# Patient Record
Sex: FEMALE | Race: ASIAN | Employment: OTHER | URBAN - METROPOLITAN AREA
[De-identification: names, ages, dates, MRNs, and addresses within clinical notes are randomized per-mention and may not be internally consistent; named-entity substitution may affect disease eponyms.]

---

## 2017-03-29 ENCOUNTER — TELEPHONE (OUTPATIENT)
Dept: FAMILY MEDICINE | Facility: CLINIC | Age: 67
End: 2017-03-29

## 2017-03-29 DIAGNOSIS — K64.4 EXTERNAL HEMORRHOIDS: Primary | ICD-10-CM

## 2017-03-29 NOTE — TELEPHONE ENCOUNTER
Rich is faxing a Prior Auth request for hydrocortisone-pramoxine (ANALPRAM HC) 2.5-1 % rectal cream which was last filled 04/29/16    Plan does not cover hydrocortisone-pramoxine (ANALPRAM HC) 2.5-1 % rectal cream.  Please call [phone] to initiate Prior Auth or change med.      ID# [id number]      Rimma Mendes  [facility]

## 2017-07-10 DIAGNOSIS — R39.15 URGENCY OF URINATION: ICD-10-CM

## 2017-07-10 NOTE — TELEPHONE ENCOUNTER
trospium (SANCTURA XR) 60 MG CP24      Last Written Prescription Date: 04/29/16  Last Fill Quantity: 100,  # refills: 03   Last Office Visit with [facility], [facility] or [facility] prescribing provider: 04/29/16      [facility]

## 2017-07-11 RX ORDER — TROSPIUM CHLORIDE ER 60 MG/1
60 CAPSULE ORAL EVERY MORNING
Qty: 30 CAPSULE | Refills: 0 | Status: SHIPPED | OUTPATIENT
Start: 2017-07-11 | End: 2017-08-11

## 2017-07-11 NOTE — TELEPHONE ENCOUNTER
Medication is being filled for 1 time refill only due to:  Patient needs to be seen for office visit for further refills.  It has been greater than one year since last office visit.  India Ryan RN

## 2017-07-21 ENCOUNTER — OFFICE VISIT (OUTPATIENT)
Dept: FAMILY MEDICINE | Facility: CLINIC | Age: 67
End: 2017-07-21
Payer: COMMERCIAL

## 2017-07-21 VITALS
SYSTOLIC BLOOD PRESSURE: 128 MMHG | HEIGHT: 59 IN | TEMPERATURE: 97.5 F | HEART RATE: 88 BPM | WEIGHT: 182 LBS | BODY MASS INDEX: 36.69 KG/M2 | OXYGEN SATURATION: 95 % | DIASTOLIC BLOOD PRESSURE: 74 MMHG

## 2017-07-21 DIAGNOSIS — N32.81 OVERACTIVE BLADDER: Primary | ICD-10-CM

## 2017-07-21 DIAGNOSIS — J30.2 SEASONAL ALLERGIC RHINITIS, UNSPECIFIED CHRONICITY, UNSPECIFIED TRIGGER: ICD-10-CM

## 2017-07-21 DIAGNOSIS — Z23 NEED FOR PROPHYLACTIC VACCINATION AGAINST STREPTOCOCCUS PNEUMONIAE (PNEUMOCOCCUS): ICD-10-CM

## 2017-07-21 DIAGNOSIS — Z12.31 VISIT FOR SCREENING MAMMOGRAM: ICD-10-CM

## 2017-07-21 PROBLEM — E66.01 MORBID OBESITY (H): Status: ACTIVE | Noted: 2017-07-21

## 2017-07-21 PROCEDURE — 99214 OFFICE O/P EST MOD 30 MIN: CPT | Mod: 25 | Performed by: FAMILY MEDICINE

## 2017-07-21 PROCEDURE — 90670 PCV13 VACCINE IM: CPT | Performed by: FAMILY MEDICINE

## 2017-07-21 PROCEDURE — 90471 IMMUNIZATION ADMIN: CPT | Performed by: FAMILY MEDICINE

## 2017-07-21 RX ORDER — TOLTERODINE TARTRATE 1 MG/1
1 TABLET, EXTENDED RELEASE ORAL 2 TIMES DAILY
Qty: 180 TABLET | Refills: 3 | Status: SHIPPED | OUTPATIENT
Start: 2017-07-21 | End: 2017-07-28 | Stop reason: SINTOL

## 2017-07-21 RX ORDER — FEXOFENADINE HCL 180 MG/1
180 TABLET ORAL DAILY
Qty: 30 TABLET | Refills: 1 | Status: SHIPPED | OUTPATIENT
Start: 2017-07-21 | End: 2018-11-27

## 2017-07-21 ASSESSMENT — PAIN SCALES - GENERAL: PAINLEVEL: NO PAIN (0)

## 2017-07-21 NOTE — PROGRESS NOTES
SUBJECTIVE:                                                    Alannah Wynn is a 66 year old female who presents to clinic today for the following health issues:    Medication Followup of Trispium    Taking Medication as prescribed: yes    Side Effects:  None    Medication Helping Symptoms:  NO-discuss about possible change    Still frequent urination and urgency, eating problems. Too much mucus. No incontinence. Urinates every 2-3 hours, up 2-3 times at night.      Acute Illness - Request a prescription for mucus, was working outside, started with upper respiratory infection symptoms    Acute illness concerns: Mucus in throat  Onset: x2weeks    Fever: no     Chills/Sweats: YES chills    Headache (location?): YES    Sinus Pressure:YES    Conjunctivitis:  no    Ear Pain: no    Rhinorrhea: YES    Congestion: YES    Sore Throat: no but itching     Cough: YES-productive of yellow sputum    Wheeze: some at night when trying to sleep.    Decreased Appetite: no     Nausea: no     Vomiting: no     Diarrhea:  no     Dysuria/Freq.: Frequency due to Hx    Fatigue/Achiness: no     Sick/Strep Exposure: no      Therapies Tried and outcome: Mucus medication, Tylenol for cold with relief helps a little bit.              Problem list and histories reviewed & adjusted, as indicated.  Additional history: as documented    Patient Active Problem List   Diagnosis     Urgency of urination     External hemorrhoids     Non morbid obesity due to excess calories     Hepatitis B immune     Need for vaccination against Streptococcus pneumoniae     Screening for cervical cancer     CARDIOVASCULAR SCREENING; LDL GOAL LESS THAN 160     Gastroesophageal reflux disease     Overactive bladder     History reviewed. No pertinent surgical history.    Social History   Substance Use Topics     Smoking status: Never Smoker     Smokeless tobacco: Never Used     Alcohol use No     Family History   Problem Relation Age of Onset     Hypertension Father   "    CANCER No family hx of      DIABETES No family hx of      CEREBROVASCULAR DISEASE No family hx of      Thyroid Disease No family hx of      Glaucoma No family hx of      Macular Degeneration No family hx of          Current Outpatient Prescriptions   Medication Sig Dispense Refill     tolterodine (DETROL) 1 MG tablet Take 1 tablet (1 mg) by mouth 2 times daily 180 tablet 3     fexofenadine (ALLEGRA) 180 MG tablet Take 1 tablet (180 mg) by mouth daily 30 tablet 1     trospium (SANCTURA XR) 60 MG CP24 24 hr capsule Take 1 capsule (60 mg) by mouth every morning 30 capsule 0     cetirizine (ZYRTEC) 10 MG tablet Take 10 mg by mouth       Allergies   Allergen Reactions     Penicillins      Sulfa Drugs      Recent Labs   Lab Test  04/30/16   0946   LDL  105*   HDL  55   TRIG  122   CR  0.80   GFRESTIMATED  72   GFRESTBLACK  87   POTASSIUM  4.1      BP Readings from Last 3 Encounters:   07/21/17 128/74   04/29/16 120/79    Wt Readings from Last 3 Encounters:   07/21/17 182 lb (82.6 kg)   04/29/16 178 lb 11.2 oz (81.1 kg)                  Labs reviewed in EPIC          Reviewed and updated as needed this visit by clinical staffTobacco       Reviewed and updated as needed this visit by Provider         ROS:  Constitutional, HEENT, cardiovascular, pulmonary, gi and gu systems are negative, except as otherwise noted.      OBJECTIVE:   /74 (BP Location: Right arm, Patient Position: Chair, Cuff Size: Adult Regular)  Pulse 88  Temp 97.5  F (36.4  C) (Oral)  Ht 4' 11.25\" (1.505 m)  Wt 182 lb (82.6 kg)  SpO2 95%  Breastfeeding? No  BMI 36.45 kg/m2  Body mass index is 36.45 kg/(m^2).  GENERAL: healthy, alert, well nourished, well hydrated, no distress, obese  HENT: ear canals- normal; TMs- normal; Nose- normal; Mouth- no ulcers, no lesions, throat is clear with no erythema or exudate.   NECK: no tenderness, no adenopathy, no asymmetry, no masses, no stiffness; thyroid- normal to palpation  RESP: lungs clear to " auscultation - no rales, no rhonchi, no wheezes  CV: regular rates and rhythm, normal S1 S2, no S3 or S4 and no murmur, no click or rub -  ABDOMEN: soft, no tenderness, no  hepatosplenomegaly, no masses, normal bowel sounds  MS: extremities- no gross deformities noted, no edema  SKIN: no suspicious lesions, no rashes  NEURO: strength and tone- normal, sensory exam- grossly normal, mentation- intact, speech- normal, reflexes- symmetric  BACK: no CVA tenderness, no paralumbar tenderness  PSYCH: Alert and oriented times 3; speech- coherent , normal rate and volume; able to articulate logical thoughts, able to abstract reason, no tangential thoughts, no hallucinations or delusions, affect- normal     Diagnostic Test Results:  Results for orders placed or performed in visit on 04/30/16   UA reflex to Microscopic and Culture   Result Value Ref Range    Color Urine Yellow     Appearance Urine Clear     Glucose Urine Negative NEG mg/dL    Bilirubin Urine Negative NEG    Ketones Urine Negative NEG mg/dL    Specific Gravity Urine 1.010 1.003 - 1.035    Blood Urine Trace (A) NEG    pH Urine 6.5 5.0 - 7.0 pH    Protein Albumin Urine Negative NEG mg/dL    Urobilinogen Urine 0.2 0.2 - 1.0 EU/dL    Nitrite Urine Negative NEG    Leukocyte Esterase Urine Negative NEG    Source Midstream Urine    Basic metabolic panel   Result Value Ref Range    Sodium 140 133 - 144 mmol/L    Potassium 4.1 3.4 - 5.3 mmol/L    Chloride 107 94 - 109 mmol/L    Carbon Dioxide 26 20 - 32 mmol/L    Anion Gap 7 3 - 14 mmol/L    Glucose 97 70 - 99 mg/dL    Urea Nitrogen 18 7 - 30 mg/dL    Creatinine 0.80 0.52 - 1.04 mg/dL    GFR Estimate 72 >60 mL/min/1.7m2    GFR Estimate If Black 87 >60 mL/min/1.7m2    Calcium 8.9 8.5 - 10.1 mg/dL   CBC with platelets   Result Value Ref Range    WBC 7.8 4.0 - 11.0 10e9/L    RBC Count 4.86 3.8 - 5.2 10e12/L    Hemoglobin 14.7 11.7 - 15.7 g/dL    Hematocrit 44.6 35.0 - 47.0 %    MCV 92 78 - 100 fl    MCH 30.2 26.5 - 33.0 pg  "   MCHC 33.0 31.5 - 36.5 g/dL    RDW 13.2 10.0 - 15.0 %    Platelet Count 306 150 - 450 10e9/L   Lipid panel reflex to direct LDL   Result Value Ref Range    Cholesterol 184 <200 mg/dL    Triglycerides 122 <150 mg/dL    HDL Cholesterol 55 >49 mg/dL    LDL Cholesterol Calculated 105 (H) <100 mg/dL    Non HDL Cholesterol 129 <130 mg/dL   Urine Microscopic   Result Value Ref Range    WBC Urine O - 2 0 - 2 /HPF    RBC Urine O - 2 0 - 2 /HPF    Squamous Epithelial /LPF Urine Few FEW /LPF    Bacteria Urine Few (A) NEG /HPF       ASSESSMENT/PLAN:         Tobacco Cessation:   reports that she has never smoked. She has never used smokeless tobacco.      BMI:   Estimated body mass index is 36.45 kg/(m^2) as calculated from the following:    Height as of this encounter: 4' 11.25\" (1.505 m).    Weight as of this encounter: 182 lb (82.6 kg).   Weight management plan: Discussed healthy diet and exercise guidelines and patient will follow up in 6 months in clinic to re-evaluate.            ICD-10-CM    1. Overactive bladder N32.81 tolterodine (DETROL) 1 MG tablet twice a day, discussed risks and benefits. Did not want to try over the counter Oxytrol patch.    2. Visit for screening mammogram Z12.31 MA SCREENING DIGITAL BILAT - Future  (s+30)- schedule    3. Need for prophylactic vaccination against Streptococcus pneumoniae (pneumococcus) Z23      ADMIN VACCINE, ADDL [27282]     Pneumococcal vaccine 13 valent PCV13 IM (Prevnar) [29772]   4. Seasonal allergic rhinitis, unspecified chronicity, unspecified trigger J30.2 fexofenadine (ALLEGRA) 180 MG tablet daily as needed. Cough seems more related to post nasal drip and exam is normal.        FURTHER TESTING:       - mammogram  FUTURE LABS:       - Schedule fasting labs in 6 months  FUTURE APPOINTMENTS:       - Follow-up visit in 6 months or sooner if any questions or concerns.   Work on weight loss  Regular exercise  See Patient Instructions    Earline Mehta MD  [facility]

## 2017-07-21 NOTE — MR AVS SNAPSHOT
After Visit Summary   7/21/2017    Alannah Wnyn    MRN: [id number]           Patient Information     Date Of Birth          1950        Visit Information        Provider Department      7/21/2017 8:00 AM Earline Mehta MD [facility]        Today's Diagnoses     Overactive bladder    -  1    Visit for screening mammogram        Need for prophylactic vaccination against Streptococcus pneumoniae (pneumococcus)        Seasonal allergic rhinitis, unspecified chronicity, unspecified trigger          Care Instructions    How to contact your care team: [phone] Pharmacy [phone]   MD IAN DOOLEY PA-C CHRIS JONES, PA-C NAM HO, MD JONATHAN BATES, MD ARVIN VOCAL, MD    Clinic hours M-Th 7am-7pm Fri 7am-5pm.   Urgent care M-F 11am-9pm  Sat/Sun 9am-5pm.   Pharmacy   Mon-Th:  8:00am-8pm   Fri:  8:00am-6:00pm  Sat/Sun  8:00am-5:00 pm       Understanding Nasal Allergies  Nasal allergies (also called allergic rhinitis) are a common health problem. They may be seasonal. This means they cause symptoms only at certain times of the year. Or they may be perennial. This means they cause symptoms all year long. Other health problems, such as asthma, often occur along with allergies as well.    What is an allergic reaction?  An allergy is a reaction to a substance called an allergen. Common allergens include:    Wind-borne pollen    Mold    Dust mites    Furry and feathered animals    Cockroaches  Normally, allergens are harmless. But when a person has allergies, the body thinks they are harmful. The body then attacks allergens with antibodies. Antibodies are attached to special cells called mast cells. Allergens stick to the antibodies. This makes the mast cells release histamine and other chemicals. This is an allergic reaction. The chemicals irritate nearby nasal tissue. This causes nasal allergy symptoms.  Common nasal allergy symptoms  Allergies can  cause nasal tissue to swell. This makes the air passages smaller. The nose may feel stuffed up. The nose may also make extra mucus, which can plug the nasal passages or drip out of the nose. Mucus can drip down the back of the throat (postnasal drip) as well. Sinus tissue can swell. This may cause pain and headache. Common allergy symptoms include:    Runny nose with clear, watery discharge    Stuffy nose (nasal congestion)    Drainage down your throat (postnasal drip)    Sneezing    Red, watery eyes    Itchy nose, eyes, ears, and throat    Plugged-up ears (ear congestion)    Sore throat    Coughing    Sinus pain and swelling    Headache  It may not be allergies  Other health problems can cause symptoms like those of nasal allergies. These include:    Nonallergic rhinitis and viruses such as colds    Irritants and pollutants, such as strong odors or smoke    Certain medicines    Changes in the weather   Treatment  Your healthcare provider will evaluate you to find the cause of your symptoms then recommend treatment. If your symptoms are due to nasal allergies, your healthcare provider may prescribe nasal steroid sprays or oral antihistamines to help reduce symptoms. Avoidance of the allergen will also be suggested. You may also be referred to an allergist.   Date Last Reviewed: 10/1/2016    6343-2633 [employer]. [address] [place] [address], [place] [address]. All rights reserved. This information is not intended as a substitute for professional medical care. Always follow your healthcare professional's instructions.        Overactive Bladder Syndrome (OAB)     Normally, urine stays in the bladder until a person decides to release it. With OAB, the bladder muscles contract involuntarily, causing a sudden urge to urinate and even urine leakage.   When the bladder muscle contract or squeeze involuntarily, it is called overactive bladder syndrome. This causes an intense urge to urinate, known as urgency.  Urgency can occur many times during the day and night. If urine leaks with the urgency, it is called urge incontinence.  A disease that affects the bladder nerves, such as multiple sclerosis, can cause overactive bladder syndrome. Other conditions, such as urinary tract infection (UTI) or prostate problems in men, can also lead to OAB. But the exact cause is often not known.  How is overactive bladder syndrome diagnosed?  Your healthcare provider will examine you and ask about your symptoms and health history. You may also have one or more of the following:    Urine test to take samples of urine and have them checked for problems.    Urinary diary to record how much fluid you take in and urinate out in a 3 day period.    Bladder ultrasound to study the bladder as it empties. Ultrasound uses sound waves to create detailed images of the inside of the body.    Cystoscopy to allow the healthcare provider to look for problems in the urinary tract. The test uses a thin, flexible scope called a cystoscope with a light and camera on the end. The scope is inserted into the urethra (the tube that carries urine out of the body).    Urodynamic studies, a battery of tests designed to measure and record many aspects of urinary bladder function, including pressures, volume, and urine flow.  How is overactive bladder syndrome treated?  Treatment depends on the cause and severity of your OAB. Treatments may include the following:    Changing urination habits may be suggested. For instance, your healthcare provider may suggest that you urinate as soon as you feel the urge. You may also need to limit how much fluid you have during the day.    Exercising your pelvic muscles can help strengthen muscles used during urination. These exercises are called Kegels. They involve stella as if you were stopping your urine stream and tightening your rectum as if trying not to pass gas. Your healthcare provider can help you learn how to do  Hadley.    Biofeedback to help you learn to control the movement of your bladder muscles. Sensors are placed on your abdomen. They turn signals given off by your muscles into lines on a computer screen.    Medicine may be given to relax the bladder muscle. Medicine can also help ease bladder contractions, which reduces the urge to urinate.    Neuromodulation may be done if medicine and behavioral changes don t work. Electrical pulses are sent to the sacral nerves (nerves that affect the pelvic area). These pulses help relieve OAB and urge incontinence.    Surgery to make the bladder larger may be done in severe cases.  With treatment, OAB can be managed. A condition, such as UTI, that has caused you to have OAB will be treated. Treatment may involve taking medicine for months or years. You may also need to make changes in your daily routine. This may include going to the bathroom more often than you think you need to. Or, you may need to cut back on caffeine and alcohol because these can make symptoms worse. Your healthcare provider can tell you more.     When to call your healthcare provider  Call the healthcare provider right away if you have any of the following:    Fever of 100.4 F (38.0  C) or higher     No improvement with treatment    Trouble urinating because of pain    Back or abdominal pain   Date Last Reviewed: 1/1/2017 2000-2017 [employer]. [address] [place] [address]. All rights reserved. This information is not intended as a substitute for professional medical care. Always follow your healthcare professional's instructions.        You are due for your annual mammogram. Please call and schedule your appointment at a time and location that is good for you.    [facility] is available for screening mammographs every other Monday 4:15-5:15, Tuesday 8-10:45, Wednesday 11-12, Friday 3:15-4:15, and every other Saturday 9:15-12:30.    [id number]  "[place] [address]  [phone]   24 hour Mammography scheduling  [phone]    [facility] is available M,W, Th 7:15 am to 5 pm, Tuesday 7:15 am to 4 pm, and Fridays 7:15 to 4:30 pm.     [facility]  [address] [place] [address]  [phone]            Follow-ups after your visit        Follow-up notes from your care team     Return in about 6 months (around 1/21/2018) for Physical Exam, Lab Work, medication follow up, Weight check.      Future tests that were ordered for you today     Open Future Orders        Priority Expected Expires Ordered    MA SCREENING DIGITAL BILAT - Future  (s+30) Routine  7/21/2018 7/21/2017            Who to contact     If you have questions or need follow up information about today's clinic visit or your schedule please contact [facility] [place] directly at [phone].  Normal or non-critical lab and imaging results will be communicated to you by [email or web address], letter or phone within 4 business days after the clinic has received the results. If you do not hear from us within 7 days, please contact the clinic through Epulst or phone. If you have a critical or abnormal lab result, we will notify you by phone as soon as possible.  Submit refill requests through Red[employer] or call your pharmacy and they will forward the refill request to us. Please allow 3 business days for your refill to be completed.          Additional Information About Your Visit        Red[employer] Information     [employer] lets you send messages to your doctor, view your test results, renew your prescriptions, schedule appointments and more. To sign up, go to [email or web address] . Click on \"Log in\" on the left side of the screen, which will take you to the Welcome page. Then click on \"Sign up Now\" on the right side of the page.     You will be asked to enter the access code listed below, as well as some personal information. " "Please follow the directions to create your username and password.     Your access code is: [email or web address]  Expires: 10/19/2017  8:36 AM     Your access code will  in 90 days. If you need help or a new code, please call your [facility] clinic or [phone].        Care EveryWhere ID     This is your Care EveryWhere ID. This could be used by other organizations to access your [facility] medical records  [id number]        Your Vitals Were     Pulse Temperature Height Pulse Oximetry Breastfeeding? BMI (Body Mass Index)    88 97.5  F (36.4  C) (Oral) 4' 11.25\" (1.505 m) 95% No 36.45 kg/m2       Blood Pressure from Last 3 Encounters:   17 128/74   16 120/79    Weight from Last 3 Encounters:   17 182 lb (82.6 kg)   16 178 lb 11.2 oz (81.1 kg)              We Performed the Following          ADMIN VACCINE, ADDL [78909]     Pneumococcal vaccine 13 valent PCV13 IM (Prevnar) [11099]          Today's Medication Changes          These changes are accurate as of: 17  8:53 AM.  If you have any questions, ask your nurse or doctor.               Start taking these medicines.        Dose/Directions    fexofenadine 180 MG tablet   Commonly known as:  ALLEGRA   Used for:  Seasonal allergic rhinitis, unspecified chronicity, unspecified trigger   Started by:  Earline Mehta MD        Dose:  180 mg   Take 1 tablet (180 mg) by mouth daily   Quantity:  30 tablet   Refills:  1       tolterodine 1 MG tablet   Commonly known as:  DETROL   Used for:  Overactive bladder   Started by:  Earline Mehta MD        Dose:  1 mg   Take 1 tablet (1 mg) by mouth 2 times daily   Quantity:  180 tablet   Refills:  3            Where to get your medicines      These medications were sent to [id number] - [place] [address] AT [facility] & [address], [place] [address]     Phone:  [phone]     fexofenadine 180 MG tablet    tolterodine 1 MG tablet       "          Primary Care Provider    None Specified       No primary provider on file.        Equal Access to Services     CARON SALDAÑA : Hadii bayron Leyva, luisito neal, susan archermadiallo zarate, ashley sebastian. So [facility] [phone].    ATENCIÓN: Si habla español, tiene a king disposición servicios gratuitos de asistencia lingüística. Ll[facility] [phone].    We comply with applicable federal civil rights laws and [place] laws. We do not discriminate on the basis of race, color, national origin, age, disability sex, sexual orientation or gender identity.            Thank you!     Thank you for choosing [facility]  for your care. Our goal is always to provide you with excellent care. Hearing back from our patients is one way we can continue to improve our services. Please take a few minutes to complete the written survey that you may receive in the mail after your visit with us. Thank you!             Your Updated Medication List - Protect others around you: Learn how to safely use, store and throw away your medicines at www.disposemymeds.org.          This list is accurate as of: 7/21/17  8:53 AM.  Always use your most recent med list.                   Brand Name Dispense Instructions for use Diagnosis    cetirizine 10 MG tablet    zyrTEC     Take 10 mg by mouth        fexofenadine 180 MG tablet    ALLEGRA    30 tablet    Take 1 tablet (180 mg) by mouth daily    Seasonal allergic rhinitis, unspecified chronicity, unspecified trigger       tolterodine 1 MG tablet    DETROL    180 tablet    Take 1 tablet (1 mg) by mouth 2 times daily    Overactive bladder       trospium 60 MG Cp24 24 hr capsule    SANCTURA XR    30 capsule    Take 1 capsule (60 mg) by mouth every morning    Urgency of urination

## 2017-07-21 NOTE — PATIENT INSTRUCTIONS
How to contact your care team: [phone] Pharmacy [phone]   MD IAN DOOLEY PA-C CHRIS JONES, PA-C NAM HO, MD JONATHAN BATES, MD ARVIN VOCAL, MD    Clinic hours M-Th 7am-7pm Fri 7am-5pm.   Urgent care M-F 11am-9pm  Sat/Sun 9am-5pm.   Pharmacy   Mon-Th:  8:00am-8pm   Fri:  8:00am-6:00pm  Sat/Sun  8:00am-5:00 pm       Understanding Nasal Allergies  Nasal allergies (also called allergic rhinitis) are a common health problem. They may be seasonal. This means they cause symptoms only at certain times of the year. Or they may be perennial. This means they cause symptoms all year long. Other health problems, such as asthma, often occur along with allergies as well.    What is an allergic reaction?  An allergy is a reaction to a substance called an allergen. Common allergens include:    Wind-borne pollen    Mold    Dust mites    Furry and feathered animals    Cockroaches  Normally, allergens are harmless. But when a person has allergies, the body thinks they are harmful. The body then attacks allergens with antibodies. Antibodies are attached to special cells called mast cells. Allergens stick to the antibodies. This makes the mast cells release histamine and other chemicals. This is an allergic reaction. The chemicals irritate nearby nasal tissue. This causes nasal allergy symptoms.  Common nasal allergy symptoms  Allergies can cause nasal tissue to swell. This makes the air passages smaller. The nose may feel stuffed up. The nose may also make extra mucus, which can plug the nasal passages or drip out of the nose. Mucus can drip down the back of the throat (postnasal drip) as well. Sinus tissue can swell. This may cause pain and headache. Common allergy symptoms include:    Runny nose with clear, watery discharge    Stuffy nose (nasal congestion)    Drainage down your throat (postnasal drip)    Sneezing    Red, watery eyes    Itchy nose, eyes, ears, and throat    Plugged-up ears (ear  congestion)    Sore throat    Coughing    Sinus pain and swelling    Headache  It may not be allergies  Other health problems can cause symptoms like those of nasal allergies. These include:    Nonallergic rhinitis and viruses such as colds    Irritants and pollutants, such as strong odors or smoke    Certain medicines    Changes in the weather   Treatment  Your healthcare provider will evaluate you to find the cause of your symptoms then recommend treatment. If your symptoms are due to nasal allergies, your healthcare provider may prescribe nasal steroid sprays or oral antihistamines to help reduce symptoms. Avoidance of the allergen will also be suggested. You may also be referred to an allergist.   Date Last Reviewed: 10/1/2016    0624-0401 [employer]. [address] [place] [address], [place] [address]. All rights reserved. This information is not intended as a substitute for professional medical care. Always follow your healthcare professional's instructions.        Overactive Bladder Syndrome (OAB)     Normally, urine stays in the bladder until a person decides to release it. With OAB, the bladder muscles contract involuntarily, causing a sudden urge to urinate and even urine leakage.   When the bladder muscle contract or squeeze involuntarily, it is called overactive bladder syndrome. This causes an intense urge to urinate, known as urgency. Urgency can occur many times during the day and night. If urine leaks with the urgency, it is called urge incontinence.  A disease that affects the bladder nerves, such as multiple sclerosis, can cause overactive bladder syndrome. Other conditions, such as urinary tract infection (UTI) or prostate problems in men, can also lead to OAB. But the exact cause is often not known.  How is overactive bladder syndrome diagnosed?  Your healthcare provider will examine you and ask about your symptoms and health history. You may also have one or more of the following:    Urine  test to take samples of urine and have them checked for problems.    Urinary diary to record how much fluid you take in and urinate out in a 3 day period.    Bladder ultrasound to study the bladder as it empties. Ultrasound uses sound waves to create detailed images of the inside of the body.    Cystoscopy to allow the healthcare provider to look for problems in the urinary tract. The test uses a thin, flexible scope called a cystoscope with a light and camera on the end. The scope is inserted into the urethra (the tube that carries urine out of the body).    Urodynamic studies, a battery of tests designed to measure and record many aspects of urinary bladder function, including pressures, volume, and urine flow.  How is overactive bladder syndrome treated?  Treatment depends on the cause and severity of your OAB. Treatments may include the following:    Changing urination habits may be suggested. For instance, your healthcare provider may suggest that you urinate as soon as you feel the urge. You may also need to limit how much fluid you have during the day.    Exercising your pelvic muscles can help strengthen muscles used during urination. These exercises are called Kegels. They involve stella as if you were stopping your urine stream and tightening your rectum as if trying not to pass gas. Your healthcare provider can help you learn how to do Kegels.    Biofeedback to help you learn to control the movement of your bladder muscles. Sensors are placed on your abdomen. They turn signals given off by your muscles into lines on a computer screen.    Medicine may be given to relax the bladder muscle. Medicine can also help ease bladder contractions, which reduces the urge to urinate.    Neuromodulation may be done if medicine and behavioral changes don t work. Electrical pulses are sent to the sacral nerves (nerves that affect the pelvic area). These pulses help relieve OAB and urge incontinence.    Surgery to  make the bladder larger may be done in severe cases.  With treatment, OAB can be managed. A condition, such as UTI, that has caused you to have OAB will be treated. Treatment may involve taking medicine for months or years. You may also need to make changes in your daily routine. This may include going to the bathroom more often than you think you need to. Or, you may need to cut back on caffeine and alcohol because these can make symptoms worse. Your healthcare provider can tell you more.     When to call your healthcare provider  Call the healthcare provider right away if you have any of the following:    Fever of 100.4 F (38.0  C) or higher     No improvement with treatment    Trouble urinating because of pain    Back or abdominal pain   Date Last Reviewed: 1/1/2017 2000-2017 [employer]. [address] [place] [address], [place] [address]. All rights reserved. This information is not intended as a substitute for professional medical care. Always follow your healthcare professional's instructions.        You are due for your annual mammogram. Please call and schedule your appointment at a time and location that is good for you.    [facility] is available for screening mammographs every other Monday 4:15-5:15, Tuesday 8-10:45, Wednesday 11-12, Friday 3:15-4:15, and every other Saturday 9:15-12:30.    [address] [place] [address]  [phone]   24 hour Mammography scheduling  [phone]    [facility] is available M,W, Th 7:15 am to 5 pm, Tuesday 7:15 am to 4 pm, and Fridays 7:15 to 4:30 pm.     [facility]  [address] [place] [address]  [phone]

## 2017-07-21 NOTE — NURSING NOTE
"Chief Complaint   Patient presents with     Urinary Urgency     Medication check, discuss possible change     Nasal Congestion     Yellow mucus in throat       Initial /74 (BP Location: Right arm, Patient Position: Chair, Cuff Size: Adult Regular)  Pulse 88  Temp 97.5  F (36.4  C) (Oral)  Ht 4' 11.25\" (1.505 m)  Wt 182 lb (82.6 kg)  SpO2 95%  Breastfeeding? No  BMI 36.45 kg/m2 Estimated body mass index is 36.45 kg/(m^2) as calculated from the following:    Height as of this encounter: 4' 11.25\" (1.505 m).    Weight as of this encounter: 182 lb (82.6 kg).  Medication Reconciliation: complete     Niko Hughes CMA    "

## 2017-07-24 ENCOUNTER — TELEPHONE (OUTPATIENT)
Dept: FAMILY MEDICINE | Facility: CLINIC | Age: 67
End: 2017-07-24

## 2017-07-24 DIAGNOSIS — N32.81 OVERACTIVE BLADDER: Primary | ICD-10-CM

## 2017-07-24 NOTE — TELEPHONE ENCOUNTER
John is faxing a Prior Auth:    Plan does not cover fexofenadine (ALLEGRA) 180 MG tablet.  Please call [phone] to initiate Prior Auth or change med.      ID# [id number]      [facility]

## 2017-07-24 NOTE — TELEPHONE ENCOUNTER
This is over the counter. Please check with insurance for alternatives or have patient take over the counter.   Earline Mehta MD

## 2017-07-25 NOTE — TELEPHONE ENCOUNTER
This writer attempted to contact Alannah on 07/25/17    Reason for call medication prior authorization, Dr. Mehta's response and left detailed message.    When patient calls back, please contact 1st floor [place] Care Team (MA/TC). If no one available, document that pt called and route to care team. routine priority .      Eva An, CMA

## 2017-07-27 NOTE — TELEPHONE ENCOUNTER
This writer attempted to contact Alannah on 07/27/17    Reason for call medication question and prior authorizatoin and left detailed message.    When patient calls back, please contact 1st floor [place] Care Team (MA/TC). If no one available, document that pt called and route to care team. routine priority.      Eva An, CMA

## 2017-07-27 NOTE — TELEPHONE ENCOUNTER
This writer attempted to contact Alannah on 07/27/17      Reason for call what side effects?  and left message to return call.      When patient calls back, please contact clinic RN team. If no one available, document that pt called and route to care team. routine priority.          Barbara Perez RN

## 2017-07-28 NOTE — TELEPHONE ENCOUNTER
I put in a referral for urology. They should do evaluation and treatment to see what is causing the symptoms and can prescribe alternative medication.  Earline Mehta MD

## 2017-07-28 NOTE — TELEPHONE ENCOUNTER
Patient called back -     Pt states she took 2 doses of Detrol but it made her chest tight and difficult to breathe so she stopped taking it. The symptoms resolved.    Pt asking if there is an alternative that can be sent?    Routing message to provider to review and advise. Detrol removed from med list with S/E for reason.    Hair Song RN

## 2017-07-28 NOTE — TELEPHONE ENCOUNTER
Patient was informed that a referral was placed for urology at [facility].  Patient was provided phone number [phone] to schedule appointment.  Advised that this provider would do an evaluation and likely be able to prescription an alternative medication.  Patient verbalized understanding.  India Ryan RN

## 2017-07-28 NOTE — TELEPHONE ENCOUNTER
This writer attempted to contact Alannah on 07/28/17      Reason for call side effects of medication - triage and left message to return call.      When patient calls back, please contact clinic RN team. If no one available, document that pt called and route to care team.      Barbara Perez RN

## 2017-08-08 ENCOUNTER — RADIANT APPOINTMENT (OUTPATIENT)
Dept: MAMMOGRAPHY | Facility: CLINIC | Age: 67
End: 2017-08-08
Attending: FAMILY MEDICINE
Payer: COMMERCIAL

## 2017-08-08 DIAGNOSIS — Z12.31 VISIT FOR SCREENING MAMMOGRAM: ICD-10-CM

## 2017-08-08 PROCEDURE — G0202 SCR MAMMO BI INCL CAD: HCPCS | Mod: TC

## 2017-08-11 DIAGNOSIS — R39.15 URGENCY OF URINATION: ICD-10-CM

## 2017-08-16 RX ORDER — TROSPIUM CHLORIDE ER 60 MG/1
CAPSULE ORAL
Qty: 30 CAPSULE | Refills: 10 | Status: SHIPPED | OUTPATIENT
Start: 2017-08-16 | End: 2018-12-27

## 2018-04-20 ENCOUNTER — OFFICE VISIT (OUTPATIENT)
Dept: OPTOMETRY | Facility: CLINIC | Age: 68
End: 2018-04-20
Payer: COMMERCIAL

## 2018-04-20 DIAGNOSIS — H52.03 HYPEROPIA WITH PRESBYOPIA OF BOTH EYES: Primary | ICD-10-CM

## 2018-04-20 DIAGNOSIS — H52.4 HYPEROPIA WITH PRESBYOPIA OF BOTH EYES: Primary | ICD-10-CM

## 2018-04-20 DIAGNOSIS — H25.813 COMBINED FORMS OF AGE-RELATED CATARACT OF BOTH EYES: ICD-10-CM

## 2018-04-20 DIAGNOSIS — H35.369 DRUSEN: ICD-10-CM

## 2018-04-20 PROCEDURE — 92015 DETERMINE REFRACTIVE STATE: CPT | Performed by: OPTOMETRIST

## 2018-04-20 PROCEDURE — 92014 COMPRE OPH EXAM EST PT 1/>: CPT | Performed by: OPTOMETRIST

## 2018-04-20 ASSESSMENT — CUP TO DISC RATIO
OD_RATIO: 0.25
OS_RATIO: 0.25

## 2018-04-20 ASSESSMENT — REFRACTION_WEARINGRX
OS_ADD: +2.50
OD_ADD: +2.50
OD_CYLINDER: +0.50
SPECS_TYPE: BIFOCAL
OD_SPHERE: +1.50
OS_SPHERE: +2.00
OD_AXIS: 37

## 2018-04-20 ASSESSMENT — SLIT LAMP EXAM - LIDS
COMMENTS: NORMAL
COMMENTS: NORMAL

## 2018-04-20 ASSESSMENT — REFRACTION_MANIFEST
OD_SPHERE: +0.25
OD_AXIS: 159
OD_AXIS: 007
METHOD_AUTOREFRACTION: 1
OD_SPHERE: +1.25
OS_SPHERE: +2.00
OD_CYLINDER: +0.50
OD_ADD: +2.50
OS_SPHERE: +1.50
OD_CYLINDER: +1.25
OS_ADD: +2.50

## 2018-04-20 ASSESSMENT — EXTERNAL EXAM - LEFT EYE: OS_EXAM: NORMAL

## 2018-04-20 ASSESSMENT — VISUAL ACUITY
OD_SC: 20/70
OD_CC: 20/30
OD_CC: 20/40
OD_CC+: -2
OS_CC: 20/40
OD_SC+: -2
CORRECTION_TYPE: GLASSES
METHOD: SNELLEN - LINEAR
OS_SC: 20/150
OS_CC: 20/30

## 2018-04-20 ASSESSMENT — CONF VISUAL FIELD
OS_NORMAL: 1
OD_NORMAL: 1
METHOD: COUNTING FINGERS

## 2018-04-20 ASSESSMENT — EXTERNAL EXAM - RIGHT EYE: OD_EXAM: NORMAL

## 2018-04-20 ASSESSMENT — TONOMETRY
IOP_METHOD: APPLANATION
OD_IOP_MMHG: 19
OS_IOP_MMHG: 17

## 2018-04-20 NOTE — PROGRESS NOTES
Chief Complaint   Patient presents with     COMPREHENSIVE EYE EXAM         Last Eye Exam: 2/2016  Dilated Previously: Yes    What are you currently using to see?  glasses       Distance Vision Acuity: Noticed gradual change in both eyes    Near Vision Acuity: Not satisfied     Eye Comfort: dry  Do you use eye drops? : Yes: as needed  Occupation or Hobbies: retired    Tari Balderas  [employer]            Medical, surgical and family histories reviewed and updated 4/20/2018.       OBJECTIVE: See Ophthalmology exam    ASSESSMENT:    ICD-10-CM    1. Hyperopia with presbyopia of both eyes H52.03 EYE EXAM (SIMPLE-NONBILLABLE)    H52.4 REFRACTION   2. Combined forms of age-related cataract of both eyes H25.813 EYE EXAM (SIMPLE-NONBILLABLE)   3. Drusen H35.369 EYE EXAM (SIMPLE-NONBILLABLE)      PLAN:     Patient Instructions   There was a small change in the prescription for your glasses.    Your eyes may be blurry at near and sensitive to light for several hours from the dilating drops.    Eat plenty of fruits and vegetables daily and protect your eyes from UV with sunglasses while outside or driving.

## 2018-04-20 NOTE — MR AVS SNAPSHOT
"              After Visit Summary   4/20/2018    Alannah Wynn    MRN: [id number]           Patient Information     Date Of Birth          1950        Visit Information        Provider Department      4/20/2018 4:00 PM Deepa Huynh OD [facility]        Today's Diagnoses     Hyperopia with presbyopia of both eyes    -  1    Combined forms of age-related cataract of both eyes        Drusen          Care Instructions    There was a small change in the prescription for your glasses.    Your eyes may be blurry at near and sensitive to light for several hours from the dilating drops.    Eat plenty of fruits and vegetables daily and protect your eyes from UV with sunglasses while outside or driving.            Follow-ups after your visit        Follow-up notes from your care team     Return in about 1 year (around 4/20/2019) for comprehensive eye exam.      Who to contact     If you have questions or need follow up information about today's clinic visit or your schedule please contact [facility] directly at [phone].  Normal or non-critical lab and imaging results will be communicated to you by Mobi-Motohart, letter or phone within 4 business days after the clinic has received the results. If you do not hear from us within 7 days, please contact the clinic through Folkstr[employer] or phone. If you have a critical or abnormal lab result, we will notify you by phone as soon as possible.  Submit refill requests through L[employer]ix or call your pharmacy and they will forward the refill request to us. Please allow 3 business days for your refill to be completed.          Additional Information About Your Visit        Mobi-Motohart Information     [employer] lets you send messages to your doctor, view your test results, renew your prescriptions, schedule appointments and more. To sign up, go to [email or web address] . Click on \"Log in\" on the left side of the screen, which will take you to the " "Welcome page. Then click on \"Sign up Now\" on the right side of the page.     You will be asked to enter the access code listed below, as well as some personal information. Please follow the directions to create your username and password.     Your access code is: [email or web address]  Expires: 2018  9:29 AM     Your access code will  in 90 days. If you need help or a new code, please call your [place] clinic or [phone].        Care EveryWhere ID     This is your Care EveryWhere ID. This could be used by other organizations to access your [place] medical records  [id number]         Blood Pressure from Last 3 Encounters:   17 128/74   16 120/79    Weight from Last 3 Encounters:   17 82.6 kg (182 lb)   16 81.1 kg (178 lb 11.2 oz)              We Performed the Following     EYE EXAM (SIMPLE-NONBILLABLE)     REFRACTION        Primary Care Provider Fax #    Physician No Ref-Primary [phone]       No address on file        Equal Access to Services     [facility]: Hadii bayron mayes hadqio Sodelmy, waaxda luqadaha, qaybta kaalmada adeelysia, ashley gann . So [facility] [phone].    ATENCIÓN: Si habla español, tiene a king disposición servicios gratuitos de asistencia lingüística. Llame al [phone].    We comply with applicable federal civil rights laws and [place] laws. We do not discriminate on the basis of race, color, national origin, age, disability, sex, sexual orientation, or gender identity.            Thank you!     Thank you for choosing [facility]  for your care. Our goal is always to provide you with excellent care. Hearing back from our patients is one way we can continue to improve our services. Please take a few minutes to complete the written survey that you may receive in the mail after your visit with us. Thank you!             Your Updated Medication List - Protect others around you: Learn how to safely use, store and " throw away your medicines at www.disposemymeds.org.          This list is accurate as of 4/20/18 11:59 PM.  Always use your most recent med list.                   Brand Name Dispense Instructions for use Diagnosis    cetirizine 10 MG tablet    zyrTEC     Take 10 mg by mouth        fexofenadine 180 MG tablet    ALLEGRA    30 tablet    Take 1 tablet (180 mg) by mouth daily    Seasonal allergic rhinitis, unspecified chronicity, unspecified trigger       trospium 60 MG Cp24 24 hr capsule    SANCTURA XR    30 capsule    TAKE 1 CAPSULE BY MOUTH EVERY MORNING    Urgency of urination

## 2018-04-20 NOTE — LETTER
4/20/2018         RE: Alannah Wynn  [address]  [facility] [address]        Dear Colleague,    Thank you for referring your patient, Alannah Wynn, to the [facility]. Please see a copy of my visit note below.    Chief Complaint   Patient presents with     COMPREHENSIVE EYE EXAM         Last Eye Exam: 2/2016  Dilated Previously: Yes    What are you currently using to see?  glasses       Distance Vision Acuity: Noticed gradual change in both eyes    Near Vision Acuity: Not satisfied     Eye Comfort: dry  Do you use eye drops? : Yes: as needed  Occupation or Hobbies: retired    Tari Balderas  [employer]            Medical, surgical and family histories reviewed and updated 4/20/2018.       OBJECTIVE: See Ophthalmology exam    ASSESSMENT:    ICD-10-CM    1. Hyperopia with presbyopia of both eyes H52.03 EYE EXAM (SIMPLE-NONBILLABLE)    H52.4 REFRACTION   2. Combined forms of age-related cataract of both eyes H25.813 EYE EXAM (SIMPLE-NONBILLABLE)   3. Drusen H35.369 EYE EXAM (SIMPLE-NONBILLABLE)      PLAN:     Patient Instructions   There was a small change in the prescription for your glasses.    Your eyes may be blurry at near and sensitive to light for several hours from the dilating drops.    Eat plenty of fruits and vegetables daily and protect your eyes from UV with sunglasses while outside or driving.           Again, thank you for allowing me to participate in the care of your patient.        Sincerely,        Deepa Huynh OD

## 2018-04-25 NOTE — PATIENT INSTRUCTIONS
There was a small change in the prescription for your glasses.    Your eyes may be blurry at near and sensitive to light for several hours from the dilating drops.    Eat plenty of fruits and vegetables daily and protect your eyes from UV with sunglasses while outside or driving.

## 2018-11-27 ENCOUNTER — OFFICE VISIT (OUTPATIENT)
Dept: FAMILY MEDICINE | Facility: CLINIC | Age: 68
End: 2018-11-27
Payer: COMMERCIAL

## 2018-11-27 VITALS
BODY MASS INDEX: 36.29 KG/M2 | SYSTOLIC BLOOD PRESSURE: 131 MMHG | OXYGEN SATURATION: 93 % | RESPIRATION RATE: 18 BRPM | DIASTOLIC BLOOD PRESSURE: 68 MMHG | HEIGHT: 59 IN | TEMPERATURE: 97.3 F | HEART RATE: 84 BPM | WEIGHT: 180 LBS

## 2018-11-27 DIAGNOSIS — Z13.1 SCREENING FOR DIABETES MELLITUS: ICD-10-CM

## 2018-11-27 DIAGNOSIS — E66.09 NON MORBID OBESITY DUE TO EXCESS CALORIES: ICD-10-CM

## 2018-11-27 DIAGNOSIS — L98.9 SKIN LESION: ICD-10-CM

## 2018-11-27 DIAGNOSIS — Z23 NEED FOR PNEUMOCOCCAL VACCINE: ICD-10-CM

## 2018-11-27 DIAGNOSIS — Z13.6 CARDIOVASCULAR SCREENING; LDL GOAL LESS THAN 160: ICD-10-CM

## 2018-11-27 DIAGNOSIS — Z00.00 NORMAL PHYSICAL EXAM: Primary | ICD-10-CM

## 2018-11-27 DIAGNOSIS — N32.81 OVERACTIVE BLADDER: ICD-10-CM

## 2018-11-27 DIAGNOSIS — Z23 FLU VACCINE NEED: ICD-10-CM

## 2018-11-27 PROBLEM — E66.01 MORBID OBESITY (H): Status: RESOLVED | Noted: 2017-07-21 | Resolved: 2018-11-27

## 2018-11-27 PROCEDURE — 99212 OFFICE O/P EST SF 10 MIN: CPT | Mod: 25 | Performed by: FAMILY MEDICINE

## 2018-11-27 PROCEDURE — 90662 IIV NO PRSV INCREASED AG IM: CPT | Performed by: FAMILY MEDICINE

## 2018-11-27 PROCEDURE — 90471 IMMUNIZATION ADMIN: CPT | Performed by: FAMILY MEDICINE

## 2018-11-27 PROCEDURE — 90472 IMMUNIZATION ADMIN EACH ADD: CPT | Performed by: FAMILY MEDICINE

## 2018-11-27 PROCEDURE — 99397 PER PM REEVAL EST PAT 65+ YR: CPT | Mod: 25 | Performed by: FAMILY MEDICINE

## 2018-11-27 PROCEDURE — 90732 PPSV23 VACC 2 YRS+ SUBQ/IM: CPT | Performed by: FAMILY MEDICINE

## 2018-11-27 RX ORDER — FESOTERODINE FUMARATE 8 MG/1
8 TABLET, FILM COATED, EXTENDED RELEASE ORAL DAILY
Qty: 30 TABLET | Refills: 11 | Status: SHIPPED | OUTPATIENT
Start: 2018-11-27

## 2018-11-27 ASSESSMENT — ACTIVITIES OF DAILY LIVING (ADL): CURRENT_FUNCTION: NO ASSISTANCE NEEDED

## 2018-11-27 ASSESSMENT — ENCOUNTER SYMPTOMS
MUSCULOSKELETAL NEGATIVE: 1
EYES NEGATIVE: 1
CARDIOVASCULAR NEGATIVE: 1
HEMATOLOGIC/LYMPHATIC NEGATIVE: 1
CONSTITUTIONAL NEGATIVE: 1
NEUROLOGICAL NEGATIVE: 1
PSYCHIATRIC NEGATIVE: 1
RESPIRATORY NEGATIVE: 1
GASTROINTESTINAL NEGATIVE: 1
ENDOCRINE NEGATIVE: 1

## 2018-11-27 ASSESSMENT — PAIN SCALES - GENERAL: PAINLEVEL: NO PAIN (0)

## 2018-11-27 NOTE — PATIENT INSTRUCTIONS
Overactive Bladder Syndrome (OAB)     Normally, urine stays in the bladder until a person decides to release it. With OAB, the bladder muscles contract involuntarily, causing a sudden urge to urinate and even urine leakage.   When the bladder muscle contract or squeeze involuntarily, it is called overactive bladder syndrome. This causes an intense urge to urinate, known as urgency. Urgency can occur many times during the day and night. If urine leaks with the urgency, it is called urge incontinence.  A disease that affects the bladder nerves, such as multiple sclerosis, can cause overactive bladder syndrome. Other conditions, such as urinary tract infection (UTI) or prostate problems in men, can also lead to OAB. But the exact cause is often not known.  How is overactive bladder syndrome diagnosed?  Your healthcare provider will examine you and ask about your symptoms and health history. You may also have one or more of the following:    Urine test to take samples of urine and have them checked for problems.    Urinary diary to record how much fluid you take in and urinate out in a 3 day period.    Bladder ultrasound to study the bladder as it empties. Ultrasound uses sound waves to create detailed images of the inside of the body.    Cystoscopy to allow the healthcare provider to look for problems in the urinary tract. The test uses a thin, flexible scope called a cystoscope with a light and camera on the end. The scope is inserted into the urethra (the tube that carries urine out of the body).    Urodynamic studies, a battery of tests designed to measure and record many aspects of urinary bladder function, including pressures, volume, and urine flow.  How is overactive bladder syndrome treated?  Treatment depends on the cause and severity of your OAB. Treatments may include the following:    Changing urination habits may be suggested. For instance, your healthcare provider may suggest that you urinate as soon as  you feel the urge. You may also need to limit how much fluid you have during the day.    Exercising your pelvic muscles can help strengthen muscles used during urination. These exercises are called Kegels. They involve stella as if you were stopping your urine stream and tightening your rectum as if trying not to pass gas. Your healthcare provider can help you learn how to do Kegels.    Biofeedback to help you learn to control the movement of your bladder muscles. Sensors are placed on your abdomen. They turn signals given off by your muscles into lines on a computer screen.    Medicine may be given to relax the bladder muscle. Medicine can also help ease bladder contractions, which reduces the urge to urinate.    Neuromodulation may be done if medicine and behavioral changes don t work. Electrical pulses are sent to the sacral nerves (nerves that affect the pelvic area). These pulses help relieve OAB and urge incontinence.    Surgery to make the bladder larger may be done in severe cases.  With treatment, OAB can be managed. A condition, such as UTI, that has caused you to have OAB will be treated. Treatment may involve taking medicine for months or years. You may also need to make changes in your daily routine. This may include going to the bathroom more often than you think you need to. Or, you may need to cut back on caffeine and alcohol because these can make symptoms worse. Your healthcare provider can tell you more.     When to call your healthcare provider  Call the healthcare provider right away if you have any of the following:    Fever of 100.4 F (38.0  C) or higher     No improvement with treatment    Trouble urinating because of pain    Back or abdominal pain   Date Last Reviewed: 1/1/2017 2000-2018 [employer]. [address] [place] [address], [place] [address]. All rights reserved. This information is not intended as a substitute for professional medical care. Always follow your  healthcare professional's instructions.

## 2018-11-27 NOTE — MR AVS SNAPSHOT
After Visit Summary   11/27/2018    Alannah Wynn    MRN: [id number]           Patient Information     Date Of Birth          1950        Visit Information        Provider Department      11/27/2018 4:40 PM Earline Mehta MD [facility]        Today's Diagnoses     Normal physical exam    -  1    Non morbid obesity due to excess calories        Overactive bladder        Skin lesion        Flu vaccine need        CARDIOVASCULAR SCREENING; LDL GOAL LESS THAN 160        Screening for diabetes mellitus        Need for pneumococcal vaccine          Care Instructions      Overactive Bladder Syndrome (OAB)     Normally, urine stays in the bladder until a person decides to release it. With OAB, the bladder muscles contract involuntarily, causing a sudden urge to urinate and even urine leakage.   When the bladder muscle contract or squeeze involuntarily, it is called overactive bladder syndrome. This causes an intense urge to urinate, known as urgency. Urgency can occur many times during the day and night. If urine leaks with the urgency, it is called urge incontinence.  A disease that affects the bladder nerves, such as multiple sclerosis, can cause overactive bladder syndrome. Other conditions, such as urinary tract infection (UTI) or prostate problems in men, can also lead to OAB. But the exact cause is often not known.  How is overactive bladder syndrome diagnosed?  Your healthcare provider will examine you and ask about your symptoms and health history. You may also have one or more of the following:    Urine test to take samples of urine and have them checked for problems.    Urinary diary to record how much fluid you take in and urinate out in a 3 day period.    Bladder ultrasound to study the bladder as it empties. Ultrasound uses sound waves to create detailed images of the inside of the body.    Cystoscopy to allow the healthcare provider to look for problems in the  urinary tract. The test uses a thin, flexible scope called a cystoscope with a light and camera on the end. The scope is inserted into the urethra (the tube that carries urine out of the body).    Urodynamic studies, a battery of tests designed to measure and record many aspects of urinary bladder function, including pressures, volume, and urine flow.  How is overactive bladder syndrome treated?  Treatment depends on the cause and severity of your OAB. Treatments may include the following:    Changing urination habits may be suggested. For instance, your healthcare provider may suggest that you urinate as soon as you feel the urge. You may also need to limit how much fluid you have during the day.    Exercising your pelvic muscles can help strengthen muscles used during urination. These exercises are called Kegels. They involve stella as if you were stopping your urine stream and tightening your rectum as if trying not to pass gas. Your healthcare provider can help you learn how to do Kegels.    Biofeedback to help you learn to control the movement of your bladder muscles. Sensors are placed on your abdomen. They turn signals given off by your muscles into lines on a computer screen.    Medicine may be given to relax the bladder muscle. Medicine can also help ease bladder contractions, which reduces the urge to urinate.    Neuromodulation may be done if medicine and behavioral changes don t work. Electrical pulses are sent to the sacral nerves (nerves that affect the pelvic area). These pulses help relieve OAB and urge incontinence.    Surgery to make the bladder larger may be done in severe cases.  With treatment, OAB can be managed. A condition, such as UTI, that has caused you to have OAB will be treated. Treatment may involve taking medicine for months or years. You may also need to make changes in your daily routine. This may include going to the bathroom more often than you think you need to. Or, you may  need to cut back on caffeine and alcohol because these can make symptoms worse. Your healthcare provider can tell you more.     When to call your healthcare provider  Call the healthcare provider right away if you have any of the following:    Fever of 100.4 F (38.0  C) or higher     No improvement with treatment    Trouble urinating because of pain    Back or abdominal pain   Date Last Reviewed: 1/1/2017 2000-2018 [employer]. [address] [place] [address]. All rights reserved. This information is not intended as a substitute for professional medical care. Always follow your healthcare professional's instructions.                Follow-ups after your visit        Follow-up notes from your care team     Return in about 4 weeks (around 12/25/2018) for recheck.      Your next 10 appointments already scheduled     Dec 27, 2018  8:20 AM CST   PROCEDURE with [facility]line [facility]hta MD   [facility]    [address] [place] [id number]   [phone]            Dec 27, 2018  9:00 AM CST   LAB with BK LAB   [facility]    [address] [place] [address]   [phone]           Please do not eat 10-12 hours before your appointment if you are coming in fasting for labs on lipids, cholesterol, or glucose (sugar). This does not apply to pregnant women. Water, hot tea and black coffee (with nothing added) are okay. Do not drink other fluids, diet soda or chew gum.              Future tests that were ordered for you today     Open Future Orders        Priority Expected Expires Ordered    Lipid panel reflex to direct LDL Fasting Routine 11/30/2018 11/27/2019 11/27/2018    Glucose Routine 11/30/2018 11/27/2019 11/27/2018            Who to contact     If you have questions or need follow up information about today's clinic visit or your schedule please contact  "[facility] directly at [phone].  Normal or non-critical lab and imaging results will be communicated to you by MyChart, letter or phone within 4 business days after the clinic has received the results. If you do not hear from us within 7 days, please contact the clinic through [employer] or phone. If you have a critical or abnormal lab result, we will notify you by phone as soon as possible.  Submit refill requests through [employer] or call your pharmacy and they will forward the refill request to us. Please allow 3 business days for your refill to be completed.          Additional Information About Your Visit        [employer] Information     [employer]Now lets you send messages to your doctor, view your test results, renew your prescriptions, schedule appointments and more. To sign up, go to [email or web address] . Click on \"Log in\" on the left side of the screen, which will take you to the Welcome page. Then click on \"Sign up Now\" on the right side of the page.     You will be asked to enter the access code listed below, as well as some personal information. Please follow the directions to create your username and password.     Your access code is: [email or web address]  Expires: 2019 12:51 PM     Your access code will  in 90 days. If you need help or a new code, please call your [facility] clinic or [phone].        Care EveryWhere ID     This is your Care EveryWhere ID. This could be used by other organizations to access your [facility] medical records  [id number]        Your Vitals Were     Pulse Temperature Respirations Height Last Period Pulse Oximetry    84 97.3  F (36.3  C) (Oral) 18 4' 11\" (1.499 m) 2001 (Approximate) 93%    Breastfeeding? BMI (Body Mass Index)                No 36.36 kg/m2           Blood Pressure from Last 3 Encounters:   18 131/68   17 128/74   16 120/79    Weight from Last 3 Encounters:   18 180 lb (81.6 kg)   17 182 lb (82.6 kg) "   04/29/16 178 lb 11.2 oz (81.1 kg)              We Performed the Following     FLU VACCINE, INCREASED ANTIGEN, PRESV FREE, AGE 65+ [24316]     Pneumococcal vaccine 23 valent PPSV23  (Pneumovax) [41490]     VACCINE ADMINISTRATION, EACH ADDITIONAL     Vaccine Administration, Initial [61181]          Today's Medication Changes          These changes are accurate as of 11/27/18 11:59 PM.  If you have any questions, ask your nurse or doctor.               Start taking these medicines.        Dose/Directions    Fesoterodine Fumarate 8 MG Tb24   Used for:  Overactive bladder   Started by:  Earline Mehta MD        Dose:  8 mg   Take 1 tablet (8 mg) by mouth daily   Quantity:  30 tablet   Refills:  11            Where to get your medicines      These medications were sent to [id number] - [place], [place] - [address] AT [facility] [address], [facility] [address]     Phone:  [phone]     Fesoterodine Fumarate 8 MG Tb24                Primary Care Provider Office Phone # Fax #    Earline Jacqueline Mehta [phone] [address]  [facility] [address]        Equal Access to Services     MY SALDAÑA AH: Hadii aad ku hadasho Soomaali, waaxda luqadaha, qaybta kaalmada adeegyada, waxay idiin hayaan adeeg kharajessika lacarolyn . So [facility] [phone].    ATENCIÓN: Si habla español, tiene a king disposición servicios gratuitos de asistencia lingüística. Llame al [phone].    We comply with applicable federal civil rights laws and [place] laws. We do not discriminate on the basis of race, color, national origin, age, disability, sex, sexual orientation, or gender identity.            Thank you!     Thank you for choosing [facility]  for your care. Our goal is always to provide you with excellent care. Hearing back from our patients is one way we can continue to improve our services. Please take a few minutes to complete the written  survey that you may receive in the mail after your visit with us. Thank you!             Your Updated Medication List - Protect others around you: Learn how to safely use, store and throw away your medicines at www.disposemymeds.org.          This list is accurate as of 11/27/18 11:59 PM.  Always use your most recent med list.                   Brand Name Dispense Instructions for use Diagnosis    Fesoterodine Fumarate 8 MG Tb24     30 tablet    Take 1 tablet (8 mg) by mouth daily    Overactive bladder       trospium 60 MG Cp24 24 hr capsule    SANCTURA XR    30 capsule    TAKE 1 CAPSULE BY MOUTH EVERY MORNING    Urgency of urination

## 2018-11-27 NOTE — PROGRESS NOTES
"SUBJECTIVE:   Alannah Wynn is a 67 year old female who presents for Preventive Visit.  Are you in the first 12 months of your Medicare coverage?  No    Annual Wellness Visit     In general, how would you rate your overall health?  Good    Frequency of exercise:  2-3 days/week    Do you usually eat at least 4 servings of fruit and vegetables a day, include whole grains    & fiber and avoid regularly eating high fat or \"junk\" foods?  Yes    Taking medications regularly:  Yes    Medication side effects:  None    Ability to successfully perform activities of daily living:  No assistance needed    Home Safety:  No safety concerns identified    Hearing Impairment:  No hearing concerns    In the past 6 months, have you been bothered by leaking of urine? Yes (discuss increasing Rx dosage)   In general, how would you rate your overall mental or emotional health?  Excellent    PHQ-2 Total Score:    Additional concerns today:  Yes (wart left lower leg wants to freeze)    Do you feel safe in your environment? Yes    Do you have a Health Care Directive? No: Advance care planning was reviewed with patient; patient declined at this time.    Fall risk  Fallen 2 or more times in the past year?: No  Any fall with injury in the past year?: No    Cognitive Screening   1) Repeat 3 items (Leader, Season, Table)    2) Clock draw:  NORMAL  3) 3 item recall: Recalls 2 objects   Results: NORMAL clock, 1-2 items recalled: COGNITIVE IMPAIRMENT LESS LIKELY    Mini-CogTM Copyright S Jacob[employer]. Licensed by the author for use in [facility]; reprinted with permission (estela@.[email or web address]). All rights reserved.      Do you have sleep apnea, excessive snoring or daytime drowsiness?: no    Reviewed and updated as needed this visit by clinical staff  Tobacco  Allergies  Meds  Med Hx  Surg Hx  Fam Hx  Soc Hx        Reviewed and updated as needed this visit by Provider        Social History   Substance Use Topics     Smoking status: Never " Smoker     Smokeless tobacco: Never Used     Alcohol use No       No flowsheet data found.No flowsheet data found.            Current providers sharing in care for this patient include:   Patient Care Team:  Earline Mehta MD as PCP - General (Family Practice)    The following health maintenance items are reviewed in Epic and correct as of today:  Health Maintenance   Topic Date Due     HEPATITIS C SCREENING  12/30/1968     ADVANCE DIRECTIVE PLANNING Q5 YRS  12/30/2005     DEXA SCAN SCREENING (SYSTEM ASSIGNED)  12/30/2015     FALL RISK ASSESSMENT  07/21/2018     PNEUMOCOCCAL (2 of 2 - PPSV23) 07/21/2018     PHQ-2 Q1 YR  07/21/2018     INFLUENZA VACCINE (1) 09/01/2018     EYE EXAM Q1 YEAR  04/25/2019     COLON CANCER SCREEN (SYSTEM ASSIGNED)  07/30/2019     MAMMO SCREEN Q2 YR (SYSTEM ASSIGNED)  08/08/2019     TETANUS IMMUNIZATION (SYSTEM ASSIGNED)  04/15/2021     LIPID SCREEN Q5 YR FEMALE (SYSTEM ASSIGNED)  04/30/2021     Labs reviewed in EPIC  BP Readings from Last 3 Encounters:   11/27/18 131/68   07/21/17 128/74   04/29/16 120/79    Wt Readings from Last 3 Encounters:   11/27/18 180 lb (81.6 kg)   07/21/17 182 lb (82.6 kg)   04/29/16 178 lb 11.2 oz (81.1 kg)                  Patient Active Problem List   Diagnosis     External hemorrhoids     Non morbid obesity due to excess calories     Hepatitis B immune     Screening for cervical cancer     CARDIOVASCULAR SCREENING; LDL GOAL LESS THAN 160     Gastroesophageal reflux disease     Overactive bladder     Morbid obesity (H)     History reviewed. No pertinent surgical history.    Social History   Substance Use Topics     Smoking status: Never Smoker     Smokeless tobacco: Never Used     Alcohol use No     Family History   Problem Relation Age of Onset     Hypertension Father      Cancer No family hx of      Diabetes No family hx of      Cerebrovascular Disease No family hx of      Thyroid Disease No family hx of      Glaucoma No family hx of      Macular  "Degeneration No family hx of          Current Outpatient Prescriptions   Medication Sig Dispense Refill     trospium (SANCTURA XR) 60 MG CP24 24 hr capsule TAKE 1 CAPSULE BY MOUTH EVERY MORNING 30 capsule 10     Allergies   Allergen Reactions     Penicillins      Sulfa Drugs      Recent Labs   Lab Test  04/30/16   0946   LDL  105*   HDL  55   TRIG  122   CR  0.80   GFRESTIMATED  72   GFRESTBLACK  87   POTASSIUM  4.1      Pneumonia Vaccine:Adults age 65+ who received Pneumovax (PPSV23) at 65 years or older: Should be given PCV13 > 1 year after their most recent PPSV23  Mammogram Screening: Mammogram Screening: Patient over age 50, mutual decision to screen reflected in health maintenance.    Review of Systems   Constitutional: Negative.    HENT: Negative.    Eyes: Negative.    Respiratory: Negative.    Cardiovascular: Negative.    Gastrointestinal: Negative.    Endocrine: Negative.    Breasts:  negative.    Genitourinary: Negative.    Musculoskeletal: Negative.    Skin: Negative.    Neurological: Negative.    Hematological: Negative.    Psychiatric/Behavioral: Negative.    All other systems reviewed and are negative.        OBJECTIVE:   /68 (BP Location: Right arm, Patient Position: Chair, Cuff Size: Adult Large)  Pulse 84  Temp 97.3  F (36.3  C) (Oral)  Resp 18  Ht 4' 11\" (1.499 m)  Wt 180 lb (81.6 kg)  LMP 11/27/2001 (Approximate)  SpO2 93%  Breastfeeding? No  BMI 36.36 kg/m2 Estimated body mass index is 36.36 kg/(m^2) as calculated from the following:    Height as of this encounter: 4' 11\" (1.499 m).    Weight as of this encounter: 180 lb (81.6 kg).  Physical Exam  GENERAL APPEARANCE: healthy, alert and no distress  EYES: Eyes grossly normal to inspection, PERRL and conjunctivae and sclerae normal  HENT: ear canals and TM's normal, nose and mouth without ulcers or lesions, oropharynx clear and oral mucous membranes moist  NECK: no adenopathy, no asymmetry, masses, or scars and thyroid normal to " palpation  RESP: lungs clear to auscultation - no rales, rhonchi or wheezes  CV: regular rates and rhythm, normal S1 S2, no S3 or S4, no murmur, click or rub, no peripheral edema and peripheral pulses strong  ABDOMEN: soft, nontender, no hepatosplenomegaly, no masses and bowel sounds normal  MS: no musculoskeletal defects are noted and gait is age appropriate without ataxia  SKIN: no suspicious lesions or rashes, lesion left calf that looks like a keratotic lesion. 4 mm size  NEURO: Normal strength and tone, sensory exam grossly normal, mentation intact and speech normal  PSYCH: mentation appears normal and affect normal/bright     Diagnostic Test Results:  Results for orders placed or performed in visit on 08/08/17   MA SCREENING DIGITAL BILAT - Future  (s+30)    Narrative    Examination: Bilateral digital screening mammography with computer  aided detection, 8/8/2017 3:51 PM.    Comparison: 05/08/2015, 04/25/2014, 07/20/2012    History: No current breast concerns.    BREAST DENSITY: Scattered fibroglandular densities.    COMMENTS:  No suspicious finding.      Impression    IMPRESSION: BI-RADS CATEGORY: 1 -  NEGATIVE.    RECOMMENDED FOLLOW-UP: Annual Mammography.      The patient will be notified of the results.     ALEISHA BUCHANAN       ASSESSMENT / PLAN:       ICD-10-CM    1. Normal physical exam Z00.00 Doing well but would like to change medication for overactive bladder.   2. Non morbid obesity due to excess calories E66.09 Weight loss counseling done    3. Overactive bladder N32.81 Fesoterodine Fumarate 8 MG TB24- discussed options for medical and non-medical treatment and would like to try a new medication.   4. Skin lesion L98.9 Schedule a shave biopsy    5. Flu vaccine need Z23 FLU VACCINE, INCREASED ANTIGEN, PRESV FREE, AGE 65+ [08197]     Vaccine Administration, Initial [12173]   6. CARDIOVASCULAR SCREENING; LDL GOAL LESS THAN 160 Z13.6 Lipid panel reflex to direct LDL Fasting   7. Screening for diabetes  "mellitus Z13.1 Glucose   8. Need for pneumococcal vaccine Z23 Pneumococcal vaccine 23 valent PPSV23  (Pneumovax) [80084]     VACCINE ADMINISTRATION, EACH ADDITIONAL       End of Life Planning:  Patient currently has an advanced directive: Yes.  Practitioner is supportive of decision.    COUNSELING:  Reviewed preventive health counseling, as reflected in patient instructions       Regular exercise       Healthy diet/nutrition       Osteoporosis Prevention/Bone Health    BP Readings from Last 1 Encounters:   11/27/18 131/68     Estimated body mass index is 36.36 kg/(m^2) as calculated from the following:    Height as of this encounter: 4' 11\" (1.499 m).    Weight as of this encounter: 180 lb (81.6 kg).           reports that she has never smoked. She has never used smokeless tobacco.      Appropriate preventive services were discussed with this patient, including applicable screening as appropriate for cardiovascular disease, diabetes, osteopenia/osteoporosis, and glaucoma.  As appropriate for age/gender, discussed screening for colorectal cancer, prostate cancer, breast cancer, and cervical cancer. Checklist reviewing preventive services available has been given to the patient.    Reviewed patients plan of care and provided an AVS. The Basic Care Plan (routine screening as documented in Health Maintenance) for Alannah meets the Care Plan requirement. This Care Plan has been established and reviewed with the Patient.    Counseling Resources:  ATP IV Guidelines  Pooled Cohorts Equation Calculator  Breast Cancer Risk Calculator  FRAX Risk Assessment  ICSI Preventive Guidelines  Dietary Guidelines for Americans, 2010  USDA's MyPlate  ASA Prophylaxis  Lung CA Screening    Earline Mehta MD  [facility]  "

## 2018-11-28 NOTE — PROGRESS NOTES
Injectable Influenza Immunization Documentation    1.  Is the person to be vaccinated sick today?   No    2. Does the person to be vaccinated have an allergy to a component   of the vaccine?   No  Egg Allergy Algorithm Link    3. Has the person to be vaccinated ever had a serious reaction   to influenza vaccine in the past?   No    4. Has the person to be vaccinated ever had Guillain-Barré syndrome?   No    Prior to injection verified patient identity using patient's name and date of birth.  Due to injection administration, patient instructed to remain in clinic for 15 minutes  afterwards, and to report any adverse reaction to me immediately.    Form completed by Niko Hughes CMA

## 2018-12-11 ENCOUNTER — TELEPHONE (OUTPATIENT)
Dept: FAMILY MEDICINE | Facility: CLINIC | Age: 68
End: 2018-12-11

## 2018-12-11 DIAGNOSIS — N32.81 OVERACTIVE BLADDER: Primary | ICD-10-CM

## 2018-12-11 RX ORDER — SOLIFENACIN SUCCINATE 10 MG/1
10 TABLET, FILM COATED ORAL DAILY
Qty: 90 TABLET | Refills: 3 | Status: SHIPPED | OUTPATIENT
Start: 2018-12-11 | End: 2018-12-27

## 2018-12-11 NOTE — TELEPHONE ENCOUNTER
Patient is looking for cheaper alternative for Toviaz 8mg tablets.    Please fax new script if appropriate.

## 2018-12-11 NOTE — TELEPHONE ENCOUNTER
I sent in a prescription for Vesicare. This may not be less expensive, but will try. Insurance coverage is the issue and I recommend a PA if this is not covered.  Earline Mehta MD

## 2018-12-12 NOTE — TELEPHONE ENCOUNTER
This writer attempted to contact Alannah on 12/11/18    Reason for call medication change and left detailed message.    When patient calls back, please contact 1st floor Esperanza Hanks. routine priority.        Niko Hughes

## 2018-12-27 ENCOUNTER — OFFICE VISIT (OUTPATIENT)
Dept: FAMILY MEDICINE | Facility: CLINIC | Age: 68
End: 2018-12-27
Payer: COMMERCIAL

## 2018-12-27 VITALS
WEIGHT: 182 LBS | OXYGEN SATURATION: 93 % | HEART RATE: 85 BPM | RESPIRATION RATE: 24 BRPM | HEIGHT: 59 IN | SYSTOLIC BLOOD PRESSURE: 121 MMHG | TEMPERATURE: 97.9 F | DIASTOLIC BLOOD PRESSURE: 77 MMHG | BODY MASS INDEX: 36.69 KG/M2

## 2018-12-27 DIAGNOSIS — Z23 NEED FOR SHINGLES VACCINE: ICD-10-CM

## 2018-12-27 DIAGNOSIS — Z13.1 SCREENING FOR DIABETES MELLITUS: ICD-10-CM

## 2018-12-27 DIAGNOSIS — D22.72 NEVUS OF LEFT LOWER LEG: Primary | ICD-10-CM

## 2018-12-27 DIAGNOSIS — Z13.6 CARDIOVASCULAR SCREENING; LDL GOAL LESS THAN 160: ICD-10-CM

## 2018-12-27 DIAGNOSIS — N32.81 OVERACTIVE BLADDER: ICD-10-CM

## 2018-12-27 DIAGNOSIS — E66.01 MORBID OBESITY (H): ICD-10-CM

## 2018-12-27 LAB
CHOLEST SERPL-MCNC: 209 MG/DL
GLUCOSE SERPL-MCNC: 96 MG/DL (ref 70–99)
HDLC SERPL-MCNC: 64 MG/DL
LDLC SERPL CALC-MCNC: 115 MG/DL
NONHDLC SERPL-MCNC: 145 MG/DL
TRIGL SERPL-MCNC: 148 MG/DL

## 2018-12-27 PROCEDURE — 11400 EXC TR-EXT B9+MARG 0.5 CM<: CPT | Performed by: FAMILY MEDICINE

## 2018-12-27 PROCEDURE — 80061 LIPID PANEL: CPT | Performed by: FAMILY MEDICINE

## 2018-12-27 PROCEDURE — 36415 COLL VENOUS BLD VENIPUNCTURE: CPT | Performed by: FAMILY MEDICINE

## 2018-12-27 PROCEDURE — 90471 IMMUNIZATION ADMIN: CPT | Performed by: FAMILY MEDICINE

## 2018-12-27 PROCEDURE — 99207 ZZC NO CHARGE LOS: CPT | Performed by: FAMILY MEDICINE

## 2018-12-27 PROCEDURE — 82947 ASSAY GLUCOSE BLOOD QUANT: CPT | Performed by: FAMILY MEDICINE

## 2018-12-27 PROCEDURE — 90750 HZV VACC RECOMBINANT IM: CPT | Performed by: FAMILY MEDICINE

## 2018-12-27 PROCEDURE — 88305 TISSUE EXAM BY PATHOLOGIST: CPT | Performed by: FAMILY MEDICINE

## 2018-12-27 RX ORDER — SOLIFENACIN SUCCINATE 10 MG/1
10 TABLET, FILM COATED ORAL DAILY
Qty: 30 TABLET | Refills: 3 | Status: SHIPPED | OUTPATIENT
Start: 2018-12-27

## 2018-12-27 RX ORDER — TOLTERODINE TARTRATE 1 MG/1
1 TABLET, EXTENDED RELEASE ORAL 2 TIMES DAILY
Qty: 180 TABLET | Refills: 3 | Status: SHIPPED | OUTPATIENT
Start: 2018-12-27 | End: 2018-12-27

## 2018-12-27 ASSESSMENT — PAIN SCALES - GENERAL: PAINLEVEL: NO PAIN (0)

## 2018-12-27 ASSESSMENT — MIFFLIN-ST. JEOR: SCORE: 1266.18

## 2018-12-27 NOTE — PROGRESS NOTES
Tray set up for skin tag removal:  1% lidocaine with epinephrine in a small syringe with needle like a TB syringe  4 by 4 sterile guaze  Betadine swabs- 3 pack  Alcohol wipes-2-3  #15 blade scalpel  Small  with teeth  Iris scissors  Silver nitrate stick  Cautery

## 2018-12-27 NOTE — LETTER
"December 31, 2018      Alannah Wynn  [address]  [place] [address]        Dear ,    We are writing to inform you of your test results. The biopsy of your leg showed hypertrophic keratosis and this is benign. It is not cancer and not pre-cancer. This is very good news. If you have any questions or concerns, please call the clinic at the number listed above.       Sincerely,    Earline Mehta MD/caitlyn                                                        Resulted Orders   Surgical pathology exam   Result Value Ref Range    Copath Report       Patient Name: ALANNAH WYNN  MR#: [id number]  Specimen #: [id number]  Collected: 12/27/2018  Received: 12/27/2018  Reported: 12/28/2018 11:01  Ordering Phy(s): EARLINE MEHTA    For improved result formatting, select 'View Enhanced Report Format' under   Linked Documents section.    SPECIMEN(S):  Skin, left inner calf midway    FINAL DIAGNOSIS:  Skin lesion from left inner calf midway:  - Benign hypertrophic keratosis.    COMMENT:  PLEASE NOTE:  *** Digital images of the pathologic findings are included with this   report (3 images total)***    To view the digital images associated with this report in Epic, select   \"View Enhanced Report Format\" under the  Linked Documents section of this report to view the PDF version of the   original CoPath generated report.    Electronically signed out by:    ASAD Montanez M.D.    CLINICAL HISTORY:  67-year-old female.    GROSS:  A single specimen container with formalin is received labeled with the   patient's name, date of birth, and  medic al record number. Information on the requisition slip, container, and   associated labels is confirmed.    The specimen is designated \"skin biopsy left inner calf midway\" consisting   of two nonoriented tan-pink tissue  fragment measuring 0.3 and 0.4 cm in greatest dimension.  Distinct   resection margins are not grossly  identified.  The fragments are submitted " intact in one cassette. (Dictated   by: Jemma Trejo 12/27/2018 04:27  PM)    MICROSCOPIC:  The sections show portions of superficial skin without atypia and   overlying compact keratosis.  No cellular  atypia is identified. (Dictated by: JOANNE Montanez MD 12/28/2018)    CPT Codes:  A: 84897-CO2    TESTING LAB LOCATION:  [address] [address] [phone]    COLLECTION SITE:  Client: [facility]  Location: [facility] (B)

## 2018-12-27 NOTE — PATIENT INSTRUCTIONS
At [facility], we strive to deliver an exceptional experience to you, every time we see you.  If you receive a survey in the mail, please send us back your thoughts. We really do value your feedback.    Based on your medical history, these are the current health maintenance/preventive care services that you are due for (some may have been done at this visit.)  Health Maintenance Due   Topic Date Due     HEPATITIS C SCREENING  12/30/1968     ZOSTER IMMUNIZATION (1 of 2) 12/30/2000     ADVANCE DIRECTIVE PLANNING Q5 YRS  12/30/2005     DEXA SCAN SCREENING (SYSTEM ASSIGNED)  12/30/2015         Suggested websites for health information:  Www.[email or web address].org : Up to date and easily searchable information on multiple topics.  Www.medlineplus.gov : medication info, interactive tutorials, watch real surgeries online  Www.familydoctor.org : good info from the Academy of Family Physicians  Www.cdc.gov : public health info, travel advisories, epidemics (H1N1)  Www.aap.org : children's health info, normal development, vaccinations  Www.health.[facility].[place].us : [place] dept of health, public health issues in [place], N1N1    Your care team:                            Family Medicine Internal Medicine   MD Zack Zuniga MD Shantel Branch-Fleming, MD Katya Georgiev PA-C Nam Ho, MD Pediatrics   CARLTON Lainez, MD Giselle New CNP, MD Deborah Mielke, MD Kim Thein, APRN [facility]      Clinic hours: Monday - Thursday 7 am-7 pm; Fridays 7 am-5 pm.   Urgent care: Monday - Friday 11 am-9 pm; Saturday and Sunday 9 am-5 pm.  Pharmacy : Monday -Thursday 8 am-8 pm; Friday 8 am-6 pm; Saturday and Sunday 9 am-5 pm.     Clinic: [phone]   Pharmacy: [phone]    Patient Education     Overactive Bladder Syndrome (OAB)     Normally, urine stays in the bladder until a person decides to release it. With OAB, the bladder muscles contract  involuntarily, causing a sudden urge to urinate and even urine leakage.   When the bladder muscle contract or squeeze involuntarily, it is called overactive bladder syndrome. This causes an intense urge to urinate, known as urgency. Urgency can occur many times during the day and night. If urine leaks with the urgency, it is called urge incontinence.  A disease that affects the bladder nerves, such as multiple sclerosis, can cause overactive bladder syndrome. Other conditions, such as urinary tract infection (UTI) or prostate problems in men, can also lead to OAB. But the exact cause is often not known.  How is overactive bladder syndrome diagnosed?  Your healthcare provider will examine you and ask about your symptoms and health history. You may also have one or more of the following:    Urine test to take samples of urine and have them checked for problems.    Urinary diary to record how much fluid you take in and urinate out in a 3 day period.    Bladder ultrasound to study the bladder as it empties. Ultrasound uses sound waves to create detailed images of the inside of the body.    Cystoscopy to allow the healthcare provider to look for problems in the urinary tract. The test uses a thin, flexible scope called a cystoscope with a light and camera on the end. The scope is inserted into the urethra (the tube that carries urine out of the body).    Urodynamic studies, a battery of tests designed to measure and record many aspects of urinary bladder function, including pressures, volume, and urine flow.  How is overactive bladder syndrome treated?  Treatment depends on the cause and severity of your OAB. Treatments may include the following:    Changing urination habits may be suggested. For instance, your healthcare provider may suggest that you urinate as soon as you feel the urge. You may also need to limit how much fluid you have during the day.    Exercising your pelvic muscles can help strengthen muscles used  during urination. These exercises are called Kegels. They involve stella as if you were stopping your urine stream and tightening your rectum as if trying not to pass gas. Your healthcare provider can help you learn how to do Kegels.    Biofeedback to help you learn to control the movement of your bladder muscles. Sensors are placed on your abdomen. They turn signals given off by your muscles into lines on a computer screen.    Medicine may be given to relax the bladder muscle. Medicine can also help ease bladder contractions, which reduces the urge to urinate.    Neuromodulation may be done if medicine and behavioral changes don t work. Electrical pulses are sent to the sacral nerves (nerves that affect the pelvic area). These pulses help relieve OAB and urge incontinence.    Surgery to make the bladder larger may be done in severe cases.  With treatment, OAB can be managed. A condition, such as UTI, that has caused you to have OAB will be treated. Treatment may involve taking medicine for months or years. You may also need to make changes in your daily routine. This may include going to the bathroom more often than you think you need to. Or, you may need to cut back on caffeine and alcohol because these can make symptoms worse. Your healthcare provider can tell you more.     When to call your healthcare provider  Call the healthcare provider right away if you have any of the following:    Fever of 100.4 F (38.0  C) or higher     No improvement with treatment    Trouble urinating because of pain    Back or abdominal pain   Date Last Reviewed: 1/1/2017 2000-2018 [employer]. [address] [place] [address], [place] [address]. All rights reserved. This information is not intended as a substitute for professional medical care. Always follow your healthcare professional's instructions.           Patient Education     Bandage Change  If the bandage becomes wet or dirty, replace it. Otherwise, leave it in  place for the first 24 hours. Then once a day:    Remove the bandage and wash the area with soap and water. Use a wet cotton swab to loosen and remove any blood or crust that forms on the wound.    After cleaning, apply a thin layer of antibiotic ointment or cream. Put the bandage back on.  You can shower as usual after the first 24 hours. If the bandage is on an arm or leg, cover it with a plastic bag rubber-banded at both ends before showering. Take care that the rubber bands are not too tight, cutting off circulation to the affected area.  Don t take a tub bath or go swimming until the bandage is removed and the wound healed. This will take at least 7 days.  When to seek medical advice  Call your healthcare provider right away if any of these occur with your wound:    Fever    Redness, warmth, or swelling    Pain in the wound gets worse    Pus drains when you remove the bandage    Red streaks surround the wound area  Date Last Reviewed: 10/1/2016    6949-6558 [employer]. [address] [place] [address], [place] [address]. All rights reserved. This information is not intended as a substitute for professional medical care. Always follow your healthcare professional's instructions.

## 2018-12-27 NOTE — NURSING NOTE
The following medication was given by Dr Mehta during procedure:     MEDICATION: Lidocaine 1% with epi  ROUTE: IM  SITE: determined by provider  DOSE: 1ml drawn for use  LOT #: [id number]  :  Off & Away  EXPIRATION DATE:  07/20  NDC#: 77133-357-12    Niko Hughes CMA

## 2018-12-27 NOTE — LETTER
December 31, 2018      Alannah Wynn  [address]  [place] [address]        Dear ,    We are writing to inform you of your test results. Your test results are attached. I am happy to let you know that they are stable and your medications can stay the same. The blood sugar is normal and you do not have diabetes. The cholesterol looks good also. If you have any questions or concerns, please call the clinic at the number listed above.       Sincerely,      Earline Mehta MD/caitlyn                                                    Resulted Orders   Glucose   Result Value Ref Range    Glucose 96 70 - 99 mg/dL      Comment:      Fasting specimen   Lipid panel reflex to direct LDL Fasting   Result Value Ref Range    Cholesterol 209 (H) <200 mg/dL      Comment:      Desirable:       <200 mg/dl    Triglycerides 148 <150 mg/dL      Comment:      Fasting specimen    HDL Cholesterol 64 >49 mg/dL    LDL Cholesterol Calculated 115 (H) <100 mg/dL      Comment:      Above desirable:  100-129 mg/dl  Borderline High:  130-159 mg/dL  High:             160-189 mg/dL  Very high:       >189 mg/dl      Non HDL Cholesterol 145 (H) <130 mg/dL      Comment:      Above Desirable:  130-159 mg/dl  Borderline high:  160-189 mg/dl  High:             190-219 mg/dl  Very high:       >219 mg/dl

## 2018-12-27 NOTE — PROGRESS NOTES
SUBJECTIVE:   Alannah Wynn is a 67 year old female who presents to clinic today for the following health issues:    Concern:  1. Medication for urine - Vesicare? cost too much, wants discuss changing to alternative. She has tried and failed Detrol, oxybutynin, and trospium. We discussed that newer bladder medications are more effective but also more expensive. I recommend a referral and follow up with urology for definitive evaluation and treatment, but she is not interested at this time. She will get 1 month prescription for Vesicare and at least try this to see if it works.     2. Skin lesion - Procedure to remove skin lesion on left lower leg            Problem list and histories reviewed & adjusted, as indicated.  Additional history: as documented    Patient Active Problem List   Diagnosis     External hemorrhoids     Non morbid obesity due to excess calories     Hepatitis B immune     Screening for cervical cancer     CARDIOVASCULAR SCREENING; LDL GOAL LESS THAN 160     Gastroesophageal reflux disease     Overactive bladder     Obesity (BMI 35.0-39.9) with comorbidity (H)     History reviewed. No pertinent surgical history.    Social History     Tobacco Use     Smoking status: Never Smoker     Smokeless tobacco: Never Used   Substance Use Topics     Alcohol use: No     Alcohol/week: 0.0 oz     Family History   Problem Relation Age of Onset     Hypertension Father      Cancer No family hx of      Diabetes No family hx of      Cerebrovascular Disease No family hx of      Thyroid Disease No family hx of      Glaucoma No family hx of      Macular Degeneration No family hx of          Current Outpatient Medications   Medication Sig Dispense Refill     Fesoterodine Fumarate 8 MG TB24 Take 1 tablet (8 mg) by mouth daily 30 tablet 11     solifenacin (VESICARE) 10 MG tablet Take 1 tablet (10 mg) by mouth daily 30 tablet 3     Allergies   Allergen Reactions     Penicillins      Sulfa Drugs      Recent Labs   Lab Test  "04/30/16  0946   *   HDL 55   TRIG 122   CR 0.80   GFRESTIMATED 72   GFRESTBLACK 87   POTASSIUM 4.1      BP Readings from Last 3 Encounters:   12/27/18 121/77   11/27/18 131/68   07/21/17 128/74    Wt Readings from Last 3 Encounters:   12/27/18 82.6 kg (182 lb)   11/27/18 81.6 kg (180 lb)   07/21/17 82.6 kg (182 lb)                  Labs reviewed in EPIC    Reviewed and updated as needed this visit by clinical staff  Tobacco  Allergies  Meds  Med Hx  Surg Hx  Fam Hx  Soc Hx      Reviewed and updated as needed this visit by Provider         ROS:  Constitutional, HEENT, cardiovascular, pulmonary, gi and gu systems are negative, except as otherwise noted.    OBJECTIVE:     /77 (BP Location: Right arm, Patient Position: Chair, Cuff Size: Adult Large)   Pulse 85   Temp 97.9  F (36.6  C) (Oral)   Resp 24   Ht 1.499 m (4' 11\")   Wt 82.6 kg (182 lb)   LMP 11/27/2001 (Approximate)   SpO2 93%   BMI 36.76 kg/m    Body mass index is 36.76 kg/m .  GENERAL: healthy, alert and no distress  EYES: Eyes grossly normal to inspection, conjunctivae and sclerae normal  MS: no gross musculoskeletal defects noted, no edema  SKIN: no suspicious lesions or rashes except lesion on left inner calf midway down that is hard and crusty 5 mm diameter.   NEURO: Normal strength and tone, gait and speech normal  PSYCH: mentation appears normal, affect normal/bright     Skin lesion removal from left inner calf. Risks and benefits discussed. Consent signed. Risk of bleeding and infection. Scarring of lesions. Patient gives verbal consent for removal of skin tags.   After cleaning with alcohol and betadine, anesthesia with 1% lidocaine with epi, lesion shave biopsied and removed with # 15 blade. Patient tolerated procedure well with minimal bleeding stopped with silver nitrate stick. Specimen placed in biopsy container, labeled and sent to lab. Bacitracin and Band-Aid applied to wound.     ASSESSMENT/PLAN:         Tobacco " "Cessation:   reports that  has never smoked. she has never used smokeless tobacco.      BMI:   Estimated body mass index is 36.76 kg/m  as calculated from the following:    Height as of this encounter: 1.499 m (4' 11\").    Weight as of this encounter: 82.6 kg (182 lb).   Weight management plan: Discussed healthy diet and exercise guidelines        ICD-10-CM    1. Nevus of left lower leg- undetermined behavior D22.72 Skin biopsy to pathology. Wound care discussed. Call if signs of infection   2. Overactive bladder N32.81 solifenacin (VESICARE) 10 MG tablet- will try for one month to see if this is more effective.         3. Morbid obesity (H) E66.01 Weight loss counseling done        CONSULTATION/REFERRAL to dermatology if needed depending on biopsy results. Urology referral recommended.   FUTURE LABS:       - Schedule fasting labs in 6 months  FUTURE APPOINTMENTS:       - Follow-up visit in 6 months or sooner if any questions or concerns.   Work on weight loss  Regular exercise  See Patient Instructions    Earline Mehta MD  [facility]    "

## 2018-12-28 LAB — COPATH REPORT: NORMAL

## 2018-12-31 NOTE — RESULT ENCOUNTER NOTE
Dear Alannah Wynn,    Your test results are attached.     The biopsy of your leg showed hypertrophic keratosis and this is benign. It is not cancer and not pre-cancer. This is very good news.    Please call me if you have any questions about these test results or about your care.    Sincerely,    Earline Mehta MD

## 2018-12-31 NOTE — RESULT ENCOUNTER NOTE
Dear Alannah Wynn,    Your test results are attached. I am happy to let you know that they are stable and your medications can stay the same.    The blood sugar is normal and you do not have diabetes. The cholesterol looks good also.     Please call me if you have any questions about these test results or about your care.    Sincerely,    Earline Mehta MD

## 2019-05-14 ENCOUNTER — TELEPHONE (OUTPATIENT)
Dept: FAMILY MEDICINE | Facility: CLINIC | Age: 69
End: 2019-05-14

## 2019-05-14 ENCOUNTER — OFFICE VISIT (OUTPATIENT)
Dept: FAMILY MEDICINE | Facility: CLINIC | Age: 69
End: 2019-05-14
Payer: COMMERCIAL

## 2019-05-14 VITALS
WEIGHT: 182 LBS | RESPIRATION RATE: 14 BRPM | BODY MASS INDEX: 36.69 KG/M2 | TEMPERATURE: 97.4 F | SYSTOLIC BLOOD PRESSURE: 125 MMHG | OXYGEN SATURATION: 95 % | HEART RATE: 84 BPM | DIASTOLIC BLOOD PRESSURE: 77 MMHG | HEIGHT: 59 IN

## 2019-05-14 DIAGNOSIS — E66.01 MORBID OBESITY (H): ICD-10-CM

## 2019-05-14 DIAGNOSIS — Z12.11 SPECIAL SCREENING FOR MALIGNANT NEOPLASMS, COLON: ICD-10-CM

## 2019-05-14 DIAGNOSIS — K64.4 EXTERNAL HEMORRHOIDS: Primary | ICD-10-CM

## 2019-05-14 DIAGNOSIS — Z23 NEED FOR SHINGLES VACCINE: ICD-10-CM

## 2019-05-14 PROCEDURE — 99213 OFFICE O/P EST LOW 20 MIN: CPT | Performed by: PREVENTIVE MEDICINE

## 2019-05-14 RX ORDER — DOCUSATE SODIUM 100 MG/1
100 CAPSULE, LIQUID FILLED ORAL 2 TIMES DAILY PRN
Qty: 60 CAPSULE | Refills: 0 | Status: SHIPPED | OUTPATIENT
Start: 2019-05-14

## 2019-05-14 RX ORDER — MULTIVITAMIN
TABLET ORAL
COMMUNITY
Start: 2005-03-08

## 2019-05-14 ASSESSMENT — MIFFLIN-ST. JEOR: SCORE: 1258.92

## 2019-05-14 ASSESSMENT — PAIN SCALES - GENERAL: PAINLEVEL: NO PAIN (0)

## 2019-05-14 NOTE — TELEPHONE ENCOUNTER
If her insurance does not cover the medication she can try using PREPARATION H over the counter.     Thanks,    Nikia Baker MD MPH

## 2019-05-14 NOTE — TELEPHONE ENCOUNTER
Reason for Call:  Other prescription    Detailed comments: patient was seen today and medication was prescribed and it is too expensive and asking for a different medication to be prescribed.    Phone Number Patient can be reached at: Home number on file [phone] (home)    Best Time: any    Can we leave a detailed message on this number? YES    Call taken on 5/14/2019 at 3:53 PM by Camille Hendrickson

## 2019-05-14 NOTE — PATIENT INSTRUCTIONS
At [facility], we strive to deliver an exceptional experience to you, every time we see you.  If you receive a survey in the mail, please send us back your thoughts. We really do value your feedback.    Based on your medical history, these are the current health maintenance/preventive care services that you are due for (some may have been done at this visit.)  Health Maintenance Due   Topic Date Due     HEPATITIS C SCREENING  12/30/1968     ADVANCE DIRECTIVE PLANNING Q5 YRS  12/30/2005     DEXA SCAN SCREENING (SYSTEM ASSIGNED)  12/30/2015     PHQ-2  01/01/2019     ZOSTER IMMUNIZATION (2 of 2) 02/21/2019     EYE EXAM Q1 YEAR  04/25/2019         Suggested websites for health information:  Www.[facility].org : Up to date and easily searchable information on multiple topics.  Www.medlineplus.gov : medication info, interactive tutorials, watch real surgeries online  Www.familydoctor.org : good info from the Academy of Family Physicians  Www.cdc.gov : public health info, travel advisories, epidemics (H1N1)  Www.aap.org : children's health info, normal development, vaccinations  Www.health.state.[place].us : [place] dept of health, public health issues in [place], N1N1    Your care team:                            Family Medicine Internal Medicine   MD Zack Zuniga MD Shantel Branch-Fleming, MD Katya Georgiev PA-C Nam Ho, MD Pediatrics   CARLTON Lainez, DERRELL Medina APRN MD Giselle De La Vega MD Deborah Mielke, MD Kim Thein, APRN CNP      Clinic hours: Monday - Thursday 7 am-7 pm; Fridays 7 am-5 pm.   Urgent care: Monday - Friday 11 am-9 pm; Saturday and Sunday 9 am-5 pm.  Pharmacy : Monday -Thursday 8 am-8 pm; Friday 8 am-6 pm; Saturday and Sunday 9 am-5 pm.     Clinic: [phone]   Pharmacy: [phone]

## 2019-05-14 NOTE — PROGRESS NOTES
SUBJECTIVE:   Alannah Wynn is a 68 year old female who presents to clinic today for the following   health issues:      Hemorrhoids  Onset: 3-4 weeks    Description:   Pain: YES  Itching: YES    Accompanying Signs & Symptoms:  Blood streaked toilet paper: YES  Blood in stool: no   Changes in stool pattern: no     History:   Any previous GI studies done:Colonoscopy, 2009, so due for another   Family History of colon cancer: no     Precipitating factors:   None    Alleviating factors:  None    Therapies Tried and outcome: OTC suppository: mild relieve       Additional history: as documented    Reviewed  and updated as needed this visit by clinical staff  Tobacco  Allergies  Meds  Problems  Med Hx  Surg Hx  Fam Hx  Soc Hx          Reviewed and updated as needed this visit by Provider  Tobacco  Allergies  Meds  Problems  Med Hx  Surg Hx  Fam Hx         Patient Active Problem List   Diagnosis     External hemorrhoids     Non morbid obesity due to excess calories     Hepatitis B immune     Screening for cervical cancer     CARDIOVASCULAR SCREENING; LDL GOAL LESS THAN 160     Gastroesophageal reflux disease     Overactive bladder     Obesity (BMI 35.0-39.9) with comorbidity (H)     Nevus of left lower leg- undetermined behavior     History reviewed. No pertinent surgical history.    Social History     Tobacco Use     Smoking status: Never Smoker     Smokeless tobacco: Never Used   Substance Use Topics     Alcohol use: No     Alcohol/week: 0.0 oz     Family History   Problem Relation Age of Onset     Hypertension Father      Cancer No family hx of      Diabetes No family hx of      Cerebrovascular Disease No family hx of      Thyroid Disease No family hx of      Glaucoma No family hx of      Macular Degeneration No family hx of          Current Outpatient Medications   Medication Sig Dispense Refill     docusate sodium (COLACE) 100 MG capsule Take 1 capsule (100 mg) by mouth 2 times daily as needed for  "constipation 60 capsule 0     Fesoterodine Fumarate 8 MG TB24 Take 1 tablet (8 mg) by mouth daily 30 tablet 11     hydrocortisone (ANUSOL-HC) 2.5 % cream Place rectally 2 times daily as needed for hemorrhoids 30 g 1     Multiple vitamin TABS        omeprazole (PRILOSEC) 20 MG DR capsule Take 20 mg by mouth       solifenacin (VESICARE) 10 MG tablet Take 1 tablet (10 mg) by mouth daily (Patient not taking: Reported on 5/14/2019) 30 tablet 3     Allergies   Allergen Reactions     Penicillins      Sulfa Drugs      BP Readings from Last 3 Encounters:   05/14/19 125/77   12/27/18 121/77   11/27/18 131/68    Wt Readings from Last 3 Encounters:   05/14/19 82.6 kg (182 lb)   12/27/18 82.6 kg (182 lb)   11/27/18 81.6 kg (180 lb)                  Labs reviewed in EPIC    ROS:  Constitutional, HEENT, cardiovascular, pulmonary, gi and gu systems are negative, except as otherwise noted.    OBJECTIVE:                                                    /77 (BP Location: Left arm, Patient Position: Chair, Cuff Size: Adult Large)   Pulse 84   Temp 97.4  F (36.3  C) (Oral)   Resp 14   Ht 1.495 m (4' 10.86\")   Wt 82.6 kg (182 lb)   LMP 11/27/2001 (Approximate)   SpO2 95%   Breastfeeding? No   BMI 36.94 kg/m    Body mass index is 36.94 kg/m .      GENERAL APPEARANCE: healthy, alert and no distress  EYES: Eyes grossly normal to inspection and conjunctivae and sclerae normal  NECK: no adenopathy and trachea midline and normal to palpation  RESP: lungs clear to auscultation - no rales, rhonchi or wheezes  CV: regular rates and rhythm, normal S1 S2, no S3 or S4 and no murmur, click or rub  ABDOMEN: soft, non-tender and no rebound or guarding   MS: extremities normal- no gross deformities noted and peripheral pulses normal  SKIN: no suspicious lesions or rashes  NEURO: Normal strength and tone, mentation intact and speech normal  PSYCH: mentation appears normal  Rectal: external hemorrhoid at 1 o' clock position, no " thrombosis, no rectal masses, no rectal bleeding.       Diagnostic test results:  Diagnostic Test Results:  No results found for this or any previous visit (from the past 24 hour(s)).     ASSESSMENT/PLAN:                                                    1. External hemorrhoids  -Sitz baths  -high fiber diet   - hydrocortisone (ANUSOL-HC) 2.5 % cream; Place rectally 2 times daily as needed for hemorrhoids  Dispense: 30 g; Refill: 1  - docusate sodium (COLACE) 100 MG capsule; Take 1 capsule (100 mg) by mouth 2 times daily as needed for constipation  Dispense: 60 capsule; Refill: 0  - GENERAL SURG ADULT REFERRAL    2. Morbid obesity (H)  -weight stable    3. Special screening for malignant neoplasms, colon  -last colonoscopy in 2009   - GASTROENTEROLOGY ADULT REF PROCEDURE ONLY    4. Need for shingles vaccine  -was due for second dose  -Vaccine not available in clinic today       Follow up with Provider - if not better in 4 weeks, please follow up with General Surgery      Niika Baker MD MPH    [facility]

## 2019-05-15 NOTE — TELEPHONE ENCOUNTER
This writer attempted to contact patient on 05/15/19      Reason for call RX and left detailed message.        Mandi Payne MA

## 2019-06-03 ENCOUNTER — OFFICE VISIT (OUTPATIENT)
Dept: OPTOMETRY | Facility: CLINIC | Age: 69
End: 2019-06-03
Payer: COMMERCIAL

## 2019-06-03 DIAGNOSIS — H25.813 COMBINED FORMS OF AGE-RELATED CATARACT OF BOTH EYES: Primary | ICD-10-CM

## 2019-06-03 DIAGNOSIS — H04.123 DRY EYE SYNDROME OF BOTH EYES: ICD-10-CM

## 2019-06-03 PROCEDURE — 92014 COMPRE OPH EXAM EST PT 1/>: CPT | Performed by: OPTOMETRIST

## 2019-06-03 ASSESSMENT — SLIT LAMP EXAM - LIDS
COMMENTS: DERMATOCHALASIS, MEIBOMIAN GLAND DYSFUNCTION
COMMENTS: DERMATOCHALASIS, MEIBOMIAN GLAND DYSFUNCTION

## 2019-06-03 ASSESSMENT — TONOMETRY
OS_IOP_MMHG: SOFT
IOP_METHOD: TONOPEN
OD_IOP_MMHG: SOFT
IOP_UNABLETOASSESS: 1

## 2019-06-03 ASSESSMENT — CONF VISUAL FIELD
OS_NORMAL: 1
OD_NORMAL: 1
METHOD: COUNTING FINGERS

## 2019-06-03 ASSESSMENT — EXTERNAL EXAM - LEFT EYE: OS_EXAM: NORMAL

## 2019-06-03 ASSESSMENT — VISUAL ACUITY
OS_CC+: -1
CORRECTION_TYPE: GLASSES
OD_CC+: -1
METHOD: SNELLEN - LINEAR
OS_CC: 20/25
OD_CC: 20/30

## 2019-06-03 ASSESSMENT — EXTERNAL EXAM - RIGHT EYE: OD_EXAM: NORMAL

## 2019-06-03 ASSESSMENT — CUP TO DISC RATIO
OS_RATIO: 0.25
OD_RATIO: 0.25

## 2019-06-03 NOTE — PROGRESS NOTES
Chief Complaint   Patient presents with     Cataract     Patient needs medical exam per insurance to check on cataracts. IOP check also. Patient going tomorrow for glasses exam.      In both eyes. Additional comments: Patient needs medical exam per insurance to check on cataracts. IOP check also. Patient going tomorrow for glasses exam.     Patient also states her eyes feel dry in am and uses eye drops.    Medical, surgical and family histories reviewed and updated 6/3/2019.       OBJECTIVE: See Ophthalmology exam    ASSESSMENT:    ICD-10-CM    1. 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 Dry eye syndrome of both eyes H04.123       PLAN:     Patient Instructions    You have cataracts which are affecting your vision.   A cataract evaluation can be scheduled with the eye surgeon to discuss with you the option of cataract surgery.  If you decide not to have surgery then I recommend returning for annual eye exams.    Systane Ultra 1 drop both eyes 3 x day- 4 x day daily.    Return in 1 year for a complete eye exam or sooner if needed.    Kolton Centeno, OD

## 2019-06-03 NOTE — PATIENT INSTRUCTIONS
You have cataracts which are affecting your vision.   A cataract evaluation can be scheduled with the eye surgeon to discuss with you the option of cataract surgery.  If you decide not to have surgery then I recommend returning for annual eye exams.    Systane Ultra 1 drop both eyes 3 x day- 4 x day daily.    Return in 1 year for a complete eye exam or sooner if needed.    Kolton Centeno, OD    The affects of the dilating drops last for 4- 6 hours.  You will be more sensitive to light and vision will be blurry up close.  Mydriatic sunglasses were given if needed.        Use one drop of artificial tears both eyes 3-4 x daily.  Continue to use the drops regardless if your eyes are comfortable.  Artificial tears work best as a preventative and not as well after your eyes are starting to bother you.  It may take 4- 6 weeks of using the drops before you notice improvement.  If after that time you are still having problems schedule an appointment for an evaluation and discussion of different treatments such as Restasis or Xiidra.  Dry eyes are a chronic condition and you may have more symptoms at certain times of the year.    Excess tearing can be due to the right tears not working properly or a blockage in the tear drainage system.  You can try using artificial tears 1 drop right eye 3-4 x day.  If the excess tearing is bothersome after 3-4 weeks and that doesn't help we can send you for further testing on the puncta which allows the tears to drain.  This would entail a referral to our oculo plastic specialist at the [facility].    Brands of drops that are recommended are:    Systane Complete  Systane Ultra  Systane Balance  Refresh Advanced Optive  Blink    If you are using drops more than 4 x day or have sensitivities to preservatives I recommend non preserved artificial tears.  These come in 1 use vials.  They can be used every 1-2 hours.    Brands of drops that are recommended are:    Systane-  preservative free  Refresh-  preservative free  Blink- preservative free    Gels or ointment can be used at night.    Brands recommended are:    Systane Gel  Refresh Gel  Blink Gel  Genteal Gel    Systane night time (ointment)  Refresh Celluvisc  Refresh PM (ointment)      Visine, Clear Eyes or Murine (drops that get the red out) can irritate the eyes and cause a rebound effect where the eyes become more red and you end up using more drops.  Avoid drops containing tetrahydrozoline, naphazoline, phenylephrine, oxymetazoline.      OTC Lumify is a newer product that gives immediate redness relief with out the rebound effect.  Use as needed to take the redness out.    Artificial tears may be used with other drops (such as allergy, glaucoma, antibiotics) around the same time.  Be sure to wait 5 minutes in between drops.    Heat to the eyelids can also improve your symptoms of dry eyes.  Esthela heat masks can be purchased at [employer] to be used daily for 10-15 minutes.  Other options are gel masks that can be put in the microwave and purchased at most pharmacies.

## 2019-06-03 NOTE — LETTER
6/3/2019         RE: Alannah Wynn  [address]  [place] [address]        Dear Colleague,    Thank you for referring your patient, Alannah Wynn, to the [facility]. Please see a copy of my visit note below.    Chief Complaint   Patient presents with     Cataract     Patient needs medical exam per insurance to check on cataracts. IOP check also. Patient going tomorrow for glasses exam.      In both eyes. Additional comments: Patient needs medical exam per insurance to check on cataracts. IOP check also. Patient going tomorrow for glasses exam.     Patient also states her eyes feel dry in am and uses eye drops.    Medical, surgical and family histories reviewed and updated 6/3/2019.       OBJECTIVE: See Ophthalmology exam    ASSESSMENT:    ICD-10-CM    1. 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 Dry eye syndrome of both eyes H04.123       PLAN:     Patient Instructions    You have cataracts which are affecting your vision.   A cataract evaluation can be scheduled with the eye surgeon to discuss with you the option of cataract surgery.  If you decide not to have surgery then I recommend returning for annual eye exams.    Systane Ultra 1 drop both eyes 3 x day- 4 x day daily.    Return in 1 year for a complete eye exam or sooner if needed.    Kolton Centeno, PRESTON           Again, thank you for allowing me to participate in the care of your patient.        Sincerely,        Kolton Centeno, OD

## 2019-09-20 ENCOUNTER — OFFICE VISIT (OUTPATIENT)
Dept: FAMILY MEDICINE | Facility: CLINIC | Age: 69
End: 2019-09-20
Payer: COMMERCIAL

## 2019-09-20 VITALS
RESPIRATION RATE: 16 BRPM | TEMPERATURE: 98.1 F | WEIGHT: 172 LBS | DIASTOLIC BLOOD PRESSURE: 76 MMHG | OXYGEN SATURATION: 98 % | HEIGHT: 59 IN | SYSTOLIC BLOOD PRESSURE: 122 MMHG | BODY MASS INDEX: 34.68 KG/M2 | HEART RATE: 81 BPM

## 2019-09-20 DIAGNOSIS — Z13.1 SCREENING FOR DIABETES MELLITUS: ICD-10-CM

## 2019-09-20 DIAGNOSIS — Z23 NEED FOR VACCINATION: ICD-10-CM

## 2019-09-20 DIAGNOSIS — Z12.31 VISIT FOR SCREENING MAMMOGRAM: ICD-10-CM

## 2019-09-20 DIAGNOSIS — R39.15 URGENCY OF URINATION: ICD-10-CM

## 2019-09-20 DIAGNOSIS — N32.81 OVERACTIVE BLADDER: ICD-10-CM

## 2019-09-20 DIAGNOSIS — K64.4 RESIDUAL HEMORRHOIDAL SKIN TAGS: Primary | ICD-10-CM

## 2019-09-20 DIAGNOSIS — K62.5 RECTAL BLEEDING: ICD-10-CM

## 2019-09-20 PROBLEM — E66.01 MORBID OBESITY (H): Status: RESOLVED | Noted: 2018-12-27 | Resolved: 2019-09-20

## 2019-09-20 LAB
ERYTHROCYTE [DISTWIDTH] IN BLOOD BY AUTOMATED COUNT: 13.5 % (ref 10–15)
GLUCOSE SERPL-MCNC: 101 MG/DL (ref 70–99)
HCT VFR BLD AUTO: 42.3 % (ref 35–47)
HGB BLD-MCNC: 13.7 G/DL (ref 11.7–15.7)
MCH RBC QN AUTO: 30.2 PG (ref 26.5–33)
MCHC RBC AUTO-ENTMCNC: 32.4 G/DL (ref 31.5–36.5)
MCV RBC AUTO: 93 FL (ref 78–100)
PLATELET # BLD AUTO: 327 10E9/L (ref 150–450)
RBC # BLD AUTO: 4.53 10E12/L (ref 3.8–5.2)
WBC # BLD AUTO: 8.9 10E9/L (ref 4–11)

## 2019-09-20 PROCEDURE — 99214 OFFICE O/P EST MOD 30 MIN: CPT | Mod: 25 | Performed by: FAMILY MEDICINE

## 2019-09-20 PROCEDURE — 90471 IMMUNIZATION ADMIN: CPT | Performed by: FAMILY MEDICINE

## 2019-09-20 PROCEDURE — 85027 COMPLETE CBC AUTOMATED: CPT | Performed by: FAMILY MEDICINE

## 2019-09-20 PROCEDURE — 36415 COLL VENOUS BLD VENIPUNCTURE: CPT | Performed by: FAMILY MEDICINE

## 2019-09-20 PROCEDURE — 90750 HZV VACC RECOMBINANT IM: CPT | Performed by: FAMILY MEDICINE

## 2019-09-20 PROCEDURE — 82947 ASSAY GLUCOSE BLOOD QUANT: CPT | Performed by: FAMILY MEDICINE

## 2019-09-20 RX ORDER — TROSPIUM CHLORIDE ER 60 MG/1
60 CAPSULE ORAL EVERY MORNING
Qty: 90 EACH | Refills: 3 | Status: SHIPPED | OUTPATIENT
Start: 2019-09-20 | End: 2020-02-25

## 2019-09-20 ASSESSMENT — MIFFLIN-ST. JEOR: SCORE: 1213.56

## 2019-09-20 ASSESSMENT — PAIN SCALES - GENERAL: PAINLEVEL: NO PAIN (0)

## 2019-09-20 NOTE — PROGRESS NOTES
Subjective     Alannah Wynn is a 68 year old female who presents to clinic today for the following health issues:    HPI   Medication Followup of Vesicare    Taking Medication as prescribed: No    Side Effects:  None    Medication Helping Symptoms:  NO would like to change back to trospium that worked well for her.     Hemorrhoids  Onset: 2 weeks ago    Description:   Pain: YES  Itching: YES    Accompanying Signs & Symptoms:  Blood streaked toilet paper: YES  Blood in stool: YES  Changes in stool pattern: no     History:   Any previous GI studies done:none  Family History of colon cancer: no     Precipitating factors:   None    Alleviating factors:  Suppository and cream helpful but still seeing blood. Son would like her to get a hemoglobin check. Also did not have her follow up colonoscopy done. She wanted the hemorrhoids to clear up a bit first.     Therapies Tried and outcome: preparation H: temporary relief      Patient Active Problem List   Diagnosis     External hemorrhoids     Non morbid obesity due to excess calories     Hepatitis B immune     Screening for cervical cancer     CARDIOVASCULAR SCREENING; LDL GOAL LESS THAN 160     Gastroesophageal reflux disease     Overactive bladder     Nevus of left lower leg- undetermined behavior     History reviewed. No pertinent surgical history.    Social History     Tobacco Use     Smoking status: Never Smoker     Smokeless tobacco: Never Used   Substance Use Topics     Alcohol use: No     Alcohol/week: 0.0 oz     Family History   Problem Relation Age of Onset     Hypertension Father          Current Outpatient Medications   Medication Sig Dispense Refill     Fesoterodine Fumarate 8 MG TB24 Take 1 tablet (8 mg) by mouth daily 30 tablet 11     hydrocortisone (ANUSOL-HC) 2.5 % cream Place rectally 2 times daily as needed for hemorrhoids 30 g 1     Multiple vitamin TABS        omeprazole (PRILOSEC) 20 MG DR capsule Take 20 mg by mouth       trospium (SANCTURA XR) 60 MG  "CP24 24 hr capsule Take 1 capsule (60 mg) by mouth every morning 90 each 3     docusate sodium (COLACE) 100 MG capsule Take 1 capsule (100 mg) by mouth 2 times daily as needed for constipation (Patient not taking: Reported on 9/20/2019) 60 capsule 0     solifenacin (VESICARE) 10 MG tablet Take 1 tablet (10 mg) by mouth daily (Patient not taking: Reported on 5/14/2019) 30 tablet 3     Allergies   Allergen Reactions     Penicillins      Sulfa Drugs      Recent Labs   Lab Test 12/27/18  0920 04/30/16  0946   * 105*   HDL 64 55   TRIG 148 122   CR  --  0.80   GFRESTIMATED  --  72   GFRESTBLACK  --  87   POTASSIUM  --  4.1      BP Readings from Last 3 Encounters:   09/20/19 122/76   05/14/19 125/77   12/27/18 121/77    Wt Readings from Last 3 Encounters:   09/20/19 78 kg (172 lb)   05/14/19 82.6 kg (182 lb)   12/27/18 82.6 kg (182 lb)                      Reviewed and updated as needed this visit by Provider         Review of Systems   ROS COMP: Constitutional, HEENT, cardiovascular, pulmonary, gi and gu systems are negative, except as otherwise noted.      Objective    /76 (BP Location: Right arm, Patient Position: Chair, Cuff Size: Adult Large)   Pulse 81   Temp 98.1  F (36.7  C) (Oral)   Resp 16   Ht 1.495 m (4' 10.86\")   Wt 78 kg (172 lb)   LMP 11/27/2001 (Approximate)   SpO2 98%   Breastfeeding? No   BMI 34.91 kg/m    Body mass index is 34.91 kg/m .  Physical Exam   GENERAL: healthy, alert, well nourished, well hydrated, no distress, obese  HENT: ear canals- normal; TMs- normal; Nose- normal; Mouth- no ulcers, no lesions, throat is clear with no erythema or exudate.   NECK: no tenderness, no adenopathy, no asymmetry, no masses, no stiffness; thyroid- normal to palpation  RESP: lungs clear to auscultation - no rales, no rhonchi, no wheezes  CV: regular rates and rhythm, normal S1 S2, no S3 or S4 and no murmur, no click or rub -  ABDOMEN: soft, no tenderness, no  hepatosplenomegaly, no masses, " normal bowel sounds  MS: extremities- no gross deformities noted, no edema  SKIN: no suspicious lesions, no rashes  NEURO: strength and tone- normal, sensory exam- grossly normal, mentation- intact, speech- normal, reflexes- symmetric  BACK: no CVA tenderness, no paralumbar tenderness  PSYCH: Alert and oriented times 3; speech- coherent , normal rate and volume; able to articulate logical thoughts, able to abstract reason, no tangential thoughts, no hallucinations or delusions, affect- normal   GYN: External genitalia normal with no rash or lesions, hemorrhoidal skin tag with normal anal tissue and no bleeding or fissure.     Diagnostic Test Results:  Labs reviewed in Epic  Results for orders placed or performed in visit on 09/20/19   CBC with platelets   Result Value Ref Range    WBC 8.9 4.0 - 11.0 10e9/L    RBC Count 4.53 3.8 - 5.2 10e12/L    Hemoglobin 13.7 11.7 - 15.7 g/dL    Hematocrit 42.3 35.0 - 47.0 %    MCV 93 78 - 100 fl    MCH 30.2 26.5 - 33.0 pg    MCHC 32.4 31.5 - 36.5 g/dL    RDW 13.5 10.0 - 15.0 %    Platelet Count 327 150 - 450 10e9/L           Assessment & Plan       ICD-10-CM    1. Residual hemorrhoidal skin tags- no active hemorrhoids seen. Discussed controlling constipation to avoid new hemorrhoids K64.4 COLORECTAL SURGERY REFERRAL   2. Rectal bleeding K62.5 CBC with platelets     GASTROENTEROLOGY ADULT REF PROCEDURE ONLY [facility] [phone]; [facility]- diagnostic colonoscopy   3. Overactive bladder- change back to previous medication and obtain a PA N32.81 trospium (SANCTURA XR) 60 MG CP24 24 hr capsule   4. Urgency of urination R39.15 trospium (SANCTURA XR) 60 MG CP24 24 hr capsule   5. Visit for screening mammogram Z12.31 MA SCREENING DIGITAL BILAT - Future  (s+30)   6. Screening for diabetes mellitus Z13.1 Glucose   7. Need for vaccination Z23 SHINGRIX [64890]- second dose     1st  Administration  [30460]        BMI:   Estimated body mass index is 34.91 kg/m  as calculated from the  "following:    Height as of this encounter: 1.495 m (4' 10.86\").    Weight as of this encounter: 78 kg (172 lb).   Weight management plan: Discussed healthy diet and exercise guidelines        CONSULTATION/REFERRAL to gastroenterology for colonoscopy and if desired skin tag removal with colorectal clinic.   FUTURE LABS:       - Schedule fasting labs in 6 months  FUTURE APPOINTMENTS:       - Follow-up visit in 6 months or sooner if any questions or concerns.   Work on weight loss  Regular exercise  See Patient Instructions    Return in about 6 months (around 3/20/2020) for Lab Work, medication follow up.    Earline Mehta MD  [facility]      "

## 2019-09-20 NOTE — NURSING NOTE
1.  Has the patient received the information for the Zostavax vaccine? YES    2.  Does the patient have any of the following contraindications?     Allergy to Neomycin or Gelatin? No       Current moderate or severe illness? No  Temp = 98.1  If temp > 99.0 degrees orally or patient has above allergies, vaccine is deferred    3.  The vaccine has been administered in the usual fashion and the patient was instructed to wait 15 minutes before leaving the building in the event of an allergic reaction: YES    Vaccination given by Kelly De La Cruz MA on 9/20/2019 at 12:27 PM.  Recorded by Kelly De La Cruz MA

## 2019-09-20 NOTE — PATIENT INSTRUCTIONS
At [facility], we strive to deliver an exceptional experience to you, every time we see you.  If you receive a survey in the mail, please send us back your thoughts. We really do value your feedback.    Based on your medical history, these are the current health maintenance/preventive care services that you are due for (some may have been done at this visit.)  Health Maintenance Due   Topic Date Due     DEXA  1950     HEPATITIS C SCREENING  1950     ADVANCE CARE PLANNING  1950     INFLUENZA VACCINE (1) 09/01/2019     ZOSTER IMMUNIZATION (2 of 2) 02/21/2019     COLONOSCOPY  07/30/2019     MAMMO SCREENING  08/08/2019         Suggested websites for health information:  Www.ChoiceMap.org : Up to date and easily searchable information on multiple topics.  Www.medlineplus.gov : medication info, interactive tutorials, watch real surgeries online  Www.familydoctor.org : good info from the Academy of Family Physicians  Www.cdc.gov : public health info, travel advisories, epidemics (H1N1)  Www.aap.org : children's health info, normal development, vaccinations  Www.health.[facility].[place].us : [place] dept of health, public health issues in [place], N1N1    Your care team:                            Family Medicine Internal Medicine   MD Zack Zuniga MD Shantel Branch-Fleming, MD Katya Georgiev PA-C Nam Ho, MD Pediatrics   CARLTON Lainez, DERRELL Medina APRN MD Giselle De La Vega MD Deborah Mielke, MD Kim Thein, APRN CNP      Clinic hours: Monday - Thursday 7 am-7 pm; Fridays 7 am-5 pm.   Urgent care: Monday - Friday 11 am-9 pm; Saturday and Sunday 9 am-5 pm.  Pharmacy : Monday -Thursday 8 am-8 pm; Friday 8 am-6 pm; Saturday and Sunday 9 am-5 pm.     Clinic: [phone]   Pharmacy: [phone]    Patient Education     Hemorrhoids    Hemorrhoids are swollen and inflamed veins inside the rectum and near the anus. The rectum is  the last several inches of the colon. The anus is the passage between the rectum and the outside of the body.  Causes  The veins can become swollen due to increased pressure in them. This is most often caused by:    Chronic constipation or diarrhea    Straining when having a bowel movement    Sitting too long on the toilet    A low-fiber diet    Pregnancy  Symptoms    Bleeding from the rectum (this may be noticeable after bowel movements)    Lump near the anus    Itching around the anus    Pain around the anus  There are different types of hemorrhoids. Depending on the type you have and the severity, you may be able to treat yourself at home. In some cases, a procedure may be the best treatment option. Your healthcare provider can tell you more about this, if needed.  Home care  General care    To get relief from pain or itching, try:  ? Medicines. Your healthcare provider may recommend stool softeners, suppositories, or laxatives to help manage constipation. Use these exactly as directed.  ? Sitz baths. A sitz bath involves sitting in a few inches of warm bath water. Be careful not to make the water so hot that you burn yourself--test it before sitting in it. Soak for about 10 to 15 minutes a few times a day. This may help relieve pain.  ? Topical products. Your healthcare provider may prescribe or recommend creams, ointments, or pads that can be applied to the hemorrhoid. Use these exactly as directed.  Tips to help prevent hemorrhoids    Eat more fiber. Fiber adds bulk to stool and absorbs water as it moves through your colon. This makes stool softer and easier to pass.  ? Increase the fiber in your diet with more fiber-rich foods. These include fresh fruit, vegetables, and whole grains.  ? Take a fiber supplement or bulking agent, if advised by your healthcare provider. These include products such as psyllium or methylcellulose.    Drink more water. Your healthcare provider may direct you to drink plenty of  water. This can help keep stool soft.    Be more active. Frequent exercise aids digestion and helps prevent constipation. It may also help make bowel movements more regular.    Don t strain during bowel movements. This can make hemorrhoids more likely. Also, don t sit on the toilet for long periods of time.  Follow-up care  Follow up with your healthcare provider as advised. If a culture or imaging tests were done, someone will let you know the results when they are ready. This may take a few days or longer. If your healthcare provider recommends a procedure for your hemorrhoids, these options can be discussed. Options may include surgery and outpatient office treatments.  When to seek medical advice  Call your healthcare provider right away if any of these occur:    Increased bleeding from the rectum    Increased pain around the rectum or anus    Weakness or dizziness  Call 911  Call 911 if any of these occur:    Trouble breathing or swallowing    Fainting or loss of consciousness    Unusually fast heart rate    Vomiting blood    Large amounts of blood in stool or black, tarry stools  Date Last Reviewed: 9/1/2017 2000-2018 [employer]. [address] [place] [address], [place] [address]. All rights reserved. This information is not intended as a substitute for professional medical care. Always follow your healthcare professional's instructions.

## 2019-09-20 NOTE — LETTER
2019      Alannah Wynn  [address]  [place] [address]              Dear Alannah Wynn        Your test results are attached. I am happy to let you know that they are stable and your medications can stay the same.    The blood sugar is normal and you do not have diabetes. The hemoglobin is normal and you do not have anemia.      Enclosed is a copy of the results.  It was a pleasure to see you at your last appointment.    If you have any questions or concerns, please call myself or my nurse at [phone].      Sincerely,      Earline Mehta MD, MPH /APOLINAR Evreett MA  [employer]      Results for orders placed or performed in visit on 19   CBC with platelets   Result Value Ref Range    WBC 8.9 4.0 - 11.0 10e9/L    RBC Count 4.53 3.8 - 5.2 10e12/L    Hemoglobin 13.7 11.7 - 15.7 g/dL    Hematocrit 42.3 35.0 - 47.0 %    MCV 93 78 - 100 fl    MCH 30.2 26.5 - 33.0 pg    MCHC 32.4 31.5 - 36.5 g/dL    RDW 13.5 10.0 - 15.0 %    Platelet Count 327 150 - 450 10e9/L   Glucose   Result Value Ref Range    Glucose 101 (H) 70 - 99 mg/dL                 RE: Alannah Wynn   MRN: [id number]  : 1950  ENC DATE: Sep 20, 2019

## 2019-09-22 NOTE — RESULT ENCOUNTER NOTE
Dear Alannah Wynn,    Your test results are attached. I am happy to let you know that they are stable and your medications can stay the same.    The blood sugar is normal and you do not have diabetes. The hemoglobin is normal and you do not have anemia.     Please call me if you have any questions about these test results or about your care.    Sincerely,    Earline Mehta MD

## 2019-09-25 ENCOUNTER — TELEPHONE (OUTPATIENT)
Dept: FAMILY MEDICINE | Facility: CLINIC | Age: 69
End: 2019-09-25

## 2019-09-25 DIAGNOSIS — N32.81 OVERACTIVE BLADDER: Primary | ICD-10-CM

## 2019-09-25 NOTE — TELEPHONE ENCOUNTER
Reason for Call:  Other prescription    Detailed comments: trospium (SANCTURA XR) 60 MG CP24 24 hr capsule needs a prior authorization per the phrama told patient this info.    Phone Number Patient can be reached at: Home number on file [phone] (home)    Best Time: any    Can we leave a detailed message on this number? YES    Call taken on 9/25/2019 at 12:22 PM by Camille Hendrickson

## 2019-09-25 NOTE — TELEPHONE ENCOUNTER
Plan does not cover  Medication prescribed. Per Rx benefit plan alternative medications include Oxybutinin, Myrbetriq. Please fax back with approval along with strength, directions, quantity, and refills.          Thomas Faamalini  [facility]

## 2019-09-26 RX ORDER — MIRABEGRON 50 MG/1
50 TABLET, EXTENDED RELEASE ORAL DAILY
Qty: 90 TABLET | Refills: 1 | Status: SHIPPED | OUTPATIENT
Start: 2019-09-26

## 2019-10-18 ENCOUNTER — HOSPITAL ENCOUNTER (OUTPATIENT)
Facility: AMBULATORY SURGERY CENTER | Age: 69
Discharge: HOME | End: 2019-10-18
Attending: INTERNAL MEDICINE | Admitting: INTERNAL MEDICINE
Payer: COMMERCIAL

## 2019-10-18 VITALS
HEART RATE: 87 BPM | TEMPERATURE: 97.9 F | SYSTOLIC BLOOD PRESSURE: 119 MMHG | RESPIRATION RATE: 16 BRPM | OXYGEN SATURATION: 94 % | DIASTOLIC BLOOD PRESSURE: 74 MMHG

## 2019-10-18 DIAGNOSIS — C20 RECTAL CANCER (H): Primary | ICD-10-CM

## 2019-10-18 LAB — COLONOSCOPY: NORMAL

## 2019-10-18 PROCEDURE — G8918 PT W/O PREOP ORDER IV AB PRO: HCPCS

## 2019-10-18 PROCEDURE — 45380 COLONOSCOPY AND BIOPSY: CPT | Mod: XS

## 2019-10-18 PROCEDURE — 45385 COLONOSCOPY W/LESION REMOVAL: CPT

## 2019-10-18 PROCEDURE — 88305 TISSUE EXAM BY PATHOLOGIST: CPT | Performed by: INTERNAL MEDICINE

## 2019-10-18 PROCEDURE — 88342 IMHCHEM/IMCYTCHM 1ST ANTB: CPT | Performed by: INTERNAL MEDICINE

## 2019-10-18 PROCEDURE — 00000159 ZZHCL STATISTIC H-SEND OUTS PREP: Performed by: INTERNAL MEDICINE

## 2019-10-18 PROCEDURE — 88341 IMHCHEM/IMCYTCHM EA ADD ANTB: CPT | Performed by: INTERNAL MEDICINE

## 2019-10-18 PROCEDURE — G8907 PT DOC NO EVENTS ON DISCHARG: HCPCS

## 2019-10-18 RX ORDER — LIDOCAINE 40 MG/G
CREAM TOPICAL
Status: DISCONTINUED | OUTPATIENT
Start: 2019-10-18 | End: 2019-10-19 | Stop reason: HOSPADM

## 2019-10-18 RX ORDER — FENTANYL CITRATE 50 UG/ML
INJECTION, SOLUTION INTRAMUSCULAR; INTRAVENOUS PRN
Status: DISCONTINUED | OUTPATIENT
Start: 2019-10-18 | End: 2019-10-18 | Stop reason: HOSPADM

## 2019-10-18 RX ORDER — ONDANSETRON 2 MG/ML
4 INJECTION INTRAMUSCULAR; INTRAVENOUS
Status: DISCONTINUED | OUTPATIENT
Start: 2019-10-18 | End: 2019-10-19 | Stop reason: HOSPADM

## 2019-10-18 NOTE — LETTER
October 22, 2019      Alannah Wynn  [address]  [place] [address]              Dear Ms. Wynn,    This letter is to confirm our telephone conversation of this afternoon.  As I said on the phone, microscopic examination of biopsies taken from the mass in your rectum at colonoscopy on Oct. 18 confirm that it is a cancer.  I'm sorry to have to tell this unfortunate news.  I see that you have already been in touch with the Colorectal Surgery Clinic at the [facility].  They are an excellent group and will provide you with the best possible plan and management of this tumor.    Incidentally microscopic examination of the small polyp removed from your colon at colonoscopy that same day shows that it is an adenomatous polyp.  These polyps are common.  An occasional one can grow over the years and turn into cancer, although most do not.  That polyp has been completely removed.    It was a pleasure to meet you and to speak with you on the phone.  I wish you the very best in dealing with this tumor.  If you have any questions or if I can be of any help, you can call my cell phone at [phone] or call the [facility] at [phone].      Sincerely,      William Duane, MD

## 2019-10-21 ENCOUNTER — TELEPHONE (OUTPATIENT)
Dept: SURGERY | Facility: CLINIC | Age: 69
End: 2019-10-21

## 2019-10-21 DIAGNOSIS — C20 RECTAL CANCER (H): Primary | ICD-10-CM

## 2019-10-21 NOTE — TELEPHONE ENCOUNTER
[facility] Call Center    Phone Message    May a detailed message be left on voicemail: yes    Reason for Call: Other: Please call pt asap to schedule for colon and rectal surgery for rectal cancer.      Action Taken: Message routed to:  [facility] Colon and Rectal

## 2019-10-21 NOTE — TELEPHONE ENCOUNTER
Called patient to discuss her colonoscopy and pathology. Advised as her pathology isn't back we need this prior to her appointment to determine scans. Advised will call to schedule imaging and consult once pathology is back.

## 2019-10-23 NOTE — TELEPHONE ENCOUNTER
10/23/2019:   Returned phone call from Strobeil message left by patient, spoke to patient's , patient, and their son Gamal. They stated that patient has a new Cancer diagnosis, and they are wanting to move forward with scheduling next steps as soon as possible. They said that MIKE Banerjee told them she was waiting on biopsy results before scheduling a scan. They said that the biopsy results are back and they confirmed cancer. I told them I'd check with MIKE Banerjee about next steps, and we'd call them back today.    Spoke with MIKE Banerjee in the office, she is aware of the situation and spoke to patient this morning. Now that pathology is back, she will call patient today to discuss next steps. MIKE Banerjee states she will let me know if I need to schedule anything for patient.

## 2019-10-23 NOTE — TELEPHONE ENCOUNTER
[facility] Call Center    Phone Message    May a detailed message be left on voicemail: yes    Reason for Call: Other:     Janiya called.  He is anxious to get Alannah's initial appointment scheduled.  Please call him back asap.      Action Taken: Message routed to:  [facility] Colon and Retctal Surgery

## 2019-10-23 NOTE — TELEPHONE ENCOUNTER
Called patient to discuss her pathology results and next steps. Advised the needed CT and MRI for initial staging. Our scheduling team will reach out to schedule. Patient is claustrophobic so she will take an ativan prior to her MRI. An appointment with out team would be scheduled once her imaging was completed. Patient would like all appointment details and any medical information shared with her son Romeo Wynn at [phone].

## 2019-10-24 NOTE — TELEPHONE ENCOUNTER
RECORDS RECEIVED FROM: Internal    DATE RECEIVED: 11/4/19    NOTES STATUS DETAILS   OFFICE NOTE from referring provider Internal Internal referral from Duane MD    OFFICE NOTE from other specialist N/A    DISCHARGE SUMMARY from hospital N/A    OPERATIVE REPORT N/A    MEDICATION LIST N/A         ENDOSCOPY  N/A    COLONOSCOPY Internal 10/18/19   ERCP N/A    EUS N/A    STOOL TESTING N/A    PERTINENT LABS Internal    PATHOLOGY REPORTS (RELATED) Internal 10/18/19   IMAGING (CT, MRI, EGD) Internal MR Pelvis 10/21/19   CT chest Abd 10/21/19      REFERRAL INFORMATION    Date referral was placed: 11/4/19   Date all records received: N/A   Date records were scanned into Epic: N/A   Date records were sent to Provider to review: N/A   Date and recommendation received from provider:  LETTER SENT  SCHEDULE APPOINTMENT   Date patient was contacted to schedule: 10/23/19

## 2019-10-31 ENCOUNTER — TELEPHONE (OUTPATIENT)
Dept: GASTROENTEROLOGY | Facility: OUTPATIENT CENTER | Age: 69
End: 2019-10-31

## 2019-10-31 RX ORDER — DIAZEPAM 5 MG
5 TABLET ORAL ONCE
Qty: 2 TABLET | Refills: 0 | Status: SHIPPED | OUTPATIENT
Start: 2019-10-31 | End: 2019-10-31

## 2019-10-31 RX ORDER — DIAZEPAM 5 MG
5 TABLET ORAL ONCE
Qty: 1 TABLET | Refills: 0 | Status: SHIPPED | OUTPATIENT
Start: 2019-10-31 | End: 2019-11-25

## 2019-10-31 NOTE — TELEPHONE ENCOUNTER
[facility] Call Center    Phone Message    May a detailed message be left on voicemail: yes    Reason for Call: Other: Castro calling to state pt is requesting oral sedation for MRI 11/01. Please call him back to discuss.      Action Taken: Message routed to:  [facility] surge

## 2019-10-31 NOTE — TELEPHONE ENCOUNTER
Called and advised the mediation would be ready for patient to  tomorrow 1 hour prior to her MRI.

## 2019-10-31 NOTE — PROGRESS NOTES
"Colon and Rectal Surgery Clinic Note    RE: Alannah Wynn.  : 1950.  CECY: 2019.    Reason for visit: rectal adenocarcinoma.    HPI:     Diagnostic colonoscopy on 10/18/19 for rectal bleeding showed \"fungating and ulcerated non-obstructing mass was found in the distal rectum <1 cm from the anal verge. The mass was partially circumferential (involving one-half of the lumen circumference). The mass measured four cm in length. In addition, its diameter measured six mm. Biopsies were taken with a cold forceps for histology.      Pathology showed invasive, moderately differentiated adenocarcinoma with intact mismatch repair protein expression.    CT CAP and MR Pelvis on 19 showed:    IMPRESSION: In this patient with history of rectal cancer, initial  workup:   1a. Correlating with same day rectal MRI, there is a fungating  polypoid mid/lower rectal mass with luminal narrowing as well as  innumerable enlarged pelvic lymph nodes with a conglomerate of  suspicious lymph nodes abutting the anterior peritoneal reflection.  Please refer to same day rectal MRI report for better  characterization.  1b. Bilateral pulmonary nodules suspicious for metastasis. No prior  studies available for comparison. Attention on follow-up studies.  2. Dilated main pulmonary artery concerning for pulmonary arterial  hypertension in the appropriate clinical setting.  3. No suspicious liver lesions.  4. Enlarged yanira hepatis as well as scattered borderline enlarged  mesenteric lymph nodes, concerning for metastasis. Attention on  follow-up studies.  5. Sliding hiatal hernia. Additional incidental findings as described  above.    IMPRESSION: Fungating polypoid mid/lower rectal mass with luminal  narrowing as well as innumerable enlarged lymph nodes as described  above with a conglomerate of suspicious lymph nodes abutting the  anterior peritoneal reflection. Stage T3d N2.     Alannah says she has had intermittent hematochezia and " "anorectal irritation since 2011, that have been managed as \"hemorrhoids\". These symptoms became persistent and progressive since early thi year. Her anorectal pain is managed with Tylenol. Denies fevers, chills, or unintentional weight loss. Does have intermittent urinary and fecal urgency but no incontinence per se. No FH of CRC or IBD. Last colonoscopy was in 2009 and was normal per patient.    Medical history:  Past Medical History:   Diagnosis Date     Gastroesophageal reflux disease        Surgical history:  Past Surgical History:   Procedure Laterality Date     COLONOSCOPY  10/18/2019    1 polyp     COLONOSCOPY N/A 10/18/2019    Procedure: Colonoscopy, With Polypectomy And Biopsy;  Surgeon: Duane, William Charles, MD;  Location: [facility] OR     COLONOSCOPY WITH CO2 INSUFFLATION N/A 10/18/2019    Procedure: COLONOSCOPY, WITH CO2 INSUFFLATION;  Surgeon: Duane, William Charles, MD;  Location: [facility] OR       Family history:  Family History   Problem Relation Age of Onset     Hypertension Father        Medications:  Current Outpatient Medications   Medication Sig Dispense Refill     docusate sodium (COLACE) 100 MG capsule Take 1 capsule (100 mg) by mouth 2 times daily as needed for constipation (Patient not taking: Reported on 9/20/2019) 60 capsule 0     Fesoterodine Fumarate 8 MG TB24 Take 1 tablet (8 mg) by mouth daily 30 tablet 11     hydrocortisone (ANUSOL-HC) 2.5 % cream Place rectally 2 times daily as needed for hemorrhoids 30 g 1     mirabegron (MYRBETRIQ) 50 MG 24 hr tablet Take 1 tablet (50 mg) by mouth daily 90 tablet 1     Multiple vitamin TABS        omeprazole (PRILOSEC) 20 MG DR capsule Take 20 mg by mouth       solifenacin (VESICARE) 10 MG tablet Take 1 tablet (10 mg) by mouth daily (Patient not taking: Reported on 5/14/2019) 30 tablet 3     trospium (SANCTURA XR) 60 MG CP24 24 hr capsule Take 1 capsule (60 mg) by mouth every morning 90 each 3       Allergies:  Allergies   Allergen Reactions     Penicillins  " "    Sulfa Drugs        Social history:   Social History     Tobacco Use     Smoking status: Never Smoker     Smokeless tobacco: Never Used   Substance Use Topics     Alcohol use: No     Alcohol/week: 0.0 standard drinks     Marital status: .    Physical Examination:  /84 (BP Location: Left arm, Patient Position: Sitting, Cuff Size: Adult Large)   Pulse 74   Ht 4' 10.86\"   Wt 166 lb 9.6 oz   LMP 11/27/2001 (Approximate)   SpO2 96%   BMI 33.81 kg/m    General: Well hydrated. No acute distress.  Abdomen: Soft, NT. No masses or inguinal adenopathy palpated.  Perianal external examination:  Perianal skin: intact.  Lesions: No.  Eversion of buttocks: There was not evidence of an anal fissure. Details: N/A.  Skin tags or external hemorrhoids: Yes: skin tags/exernal hemorrhoids.  Digital rectal examination: Was performed.   Sphincter tone: Good.  Palpable lesions: Yes - Location: right posterolateral fixed mass, 3-cm from the anal verge. No clear sphincter involvement  Other: A medium rectocele was appreciated on bearing down.  Bimanual examination: was not performed.    Investigations:  None.    Procedures:  Flexible sigmoidoscopy: after obtaining informed consent and performing a \"time out\", an adult flexible sigmoidoscope was introduced through the anus and passed up to the proximal rectum. The quality of the prep was unprepped. Findings: 5x4-cm right posterolateral ulcerated mass that covers approx 40% of the lumen circumference and extends to the dentate line. No active bleeding. No additional abnormalities were seen. Total scope time: 12 minutes. The patient tolerated the procedure well.    ASSESSMENT  69 y/o F with T3N2M1 distal rectal adenocarcinoma with radiologic evidence of non-regional lymphatic as well as pulmonary metastases. No obstructive symptoms at this time. Risks, benefits, and alternatives of operative treatment were thoroughly discussed with the patient, he/she understands these well " and agrees to proceed.    PLAN  1. Labs today and schedule PET/CT.  2. Lido 5% PRN for perianal pain.  3. Refer to Medical Oncology for systemic chemotherapy. If positive response, lung lesions appear treatable by VATS. If this is done, we could further discuss CXRT after VATS, and then possible APR vs. watch and wait depending on response to CXRT.  4. Will discuss at multidisciplinary CRC conference.    Time spent: 60 minutes.  >50% spent in discussing, counseling and coordinating care.    Samson Prince M.D., M.Sc.     Division of Colon and Rectal Surgery  [facility]    Referring Provider:  William C. Duane, MD.     Primary Care Provider:  Earline Mehta

## 2019-11-01 ENCOUNTER — ANCILLARY PROCEDURE (OUTPATIENT)
Dept: MRI IMAGING | Facility: CLINIC | Age: 69
End: 2019-11-01
Attending: COLON & RECTAL SURGERY
Payer: COMMERCIAL

## 2019-11-01 ENCOUNTER — ANCILLARY PROCEDURE (OUTPATIENT)
Dept: CT IMAGING | Facility: CLINIC | Age: 69
End: 2019-11-01
Attending: COLON & RECTAL SURGERY
Payer: COMMERCIAL

## 2019-11-01 DIAGNOSIS — C20 RECTAL CANCER (H): ICD-10-CM

## 2019-11-01 RX ORDER — GADOBUTROL 604.72 MG/ML
7.5 INJECTION INTRAVENOUS ONCE
Status: COMPLETED | OUTPATIENT
Start: 2019-11-01 | End: 2019-11-01

## 2019-11-01 RX ORDER — IOPAMIDOL 755 MG/ML
105 INJECTION, SOLUTION INTRAVASCULAR ONCE
Status: COMPLETED | OUTPATIENT
Start: 2019-11-01 | End: 2019-11-01

## 2019-11-01 RX ADMIN — GADOBUTROL 7.5 ML: 604.72 INJECTION INTRAVENOUS at 17:10

## 2019-11-01 RX ADMIN — IOPAMIDOL 105 ML: 755 INJECTION, SOLUTION INTRAVASCULAR at 18:07

## 2019-11-01 NOTE — DISCHARGE INSTRUCTIONS
MRI Contrast Discharge Instructions    The IV contrast you received today will pass out of your body in your  urine. This will happen in the next 24 hours. You will not feel this process.  Your urine will not change color.    Drink at least 4 extra glasses of water or juice today (unless your doctor  has restricted your fluids). This reduces the stress on your kidneys.  You may take your regular medicines.    If you are on dialysis: It is best to have dialysis today.    If you have a reaction: Most reactions happen right away. If you have  any new symptoms after leaving the hospital (such as hives or swelling),  call your hospital at the correct number below. Or call your family doctor.  If you have breathing distress or wheezing, call 911.    Special instructions: ***    I have read and understand the above information.    Signature:______________________________________ Date:___________    Staff:__________________________________________ Date:___________     Time:__________    [facility] Departments:    ___[place]: [phone]  ___[facility]: [phone]  ___[place]: [phone] ___[facility]: [phone]  ___[facility]: [phone]  ___[facility]: [phone]  ___[phone]  ___[facility]: [phone]  ___[phone]

## 2019-11-04 ENCOUNTER — PRE VISIT (OUTPATIENT)
Dept: SURGERY | Facility: CLINIC | Age: 69
End: 2019-11-04

## 2019-11-04 ENCOUNTER — OFFICE VISIT (OUTPATIENT)
Dept: SURGERY | Facility: CLINIC | Age: 69
End: 2019-11-04
Payer: COMMERCIAL

## 2019-11-04 VITALS
DIASTOLIC BLOOD PRESSURE: 84 MMHG | WEIGHT: 166.6 LBS | BODY MASS INDEX: 33.59 KG/M2 | OXYGEN SATURATION: 96 % | SYSTOLIC BLOOD PRESSURE: 132 MMHG | HEIGHT: 59 IN | HEART RATE: 74 BPM

## 2019-11-04 DIAGNOSIS — C20 RECTAL CANCER (H): ICD-10-CM

## 2019-11-04 DIAGNOSIS — L29.0 PERIANAL IRRITATION: ICD-10-CM

## 2019-11-04 DIAGNOSIS — C20 RECTAL CANCER (H): Primary | ICD-10-CM

## 2019-11-04 LAB
ABO + RH BLD: NORMAL
ABO + RH BLD: NORMAL
ALBUMIN SERPL-MCNC: 3.8 G/DL (ref 3.4–5)
ALP SERPL-CCNC: 96 U/L (ref 40–150)
ALT SERPL W P-5'-P-CCNC: 26 U/L (ref 0–50)
ANION GAP SERPL CALCULATED.3IONS-SCNC: 3 MMOL/L (ref 3–14)
AST SERPL W P-5'-P-CCNC: 18 U/L (ref 0–45)
BILIRUB SERPL-MCNC: 0.3 MG/DL (ref 0.2–1.3)
BLD GP AB SCN SERPL QL: NORMAL
BLOOD BANK CMNT PATIENT-IMP: NORMAL
BUN SERPL-MCNC: 11 MG/DL (ref 7–30)
CALCIUM SERPL-MCNC: 9.4 MG/DL (ref 8.5–10.1)
CEA SERPL-MCNC: 10 UG/L (ref 0–2.5)
CHLORIDE SERPL-SCNC: 106 MMOL/L (ref 94–109)
CO2 SERPL-SCNC: 30 MMOL/L (ref 20–32)
CREAT SERPL-MCNC: 0.79 MG/DL (ref 0.52–1.04)
ERYTHROCYTE [DISTWIDTH] IN BLOOD BY AUTOMATED COUNT: 13 % (ref 10–15)
GFR SERPL CREATININE-BSD FRML MDRD: 76 ML/MIN/{1.73_M2}
GLUCOSE SERPL-MCNC: 103 MG/DL (ref 70–99)
HCT VFR BLD AUTO: 45.1 % (ref 35–47)
HGB BLD-MCNC: 14.2 G/DL (ref 11.7–15.7)
MCH RBC QN AUTO: 29.1 PG (ref 26.5–33)
MCHC RBC AUTO-ENTMCNC: 31.5 G/DL (ref 31.5–36.5)
MCV RBC AUTO: 92 FL (ref 78–100)
PLATELET # BLD AUTO: 326 10E9/L (ref 150–450)
POTASSIUM SERPL-SCNC: 4.6 MMOL/L (ref 3.4–5.3)
PREALB SERPL IA-MCNC: 25 MG/DL (ref 15–45)
PROT SERPL-MCNC: 8 G/DL (ref 6.8–8.8)
RBC # BLD AUTO: 4.88 10E12/L (ref 3.8–5.2)
SODIUM SERPL-SCNC: 139 MMOL/L (ref 133–144)
SPECIMEN EXP DATE BLD: NORMAL
WBC # BLD AUTO: 8.2 10E9/L (ref 4–11)

## 2019-11-04 PROCEDURE — 82378 CARCINOEMBRYONIC ANTIGEN: CPT | Performed by: COLON & RECTAL SURGERY

## 2019-11-04 RX ORDER — LIDOCAINE 50 MG/G
OINTMENT TOPICAL ONCE
Status: ACTIVE | OUTPATIENT
Start: 2019-11-04

## 2019-11-04 ASSESSMENT — PAIN SCALES - GENERAL: PAINLEVEL: NO PAIN (0)

## 2019-11-04 ASSESSMENT — MIFFLIN-ST. JEOR: SCORE: 1189.09

## 2019-11-04 NOTE — LETTER
"2019       RE: Alannah Wynn  [address]  [place] [address]     Dear Colleague,    Thank you for referring your patient, Alannah Wynn, to the [facility] SUR[facility] at [facility]. Please see a copy of my visit note below.    Colon and Rectal Surgery Clinic Note    RE: Alannah Wynn.  : 1950.  CECY: 2019.    Reason for visit: rectal adenocarcinoma.    HPI:     Diagnostic colonoscopy on 10/18/19 for rectal bleeding showed \"fungating and ulcerated non-obstructing mass was found in the distal rectum <1 cm from the anal verge. The mass was partially circumferential (involving one-half of the lumen circumference). The mass measured four cm in length. In addition, its diameter measured six mm. Biopsies were taken with a cold forceps for histology.      Pathology showed invasive, moderately differentiated adenocarcinoma with intact mismatch repair protein expression.    CT CAP and MR Pelvis on 19 showed:    IMPRESSION: In this patient with history of rectal cancer, initial  workup:   1a. Correlating with same day rectal MRI, there is a fungating  polypoid mid/lower rectal mass with luminal narrowing as well as  innumerable enlarged pelvic lymph nodes with a conglomerate of  suspicious lymph nodes abutting the anterior peritoneal reflection.  Please refer to same day rectal MRI report for better  characterization.  1b. Bilateral pulmonary nodules suspicious for metastasis. No prior  studies available for comparison. Attention on follow-up studies.  2. Dilated main pulmonary artery concerning for pulmonary arterial  hypertension in the appropriate clinical setting.  3. No suspicious liver lesions.  4. Enlarged yanira hepatis as well as scattered borderline enlarged  mesenteric lymph nodes, concerning for metastasis. Attention on  follow-up studies.  5. Sliding hiatal hernia. Additional incidental findings as " "described  above.    IMPRESSION: Fungating polypoid mid/lower rectal mass with luminal  narrowing as well as innumerable enlarged lymph nodes as described  above with a conglomerate of suspicious lymph nodes abutting the  anterior peritoneal reflection. Stage T3d N2.     Alannah says she has had intermittent hematochezia and anorectal irritation since 2011, that have been managed as \"hemorrhoids\". These symptoms became persistent and progressive since early thi year. Her anorectal pain is managed with Tylenol. Denies fevers, chills, or unintentional weight loss. Does have intermittent urinary and fecal urgency but no incontinence per se. No FH of CRC or IBD. Last colonoscopy was in 2009 and was normal per patient.    Medical history:  Past Medical History:   Diagnosis Date     Gastroesophageal reflux disease        Surgical history:  Past Surgical History:   Procedure Laterality Date     COLONOSCOPY  10/18/2019    1 polyp     COLONOSCOPY N/A 10/18/2019    Procedure: Colonoscopy, With Polypectomy And Biopsy;  Surgeon: Duane, William Charles, MD;  Location: [facility] OR     COLONOSCOPY WITH CO2 INSUFFLATION N/A 10/18/2019    Procedure: COLONOSCOPY, WITH CO2 INSUFFLATION;  Surgeon: Duane, William Charles, MD;  Location: [facility] OR       Family history:  Family History   Problem Relation Age of Onset     Hypertension Father        Medications:  Current Outpatient Medications   Medication Sig Dispense Refill     docusate sodium (COLACE) 100 MG capsule Take 1 capsule (100 mg) by mouth 2 times daily as needed for constipation (Patient not taking: Reported on 9/20/2019) 60 capsule 0     Fesoterodine Fumarate 8 MG TB24 Take 1 tablet (8 mg) by mouth daily 30 tablet 11     hydrocortisone (ANUSOL-HC) 2.5 % cream Place rectally 2 times daily as needed for hemorrhoids 30 g 1     mirabegron (MYRBETRIQ) 50 MG 24 hr tablet Take 1 tablet (50 mg) by mouth daily 90 tablet 1     Multiple vitamin TABS        omeprazole (PRILOSEC) 20 MG DR capsule " "Take 20 mg by mouth       solifenacin (VESICARE) 10 MG tablet Take 1 tablet (10 mg) by mouth daily (Patient not taking: Reported on 5/14/2019) 30 tablet 3     trospium (SANCTURA XR) 60 MG CP24 24 hr capsule Take 1 capsule (60 mg) by mouth every morning 90 each 3       Allergies:  Allergies   Allergen Reactions     Penicillins      Sulfa Drugs        Social history:   Social History     Tobacco Use     Smoking status: Never Smoker     Smokeless tobacco: Never Used   Substance Use Topics     Alcohol use: No     Alcohol/week: 0.0 standard drinks     Marital status: .    Physical Examination:  /84 (BP Location: Left arm, Patient Position: Sitting, Cuff Size: Adult Large)   Pulse 74   Ht 4' 10.86\"   Wt 166 lb 9.6 oz   LMP 11/27/2001 (Approximate)   SpO2 96%   BMI 33.81 kg/m     General: Well hydrated. No acute distress.  Abdomen: Soft, NT. No masses or inguinal adenopathy palpated.  Perianal external examination:  Perianal skin: intact.  Lesions: No.  Eversion of buttocks: There was not evidence of an anal fissure. Details: N/A.  Skin tags or external hemorrhoids: Yes: skin tags/exernal hemorrhoids.  Digital rectal examination: Was performed.   Sphincter tone: Good.  Palpable lesions: Yes - Location: right posterolateral fixed mass, 3-cm from the anal verge. No clear sphincter involvement  Other: A medium rectocele was appreciated on bearing down.  Bimanual examination: was not performed.    Investigations:  None.    Procedures:  Flexible sigmoidoscopy: after obtaining informed consent and performing a \"time out\", an adult flexible sigmoidoscope was introduced through the anus and passed up to the proximal rectum. The quality of the prep was unprepped. Findings: 5x4-cm right posterolateral ulcerated mass that covers approx 40% of the lumen circumference and extends to the dentate line. No active bleeding. No additional abnormalities were seen. Total scope time: 12 minutes. The patient tolerated the " procedure well.    ASSESSMENT  67 y/o F with T3N2M1 distal rectal adenocarcinoma with radiologic evidence of non-regional lymphatic as well as pulmonary metastases. No obstructive symptoms at this time. Risks, benefits, and alternatives of operative treatment were thoroughly discussed with the patient, he/she understands these well and agrees to proceed.    PLAN  1. Labs today and schedule PET/CT.  2. Lido 5% PRN for perianal pain.  3. Refer to Medical Oncology for systemic chemotherapy. If positive response, lung lesions appear treatable by VATS. If this is done, we could further discuss CXRT after VATS, and then possible APR vs. watch and wait depending on response to CXRT.  4. Will discuss at multidisciplinary CRC conference.    Time spent: 60 minutes.  >50% spent in discussing, counseling and coordinating care.    Samson Prince M.D., M.Sc.     Division of Colon and Rectal Surgery  [facility]    Referring Provider:  William C. Duane, MD.     Primary Care Provider:  Earline Mehta

## 2019-11-04 NOTE — PATIENT INSTRUCTIONS
Follow up:    Please call with any questions or concerns regarding your clinic visit today.    It is a pleasure being involved in your health care.    Contacts post-consultation depending on your need:    [facility] will call you for an appt with an oncologist.  If you do not hear from them in 1-2 days, please call [phone] for an appt.    PET/CT scan   [address] [place], [place]  Thurs. 11/7 1:00  1st floor  1-327    MIKE Gonzalez [phone]    Clinic Fax Number [phone]    Surgery Scheduling [phone]    My Chart is available 24 hours a day and is a secure way to access your records and communicate with your care team.  I strongly recommend signing up if you haven't already done so, if you are comfortable with computers.  If you would like to inquire about this or are having problems with My Chart access, you may call [phone] or go online at [email or web address].  Please allow at least 24 hours for a response and extra time on weekends and Holidays.

## 2019-11-04 NOTE — NURSING NOTE
"Chief Complaint   Patient presents with     Consult     New cancer.       Vitals:    11/04/19 0943   BP: 132/84   BP Location: Left arm   Patient Position: Sitting   Cuff Size: Adult Large   Pulse: 74   SpO2: 96%   Weight: 166 lb 9.6 oz   Height: 4' 10.86\"       Body mass index is 33.81 kg/m .      Renetta Dean, EMT                      "

## 2019-11-05 NOTE — TELEPHONE ENCOUNTER
RECORDS STATUS - ALL OTHER DIAGNOSIS      RECORDS RECEIVED FROM: Epic/CE   DATE RECEIVED: 12/6/2019    NOTES STATUS DETAILS   OFFICE NOTE from referring provider Complete   Referred Internally by Dr Prince, Samson Dunham MD    OFFICE NOTE from medical oncologist N/A    DISCHARGE SUMMARY from hospital N/A    DISCHARGE REPORT from the ER N/A    OPERATIVE REPORT N/A    MEDICATION LIST Complete [facility]   CLINICAL TRIAL TREATMENTS TO DATE     LABS     PATHOLOGY REPORTS Complete [facility]   ANYTHING RELATED TO DIAGNOSIS Complete EPIC   GENONOMIC TESTING     TYPE:     IMAGING (NEED IMAGES & REPORT)     CT SCANS Complete PACS   MRI Complete PACS   MAMMO     ULTRASOUND     PET Complete PACS

## 2019-11-06 DIAGNOSIS — C20 RECTAL CANCER (H): Primary | ICD-10-CM

## 2019-11-06 RX ORDER — LORAZEPAM 1 MG/1
1 TABLET ORAL ONCE
Qty: 1 TABLET | Refills: 0 | Status: SHIPPED | OUTPATIENT
Start: 2019-11-06 | End: 2019-11-06

## 2019-11-07 ENCOUNTER — HOSPITAL ENCOUNTER (OUTPATIENT)
Dept: PET IMAGING | Facility: CLINIC | Age: 69
Discharge: HOME | End: 2019-11-07
Attending: COLON & RECTAL SURGERY | Admitting: COLON & RECTAL SURGERY
Payer: COMMERCIAL

## 2019-11-07 DIAGNOSIS — C20 RECTAL CANCER (H): ICD-10-CM

## 2019-11-07 PROCEDURE — A9552 F18 FDG: HCPCS | Performed by: COLON & RECTAL SURGERY

## 2019-11-07 PROCEDURE — 78816 PET IMAGE W/CT FULL BODY: CPT | Mod: PS

## 2019-11-07 PROCEDURE — 34300033 ZZH RX 343: Performed by: COLON & RECTAL SURGERY

## 2019-11-07 RX ADMIN — FLUDEOXYGLUCOSE F-18 10.22 MCI.: 500 INJECTION, SOLUTION INTRAVENOUS at 13:25

## 2019-11-08 NOTE — PROGRESS NOTES
"NEW PATIENT MEDICAL ONCOLOGY CONSULT      REQUESTING PROVIDER: colon recal surgeon Dr.Wolfgang Prince      REASON FOR CONSULTATION/CC:    10/2019 dx adenoCa of rectum,  MMR intact at age 68, images are consistent with stage IV disease with pulmonary and bony mets      HISTORY OF ONCOLOGY ILLNESS:    She has had intermittent hematochezia and anorectal irritation since 2011, that have been managed as \"hemorrhoids\". These symptoms became persistent and progressive since early thi year. Her anorectal pain is managed with Tylenol. Denies fevers, chills, or unintentional weight loss. Does have intermittent urinary and fecal urgency but no incontinence per se.    Diagnostic colonoscopy on 10/18/19 for rectal bleeding showed \"fungating and ulcerated non-obstructing mass was found in the distal rectum <1 cm from the anal verge. The mass was partially circumferential (involving one-half of the lumen circumference). The mass measured four cm in length. In addition, its diameter measured six mm.  Pathology showed invasive moderately differentiated adenocarcinoma with intact mismatch repair protein expression.    Patient denies any abdominal pain, weight loss, she can still pass bowel movement without pressure sensation.    Pelvic MRI 11/2019 found fungating polypoid mid/lower rectal mass with luminal narrowing as well as innumerable enlarged lymph nodes as described above with a conglomerate of suspicious lymph nodes abutting the anterior peritoneal reflection. Stage cT3d N2.     CT body 11/2019 found   1. Bilateral pulmonary nodules suspicious for metastasis.  2. Enlarged yanira hepatis as well as scattered borderline enlarged mesenteric lymph nodes, concerning for metastasis.      PET 11/2019 found   1. Hypermetabolic mass in the distal rectum, SUV 14 corresponding to the patient's known rectal cancer.  2. Multiple hypermetabolic pelvic lymph nodes, compatible metastatic disease, SUV 5-6.  3. FDG avid pulmonary nodule " "measuring 7 mm in the left lower lobe, concerning for metastatic disease. Additional FDG avid pulmonary nodules suspicious for metastatic disease, SUV around 4.  4. Osteoblastic metastatic lesion in the L3 vertebral body, SUV 6.8       INTERVAL HISTORY: N/A      PAST MEDICAL HISTORY  Reflux, hemorrhoid, hepatitis B?  Overactive bladder    MEDICATIONS/ALLERY:  Reviewed in Epic system.        SOCIAL HISTORY:  She is a [place] immigrants to the [place].  Denies smoking denies alcohol abuse.      FAMILY HISTORY:   Denies any family history of cancer.      REVIEW OF SYSTEMS:   GENERAL: pt is in usual state of health.  No B symptoms  NEURO:   No headache, double vision, or focal weakness.  No neuropathy.   SKIN:  No chronic skin rash or skin infection.   CARDIOVASCULAR:  No High blood pressure or hyperlipidemia. NO BARRY  PULMONARY:  No shortness of breath, no pleurisy, no cough, no hemoptysis.   GI:   intermittent hematochezia and anorectal irritation since ,   :   Overreactive bladder  RHEUMATOLOGY/MUSCULOSKELETAL SYSTEM:  no arthritic pain, or muscle pain. No muscle ache.   ENDOCRINE:  No history of diabetes or thyroid problem.  No complaints of hot flashes.   HEMATOLOGY:  No history of bleeding or thrombosis episode.   Oncology: rectal cancer dx 10/2019  IMMUNOLOGY:  No recurrent fever or chills episode.  No recurrent infectious episode.   PSYCHIATRY:  No anxiety or depression.          PHYSICAL EXAMINATION:   VITAL SIGNS: Blood pressure 135/79, pulse 77, temperature 97.7  F (36.5  C), temperature source Oral, resp. rate 16, height 1.495 m (4' 10.86\"), weight 76.2 kg (168 lb), last menstrual period 2001, SpO2 97 %, not currently breastfeeding.       GENERAL APPEARANCE:  looks like stated age, very pleasant, not in acute distress.     ECO    ENT, MOUTH: Pupils are equally reactive to light.  Sclerae are anicteric.  Moist oral mucosa without lesion or ulcer.   Negative pharynx.  No oral thrush.   NECK:  " Supple.  No jugular venous distention.  Thyroid is not palpable.   LYMPH NODES:  Superficial lymphadenopathy is not appreciable in the bilateral cervical, supraclavicular, axillary or inguinal areas.   CARDIOVASCULAR:  S1, S2 regular with no murmurs or gallops.  No carotid or abdominal bruits.   PULMONARY:  Lungs are clear to auscultation and percussion bilaterally.  There is no wheezing or rhonchi.   GASTROINTESTINAL:  Abdomen is soft, nontender.  No hepatosplenomegaly.  No signs of ascites.  No mass appreciable.   MUSCULOSKELETAL/EXTREMITIES:  No edema.  No cyanotic changes.  No signs of joint deformity.  No lymphedema.   NEUROLOGIC:  Cranial nerves II-XII are grossly intact.  Sensation intact.  Muscle strength and muscle tone symmetrical, 5/5 throughout.   BACK  No spinal or paraspinal tenderness.  No CVA tenderness.   SKIN:  No petechiae.  No rash.  No signs of cellulitis.   PSYCHIATRIC: Oriented to time, person, and places. Normal mood and affect. Good memory. Proper insight and judgement.  She is anxious during the visit.    CURRENT LAB DATA REVIEWED    CEA at 10 in 11/2019  CMP/CBC are fine    Diagnostic colonoscopy on 10/18/19 to rectal mass biopsy found invasive moderately differentiated adenocarcinoma with intact mismatch repair protein expression.        CURRENT IMAGING REVIEWED  PET 11/2019   1. Hypermetabolic mass in the distal rectum, SUV 14 corresponding to the patient's known rectal cancer.  2. Multiple hypermetabolic pelvic lymph nodes, compatible metastatic disease, SUV 5-6.  3. FDG avid pulmonary nodule measuring 7 mm in the left lower lobe, concerning for metastatic disease. Additional FDG avid pulmonary nodules suspicious for metastatic disease, SUV around 4.  4. Osteoblastic metastatic lesion in the L3 vertebral body, SUV 6.8       OLD DATA REVIEWED TODAY WITH SUMMARY:   Baseline cbc nl in 2016  No baseline a/p images.         ASSESSMENT AND PLAN:  At age 68, in 10/2019 dx with rectum cancer  with years of  hematochezia and anorectal irritation.  Staging is most consistent with stage IV disease with pulmonary and bony mets    I broke the bad news to the patient her  and her son.  The son is an internist.    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    Recommend PDL 1 study, NexGen sequencing on her rectal biopsy sample.    We discussed overall survival associated with stage IV colorectal cancer with treatment without treatment, the goal of the treatment is for palliation not for cure, the standard frontline systemic palliative treatment is FOLFOX plus Avastin.    She likely will be a good candidate for this upfront regimen due to her management minimal comorbidities especially negative cardiovascular comorbidities.    We discussed the likely minimal role of the surgery in her presentation, and role of radiation is not an upfront setting.     We discussed the chemotherapy side effect, which include but not limited to GI toxicity nausea vomiting, lower immunity and bleeding risk from cytopenia, neurotoxicity, cardica toxicity,  infusion related reaction, mortality, etc.    We explained in detail how this regimen is delivered.  She will need a port placement.  She request this to be done at [facility].    We discussed how the monitoring along with the palliative systemic chemotherapy plus Avastin, the utilization of tumor marker in her presentation, and the follow-up PET scan.      I recommend patient to creat a Health Care Directive/advanced Directive.      2.  She also has evidence of bone mass on PET scan, Zometa will be building with her treatment.    All Qs are answered to pt's satisfaction.

## 2019-11-13 ENCOUNTER — ONCOLOGY VISIT (OUTPATIENT)
Dept: ONCOLOGY | Facility: CLINIC | Age: 69
End: 2019-11-13
Attending: COLON & RECTAL SURGERY
Payer: COMMERCIAL

## 2019-11-13 ENCOUNTER — PATIENT OUTREACH (OUTPATIENT)
Dept: ONCOLOGY | Facility: CLINIC | Age: 69
End: 2019-11-13

## 2019-11-13 VITALS
SYSTOLIC BLOOD PRESSURE: 135 MMHG | HEART RATE: 77 BPM | BODY MASS INDEX: 33.87 KG/M2 | TEMPERATURE: 97.7 F | DIASTOLIC BLOOD PRESSURE: 79 MMHG | OXYGEN SATURATION: 97 % | RESPIRATION RATE: 16 BRPM | HEIGHT: 59 IN | WEIGHT: 168 LBS

## 2019-11-13 DIAGNOSIS — C79.9 METASTASIS FROM RECTAL CANCER (H): Primary | ICD-10-CM

## 2019-11-13 DIAGNOSIS — C79.51 BONE METASTASIS: ICD-10-CM

## 2019-11-13 DIAGNOSIS — C20 METASTASIS FROM RECTAL CANCER (H): Primary | ICD-10-CM

## 2019-11-13 PROCEDURE — 99205 OFFICE O/P NEW HI 60 MIN: CPT | Performed by: INTERNAL MEDICINE

## 2019-11-13 PROCEDURE — G0463 HOSPITAL OUTPT CLINIC VISIT: HCPCS

## 2019-11-13 ASSESSMENT — MIFFLIN-ST. JEOR: SCORE: 1195.44

## 2019-11-13 ASSESSMENT — PAIN SCALES - GENERAL: PAINLEVEL: SEVERE PAIN (7)

## 2019-11-13 NOTE — PROGRESS NOTES
EDUCATION CHEMOTHERAPY INFUSION    Discussed with patient and family information regarding FOLFOX with Avastin infusions. Went over side affects of medications, as self care while on chemotherapy.   Informed patient and family when he should call the triage nurse at clinic or seek medical attention if you have chills and/or temperature greater than or equal to 100.5, uncontrolled nausea/vomiting, diarrhea, constipation, dizziness, shortness of breath, chest pain, heart palpitations, weakness or any other new or concerning symptoms, questions or concerns.  You can not have any live virus vaccines prior to or during treatment or up to 6 months post infusion.  If you have an upcoming surgery, medical procedure or dental procedure during treatment, this should be discussed with your ordering physician and your surgeon/dentist.  If you are having any concerning symptom, if you are unsure if you should get your next infusion or wish to speak to a provider before your next infusion, please call your care coordinator or triage nurse at your clinic to notify them so we can adequately serve you.    Zaira Bal RN

## 2019-11-13 NOTE — PROGRESS NOTES
"Oncology Rooming Note    November 13, 2019 12:59 PM   Alannah Wynn is a 68 year old female who presents for:    Chief Complaint   Patient presents with     Oncology Clinic Visit     Initial Vitals: /79   Pulse 77   Temp 97.7  F (36.5  C) (Oral)   Resp 16   Ht 1.495 m (4' 10.86\")   Wt 76.2 kg (168 lb)   LMP 11/27/2001 (Approximate)   SpO2 97%   BMI 34.09 kg/m   Estimated body mass index is 34.09 kg/m  as calculated from the following:    Height as of this encounter: 1.495 m (4' 10.86\").    Weight as of this encounter: 76.2 kg (168 lb). Body surface area is 1.78 meters squared.  Severe Pain (7) Comment: rectal and tailbone   Patient's last menstrual period was 11/27/2001 (approximate).  Allergies reviewed: Yes  Medications reviewed: Yes    Medications: Medication refills not needed today.  Pharmacy name entered into [employer]: [id number] - [place], [place] - [address] AT [facility] [address]    Clinical concerns: Pain and discuss tx plan Ge was notified.      Shari J. Schoenberger, [facility]            "

## 2019-11-13 NOTE — PATIENT INSTRUCTIONS
Michelle Palacios [facility]  Follow up with Dr. Velazquez/ Michelle Awan

## 2019-11-13 NOTE — LETTER
"    11/13/2019         RE: Alannah Wynn  [address]  [place] [address]        Dear Colleague,    Thank you for referring your patient, Alannah Wynn, to the [facility]. Please see a copy of my visit note below.    NEW PATIENT MEDICAL ONCOLOGY CONSULT      REQUESTING PROVIDER: colon recal surgeon Dr.Wolfgang Prince      REASON FOR CONSULTATION/CC:    10/2019 dx adenoCa of rectum,  MMR intact at age 68, images are consistent with stage IV disease with pulmonary and bony mets      HISTORY OF ONCOLOGY ILLNESS:    She has had intermittent hematochezia and anorectal irritation since 2011, that have been managed as \"hemorrhoids\". These symptoms became persistent and progressive since early thi year. Her anorectal pain is managed with Tylenol. Denies fevers, chills, or unintentional weight loss. Does have intermittent urinary and fecal urgency but no incontinence per se.    Diagnostic colonoscopy on 10/18/19 for rectal bleeding showed \"fungating and ulcerated non-obstructing mass was found in the distal rectum <1 cm from the anal verge. The mass was partially circumferential (involving one-half of the lumen circumference). The mass measured four cm in length. In addition, its diameter measured six mm.  Pathology showed invasive moderately differentiated adenocarcinoma with intact mismatch repair protein expression.    Patient denies any abdominal pain, weight loss, she can still pass bowel movement without pressure sensation.    Pelvic MRI 11/2019 found fungating polypoid mid/lower rectal mass with luminal narrowing as well as innumerable enlarged lymph nodes as described above with a conglomerate of suspicious lymph nodes abutting the anterior peritoneal reflection. Stage cT3d N2.     CT body 11/2019 found   1. Bilateral pulmonary nodules suspicious for metastasis.  2. Enlarged yanira hepatis as well as scattered borderline enlarged mesenteric lymph nodes, concerning for " "metastasis.      PET 11/2019 found   1. Hypermetabolic mass in the distal rectum, SUV 14 corresponding to the patient's known rectal cancer.  2. Multiple hypermetabolic pelvic lymph nodes, compatible metastatic disease, SUV 5-6.  3. FDG avid pulmonary nodule measuring 7 mm in the left lower lobe, concerning for metastatic disease. Additional FDG avid pulmonary nodules suspicious for metastatic disease, SUV around 4.  4. Osteoblastic metastatic lesion in the L3 vertebral body, SUV 6.8       INTERVAL HISTORY: N/A      PAST MEDICAL HISTORY  Reflux, hemorrhoid, hepatitis B?  Overactive bladder    MEDICATIONS/ALLERY:  Reviewed in Epic system.        SOCIAL HISTORY:  She is a [place] immigrants [place].  Denies smoking denies alcohol abuse.      FAMILY HISTORY:   Denies any family history of cancer.      REVIEW OF SYSTEMS:   GENERAL: pt is in usual state of health.  No B symptoms  NEURO:   No headache, double vision, or focal weakness.  No neuropathy.   SKIN:  No chronic skin rash or skin infection.   CARDIOVASCULAR:  No High blood pressure or hyperlipidemia. NO BARRY  PULMONARY:  No shortness of breath, no pleurisy, no cough, no hemoptysis.   GI:    intermittent hematochezia and anorectal irritation since 2011,   :    Overreactive bladder  RHEUMATOLOGY/MUSCULOSKELETAL SYSTEM:  no arthritic pain, or muscle pain. No muscle ache.   ENDOCRINE:  No history of diabetes or thyroid problem.  No complaints of hot flashes.   HEMATOLOGY:  No history of bleeding or thrombosis episode.   Oncology: rectal cancer dx 10/2019  IMMUNOLOGY:  No recurrent fever or chills episode.  No recurrent infectious episode.   PSYCHIATRY:  No anxiety or depression.          PHYSICAL EXAMINATION:   VITAL SIGNS: Blood pressure 135/79, pulse 77, temperature 97.7  F (36.5  C), temperature source Oral, resp. rate 16, height 1.495 m (4' 10.86\"), weight 76.2 kg (168 lb), last menstrual period 11/27/2001, SpO2 97 %, not currently breastfeeding.     "   GENERAL APPEARANCE:  looks like stated age, very pleasant, not in acute distress.     ECO    ENT, MOUTH: Pupils are equally reactive to light.  Sclerae are anicteric.  Moist oral mucosa without lesion or ulcer.   Negative pharynx.  No oral thrush.   NECK:  Supple.  No jugular venous distention.  Thyroid is not palpable.   LYMPH NODES:  Superficial lymphadenopathy is not appreciable in the bilateral cervical, supraclavicular, axillary or inguinal areas.   CARDIOVASCULAR:  S1, S2 regular with no murmurs or gallops.  No carotid or abdominal bruits.   PULMONARY:  Lungs are clear to auscultation and percussion bilaterally.  There is no wheezing or rhonchi.   GASTROINTESTINAL:  Abdomen is soft, nontender.  No hepatosplenomegaly.  No signs of ascites.  No mass appreciable.   MUSCULOSKELETAL/EXTREMITIES:  No edema.  No cyanotic changes.  No signs of joint deformity.  No lymphedema.   NEUROLOGIC:  Cranial nerves II-XII are grossly intact.  Sensation intact.  Muscle strength and muscle tone symmetrical, 5/5 throughout.   BACK  No spinal or paraspinal tenderness.  No CVA tenderness.   SKIN:  No petechiae.  No rash.  No signs of cellulitis.   PSYCHIATRIC: Oriented to time, person, and places. Normal mood and affect. Good memory. Proper insight and judgement.  She is anxious during the visit.    CURRENT LAB DATA REVIEWED    CEA at 10 in 2019  CMP/CBC are fine    Diagnostic colonoscopy on 10/18/19  to rectal mass biopsy found invasive moderately differentiated adenocarcinoma with intact mismatch repair protein expression.        CURRENT IMAGING REVIEWED  PET 2019   1. Hypermetabolic mass in the distal rectum, SUV 14 corresponding to the patient's known rectal cancer.  2. Multiple hypermetabolic pelvic lymph nodes, compatible metastatic disease, SUV 5-6.  3. FDG avid pulmonary nodule measuring 7 mm in the left lower lobe, concerning for metastatic disease. Additional FDG avid pulmonary nodules suspicious for metastatic  disease, SUV around 4.  4. Osteoblastic metastatic lesion in the L3 vertebral body, SUV 6.8       OLD DATA REVIEWED TODAY WITH SUMMARY:   Baseline cbc nl in 2016  No baseline a/p images.         ASSESSMENT AND PLAN:  At age 68, in 10/2019 dx with rectum cancer with years of  hematochezia and anorectal irritation.  Staging is most consistent with stage IV disease with pulmonary and bony mets    I broke the bad news to the patient her  and her son.  The son is an internist.    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    Recommend PDL 1 study, NexGen sequencing on her rectal biopsy sample.    We discussed overall survival associated with stage IV colorectal cancer with treatment without treatment, the goal of the treatment is for palliation not for cure, the standard frontline systemic palliative treatment is FOLFOX plus Avastin.    She likely will be a good candidate for this upfront regimen due to her management minimal comorbidities especially negative cardiovascular comorbidities.    We discussed the likely minimal role of the surgery in her presentation, and role of radiation is not an upfront setting.     We discussed the chemotherapy side effect, which include but not limited to GI toxicity nausea vomiting, lower immunity and bleeding risk from cytopenia, neurotoxicity, cardica toxicity,  infusion related reaction, mortality, etc.    We explained in detail how this regimen is delivered.  She will need a port placement.  She request this to be done at [facility].    We discussed how the monitoring along with the palliative systemic chemotherapy plus Avastin, the utilization of tumor marker in her presentation, and the follow-up PET scan.    2.  She also has evidence of bone mass on PET scan, Zometa will be building with her treatment.    All Qs are answered to pt's satisfaction.  "        Oncology Rooming Note    November 13, 2019 12:59 PM   Alannah Wynn is a 68 year old female who presents for:    Chief Complaint   Patient presents with     Oncology Clinic Visit     Initial Vitals: /79   Pulse 77   Temp 97.7  F (36.5  C) (Oral)   Resp 16   Ht 1.495 m (4' 10.86\")   Wt 76.2 kg (168 lb)   LMP 11/27/2001 (Approximate)   SpO2 97%   BMI 34.09 kg/m    Estimated body mass index is 34.09 kg/m  as calculated from the following:    Height as of this encounter: 1.495 m (4' 10.86\").    Weight as of this encounter: 76.2 kg (168 lb). Body surface area is 1.78 meters squared.  Severe Pain (7) Comment: rectal and tailbone   Patient's last menstrual period was 11/27/2001 (approximate).  Allergies reviewed: Yes  Medications reviewed: Yes    Medications: Medication refills not needed today.  [facility] into [id number] [place], [place] - [address] AT [facility] OF [facility] [address]    Clinical concerns: Pain and discuss tx plan [place] was notified.      Shari J. Schoenberger, [facility]              Again, thank you for allowing me to participate in the care of your patient.        Sincerely,        Sumi Tavarez MD, MD    "

## 2019-11-18 DIAGNOSIS — C18.7 CANCER OF SIGMOID COLON (H): Primary | ICD-10-CM

## 2019-11-18 LAB — COPATH REPORT: NORMAL

## 2019-11-18 PROCEDURE — 81445 SO NEO GSAP 5-50DNA/DNA&RNA: CPT | Performed by: INTERNAL MEDICINE

## 2019-11-21 ENCOUNTER — APPOINTMENT (OUTPATIENT)
Dept: INTERVENTIONAL RADIOLOGY/VASCULAR | Facility: CLINIC | Age: 69
End: 2019-11-21
Attending: INTERNAL MEDICINE
Payer: COMMERCIAL

## 2019-11-21 ENCOUNTER — HOSPITAL ENCOUNTER (OUTPATIENT)
Facility: CLINIC | Age: 69
Discharge: HOME | End: 2019-11-21
Attending: RADIOLOGY | Admitting: RADIOLOGY
Payer: COMMERCIAL

## 2019-11-21 VITALS
SYSTOLIC BLOOD PRESSURE: 100 MMHG | DIASTOLIC BLOOD PRESSURE: 57 MMHG | TEMPERATURE: 95.9 F | RESPIRATION RATE: 19 BRPM | OXYGEN SATURATION: 95 % | HEART RATE: 85 BPM

## 2019-11-21 DIAGNOSIS — C20 METASTASIS FROM RECTAL CANCER (H): ICD-10-CM

## 2019-11-21 DIAGNOSIS — C79.9 METASTASIS FROM RECTAL CANCER (H): ICD-10-CM

## 2019-11-21 LAB
APTT PPP: 35 SEC (ref 22–37)
INR PPP: 0.97 (ref 0.86–1.14)

## 2019-11-21 PROCEDURE — 36415 COLL VENOUS BLD VENIPUNCTURE: CPT | Performed by: RADIOLOGY

## 2019-11-21 PROCEDURE — 85610 PROTHROMBIN TIME: CPT | Performed by: RADIOLOGY

## 2019-11-21 PROCEDURE — 36415 COLL VENOUS BLD VENIPUNCTURE: CPT

## 2019-11-21 PROCEDURE — 25800030 ZZH RX IP 258 OP 636: Performed by: PHYSICIAN ASSISTANT

## 2019-11-21 PROCEDURE — 25000128 H RX IP 250 OP 636: Performed by: RADIOLOGY

## 2019-11-21 PROCEDURE — 25000125 ZZHC RX 250

## 2019-11-21 PROCEDURE — 27210886 ZZH ACCESSORY CR5

## 2019-11-21 PROCEDURE — C1788 PORT, INDWELLING, IMP: HCPCS

## 2019-11-21 PROCEDURE — 40000854 ZZH STATISTIC SIMPLE TUBE INSERTION/CHARGE, PORT, CATH, FISTULOGRAM

## 2019-11-21 PROCEDURE — 25000125 ZZHC RX 250: Performed by: PHYSICIAN ASSISTANT

## 2019-11-21 PROCEDURE — 27211193 ZZ H WOUND GLUE CR1

## 2019-11-21 PROCEDURE — 25000128 H RX IP 250 OP 636

## 2019-11-21 PROCEDURE — 36561 INSERT TUNNELED CV CATH: CPT

## 2019-11-21 PROCEDURE — 25800030 ZZH RX IP 258 OP 636: Performed by: RADIOLOGY

## 2019-11-21 PROCEDURE — 85730 THROMBOPLASTIN TIME PARTIAL: CPT | Performed by: RADIOLOGY

## 2019-11-21 PROCEDURE — 25000128 H RX IP 250 OP 636: Performed by: NURSE PRACTITIONER

## 2019-11-21 RX ORDER — ACETAMINOPHEN 325 MG/1
650-975 TABLET ORAL
Status: DISCONTINUED | OUTPATIENT
Start: 2019-11-21 | End: 2019-11-21 | Stop reason: HOSPADM

## 2019-11-21 RX ORDER — CLINDAMYCIN PHOSPHATE 900 MG/50ML
900 INJECTION, SOLUTION INTRAVENOUS
Status: COMPLETED | OUTPATIENT
Start: 2019-11-21 | End: 2019-11-21

## 2019-11-21 RX ORDER — HEPARIN SODIUM (PORCINE) LOCK FLUSH IV SOLN 100 UNIT/ML 100 UNIT/ML
500 SOLUTION INTRAVENOUS ONCE
Status: COMPLETED | OUTPATIENT
Start: 2019-11-21 | End: 2019-11-21

## 2019-11-21 RX ORDER — HEPARIN SODIUM,PORCINE 10 UNIT/ML
5 VIAL (ML) INTRAVENOUS EVERY 24 HOURS
Status: DISCONTINUED | OUTPATIENT
Start: 2019-11-21 | End: 2019-11-21 | Stop reason: HOSPADM

## 2019-11-21 RX ORDER — FENTANYL CITRATE 50 UG/ML
25-50 INJECTION, SOLUTION INTRAMUSCULAR; INTRAVENOUS EVERY 5 MIN PRN
Status: DISCONTINUED | OUTPATIENT
Start: 2019-11-21 | End: 2019-11-21 | Stop reason: HOSPADM

## 2019-11-21 RX ORDER — FENTANYL CITRATE 50 UG/ML
INJECTION, SOLUTION INTRAMUSCULAR; INTRAVENOUS
Status: COMPLETED
Start: 2019-11-21 | End: 2019-11-21

## 2019-11-21 RX ORDER — FLUMAZENIL 0.1 MG/ML
0.2 INJECTION, SOLUTION INTRAVENOUS
Status: DISCONTINUED | OUTPATIENT
Start: 2019-11-21 | End: 2019-11-21 | Stop reason: HOSPADM

## 2019-11-21 RX ORDER — NALOXONE HYDROCHLORIDE 0.4 MG/ML
.1-.4 INJECTION, SOLUTION INTRAMUSCULAR; INTRAVENOUS; SUBCUTANEOUS
Status: DISCONTINUED | OUTPATIENT
Start: 2019-11-21 | End: 2019-11-21 | Stop reason: HOSPADM

## 2019-11-21 RX ORDER — HEPARIN SODIUM (PORCINE) LOCK FLUSH IV SOLN 100 UNIT/ML 100 UNIT/ML
5 SOLUTION INTRAVENOUS
Status: DISCONTINUED | OUTPATIENT
Start: 2019-11-21 | End: 2019-11-21 | Stop reason: HOSPADM

## 2019-11-21 RX ORDER — HEPARIN SODIUM (PORCINE) LOCK FLUSH IV SOLN 100 UNIT/ML 100 UNIT/ML
SOLUTION INTRAVENOUS
Status: COMPLETED
Start: 2019-11-21 | End: 2019-11-21

## 2019-11-21 RX ORDER — SODIUM CHLORIDE 9 MG/ML
INJECTION, SOLUTION INTRAVENOUS CONTINUOUS
Status: DISCONTINUED | OUTPATIENT
Start: 2019-11-21 | End: 2019-11-21 | Stop reason: HOSPADM

## 2019-11-21 RX ORDER — LIDOCAINE HYDROCHLORIDE 10 MG/ML
INJECTION, SOLUTION INFILTRATION; PERINEURAL
Status: COMPLETED
Start: 2019-11-21 | End: 2019-11-21

## 2019-11-21 RX ORDER — LIDOCAINE 40 MG/G
CREAM TOPICAL
Status: DISCONTINUED | OUTPATIENT
Start: 2019-11-21 | End: 2019-11-21 | Stop reason: HOSPADM

## 2019-11-21 RX ADMIN — LIDOCAINE HYDROCHLORIDE 10 ML: 10 INJECTION, SOLUTION INFILTRATION; PERINEURAL at 10:25

## 2019-11-21 RX ADMIN — FENTANYL CITRATE 50 MCG: 50 INJECTION, SOLUTION INTRAMUSCULAR; INTRAVENOUS at 10:23

## 2019-11-21 RX ADMIN — HEPARIN SODIUM 10000 UNITS: 10000 INJECTION INTRAVENOUS; SUBCUTANEOUS at 10:28

## 2019-11-21 RX ADMIN — HEPARIN SODIUM (PORCINE) LOCK FLUSH IV SOLN 100 UNIT/ML 500 UNITS: 100 SOLUTION at 10:29

## 2019-11-21 RX ADMIN — HEPARIN 500 UNITS: 100 SYRINGE at 10:29

## 2019-11-21 RX ADMIN — LIDOCAINE HYDROCHLORIDE 10 ML: 10; .005 INJECTION, SOLUTION EPIDURAL; INFILTRATION; INTRACAUDAL; PERINEURAL at 10:27

## 2019-11-21 RX ADMIN — SODIUM CHLORIDE: 9 INJECTION, SOLUTION INTRAVENOUS at 09:27

## 2019-11-21 RX ADMIN — MIDAZOLAM HYDROCHLORIDE 1 MG: 1 INJECTION, SOLUTION INTRAMUSCULAR; INTRAVENOUS at 10:14

## 2019-11-21 RX ADMIN — FENTANYL CITRATE 50 MCG: 50 INJECTION, SOLUTION INTRAMUSCULAR; INTRAVENOUS at 10:14

## 2019-11-21 RX ADMIN — CLINDAMYCIN PHOSPHATE 900 MG: 900 INJECTION, SOLUTION INTRAVENOUS at 09:55

## 2019-11-21 RX ADMIN — MIDAZOLAM HYDROCHLORIDE 1 MG: 1 INJECTION, SOLUTION INTRAMUSCULAR; INTRAVENOUS at 10:23

## 2019-11-21 NOTE — DISCHARGE SUMMARY
Care Suites Discharge Nursing Note    Education/questions answered: yes  Patient DC location: home  Accompanied by: spouse-  CS discharge time: 1145    Right upper chest dressing CDI.  No numbness or tingling in right hand or arm.  Right radial pulse normal. Mild pain reported but denied need for pain medications.

## 2019-11-21 NOTE — PRE-PROCEDURE
GENERAL PRE-PROCEDURE:   Procedure:  Port a catheter placement with intra venous (IV) moderate sedation   Date/Time:  11/21/2019 9:41 AM    Written consent obtained?: Yes    Risks and benefits: Risks, benefits and alternatives were discussed    Consent given by:  Patient  Patient states understanding of procedure being performed: Yes    Patient's understanding of procedure matches consent: Yes    Procedure consent matches procedure scheduled: Yes    Expected level of sedation:  Moderate  Appropriately NPO:  Yes  ASA Class:  Class 3- Severe systemic disease, definite functional limitations  Mallampati  :  Grade 1- soft palate, uvula, tonsillar pillars, and posterior pharyngeal wall visible  Lungs:  Lungs clear with good breath sounds bilaterally  Heart:  Normal heart sounds and rate and systolic murmur  History & Physical reviewed:  History and physical reviewed and no updates needed  Statement of review:  I have reviewed the lab findings, diagnostic data, medications, and the plan for sedation    Dr Kimbrough consented the patient.

## 2019-11-21 NOTE — DISCHARGE INSTRUCTIONS
Port Insertion Discharge Instructions     After you go home:      Have an adult stay with you for the first 6 hours    You may resume your normal diet       For 24 hours - due to the sedation you received:    Relax and take it easy    Do NOT make any important or legal decisions    Do NOT drive or operate machines at home or at work    Do NOT drink alcohol    Care of Incision sites:      You have a liquid adhesive covering on your incisions. Do not remove the adhesive residue. It will come off on it's own.    You may shower tomorrow.    You may cover the wound with a bandaid if needed for comfort.      Activity       Avoid heavy lifting (greater than 10 pounds) or the overuse of your shoulder for 3 days    Bleeding:      If you start bleeding from the incision sites in your chest or neck - or have swelling in your neck, sit down and press on the site for 5-10 minutes.     If bleeding has not stopped after 10 minutes, call your provider.        Call 911 right away if you have heavy bleeding or bleeding that does not stop.      Medicines:      You may resume all medications    For minor pain, you may take Acetaminophen (Tylenol) or Ibuprofen (Advil)    Call the provider who ordered this procedure if:      You have swelling in your neck or over your port site    The incision area is red, swollen, hot or tender    You have chills or a fever greater than 101 F (38 C)    Any questions or concerns    Call  911 or go to the Emergency Room if you have:      Severe chest pain or trouble breathing    Bleeding that you cannot control    Additional Information:      Your port may be accessed right away.     You will need to have your port flushed every 30 days or after each use.      If you have questions call:          [facility] Radiology Dept @ [phone]      The provider who performed your procedure was _________________.

## 2019-11-21 NOTE — PROGRESS NOTES
Care Suites Admission Nursing Note    Reason for admission: Port placement  CS arrival time: 0850  Accompanied by: Janiya Spouse  Name/phone of DC : Janiya  Medications held: None  Consent signed: Yes  Abnormal assessment/labs: Lab WDL  If abnormal, provider notified: NA  Education/questions answered: Reviewed plan for today pt and  with many questions. Questions answered. Reviewed discharge instructions, questions answered. Accept and understand.  Plan: Proceed with port placement

## 2019-11-21 NOTE — PROCEDURES
[facility]    Procedure: Chest port placement  Date/Time: 11/21/2019 10:57 AM  Performed by: Nick Kimbrough MD  Authorized by: Nick Kimbrough MD     UNIVERSAL PROTOCOL   Site Marked: Yes  Prior Images Obtained and Reviewed:  Yes  Required items: Required blood products, implants, devices and special equipment available    Patient identity confirmed:  Verbally with patient  Patient was reevaluated immediately before administering moderate or deep sedation or anesthesia  Confirmation Checklist:  Patient's identity using two indicators, relevant allergies, procedure was appropriate and matched the consent or emergent situation and correct equipment/implants were available  Time out: Immediately prior to the procedure a time out was called    Preparation: Patient was prepped and draped in usual sterile fashion       ANESTHESIA    Anesthesia: Local infiltration  Local Anesthetic:  Lidocaine 1% without epinephrine and lidocaine 1% with epinephrine      SEDATION    Patient Sedated: Yes    Sedation Type:  Moderate (conscious) sedation  Sedation:  See MAR for details  Vital signs: Vital signs monitored during sedation    See dictated procedure note for full details.  Findings: RIJ 6F chest port placement     PROCEDURE   Patient Tolerance:  Patient tolerated the procedure well with no immediate complications    Length of time physician/provider present for 1:1 monitoring during sedation: 10

## 2019-11-22 DIAGNOSIS — C79.51 BONE METASTASIS: ICD-10-CM

## 2019-11-22 DIAGNOSIS — C79.9 METASTASIS FROM RECTAL CANCER (H): ICD-10-CM

## 2019-11-22 DIAGNOSIS — C79.9 METASTASIS FROM RECTAL CANCER (H): Primary | ICD-10-CM

## 2019-11-22 DIAGNOSIS — C20 METASTASIS FROM RECTAL CANCER (H): ICD-10-CM

## 2019-11-22 DIAGNOSIS — C20 METASTASIS FROM RECTAL CANCER (H): Primary | ICD-10-CM

## 2019-11-22 RX ORDER — SODIUM CHLORIDE 9 MG/ML
1000 INJECTION, SOLUTION INTRAVENOUS CONTINUOUS PRN
Status: CANCELLED
Start: 2019-11-25

## 2019-11-22 RX ORDER — NALOXONE HYDROCHLORIDE 0.4 MG/ML
.1-.4 INJECTION, SOLUTION INTRAMUSCULAR; INTRAVENOUS; SUBCUTANEOUS
Status: CANCELLED | OUTPATIENT
Start: 2019-11-25

## 2019-11-22 RX ORDER — LORAZEPAM 0.5 MG/1
0.5 TABLET ORAL EVERY 4 HOURS PRN
Qty: 30 TABLET | Refills: 2 | Status: CANCELLED | OUTPATIENT
Start: 2019-11-24

## 2019-11-22 RX ORDER — MEPERIDINE HYDROCHLORIDE 25 MG/ML
25 INJECTION INTRAMUSCULAR; INTRAVENOUS; SUBCUTANEOUS EVERY 30 MIN PRN
Status: CANCELLED | OUTPATIENT
Start: 2019-11-25

## 2019-11-22 RX ORDER — EPINEPHRINE 0.3 MG/.3ML
0.3 INJECTION SUBCUTANEOUS EVERY 5 MIN PRN
Status: CANCELLED | OUTPATIENT
Start: 2019-11-25

## 2019-11-22 RX ORDER — PROCHLORPERAZINE MALEATE 10 MG
5 TABLET ORAL EVERY 6 HOURS PRN
Qty: 30 TABLET | Refills: 2 | Status: SHIPPED | OUTPATIENT
Start: 2019-11-22 | End: 2020-03-27

## 2019-11-22 RX ORDER — LORAZEPAM 0.5 MG/1
0.5 TABLET ORAL EVERY 4 HOURS PRN
Qty: 30 TABLET | Refills: 2 | Status: SHIPPED | OUTPATIENT
Start: 2019-11-22 | End: 2019-11-25

## 2019-11-22 RX ORDER — ONDANSETRON 8 MG/1
8 TABLET, FILM COATED ORAL EVERY 8 HOURS PRN
Qty: 10 TABLET | Refills: 2 | Status: SHIPPED | OUTPATIENT
Start: 2019-11-22 | End: 2019-12-09

## 2019-11-22 RX ORDER — ONDANSETRON 8 MG/1
8 TABLET, FILM COATED ORAL EVERY 8 HOURS PRN
Qty: 10 TABLET | Refills: 2 | Status: CANCELLED | OUTPATIENT
Start: 2019-11-24

## 2019-11-22 RX ORDER — ALBUTEROL SULFATE 0.83 MG/ML
2.5 SOLUTION RESPIRATORY (INHALATION)
Status: CANCELLED | OUTPATIENT
Start: 2019-11-25

## 2019-11-22 RX ORDER — ALBUTEROL SULFATE 90 UG/1
1-2 AEROSOL, METERED RESPIRATORY (INHALATION)
Status: CANCELLED
Start: 2019-11-25

## 2019-11-22 RX ORDER — DIPHENHYDRAMINE HYDROCHLORIDE 50 MG/ML
50 INJECTION INTRAMUSCULAR; INTRAVENOUS
Status: CANCELLED
Start: 2019-11-25

## 2019-11-22 RX ORDER — METHYLPREDNISOLONE SODIUM SUCCINATE 125 MG/2ML
125 INJECTION, POWDER, LYOPHILIZED, FOR SOLUTION INTRAMUSCULAR; INTRAVENOUS
Status: CANCELLED
Start: 2019-11-25

## 2019-11-22 RX ORDER — EPINEPHRINE 1 MG/ML
0.3 INJECTION, SOLUTION, CONCENTRATE INTRAVENOUS EVERY 5 MIN PRN
Status: CANCELLED | OUTPATIENT
Start: 2019-11-25

## 2019-11-22 RX ORDER — LORAZEPAM 2 MG/ML
0.5 INJECTION INTRAMUSCULAR EVERY 4 HOURS PRN
Status: CANCELLED
Start: 2019-11-25

## 2019-11-22 RX ORDER — PROCHLORPERAZINE MALEATE 10 MG
5 TABLET ORAL EVERY 6 HOURS PRN
Qty: 30 TABLET | Refills: 2 | Status: CANCELLED | OUTPATIENT
Start: 2019-11-24

## 2019-11-22 RX ORDER — FLUOROURACIL 50 MG/ML
400 INJECTION, SOLUTION INTRAVENOUS ONCE
Status: CANCELLED | OUTPATIENT
Start: 2019-11-25

## 2019-11-25 ENCOUNTER — PATIENT OUTREACH (OUTPATIENT)
Dept: CARE COORDINATION | Facility: CLINIC | Age: 69
End: 2019-11-25

## 2019-11-25 ENCOUNTER — ONCOLOGY VISIT (OUTPATIENT)
Dept: ONCOLOGY | Facility: CLINIC | Age: 69
End: 2019-11-25
Payer: COMMERCIAL

## 2019-11-25 ENCOUNTER — PATIENT OUTREACH (OUTPATIENT)
Dept: ONCOLOGY | Facility: CLINIC | Age: 69
End: 2019-11-25

## 2019-11-25 ENCOUNTER — INFUSION THERAPY VISIT (OUTPATIENT)
Dept: INFUSION THERAPY | Facility: CLINIC | Age: 69
End: 2019-11-25
Payer: COMMERCIAL

## 2019-11-25 VITALS
WEIGHT: 165.6 LBS | BODY MASS INDEX: 33.38 KG/M2 | HEART RATE: 86 BPM | RESPIRATION RATE: 16 BRPM | OXYGEN SATURATION: 94 % | DIASTOLIC BLOOD PRESSURE: 78 MMHG | SYSTOLIC BLOOD PRESSURE: 119 MMHG | HEIGHT: 59 IN | TEMPERATURE: 97.7 F

## 2019-11-25 DIAGNOSIS — C79.51 BONE METASTASIS: ICD-10-CM

## 2019-11-25 DIAGNOSIS — C79.9 METASTASIS FROM RECTAL CANCER (H): Primary | ICD-10-CM

## 2019-11-25 DIAGNOSIS — C20 METASTASIS FROM RECTAL CANCER (H): Primary | ICD-10-CM

## 2019-11-25 DIAGNOSIS — R11.0 NAUSEA: ICD-10-CM

## 2019-11-25 DIAGNOSIS — K92.1 BLOOD IN STOOL: ICD-10-CM

## 2019-11-25 DIAGNOSIS — C20 METASTASIS FROM RECTAL CANCER (H): ICD-10-CM

## 2019-11-25 DIAGNOSIS — C79.51 BONE METASTASIS: Primary | ICD-10-CM

## 2019-11-25 DIAGNOSIS — C79.9 METASTASIS FROM RECTAL CANCER (H): ICD-10-CM

## 2019-11-25 LAB
ALBUMIN SERPL-MCNC: 3.5 G/DL (ref 3.4–5)
ALBUMIN UR-MCNC: NEGATIVE MG/DL
ALP SERPL-CCNC: 111 U/L (ref 40–150)
ALT SERPL W P-5'-P-CCNC: 33 U/L (ref 0–50)
ANION GAP SERPL CALCULATED.3IONS-SCNC: 6 MMOL/L (ref 3–14)
AST SERPL W P-5'-P-CCNC: 18 U/L (ref 0–45)
BASOPHILS # BLD AUTO: 0.1 10E9/L (ref 0–0.2)
BASOPHILS NFR BLD AUTO: 0.9 %
BILIRUB SERPL-MCNC: 0.4 MG/DL (ref 0.2–1.3)
BUN SERPL-MCNC: 10 MG/DL (ref 7–30)
CALCIUM SERPL-MCNC: 9.1 MG/DL (ref 8.5–10.1)
CHLORIDE SERPL-SCNC: 107 MMOL/L (ref 94–109)
CO2 SERPL-SCNC: 28 MMOL/L (ref 20–32)
CREAT SERPL-MCNC: 0.61 MG/DL (ref 0.52–1.04)
DIFFERENTIAL METHOD BLD: NORMAL
EOSINOPHIL # BLD AUTO: 0.3 10E9/L (ref 0–0.7)
EOSINOPHIL NFR BLD AUTO: 4 %
ERYTHROCYTE [DISTWIDTH] IN BLOOD BY AUTOMATED COUNT: 13.7 % (ref 10–15)
GFR SERPL CREATININE-BSD FRML MDRD: >90 ML/MIN/{1.73_M2}
GLUCOSE SERPL-MCNC: 109 MG/DL (ref 70–99)
HCT VFR BLD AUTO: 41.2 % (ref 35–47)
HGB BLD-MCNC: 13 G/DL (ref 11.7–15.7)
IMM GRANULOCYTES # BLD: 0 10E9/L (ref 0–0.4)
IMM GRANULOCYTES NFR BLD: 0.4 %
LYMPHOCYTES # BLD AUTO: 3 10E9/L (ref 0.8–5.3)
LYMPHOCYTES NFR BLD AUTO: 36 %
MCH RBC QN AUTO: 28.7 PG (ref 26.5–33)
MCHC RBC AUTO-ENTMCNC: 31.6 G/DL (ref 31.5–36.5)
MCV RBC AUTO: 91 FL (ref 78–100)
MONOCYTES # BLD AUTO: 0.5 10E9/L (ref 0–1.3)
MONOCYTES NFR BLD AUTO: 6.2 %
NEUTROPHILS # BLD AUTO: 4.3 10E9/L (ref 1.6–8.3)
NEUTROPHILS NFR BLD AUTO: 52.5 %
PLATELET # BLD AUTO: 331 10E9/L (ref 150–450)
POTASSIUM SERPL-SCNC: 3.9 MMOL/L (ref 3.4–5.3)
PROT SERPL-MCNC: 7.4 G/DL (ref 6.8–8.8)
RBC # BLD AUTO: 4.53 10E12/L (ref 3.8–5.2)
SODIUM SERPL-SCNC: 141 MMOL/L (ref 133–144)
WBC # BLD AUTO: 8.2 10E9/L (ref 4–11)

## 2019-11-25 PROCEDURE — 96368 THER/DIAG CONCURRENT INF: CPT | Performed by: NURSE PRACTITIONER

## 2019-11-25 PROCEDURE — 80053 COMPREHEN METABOLIC PANEL: CPT | Performed by: INTERNAL MEDICINE

## 2019-11-25 PROCEDURE — 99214 OFFICE O/P EST MOD 30 MIN: CPT | Mod: 25 | Performed by: NURSE PRACTITIONER

## 2019-11-25 PROCEDURE — 99207 ZZC NO CHARGE NURSE ONLY: CPT

## 2019-11-25 PROCEDURE — 96417 CHEMO IV INFUS EACH ADDL SEQ: CPT | Performed by: NURSE PRACTITIONER

## 2019-11-25 PROCEDURE — 85025 COMPLETE CBC W/AUTO DIFF WBC: CPT | Performed by: INTERNAL MEDICINE

## 2019-11-25 PROCEDURE — 96416 CHEMO PROLONG INFUSE W/PUMP: CPT | Performed by: NURSE PRACTITIONER

## 2019-11-25 PROCEDURE — 96411 CHEMO IV PUSH ADDL DRUG: CPT | Performed by: NURSE PRACTITIONER

## 2019-11-25 PROCEDURE — 96413 CHEMO IV INFUSION 1 HR: CPT | Performed by: NURSE PRACTITIONER

## 2019-11-25 PROCEDURE — 81003 URINALYSIS AUTO W/O SCOPE: CPT | Performed by: INTERNAL MEDICINE

## 2019-11-25 PROCEDURE — 96367 TX/PROPH/DG ADDL SEQ IV INF: CPT | Performed by: NURSE PRACTITIONER

## 2019-11-25 PROCEDURE — 96415 CHEMO IV INFUSION ADDL HR: CPT | Performed by: NURSE PRACTITIONER

## 2019-11-25 RX ORDER — LORAZEPAM 0.5 MG/1
0.5 TABLET ORAL EVERY 6 HOURS PRN
Qty: 30 TABLET | Refills: 2 | Status: SHIPPED | OUTPATIENT
Start: 2019-11-25 | End: 2020-06-16

## 2019-11-25 RX ORDER — LORAZEPAM 0.5 MG/1
0.5 TABLET ORAL EVERY 4 HOURS PRN
Qty: 30 TABLET | Refills: 2 | Status: SHIPPED | OUTPATIENT
Start: 2019-11-25 | End: 2019-11-25

## 2019-11-25 RX ORDER — MULTIVIT-MIN/IRON/FOLIC ACID/K 18-600-40
1000 CAPSULE ORAL
COMMUNITY

## 2019-11-25 RX ORDER — ZOLEDRONIC ACID 0.04 MG/ML
4 INJECTION, SOLUTION INTRAVENOUS ONCE
Status: COMPLETED | OUTPATIENT
Start: 2019-11-25 | End: 2019-11-25

## 2019-11-25 RX ORDER — HEPARIN SODIUM (PORCINE) LOCK FLUSH IV SOLN 100 UNIT/ML 100 UNIT/ML
5 SOLUTION INTRAVENOUS
Status: DISCONTINUED | OUTPATIENT
Start: 2019-11-25 | End: 2019-11-25 | Stop reason: HOSPADM

## 2019-11-25 RX ORDER — SPIRONOLACTONE 25 MG
TABLET ORAL
COMMUNITY

## 2019-11-25 RX ORDER — FLUOROURACIL 50 MG/ML
400 INJECTION, SOLUTION INTRAVENOUS ONCE
Status: COMPLETED | OUTPATIENT
Start: 2019-11-25 | End: 2019-11-25

## 2019-11-25 RX ORDER — LIDOCAINE/PRILOCAINE 2.5 %-2.5%
CREAM (GRAM) TOPICAL PRN
Qty: 30 G | Refills: 3 | Status: SHIPPED | OUTPATIENT
Start: 2019-11-25 | End: 2020-07-22

## 2019-11-25 RX ADMIN — FLUOROURACIL 710 MG: 50 INJECTION, SOLUTION INTRAVENOUS at 14:19

## 2019-11-25 RX ADMIN — ZOLEDRONIC ACID 4 MG: 0.04 INJECTION, SOLUTION INTRAVENOUS at 09:34

## 2019-11-25 RX ADMIN — Medication 250 ML: at 09:34

## 2019-11-25 RX ADMIN — HEPARIN SODIUM (PORCINE) LOCK FLUSH IV SOLN 100 UNIT/ML 5 ML: 100 SOLUTION at 07:48

## 2019-11-25 ASSESSMENT — MIFFLIN-ST. JEOR: SCORE: 1184.53

## 2019-11-25 ASSESSMENT — PAIN SCALES - GENERAL: PAINLEVEL: SEVERE PAIN (6)

## 2019-11-25 NOTE — NURSING NOTE
"Oncology Rooming Note    November 25, 2019 8:17 AM   Alannah Wynn is a 68 year old female who presents for:    Chief Complaint   Patient presents with     Oncology Clinic Visit     Prior to treatment     Initial Vitals: /78 (BP Location: Right arm)   Pulse 86   Temp 97.7  F (36.5  C) (Oral)   Resp 16   Ht 1.495 m (4' 10.86\")   Wt 75.1 kg (165 lb 9.6 oz)   LMP 11/27/2001 (Approximate)   SpO2 94%   BMI 33.61 kg/m   Estimated body mass index is 33.61 kg/m  as calculated from the following:    Height as of this encounter: 1.495 m (4' 10.86\").    Weight as of this encounter: 75.1 kg (165 lb 9.6 oz). Body surface area is 1.77 meters squared.  Severe Pain (6) Comment: Data Unavailable   Patient's last menstrual period was 11/27/2001 (approximate).  Allergies reviewed: Yes  Medications reviewed: Yes    Medications: Medication refills not needed today.  Pharmacy name entered into [employer]: [id number] - [place], [place] - [address] [facility] [address]              "

## 2019-11-25 NOTE — TELEPHONE ENCOUNTER
Met with patient, her spouse and son today in the infusion center during her first infusion treatment here at [facility].  Introduced self as RN coordinator, provided patient with contact information and explained about Triage Phone Line and what side effects should be called into the clinic as urgent. EMLA cream requested and that was sent to [facility].  Questions answered; her pump will need to get ordered through [facility] for next cycle on 12/9 and this will be ordered.

## 2019-11-25 NOTE — PROGRESS NOTES
"Patient's name and  were verified.  See Doc Flowsheet - IV assess for details.  IVAD accessed with 20G 3/4\" david gripper plus needle  blood return positive: YES  Site without redness, tenderness or swelling: YES  flushed with 30cc NS and 5cc 100u/ml heparin  Needle: left accessed for chemotherapy  Comments: Labs drawn.  Patient tolerated procedure without incident.    Allen Andrade  RN, BSN      "

## 2019-11-25 NOTE — PROGRESS NOTES
"Infusion Nursing Note:  Alannah Wynn presents today [id number] Folfox/Avastin/Zometa.    Patient seen by provider today: Yes: Felipa Garner NP   present during visit today: Not Applicable.    Note: Pt new to infusion room, here with  and son.  Oriented to infusion room, call light, met with pharmacist and reviewed medications.       Intravenous Access:  Implanted Port.    Treatment Conditions:  Lab Results   Component Value Date    HGB 13.0 11/25/2019     Lab Results   Component Value Date    WBC 8.2 11/25/2019      Lab Results   Component Value Date    ANEU 4.3 11/25/2019     Lab Results   Component Value Date     11/25/2019      Lab Results   Component Value Date     11/25/2019                   Lab Results   Component Value Date    POTASSIUM 3.9 11/25/2019           No results found for: MAG         Lab Results   Component Value Date    CR 0.61 11/25/2019                   Lab Results   Component Value Date    LAUREN 9.1 11/25/2019                Lab Results   Component Value Date    BILITOTAL 0.4 11/25/2019           Lab Results   Component Value Date    ALBUMIN 3.5 11/25/2019                    Lab Results   Component Value Date    ALT 33 11/25/2019           Lab Results   Component Value Date    AST 18 11/25/2019       Results reviewed, labs MET treatment parameters, ok to proceed with treatment.  Urine negative.      Post Infusion Assessment:  Patient tolerated infusion without incident.  Blood return noted pre and post infusion.  Site patent and intact, free from redness, edema or discomfort.  No evidence of extravasations.  Access discontinued per protocol.  Prior to discharge: Port is secured in place with tegaderm and flushed with 10cc NS with positive blood return noted.  Continuous home infusion CADD pump connected.    All connectors secured in place and clamps taped open.    Pump started, \"running\" noted on display (CADD): YES.  Patient instructed to call our clinic or " [facility] with any questions or concerns at home.  Patient verbalized understanding.    Patient set up for pump disconnect at our clinic on 11/27/19 at 1:00pm.        Discharge Plan:   Patient discharged in stable condition accompanied by: .  Departure Mode: Ambulatory.  Pt will RTC 11/27/19 for pump disconnect.    Allen Andrade RN

## 2019-11-25 NOTE — PROGRESS NOTES
"Oncology Follow Up Visit: November 25, 2019    Oncologist: Dr Rachna Tavarez  PCP: Earline Mehta    Diagnosis: Stage IV Adenocarcinoma of the rectum   Alannah Wynn is a 69 yo female who presented in 10/2019 with intermittent hematochezia and anorectal irritation since 2011, that have been managed as \"hemorrhoids\".  Diagnostic colonoscopy on 10/18/19 for rectal bleeding showed \"fungating and ulcerated non-obstructing mass was found in the distal rectum <1 cm from the anal verge. The mass was partially circumferential (involving one-half of the lumen circumference). The mass measured four cm in length. In addition, its diameter measured six mm.  Pathology showed invasive moderately differentiated adenocarcinoma with intact mismatch repair protein expression.  Pelvic MRI 11/2019 found fungating polypoid mid/lower rectal mass with luminal narrowing as well as innumerable enlarged lymph nodes as described above with a conglomerate of suspicious lymph nodes abutting the anterior peritoneal reflection. Stage cT3d N2.   Further testing with PET scan proved pulmonarymetastasis and bony metastasis at L3.  Treatment:   11/25/2019 to begin treatment with FOLFOX/Avastin    Interval History: Ms. Wynn comes to clinic with  and son for start of treatment for her colon cancer with metastasis with FOLFOX/Avastin. They have many questions about treatment but pt is anxious to start infusion. She is otherwise feeling well without pain but admits she has some loose stools and occasionally noted with blood in stool. She is eating well and bladder is without problems.    Rest of comprehensive and complete ROS is reviewed and is negative.   Past Medical History:   Diagnosis Date     Gastroesophageal reflux disease      Current Outpatient Medications   Medication     docusate sodium (COLACE) 100 MG capsule     ENOVARX-LIDOCAINE HCL 5 % cream     Fesoterodine Fumarate 8 MG TB24     hydrocortisone (ANUSOL-HC) 2.5 % cream     LORazepam " "(ATIVAN) 0.5 MG tablet     mirabegron (MYRBETRIQ) 50 MG 24 hr tablet     Multiple vitamin TABS     omeprazole (PRILOSEC) 20 MG DR capsule     ondansetron (ZOFRAN) 8 MG tablet     prochlorperazine (COMPAZINE) 10 MG tablet     solifenacin (VESICARE) 10 MG tablet     trospium (SANCTURA XR) 60 MG CP24 24 hr capsule     Current Facility-Administered Medications   Medication     lidocaine (XYLOCAINE) 5 % ointment     Allergies   Allergen Reactions     Penicillins      Sulfa Drugs        Physical Exam:/78 (BP Location: Right arm)   Pulse 86   Temp 97.7  F (36.5  C) (Oral)   Resp 16   Ht 1.495 m (4' 10.86\")   Wt 75.1 kg (165 lb 9.6 oz)   LMP 11/27/2001 (Approximate)   SpO2 94%   BMI 33.61 kg/m     ECOG PS- 0  Constitutional: Alert, cooperative, and in no distress.   ENT: Eyes bright , No mouth sores  Neck: Supple, No adenopathy.  Cardiac: Heart rate and rhythm is regular and strong without murmur  Respiratory: Breathing easy. Lung sounds clear to auscultation  GI: Abdomen is soft, non-tender, BS normal. No masses or organomegaly  MS: Muscle tone normal, extremities normal with no edema.   Skin: No suspicious lesions or rashes  Neuro: Sensory grossly WNL, gait normal.   Lymph: Normal ant/post cervical, axillary, supraclavicular nodes  Psych: Mentation appears normal and affect is normal.     Laboratory Results:   Results for orders placed or performed in visit on 11/25/19   CBC with platelets differential     Status: None   Result Value Ref Range    WBC 8.2 4.0 - 11.0 10e9/L    RBC Count 4.53 3.8 - 5.2 10e12/L    Hemoglobin 13.0 11.7 - 15.7 g/dL    Hematocrit 41.2 35.0 - 47.0 %    MCV 91 78 - 100 fl    MCH 28.7 26.5 - 33.0 pg    MCHC 31.6 31.5 - 36.5 g/dL    RDW 13.7 10.0 - 15.0 %    Platelet Count 331 150 - 450 10e9/L    Diff Method Automated Method     % Neutrophils 52.5 %    % Lymphocytes 36.0 %    % Monocytes 6.2 %    % Eosinophils 4.0 %    % Basophils 0.9 %    % Immature Granulocytes 0.4 %    Absolute " Neutrophil 4.3 1.6 - 8.3 10e9/L    Absolute Lymphocytes 3.0 0.8 - 5.3 10e9/L    Absolute Monocytes 0.5 0.0 - 1.3 10e9/L    Absolute Eosinophils 0.3 0.0 - 0.7 10e9/L    Absolute Basophils 0.1 0.0 - 0.2 10e9/L    Abs Immature Granulocytes 0.0 0 - 0.4 10e9/L   Comprehensive metabolic panel     Status: Abnormal   Result Value Ref Range    Sodium 141 133 - 144 mmol/L    Potassium 3.9 3.4 - 5.3 mmol/L    Chloride 107 94 - 109 mmol/L    Carbon Dioxide 28 20 - 32 mmol/L    Anion Gap 6 3 - 14 mmol/L    Glucose 109 (H) 70 - 99 mg/dL    Urea Nitrogen 10 7 - 30 mg/dL    Creatinine 0.61 0.52 - 1.04 mg/dL    GFR Estimate >90 >60 mL/min/[1.73_m2]    GFR Estimate If Black >90 >60 mL/min/[1.73_m2]    Calcium 9.1 8.5 - 10.1 mg/dL    Bilirubin Total 0.4 0.2 - 1.3 mg/dL    Albumin 3.5 3.4 - 5.0 g/dL    Protein Total 7.4 6.8 - 8.8 g/dL    Alkaline Phosphatase 111 40 - 150 U/L    ALT 33 0 - 50 U/L    AST 18 0 - 45 U/L   Protein qualitative urine     Status: None   Result Value Ref Range    Protein Albumin Urine Negative NEG^Negative mg/dL     Assessment and Plan:   Stage IV Adenocarcinoma of the rectum-Pt is here with family to start treatment with FOLFOX/Avastin. We reviewed administration and side effects of treatment with expectations for follow up after 6 cycles. Symptom control reviewed and made sure patient and family know where to call for help if needed. All of patient's questions were answered to best of ability.   Pharmacy will be discussing antiemetics with pt during infusion.   Pt will return in 2 weeks for cycle 2 and we will see her for symptom review prior to treatment.   -Awaiting PD1L result.   Bone metastasis- pt will start zometa today. Reviewed need for calcium and vitamin D support today.   Blood in stool- reviewed with pt that if increases she should contact us for next steps.   This was a 35 min visit with > 50% in counseling and coordinating care including education and management of concerns.    Felipa  Patsy,CNP

## 2019-11-25 NOTE — PROGRESS NOTES
ONCOLOGY SOCIAL WORK  INITIAL PSYCHOSOCIAL ASSESSMENT    Assessment completed of living situation, support system, financial status, functional status, coping, stressors, need for resources and social work intervention provided as needed.    Date of Assessment: 11/25/2019    Present at assessment: Alannah,  Scott, and son Thomas    Diagnosis: Metastatic rectal CA    Date of Diagnosis: October 2019    Physician: Dr. Velazquez    [place] [place]    Phone/Email: [phone]    Presenting Information: COLT referred to meet with Alannah to provide support and resources.  She is here for D1C1 Modified Folfox-6/Bevacizumab.     Decision Making: Self    Health Care Directive: None on file    Relationship Status of Patient:      Special Needs: None identified    Family/Support System: Spouse and Children    Caregiver/Home Services: No caregiver concerns identified    Temporary Living Situation: No temporary living situation needed.  Alannah lives at home with her .    Transportation: Private Car    Insurance: No Insurance issues identified.   did ask questions related to cost of prescriptions being high and concern about the cost of infusions.  SW explained the different MCR parts and the fact chemo is typically not billed under D, but under B, so this should not be a significant concern.      Sources of Income: No income concerns identified    Employment: No employment issues identified    Financial Concerns: No financial concerns identified today    Education/School: No development issues identified    Mental Health: No mental health issues identified      Chemical Use: No issues identified    Legal Concerns/Involvement: No legal issues identified    /Social Service Providers: None identified     History/Benefits: N/A    Trauma/Loss/Abuse History: No identified issues    Current Coping Strategies: approachable, responsive and  interactive    Assessment and Recommendations for Team: Alannah appears to have a supportive family, they were open and friendly with SW.  Alannah's son is an IM doctor and appeared a bit anxious to be sure everything was in place for his mom.  Support provided and questions answered.  SW agreed to check in periodically.      Interventions: SW contact information and availability provided.  Support provided.     Follow-Up Planned: Psychosocial support      MARYCRUZ Anderson, [facility]  Clinical , Adult Oncology  Pager: [phone]  Phone: [phone]

## 2019-11-26 LAB — COPATH REPORT: NORMAL

## 2019-11-27 ENCOUNTER — INFUSION THERAPY VISIT (OUTPATIENT)
Dept: INFUSION THERAPY | Facility: CLINIC | Age: 69
End: 2019-11-27
Payer: COMMERCIAL

## 2019-11-27 VITALS — TEMPERATURE: 98.2 F | DIASTOLIC BLOOD PRESSURE: 80 MMHG | SYSTOLIC BLOOD PRESSURE: 123 MMHG | HEART RATE: 64 BPM

## 2019-11-27 DIAGNOSIS — C20 METASTASIS FROM RECTAL CANCER (H): ICD-10-CM

## 2019-11-27 DIAGNOSIS — R11.0 NAUSEA: Primary | ICD-10-CM

## 2019-11-27 DIAGNOSIS — C79.9 METASTASIS FROM RECTAL CANCER (H): ICD-10-CM

## 2019-11-27 PROCEDURE — 96375 TX/PRO/DX INJ NEW DRUG ADDON: CPT | Performed by: NURSE PRACTITIONER

## 2019-11-27 PROCEDURE — 96361 HYDRATE IV INFUSION ADD-ON: CPT | Mod: 59 | Performed by: NURSE PRACTITIONER

## 2019-11-27 PROCEDURE — 99207 ZZC NO CHARGE NURSE ONLY: CPT

## 2019-11-27 PROCEDURE — 96374 THER/PROPH/DIAG INJ IV PUSH: CPT | Performed by: NURSE PRACTITIONER

## 2019-11-27 RX ORDER — HEPARIN SODIUM,PORCINE 10 UNIT/ML
5 VIAL (ML) INTRAVENOUS
Status: CANCELLED | OUTPATIENT
Start: 2019-11-27

## 2019-11-27 RX ORDER — HEPARIN SODIUM (PORCINE) LOCK FLUSH IV SOLN 100 UNIT/ML 100 UNIT/ML
5 SOLUTION INTRAVENOUS
Status: DISCONTINUED | OUTPATIENT
Start: 2019-11-27 | End: 2019-11-27 | Stop reason: HOSPADM

## 2019-11-27 RX ORDER — HEPARIN SODIUM (PORCINE) LOCK FLUSH IV SOLN 100 UNIT/ML 100 UNIT/ML
5 SOLUTION INTRAVENOUS
Status: CANCELLED | OUTPATIENT
Start: 2019-11-27

## 2019-11-27 RX ADMIN — Medication 1000 ML: at 14:16

## 2019-11-27 RX ADMIN — HEPARIN SODIUM (PORCINE) LOCK FLUSH IV SOLN 100 UNIT/ML 5 ML: 100 SOLUTION at 15:14

## 2019-11-27 ASSESSMENT — PAIN SCALES - GENERAL: PAINLEVEL: MILD PAIN (2)

## 2019-11-27 NOTE — PROGRESS NOTES
Infusion Nursing Note:  Alannah Wynn presents today for pump discontinue, fluids antinausea.    Patient seen by provider today: No   present during visit today: Not Applicable.    Note: Arrived in wheelchair to infusion. States unable to eat for since after chemo. Nausea/ vomiting not relieved with PO meds. States having diarrhea. Discussed with Felipa, new orders for fluids and antinausea. Offered support, reviewed home meds and plan. Will go to ER if symptoms worsen or fever.     Intravenous Access:  No Intravenous access/labs at this visit.  Implanted Port.    Treatment Conditions:  Not Applicable.      Post Infusion Assessment:  Patient tolerated infusion without incident.  Site patent and intact, free from redness, edema or discomfort.  No evidence of extravasations.  Access discontinued per protocol.       Discharge Plan:   Patient and/or family verbalized understanding of discharge instructions and all questions answered. Pt to call Friday with update.     Barb Schroeder RN

## 2019-11-29 ENCOUNTER — INFUSION THERAPY VISIT (OUTPATIENT)
Dept: INFUSION THERAPY | Facility: CLINIC | Age: 69
End: 2019-11-29
Payer: COMMERCIAL

## 2019-11-29 VITALS
SYSTOLIC BLOOD PRESSURE: 112 MMHG | HEART RATE: 119 BPM | RESPIRATION RATE: 16 BRPM | DIASTOLIC BLOOD PRESSURE: 75 MMHG | TEMPERATURE: 97.7 F

## 2019-11-29 DIAGNOSIS — C79.9 METASTASIS FROM RECTAL CANCER (H): ICD-10-CM

## 2019-11-29 DIAGNOSIS — R11.0 NAUSEA: Primary | ICD-10-CM

## 2019-11-29 DIAGNOSIS — C20 METASTASIS FROM RECTAL CANCER (H): ICD-10-CM

## 2019-11-29 DIAGNOSIS — E87.6 HYPOKALEMIA: ICD-10-CM

## 2019-11-29 LAB
ANION GAP SERPL CALCULATED.3IONS-SCNC: 5 MMOL/L (ref 3–14)
BUN SERPL-MCNC: 22 MG/DL (ref 7–30)
CALCIUM SERPL-MCNC: 8.9 MG/DL (ref 8.5–10.1)
CHLORIDE SERPL-SCNC: 96 MMOL/L (ref 94–109)
CO2 SERPL-SCNC: 31 MMOL/L (ref 20–32)
CREAT SERPL-MCNC: 0.85 MG/DL (ref 0.52–1.04)
GFR SERPL CREATININE-BSD FRML MDRD: 70 ML/MIN/{1.73_M2}
GLUCOSE SERPL-MCNC: 114 MG/DL (ref 70–99)
POTASSIUM SERPL-SCNC: 3.1 MMOL/L (ref 3.4–5.3)
SODIUM SERPL-SCNC: 132 MMOL/L (ref 133–144)

## 2019-11-29 PROCEDURE — 96375 TX/PRO/DX INJ NEW DRUG ADDON: CPT | Performed by: INTERNAL MEDICINE

## 2019-11-29 PROCEDURE — 96365 THER/PROPH/DIAG IV INF INIT: CPT | Performed by: INTERNAL MEDICINE

## 2019-11-29 PROCEDURE — 96361 HYDRATE IV INFUSION ADD-ON: CPT | Mod: 59 | Performed by: INTERNAL MEDICINE

## 2019-11-29 PROCEDURE — 99207 ZZC NO CHARGE NURSE ONLY: CPT

## 2019-11-29 PROCEDURE — 80048 BASIC METABOLIC PNL TOTAL CA: CPT | Performed by: INTERNAL MEDICINE

## 2019-11-29 RX ORDER — POTASSIUM CHLORIDE 29.8 MG/ML
20 INJECTION INTRAVENOUS
Status: DISCONTINUED | OUTPATIENT
Start: 2019-11-29 | End: 2019-11-29

## 2019-11-29 RX ORDER — HEPARIN SODIUM (PORCINE) LOCK FLUSH IV SOLN 100 UNIT/ML 100 UNIT/ML
5 SOLUTION INTRAVENOUS
Status: CANCELLED | OUTPATIENT
Start: 2019-11-29

## 2019-11-29 RX ORDER — POTASSIUM CHLORIDE 1.5 G/1.58G
20-40 POWDER, FOR SOLUTION ORAL
Status: DISCONTINUED | OUTPATIENT
Start: 2019-11-29 | End: 2019-11-29

## 2019-11-29 RX ORDER — POTASSIUM CHLORIDE 750 MG/1
20 TABLET, EXTENDED RELEASE ORAL ONCE
Status: DISCONTINUED | OUTPATIENT
Start: 2019-11-29 | End: 2019-11-29 | Stop reason: HOSPADM

## 2019-11-29 RX ORDER — HEPARIN SODIUM (PORCINE) LOCK FLUSH IV SOLN 100 UNIT/ML 100 UNIT/ML
5 SOLUTION INTRAVENOUS
Status: DISCONTINUED | OUTPATIENT
Start: 2019-11-29 | End: 2019-11-29 | Stop reason: HOSPADM

## 2019-11-29 RX ORDER — POTASSIUM CHLORIDE 1500 MG/1
40 TABLET, EXTENDED RELEASE ORAL ONCE
Status: DISCONTINUED | OUTPATIENT
Start: 2019-11-29 | End: 2019-11-29

## 2019-11-29 RX ORDER — POTASSIUM CL/LIDO/0.9 % NACL 10MEQ/0.1L
10 INTRAVENOUS SOLUTION, PIGGYBACK (ML) INTRAVENOUS
Status: DISCONTINUED | OUTPATIENT
Start: 2019-11-29 | End: 2019-11-29

## 2019-11-29 RX ORDER — POTASSIUM CHLORIDE 750 MG/1
40 TABLET, EXTENDED RELEASE ORAL ONCE
Status: DISCONTINUED | OUTPATIENT
Start: 2019-11-29 | End: 2019-11-29

## 2019-11-29 RX ORDER — POTASSIUM CHLORIDE 7.45 MG/ML
10 INJECTION INTRAVENOUS
Status: DISCONTINUED | OUTPATIENT
Start: 2019-11-29 | End: 2019-11-29

## 2019-11-29 RX ORDER — HEPARIN SODIUM,PORCINE 10 UNIT/ML
5 VIAL (ML) INTRAVENOUS
Status: CANCELLED | OUTPATIENT
Start: 2019-11-29

## 2019-11-29 RX ADMIN — HEPARIN SODIUM (PORCINE) LOCK FLUSH IV SOLN 100 UNIT/ML 5 ML: 100 SOLUTION at 16:55

## 2019-11-29 RX ADMIN — Medication 1000 ML: at 14:10

## 2019-11-29 ASSESSMENT — PAIN SCALES - GENERAL: PAINLEVEL: MILD PAIN (2)

## 2019-11-29 NOTE — PROGRESS NOTES
Infusion Nursing Note:  Alannah Wynn presents today for fluids.    Patient seen by provider today: No   present during visit today: Not Applicable.    Note: Pt states diarrhea improved, having nausea and increase fatigue. Per  she has been able to take very little oral but that she is looking better. He is also taking care of their son and feeling stressed.    Given potassium IV and 2 tabs. Due to pt drowsiness  states he will give her the tabs at home.    Intravenous Access:  Implanted Port.    Treatment Conditions:  Last Comprehensive Metabolic Panel:  Sodium   Date Value Ref Range Status   11/29/2019 132 (L) 133 - 144 mmol/L Final     Potassium   Date Value Ref Range Status   11/29/2019 3.1 (L) 3.4 - 5.3 mmol/L Final     Chloride   Date Value Ref Range Status   11/29/2019 96 94 - 109 mmol/L Final     Carbon Dioxide   Date Value Ref Range Status   11/29/2019 31 20 - 32 mmol/L Final     Anion Gap   Date Value Ref Range Status   11/29/2019 5 3 - 14 mmol/L Final     Glucose   Date Value Ref Range Status   11/29/2019 114 (H) 70 - 99 mg/dL Final     Urea Nitrogen   Date Value Ref Range Status   11/29/2019 22 7 - 30 mg/dL Final     Creatinine   Date Value Ref Range Status   11/29/2019 0.85 0.52 - 1.04 mg/dL Final     GFR Estimate   Date Value Ref Range Status   11/29/2019 70 >60 mL/min/[1.73_m2] Final     Comment:     Non  GFR Calc  Starting 12/18/2018, serum creatinine based estimated GFR (eGFR) will be   calculated using the Chronic Kidney Disease Epidemiology Collaboration   (CKD-EPI) equation.       Calcium   Date Value Ref Range Status   11/29/2019 8.9 8.5 - 10.1 mg/dL Final         Post Infusion Assessment:  Patient tolerated infusion without incident.  Site patent and intact, free from redness, edema or discomfort.  No evidence of extravasations.  Access discontinued per protocol.       Discharge Plan:   Patient and/or family verbalized understanding of discharge  instructions and all questions answered.    Barb Schroeder RN

## 2019-12-03 ENCOUNTER — PATIENT OUTREACH (OUTPATIENT)
Dept: ONCOLOGY | Facility: CLINIC | Age: 69
End: 2019-12-03

## 2019-12-03 ENCOUNTER — MEDICAL CORRESPONDENCE (OUTPATIENT)
Dept: HEALTH INFORMATION MANAGEMENT | Facility: CLINIC | Age: 69
End: 2019-12-03

## 2019-12-03 NOTE — TELEPHONE ENCOUNTER
Order form faxed to [facility] @ [phone].  FAX confirmed as received.  Orders are for infusion on 1/2919.

## 2019-12-06 ENCOUNTER — PRE VISIT (OUTPATIENT)
Dept: ONCOLOGY | Facility: CLINIC | Age: 69
End: 2019-12-06

## 2019-12-09 ENCOUNTER — TELEPHONE (OUTPATIENT)
Dept: FAMILY MEDICINE | Facility: CLINIC | Age: 69
End: 2019-12-09

## 2019-12-09 ENCOUNTER — ONCOLOGY VISIT (OUTPATIENT)
Dept: ONCOLOGY | Facility: CLINIC | Age: 69
End: 2019-12-09
Payer: COMMERCIAL

## 2019-12-09 ENCOUNTER — PATIENT OUTREACH (OUTPATIENT)
Dept: CARE COORDINATION | Facility: CLINIC | Age: 69
End: 2019-12-09

## 2019-12-09 ENCOUNTER — INFUSION THERAPY VISIT (OUTPATIENT)
Dept: INFUSION THERAPY | Facility: CLINIC | Age: 69
End: 2019-12-09
Payer: COMMERCIAL

## 2019-12-09 VITALS
DIASTOLIC BLOOD PRESSURE: 77 MMHG | SYSTOLIC BLOOD PRESSURE: 131 MMHG | BODY MASS INDEX: 32.68 KG/M2 | OXYGEN SATURATION: 95 % | HEIGHT: 59 IN | RESPIRATION RATE: 18 BRPM | TEMPERATURE: 97.5 F | HEART RATE: 88 BPM | WEIGHT: 162.1 LBS

## 2019-12-09 DIAGNOSIS — C20 METASTASIS FROM RECTAL CANCER (H): Primary | ICD-10-CM

## 2019-12-09 DIAGNOSIS — C79.9 METASTASIS FROM RECTAL CANCER (H): ICD-10-CM

## 2019-12-09 DIAGNOSIS — C79.51 BONE METASTASIS: ICD-10-CM

## 2019-12-09 DIAGNOSIS — R11.0 NAUSEA: Primary | ICD-10-CM

## 2019-12-09 DIAGNOSIS — C79.9 METASTASIS FROM RECTAL CANCER (H): Primary | ICD-10-CM

## 2019-12-09 DIAGNOSIS — C20 METASTASIS FROM RECTAL CANCER (H): ICD-10-CM

## 2019-12-09 DIAGNOSIS — C79.51 BONE METASTASIS: Primary | ICD-10-CM

## 2019-12-09 LAB
ALBUMIN SERPL-MCNC: 3.2 G/DL (ref 3.4–5)
ALBUMIN UR-MCNC: NEGATIVE MG/DL
ALP SERPL-CCNC: 87 U/L (ref 40–150)
ALT SERPL W P-5'-P-CCNC: 37 U/L (ref 0–50)
ANION GAP SERPL CALCULATED.3IONS-SCNC: 4 MMOL/L (ref 3–14)
AST SERPL W P-5'-P-CCNC: 23 U/L (ref 0–45)
BASOPHILS # BLD AUTO: 0.1 10E9/L (ref 0–0.2)
BASOPHILS NFR BLD AUTO: 1.4 %
BILIRUB SERPL-MCNC: 0.3 MG/DL (ref 0.2–1.3)
BUN SERPL-MCNC: 18 MG/DL (ref 7–30)
CALCIUM SERPL-MCNC: 8.8 MG/DL (ref 8.5–10.1)
CHLORIDE SERPL-SCNC: 107 MMOL/L (ref 94–109)
CO2 SERPL-SCNC: 29 MMOL/L (ref 20–32)
CREAT SERPL-MCNC: 0.6 MG/DL (ref 0.52–1.04)
DIFFERENTIAL METHOD BLD: NORMAL
EOSINOPHIL # BLD AUTO: 0.3 10E9/L (ref 0–0.7)
EOSINOPHIL NFR BLD AUTO: 5.5 %
ERYTHROCYTE [DISTWIDTH] IN BLOOD BY AUTOMATED COUNT: 13.8 % (ref 10–15)
GFR SERPL CREATININE-BSD FRML MDRD: >90 ML/MIN/{1.73_M2}
GLUCOSE SERPL-MCNC: 96 MG/DL (ref 70–99)
HCT VFR BLD AUTO: 40.2 % (ref 35–47)
HGB BLD-MCNC: 12.9 G/DL (ref 11.7–15.7)
IMM GRANULOCYTES # BLD: 0 10E9/L (ref 0–0.4)
IMM GRANULOCYTES NFR BLD: 0 %
LYMPHOCYTES # BLD AUTO: 2.7 10E9/L (ref 0.8–5.3)
LYMPHOCYTES NFR BLD AUTO: 47.5 %
MCH RBC QN AUTO: 29.2 PG (ref 26.5–33)
MCHC RBC AUTO-ENTMCNC: 32.1 G/DL (ref 31.5–36.5)
MCV RBC AUTO: 91 FL (ref 78–100)
MONOCYTES # BLD AUTO: 0.5 10E9/L (ref 0–1.3)
MONOCYTES NFR BLD AUTO: 8.9 %
NEUTROPHILS # BLD AUTO: 2.1 10E9/L (ref 1.6–8.3)
NEUTROPHILS NFR BLD AUTO: 36.7 %
PLATELET # BLD AUTO: 273 10E9/L (ref 150–450)
POTASSIUM SERPL-SCNC: 3.6 MMOL/L (ref 3.4–5.3)
PROT SERPL-MCNC: 7 G/DL (ref 6.8–8.8)
RBC # BLD AUTO: 4.42 10E12/L (ref 3.8–5.2)
SODIUM SERPL-SCNC: 140 MMOL/L (ref 133–144)
WBC # BLD AUTO: 5.6 10E9/L (ref 4–11)

## 2019-12-09 PROCEDURE — 81003 URINALYSIS AUTO W/O SCOPE: CPT | Performed by: NURSE PRACTITIONER

## 2019-12-09 PROCEDURE — 96375 TX/PRO/DX INJ NEW DRUG ADDON: CPT | Performed by: NURSE PRACTITIONER

## 2019-12-09 PROCEDURE — 96413 CHEMO IV INFUSION 1 HR: CPT | Performed by: NURSE PRACTITIONER

## 2019-12-09 PROCEDURE — 96411 CHEMO IV PUSH ADDL DRUG: CPT | Performed by: NURSE PRACTITIONER

## 2019-12-09 PROCEDURE — 96368 THER/DIAG CONCURRENT INF: CPT | Performed by: NURSE PRACTITIONER

## 2019-12-09 PROCEDURE — 85025 COMPLETE CBC W/AUTO DIFF WBC: CPT | Performed by: NURSE PRACTITIONER

## 2019-12-09 PROCEDURE — 96367 TX/PROPH/DG ADDL SEQ IV INF: CPT | Performed by: NURSE PRACTITIONER

## 2019-12-09 PROCEDURE — 96415 CHEMO IV INFUSION ADDL HR: CPT | Performed by: NURSE PRACTITIONER

## 2019-12-09 PROCEDURE — 96417 CHEMO IV INFUS EACH ADDL SEQ: CPT | Performed by: NURSE PRACTITIONER

## 2019-12-09 PROCEDURE — 96416 CHEMO PROLONG INFUSE W/PUMP: CPT | Performed by: NURSE PRACTITIONER

## 2019-12-09 PROCEDURE — 99207 ZZC NO CHARGE NURSE ONLY: CPT

## 2019-12-09 PROCEDURE — 99214 OFFICE O/P EST MOD 30 MIN: CPT | Mod: 25 | Performed by: NURSE PRACTITIONER

## 2019-12-09 PROCEDURE — 80053 COMPREHEN METABOLIC PANEL: CPT | Performed by: NURSE PRACTITIONER

## 2019-12-09 PROCEDURE — 99207 ZZC NO CHARGE LOS: CPT

## 2019-12-09 RX ORDER — LORAZEPAM 2 MG/ML
0.5 INJECTION INTRAMUSCULAR EVERY 4 HOURS PRN
Status: CANCELLED
Start: 2019-12-09

## 2019-12-09 RX ORDER — SODIUM CHLORIDE 9 MG/ML
1000 INJECTION, SOLUTION INTRAVENOUS CONTINUOUS PRN
Status: CANCELLED
Start: 2019-12-09

## 2019-12-09 RX ORDER — ZOLEDRONIC ACID 0.04 MG/ML
4 INJECTION, SOLUTION INTRAVENOUS ONCE
Status: CANCELLED | OUTPATIENT
Start: 2020-02-25

## 2019-12-09 RX ORDER — ALBUTEROL SULFATE 90 UG/1
1-2 AEROSOL, METERED RESPIRATORY (INHALATION)
Status: CANCELLED
Start: 2019-12-09

## 2019-12-09 RX ORDER — HEPARIN SODIUM (PORCINE) LOCK FLUSH IV SOLN 100 UNIT/ML 100 UNIT/ML
5 SOLUTION INTRAVENOUS
Status: DISCONTINUED | OUTPATIENT
Start: 2019-12-09 | End: 2019-12-09 | Stop reason: HOSPADM

## 2019-12-09 RX ORDER — ONDANSETRON 8 MG/1
8 TABLET, FILM COATED ORAL EVERY 8 HOURS PRN
Qty: 30 TABLET | Refills: 2 | Status: SHIPPED | OUTPATIENT
Start: 2019-12-09 | End: 2020-03-27

## 2019-12-09 RX ORDER — NALOXONE HYDROCHLORIDE 0.4 MG/ML
.1-.4 INJECTION, SOLUTION INTRAMUSCULAR; INTRAVENOUS; SUBCUTANEOUS
Status: CANCELLED | OUTPATIENT
Start: 2019-12-09

## 2019-12-09 RX ORDER — METHYLPREDNISOLONE SODIUM SUCCINATE 125 MG/2ML
125 INJECTION, POWDER, LYOPHILIZED, FOR SOLUTION INTRAMUSCULAR; INTRAVENOUS
Status: CANCELLED
Start: 2019-12-09

## 2019-12-09 RX ORDER — PALONOSETRON 0.05 MG/ML
0.25 INJECTION, SOLUTION INTRAVENOUS ONCE
Status: CANCELLED
Start: 2019-12-09

## 2019-12-09 RX ORDER — EPINEPHRINE 0.3 MG/.3ML
0.3 INJECTION SUBCUTANEOUS EVERY 5 MIN PRN
Status: CANCELLED | OUTPATIENT
Start: 2019-12-09

## 2019-12-09 RX ORDER — DIPHENHYDRAMINE HYDROCHLORIDE 50 MG/ML
50 INJECTION INTRAMUSCULAR; INTRAVENOUS
Status: CANCELLED
Start: 2019-12-09

## 2019-12-09 RX ORDER — PALONOSETRON 0.05 MG/ML
0.25 INJECTION, SOLUTION INTRAVENOUS ONCE
Status: COMPLETED | OUTPATIENT
Start: 2019-12-09 | End: 2019-12-09

## 2019-12-09 RX ORDER — FLUOROURACIL 50 MG/ML
400 INJECTION, SOLUTION INTRAVENOUS ONCE
Status: COMPLETED | OUTPATIENT
Start: 2019-12-09 | End: 2019-12-09

## 2019-12-09 RX ORDER — EPINEPHRINE 1 MG/ML
0.3 INJECTION, SOLUTION INTRAMUSCULAR; SUBCUTANEOUS EVERY 5 MIN PRN
Status: CANCELLED | OUTPATIENT
Start: 2019-12-09

## 2019-12-09 RX ORDER — MEPERIDINE HYDROCHLORIDE 25 MG/ML
25 INJECTION INTRAMUSCULAR; INTRAVENOUS; SUBCUTANEOUS EVERY 30 MIN PRN
Status: CANCELLED | OUTPATIENT
Start: 2019-12-09

## 2019-12-09 RX ORDER — FLUOROURACIL 50 MG/ML
400 INJECTION, SOLUTION INTRAVENOUS ONCE
Status: CANCELLED | OUTPATIENT
Start: 2019-12-09

## 2019-12-09 RX ORDER — ALBUTEROL SULFATE 0.83 MG/ML
2.5 SOLUTION RESPIRATORY (INHALATION)
Status: CANCELLED | OUTPATIENT
Start: 2019-12-09

## 2019-12-09 RX ADMIN — PALONOSETRON 0.25 MG: 0.05 INJECTION, SOLUTION INTRAVENOUS at 10:12

## 2019-12-09 RX ADMIN — Medication 250 ML: at 10:14

## 2019-12-09 RX ADMIN — FLUOROURACIL 710 MG: 50 INJECTION, SOLUTION INTRAVENOUS at 13:49

## 2019-12-09 RX ADMIN — HEPARIN SODIUM (PORCINE) LOCK FLUSH IV SOLN 100 UNIT/ML 5 ML: 100 SOLUTION at 07:44

## 2019-12-09 ASSESSMENT — MIFFLIN-ST. JEOR: SCORE: 1168.65

## 2019-12-09 ASSESSMENT — PAIN SCALES - GENERAL: PAINLEVEL: SEVERE PAIN (6)

## 2019-12-09 NOTE — PROGRESS NOTES
"Infusion Nursing Note:  Alannah Wynn presents today for C2 D1 Oxaliplatin/Avastin/5FU pump.    Patient seen by provider today: Yes: Felipa Garner NP   present during visit today: Not Applicable.    Note: Re-inforced antiemetic schedule as patient was using all 3 antiemetics at once and taking them every 6 hours.  Laid out a clear plan for the next week to which both patient and  verbalized understanding.     Intravenous Access:  Implanted Port.    Treatment Conditions:  Lab Results   Component Value Date    HGB 12.9 12/09/2019     Lab Results   Component Value Date    WBC 5.6 12/09/2019      Lab Results   Component Value Date    ANEU 2.1 12/09/2019     Lab Results   Component Value Date     12/09/2019      Lab Results   Component Value Date     12/09/2019                   Lab Results   Component Value Date    POTASSIUM 3.6 12/09/2019           No results found for: MAG         Lab Results   Component Value Date    CR 0.60 12/09/2019                   Lab Results   Component Value Date    LAUREN 8.8 12/09/2019                Lab Results   Component Value Date    BILITOTAL 0.3 12/09/2019           Lab Results   Component Value Date    ALBUMIN 3.2 12/09/2019                    Lab Results   Component Value Date    ALT 37 12/09/2019           Lab Results   Component Value Date    AST 23 12/09/2019       Results reviewed, labs MET treatment parameters, ok to proceed with treatment.      Post Infusion Assessment:  Patient tolerated infusion without incident.  Blood return noted pre and post infusion.  Site patent and intact, free from redness, edema or discomfort.  No evidence of extravasations.     Prior to discharge: Port is secured in place with tegaderm and flushed with 10cc NS with positive blood return noted.  Continuous home infusion CADD pump connected.    All connectors secured in place and clamps taped open.    Pump started, \"running\" noted on display (CADD): YES.  Patient " instructed to call our clinic or [facility] with any questions or concerns at home.  Patient verbalized understanding.    Patient set up for pump disconnect at our clinic on Wednesday at 1200.          Discharge Plan:   Discharge instructions reviewed with: Patient and Family.  Patient and/or family verbalized understanding of discharge instructions and all questions answered.  Patient discharged in stable condition accompanied by: .  Departure Mode: Ambulatory.    Ernestina Melissa RN

## 2019-12-09 NOTE — PROGRESS NOTES
"Oncology Follow Up Visit: December 9, 2019     Oncologist: Dr Rachna Tavarez  PCP: Earline Mehta    Diagnosis: Stage IV Adenocarcinoma of the rectum   Alannah Wynn is a 67 yo female who presented in 10/2019 with intermittent hematochezia and anorectal irritation since 2011, that have been managed as \"hemorrhoids\".  Diagnostic colonoscopy on 10/18/19 for rectal bleeding showed \"fungating and ulcerated non-obstructing mass was found in the distal rectum <1 cm from the anal verge. The mass was partially circumferential (involving one-half of the lumen circumference). The mass measured four cm in length. In addition, its diameter measured six mm.  Pathology showed invasive moderately differentiated adenocarcinoma with intact mismatch repair protein expression.  Pelvic MRI 11/2019 found fungating polypoid mid/lower rectal mass with luminal narrowing as well as innumerable enlarged lymph nodes as described above with a conglomerate of suspicious lymph nodes abutting the anterior peritoneal reflection. Stage cT3d N2.   Further testing with PET scan proved pulmonarymetastasis and bony metastasis at L3.  Treatment:   11/25/2019 to begin treatment with FOLFOX/Avastin    Interval History: Ms. Wynn comes to clinic with  for continuation of treatment for her colon cancer with metastasis with FOLFOX/Avastin. Pt states he nausea was improved by using antiemetic every 6 hours and feels all 3 products were helpful- she has been able to eat well without weight loss. Bowels are variable but better this week. She denies neuropathy or trouble sleeping.   Rest of comprehensive and complete ROS is reviewed and is negative.   Past Medical History:   Diagnosis Date     Gastroesophageal reflux disease      Current Outpatient Medications   Medication     Ascorbic Acid (VITAMIN C) 500 MG CAPS     B Complex Vitamins (B COMPLEX-B12 PO)     cholecalciferol (VITAMIN D3) 400 unit (10 mcg) TABS tablet     docusate sodium (COLACE) 100 MG " "capsule     ENOVARX-LIDOCAINE HCL 5 % cream     Fesoterodine Fumarate 8 MG TB24     hydrocortisone (ANUSOL-HC) 2.5 % cream     lidocaine-prilocaine (EMLA) 2.5-2.5 % external cream     LORazepam (ATIVAN) 0.5 MG tablet     Lutein 20 MG CAPS     mirabegron (MYRBETRIQ) 50 MG 24 hr tablet     Multiple vitamin TABS     omeprazole (PRILOSEC) 20 MG DR capsule     ondansetron (ZOFRAN) 8 MG tablet     prochlorperazine (COMPAZINE) 10 MG tablet     solifenacin (VESICARE) 10 MG tablet     trospium (SANCTURA XR) 60 MG CP24 24 hr capsule     Current Facility-Administered Medications   Medication     lidocaine (XYLOCAINE) 5 % ointment     Allergies   Allergen Reactions     Penicillins      Sulfa Drugs        Physical Exam:/77 (BP Location: Right arm)   Pulse 88   Temp 97.5  F (36.4  C) (Oral)   Resp 18   Ht 1.495 m (4' 10.86\")   Wt 73.5 kg (162 lb 1.6 oz)   LMP 11/27/2001 (Approximate)   SpO2 95%   BMI 32.90 kg/m     ECOG PS- 0-1  Constitutional: Alert, cooperative, and in no distress.   ENT: Eyes bright , No mouth sores  Neck: Supple, No adenopathy.  Cardiac: Heart rate and rhythm is regular and strong without murmur  Respiratory: Breathing easy. Lung sounds clear to auscultation  GI: Abdomen is soft, rounded, non-tender, BS normal. No masses or organomegaly  MS: Muscle tone normal, extremities normal with no edema.   Skin: No suspicious lesions or rashes  Neuro: Sensory grossly WNL, gait normal.   Lymph: Normal ant/post cervical, axillary, supraclavicular nodes  Psych: Mentation appears normal and affect is normal.     Laboratory Results:   Results for orders placed or performed in visit on 12/09/19   CBC with platelets differential     Status: None   Result Value Ref Range    WBC 5.6 4.0 - 11.0 10e9/L    RBC Count 4.42 3.8 - 5.2 10e12/L    Hemoglobin 12.9 11.7 - 15.7 g/dL    Hematocrit 40.2 35.0 - 47.0 %    MCV 91 78 - 100 fl    MCH 29.2 26.5 - 33.0 pg    MCHC 32.1 31.5 - 36.5 g/dL    RDW 13.8 10.0 - 15.0 %    " Platelet Count 273 150 - 450 10e9/L    Diff Method Automated Method     % Neutrophils 36.7 %    % Lymphocytes 47.5 %    % Monocytes 8.9 %    % Eosinophils 5.5 %    % Basophils 1.4 %    % Immature Granulocytes 0.0 %    Absolute Neutrophil 2.1 1.6 - 8.3 10e9/L    Absolute Lymphocytes 2.7 0.8 - 5.3 10e9/L    Absolute Monocytes 0.5 0.0 - 1.3 10e9/L    Absolute Eosinophils 0.3 0.0 - 0.7 10e9/L    Absolute Basophils 0.1 0.0 - 0.2 10e9/L    Abs Immature Granulocytes 0.0 0 - 0.4 10e9/L   Comprehensive metabolic panel     Status: Abnormal   Result Value Ref Range    Sodium 140 133 - 144 mmol/L    Potassium 3.6 3.4 - 5.3 mmol/L    Chloride 107 94 - 109 mmol/L    Carbon Dioxide 29 20 - 32 mmol/L    Anion Gap 4 3 - 14 mmol/L    Glucose 96 70 - 99 mg/dL    Urea Nitrogen 18 7 - 30 mg/dL    Creatinine 0.60 0.52 - 1.04 mg/dL    GFR Estimate >90 >60 mL/min/[1.73_m2]    GFR Estimate If Black >90 >60 mL/min/[1.73_m2]    Calcium 8.8 8.5 - 10.1 mg/dL    Bilirubin Total 0.3 0.2 - 1.3 mg/dL    Albumin 3.2 (L) 3.4 - 5.0 g/dL    Protein Total 7.0 6.8 - 8.8 g/dL    Alkaline Phosphatase 87 40 - 150 U/L    ALT 37 0 - 50 U/L    AST 23 0 - 45 U/L   Protein qualitative urine     Status: None   Result Value Ref Range    Protein Albumin Urine Negative NEG^Negative mg/dL     Assessment and Plan:   Stage IV Adenocarcinoma of the rectum-Pt began treatment with FOLFOX/Avastin on 11/25/2019.She noted some early nausea and on day 3 when having pump off she was told to use antiemetics regularly and use combination of all 3 products however pt heard to take all 3 products every 6 hours until the nausea is gone- stated this was very helpful but discussed today that this is not the best way to use the products and pt will now start with zofran every 8 hours and may use another product in 1 hour after the zofran if not getting relief- language issues may have caused some confusion.   Pt will return for day 3 pump take off and nursing will review nausea  products. Next infusion in 2 weeks.    -Awaiting PD1L result.   Bone metastasis- pt started Zometa monthly in 11/2019. Pt confirms she is now on calcium and vitamin D support.   Blood in stool-improved. No obvious blood on regular basis.   This was a 25 min visit with > 50% in counseling and coordinating care including education and management of concerns.    Felipa Garner,CNP

## 2019-12-09 NOTE — PROGRESS NOTES
Social Work Follow-Up Encounter Visit  Oncology Clinic    Data/Intervention:  Patient Name:  Alannah Wynn  /Age:  1950 (68 year old)    Reason for Follow-Up:  Psychosocial outreach    Collaborated With:    Alannah,     Resources Provided:  None today    Assessment:   COLT met with Alannah and her  in the infusion center this morning.  She is here for Modified FOLFOX-6 / Bevacizumab   Day 1, Cycle 2.  Alannah reports she is doing ok.  COLT was smiling and friendly with SW, but did state they had a question. Her  explained that they have not been having any issues with billing, but recently got something in the mail from his former employer's coverage and he believes one of her recent scans was sent to the wrong place for billing.  SW talked them through this process, he did not have a copy of the document he received, but sw suggested they wait until they receive the bill and if it looks incorrect, to call the billing department directly. They agreed and stated their understanding.  They deny any other questions or needs at this time.        Plan:  COLT will continue to follow and remain available as needed and appropriate.     MARYCRUZ Anderson, [facility]  Clinical , Adult Oncology  Pager: [phone]  Phone: [phone]

## 2019-12-09 NOTE — LETTER
"    12/9/2019         RE: Alannah Wynn  [address]  [place] [address]        Dear Colleague,    Thank you for referring your patient, Alannah Wynn, to the [facility]. Please see a copy of my visit note below.    Oncology Follow Up Visit: December 9, 2019     Oncologist: Dr Rachna Tavarez  PCP: Earline Mehta    Diagnosis: Stage IV Adenocarcinoma of the rectum   Alannah Wynn is a 67 yo female who presented in 10/2019 with intermittent hematochezia and anorectal irritation since 2011, that have been managed as \"hemorrhoids\".  Diagnostic colonoscopy on 10/18/19 for rectal bleeding showed \"fungating and ulcerated non-obstructing mass was found in the distal rectum <1 cm from the anal verge. The mass was partially circumferential (involving one-half of the lumen circumference). The mass measured four cm in length. In addition, its diameter measured six mm.  Pathology showed invasive moderately differentiated adenocarcinoma with intact mismatch repair protein expression.  Pelvic MRI 11/2019 found fungating polypoid mid/lower rectal mass with luminal narrowing as well as innumerable enlarged lymph nodes as described above with a conglomerate of suspicious lymph nodes abutting the anterior peritoneal reflection. Stage cT3d N2.   Further testing with PET scan proved pulmonarymetastasis and bony metastasis at L3.  Treatment:   11/25/2019 to begin treatment with FOLFOX/Avastin    Interval History: Ms. Wynn comes to clinic with  for continuation of treatment for her colon cancer with metastasis with FOLFOX/Avastin. Pt states he nausea was improved by using antiemetic every 6 hours and feels all 3 products were helpful- she has been able to eat well without weight loss. Bowels are variable but better this week. She denies neuropathy or trouble sleeping.   Rest of comprehensive and complete ROS is reviewed and is negative.   Past Medical History:   Diagnosis Date     " "Gastroesophageal reflux disease      Current Outpatient Medications   Medication     Ascorbic Acid (VITAMIN C) 500 MG CAPS     B Complex Vitamins (B COMPLEX-B12 PO)     cholecalciferol (VITAMIN D3) 400 unit (10 mcg) TABS tablet     docusate sodium (COLACE) 100 MG capsule     ENOVARX-LIDOCAINE HCL 5 % cream     Fesoterodine Fumarate 8 MG TB24     hydrocortisone (ANUSOL-HC) 2.5 % cream     lidocaine-prilocaine (EMLA) 2.5-2.5 % external cream     LORazepam (ATIVAN) 0.5 MG tablet     Lutein 20 MG CAPS     mirabegron (MYRBETRIQ) 50 MG 24 hr tablet     Multiple vitamin TABS     omeprazole (PRILOSEC) 20 MG DR capsule     ondansetron (ZOFRAN) 8 MG tablet     prochlorperazine (COMPAZINE) 10 MG tablet     solifenacin (VESICARE) 10 MG tablet     trospium (SANCTURA XR) 60 MG CP24 24 hr capsule     Current Facility-Administered Medications   Medication     lidocaine (XYLOCAINE) 5 % ointment     Allergies   Allergen Reactions     Penicillins      Sulfa Drugs        Physical Exam:/77 (BP Location: Right arm)   Pulse 88   Temp 97.5  F (36.4  C) (Oral)   Resp 18   Ht 1.495 m (4' 10.86\")   Wt 73.5 kg (162 lb 1.6 oz)   LMP 11/27/2001 (Approximate)   SpO2 95%   BMI 32.90 kg/m      ECOG PS- 0-1  Constitutional: Alert, cooperative, and in no distress.   ENT: Eyes bright , No mouth sores  Neck: Supple, No adenopathy.  Cardiac: Heart rate and rhythm is regular and strong without murmur  Respiratory: Breathing easy. Lung sounds clear to auscultation  GI: Abdomen is soft, rounded, non-tender, BS normal. No masses or organomegaly  MS: Muscle tone normal, extremities normal with no edema.   Skin: No suspicious lesions or rashes  Neuro: Sensory grossly WNL, gait normal.   Lymph: Normal ant/post cervical, axillary, supraclavicular nodes  Psych: Mentation appears normal and affect is normal.     Laboratory Results:   Results for orders placed or performed in visit on 12/09/19   CBC with platelets differential     Status: None "   Result Value Ref Range    WBC 5.6 4.0 - 11.0 10e9/L    RBC Count 4.42 3.8 - 5.2 10e12/L    Hemoglobin 12.9 11.7 - 15.7 g/dL    Hematocrit 40.2 35.0 - 47.0 %    MCV 91 78 - 100 fl    MCH 29.2 26.5 - 33.0 pg    MCHC 32.1 31.5 - 36.5 g/dL    RDW 13.8 10.0 - 15.0 %    Platelet Count 273 150 - 450 10e9/L    Diff Method Automated Method     % Neutrophils 36.7 %    % Lymphocytes 47.5 %    % Monocytes 8.9 %    % Eosinophils 5.5 %    % Basophils 1.4 %    % Immature Granulocytes 0.0 %    Absolute Neutrophil 2.1 1.6 - 8.3 10e9/L    Absolute Lymphocytes 2.7 0.8 - 5.3 10e9/L    Absolute Monocytes 0.5 0.0 - 1.3 10e9/L    Absolute Eosinophils 0.3 0.0 - 0.7 10e9/L    Absolute Basophils 0.1 0.0 - 0.2 10e9/L    Abs Immature Granulocytes 0.0 0 - 0.4 10e9/L   Comprehensive metabolic panel     Status: Abnormal   Result Value Ref Range    Sodium 140 133 - 144 mmol/L    Potassium 3.6 3.4 - 5.3 mmol/L    Chloride 107 94 - 109 mmol/L    Carbon Dioxide 29 20 - 32 mmol/L    Anion Gap 4 3 - 14 mmol/L    Glucose 96 70 - 99 mg/dL    Urea Nitrogen 18 7 - 30 mg/dL    Creatinine 0.60 0.52 - 1.04 mg/dL    GFR Estimate >90 >60 mL/min/[1.73_m2]    GFR Estimate If Black >90 >60 mL/min/[1.73_m2]    Calcium 8.8 8.5 - 10.1 mg/dL    Bilirubin Total 0.3 0.2 - 1.3 mg/dL    Albumin 3.2 (L) 3.4 - 5.0 g/dL    Protein Total 7.0 6.8 - 8.8 g/dL    Alkaline Phosphatase 87 40 - 150 U/L    ALT 37 0 - 50 U/L    AST 23 0 - 45 U/L   Protein qualitative urine     Status: None   Result Value Ref Range    Protein Albumin Urine Negative NEG^Negative mg/dL     Assessment and Plan:   Stage IV Adenocarcinoma of the rectum-Pt began treatment with FOLFOX/Avastin on 11/25/2019.She noted some early nausea and on day 3 when having pump off she was told to use antiemetics regularly and use combination of all 3 products however pt heard to take all 3 products every 6 hours until the nausea is gone- stated this was very helpful but discussed today that this is not the best way to  use the products and pt will now start with zofran every 8 hours and may use another product in 1 hour after the zofran if not getting relief- language issues may have caused some confusion.   Pt will return for day 3 pump take off and nursing will review nausea products. Next infusion in 2 weeks.    -Awaiting PD1L result.   Bone metastasis- pt started Zometa monthly in 11/2019. Pt confirms she is now on calcium and vitamin D support.   Blood in stool-improved. No obvious blood on regular basis.   This was a 25 min visit with > 50% in counseling and coordinating care including education and management of concerns.    Felipa Garner CNP    Again, thank you for allowing me to participate in the care of your patient.        Sincerely,        Felipa Garner, LETICIA, APRN CNP

## 2019-12-09 NOTE — NURSING NOTE
"Oncology Rooming Note    December 9, 2019 8:13 AM   Alannah Wynn is a 68 year old female who presents for:    Chief Complaint   Patient presents with     Oncology Clinic Visit     Prior to treatment     Initial Vitals: /77 (BP Location: Right arm)   Pulse 88   Temp 97.5  F (36.4  C) (Oral)   Resp 18   Ht 1.495 m (4' 10.86\")   Wt 73.5 kg (162 lb 1.6 oz)   LMP 11/27/2001 (Approximate)   SpO2 95%   BMI 32.90 kg/m   Estimated body mass index is 32.9 kg/m  as calculated from the following:    Height as of this encounter: 1.495 m (4' 10.86\").    Weight as of this encounter: 73.5 kg (162 lb 1.6 oz). Body surface area is 1.75 meters squared.  Severe Pain (6) Comment: Data Unavailable   Patient's last menstrual period was 11/27/2001 (approximate).  Allergies reviewed: Yes  Medications reviewed: Yes    Medications: MEDICATION REFILLS NEEDED TODAY. Provider was notified.  Pharmacy name entered into [employer] [facility]: [id number] - [place], [place] - [address] AT [facility] [address]              "

## 2019-12-10 ENCOUNTER — NURSE TRIAGE (OUTPATIENT)
Dept: NURSING | Facility: CLINIC | Age: 69
End: 2019-12-10

## 2019-12-11 ENCOUNTER — INFUSION THERAPY VISIT (OUTPATIENT)
Dept: INFUSION THERAPY | Facility: CLINIC | Age: 69
End: 2019-12-11
Payer: COMMERCIAL

## 2019-12-11 ENCOUNTER — TELEPHONE (OUTPATIENT)
Dept: ONCOLOGY | Facility: CLINIC | Age: 69
End: 2019-12-11

## 2019-12-11 VITALS
HEART RATE: 97 BPM | RESPIRATION RATE: 16 BRPM | SYSTOLIC BLOOD PRESSURE: 124 MMHG | DIASTOLIC BLOOD PRESSURE: 78 MMHG | TEMPERATURE: 98 F | OXYGEN SATURATION: 100 %

## 2019-12-11 DIAGNOSIS — C20 METASTASIS FROM RECTAL CANCER (H): ICD-10-CM

## 2019-12-11 DIAGNOSIS — C79.9 METASTASIS FROM RECTAL CANCER (H): ICD-10-CM

## 2019-12-11 DIAGNOSIS — R11.0 NAUSEA: ICD-10-CM

## 2019-12-11 DIAGNOSIS — C79.51 BONE METASTASIS: Primary | ICD-10-CM

## 2019-12-11 PROCEDURE — 99207 ZZC NO CHARGE LOS: CPT

## 2019-12-11 PROCEDURE — 96361 HYDRATE IV INFUSION ADD-ON: CPT | Mod: 59 | Performed by: NURSE PRACTITIONER

## 2019-12-11 PROCEDURE — 96375 TX/PRO/DX INJ NEW DRUG ADDON: CPT | Performed by: NURSE PRACTITIONER

## 2019-12-11 PROCEDURE — 96374 THER/PROPH/DIAG INJ IV PUSH: CPT | Performed by: NURSE PRACTITIONER

## 2019-12-11 RX ORDER — HEPARIN SODIUM,PORCINE 10 UNIT/ML
5 VIAL (ML) INTRAVENOUS
Status: CANCELLED | OUTPATIENT
Start: 2019-12-11

## 2019-12-11 RX ORDER — HEPARIN SODIUM (PORCINE) LOCK FLUSH IV SOLN 100 UNIT/ML 100 UNIT/ML
5 SOLUTION INTRAVENOUS
Status: CANCELLED | OUTPATIENT
Start: 2019-12-11

## 2019-12-11 RX ORDER — HEPARIN SODIUM (PORCINE) LOCK FLUSH IV SOLN 100 UNIT/ML 100 UNIT/ML
5 SOLUTION INTRAVENOUS
Status: DISCONTINUED | OUTPATIENT
Start: 2019-12-11 | End: 2019-12-11 | Stop reason: HOSPADM

## 2019-12-11 RX ORDER — LORAZEPAM 2 MG/ML
0.5 INJECTION INTRAMUSCULAR ONCE
Status: CANCELLED
Start: 2019-12-11

## 2019-12-11 RX ADMIN — Medication 1000 ML: at 12:40

## 2019-12-11 RX ADMIN — HEPARIN SODIUM (PORCINE) LOCK FLUSH IV SOLN 100 UNIT/ML 5 ML: 100 SOLUTION at 13:59

## 2019-12-11 ASSESSMENT — PAIN SCALES - GENERAL: PAINLEVEL: MODERATE PAIN (5)

## 2019-12-11 NOTE — PROGRESS NOTES
Infusion Nursing Note:  Alannah Wynn presents today for pump disconnect.    Patient seen by provider today: No   present during visit today: Not Applicable.    Note: Pharmacy did more education on when to take home meds for nausea. Pt reports feeling very fatigued, nauseous, and has developed cough. Pt requested IVF and nausea meds and reported feeling better post infusion.    Intravenous Access:  Implanted Port.    Treatment Conditions:  Not Applicable.      Post Infusion Assessment:  Patient tolerated infusion without incident.  Blood return noted pre and post infusion.  Site patent and intact, free from redness, edema or discomfort.  No evidence of extravasations.  Access discontinued per protocol.       Discharge Plan:   Patient will return 12/24 for next appointment.   Patient discharged in stable condition accompanied by: self.  Departure Mode: Wheelchair.    Maya Grubbs RN

## 2019-12-11 NOTE — TELEPHONE ENCOUNTER
Patient reports she got chemotherapy yesterday (12/9) Now has a runny nose, cough. No fever. Patient has been taking Tylenol. Wants to know if she can take cold medicine. Advised patient to call the [facility] tomorrow morning to speak with provider's care team regarding this medication and potential interactions with chemotherapy. Patient verbalized understanding and had no further questions.    Savannah Ernst RN/[facility]    Reason for Disposition    Caller has NON-URGENT medication question about med that PCP prescribed and triager unable to answer question    Protocols used: MEDICATION QUESTION CALL-A-

## 2019-12-11 NOTE — TELEPHONE ENCOUNTER
Received telephone call from patient's ,Janiya. States patient has a runny nose and slight cough. He is wondering if she can take Tylenol Cold. Writer consulted oncology pharmacist, Ximena Almanza. No interaction between patient's treatment medications and Tylenol Cold, ok for patient to take.  expressed understanding and denies further questions. Patient will be in clinic today for pump disconnect.  Genevieve Callahan  RN, BSN, OCN

## 2019-12-17 ENCOUNTER — ONCOLOGY VISIT (OUTPATIENT)
Dept: ONCOLOGY | Facility: CLINIC | Age: 69
End: 2019-12-17
Payer: COMMERCIAL

## 2019-12-17 VITALS
WEIGHT: 163.8 LBS | HEIGHT: 59 IN | TEMPERATURE: 97.5 F | BODY MASS INDEX: 33.02 KG/M2 | HEART RATE: 81 BPM | DIASTOLIC BLOOD PRESSURE: 75 MMHG | OXYGEN SATURATION: 97 % | SYSTOLIC BLOOD PRESSURE: 132 MMHG | RESPIRATION RATE: 18 BRPM

## 2019-12-17 DIAGNOSIS — C79.9 METASTASIS FROM RECTAL CANCER (H): ICD-10-CM

## 2019-12-17 DIAGNOSIS — C79.51 BONE METASTASIS: ICD-10-CM

## 2019-12-17 DIAGNOSIS — C20 METASTASIS FROM RECTAL CANCER (H): ICD-10-CM

## 2019-12-17 PROCEDURE — 99214 OFFICE O/P EST MOD 30 MIN: CPT | Mod: 25 | Performed by: INTERNAL MEDICINE

## 2019-12-17 RX ORDER — ALBUTEROL SULFATE 0.83 MG/ML
2.5 SOLUTION RESPIRATORY (INHALATION)
Status: CANCELLED | OUTPATIENT
Start: 2019-12-24

## 2019-12-17 RX ORDER — EPINEPHRINE 1 MG/ML
0.3 INJECTION, SOLUTION INTRAMUSCULAR; SUBCUTANEOUS EVERY 5 MIN PRN
Status: CANCELLED | OUTPATIENT
Start: 2019-12-24

## 2019-12-17 RX ORDER — DIPHENHYDRAMINE HYDROCHLORIDE 50 MG/ML
50 INJECTION INTRAMUSCULAR; INTRAVENOUS
Status: CANCELLED
Start: 2019-12-24

## 2019-12-17 RX ORDER — SODIUM CHLORIDE 9 MG/ML
1000 INJECTION, SOLUTION INTRAVENOUS CONTINUOUS PRN
Status: CANCELLED
Start: 2019-12-24

## 2019-12-17 RX ORDER — MEPERIDINE HYDROCHLORIDE 25 MG/ML
25 INJECTION INTRAMUSCULAR; INTRAVENOUS; SUBCUTANEOUS EVERY 30 MIN PRN
Status: CANCELLED | OUTPATIENT
Start: 2019-12-24

## 2019-12-17 RX ORDER — LORAZEPAM 2 MG/ML
0.5 INJECTION INTRAMUSCULAR EVERY 4 HOURS PRN
Status: CANCELLED
Start: 2019-12-24

## 2019-12-17 RX ORDER — ALBUTEROL SULFATE 90 UG/1
1-2 AEROSOL, METERED RESPIRATORY (INHALATION)
Status: CANCELLED
Start: 2019-12-24

## 2019-12-17 RX ORDER — PALONOSETRON 0.05 MG/ML
0.25 INJECTION, SOLUTION INTRAVENOUS ONCE
Status: CANCELLED
Start: 2019-12-24

## 2019-12-17 RX ORDER — METHYLPREDNISOLONE SODIUM SUCCINATE 125 MG/2ML
125 INJECTION, POWDER, LYOPHILIZED, FOR SOLUTION INTRAMUSCULAR; INTRAVENOUS
Status: CANCELLED
Start: 2019-12-24

## 2019-12-17 RX ORDER — EPINEPHRINE 0.3 MG/.3ML
0.3 INJECTION SUBCUTANEOUS EVERY 5 MIN PRN
Status: CANCELLED | OUTPATIENT
Start: 2019-12-24

## 2019-12-17 RX ORDER — NALOXONE HYDROCHLORIDE 0.4 MG/ML
.1-.4 INJECTION, SOLUTION INTRAMUSCULAR; INTRAVENOUS; SUBCUTANEOUS
Status: CANCELLED | OUTPATIENT
Start: 2019-12-24

## 2019-12-17 ASSESSMENT — MIFFLIN-ST. JEOR: SCORE: 1176.36

## 2019-12-17 ASSESSMENT — PAIN SCALES - GENERAL: PAINLEVEL: SEVERE PAIN (6)

## 2019-12-17 NOTE — PROGRESS NOTES
"ONCOLOGY FOLLOW UP NOTE    12/17/2019     Patient was being followed by Dr. Tavarez and saw her on 11/13/2019.  Now she is transferring her care to me.  I have reviewed her previous records and have copied and updated from prior notes.      DIAGNOSIS:    10/2019 dx adenoCa of rectum,  MMR intact- NGS Negative for KRAS, NRAS and BRAF. PD L1 25%.  Baseline CEA 10.  Stage IV disease with pulmonary and bony mets      HISTORY OF ONCOLOGY ILLNESS:    She has had intermittent hematochezia and anorectal irritation since 2011, that have been managed as \"hemorrhoids\". These symptoms became persistent and progressive since early thi year. Her anorectal pain is managed with Tylenol. Denies fevers, chills, or unintentional weight loss. Does have intermittent urinary and fecal urgency but no incontinence per se.    Diagnostic colonoscopy on 10/18/19 for rectal bleeding showed \"fungating and ulcerated non-obstructing mass was found in the distal rectum <1 cm from the anal verge. The mass was partially circumferential (involving one-half of the lumen circumference). The mass measured four cm in length. In addition, its diameter measured six mm.  Pathology showed invasive moderately differentiated adenocarcinoma with intact mismatch repair protein expression, PD L1 25% and NGS panel showed NO MUTATIONS in KRAS, NRAS and BRAF.      Pelvic MRI 11/2019 found fungating polypoid mid/lower rectal mass with luminal narrowing as well as innumerable enlarged lymph nodes as described above with a conglomerate of suspicious lymph nodes abutting the anterior peritoneal reflection. Stage cT3d N2.     PET 11/2019 found   1. Hypermetabolic mass in the distal rectum, SUV 14 corresponding to the patient's known rectal cancer.  2. Multiple hypermetabolic pelvic lymph nodes, compatible metastatic disease, SUV 5-6.  3. FDG avid pulmonary nodule measuring 7 mm in the left lower lobe, concerning for metastatic disease. Additional FDG avid pulmonary nodules " suspicious for metastatic disease, SUV around 4.  4. Osteoblastic metastatic lesion in the L3 vertebral body, SUV 6.8    She started palliative FOLFOX/Avastin on 11/25/2019.  Cycle #2 given on 12/9/2019.  Cycle #3 scheduled for 12/24/2019.    Because of bone metastatic disease, she was started on Zometa on 11/25/2019.       INTERVAL HISTORY:     She comes in today accompanied by her  and tells me that overall she is doing well.  She tolerated her second cycle of chemotherapy better.  Aloxi and Emend were added as premedication for nausea which she experienced with the first chemotherapy and she tells me that she did not have any nausea with the second cycle of chemotherapy.  Starting day 3 she was also using Compazine and Zofran regularly and Ativan at night.  She did not vomit.  She had cold sensitivity for a few days but denies any neuropathy outside of cold sensitivity.  She mentions that her bowel movements seem better and the bleeding is also less.  She feels a little constipated and is asking if she can take MiraLAX.  Rectal pain is also better and she only takes Tylenol which helps with the pain.  Denies any fevers or infections.  She felt very tired for the first 1 week with chemotherapy but then starts to feel better.  No infections.  No new swellings..     ECOG 1    ROS:  A comprehensive ROS was otherwise neg    I reviewed with her history in epic as below.    PAST MEDICAL HISTORY  Reflux, hemorrhoid, hepatitis B?  Overactive bladder  Active Ambulatory Problems     Diagnosis Date Noted     External hemorrhoids 04/29/2016     Non morbid obesity due to excess calories 04/29/2016     Hepatitis B immune 04/29/2016     Screening for cervical cancer 04/29/2016     CARDIOVASCULAR SCREENING; LDL GOAL LESS THAN 160 04/29/2016     Gastroesophageal reflux disease 04/29/2016     Overactive bladder 07/21/2017     Nevus of left lower leg- undetermined behavior 12/27/2018     Metastasis from rectal cancer (H)  "11/13/2019     Bone metastasis (H) 11/13/2019     Nausea 11/27/2019     Resolved Ambulatory Problems     Diagnosis Date Noted     Urgency of urination 04/29/2016     Need for vaccination against Streptococcus pneumoniae 04/29/2016     Morbid obesity (H) 07/21/2017     Obesity (BMI 35.0-39.9) with comorbidity (H) 12/27/2018     No Additional Past Medical History       MEDICATIONS:  Current Outpatient Medications   Medication     Ascorbic Acid (VITAMIN C) 500 MG CAPS     B Complex Vitamins (B COMPLEX-B12 PO)     cholecalciferol (VITAMIN D3) 400 unit (10 mcg) TABS tablet     ENOVARX-LIDOCAINE HCL 5 % cream     Fesoterodine Fumarate 8 MG TB24     hydrocortisone (ANUSOL-HC) 2.5 % cream     lidocaine-prilocaine (EMLA) 2.5-2.5 % external cream     LORazepam (ATIVAN) 0.5 MG tablet     Lutein 20 MG CAPS     mirabegron (MYRBETRIQ) 50 MG 24 hr tablet     Multiple vitamin TABS     omeprazole (PRILOSEC) 20 MG DR capsule     ondansetron (ZOFRAN) 8 MG tablet     prochlorperazine (COMPAZINE) 10 MG tablet     solifenacin (VESICARE) 10 MG tablet     trospium (SANCTURA XR) 60 MG CP24 24 hr capsule     docusate sodium (COLACE) 100 MG capsule     Current Facility-Administered Medications   Medication     lidocaine (XYLOCAINE) 5 % ointment     ALLERGY:     Allergies   Allergen Reactions     Penicillins      Sulfa Drugs             SOCIAL HISTORY:  She is a [place] immigrants to [place].  Denies smoking denies alcohol abuse.  2 of her sons are doctors.    FAMILY HISTORY:   Denies any family history of cancer.            PHYSICAL EXAMINATION:     /75 (BP Location: Right arm)   Pulse 81   Temp 97.5  F (36.4  C) (Oral)   Resp 18   Ht 1.495 m (4' 10.86\")   Wt 74.3 kg (163 lb 12.8 oz)   LMP 11/27/2001 (Approximate)   SpO2 97%   BMI 33.24 kg/m       Wt Readings from Last 4 Encounters:   12/17/19 74.3 kg (163 lb 12.8 oz)   12/09/19 73.5 kg (162 lb 1.6 oz)   11/25/19 75.1 kg (165 lb 9.6 oz)   11/13/19 76.2 kg (168 lb) "     CONSTITUTIONAL: no acute distress  EYES: PERRLA, no palor or icterus.   ENT/MOUTH: no mouth lesions. Ears normal  CVS: s1s2 no m r g .   RESPIRATORY: clear to auscultation b/l  GI: soft non tender no hepatosplenomegaly  NEURO: AAOX3  Grossly non focal neuro exam  INTEGUMENT: no obvious rashes.  Port site is clean.  LYMPHATIC: no palpable cervical, supraclavicular, axillary or inguinal LAD  MUSCULOSKELETAL: Unremarkable. No bony tenderness.   EXTREMITIES: no edema  PSYCH: Mentation, mood and affect are normal. Decision making capacity is intact      LABS/IMAGING/PATHOLOGY:    Reviewed    ASSESSMENT AND PLAN:      Metastatic rectal adenocarcinoma, MSI intact, K-aimee wild type, PDL 1 + 25% with metastasis to the lungs, bones and pelvic lymph nodes.      She started palliative chemotherapy with FOLFOX/Avastin on 11/25/2019.  She understands that the chemotherapy is not curative but palliative in nature.  She has received 2 cycles up until now.  She is tolerating it fairly well apart from excessive tiredness for the first 1 week and nausea which she developed with the first cycle but it improved with second cycle after addition of antinausea medications.  I plan to continue the same chemotherapy but will remove the 5-FU bolus to help with the fatigue.  If she continues to do well then we will repeat his scans after 5 or 6 cycles of chemotherapy.    Bone metastasis.  Started Zometa on 11/25/2019.  I plan to give it to her every 3 months.  Continue calcium and vitamin D.    Nausea.  She is getting Decadron, Aloxi and Emend as premedications.  With this combination she did not have any significant nausea but she continues to take antinausea medications on a scheduled basis so I want her to start taking antinausea medications on an as-needed basis.    Bowel movements/constipation.  Overall her bowel movements have been better after start of chemotherapy.  She is still a little constipated and is wondering if she can take  MiraLAX.  I believe she can take it on an as-needed basis.    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  She does not have any neuropathy outside of cold sensitivity so at this time we will continue to observe.    Discussion regarding health care directive  We discussed that it is important that she completes health care directive which would help in making sure that her wishes are followed about her treatment care in case she is not able to make a decision for herself.  We gave her information regarding that.      Return to clinic on 12/24/2019 for cycle #3 chemotherapy.  Return to clinic 1/7/2020 to see nurse practitioner and chemotherapy.  I will see her back 1/21/2020.    All questions answered.  She is agreeable and comfortable with the plan.    Charline Velazquez MD

## 2019-12-17 NOTE — NURSING NOTE
"Oncology Rooming Note    December 17, 2019 12:24 PM   Alannah Wynn is a 68 year old female who presents for:    Chief Complaint   Patient presents with     Oncology Clinic Visit     Transfer of care     Initial Vitals: /75 (BP Location: Right arm)   Pulse 81   Temp 97.5  F (36.4  C) (Oral)   Resp 18   Ht 1.495 m (4' 10.86\")   Wt 74.3 kg (163 lb 12.8 oz)   LMP 11/27/2001 (Approximate)   SpO2 97%   BMI 33.24 kg/m   Estimated body mass index is 33.24 kg/m  as calculated from the following:    Height as of this encounter: 1.495 m (4' 10.86\").    Weight as of this encounter: 74.3 kg (163 lb 12.8 oz). Body surface area is 1.76 meters squared.  Severe Pain (6) Comment: Data Unavailable   Patient's last menstrual period was 11/27/2001 (approximate).  Allergies reviewed: Yes  Medications reviewed: Yes    Medications: Medication refills not needed today.  Pharmacy name entered into [facility]:    [id number] - [place], [place] - [address] AT [address] [facility] - [place], [place] - [address], [id number]    Suni Givens LPN              "

## 2019-12-17 NOTE — LETTER
"    12/17/2019         RE: Alannah Wynn  [address]  [place] [address]        Dear Colleague,    Thank you for referring your patient, Alannah Wynn, to the [facility]. Please see a copy of my visit note below.    ONCOLOGY FOLLOW UP NOTE    12/17/2019     Patient was being followed by Dr. Tavarez and saw her on 11/13/2019.  Now she is transferring her care to me.  I have reviewed her previous records and have copied and updated from prior notes.      DIAGNOSIS:    10/2019 dx adenoCa of rectum,  MMR intact- NGS Negative for KRAS, NRAS and BRAF. PD L1 25%.  Baseline CEA 10.  Stage IV disease with pulmonary and bony mets      HISTORY OF ONCOLOGY ILLNESS:    She has had intermittent hematochezia and anorectal irritation since 2011, that have been managed as \"hemorrhoids\". These symptoms became persistent and progressive since early thi year. Her anorectal pain is managed with Tylenol. Denies fevers, chills, or unintentional weight loss. Does have intermittent urinary and fecal urgency but no incontinence per se.    Diagnostic colonoscopy on 10/18/19 for rectal bleeding showed \"fungating and ulcerated non-obstructing mass was found in the distal rectum <1 cm from the anal verge. The mass was partially circumferential (involving one-half of the lumen circumference). The mass measured four cm in length. In addition, its diameter measured six mm.  Pathology showed invasive moderately differentiated adenocarcinoma with intact mismatch repair protein expression, PD L1 25% and NGS panel showed NO MUTATIONS in KRAS, NRAS and BRAF.      Pelvic MRI 11/2019 found fungating polypoid mid/lower rectal mass with luminal narrowing as well as innumerable enlarged lymph nodes as described above with a conglomerate of suspicious lymph nodes abutting the anterior peritoneal reflection. Stage cT3d N2.     PET 11/2019 found   1. Hypermetabolic mass in the distal rectum, SUV 14 corresponding to the patient's " known rectal cancer.  2. Multiple hypermetabolic pelvic lymph nodes, compatible metastatic disease, SUV 5-6.  3. FDG avid pulmonary nodule measuring 7 mm in the left lower lobe, concerning for metastatic disease. Additional FDG avid pulmonary nodules suspicious for metastatic disease, SUV around 4.  4. Osteoblastic metastatic lesion in the L3 vertebral body, SUV 6.8    She started palliative FOLFOX/Avastin on 11/25/2019.  Cycle #2 given on 12/9/2019.  Cycle #3 scheduled for 12/24/2019.    Because of bone metastatic disease, she was started on Zometa on 11/25/2019.       INTERVAL HISTORY:     She comes in today accompanied by her  and tells me that overall she is doing well.  She tolerated her second cycle of chemotherapy better.  Aloxi and Emend were added as premedication for nausea which she experienced with the first chemotherapy and she tells me that she did not have any nausea with the second cycle of chemotherapy.  Starting day 3 she was also using Compazine and Zofran regularly and Ativan at night.  She did not vomit.  She had cold sensitivity for a few days but denies any neuropathy outside of cold sensitivity.  She mentions that her bowel movements seem better and the bleeding is also less.  She feels a little constipated and is asking if she can take MiraLAX.  Rectal pain is also better and she only takes Tylenol which helps with the pain.  Denies any fevers or infections.  She felt very tired for the first 1 week with chemotherapy but then starts to feel better.  No infections.  No new swellings..     ECOG 1    ROS:  A comprehensive ROS was otherwise neg    I reviewed with her history in epic as below.    PAST MEDICAL HISTORY  Reflux, hemorrhoid, hepatitis B?  Overactive bladder  Active Ambulatory Problems     Diagnosis Date Noted     External hemorrhoids 04/29/2016     Non morbid obesity due to excess calories 04/29/2016     Hepatitis B immune 04/29/2016     Screening for cervical cancer  "04/29/2016     CARDIOVASCULAR SCREENING; LDL GOAL LESS THAN 160 04/29/2016     Gastroesophageal reflux disease 04/29/2016     Overactive bladder 07/21/2017     Nevus of left lower leg- undetermined behavior 12/27/2018     Metastasis from rectal cancer (H) 11/13/2019     Bone metastasis (H) 11/13/2019     Nausea 11/27/2019     Resolved Ambulatory Problems     Diagnosis Date Noted     Urgency of urination 04/29/2016     Need for vaccination against Streptococcus pneumoniae 04/29/2016     Morbid obesity (H) 07/21/2017     Obesity (BMI 35.0-39.9) with comorbidity (H) 12/27/2018     No Additional Past Medical History       MEDICATIONS:  Current Outpatient Medications   Medication     Ascorbic Acid (VITAMIN C) 500 MG CAPS     B Complex Vitamins (B COMPLEX-B12 PO)     cholecalciferol (VITAMIN D3) 400 unit (10 mcg) TABS tablet     ENOVARX-LIDOCAINE HCL 5 % cream     Fesoterodine Fumarate 8 MG TB24     hydrocortisone (ANUSOL-HC) 2.5 % cream     lidocaine-prilocaine (EMLA) 2.5-2.5 % external cream     LORazepam (ATIVAN) 0.5 MG tablet     Lutein 20 MG CAPS     mirabegron (MYRBETRIQ) 50 MG 24 hr tablet     Multiple vitamin TABS     omeprazole (PRILOSEC) 20 MG DR capsule     ondansetron (ZOFRAN) 8 MG tablet     prochlorperazine (COMPAZINE) 10 MG tablet     solifenacin (VESICARE) 10 MG tablet     trospium (SANCTURA XR) 60 MG CP24 24 hr capsule     docusate sodium (COLACE) 100 MG capsule     Current Facility-Administered Medications   Medication     lidocaine (XYLOCAINE) 5 % ointment     ALLERGY:     Allergies   Allergen Reactions     Penicillins      Sulfa Drugs             SOCIAL HISTORY:  [place] is a [place].  Denies smoking denies alcohol abuse.  2 of her sons are doctors.    FAMILY HISTORY:   Denies any family history of cancer.            PHYSICAL EXAMINATION:     /75 (BP Location: Right arm)   Pulse 81   Temp 97.5  F (36.4  C) (Oral)   Resp 18   Ht 1.495 m (4' 10.86\")   Wt 74.3 kg (163 lb " 12.8 oz)   LMP 11/27/2001 (Approximate)   SpO2 97%   BMI 33.24 kg/m        Wt Readings from Last 4 Encounters:   12/17/19 74.3 kg (163 lb 12.8 oz)   12/09/19 73.5 kg (162 lb 1.6 oz)   11/25/19 75.1 kg (165 lb 9.6 oz)   11/13/19 76.2 kg (168 lb)     CONSTITUTIONAL: no acute distress  EYES: PERRLA, no palor or icterus.   ENT/MOUTH: no mouth lesions. Ears normal  CVS: s1s2 no m r g .   RESPIRATORY: clear to auscultation b/l  GI: soft non tender no hepatosplenomegaly  NEURO: AAOX3  Grossly non focal neuro exam  INTEGUMENT: no obvious rashes.  Port site is clean.  LYMPHATIC: no palpable cervical, supraclavicular, axillary or inguinal LAD  MUSCULOSKELETAL: Unremarkable. No bony tenderness.   EXTREMITIES: no edema  PSYCH: Mentation, mood and affect are normal. Decision making capacity is intact      LABS/IMAGING/PATHOLOGY:    Reviewed    ASSESSMENT AND PLAN:      Metastatic rectal adenocarcinoma, MSI intact, K-aimee wild type, PDL 1 + 25% with metastasis to the lungs, bones and pelvic lymph nodes.      She started palliative chemotherapy with FOLFOX/Avastin on 11/25/2019.  She understands that the chemotherapy is not curative but palliative in nature.  She has received 2 cycles up until now.  She is tolerating it fairly well apart from excessive tiredness for the first 1 week and nausea which she developed with the first cycle but it improved with second cycle after addition of antinausea medications.  I plan to continue the same chemotherapy but will remove the 5-FU bolus to help with the fatigue.  If she continues to do well then we will repeat his scans after 5 or 6 cycles of chemotherapy.    Bone metastasis.  Started Zometa on 11/25/2019.  I plan to give it to her every 3 months.  Continue calcium and vitamin D.    Nausea.  She is getting Decadron, Aloxi and Emend as premedications.  With this combination she did not have any significant nausea but she continues to take antinausea medications on a scheduled basis so I  want her to start taking antinausea medications on an as-needed basis.    Bowel movements/constipation.  Overall her bowel movements have been better after start of chemotherapy.  She is still a little constipated and is wondering if she can take MiraLAX.  I believe she can take it on an as-needed basis.    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  She does not have any neuropathy outside of cold sensitivity so at this time we will continue to observe.    Discussion regarding health care directive  We discussed that it is important that she completes health care directive which would help in making sure that her wishes are followed about her treatment care in case she is not able to make a decision for herself.  We gave her information regarding that.      Return to clinic on 12/24/2019 for cycle #3 chemotherapy.  Return to clinic 1/7/2020 to see nurse practitioner and chemotherapy.  I will see her back 1/21/2020.    All questions answered.  She is agreeable and comfortable with the plan.    Charline Velazquez MD      Again, thank you for allowing me to participate in the care of your patient.        Sincerely,        Charline Velazquez MD

## 2019-12-17 NOTE — PATIENT INSTRUCTIONS
"Chemo on 12/24    See NP on 1/7/20- chemo    See me 1/21/20 - chemo    Zometa every 3 months- Take calcium at least 1200 mg daily and Vit D3 at least 1000 U daily    Take Miralax once daily as needed for constipation    Take nausea medications ( Zofran every 8hrs, Compazine every 6 hrs and Ativan at night ) ONLY as needed and not continuously. DO NOT USE ZOFRAN FOR 2 days after chemo.    We will remove 5 Follow-up bolus from chemo to help with extreme tiredness which happens after chemo.    Patient Education   Honoring Choices - Your Rights: Making Your Own Health Care Treatment Decisions  [place] Law:  [place] law allows you to inform others of your health care wishes. You have the right to state your wishes or appoint an agent in writing so that others will know what you want if you can't tell them because of illness or injury. The information that follows tells about health care directives and how to prepare them. It does not give every detail of the law.  What is a health care directive?  A health care directive is a written document that informs others of your wishes about health care. It allows you to name a person (\"agent\") to decide for you if you are unable to decide. It also allows you to name an agent if you want someone else to decide for you while you still have capacity. You must be at least 18 years old to make a health care directive.  Why have a health care directive?  A health care directive is important if your attending physician determines you can't communicate your health care choices (because of physical or mental incapacity). It is also important if you wish to have someone else make your health care decisions. In some circumstances, your directive may state that you want someone other than an attending physician to decide when you cannot make your own decisions.  Must I have a health care directive? What happens if I don't have one?  You don't have to have a health care directive. But, " writing one helps to make sure your wishes are followed. You will still receive medical treatment if you don't have a written directive. Health care providers will listen to what people close to you say about your treatment preferences, but the best way to be sure your wishes are followed is to have a health care directive.  How do I make a health care directive?  There are forms for health care directives. You don't have to use a form, but your health care directive must meet the following requirements to be legal:  Be in writing, dated, and state your name.  Be signed by you or someone you authorize to sign for you when you can understand and communicate your health care wishes.  Have your signature verified by a  or two witnesses (notaries and witnesses cannot also be named as agent).  Include the appointment of an agent to make health care decisions for you and/or instructions about the health care choices you wish to make.  Before you prepare or revise your directive, you should discuss your health care wishes with your doctor or other health care provider. Information about where to get health care directive forms is given at the end of this document.  What can I put in a health care directive?  You have many choices of what to put in your health care directive. For example, you may include:  The person you trust as your agent to make health care decisions for you. You can name alternate agents, in case the first agent is unavailable, or joint agents.  Your goals, values, preferences, and cultural beliefs about health care.  The types of medical treatment you would want (or not want).  How you want your agent or agents to decide.  Where you want to receive care.  Instructions about artificial nutrition and hydration.  Mental health treatments that use electroshock therapy or neuroleptic medications.  Instructions if you are pregnant.  Donation of organs, tissues and eyes.    arrangements.  Who you would like as your guardian or conservator if there is a court action.  You may be as specific or as general as you wish. You can choose which issues or treatments to deal with in your health care directive.  Are there any limits to what I can put in my health care directive?  There are some limits about what you can put in your health care directive. For instance:  Your agent must be at least 18 years of age.  Your agent cannot be your health care provider, unless the health care provider is a family member or you give reasons for the naming of the agent in your directive.  You cannot request health care treatment that is outside of reasonable medical practice.  You cannot request assisted suicide.  How long does a health care directive last? Can I change it?  Your health care directive lasts until you change or cancel it. As long as the changes meet the health care directive requirements listed above, you may cancel your directive by any of the following:  A written statement saying you want to cancel it  Destroying it  Telling at least two other people you want to cancel it  Writing a new health care directive.  What should I do with my health care directive after I have signed it?  You should inform others of your health care directive and give people copies of it. You may wish to inform family members, your health care agent or agents, and your health care providers that you have a health care directive. You should give them a copy. It's a good idea to review and update your directive as your needs change. Keep it in a safe place where it is easily found.   We are committed to making your health care wishes known. You may give a copy of your directive to any care team member or bring or mail a copy to any of our locations, and we will keep it in your medical record.  What if I've already prepared a health care document? Is it still good?  Before August 1, 1998, [place] law provided for  several other types of directives, including living herrmann, durable health care epstein of  and mental health declarations. The law changed so people can use one form for all their health care instructions. Forms created before August 1, 1998 are still legal if they followed the law in effect when written. They are also legal if they meet the requirements of the new law (described above). You may want to review any existing documents to make sure they say what you want and meet all requirements.  I prepared my directive in another state. Is it still good?  Health care directives prepared in other states are legal if they meet the requirements of the other state's laws or the [place] requirements. But requests for assisted suicide will not be followed.  What if my health care provider refuses to follow my health care directive?  Your health care provider generally will follow your health care directive, or any instructions from your agent, as long as the health care follows reasonable medical practice. But, you or your agent cannot request treatment that will not help you or which the provider cannot provide. If the provider cannot follow the agent's directions about life-sustaining treatment, the provider must inform the agent. The provider must also document the notice in your medical record. The provider must allow the agency to arrange to transfer you to another provider who will follow the agent's directions.  What if I believe a health care provider has not followed health care directive requirements?  Complaints of this type can be filed with the Office of Health Facility Complaints at [phone] ([facility]) or toll free at [phone].  What if I believe a health plan has not followed health care directive requirements?  Complaints of this type can be filed with the [place] Health Information Clearinghouse at [phone] or toll free at [phone].  How to obtain more information  Ask any  care team member for information, materials or how to register for a free class on advance care planning and creating a health care directive. Or, visit [email or web address].org/choices, or call [phone] or [phone].   Also: [place] Board on Aging's Senior LinkAge Line, 1-223.205.4565. Another health care directive form is at: www.mnaging.  For informational purposes only. Not to replace the advice of your health care provider.  Copyright   2012 [employer]. All rights reserved. [employer]uffed [employer] Wanted 1626 - REV 06/16.

## 2019-12-19 ENCOUNTER — PATIENT OUTREACH (OUTPATIENT)
Dept: ONCOLOGY | Facility: CLINIC | Age: 69
End: 2019-12-19

## 2019-12-19 NOTE — TELEPHONE ENCOUNTER
Next cycle for FOLFOX was faxed to [facility] @ [phone].  FAX confirmed as received.

## 2019-12-24 ENCOUNTER — MEDICAL CORRESPONDENCE (OUTPATIENT)
Dept: HEALTH INFORMATION MANAGEMENT | Facility: CLINIC | Age: 69
End: 2019-12-24

## 2019-12-24 ENCOUNTER — ONCOLOGY VISIT (OUTPATIENT)
Dept: ONCOLOGY | Facility: CLINIC | Age: 69
End: 2019-12-24
Payer: COMMERCIAL

## 2019-12-24 ENCOUNTER — INFUSION THERAPY VISIT (OUTPATIENT)
Dept: INFUSION THERAPY | Facility: CLINIC | Age: 69
End: 2019-12-24
Payer: COMMERCIAL

## 2019-12-24 VITALS
TEMPERATURE: 98.2 F | HEART RATE: 84 BPM | OXYGEN SATURATION: 97 % | RESPIRATION RATE: 16 BRPM | SYSTOLIC BLOOD PRESSURE: 123 MMHG | DIASTOLIC BLOOD PRESSURE: 74 MMHG

## 2019-12-24 DIAGNOSIS — D70.1 CHEMOTHERAPY-INDUCED NEUTROPENIA (H): ICD-10-CM

## 2019-12-24 DIAGNOSIS — C79.51 BONE METASTASIS: ICD-10-CM

## 2019-12-24 DIAGNOSIS — C20 METASTASIS FROM RECTAL CANCER (H): Primary | ICD-10-CM

## 2019-12-24 DIAGNOSIS — T45.1X5A CHEMOTHERAPY-INDUCED NEUTROPENIA (H): ICD-10-CM

## 2019-12-24 DIAGNOSIS — C79.9 METASTASIS FROM RECTAL CANCER (H): ICD-10-CM

## 2019-12-24 DIAGNOSIS — C79.9 METASTASIS FROM RECTAL CANCER (H): Primary | ICD-10-CM

## 2019-12-24 DIAGNOSIS — C20 METASTASIS FROM RECTAL CANCER (H): ICD-10-CM

## 2019-12-24 LAB
ALBUMIN SERPL-MCNC: 3.3 G/DL (ref 3.4–5)
ALBUMIN UR-MCNC: NEGATIVE MG/DL
ALP SERPL-CCNC: 92 U/L (ref 40–150)
ALT SERPL W P-5'-P-CCNC: 28 U/L (ref 0–50)
ANION GAP SERPL CALCULATED.3IONS-SCNC: 4 MMOL/L (ref 3–14)
AST SERPL W P-5'-P-CCNC: 19 U/L (ref 0–45)
BASOPHILS # BLD AUTO: 0.2 10E9/L (ref 0–0.2)
BASOPHILS NFR BLD AUTO: 6 %
BILIRUB SERPL-MCNC: 0.3 MG/DL (ref 0.2–1.3)
BUN SERPL-MCNC: 15 MG/DL (ref 7–30)
CALCIUM SERPL-MCNC: 8.9 MG/DL (ref 8.5–10.1)
CEA SERPL-MCNC: 8.8 UG/L (ref 0–2.5)
CHLORIDE SERPL-SCNC: 107 MMOL/L (ref 94–109)
CO2 SERPL-SCNC: 30 MMOL/L (ref 20–32)
CREAT SERPL-MCNC: 0.67 MG/DL (ref 0.52–1.04)
DIFFERENTIAL METHOD BLD: ABNORMAL
EOSINOPHIL # BLD AUTO: 0.1 10E9/L (ref 0–0.7)
EOSINOPHIL NFR BLD AUTO: 3 %
ERYTHROCYTE [DISTWIDTH] IN BLOOD BY AUTOMATED COUNT: 14.7 % (ref 10–15)
GFR SERPL CREATININE-BSD FRML MDRD: 90 ML/MIN/{1.73_M2}
GLUCOSE SERPL-MCNC: 101 MG/DL (ref 70–99)
HCT VFR BLD AUTO: 39 % (ref 35–47)
HGB BLD-MCNC: 12.5 G/DL (ref 11.7–15.7)
LYMPHOCYTES # BLD AUTO: 2.3 10E9/L (ref 0.8–5.3)
LYMPHOCYTES NFR BLD AUTO: 67 %
MCH RBC QN AUTO: 29.3 PG (ref 26.5–33)
MCHC RBC AUTO-ENTMCNC: 32.1 G/DL (ref 31.5–36.5)
MCV RBC AUTO: 91 FL (ref 78–100)
MONOCYTES # BLD AUTO: 0.2 10E9/L (ref 0–1.3)
MONOCYTES NFR BLD AUTO: 5 %
NEUTROPHILS # BLD AUTO: 0.7 10E9/L (ref 1.6–8.3)
NEUTROPHILS NFR BLD AUTO: 19 %
PLATELET # BLD AUTO: 227 10E9/L (ref 150–450)
PLATELET # BLD EST: ABNORMAL 10*3/UL
POTASSIUM SERPL-SCNC: 4 MMOL/L (ref 3.4–5.3)
PROT SERPL-MCNC: 7 G/DL (ref 6.8–8.8)
RBC # BLD AUTO: 4.27 10E12/L (ref 3.8–5.2)
RBC MORPH BLD: NORMAL
SODIUM SERPL-SCNC: 141 MMOL/L (ref 133–144)
WBC # BLD AUTO: 3.5 10E9/L (ref 4–11)

## 2019-12-24 PROCEDURE — 99207 ZZC NO CHARGE NURSE ONLY: CPT | Performed by: INTERNAL MEDICINE

## 2019-12-24 PROCEDURE — 81003 URINALYSIS AUTO W/O SCOPE: CPT | Performed by: INTERNAL MEDICINE

## 2019-12-24 PROCEDURE — 36591 DRAW BLOOD OFF VENOUS DEVICE: CPT

## 2019-12-24 PROCEDURE — 85025 COMPLETE CBC W/AUTO DIFF WBC: CPT | Performed by: INTERNAL MEDICINE

## 2019-12-24 PROCEDURE — 82378 CARCINOEMBRYONIC ANTIGEN: CPT | Performed by: INTERNAL MEDICINE

## 2019-12-24 PROCEDURE — 80053 COMPREHEN METABOLIC PANEL: CPT | Performed by: INTERNAL MEDICINE

## 2019-12-24 RX ORDER — TRAMADOL HYDROCHLORIDE 50 MG/1
50 TABLET ORAL EVERY 6 HOURS PRN
Qty: 30 TABLET | Refills: 0 | Status: SHIPPED | OUTPATIENT
Start: 2019-12-24

## 2019-12-24 RX ORDER — HEPARIN SODIUM (PORCINE) LOCK FLUSH IV SOLN 100 UNIT/ML 100 UNIT/ML
5 SOLUTION INTRAVENOUS
Status: DISCONTINUED | OUTPATIENT
Start: 2019-12-24 | End: 2019-12-24 | Stop reason: HOSPADM

## 2019-12-24 RX ADMIN — HEPARIN SODIUM (PORCINE) LOCK FLUSH IV SOLN 100 UNIT/ML 5 ML: 100 SOLUTION at 07:37

## 2019-12-24 ASSESSMENT — PAIN SCALES - GENERAL: PAINLEVEL: MODERATE PAIN (4)

## 2019-12-24 NOTE — PROGRESS NOTES
Infusion Nursing Note:  Alannah Wynn presents today for FOLFOX/Avastin- NOT GIVEN.    Patient seen by provider today: No   present during visit today: Not Applicable.    Note: Patient noting worsening back pain, Tylenol helps a little, but it continues to get worse.  Discussed with Dr. Velazquez, rx for Tramadol provided to patient.  Discussed neutropenic precautions with patient and .      Intravenous Access:  Implanted Port.    Treatment Conditions:  Lab Results   Component Value Date    HGB 12.5 12/24/2019     Lab Results   Component Value Date    WBC 3.5 12/24/2019      Lab Results   Component Value Date    ANEU 0.7 12/24/2019     Lab Results   Component Value Date     12/24/2019      Lab Results   Component Value Date     12/24/2019                   Lab Results   Component Value Date    POTASSIUM 4.0 12/24/2019           No results found for: MAG         Lab Results   Component Value Date    CR 0.67 12/24/2019                   Lab Results   Component Value Date    LAUREN 8.9 12/24/2019                Lab Results   Component Value Date    BILITOTAL 0.3 12/24/2019           Lab Results   Component Value Date    ALBUMIN 3.3 12/24/2019                    Lab Results   Component Value Date    ALT 28 12/24/2019           Lab Results   Component Value Date    AST 19 12/24/2019       Results reviewed, labs did NOT meet treatment parameters: ANC <= 1.2.    Discharge Plan:   Patient will return next week for next appointment.   Patient discharged in stable condition accompanied by: wife.  Departure Mode: Ambulatory.    Char Valadez, RN-BSN, PHN, OCN  [facility]

## 2019-12-24 NOTE — PROGRESS NOTES
Power port needle left accessed for treatment. Tolerated port access and draw without complaint. Port site scrubbed with Chloraprep for 30 seconds and allowed to dry completely prior to dressing application. Accessed using sterile technique. Saunemin tubes drawn-Red gel/Green/Purple tubes. Double signed by patient and RN. See documentation flowsheet. Gave patient a specimen cup for urine collection. Cuca Chang, RN, BSN, OCN

## 2019-12-27 DIAGNOSIS — C79.9 METASTASIS FROM RECTAL CANCER (H): ICD-10-CM

## 2019-12-27 DIAGNOSIS — C79.51 BONE METASTASIS: ICD-10-CM

## 2019-12-27 DIAGNOSIS — C20 METASTASIS FROM RECTAL CANCER (H): ICD-10-CM

## 2019-12-27 DIAGNOSIS — D70.1 CHEMOTHERAPY-INDUCED NEUTROPENIA (H): ICD-10-CM

## 2019-12-27 DIAGNOSIS — T45.1X5A CHEMOTHERAPY-INDUCED NEUTROPENIA (H): ICD-10-CM

## 2019-12-27 RX ORDER — PALONOSETRON 0.05 MG/ML
0.25 INJECTION, SOLUTION INTRAVENOUS ONCE
Status: CANCELLED
Start: 2019-12-31

## 2019-12-27 RX ORDER — MEPERIDINE HYDROCHLORIDE 25 MG/ML
25 INJECTION INTRAMUSCULAR; INTRAVENOUS; SUBCUTANEOUS EVERY 30 MIN PRN
Status: CANCELLED | OUTPATIENT
Start: 2019-12-31

## 2019-12-27 RX ORDER — EPINEPHRINE 1 MG/ML
0.3 INJECTION, SOLUTION INTRAMUSCULAR; SUBCUTANEOUS EVERY 5 MIN PRN
Status: CANCELLED | OUTPATIENT
Start: 2019-12-31

## 2019-12-27 RX ORDER — SODIUM CHLORIDE 9 MG/ML
1000 INJECTION, SOLUTION INTRAVENOUS CONTINUOUS PRN
Status: CANCELLED
Start: 2019-12-31

## 2019-12-27 RX ORDER — ALBUTEROL SULFATE 0.83 MG/ML
2.5 SOLUTION RESPIRATORY (INHALATION)
Status: CANCELLED | OUTPATIENT
Start: 2019-12-31

## 2019-12-27 RX ORDER — METHYLPREDNISOLONE SODIUM SUCCINATE 125 MG/2ML
125 INJECTION, POWDER, LYOPHILIZED, FOR SOLUTION INTRAMUSCULAR; INTRAVENOUS
Status: CANCELLED
Start: 2019-12-31

## 2019-12-27 RX ORDER — EPINEPHRINE 0.3 MG/.3ML
0.3 INJECTION SUBCUTANEOUS EVERY 5 MIN PRN
Status: CANCELLED | OUTPATIENT
Start: 2019-12-31

## 2019-12-27 RX ORDER — DIPHENHYDRAMINE HYDROCHLORIDE 50 MG/ML
50 INJECTION INTRAMUSCULAR; INTRAVENOUS
Status: CANCELLED
Start: 2019-12-31

## 2019-12-27 RX ORDER — ALBUTEROL SULFATE 90 UG/1
1-2 AEROSOL, METERED RESPIRATORY (INHALATION)
Status: CANCELLED
Start: 2019-12-31

## 2019-12-27 RX ORDER — NALOXONE HYDROCHLORIDE 0.4 MG/ML
.1-.4 INJECTION, SOLUTION INTRAMUSCULAR; INTRAVENOUS; SUBCUTANEOUS
Status: CANCELLED | OUTPATIENT
Start: 2019-12-31

## 2019-12-27 RX ORDER — LORAZEPAM 2 MG/ML
0.5 INJECTION INTRAMUSCULAR EVERY 4 HOURS PRN
Status: CANCELLED
Start: 2019-12-31

## 2019-12-30 ENCOUNTER — HOSPITAL ENCOUNTER (OUTPATIENT)
Facility: CLINIC | Age: 69
Setting detail: SPECIMEN
Discharge: HOME | End: 2019-12-30
Attending: INTERNAL MEDICINE | Admitting: INTERNAL MEDICINE
Payer: COMMERCIAL

## 2019-12-30 ENCOUNTER — INFUSION THERAPY VISIT (OUTPATIENT)
Dept: INFUSION THERAPY | Facility: CLINIC | Age: 69
End: 2019-12-30
Attending: INTERNAL MEDICINE
Payer: COMMERCIAL

## 2019-12-30 VITALS
SYSTOLIC BLOOD PRESSURE: 115 MMHG | BODY MASS INDEX: 33.45 KG/M2 | TEMPERATURE: 98.2 F | WEIGHT: 164.8 LBS | RESPIRATION RATE: 18 BRPM | DIASTOLIC BLOOD PRESSURE: 76 MMHG | HEART RATE: 78 BPM

## 2019-12-30 DIAGNOSIS — C79.9 METASTASIS FROM RECTAL CANCER (H): ICD-10-CM

## 2019-12-30 DIAGNOSIS — C79.51 BONE METASTASIS: Primary | ICD-10-CM

## 2019-12-30 DIAGNOSIS — C20 METASTASIS FROM RECTAL CANCER (H): ICD-10-CM

## 2019-12-30 DIAGNOSIS — D70.1 CHEMOTHERAPY-INDUCED NEUTROPENIA (H): ICD-10-CM

## 2019-12-30 DIAGNOSIS — T45.1X5A CHEMOTHERAPY-INDUCED NEUTROPENIA (H): ICD-10-CM

## 2019-12-30 LAB
ALBUMIN SERPL-MCNC: 3.3 G/DL (ref 3.4–5)
ALBUMIN UR-MCNC: NEGATIVE MG/DL
ALP SERPL-CCNC: 100 U/L (ref 40–150)
ALT SERPL W P-5'-P-CCNC: 25 U/L (ref 0–50)
ANION GAP SERPL CALCULATED.3IONS-SCNC: 4 MMOL/L (ref 3–14)
AST SERPL W P-5'-P-CCNC: 23 U/L (ref 0–45)
BASOPHILS # BLD AUTO: 0.1 10E9/L (ref 0–0.2)
BASOPHILS NFR BLD AUTO: 1.6 %
BILIRUB SERPL-MCNC: 0.2 MG/DL (ref 0.2–1.3)
BUN SERPL-MCNC: 15 MG/DL (ref 7–30)
CALCIUM SERPL-MCNC: 8.6 MG/DL (ref 8.5–10.1)
CEA SERPL-MCNC: 7.6 UG/L (ref 0–2.5)
CHLORIDE SERPL-SCNC: 106 MMOL/L (ref 94–109)
CO2 SERPL-SCNC: 27 MMOL/L (ref 20–32)
CREAT SERPL-MCNC: 0.66 MG/DL (ref 0.52–1.04)
DIFFERENTIAL METHOD BLD: ABNORMAL
EOSINOPHIL # BLD AUTO: 0 10E9/L (ref 0–0.7)
EOSINOPHIL NFR BLD AUTO: 0.8 %
ERYTHROCYTE [DISTWIDTH] IN BLOOD BY AUTOMATED COUNT: 15.5 % (ref 10–15)
GFR SERPL CREATININE-BSD FRML MDRD: >90 ML/MIN/{1.73_M2}
GLUCOSE SERPL-MCNC: 83 MG/DL (ref 70–99)
HCT VFR BLD AUTO: 39.7 % (ref 35–47)
HGB BLD-MCNC: 12.3 G/DL (ref 11.7–15.7)
IMM GRANULOCYTES # BLD: 0 10E9/L (ref 0–0.4)
IMM GRANULOCYTES NFR BLD: 0 %
LYMPHOCYTES # BLD AUTO: 3 10E9/L (ref 0.8–5.3)
LYMPHOCYTES NFR BLD AUTO: 59.4 %
MCH RBC QN AUTO: 28.5 PG (ref 26.5–33)
MCHC RBC AUTO-ENTMCNC: 31 G/DL (ref 31.5–36.5)
MCV RBC AUTO: 92 FL (ref 78–100)
MONOCYTES # BLD AUTO: 0.6 10E9/L (ref 0–1.3)
MONOCYTES NFR BLD AUTO: 11.5 %
NEUTROPHILS # BLD AUTO: 1.3 10E9/L (ref 1.6–8.3)
NEUTROPHILS NFR BLD AUTO: 26.7 %
NRBC # BLD AUTO: 0 10*3/UL
NRBC BLD AUTO-RTO: 0 /100
PLATELET # BLD AUTO: 344 10E9/L (ref 150–450)
POTASSIUM SERPL-SCNC: 4.1 MMOL/L (ref 3.4–5.3)
PROT SERPL-MCNC: 7.3 G/DL (ref 6.8–8.8)
RBC # BLD AUTO: 4.32 10E12/L (ref 3.8–5.2)
SODIUM SERPL-SCNC: 137 MMOL/L (ref 133–144)
WBC # BLD AUTO: 5 10E9/L (ref 4–11)

## 2019-12-30 PROCEDURE — 25800030 ZZH RX IP 258 OP 636: Performed by: INTERNAL MEDICINE

## 2019-12-30 PROCEDURE — 81003 URINALYSIS AUTO W/O SCOPE: CPT | Performed by: INTERNAL MEDICINE

## 2019-12-30 PROCEDURE — 85025 COMPLETE CBC W/AUTO DIFF WBC: CPT | Performed by: INTERNAL MEDICINE

## 2019-12-30 PROCEDURE — G0498 CHEMO EXTEND IV INFUS W/PUMP: HCPCS

## 2019-12-30 PROCEDURE — 25000128 H RX IP 250 OP 636: Performed by: INTERNAL MEDICINE

## 2019-12-30 PROCEDURE — 96375 TX/PRO/DX INJ NEW DRUG ADDON: CPT

## 2019-12-30 PROCEDURE — 96413 CHEMO IV INFUSION 1 HR: CPT

## 2019-12-30 PROCEDURE — 82378 CARCINOEMBRYONIC ANTIGEN: CPT | Performed by: INTERNAL MEDICINE

## 2019-12-30 PROCEDURE — 96367 TX/PROPH/DG ADDL SEQ IV INF: CPT

## 2019-12-30 PROCEDURE — 96417 CHEMO IV INFUS EACH ADDL SEQ: CPT

## 2019-12-30 PROCEDURE — 80053 COMPREHEN METABOLIC PANEL: CPT | Performed by: INTERNAL MEDICINE

## 2019-12-30 PROCEDURE — 96415 CHEMO IV INFUSION ADDL HR: CPT

## 2019-12-30 RX ORDER — PALONOSETRON 0.05 MG/ML
0.25 INJECTION, SOLUTION INTRAVENOUS ONCE
Status: COMPLETED | OUTPATIENT
Start: 2019-12-30 | End: 2019-12-30

## 2019-12-30 RX ADMIN — SODIUM CHLORIDE 250 ML: 9 INJECTION, SOLUTION INTRAVENOUS at 11:32

## 2019-12-30 RX ADMIN — OXALIPLATIN 150 MG: 100 INJECTION, SOLUTION, CONCENTRATE INTRAVENOUS at 13:18

## 2019-12-30 RX ADMIN — FOSAPREPITANT 150 MG: 150 INJECTION, POWDER, LYOPHILIZED, FOR SOLUTION INTRAVENOUS at 11:32

## 2019-12-30 RX ADMIN — BEVACIZUMAB 400 MG: 400 INJECTION, SOLUTION INTRAVENOUS at 12:41

## 2019-12-30 RX ADMIN — DEXAMETHASONE SODIUM PHOSPHATE: 10 INJECTION, SOLUTION INTRAMUSCULAR; INTRAVENOUS at 11:58

## 2019-12-30 RX ADMIN — DEXTROSE MONOHYDRATE 250 ML: 50 INJECTION, SOLUTION INTRAVENOUS at 13:19

## 2019-12-30 RX ADMIN — PALONOSETRON HYDROCHLORIDE 0.25 MG: 0.25 INJECTION, SOLUTION INTRAVENOUS at 11:31

## 2019-12-30 ASSESSMENT — PAIN SCALES - GENERAL: PAINLEVEL: MODERATE PAIN (5)

## 2019-12-30 NOTE — PROGRESS NOTES
"Infusion Nursing Note:  Alannah Wynn presents today for C3D1 FOLFOX and avastin.    Patient seen by provider today: No   present during visit today: Not Applicable.    Note: NA.    Intravenous Access:  Implanted Port.    Treatment Conditions:  Lab Results   Component Value Date    HGB 12.3 12/30/2019     Lab Results   Component Value Date    WBC 5.0 12/30/2019      Lab Results   Component Value Date    ANEU 1.3 12/30/2019     Lab Results   Component Value Date     12/30/2019      Lab Results   Component Value Date     12/30/2019                   Lab Results   Component Value Date    POTASSIUM 4.1 12/30/2019           No results found for: MAG         Lab Results   Component Value Date    CR 0.66 12/30/2019                   Lab Results   Component Value Date    LAUREN 8.6 12/30/2019                Lab Results   Component Value Date    BILITOTAL 0.2 12/30/2019           Lab Results   Component Value Date    ALBUMIN 3.3 12/30/2019                    Lab Results   Component Value Date    ALT 25 12/30/2019           Lab Results   Component Value Date    AST 23 12/30/2019       Results reviewed, labs MET treatment parameters, ok to proceed with treatment.      Post Infusion Assessment:  Patient tolerated infusion without incident.  Blood return noted pre and post infusion.  Site patent and intact, free from redness, edema or discomfort.  No evidence of extravasations.  Access discontinued per protocol.     Prior to discharge: Port is secured in place with tegaderm and flushed with 10cc NS with positive blood return noted.  Continuous home infusion CADD pump connected.    All connectors secured in place and clamps taped open.    Pump started, \"running\" noted on display (CADD): YES.  Patient instructed to call our clinic or [facility] with any questions or concerns at home.  Patient verbalized understanding.    Patient set up for pump disconnect at our clinic on 1/1.          Discharge " Plan:   Discharge instructions reviewed with: Family.  Patient and/or family verbalized understanding of discharge instructions and all questions answered.  Copy of AVS reviewed with patient and/or family.  Patient will return 1/1/19 for next appointment.  Patient discharged in stable condition accompanied by: .  Departure Mode: Ambulatory.    Ximena Holden RN

## 2020-01-01 ENCOUNTER — ONCOLOGY VISIT (OUTPATIENT)
Dept: ONCOLOGY | Facility: CLINIC | Age: 70
End: 2020-01-01
Payer: COMMERCIAL

## 2020-01-01 ENCOUNTER — OFFICE VISIT (OUTPATIENT)
Dept: ONCOLOGY | Facility: CLINIC | Age: 70
End: 2020-01-01
Payer: COMMERCIAL

## 2020-01-01 ENCOUNTER — HOSPITAL ENCOUNTER (OUTPATIENT)
Facility: CLINIC | Age: 70
Discharge: HOME | End: 2020-10-20
Attending: RADIOLOGY | Admitting: RADIOLOGY
Payer: COMMERCIAL

## 2020-01-01 ENCOUNTER — INFUSION THERAPY VISIT (OUTPATIENT)
Dept: INFUSION THERAPY | Facility: CLINIC | Age: 70
End: 2020-01-01
Payer: COMMERCIAL

## 2020-01-01 ENCOUNTER — PATIENT OUTREACH (OUTPATIENT)
Dept: ONCOLOGY | Facility: CLINIC | Age: 70
End: 2020-01-01

## 2020-01-01 ENCOUNTER — VIRTUAL VISIT (OUTPATIENT)
Dept: ONCOLOGY | Facility: CLINIC | Age: 70
End: 2020-01-01
Attending: INTERNAL MEDICINE
Payer: COMMERCIAL

## 2020-01-01 ENCOUNTER — INFUSION THERAPY VISIT (OUTPATIENT)
Dept: INFUSION THERAPY | Facility: CLINIC | Age: 70
End: 2020-01-01
Attending: INTERNAL MEDICINE
Payer: COMMERCIAL

## 2020-01-01 ENCOUNTER — TELEPHONE (OUTPATIENT)
Dept: ONCOLOGY | Facility: CLINIC | Age: 70
End: 2020-01-01

## 2020-01-01 ENCOUNTER — TELEPHONE (OUTPATIENT)
Dept: ONCOLOGY | Facility: CLINIC | Age: 70
End: 2020-01-01
Payer: COMMERCIAL

## 2020-01-01 ENCOUNTER — VIRTUAL VISIT (OUTPATIENT)
Dept: ONCOLOGY | Facility: CLINIC | Age: 70
End: 2020-01-01
Payer: COMMERCIAL

## 2020-01-01 ENCOUNTER — TELEPHONE (OUTPATIENT)
Dept: PODIATRY | Facility: CLINIC | Age: 70
End: 2020-01-01

## 2020-01-01 ENCOUNTER — ANCILLARY PROCEDURE (OUTPATIENT)
Dept: CT IMAGING | Facility: CLINIC | Age: 70
End: 2020-01-01
Attending: INTERNAL MEDICINE
Payer: COMMERCIAL

## 2020-01-01 ENCOUNTER — OFFICE VISIT (OUTPATIENT)
Dept: PODIATRY | Facility: CLINIC | Age: 70
End: 2020-01-01
Payer: COMMERCIAL

## 2020-01-01 ENCOUNTER — APPOINTMENT (OUTPATIENT)
Dept: INTERVENTIONAL RADIOLOGY/VASCULAR | Facility: CLINIC | Age: 70
End: 2020-01-01
Attending: RADIOLOGY
Payer: COMMERCIAL

## 2020-01-01 ENCOUNTER — TELEPHONE (OUTPATIENT)
Dept: PHARMACY | Facility: CLINIC | Age: 70
End: 2020-01-01

## 2020-01-01 ENCOUNTER — APPOINTMENT (OUTPATIENT)
Dept: ONCOLOGY | Facility: CLINIC | Age: 70
End: 2020-01-01
Payer: COMMERCIAL

## 2020-01-01 VITALS
RESPIRATION RATE: 16 BRPM | HEART RATE: 71 BPM | DIASTOLIC BLOOD PRESSURE: 78 MMHG | TEMPERATURE: 97.4 F | SYSTOLIC BLOOD PRESSURE: 127 MMHG

## 2020-01-01 VITALS
TEMPERATURE: 98.9 F | HEART RATE: 96 BPM | DIASTOLIC BLOOD PRESSURE: 78 MMHG | OXYGEN SATURATION: 99 % | SYSTOLIC BLOOD PRESSURE: 120 MMHG | BODY MASS INDEX: 30.92 KG/M2 | WEIGHT: 159.6 LBS | RESPIRATION RATE: 19 BRPM

## 2020-01-01 VITALS
WEIGHT: 155 LBS | HEART RATE: 79 BPM | OXYGEN SATURATION: 100 % | HEIGHT: 60 IN | SYSTOLIC BLOOD PRESSURE: 114 MMHG | TEMPERATURE: 97.8 F | BODY MASS INDEX: 30.43 KG/M2 | RESPIRATION RATE: 16 BRPM | DIASTOLIC BLOOD PRESSURE: 67 MMHG

## 2020-01-01 VITALS
HEIGHT: 60 IN | DIASTOLIC BLOOD PRESSURE: 84 MMHG | WEIGHT: 160.3 LBS | HEART RATE: 82 BPM | OXYGEN SATURATION: 98 % | BODY MASS INDEX: 31.47 KG/M2 | TEMPERATURE: 97.8 F | SYSTOLIC BLOOD PRESSURE: 130 MMHG

## 2020-01-01 VITALS
TEMPERATURE: 97.4 F | DIASTOLIC BLOOD PRESSURE: 74 MMHG | WEIGHT: 163.9 LBS | OXYGEN SATURATION: 98 % | HEART RATE: 74 BPM | RESPIRATION RATE: 16 BRPM | SYSTOLIC BLOOD PRESSURE: 118 MMHG | BODY MASS INDEX: 29.98 KG/M2

## 2020-01-01 VITALS
TEMPERATURE: 97 F | RESPIRATION RATE: 18 BRPM | WEIGHT: 153.8 LBS | DIASTOLIC BLOOD PRESSURE: 84 MMHG | HEART RATE: 84 BPM | OXYGEN SATURATION: 97 % | SYSTOLIC BLOOD PRESSURE: 157 MMHG | BODY MASS INDEX: 29.8 KG/M2

## 2020-01-01 VITALS — SYSTOLIC BLOOD PRESSURE: 144 MMHG | DIASTOLIC BLOOD PRESSURE: 80 MMHG

## 2020-01-01 VITALS
HEIGHT: 60 IN | HEART RATE: 91 BPM | SYSTOLIC BLOOD PRESSURE: 132 MMHG | BODY MASS INDEX: 31.88 KG/M2 | OXYGEN SATURATION: 98 % | WEIGHT: 162.4 LBS | DIASTOLIC BLOOD PRESSURE: 74 MMHG

## 2020-01-01 VITALS
DIASTOLIC BLOOD PRESSURE: 87 MMHG | SYSTOLIC BLOOD PRESSURE: 150 MMHG | OXYGEN SATURATION: 100 % | HEART RATE: 75 BPM | WEIGHT: 159.8 LBS | BODY MASS INDEX: 29.23 KG/M2 | TEMPERATURE: 97 F

## 2020-01-01 VITALS
WEIGHT: 157.36 LBS | BODY MASS INDEX: 30.49 KG/M2 | OXYGEN SATURATION: 99 % | SYSTOLIC BLOOD PRESSURE: 135 MMHG | TEMPERATURE: 97.8 F | HEART RATE: 80 BPM | DIASTOLIC BLOOD PRESSURE: 77 MMHG

## 2020-01-01 VITALS — OXYGEN SATURATION: 100 % | DIASTOLIC BLOOD PRESSURE: 85 MMHG | HEART RATE: 74 BPM | SYSTOLIC BLOOD PRESSURE: 151 MMHG

## 2020-01-01 VITALS — HEART RATE: 72 BPM | SYSTOLIC BLOOD PRESSURE: 120 MMHG | DIASTOLIC BLOOD PRESSURE: 75 MMHG | OXYGEN SATURATION: 100 %

## 2020-01-01 VITALS — SYSTOLIC BLOOD PRESSURE: 126 MMHG | DIASTOLIC BLOOD PRESSURE: 77 MMHG

## 2020-01-01 VITALS
OXYGEN SATURATION: 99 % | TEMPERATURE: 97.8 F | BODY MASS INDEX: 30.48 KG/M2 | SYSTOLIC BLOOD PRESSURE: 135 MMHG | HEART RATE: 80 BPM | WEIGHT: 157.3 LBS | DIASTOLIC BLOOD PRESSURE: 77 MMHG

## 2020-01-01 VITALS
RESPIRATION RATE: 16 BRPM | DIASTOLIC BLOOD PRESSURE: 84 MMHG | TEMPERATURE: 98 F | WEIGHT: 157.1 LBS | SYSTOLIC BLOOD PRESSURE: 146 MMHG | OXYGEN SATURATION: 99 % | BODY MASS INDEX: 30.44 KG/M2 | HEART RATE: 77 BPM

## 2020-01-01 DIAGNOSIS — L60.0 INGROWN RIGHT BIG TOENAIL: Primary | ICD-10-CM

## 2020-01-01 DIAGNOSIS — C79.51 BONE METASTASIS: ICD-10-CM

## 2020-01-01 DIAGNOSIS — C79.9 METASTASIS FROM RECTAL CANCER (H): ICD-10-CM

## 2020-01-01 DIAGNOSIS — T45.1X5A CHEMOTHERAPY-INDUCED NEUTROPENIA (H): Primary | ICD-10-CM

## 2020-01-01 DIAGNOSIS — C20 METASTASIS FROM RECTAL CANCER (H): ICD-10-CM

## 2020-01-01 DIAGNOSIS — D70.1 CHEMOTHERAPY-INDUCED NEUTROPENIA (H): ICD-10-CM

## 2020-01-01 DIAGNOSIS — D70.1 CHEMOTHERAPY-INDUCED NEUTROPENIA (H): Primary | ICD-10-CM

## 2020-01-01 DIAGNOSIS — C20 METASTASIS FROM RECTAL CANCER (H): Primary | ICD-10-CM

## 2020-01-01 DIAGNOSIS — C79.51 BONE METASTASIS: Primary | ICD-10-CM

## 2020-01-01 DIAGNOSIS — T45.1X5A CHEMOTHERAPY-INDUCED NEUTROPENIA (H): ICD-10-CM

## 2020-01-01 DIAGNOSIS — C79.9 METASTASIS FROM RECTAL CANCER (H): Primary | ICD-10-CM

## 2020-01-01 DIAGNOSIS — Z23 NEED FOR PROPHYLACTIC VACCINATION AND INOCULATION AGAINST INFLUENZA: ICD-10-CM

## 2020-01-01 DIAGNOSIS — L27.1 HAND FOOT SYNDROME: ICD-10-CM

## 2020-01-01 DIAGNOSIS — R11.0 NAUSEA: Primary | ICD-10-CM

## 2020-01-01 DIAGNOSIS — R11.0 NAUSEA: ICD-10-CM

## 2020-01-01 DIAGNOSIS — B99.9 INFECTION: Primary | ICD-10-CM

## 2020-01-01 DIAGNOSIS — L03.031 PARONYCHIA, TOE, RIGHT: Primary | ICD-10-CM

## 2020-01-01 DIAGNOSIS — L08.9 TOE INFECTION: Primary | ICD-10-CM

## 2020-01-01 LAB
ALBUMIN SERPL-MCNC: 3.3 G/DL (ref 3.4–5)
ALBUMIN SERPL-MCNC: 3.4 G/DL (ref 3.4–5)
ALBUMIN SERPL-MCNC: 3.5 G/DL (ref 3.4–5)
ALBUMIN SERPL-MCNC: 3.5 G/DL (ref 3.4–5)
ALBUMIN SERPL-MCNC: 3.6 G/DL (ref 3.4–5)
ALBUMIN SERPL-MCNC: 3.6 G/DL (ref 3.4–5)
ALBUMIN UR-MCNC: 10 MG/DL
ALBUMIN UR-MCNC: NEGATIVE MG/DL
ALP SERPL-CCNC: 111 U/L (ref 40–150)
ALP SERPL-CCNC: 121 U/L (ref 40–150)
ALP SERPL-CCNC: 130 U/L (ref 40–150)
ALP SERPL-CCNC: 61 U/L (ref 40–150)
ALP SERPL-CCNC: 83 U/L (ref 40–150)
ALP SERPL-CCNC: 86 U/L (ref 40–150)
ALP SERPL-CCNC: 86 U/L (ref 40–150)
ALP SERPL-CCNC: 88 U/L (ref 40–150)
ALT SERPL W P-5'-P-CCNC: 118 U/L (ref 0–50)
ALT SERPL W P-5'-P-CCNC: 23 U/L (ref 0–50)
ALT SERPL W P-5'-P-CCNC: 24 U/L (ref 0–50)
ALT SERPL W P-5'-P-CCNC: 31 U/L (ref 0–50)
ALT SERPL W P-5'-P-CCNC: 34 U/L (ref 0–50)
ALT SERPL W P-5'-P-CCNC: 34 U/L (ref 0–50)
ALT SERPL W P-5'-P-CCNC: 52 U/L (ref 0–50)
ALT SERPL W P-5'-P-CCNC: 74 U/L (ref 0–50)
ANION GAP SERPL CALCULATED.3IONS-SCNC: 3 MMOL/L (ref 3–14)
ANION GAP SERPL CALCULATED.3IONS-SCNC: 4 MMOL/L (ref 3–14)
ANION GAP SERPL CALCULATED.3IONS-SCNC: 4 MMOL/L (ref 3–14)
ANION GAP SERPL CALCULATED.3IONS-SCNC: 5 MMOL/L (ref 3–14)
ANION GAP SERPL CALCULATED.3IONS-SCNC: 6 MMOL/L (ref 3–14)
AST SERPL W P-5'-P-CCNC: 22 U/L (ref 0–45)
AST SERPL W P-5'-P-CCNC: 23 U/L (ref 0–45)
AST SERPL W P-5'-P-CCNC: 34 U/L (ref 0–45)
AST SERPL W P-5'-P-CCNC: 59 U/L (ref 0–45)
AST SERPL W P-5'-P-CCNC: 72 U/L (ref 0–45)
AST SERPL W P-5'-P-CCNC: 83 U/L (ref 0–45)
BASOPHILS # BLD AUTO: 0 10E9/L (ref 0–0.2)
BASOPHILS # BLD AUTO: 0.1 10E9/L (ref 0–0.2)
BASOPHILS NFR BLD AUTO: 0.7 %
BASOPHILS NFR BLD AUTO: 0.8 %
BASOPHILS NFR BLD AUTO: 0.9 %
BASOPHILS NFR BLD AUTO: 0.9 %
BASOPHILS NFR BLD AUTO: 1 %
BASOPHILS NFR BLD AUTO: 1 %
BASOPHILS NFR BLD AUTO: 2 %
BILIRUB SERPL-MCNC: 0.3 MG/DL (ref 0.2–1.3)
BILIRUB SERPL-MCNC: 0.4 MG/DL (ref 0.2–1.3)
BILIRUB SERPL-MCNC: 0.4 MG/DL (ref 0.2–1.3)
BILIRUB SERPL-MCNC: 0.5 MG/DL (ref 0.2–1.3)
BUN SERPL-MCNC: 13 MG/DL (ref 7–30)
BUN SERPL-MCNC: 14 MG/DL (ref 7–30)
BUN SERPL-MCNC: 14 MG/DL (ref 7–30)
BUN SERPL-MCNC: 15 MG/DL (ref 7–30)
BUN SERPL-MCNC: 9 MG/DL (ref 7–30)
BUN SERPL-MCNC: 9 MG/DL (ref 7–30)
CALCIUM SERPL-MCNC: 8.3 MG/DL (ref 8.5–10.1)
CALCIUM SERPL-MCNC: 8.4 MG/DL (ref 8.5–10.1)
CALCIUM SERPL-MCNC: 8.5 MG/DL (ref 8.5–10.1)
CALCIUM SERPL-MCNC: 8.5 MG/DL (ref 8.5–10.1)
CALCIUM SERPL-MCNC: 8.6 MG/DL (ref 8.5–10.1)
CEA SERPL-MCNC: 4.8 UG/L (ref 0–2.5)
CEA SERPL-MCNC: 5.1 UG/L (ref 0–2.5)
CEA SERPL-MCNC: 5.3 UG/L (ref 0–2.5)
CEA SERPL-MCNC: 5.7 UG/L (ref 0–2.5)
CEA SERPL-MCNC: 5.7 UG/L (ref 0–2.5)
CEA SERPL-MCNC: 7 UG/L (ref 0–2.5)
CEA SERPL-MCNC: 9.2 UG/L (ref 0–2.5)
CHLORIDE SERPL-SCNC: 106 MMOL/L (ref 94–109)
CHLORIDE SERPL-SCNC: 108 MMOL/L (ref 94–109)
CHLORIDE SERPL-SCNC: 109 MMOL/L (ref 94–109)
CHLORIDE SERPL-SCNC: 110 MMOL/L (ref 94–109)
CHLORIDE SERPL-SCNC: 111 MMOL/L (ref 94–109)
CO2 SERPL-SCNC: 25 MMOL/L (ref 20–32)
CO2 SERPL-SCNC: 26 MMOL/L (ref 20–32)
CO2 SERPL-SCNC: 26 MMOL/L (ref 20–32)
CO2 SERPL-SCNC: 27 MMOL/L (ref 20–32)
CO2 SERPL-SCNC: 28 MMOL/L (ref 20–32)
CO2 SERPL-SCNC: 29 MMOL/L (ref 20–32)
CREAT SERPL-MCNC: 0.56 MG/DL (ref 0.52–1.04)
CREAT SERPL-MCNC: 0.56 MG/DL (ref 0.52–1.04)
CREAT SERPL-MCNC: 0.6 MG/DL (ref 0.52–1.04)
CREAT SERPL-MCNC: 0.62 MG/DL (ref 0.52–1.04)
CREAT SERPL-MCNC: 0.63 MG/DL (ref 0.52–1.04)
CREAT SERPL-MCNC: 0.64 MG/DL (ref 0.52–1.04)
CREAT SERPL-MCNC: 0.67 MG/DL (ref 0.52–1.04)
CREAT SERPL-MCNC: 0.68 MG/DL (ref 0.52–1.04)
DIFFERENTIAL METHOD BLD: ABNORMAL
EOSINOPHIL # BLD AUTO: 0.1 10E9/L (ref 0–0.7)
EOSINOPHIL # BLD AUTO: 0.2 10E9/L (ref 0–0.7)
EOSINOPHIL NFR BLD AUTO: 1.4 %
EOSINOPHIL NFR BLD AUTO: 1.9 %
EOSINOPHIL NFR BLD AUTO: 1.9 %
EOSINOPHIL NFR BLD AUTO: 2 %
EOSINOPHIL NFR BLD AUTO: 2 %
EOSINOPHIL NFR BLD AUTO: 3 %
EOSINOPHIL NFR BLD AUTO: 3.1 %
ERYTHROCYTE [DISTWIDTH] IN BLOOD BY AUTOMATED COUNT: 16.7 % (ref 10–15)
ERYTHROCYTE [DISTWIDTH] IN BLOOD BY AUTOMATED COUNT: 17.4 % (ref 10–15)
ERYTHROCYTE [DISTWIDTH] IN BLOOD BY AUTOMATED COUNT: 17.6 % (ref 10–15)
ERYTHROCYTE [DISTWIDTH] IN BLOOD BY AUTOMATED COUNT: 17.7 % (ref 10–15)
ERYTHROCYTE [DISTWIDTH] IN BLOOD BY AUTOMATED COUNT: 17.9 % (ref 10–15)
ERYTHROCYTE [DISTWIDTH] IN BLOOD BY AUTOMATED COUNT: 18.2 % (ref 10–15)
ERYTHROCYTE [DISTWIDTH] IN BLOOD BY AUTOMATED COUNT: 18.3 % (ref 10–15)
ERYTHROCYTE [DISTWIDTH] IN BLOOD BY AUTOMATED COUNT: 18.4 % (ref 10–15)
ERYTHROCYTE [DISTWIDTH] IN BLOOD BY AUTOMATED COUNT: 18.7 % (ref 10–15)
GFR SERPL CREATININE-BSD FRML MDRD: 89 ML/MIN/{1.73_M2}
GFR SERPL CREATININE-BSD FRML MDRD: 89 ML/MIN/{1.73_M2}
GFR SERPL CREATININE-BSD FRML MDRD: >90 ML/MIN/{1.73_M2}
GLUCOSE SERPL-MCNC: 104 MG/DL (ref 70–99)
GLUCOSE SERPL-MCNC: 107 MG/DL (ref 70–99)
GLUCOSE SERPL-MCNC: 111 MG/DL (ref 70–99)
GLUCOSE SERPL-MCNC: 85 MG/DL (ref 70–99)
GLUCOSE SERPL-MCNC: 88 MG/DL (ref 70–99)
GLUCOSE SERPL-MCNC: 89 MG/DL (ref 70–99)
GLUCOSE SERPL-MCNC: 90 MG/DL (ref 70–99)
GLUCOSE SERPL-MCNC: 91 MG/DL (ref 70–99)
HCT VFR BLD AUTO: 35.3 % (ref 35–47)
HCT VFR BLD AUTO: 35.4 % (ref 35–47)
HCT VFR BLD AUTO: 36 % (ref 35–47)
HCT VFR BLD AUTO: 36.5 % (ref 35–47)
HCT VFR BLD AUTO: 36.5 % (ref 35–47)
HCT VFR BLD AUTO: 37.2 % (ref 35–47)
HCT VFR BLD AUTO: 37.8 % (ref 35–47)
HCT VFR BLD AUTO: 37.8 % (ref 35–47)
HCT VFR BLD AUTO: 38 % (ref 35–47)
HGB BLD-MCNC: 11.7 G/DL (ref 11.7–15.7)
HGB BLD-MCNC: 11.8 G/DL (ref 11.7–15.7)
HGB BLD-MCNC: 11.9 G/DL (ref 11.7–15.7)
HGB BLD-MCNC: 11.9 G/DL (ref 11.7–15.7)
HGB BLD-MCNC: 12 G/DL (ref 11.7–15.7)
HGB BLD-MCNC: 12.3 G/DL (ref 11.7–15.7)
HGB BLD-MCNC: 12.3 G/DL (ref 11.7–15.7)
HGB BLD-MCNC: 12.4 G/DL (ref 11.7–15.7)
HGB BLD-MCNC: 12.5 G/DL (ref 11.7–15.7)
IMM GRANULOCYTES # BLD: 0 10E9/L (ref 0–0.4)
IMM GRANULOCYTES NFR BLD: 0 %
IMM GRANULOCYTES NFR BLD: 0.1 %
IMM GRANULOCYTES NFR BLD: 0.2 %
INR PPP: 0.93 (ref 0.86–1.14)
LABORATORY COMMENT REPORT: NORMAL
LYMPHOCYTES # BLD AUTO: 1.9 10E9/L (ref 0.8–5.3)
LYMPHOCYTES # BLD AUTO: 2 10E9/L (ref 0.8–5.3)
LYMPHOCYTES # BLD AUTO: 2.3 10E9/L (ref 0.8–5.3)
LYMPHOCYTES # BLD AUTO: 2.4 10E9/L (ref 0.8–5.3)
LYMPHOCYTES # BLD AUTO: 2.5 10E9/L (ref 0.8–5.3)
LYMPHOCYTES # BLD AUTO: 2.9 10E9/L (ref 0.8–5.3)
LYMPHOCYTES # BLD AUTO: 3.3 10E9/L (ref 0.8–5.3)
LYMPHOCYTES # BLD AUTO: 3.4 10E9/L (ref 0.8–5.3)
LYMPHOCYTES NFR BLD AUTO: 44.1 %
LYMPHOCYTES NFR BLD AUTO: 45.2 %
LYMPHOCYTES NFR BLD AUTO: 45.5 %
LYMPHOCYTES NFR BLD AUTO: 47.4 %
LYMPHOCYTES NFR BLD AUTO: 49.8 %
LYMPHOCYTES NFR BLD AUTO: 51 %
LYMPHOCYTES NFR BLD AUTO: 56 %
LYMPHOCYTES NFR BLD AUTO: 73 %
MACROCYTES BLD QL SMEAR: PRESENT
MCH RBC QN AUTO: 33.4 PG (ref 26.5–33)
MCH RBC QN AUTO: 33.8 PG (ref 26.5–33)
MCH RBC QN AUTO: 33.8 PG (ref 26.5–33)
MCH RBC QN AUTO: 34.5 PG (ref 26.5–33)
MCH RBC QN AUTO: 34.5 PG (ref 26.5–33)
MCH RBC QN AUTO: 34.9 PG (ref 26.5–33)
MCH RBC QN AUTO: 35.1 PG (ref 26.5–33)
MCH RBC QN AUTO: 35.4 PG (ref 26.5–33)
MCH RBC QN AUTO: 35.6 PG (ref 26.5–33)
MCHC RBC AUTO-ENTMCNC: 32.5 G/DL (ref 31.5–36.5)
MCHC RBC AUTO-ENTMCNC: 32.6 G/DL (ref 31.5–36.5)
MCHC RBC AUTO-ENTMCNC: 32.8 G/DL (ref 31.5–36.5)
MCHC RBC AUTO-ENTMCNC: 32.8 G/DL (ref 31.5–36.5)
MCHC RBC AUTO-ENTMCNC: 32.9 G/DL (ref 31.5–36.5)
MCHC RBC AUTO-ENTMCNC: 32.9 G/DL (ref 31.5–36.5)
MCHC RBC AUTO-ENTMCNC: 33.1 G/DL (ref 31.5–36.5)
MCHC RBC AUTO-ENTMCNC: 33.1 G/DL (ref 31.5–36.5)
MCHC RBC AUTO-ENTMCNC: 33.6 G/DL (ref 31.5–36.5)
MCV RBC AUTO: 102 FL (ref 78–100)
MCV RBC AUTO: 103 FL (ref 78–100)
MCV RBC AUTO: 103 FL (ref 78–100)
MCV RBC AUTO: 104 FL (ref 78–100)
MCV RBC AUTO: 104 FL (ref 78–100)
MCV RBC AUTO: 106 FL (ref 78–100)
MCV RBC AUTO: 106 FL (ref 78–100)
MCV RBC AUTO: 108 FL (ref 78–100)
MCV RBC AUTO: 109 FL (ref 78–100)
MONOCYTES # BLD AUTO: 0.3 10E9/L (ref 0–1.3)
MONOCYTES # BLD AUTO: 0.5 10E9/L (ref 0–1.3)
MONOCYTES # BLD AUTO: 0.7 10E9/L (ref 0–1.3)
MONOCYTES # BLD AUTO: 0.7 10E9/L (ref 0–1.3)
MONOCYTES NFR BLD AUTO: 11.6 %
MONOCYTES NFR BLD AUTO: 11.8 %
MONOCYTES NFR BLD AUTO: 12 %
MONOCYTES NFR BLD AUTO: 15.2 %
MONOCYTES NFR BLD AUTO: 7.6 %
MONOCYTES NFR BLD AUTO: 8 %
MONOCYTES NFR BLD AUTO: 8 %
NEUTROPHILS # BLD AUTO: 1.2 10E9/L (ref 1.6–8.3)
NEUTROPHILS # BLD AUTO: 1.3 10E9/L (ref 1.6–8.3)
NEUTROPHILS # BLD AUTO: 1.4 10E9/L (ref 1.6–8.3)
NEUTROPHILS # BLD AUTO: 1.5 10E9/L (ref 1.6–8.3)
NEUTROPHILS # BLD AUTO: 1.7 10E9/L (ref 1.6–8.3)
NEUTROPHILS # BLD AUTO: 2.3 10E9/L (ref 1.6–8.3)
NEUTROPHILS # BLD AUTO: 2.7 10E9/L (ref 1.6–8.3)
NEUTROPHILS # BLD AUTO: 3 10E9/L (ref 1.6–8.3)
NEUTROPHILS NFR BLD AUTO: 25 %
NEUTROPHILS NFR BLD AUTO: 31.9 %
NEUTROPHILS NFR BLD AUTO: 33 %
NEUTROPHILS NFR BLD AUTO: 34 %
NEUTROPHILS NFR BLD AUTO: 39.8 %
NEUTROPHILS NFR BLD AUTO: 40.4 %
NEUTROPHILS NFR BLD AUTO: 42.8 %
NEUTROPHILS NFR BLD AUTO: 43.6 %
PLATELET # BLD AUTO: 142 10E9/L (ref 150–450)
PLATELET # BLD AUTO: 144 10E9/L (ref 150–450)
PLATELET # BLD AUTO: 157 10E9/L (ref 150–450)
PLATELET # BLD AUTO: 169 10E9/L (ref 150–450)
PLATELET # BLD AUTO: 180 10E9/L (ref 150–450)
PLATELET # BLD AUTO: 184 10E9/L (ref 150–450)
PLATELET # BLD AUTO: 191 10E9/L (ref 150–450)
PLATELET # BLD AUTO: 191 10E9/L (ref 150–450)
PLATELET # BLD AUTO: 221 10E9/L (ref 150–450)
PLATELET # BLD EST: ABNORMAL 10*3/UL
PLATELET # BLD EST: ABNORMAL 10*3/UL
POTASSIUM SERPL-SCNC: 3.8 MMOL/L (ref 3.4–5.3)
POTASSIUM SERPL-SCNC: 3.8 MMOL/L (ref 3.4–5.3)
POTASSIUM SERPL-SCNC: 3.9 MMOL/L (ref 3.4–5.3)
POTASSIUM SERPL-SCNC: 3.9 MMOL/L (ref 3.4–5.3)
POTASSIUM SERPL-SCNC: 4.1 MMOL/L (ref 3.4–5.3)
POTASSIUM SERPL-SCNC: 4.2 MMOL/L (ref 3.4–5.3)
PROT SERPL-MCNC: 6.9 G/DL (ref 6.8–8.8)
PROT SERPL-MCNC: 6.9 G/DL (ref 6.8–8.8)
PROT SERPL-MCNC: 7 G/DL (ref 6.8–8.8)
PROT SERPL-MCNC: 7 G/DL (ref 6.8–8.8)
PROT SERPL-MCNC: 7.2 G/DL (ref 6.8–8.8)
PROT SERPL-MCNC: 7.2 G/DL (ref 6.8–8.8)
PROT SERPL-MCNC: 7.4 G/DL (ref 6.8–8.8)
PROT SERPL-MCNC: 7.5 G/DL (ref 6.8–8.8)
RADIOLOGIST FLAGS: ABNORMAL
RBC # BLD AUTO: 3.33 10E12/L (ref 3.8–5.2)
RBC # BLD AUTO: 3.34 10E12/L (ref 3.8–5.2)
RBC # BLD AUTO: 3.39 10E12/L (ref 3.8–5.2)
RBC # BLD AUTO: 3.39 10E12/L (ref 3.8–5.2)
RBC # BLD AUTO: 3.52 10E12/L (ref 3.8–5.2)
RBC # BLD AUTO: 3.57 10E12/L (ref 3.8–5.2)
RBC # BLD AUTO: 3.59 10E12/L (ref 3.8–5.2)
RBC # BLD AUTO: 3.67 10E12/L (ref 3.8–5.2)
RBC # BLD AUTO: 3.7 10E12/L (ref 3.8–5.2)
RBC MORPH BLD: NORMAL
SARS-COV-2 RNA SPEC QL NAA+PROBE: NEGATIVE
SARS-COV-2 RNA SPEC QL NAA+PROBE: NORMAL
SODIUM SERPL-SCNC: 138 MMOL/L (ref 133–144)
SODIUM SERPL-SCNC: 139 MMOL/L (ref 133–144)
SODIUM SERPL-SCNC: 139 MMOL/L (ref 133–144)
SODIUM SERPL-SCNC: 140 MMOL/L (ref 133–144)
SODIUM SERPL-SCNC: 140 MMOL/L (ref 133–144)
SODIUM SERPL-SCNC: 141 MMOL/L (ref 133–144)
SPECIMEN SOURCE: NORMAL
SPECIMEN SOURCE: NORMAL
WBC # BLD AUTO: 3.8 10E9/L (ref 4–11)
WBC # BLD AUTO: 3.9 10E9/L (ref 4–11)
WBC # BLD AUTO: 4.2 10E9/L (ref 4–11)
WBC # BLD AUTO: 4.3 10E9/L (ref 4–11)
WBC # BLD AUTO: 4.5 10E9/L (ref 4–11)
WBC # BLD AUTO: 4.7 10E9/L (ref 4–11)
WBC # BLD AUTO: 5.7 10E9/L (ref 4–11)
WBC # BLD AUTO: 6.3 10E9/L (ref 4–11)
WBC # BLD AUTO: 6.9 10E9/L (ref 4–11)

## 2020-01-01 PROCEDURE — 82378 CARCINOEMBRYONIC ANTIGEN: CPT | Performed by: INTERNAL MEDICINE

## 2020-01-01 PROCEDURE — 36591 DRAW BLOOD OFF VENOUS DEVICE: CPT

## 2020-01-01 PROCEDURE — 96413 CHEMO IV INFUSION 1 HR: CPT | Performed by: OBSTETRICS & GYNECOLOGY

## 2020-01-01 PROCEDURE — 96417 CHEMO IV INFUS EACH ADDL SEQ: CPT | Performed by: OBSTETRICS & GYNECOLOGY

## 2020-01-01 PROCEDURE — 99207 PR NO CHARGE LOS: CPT

## 2020-01-01 PROCEDURE — 80053 COMPREHEN METABOLIC PANEL: CPT | Performed by: INTERNAL MEDICINE

## 2020-01-01 PROCEDURE — 99214 OFFICE O/P EST MOD 30 MIN: CPT | Mod: 95 | Performed by: INTERNAL MEDICINE

## 2020-01-01 PROCEDURE — 96415 CHEMO IV INFUSION ADDL HR: CPT | Performed by: NURSE PRACTITIONER

## 2020-01-01 PROCEDURE — C1788 PORT, INDWELLING, IMP: HCPCS

## 2020-01-01 PROCEDURE — 81003 URINALYSIS AUTO W/O SCOPE: CPT | Performed by: NURSE PRACTITIONER

## 2020-01-01 PROCEDURE — 96375 TX/PRO/DX INJ NEW DRUG ADDON: CPT | Performed by: NURSE PRACTITIONER

## 2020-01-01 PROCEDURE — 76937 US GUIDE VASCULAR ACCESS: CPT

## 2020-01-01 PROCEDURE — 96415 CHEMO IV INFUSION ADDL HR: CPT | Performed by: OBSTETRICS & GYNECOLOGY

## 2020-01-01 PROCEDURE — 99214 OFFICE O/P EST MOD 30 MIN: CPT | Mod: 25 | Performed by: INTERNAL MEDICINE

## 2020-01-01 PROCEDURE — 81003 URINALYSIS AUTO W/O SCOPE: CPT | Performed by: INTERNAL MEDICINE

## 2020-01-01 PROCEDURE — 99207 ZZC NO CHARGE NURSE ONLY: CPT

## 2020-01-01 PROCEDURE — 272N000602 HC WOUND GLUE CR1

## 2020-01-01 PROCEDURE — 250N000009 HC RX 250: Performed by: PHYSICIAN ASSISTANT

## 2020-01-01 PROCEDURE — 85025 COMPLETE CBC W/AUTO DIFF WBC: CPT | Performed by: INTERNAL MEDICINE

## 2020-01-01 PROCEDURE — 99213 OFFICE O/P EST LOW 20 MIN: CPT | Performed by: PODIATRIST

## 2020-01-01 PROCEDURE — 25800030 ZZH RX IP 258 OP 636: Performed by: NURSE PRACTITIONER

## 2020-01-01 PROCEDURE — 250N000011 HC RX IP 250 OP 636: Performed by: PHYSICIAN ASSISTANT

## 2020-01-01 PROCEDURE — 96375 TX/PRO/DX INJ NEW DRUG ADDON: CPT

## 2020-01-01 PROCEDURE — 71260 CT THORAX DX C+: CPT | Performed by: RADIOLOGY

## 2020-01-01 PROCEDURE — 99207 ZZC NO CHARGE LOS: CPT

## 2020-01-01 PROCEDURE — 96417 CHEMO IV INFUS EACH ADDL SEQ: CPT | Performed by: NURSE PRACTITIONER

## 2020-01-01 PROCEDURE — 96367 TX/PROPH/DG ADDL SEQ IV INF: CPT | Performed by: OBSTETRICS & GYNECOLOGY

## 2020-01-01 PROCEDURE — 74177 CT ABD & PELVIS W/CONTRAST: CPT | Performed by: RADIOLOGY

## 2020-01-01 PROCEDURE — 96413 CHEMO IV INFUSION 1 HR: CPT | Performed by: NURSE PRACTITIONER

## 2020-01-01 PROCEDURE — 96367 TX/PROPH/DG ADDL SEQ IV INF: CPT | Performed by: NURSE PRACTITIONER

## 2020-01-01 PROCEDURE — 999N000163 HC STATISTIC SIMPLE TUBE INSERTION/CHARGE, PORT, CATH, FISTULOGRAM

## 2020-01-01 PROCEDURE — 96377 APPLICATON ON-BODY INJECTOR: CPT

## 2020-01-01 PROCEDURE — 96417 CHEMO IV INFUS EACH ADDL SEQ: CPT | Performed by: INTERNAL MEDICINE

## 2020-01-01 PROCEDURE — 25000128 H RX IP 250 OP 636: Performed by: NURSE PRACTITIONER

## 2020-01-01 PROCEDURE — U0003 INFECTIOUS AGENT DETECTION BY NUCLEIC ACID (DNA OR RNA); SEVERE ACUTE RESPIRATORY SYNDROME CORONAVIRUS 2 (SARS-COV-2) (CORONAVIRUS DISEASE [COVID-19]), AMPLIFIED PROBE TECHNIQUE, MAKING USE OF HIGH THROUGHPUT TECHNOLOGIES AS DESCRIBED BY CMS-2020-01-R: HCPCS | Performed by: INTERNAL MEDICINE

## 2020-01-01 PROCEDURE — 85610 PROTHROMBIN TIME: CPT | Performed by: PHYSICIAN ASSISTANT

## 2020-01-01 PROCEDURE — 96413 CHEMO IV INFUSION 1 HR: CPT | Performed by: INTERNAL MEDICINE

## 2020-01-01 PROCEDURE — 77001 FLUOROGUIDE FOR VEIN DEVICE: CPT

## 2020-01-01 PROCEDURE — 99215 OFFICE O/P EST HI 40 MIN: CPT | Performed by: INTERNAL MEDICINE

## 2020-01-01 PROCEDURE — 85027 COMPLETE CBC AUTOMATED: CPT | Performed by: PHYSICIAN ASSISTANT

## 2020-01-01 PROCEDURE — 96415 CHEMO IV INFUSION ADDL HR: CPT | Performed by: INTERNAL MEDICINE

## 2020-01-01 PROCEDURE — 250N000011 HC RX IP 250 OP 636: Performed by: RADIOLOGY

## 2020-01-01 PROCEDURE — G0008 ADMIN INFLUENZA VIRUS VAC: HCPCS | Performed by: INTERNAL MEDICINE

## 2020-01-01 PROCEDURE — 96374 THER/PROPH/DIAG INJ IV PUSH: CPT

## 2020-01-01 PROCEDURE — 96367 TX/PROPH/DG ADDL SEQ IV INF: CPT | Performed by: INTERNAL MEDICINE

## 2020-01-01 PROCEDURE — 25000128 H RX IP 250 OP 636: Performed by: INTERNAL MEDICINE

## 2020-01-01 PROCEDURE — 36415 COLL VENOUS BLD VENIPUNCTURE: CPT

## 2020-01-01 PROCEDURE — 90662 IIV NO PRSV INCREASED AG IM: CPT | Performed by: INTERNAL MEDICINE

## 2020-01-01 PROCEDURE — 99203 OFFICE O/P NEW LOW 30 MIN: CPT | Performed by: PODIATRIST

## 2020-01-01 PROCEDURE — 272N000196 HC ACCESSORY CR5

## 2020-01-01 PROCEDURE — 36415 COLL VENOUS BLD VENIPUNCTURE: CPT | Performed by: INTERNAL MEDICINE

## 2020-01-01 PROCEDURE — 250N000009 HC RX 250: Performed by: RADIOLOGY

## 2020-01-01 PROCEDURE — 96361 HYDRATE IV INFUSION ADD-ON: CPT

## 2020-01-01 RX ORDER — EPINEPHRINE 1 MG/ML
0.3 INJECTION, SOLUTION INTRAMUSCULAR; SUBCUTANEOUS EVERY 5 MIN PRN
Status: CANCELLED | OUTPATIENT
Start: 2020-01-01

## 2020-01-01 RX ORDER — LORAZEPAM 2 MG/ML
0.5 INJECTION INTRAMUSCULAR EVERY 4 HOURS PRN
Status: CANCELLED
Start: 2020-01-01

## 2020-01-01 RX ORDER — DIPHENHYDRAMINE HYDROCHLORIDE 50 MG/ML
50 INJECTION INTRAMUSCULAR; INTRAVENOUS
Status: CANCELLED
Start: 2020-01-01

## 2020-01-01 RX ORDER — NICOTINE POLACRILEX 4 MG
15-30 LOZENGE BUCCAL
Status: DISCONTINUED | OUTPATIENT
Start: 2020-01-01 | End: 2020-01-01 | Stop reason: HOSPADM

## 2020-01-01 RX ORDER — HEPARIN SODIUM (PORCINE) LOCK FLUSH IV SOLN 100 UNIT/ML 100 UNIT/ML
5 SOLUTION INTRAVENOUS
Status: DISCONTINUED | OUTPATIENT
Start: 2020-01-01 | End: 2020-01-01 | Stop reason: HOSPADM

## 2020-01-01 RX ORDER — HEPARIN SODIUM,PORCINE 10 UNIT/ML
5 VIAL (ML) INTRAVENOUS
Status: CANCELLED | OUTPATIENT
Start: 2020-01-01

## 2020-01-01 RX ORDER — CAPECITABINE 500 MG/1
1500 TABLET, FILM COATED ORAL 2 TIMES DAILY
Qty: 84 TABLET | Refills: 0 | Status: SHIPPED | OUTPATIENT
Start: 2020-01-01 | End: 2021-01-01 | Stop reason: ALTCHOICE

## 2020-01-01 RX ORDER — ALBUTEROL SULFATE 90 UG/1
1-2 AEROSOL, METERED RESPIRATORY (INHALATION)
Status: CANCELLED
Start: 2020-01-01

## 2020-01-01 RX ORDER — ALBUTEROL SULFATE 0.83 MG/ML
2.5 SOLUTION RESPIRATORY (INHALATION)
Status: CANCELLED | OUTPATIENT
Start: 2020-01-01

## 2020-01-01 RX ORDER — NALOXONE HYDROCHLORIDE 0.4 MG/ML
.1-.4 INJECTION, SOLUTION INTRAMUSCULAR; INTRAVENOUS; SUBCUTANEOUS
Status: CANCELLED | OUTPATIENT
Start: 2020-01-01

## 2020-01-01 RX ORDER — CLINDAMYCIN PHOSPHATE 900 MG/50ML
900 INJECTION, SOLUTION INTRAVENOUS
Status: COMPLETED | OUTPATIENT
Start: 2020-01-01 | End: 2020-01-01

## 2020-01-01 RX ORDER — HEPARIN SODIUM (PORCINE) LOCK FLUSH IV SOLN 100 UNIT/ML 100 UNIT/ML
5 SOLUTION INTRAVENOUS
Status: CANCELLED | OUTPATIENT
Start: 2020-01-01

## 2020-01-01 RX ORDER — DEXTROSE MONOHYDRATE 25 G/50ML
25-50 INJECTION, SOLUTION INTRAVENOUS
Status: DISCONTINUED | OUTPATIENT
Start: 2020-01-01 | End: 2020-01-01 | Stop reason: HOSPADM

## 2020-01-01 RX ORDER — FENTANYL CITRATE 50 UG/ML
25-50 INJECTION, SOLUTION INTRAMUSCULAR; INTRAVENOUS EVERY 5 MIN PRN
Status: DISCONTINUED | OUTPATIENT
Start: 2020-01-01 | End: 2020-01-01 | Stop reason: HOSPADM

## 2020-01-01 RX ORDER — SODIUM CHLORIDE 9 MG/ML
1000 INJECTION, SOLUTION INTRAVENOUS CONTINUOUS PRN
Status: CANCELLED
Start: 2020-01-01

## 2020-01-01 RX ORDER — MEPERIDINE HYDROCHLORIDE 25 MG/ML
25 INJECTION INTRAMUSCULAR; INTRAVENOUS; SUBCUTANEOUS EVERY 30 MIN PRN
Status: CANCELLED | OUTPATIENT
Start: 2020-01-01

## 2020-01-01 RX ORDER — METHYLPREDNISOLONE SODIUM SUCCINATE 125 MG/2ML
125 INJECTION, POWDER, LYOPHILIZED, FOR SOLUTION INTRAMUSCULAR; INTRAVENOUS
Status: CANCELLED
Start: 2020-01-01

## 2020-01-01 RX ORDER — LIDOCAINE 40 MG/G
CREAM TOPICAL
Status: DISCONTINUED | OUTPATIENT
Start: 2020-01-01 | End: 2020-01-01 | Stop reason: HOSPADM

## 2020-01-01 RX ORDER — ONDANSETRON 8 MG/1
8 TABLET, FILM COATED ORAL EVERY 8 HOURS PRN
Qty: 30 TABLET | Refills: 4 | Status: SHIPPED | OUTPATIENT
Start: 2020-01-01 | End: 2020-01-01

## 2020-01-01 RX ORDER — LORAZEPAM 0.5 MG/1
0.5 TABLET ORAL EVERY 6 HOURS PRN
Qty: 30 TABLET | Refills: 2 | Status: SHIPPED | OUTPATIENT
Start: 2020-01-01 | End: 2021-01-01

## 2020-01-01 RX ORDER — FLUMAZENIL 0.1 MG/ML
0.2 INJECTION, SOLUTION INTRAVENOUS
Status: DISCONTINUED | OUTPATIENT
Start: 2020-01-01 | End: 2020-01-01 | Stop reason: HOSPADM

## 2020-01-01 RX ORDER — LORAZEPAM 0.5 MG/1
0.5 TABLET ORAL EVERY 4 HOURS PRN
Qty: 30 TABLET | Refills: 2 | Status: SHIPPED | OUTPATIENT
Start: 2020-01-01 | End: 2021-01-01

## 2020-01-01 RX ORDER — HEPARIN SODIUM,PORCINE 10 UNIT/ML
5 VIAL (ML) INTRAVENOUS
Status: DISCONTINUED | OUTPATIENT
Start: 2020-01-01 | End: 2020-01-01 | Stop reason: HOSPADM

## 2020-01-01 RX ORDER — PROCHLORPERAZINE MALEATE 10 MG
10 TABLET ORAL EVERY 6 HOURS PRN
Qty: 30 TABLET | Refills: 2 | Status: SHIPPED | OUTPATIENT
Start: 2020-01-01 | End: 2021-01-01

## 2020-01-01 RX ORDER — ZOLEDRONIC ACID 0.04 MG/ML
4 INJECTION, SOLUTION INTRAVENOUS ONCE
Status: CANCELLED | OUTPATIENT
Start: 2020-01-01

## 2020-01-01 RX ORDER — IOPAMIDOL 755 MG/ML
99 INJECTION, SOLUTION INTRAVASCULAR ONCE
Status: COMPLETED | OUTPATIENT
Start: 2020-01-01 | End: 2020-01-01

## 2020-01-01 RX ORDER — ZOLEDRONIC ACID 0.04 MG/ML
4 INJECTION, SOLUTION INTRAVENOUS ONCE
Status: COMPLETED | OUTPATIENT
Start: 2020-01-01 | End: 2020-01-01

## 2020-01-01 RX ORDER — CAPECITABINE 500 MG/1
1500 TABLET, FILM COATED ORAL 2 TIMES DAILY
Qty: 84 TABLET | Refills: 0 | Status: SHIPPED | OUTPATIENT
Start: 2020-01-01 | End: 2020-01-01

## 2020-01-01 RX ORDER — LORAZEPAM 0.5 MG/1
0.5 TABLET ORAL EVERY 6 HOURS PRN
Qty: 30 TABLET | Refills: 2 | Status: SHIPPED | OUTPATIENT
Start: 2020-01-01 | End: 2020-01-01

## 2020-01-01 RX ORDER — CAPECITABINE 500 MG/1
1500 TABLET, FILM COATED ORAL 2 TIMES DAILY
Qty: 84 TABLET | Refills: 0 | Status: CANCELLED | OUTPATIENT
Start: 2020-01-01 | End: 2020-01-01

## 2020-01-01 RX ORDER — CEPHALEXIN 500 MG/1
500 CAPSULE ORAL 2 TIMES DAILY
Qty: 14 CAPSULE | Refills: 0 | Status: ON HOLD | OUTPATIENT
Start: 2020-01-01 | End: 2020-01-01

## 2020-01-01 RX ORDER — NALOXONE HYDROCHLORIDE 0.4 MG/ML
.1-.4 INJECTION, SOLUTION INTRAMUSCULAR; INTRAVENOUS; SUBCUTANEOUS
Status: DISCONTINUED | OUTPATIENT
Start: 2020-01-01 | End: 2020-01-01 | Stop reason: HOSPADM

## 2020-01-01 RX ORDER — ONDANSETRON 8 MG/1
8 TABLET, FILM COATED ORAL EVERY 8 HOURS PRN
Qty: 30 TABLET | Refills: 4 | Status: SHIPPED | OUTPATIENT
Start: 2020-01-01 | End: 2021-01-01

## 2020-01-01 RX ORDER — ZOLEDRONIC ACID 0.04 MG/ML
4 INJECTION, SOLUTION INTRAVENOUS ONCE
Status: CANCELLED | OUTPATIENT
Start: 2020-01-01 | End: 2020-01-01

## 2020-01-01 RX ORDER — LORAZEPAM 2 MG/ML
0.5 INJECTION INTRAMUSCULAR ONCE
Status: CANCELLED
Start: 2020-01-01

## 2020-01-01 RX ORDER — CAPECITABINE 500 MG/1
1500 TABLET, FILM COATED ORAL 2 TIMES DAILY
Qty: 84 TABLET | Refills: 0 | Status: CANCELLED | OUTPATIENT
Start: 2020-01-01

## 2020-01-01 RX ORDER — HEPARIN SODIUM (PORCINE) LOCK FLUSH IV SOLN 100 UNIT/ML 100 UNIT/ML
500 SOLUTION INTRAVENOUS ONCE
Status: COMPLETED | OUTPATIENT
Start: 2020-01-01 | End: 2020-01-01

## 2020-01-01 RX ORDER — CEPHALEXIN 500 MG/1
500 CAPSULE ORAL 4 TIMES DAILY
Qty: 40 CAPSULE | Refills: 0 | Status: SHIPPED | OUTPATIENT
Start: 2020-01-01 | End: 2020-01-01

## 2020-01-01 RX ADMIN — Medication 250 ML: at 10:25

## 2020-01-01 RX ADMIN — HEPARIN SODIUM (PORCINE) LOCK FLUSH IV SOLN 100 UNIT/ML 500 UNITS: 100 SOLUTION at 09:35

## 2020-01-01 RX ADMIN — HEPARIN SODIUM (PORCINE) LOCK FLUSH IV SOLN 100 UNIT/ML 5 ML: 100 SOLUTION at 14:01

## 2020-01-01 RX ADMIN — Medication 250 ML: at 09:49

## 2020-01-01 RX ADMIN — HEPARIN SODIUM (PORCINE) LOCK FLUSH IV SOLN 100 UNIT/ML 5 ML: 100 SOLUTION at 07:23

## 2020-01-01 RX ADMIN — CLINDAMYCIN IN 5 PERCENT DEXTROSE 900 MG: 18 INJECTION, SOLUTION INTRAVENOUS at 08:48

## 2020-01-01 RX ADMIN — HEPARIN SODIUM (PORCINE) LOCK FLUSH IV SOLN 100 UNIT/ML 5 ML: 100 SOLUTION at 09:28

## 2020-01-01 RX ADMIN — IOPAMIDOL 99 ML: 755 INJECTION, SOLUTION INTRAVASCULAR at 10:57

## 2020-01-01 RX ADMIN — HEPARIN SODIUM (PORCINE) LOCK FLUSH IV SOLN 100 UNIT/ML 5 ML: 100 SOLUTION at 13:42

## 2020-01-01 RX ADMIN — ONDANSETRON: 2 INJECTION INTRAMUSCULAR; INTRAVENOUS at 13:41

## 2020-01-01 RX ADMIN — HEPARIN SODIUM (PORCINE) LOCK FLUSH IV SOLN 100 UNIT/ML 5 ML: 100 SOLUTION at 14:02

## 2020-01-01 RX ADMIN — LORAZEPAM: 2 INJECTION INTRAMUSCULAR; INTRAVENOUS at 14:09

## 2020-01-01 RX ADMIN — HEPARIN SODIUM (PORCINE) LOCK FLUSH IV SOLN 100 UNIT/ML 5 ML: 100 SOLUTION at 13:34

## 2020-01-01 RX ADMIN — PEGFILGRASTIM 6 MG: KIT SUBCUTANEOUS at 13:47

## 2020-01-01 RX ADMIN — HEPARIN SODIUM (PORCINE) LOCK FLUSH IV SOLN 100 UNIT/ML 5 ML: 100 SOLUTION at 12:36

## 2020-01-01 RX ADMIN — SODIUM CHLORIDE 1000 ML: 9 INJECTION, SOLUTION INTRAVENOUS at 13:41

## 2020-01-01 RX ADMIN — HEPARIN SODIUM (PORCINE) LOCK FLUSH IV SOLN 100 UNIT/ML 5 ML: 100 SOLUTION at 14:34

## 2020-01-01 RX ADMIN — LIDOCAINE HYDROCHLORIDE 5 ML: 10; .005 INJECTION, SOLUTION EPIDURAL; INFILTRATION; INTRACAUDAL; PERINEURAL at 09:25

## 2020-01-01 RX ADMIN — MIDAZOLAM HYDROCHLORIDE 0.5 MG: 1 INJECTION, SOLUTION INTRAMUSCULAR; INTRAVENOUS at 09:11

## 2020-01-01 RX ADMIN — FENTANYL CITRATE 50 MCG: 50 INJECTION, SOLUTION INTRAMUSCULAR; INTRAVENOUS at 09:07

## 2020-01-01 RX ADMIN — HEPARIN SODIUM (PORCINE) LOCK FLUSH IV SOLN 100 UNIT/ML 5 ML: 100 SOLUTION at 08:37

## 2020-01-01 RX ADMIN — Medication 250 ML: at 09:53

## 2020-01-01 RX ADMIN — Medication 250 ML: at 09:02

## 2020-01-01 RX ADMIN — HEPARIN SODIUM (PORCINE) LOCK FLUSH IV SOLN 100 UNIT/ML 5 ML: 100 SOLUTION at 17:09

## 2020-01-01 RX ADMIN — Medication 250 ML: at 13:58

## 2020-01-01 RX ADMIN — HEPARIN SODIUM (PORCINE) LOCK FLUSH IV SOLN 100 UNIT/ML 5 ML: 100 SOLUTION at 08:32

## 2020-01-01 RX ADMIN — FENTANYL CITRATE 25 MCG: 50 INJECTION, SOLUTION INTRAMUSCULAR; INTRAVENOUS at 09:11

## 2020-01-01 RX ADMIN — HEPARIN SODIUM (PORCINE) LOCK FLUSH IV SOLN 100 UNIT/ML 5 ML: 100 SOLUTION at 08:10

## 2020-01-01 RX ADMIN — HEPARIN SODIUM (PORCINE) LOCK FLUSH IV SOLN 100 UNIT/ML 5 ML: 100 SOLUTION at 13:03

## 2020-01-01 RX ADMIN — LIDOCAINE HYDROCHLORIDE 18 ML: 10 INJECTION, SOLUTION INFILTRATION; PERINEURAL at 09:26

## 2020-01-01 RX ADMIN — Medication 250 ML: at 10:41

## 2020-01-01 RX ADMIN — ZOLEDRONIC ACID 4 MG: 0.04 INJECTION, SOLUTION INTRAVENOUS at 10:34

## 2020-01-01 RX ADMIN — Medication 250 ML: at 09:00

## 2020-01-01 RX ADMIN — HEPARIN SODIUM (PORCINE) LOCK FLUSH IV SOLN 100 UNIT/ML 5 ML: 100 SOLUTION at 12:55

## 2020-01-01 RX ADMIN — MIDAZOLAM HYDROCHLORIDE 0.5 MG: 1 INJECTION, SOLUTION INTRAMUSCULAR; INTRAVENOUS at 09:13

## 2020-01-01 RX ADMIN — FENTANYL CITRATE 25 MCG: 50 INJECTION, SOLUTION INTRAMUSCULAR; INTRAVENOUS at 09:13

## 2020-01-01 RX ADMIN — HEPARIN SODIUM (PORCINE) LOCK FLUSH IV SOLN 100 UNIT/ML 5 ML: 100 SOLUTION at 12:59

## 2020-01-01 RX ADMIN — MIDAZOLAM HYDROCHLORIDE 1 MG: 1 INJECTION, SOLUTION INTRAMUSCULAR; INTRAVENOUS at 09:06

## 2020-01-01 ASSESSMENT — MIFFLIN-ST. JEOR
SCORE: 1186.89
SCORE: 1177.43
SCORE: 1153.33

## 2020-01-01 ASSESSMENT — PAIN SCALES - GENERAL
PAINLEVEL: NO PAIN (0)
PAINLEVEL: EXTREME PAIN (8)
PAINLEVEL: NO PAIN (1)
PAINLEVEL: MILD PAIN (2)
PAINLEVEL: MILD PAIN (3)
PAINLEVEL: NO PAIN (0)

## 2020-01-01 NOTE — PROGRESS NOTES
Infusion Nursing Note:  Alannah Wynn presents today for pump discharge/onpro/IVF.    Patient seen by provider today: No   present during visit today: Not Applicable.    Note: na.    Intravenous Access:  Peripheral IV placed.  Implanted Port.    Treatment Conditions:  Not Applicable.      Post Infusion Assessment:  Patient tolerated infusion without incident.  Patient tolerated injection without incident.  Site patent and intact, free from redness, edema or discomfort.  No evidence of extravasations.  Access discontinued per protocol.   ONPRO  Was placed on patient's: back of left arm.    Was placed at 1415 PM    ONPRO injector device Lot number: [id number]    Patient education included: what patient can expect after application, what colored lights mean on the device, when to remove device, when and where to call with questions or issues, all patients questions answered, that Neulasta administration will occur at 1720 pm on 1/2/20.     Patient tolerated administration well.    Discharge Plan:   Discharge instructions reviewed with: Patient and .  Patient and/or family verbalized understanding of discharge instructions and all questions answered.  Copy of AVS reviewed with patient and/or family.  Patient will return 1/14/20 for next appointment.  Patient discharged in stable condition accompanied by: .  Departure Mode: Ambulatory.    Carla Christensen RN

## 2020-01-01 NOTE — PATIENT INSTRUCTIONS
Your On-body Neulasta Injector was applied to your left arm at 2:15 pm.  At approximately 5:20 pm on 1/2/20, your On-body Injector will beep to let you know your dose delivery will begin in 2 minutes.  Your medication will be delivered over the next 45 minutes.  You can remove your Injector at 6:30 pm.  Please make sure your Injector has a solid green light or has turned off prior to removing the device.  Please contact your provider at [phone] with questions or concerns.

## 2020-01-07 ENCOUNTER — MEDICAL CORRESPONDENCE (OUTPATIENT)
Dept: HEALTH INFORMATION MANAGEMENT | Facility: CLINIC | Age: 70
End: 2020-01-07

## 2020-01-09 ENCOUNTER — PATIENT OUTREACH (OUTPATIENT)
Dept: ONCOLOGY | Facility: CLINIC | Age: 70
End: 2020-01-09

## 2020-01-09 NOTE — TELEPHONE ENCOUNTER
Faxed chemotherapy orders to [facility] for Cycle #4 (FOLFOX) scheduled for 1/13/2020.  Fax confirmed as received to [phone].

## 2020-01-13 ENCOUNTER — ONCOLOGY VISIT (OUTPATIENT)
Dept: ONCOLOGY | Facility: CLINIC | Age: 70
End: 2020-01-13
Payer: COMMERCIAL

## 2020-01-13 ENCOUNTER — INFUSION THERAPY VISIT (OUTPATIENT)
Dept: INFUSION THERAPY | Facility: CLINIC | Age: 70
End: 2020-01-13
Payer: COMMERCIAL

## 2020-01-13 VITALS
HEIGHT: 59 IN | DIASTOLIC BLOOD PRESSURE: 75 MMHG | WEIGHT: 163.5 LBS | RESPIRATION RATE: 16 BRPM | SYSTOLIC BLOOD PRESSURE: 125 MMHG | HEART RATE: 81 BPM | TEMPERATURE: 97.6 F | BODY MASS INDEX: 32.96 KG/M2 | OXYGEN SATURATION: 98 %

## 2020-01-13 DIAGNOSIS — C79.51 BONE METASTASIS: ICD-10-CM

## 2020-01-13 DIAGNOSIS — G60.8 COLD INDUCED NEUROPATHY: ICD-10-CM

## 2020-01-13 DIAGNOSIS — C79.9 METASTASIS FROM RECTAL CANCER (H): ICD-10-CM

## 2020-01-13 DIAGNOSIS — C20 METASTASIS FROM RECTAL CANCER (H): ICD-10-CM

## 2020-01-13 DIAGNOSIS — T45.1X5A CHEMOTHERAPY-INDUCED NEUTROPENIA (H): Primary | ICD-10-CM

## 2020-01-13 DIAGNOSIS — D70.1 CHEMOTHERAPY-INDUCED NEUTROPENIA (H): Primary | ICD-10-CM

## 2020-01-13 DIAGNOSIS — R11.0 NAUSEA: Primary | ICD-10-CM

## 2020-01-13 DIAGNOSIS — D70.1 CHEMOTHERAPY-INDUCED NEUTROPENIA (H): ICD-10-CM

## 2020-01-13 DIAGNOSIS — T45.1X5A CHEMOTHERAPY-INDUCED NEUTROPENIA (H): ICD-10-CM

## 2020-01-13 LAB
ALBUMIN SERPL-MCNC: 3.4 G/DL (ref 3.4–5)
ALBUMIN UR-MCNC: NEGATIVE MG/DL
ALP SERPL-CCNC: 161 U/L (ref 40–150)
ALT SERPL W P-5'-P-CCNC: 27 U/L (ref 0–50)
ANION GAP SERPL CALCULATED.3IONS-SCNC: 3 MMOL/L (ref 3–14)
AST SERPL W P-5'-P-CCNC: 21 U/L (ref 0–45)
BASOPHILS # BLD AUTO: 0.1 10E9/L (ref 0–0.2)
BASOPHILS NFR BLD AUTO: 0.7 %
BILIRUB SERPL-MCNC: 0.2 MG/DL (ref 0.2–1.3)
BUN SERPL-MCNC: 17 MG/DL (ref 7–30)
CALCIUM SERPL-MCNC: 8.8 MG/DL (ref 8.5–10.1)
CHLORIDE SERPL-SCNC: 109 MMOL/L (ref 94–109)
CO2 SERPL-SCNC: 29 MMOL/L (ref 20–32)
CREAT SERPL-MCNC: 0.61 MG/DL (ref 0.52–1.04)
DIFFERENTIAL METHOD BLD: ABNORMAL
EOSINOPHIL # BLD AUTO: 0.3 10E9/L (ref 0–0.7)
EOSINOPHIL NFR BLD AUTO: 2.3 %
ERYTHROCYTE [DISTWIDTH] IN BLOOD BY AUTOMATED COUNT: 15.7 % (ref 10–15)
GFR SERPL CREATININE-BSD FRML MDRD: >90 ML/MIN/{1.73_M2}
GLUCOSE SERPL-MCNC: 94 MG/DL (ref 70–99)
HCT VFR BLD AUTO: 40.3 % (ref 35–47)
HGB BLD-MCNC: 13 G/DL (ref 11.7–15.7)
IMM GRANULOCYTES # BLD: 0.3 10E9/L (ref 0–0.4)
IMM GRANULOCYTES NFR BLD: 2.3 %
LYMPHOCYTES # BLD AUTO: 3.8 10E9/L (ref 0.8–5.3)
LYMPHOCYTES NFR BLD AUTO: 25.9 %
MCH RBC QN AUTO: 29.5 PG (ref 26.5–33)
MCHC RBC AUTO-ENTMCNC: 32.3 G/DL (ref 31.5–36.5)
MCV RBC AUTO: 92 FL (ref 78–100)
MONOCYTES # BLD AUTO: 0.7 10E9/L (ref 0–1.3)
MONOCYTES NFR BLD AUTO: 5.1 %
NEUTROPHILS # BLD AUTO: 9.3 10E9/L (ref 1.6–8.3)
NEUTROPHILS NFR BLD AUTO: 63.7 %
PLATELET # BLD AUTO: 179 10E9/L (ref 150–450)
POTASSIUM SERPL-SCNC: 4.1 MMOL/L (ref 3.4–5.3)
PROT SERPL-MCNC: 7.2 G/DL (ref 6.8–8.8)
RBC # BLD AUTO: 4.4 10E12/L (ref 3.8–5.2)
SODIUM SERPL-SCNC: 141 MMOL/L (ref 133–144)
WBC # BLD AUTO: 14.6 10E9/L (ref 4–11)

## 2020-01-13 PROCEDURE — 96413 CHEMO IV INFUSION 1 HR: CPT | Performed by: NURSE PRACTITIONER

## 2020-01-13 PROCEDURE — 96417 CHEMO IV INFUS EACH ADDL SEQ: CPT | Performed by: NURSE PRACTITIONER

## 2020-01-13 PROCEDURE — 85025 COMPLETE CBC W/AUTO DIFF WBC: CPT | Performed by: NURSE PRACTITIONER

## 2020-01-13 PROCEDURE — 96375 TX/PRO/DX INJ NEW DRUG ADDON: CPT | Performed by: NURSE PRACTITIONER

## 2020-01-13 PROCEDURE — 99207 ZZC NO CHARGE NURSE ONLY: CPT

## 2020-01-13 PROCEDURE — 80053 COMPREHEN METABOLIC PANEL: CPT | Performed by: NURSE PRACTITIONER

## 2020-01-13 PROCEDURE — 81003 URINALYSIS AUTO W/O SCOPE: CPT | Performed by: NURSE PRACTITIONER

## 2020-01-13 PROCEDURE — 96416 CHEMO PROLONG INFUSE W/PUMP: CPT | Performed by: NURSE PRACTITIONER

## 2020-01-13 PROCEDURE — 99214 OFFICE O/P EST MOD 30 MIN: CPT | Mod: 25 | Performed by: NURSE PRACTITIONER

## 2020-01-13 PROCEDURE — 96367 TX/PROPH/DG ADDL SEQ IV INF: CPT | Performed by: NURSE PRACTITIONER

## 2020-01-13 PROCEDURE — 96415 CHEMO IV INFUSION ADDL HR: CPT | Performed by: NURSE PRACTITIONER

## 2020-01-13 RX ORDER — EPINEPHRINE 1 MG/ML
0.3 INJECTION, SOLUTION INTRAMUSCULAR; SUBCUTANEOUS EVERY 5 MIN PRN
Status: CANCELLED | OUTPATIENT
Start: 2020-01-13

## 2020-01-13 RX ORDER — SODIUM CHLORIDE 9 MG/ML
1000 INJECTION, SOLUTION INTRAVENOUS CONTINUOUS PRN
Status: CANCELLED
Start: 2020-01-13

## 2020-01-13 RX ORDER — LORAZEPAM 2 MG/ML
0.5 INJECTION INTRAMUSCULAR EVERY 4 HOURS PRN
Status: CANCELLED
Start: 2020-01-13

## 2020-01-13 RX ORDER — EPINEPHRINE 0.3 MG/.3ML
0.3 INJECTION SUBCUTANEOUS EVERY 5 MIN PRN
Status: CANCELLED | OUTPATIENT
Start: 2020-01-13

## 2020-01-13 RX ORDER — METHYLPREDNISOLONE SODIUM SUCCINATE 125 MG/2ML
125 INJECTION, POWDER, LYOPHILIZED, FOR SOLUTION INTRAMUSCULAR; INTRAVENOUS
Status: CANCELLED
Start: 2020-01-13

## 2020-01-13 RX ORDER — ALBUTEROL SULFATE 0.83 MG/ML
2.5 SOLUTION RESPIRATORY (INHALATION)
Status: CANCELLED | OUTPATIENT
Start: 2020-01-13

## 2020-01-13 RX ORDER — NALOXONE HYDROCHLORIDE 0.4 MG/ML
.1-.4 INJECTION, SOLUTION INTRAMUSCULAR; INTRAVENOUS; SUBCUTANEOUS
Status: CANCELLED | OUTPATIENT
Start: 2020-01-13

## 2020-01-13 RX ORDER — DIPHENHYDRAMINE HYDROCHLORIDE 50 MG/ML
50 INJECTION INTRAMUSCULAR; INTRAVENOUS
Status: CANCELLED
Start: 2020-01-13

## 2020-01-13 RX ORDER — HEPARIN SODIUM (PORCINE) LOCK FLUSH IV SOLN 100 UNIT/ML 100 UNIT/ML
5 SOLUTION INTRAVENOUS EVERY 8 HOURS
Status: DISCONTINUED | OUTPATIENT
Start: 2020-01-13 | End: 2020-01-13 | Stop reason: HOSPADM

## 2020-01-13 RX ORDER — ALBUTEROL SULFATE 90 UG/1
1-2 AEROSOL, METERED RESPIRATORY (INHALATION)
Status: CANCELLED
Start: 2020-01-13

## 2020-01-13 RX ORDER — PALONOSETRON 0.05 MG/ML
0.25 INJECTION, SOLUTION INTRAVENOUS ONCE
Status: CANCELLED
Start: 2020-01-13

## 2020-01-13 RX ORDER — MEPERIDINE HYDROCHLORIDE 25 MG/ML
25 INJECTION INTRAMUSCULAR; INTRAVENOUS; SUBCUTANEOUS EVERY 30 MIN PRN
Status: CANCELLED | OUTPATIENT
Start: 2020-01-13

## 2020-01-13 RX ORDER — PALONOSETRON 0.05 MG/ML
0.25 INJECTION, SOLUTION INTRAVENOUS ONCE
Status: COMPLETED | OUTPATIENT
Start: 2020-01-13 | End: 2020-01-13

## 2020-01-13 RX ADMIN — HEPARIN SODIUM (PORCINE) LOCK FLUSH IV SOLN 100 UNIT/ML 5 ML: 100 SOLUTION at 07:47

## 2020-01-13 RX ADMIN — PALONOSETRON 0.25 MG: 0.05 INJECTION, SOLUTION INTRAVENOUS at 08:51

## 2020-01-13 RX ADMIN — Medication 250 ML: at 08:42

## 2020-01-13 ASSESSMENT — PAIN SCALES - GENERAL: PAINLEVEL: MILD PAIN (3)

## 2020-01-13 ASSESSMENT — MIFFLIN-ST. JEOR: SCORE: 1170

## 2020-01-13 NOTE — PROGRESS NOTES
Right port accessed with a flat needle, labs drawn from port, urine collected, flushed with saline and heparin, transparent dressing applied, see flow sheet.  Yadira Dumas, RN

## 2020-01-13 NOTE — PROGRESS NOTES
"Infusion Nursing Note:  Alannah Wynn presents today for C4D1 avastin/ folfox.    Patient seen by provider today: Yes: Felipa Garner NP   present during visit today: Not Applicable.    Note: [facility] pump. Disconnect appointment made 46 hours post connection to infuse 4,270mg of fluorouracil in 266.8 mls. Helen Ha RN double checked all pump connections and clamps. CADD pumped showed \"running\" at discharge to home.    Intravenous Access:  Implanted Port.    Treatment Conditions:  Lab Results   Component Value Date    HGB 13.0 01/13/2020     Lab Results   Component Value Date    WBC 14.6 01/13/2020      Lab Results   Component Value Date    ANEU 9.3 01/13/2020     Lab Results   Component Value Date     01/13/2020      Lab Results   Component Value Date     01/13/2020                   Lab Results   Component Value Date    POTASSIUM 4.1 01/13/2020           No results found for: MAG         Lab Results   Component Value Date    CR 0.61 01/13/2020                   Lab Results   Component Value Date    LAUREN 8.8 01/13/2020                Lab Results   Component Value Date    BILITOTAL 0.2 01/13/2020           Lab Results   Component Value Date    ALBUMIN 3.4 01/13/2020                    Lab Results   Component Value Date    ALT 27 01/13/2020           Lab Results   Component Value Date    AST 21 01/13/2020       Results reviewed, labs MET treatment parameters, ok to proceed with treatment.  Urine protein negative.      Post Infusion Assessment:  Patient tolerated infusion without incident.  Blood return noted pre and post infusion.  No evidence of extravasations.  IVAD left in place for 46 hours chemotherapy.       Discharge Plan:   Patient discharged in stable condition accompanied by: .  Departure Mode: Ambulatory.  Patient to return 1/15/2020 for chemotherapy pump disconnect. Alannah has next cycle of chemotherapy scheduled.    Shannan Ferrari RN                        "

## 2020-01-13 NOTE — PROGRESS NOTES
"Oncology Follow Up Visit: January 13, 2020     Oncologist: Dr Rachna Tavarez  PCP: Earline Mehta    Diagnosis: Stage IV Adenocarcinoma of the rectum   Alannah Wynn is a 70 yo female who presented in 10/2019 with intermittent hematochezia and anorectal irritation since 2011, that have been managed as \"hemorrhoids\".  Diagnostic colonoscopy on 10/18/19 for rectal bleeding showed \"fungating and ulcerated non-obstructing mass was found in the distal rectum <1 cm from the anal verge. The mass was partially circumferential (involving one-half of the lumen circumference). The mass measured four cm in length. In addition, its diameter measured six mm.  Pathology showed invasive moderately differentiated adenocarcinoma with intact mismatch repair protein expression.  Pelvic MRI 11/2019 found fungating polypoid mid/lower rectal mass with luminal narrowing as well as innumerable enlarged lymph nodes as described above with a conglomerate of suspicious lymph nodes abutting the anterior peritoneal reflection. Stage cT3d N2.   Further testing with PET scan proved pulmonarymetastasis and bony metastasis at L3.  Treatment:   11/25/2019 to begin treatment with FOLFOX/Avastin    Interval History: Ms. Wynn comes to clinic with  for continuation of treatment for her colon cancer with metastasis with FOLFOX/Avastin- due for cycle 4.Using Aloxi and Emend and noted nausea first week but much better by 2nd week. She continues to eat well with no weight loss. Bowel and bladder have been normal. She has had no bruising or bleeding.  Pt denies recent illness.   Rest of comprehensive and complete ROS is reviewed and is negative.   Past Medical History:   Diagnosis Date     Gastroesophageal reflux disease      Current Outpatient Medications   Medication     Ascorbic Acid (VITAMIN C) 500 MG CAPS     B Complex Vitamins (B COMPLEX-B12 PO)     cholecalciferol (VITAMIN D3) 400 unit (10 mcg) TABS tablet     docusate sodium (COLACE) 100 MG " "capsule     ENOVARX-LIDOCAINE HCL 5 % cream     Fesoterodine Fumarate 8 MG TB24     hydrocortisone (ANUSOL-HC) 2.5 % cream     lidocaine-prilocaine (EMLA) 2.5-2.5 % external cream     LORazepam (ATIVAN) 0.5 MG tablet     Lutein 20 MG CAPS     mirabegron (MYRBETRIQ) 50 MG 24 hr tablet     Multiple vitamin TABS     naloxone (NARCAN) 4 MG/0.1ML nasal spray     omeprazole (PRILOSEC) 20 MG DR capsule     ondansetron (ZOFRAN) 8 MG tablet     prochlorperazine (COMPAZINE) 10 MG tablet     solifenacin (VESICARE) 10 MG tablet     traMADol (ULTRAM) 50 MG tablet     trospium (SANCTURA XR) 60 MG CP24 24 hr capsule     Current Facility-Administered Medications   Medication     lidocaine (XYLOCAINE) 5 % ointment     Allergies   Allergen Reactions     Penicillins      Sulfa Drugs      Physical Exam:/75 (BP Location: Right arm)   Pulse 81   Temp 97.6  F (36.4  C) (Oral)   Resp 16   Ht 1.495 m (4' 10.86\")   Wt 74.2 kg (163 lb 8 oz)   LMP 11/27/2001 (Approximate)   SpO2 98%   BMI 33.18 kg/m     ECOG PS- 0-1  Constitutional: Alert, cooperative, and in no distress.   ENT: Eyes bright, No mouth sores  Neck: Supple, No adenopathy.  Cardiac: Heart rate and rhythm is regular and strong without murmur  Respiratory: Breathing easy. Lung sounds clear to auscultation  GI: Abdomen is soft, rounded, non-tender, BS normal. No masses or organomegaly  MS: Muscle tone normal, extremities normal with no edema. Able to get to exam table with minimal assistance.  Skin: No suspicious lesions or rashes  Neuro: Sensory grossly WNL, gait normal.   Lymph: Normal ant/post cervical, axillary, supraclavicular nodes  Psych: Mentation appears normal and affect is normal.     Laboratory Results:   Results for orders placed or performed in visit on 01/13/20   CBC with platelets differential     Status: Abnormal   Result Value Ref Range    WBC 14.6 (H) 4.0 - 11.0 10e9/L    RBC Count 4.40 3.8 - 5.2 10e12/L    Hemoglobin 13.0 11.7 - 15.7 g/dL    " Hematocrit 40.3 35.0 - 47.0 %    MCV 92 78 - 100 fl    MCH 29.5 26.5 - 33.0 pg    MCHC 32.3 31.5 - 36.5 g/dL    RDW 15.7 (H) 10.0 - 15.0 %    Platelet Count 179 150 - 450 10e9/L    % Neutrophils 63.7 %    % Lymphocytes 25.9 %    % Monocytes 5.1 %    % Eosinophils 2.3 %    % Basophils 0.7 %    % Immature Granulocytes 2.3 %    Absolute Neutrophil 9.3 (H) 1.6 - 8.3 10e9/L    Absolute Lymphocytes 3.8 0.8 - 5.3 10e9/L    Absolute Monocytes 0.7 0.0 - 1.3 10e9/L    Absolute Eosinophils 0.3 0.0 - 0.7 10e9/L    Absolute Basophils 0.1 0.0 - 0.2 10e9/L    Abs Immature Granulocytes 0.3 0 - 0.4 10e9/L    Diff Method Automated Method    Comprehensive metabolic panel     Status: Abnormal   Result Value Ref Range    Sodium 141 133 - 144 mmol/L    Potassium 4.1 3.4 - 5.3 mmol/L    Chloride 109 94 - 109 mmol/L    Carbon Dioxide 29 20 - 32 mmol/L    Anion Gap 3 3 - 14 mmol/L    Glucose 94 70 - 99 mg/dL    Urea Nitrogen 17 7 - 30 mg/dL    Creatinine 0.61 0.52 - 1.04 mg/dL    GFR Estimate >90 >60 mL/min/[1.73_m2]    GFR Estimate If Black >90 >60 mL/min/[1.73_m2]    Calcium 8.8 8.5 - 10.1 mg/dL    Bilirubin Total 0.2 0.2 - 1.3 mg/dL    Albumin 3.4 3.4 - 5.0 g/dL    Protein Total 7.2 6.8 - 8.8 g/dL    Alkaline Phosphatase 161 (H) 40 - 150 U/L    ALT 27 0 - 50 U/L    AST 21 0 - 45 U/L   Protein qualitative urine     Status: None   Result Value Ref Range    Protein Albumin Urine Negative NEG^Negative mg/dL     Assessment and Plan:   Stage IV Adenocarcinoma of the rectum-Pt continues treatment with FOLFOX/Avastin since 11/25/2019.She is now due for cycle 4 and shows that she is tolerating treatment well. Review, lab and exam showing she is can continue with plan as set.    Pt will return in 2 weeks to continue with plan.   Nausea- use of Aloxi and Emend with prednisone for premed. Reviewed taking oral antiemetics early in plan to control residual nausea. Pushing fluids and smaller meals more often can luciano very helpful.   Bone metastasis- pt  started Zometa every 3 months- last given 11/2019. Pt confirms she continues on calcium and vitamin D daily.Reminded regular walks can be helpful.   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   This was a 25 min visit with > 50% in counseling and coordinating care including education and management of concerns.    Felipa Garner,CNP

## 2020-01-13 NOTE — LETTER
"    1/13/2020         RE: Alannah Wynn  [address]  [place] [address]        Dear Colleague,    Thank you for referring your patient, Alannah Wynn, to the [facility]. Please see a copy of my visit note below.    Oncology Follow Up Visit: January 13, 2020     Oncologist: Dr Rachna Tavarez  PCP: Earline Mehta    Diagnosis: Stage IV Adenocarcinoma of the rectum   Alannah Wynn is a 68 yo female who presented in 10/2019 with intermittent hematochezia and anorectal irritation since 2011, that have been managed as \"hemorrhoids\".  Diagnostic colonoscopy on 10/18/19 for rectal bleeding showed \"fungating and ulcerated non-obstructing mass was found in the distal rectum <1 cm from the anal verge. The mass was partially circumferential (involving one-half of the lumen circumference). The mass measured four cm in length. In addition, its diameter measured six mm.  Pathology showed invasive moderately differentiated adenocarcinoma with intact mismatch repair protein expression.  Pelvic MRI 11/2019 found fungating polypoid mid/lower rectal mass with luminal narrowing as well as innumerable enlarged lymph nodes as described above with a conglomerate of suspicious lymph nodes abutting the anterior peritoneal reflection. Stage cT3d N2.   Further testing with PET scan proved pulmonarymetastasis and bony metastasis at L3.  Treatment:   11/25/2019 to begin treatment with FOLFOX/Avastin    Interval History: Ms. Wynn comes to clinic with  for continuation of treatment for her colon cancer with metastasis with FOLFOX/Avastin- due for cycle 4.Using Aloxi and Emend and noted nausea first week but much better by 2nd week. She continues to eat well with no weight loss. Bowel and bladder have been normal. She has had no bruising or bleeding.  Pt denies recent illness.   Rest of comprehensive and complete ROS is reviewed and is negative.   Past Medical History:   Diagnosis Date     " "Gastroesophageal reflux disease      Current Outpatient Medications   Medication     Ascorbic Acid (VITAMIN C) 500 MG CAPS     B Complex Vitamins (B COMPLEX-B12 PO)     cholecalciferol (VITAMIN D3) 400 unit (10 mcg) TABS tablet     docusate sodium (COLACE) 100 MG capsule     ENOVARX-LIDOCAINE HCL 5 % cream     Fesoterodine Fumarate 8 MG TB24     hydrocortisone (ANUSOL-HC) 2.5 % cream     lidocaine-prilocaine (EMLA) 2.5-2.5 % external cream     LORazepam (ATIVAN) 0.5 MG tablet     Lutein 20 MG CAPS     mirabegron (MYRBETRIQ) 50 MG 24 hr tablet     Multiple vitamin TABS     naloxone (NARCAN) 4 MG/0.1ML nasal spray     omeprazole (PRILOSEC) 20 MG DR capsule     ondansetron (ZOFRAN) 8 MG tablet     prochlorperazine (COMPAZINE) 10 MG tablet     solifenacin (VESICARE) 10 MG tablet     traMADol (ULTRAM) 50 MG tablet     trospium (SANCTURA XR) 60 MG CP24 24 hr capsule     Current Facility-Administered Medications   Medication     lidocaine (XYLOCAINE) 5 % ointment     Allergies   Allergen Reactions     Penicillins      Sulfa Drugs      Physical Exam:/75 (BP Location: Right arm)   Pulse 81   Temp 97.6  F (36.4  C) (Oral)   Resp 16   Ht 1.495 m (4' 10.86\")   Wt 74.2 kg (163 lb 8 oz)   LMP 11/27/2001 (Approximate)   SpO2 98%   BMI 33.18 kg/m      ECOG PS- 0-1  Constitutional: Alert, cooperative, and in no distress.   ENT: Eyes bright, No mouth sores  Neck: Supple, No adenopathy.  Cardiac: Heart rate and rhythm is regular and strong without murmur  Respiratory: Breathing easy. Lung sounds clear to auscultation  GI: Abdomen is soft, rounded, non-tender, BS normal. No masses or organomegaly  MS: Muscle tone normal, extremities normal with no edema. Able to get to exam table with minimal assistance.  Skin: No suspicious lesions or rashes  Neuro: Sensory grossly WNL, gait normal.   Lymph: Normal ant/post cervical, axillary, supraclavicular nodes  Psych: Mentation appears normal and affect is normal.     Laboratory " Results:   Results for orders placed or performed in visit on 01/13/20   CBC with platelets differential     Status: Abnormal   Result Value Ref Range    WBC 14.6 (H) 4.0 - 11.0 10e9/L    RBC Count 4.40 3.8 - 5.2 10e12/L    Hemoglobin 13.0 11.7 - 15.7 g/dL    Hematocrit 40.3 35.0 - 47.0 %    MCV 92 78 - 100 fl    MCH 29.5 26.5 - 33.0 pg    MCHC 32.3 31.5 - 36.5 g/dL    RDW 15.7 (H) 10.0 - 15.0 %    Platelet Count 179 150 - 450 10e9/L    % Neutrophils 63.7 %    % Lymphocytes 25.9 %    % Monocytes 5.1 %    % Eosinophils 2.3 %    % Basophils 0.7 %    % Immature Granulocytes 2.3 %    Absolute Neutrophil 9.3 (H) 1.6 - 8.3 10e9/L    Absolute Lymphocytes 3.8 0.8 - 5.3 10e9/L    Absolute Monocytes 0.7 0.0 - 1.3 10e9/L    Absolute Eosinophils 0.3 0.0 - 0.7 10e9/L    Absolute Basophils 0.1 0.0 - 0.2 10e9/L    Abs Immature Granulocytes 0.3 0 - 0.4 10e9/L    Diff Method Automated Method    Comprehensive metabolic panel     Status: Abnormal   Result Value Ref Range    Sodium 141 133 - 144 mmol/L    Potassium 4.1 3.4 - 5.3 mmol/L    Chloride 109 94 - 109 mmol/L    Carbon Dioxide 29 20 - 32 mmol/L    Anion Gap 3 3 - 14 mmol/L    Glucose 94 70 - 99 mg/dL    Urea Nitrogen 17 7 - 30 mg/dL    Creatinine 0.61 0.52 - 1.04 mg/dL    GFR Estimate >90 >60 mL/min/[1.73_m2]    GFR Estimate If Black >90 >60 mL/min/[1.73_m2]    Calcium 8.8 8.5 - 10.1 mg/dL    Bilirubin Total 0.2 0.2 - 1.3 mg/dL    Albumin 3.4 3.4 - 5.0 g/dL    Protein Total 7.2 6.8 - 8.8 g/dL    Alkaline Phosphatase 161 (H) 40 - 150 U/L    ALT 27 0 - 50 U/L    AST 21 0 - 45 U/L   Protein qualitative urine     Status: None   Result Value Ref Range    Protein Albumin Urine Negative NEG^Negative mg/dL     Assessment and Plan:   Stage IV Adenocarcinoma of the rectum-Pt continues treatment with FOLFOX/Avastin since 11/25/2019.She is now due for cycle 4 and shows that she is tolerating treatment well. Review, lab and exam showing she is can continue with plan as set.    Pt will  return in 2 weeks to continue with plan.   Nausea- use of Aloxi and Emend with prednisone for premed. Reviewed taking oral antiemetics early in plan to control residual nausea. Pushing fluids and smaller meals more often can luciano very helpful.   Bone metastasis- pt started Zometa every 3 months- last given 11/2019. Pt confirms she continues on calcium and vitamin D daily.Reminded regular walks can be helpful.   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   This was a 25 min visit with > 50% in counseling and coordinating care including education and management of concerns.    Felipa Garner,CNP    Again, thank you for allowing me to participate in the care of your patient.        Sincerely,        Felipa Garner, LETICIA, APRN CNP

## 2020-01-15 ENCOUNTER — INFUSION THERAPY VISIT (OUTPATIENT)
Dept: INFUSION THERAPY | Facility: CLINIC | Age: 70
End: 2020-01-15
Payer: COMMERCIAL

## 2020-01-15 VITALS
TEMPERATURE: 97.9 F | SYSTOLIC BLOOD PRESSURE: 128 MMHG | RESPIRATION RATE: 20 BRPM | HEART RATE: 99 BPM | DIASTOLIC BLOOD PRESSURE: 82 MMHG | OXYGEN SATURATION: 99 %

## 2020-01-15 DIAGNOSIS — C79.51 BONE METASTASIS: ICD-10-CM

## 2020-01-15 DIAGNOSIS — C20 METASTASIS FROM RECTAL CANCER (H): Primary | ICD-10-CM

## 2020-01-15 DIAGNOSIS — T45.1X5A CHEMOTHERAPY-INDUCED NEUTROPENIA (H): ICD-10-CM

## 2020-01-15 DIAGNOSIS — C79.9 METASTASIS FROM RECTAL CANCER (H): Primary | ICD-10-CM

## 2020-01-15 DIAGNOSIS — D70.1 CHEMOTHERAPY-INDUCED NEUTROPENIA (H): ICD-10-CM

## 2020-01-15 PROCEDURE — 96360 HYDRATION IV INFUSION INIT: CPT | Performed by: INTERNAL MEDICINE

## 2020-01-15 PROCEDURE — 99207 ZZC NO CHARGE LOS: CPT

## 2020-01-15 PROCEDURE — 96377 APPLICATON ON-BODY INJECTOR: CPT | Mod: 59 | Performed by: INTERNAL MEDICINE

## 2020-01-15 RX ORDER — HEPARIN SODIUM (PORCINE) LOCK FLUSH IV SOLN 100 UNIT/ML 100 UNIT/ML
5 SOLUTION INTRAVENOUS ONCE
Status: COMPLETED | OUTPATIENT
Start: 2020-01-15 | End: 2020-01-15

## 2020-01-15 RX ADMIN — Medication 1000 ML: at 11:37

## 2020-01-15 RX ADMIN — HEPARIN SODIUM (PORCINE) LOCK FLUSH IV SOLN 100 UNIT/ML 5 ML: 100 SOLUTION at 12:59

## 2020-01-15 NOTE — PROGRESS NOTES
Infusion Nursing Note:  Alannah Wynn presents today for Pump disconnect/IV Fluids/ONPRO Neulasta.    Patient seen by provider today: No   present during visit today: Not Applicable.    Note: Pt states she has been coughing and sneezing day and night since 5FU pump connected.  She had emesis this morning then took a zofran.  She has had no emesis since but has nausea.  Alannah is very tired and is asking for fluids today.  She was able to take only Ensure and water over the past 46 hours.      Per Dr Velazquez, OK for fluids today and on future pump disconnect days.  Alannah can take Claritin or Benadryl.  She does take Claritin so will try Benadryl.         Intravenous Access:  Implanted Port.    Treatment Conditions:  Not Applicable.      Post Infusion Assessment:  Patient tolerated infusion without incident.  Changed future pump discontinue appointment to longer time to accommodate IV Fluids.       Discharge Plan:    Patient will return 1/27/19 for next appointment.   Patient discharged in stable condition accompanied by: .  Departure Mode: Ambulatory.    Helen Ha RN

## 2020-01-15 NOTE — PATIENT INSTRUCTIONS
We will give you some IV fluid today and will plan for IV fluids on your future pump disconnect days.  If you come and do not need or want the fluids, that is fine.      Regarding the coughing and sneezing during the Fluorouracil, Per Dr Velazquez you can take Claritin daily or Benadryl (diphenhydramine).  If the symptoms get worse, call us.

## 2020-01-17 ENCOUNTER — DOCUMENTATION ONLY (OUTPATIENT)
Dept: OTHER | Facility: CLINIC | Age: 70
End: 2020-01-17

## 2020-01-23 ENCOUNTER — PATIENT OUTREACH (OUTPATIENT)
Dept: ONCOLOGY | Facility: CLINIC | Age: 70
End: 2020-01-23

## 2020-01-23 NOTE — PROGRESS NOTES
Orders for upcoming chemotherapy - FOLFOX were faxed to [facility] for treatment planned on 1/27/20 - cycle #5.   FAX confirmed as received to [phone].

## 2020-01-27 ENCOUNTER — ONCOLOGY VISIT (OUTPATIENT)
Dept: ONCOLOGY | Facility: CLINIC | Age: 70
End: 2020-01-27
Payer: COMMERCIAL

## 2020-01-27 ENCOUNTER — INFUSION THERAPY VISIT (OUTPATIENT)
Dept: INFUSION THERAPY | Facility: CLINIC | Age: 70
End: 2020-01-27
Payer: COMMERCIAL

## 2020-01-27 VITALS
WEIGHT: 162.5 LBS | BODY MASS INDEX: 32.98 KG/M2 | OXYGEN SATURATION: 99 % | TEMPERATURE: 97.6 F | SYSTOLIC BLOOD PRESSURE: 109 MMHG | RESPIRATION RATE: 16 BRPM | HEART RATE: 90 BPM | DIASTOLIC BLOOD PRESSURE: 74 MMHG

## 2020-01-27 DIAGNOSIS — C79.51 BONE METASTASIS: ICD-10-CM

## 2020-01-27 DIAGNOSIS — D70.1 CHEMOTHERAPY-INDUCED NEUTROPENIA (H): Primary | ICD-10-CM

## 2020-01-27 DIAGNOSIS — T45.1X5A CHEMOTHERAPY-INDUCED NEUTROPENIA (H): ICD-10-CM

## 2020-01-27 DIAGNOSIS — C79.9 METASTASIS FROM RECTAL CANCER (H): ICD-10-CM

## 2020-01-27 DIAGNOSIS — C20 METASTASIS FROM RECTAL CANCER (H): ICD-10-CM

## 2020-01-27 DIAGNOSIS — D70.1 CHEMOTHERAPY-INDUCED NEUTROPENIA (H): ICD-10-CM

## 2020-01-27 DIAGNOSIS — C79.51 BONE METASTASIS: Primary | ICD-10-CM

## 2020-01-27 DIAGNOSIS — G60.8 COLD INDUCED NEUROPATHY: Primary | ICD-10-CM

## 2020-01-27 DIAGNOSIS — T45.1X5A CHEMOTHERAPY-INDUCED NEUTROPENIA (H): Primary | ICD-10-CM

## 2020-01-27 LAB
ALBUMIN SERPL-MCNC: 3.4 G/DL (ref 3.4–5)
ALBUMIN UR-MCNC: NEGATIVE MG/DL
ALP SERPL-CCNC: 179 U/L (ref 40–150)
ALT SERPL W P-5'-P-CCNC: 28 U/L (ref 0–50)
ANION GAP SERPL CALCULATED.3IONS-SCNC: 3 MMOL/L (ref 3–14)
AST SERPL W P-5'-P-CCNC: 33 U/L (ref 0–45)
BASOPHILS # BLD AUTO: 0.1 10E9/L (ref 0–0.2)
BASOPHILS NFR BLD AUTO: 0.7 %
BILIRUB SERPL-MCNC: 0.3 MG/DL (ref 0.2–1.3)
BUN SERPL-MCNC: 20 MG/DL (ref 7–30)
CALCIUM SERPL-MCNC: 9.3 MG/DL (ref 8.5–10.1)
CHLORIDE SERPL-SCNC: 107 MMOL/L (ref 94–109)
CO2 SERPL-SCNC: 30 MMOL/L (ref 20–32)
CREAT SERPL-MCNC: 0.73 MG/DL (ref 0.52–1.04)
DIFFERENTIAL METHOD BLD: ABNORMAL
EOSINOPHIL # BLD AUTO: 0.2 10E9/L (ref 0–0.7)
EOSINOPHIL NFR BLD AUTO: 1.4 %
ERYTHROCYTE [DISTWIDTH] IN BLOOD BY AUTOMATED COUNT: 17.4 % (ref 10–15)
GFR SERPL CREATININE-BSD FRML MDRD: 84 ML/MIN/{1.73_M2}
GLUCOSE SERPL-MCNC: 133 MG/DL (ref 70–99)
HCT VFR BLD AUTO: 40.7 % (ref 35–47)
HGB BLD-MCNC: 13 G/DL (ref 11.7–15.7)
IMM GRANULOCYTES # BLD: 0.2 10E9/L (ref 0–0.4)
IMM GRANULOCYTES NFR BLD: 1.3 %
LYMPHOCYTES # BLD AUTO: 3.8 10E9/L (ref 0.8–5.3)
LYMPHOCYTES NFR BLD AUTO: 29.7 %
MCH RBC QN AUTO: 29.8 PG (ref 26.5–33)
MCHC RBC AUTO-ENTMCNC: 31.9 G/DL (ref 31.5–36.5)
MCV RBC AUTO: 93 FL (ref 78–100)
MONOCYTES # BLD AUTO: 0.7 10E9/L (ref 0–1.3)
MONOCYTES NFR BLD AUTO: 5.2 %
NEUTROPHILS # BLD AUTO: 7.9 10E9/L (ref 1.6–8.3)
NEUTROPHILS NFR BLD AUTO: 61.7 %
PLATELET # BLD AUTO: 217 10E9/L (ref 150–450)
POTASSIUM SERPL-SCNC: 5 MMOL/L (ref 3.4–5.3)
PROT SERPL-MCNC: 7.4 G/DL (ref 6.8–8.8)
RBC # BLD AUTO: 4.36 10E12/L (ref 3.8–5.2)
SODIUM SERPL-SCNC: 140 MMOL/L (ref 133–144)
WBC # BLD AUTO: 12.8 10E9/L (ref 4–11)

## 2020-01-27 PROCEDURE — 96375 TX/PRO/DX INJ NEW DRUG ADDON: CPT | Performed by: NURSE PRACTITIONER

## 2020-01-27 PROCEDURE — 36593 DECLOT VASCULAR DEVICE: CPT

## 2020-01-27 PROCEDURE — 96415 CHEMO IV INFUSION ADDL HR: CPT | Performed by: NURSE PRACTITIONER

## 2020-01-27 PROCEDURE — 85025 COMPLETE CBC W/AUTO DIFF WBC: CPT | Performed by: NURSE PRACTITIONER

## 2020-01-27 PROCEDURE — 96413 CHEMO IV INFUSION 1 HR: CPT | Performed by: NURSE PRACTITIONER

## 2020-01-27 PROCEDURE — 36415 COLL VENOUS BLD VENIPUNCTURE: CPT | Performed by: NURSE PRACTITIONER

## 2020-01-27 PROCEDURE — 96417 CHEMO IV INFUS EACH ADDL SEQ: CPT | Performed by: NURSE PRACTITIONER

## 2020-01-27 PROCEDURE — 99207 ZZC NO CHARGE LOS: CPT

## 2020-01-27 PROCEDURE — 99214 OFFICE O/P EST MOD 30 MIN: CPT | Mod: 25 | Performed by: NURSE PRACTITIONER

## 2020-01-27 PROCEDURE — 80053 COMPREHEN METABOLIC PANEL: CPT | Performed by: NURSE PRACTITIONER

## 2020-01-27 PROCEDURE — 81003 URINALYSIS AUTO W/O SCOPE: CPT | Performed by: NURSE PRACTITIONER

## 2020-01-27 PROCEDURE — 96416 CHEMO PROLONG INFUSE W/PUMP: CPT | Performed by: NURSE PRACTITIONER

## 2020-01-27 PROCEDURE — 96367 TX/PROPH/DG ADDL SEQ IV INF: CPT | Performed by: NURSE PRACTITIONER

## 2020-01-27 RX ORDER — ALBUTEROL SULFATE 90 UG/1
1-2 AEROSOL, METERED RESPIRATORY (INHALATION)
Status: CANCELLED
Start: 2020-01-27

## 2020-01-27 RX ORDER — DIPHENHYDRAMINE HYDROCHLORIDE 50 MG/ML
50 INJECTION INTRAMUSCULAR; INTRAVENOUS
Status: CANCELLED
Start: 2020-01-27

## 2020-01-27 RX ORDER — PALONOSETRON 0.05 MG/ML
0.25 INJECTION, SOLUTION INTRAVENOUS ONCE
Status: CANCELLED
Start: 2020-01-27

## 2020-01-27 RX ORDER — PALONOSETRON 0.05 MG/ML
0.25 INJECTION, SOLUTION INTRAVENOUS ONCE
Status: COMPLETED | OUTPATIENT
Start: 2020-01-27 | End: 2020-01-27

## 2020-01-27 RX ORDER — LORAZEPAM 2 MG/ML
0.5 INJECTION INTRAMUSCULAR EVERY 4 HOURS PRN
Status: CANCELLED
Start: 2020-01-27

## 2020-01-27 RX ORDER — ALBUTEROL SULFATE 0.83 MG/ML
2.5 SOLUTION RESPIRATORY (INHALATION)
Status: CANCELLED | OUTPATIENT
Start: 2020-01-27

## 2020-01-27 RX ORDER — EPINEPHRINE 0.3 MG/.3ML
0.3 INJECTION SUBCUTANEOUS EVERY 5 MIN PRN
Status: CANCELLED | OUTPATIENT
Start: 2020-01-27

## 2020-01-27 RX ORDER — MEPERIDINE HYDROCHLORIDE 25 MG/ML
25 INJECTION INTRAMUSCULAR; INTRAVENOUS; SUBCUTANEOUS EVERY 30 MIN PRN
Status: CANCELLED | OUTPATIENT
Start: 2020-01-27

## 2020-01-27 RX ORDER — METHYLPREDNISOLONE SODIUM SUCCINATE 125 MG/2ML
125 INJECTION, POWDER, LYOPHILIZED, FOR SOLUTION INTRAMUSCULAR; INTRAVENOUS
Status: CANCELLED
Start: 2020-01-27

## 2020-01-27 RX ORDER — SODIUM CHLORIDE 9 MG/ML
1000 INJECTION, SOLUTION INTRAVENOUS CONTINUOUS PRN
Status: CANCELLED
Start: 2020-01-27

## 2020-01-27 RX ORDER — NALOXONE HYDROCHLORIDE 0.4 MG/ML
.1-.4 INJECTION, SOLUTION INTRAMUSCULAR; INTRAVENOUS; SUBCUTANEOUS
Status: CANCELLED | OUTPATIENT
Start: 2020-01-27

## 2020-01-27 RX ORDER — EPINEPHRINE 1 MG/ML
0.3 INJECTION, SOLUTION INTRAMUSCULAR; SUBCUTANEOUS EVERY 5 MIN PRN
Status: CANCELLED | OUTPATIENT
Start: 2020-01-27

## 2020-01-27 RX ORDER — HEPARIN SODIUM (PORCINE) LOCK FLUSH IV SOLN 100 UNIT/ML 100 UNIT/ML
5 SOLUTION INTRAVENOUS
Status: DISCONTINUED | OUTPATIENT
Start: 2020-01-27 | End: 2020-01-27 | Stop reason: HOSPADM

## 2020-01-27 RX ADMIN — PALONOSETRON 0.25 MG: 0.05 INJECTION, SOLUTION INTRAVENOUS at 11:17

## 2020-01-27 RX ADMIN — Medication 2 MG: at 09:58

## 2020-01-27 RX ADMIN — HEPARIN SODIUM (PORCINE) LOCK FLUSH IV SOLN 100 UNIT/ML 5 ML: 100 SOLUTION at 09:32

## 2020-01-27 RX ADMIN — Medication 250 ML: at 11:01

## 2020-01-27 ASSESSMENT — PAIN SCALES - GENERAL: PAINLEVEL: MILD PAIN (3)

## 2020-01-27 NOTE — PROGRESS NOTES
"Oncology Follow Up Visit: January 27, 2020     Oncologist: Dr Charline Velazquez  PCP: Earline Mehta    Diagnosis: Stage IV Adenocarcinoma of the rectum   Alannah Wynn is a 68 yo female who presented in 10/2019 with intermittent hematochezia and anorectal irritation since 2011, that have been managed as \"hemorrhoids\".  Diagnostic colonoscopy on 10/18/19 for rectal bleeding showed \"fungating and ulcerated non-obstructing mass was found in the distal rectum <1 cm from the anal verge. The mass was partially circumferential (involving one-half of the lumen circumference). The mass measured four cm in length. In addition, its diameter measured six mm.  Pathology showed invasive moderately differentiated adenocarcinoma with intact mismatch repair protein expression.  Pelvic MRI 11/2019 found fungating polypoid mid/lower rectal mass with luminal narrowing as well as innumerable enlarged lymph nodes as described above with a conglomerate of suspicious lymph nodes abutting the anterior peritoneal reflection. Stage cT3d N2.   Further testing with PET scan proved pulmonarymetastasis and bony metastasis at L3.  Treatment:   11/25/2019 began treatment with FOLFOX/Avastin    Interval History: Ms. Wynn comes to clinic with  for continuation of treatment for her colon cancer with metastasis with FOLFOX/Avastin- due for cycle 5. Pt states she continues with nausea on week 1 of treatment even after pump has been removed and does use the antiemetics but vomited while here on day of pump disconnect and was given ativan with last cycle - has use of Aloxi and emend for premeds and has compazine and zofran for use at home. She is using Ensure first week then is eating well by week 2 and has stable weight. Stools are loose though ostomy - also noted occasional streak of blood in stool. Admits to some weakness felt during first week of treatment but no falls and no recent illness.   Rest of comprehensive and complete ROS is reviewed " and is negative.   Past Medical History:   Diagnosis Date     Gastroesophageal reflux disease      Current Outpatient Medications   Medication     Ascorbic Acid (VITAMIN C) 500 MG CAPS     B Complex Vitamins (B COMPLEX-B12 PO)     cholecalciferol (VITAMIN D3) 400 unit (10 mcg) TABS tablet     docusate sodium (COLACE) 100 MG capsule     ENOVARX-LIDOCAINE HCL 5 % cream     Fesoterodine Fumarate 8 MG TB24     hydrocortisone (ANUSOL-HC) 2.5 % cream     lidocaine-prilocaine (EMLA) 2.5-2.5 % external cream     LORazepam (ATIVAN) 0.5 MG tablet     Lutein 20 MG CAPS     mirabegron (MYRBETRIQ) 50 MG 24 hr tablet     Multiple vitamin TABS     naloxone (NARCAN) 4 MG/0.1ML nasal spray     omeprazole (PRILOSEC) 20 MG DR capsule     ondansetron (ZOFRAN) 8 MG tablet     prochlorperazine (COMPAZINE) 10 MG tablet     solifenacin (VESICARE) 10 MG tablet     traMADol (ULTRAM) 50 MG tablet     trospium (SANCTURA XR) 60 MG CP24 24 hr capsule     Current Facility-Administered Medications   Medication     lidocaine (XYLOCAINE) 5 % ointment     Allergies   Allergen Reactions     Penicillins      Sulfa Drugs      Physical Exam:/74 (BP Location: Right arm, Patient Position: Sitting, Cuff Size: Adult Regular)   Pulse 90   Temp 97.6  F (36.4  C) (Oral)   Resp 16   Wt 73.7 kg (162 lb 8 oz)   LMP 11/27/2001 (Approximate)   SpO2 99%   BMI 32.98 kg/m    ECOG PS- 0-1  Constitutional: Alert, cooperative, and in no distress.   ENT: Eyes bright, No mouth sores  Neck: Supple, No adenopathy.  Cardiac: Heart rate and rhythm is regular and strong without murmur  Respiratory: Breathing easy. Lung sounds clear to auscultation  GI: Abdomen is soft, rounded, non-tender, BS normal. No masses or organomegaly  MS: Muscle tone normal, extremities normal with no edema. Able to get to exam table with minimal assistance.  Skin: No suspicious lesions or rashes  Neuro: Sensory grossly WNL, gait normal.   Lymph: Normal ant/post cervical, axillary,  supraclavicular nodes  Psych: Mentation appears normal and affect is normal.     Laboratory Results:   Results for orders placed or performed in visit on 01/27/20   CBC with platelets differential     Status: Abnormal   Result Value Ref Range    WBC 12.8 (H) 4.0 - 11.0 10e9/L    RBC Count 4.36 3.8 - 5.2 10e12/L    Hemoglobin 13.0 11.7 - 15.7 g/dL    Hematocrit 40.7 35.0 - 47.0 %    MCV 93 78 - 100 fl    MCH 29.8 26.5 - 33.0 pg    MCHC 31.9 31.5 - 36.5 g/dL    RDW 17.4 (H) 10.0 - 15.0 %    Platelet Count 217 150 - 450 10e9/L    Diff Method Automated Method     % Neutrophils 61.7 %    % Lymphocytes 29.7 %    % Monocytes 5.2 %    % Eosinophils 1.4 %    % Basophils 0.7 %    % Immature Granulocytes 1.3 %    Absolute Neutrophil 7.9 1.6 - 8.3 10e9/L    Absolute Lymphocytes 3.8 0.8 - 5.3 10e9/L    Absolute Monocytes 0.7 0.0 - 1.3 10e9/L    Absolute Eosinophils 0.2 0.0 - 0.7 10e9/L    Absolute Basophils 0.1 0.0 - 0.2 10e9/L    Abs Immature Granulocytes 0.2 0 - 0.4 10e9/L   Comprehensive metabolic panel     Status: Abnormal   Result Value Ref Range    Sodium 140 133 - 144 mmol/L    Potassium 5.0 3.4 - 5.3 mmol/L    Chloride 107 94 - 109 mmol/L    Carbon Dioxide 30 20 - 32 mmol/L    Anion Gap 3 3 - 14 mmol/L    Glucose 133 (H) 70 - 99 mg/dL    Urea Nitrogen 20 7 - 30 mg/dL    Creatinine 0.73 0.52 - 1.04 mg/dL    GFR Estimate 84 >60 mL/min/[1.73_m2]    GFR Estimate If Black >90 >60 mL/min/[1.73_m2]    Calcium 9.3 8.5 - 10.1 mg/dL    Bilirubin Total 0.3 0.2 - 1.3 mg/dL    Albumin 3.4 3.4 - 5.0 g/dL    Protein Total 7.4 6.8 - 8.8 g/dL    Alkaline Phosphatase 179 (H) 40 - 150 U/L    ALT 28 0 - 50 U/L    AST 33 0 - 45 U/L   Protein qualitative urine     Status: None   Result Value Ref Range    Protein Albumin Urine Negative NEG^Negative mg/dL     Assessment and Plan:   Stage IV Adenocarcinoma of the rectum-Pt continues treatment with FOLFOX/Avastin since 11/25/2019.She is now due for cycle 5 and is meeting goals to continue with  plan though plan is causing nausea and vomiting in first week. She will continue with plan today  Pt will return in 2 weeks to continue with plan. Imaging to be completed after 6 cycles of the plan.   Nausea- use of Aloxi and Emend with prednisone for premed. Since this is ongoing even with oral antiemetic use- will see if we can add Emend on day of pump disconnect.   Bone metastasis- pt started Zometa every 3 months- last given 11/2019. Confirms she is using calcium and vitamin D daily. Encouraged increased exercise.   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   This was a 25 min visit with > 50% in counseling and coordinating care including education and management of concerns.    Felipa Garner,CNP

## 2020-01-27 NOTE — LETTER
"    1/27/2020         RE: Alannah Wynn  [address]  [place] [address]        Dear Colleague,    Thank you for referring your patient, Alannah Wynn, to the [facility]. Please see a copy of my visit note below.    Oncology Follow Up Visit: January 27, 2020     Oncologist: Dr Charline Velazquez  PCP: Earline Mehta    Diagnosis: Stage IV Adenocarcinoma of the rectum   Alannah Wynn is a 70 yo female who presented in 10/2019 with intermittent hematochezia and anorectal irritation since 2011, that have been managed as \"hemorrhoids\".  Diagnostic colonoscopy on 10/18/19 for rectal bleeding showed \"fungating and ulcerated non-obstructing mass was found in the distal rectum <1 cm from the anal verge. The mass was partially circumferential (involving one-half of the lumen circumference). The mass measured four cm in length. In addition, its diameter measured six mm.  Pathology showed invasive moderately differentiated adenocarcinoma with intact mismatch repair protein expression.  Pelvic MRI 11/2019 found fungating polypoid mid/lower rectal mass with luminal narrowing as well as innumerable enlarged lymph nodes as described above with a conglomerate of suspicious lymph nodes abutting the anterior peritoneal reflection. Stage cT3d N2.   Further testing with PET scan proved pulmonarymetastasis and bony metastasis at L3.  Treatment:   11/25/2019 began treatment with FOLFOX/Avastin    Interval History: Ms. Wynn comes to clinic with  for continuation of treatment for her colon cancer with metastasis with FOLFOX/Avastin- due for cycle 5. Pt states she continues with nausea on week 1 of treatment even after pump has been removed and does use the antiemetics but vomited while here on day of pump disconnect and was given ativan with last cycle - has use of Aloxi and emend for premeds and has compazine and zofran for use at home. She is using Ensure first week then is eating well by week 2 " and has stable weight. Stools are loose though ostomy - also noted occasional streak of blood in stool. Admits to some weakness felt during first week of treatment but no falls and no recent illness.   Rest of comprehensive and complete ROS is reviewed and is negative.   Past Medical History:   Diagnosis Date     Gastroesophageal reflux disease      Current Outpatient Medications   Medication     Ascorbic Acid (VITAMIN C) 500 MG CAPS     B Complex Vitamins (B COMPLEX-B12 PO)     cholecalciferol (VITAMIN D3) 400 unit (10 mcg) TABS tablet     docusate sodium (COLACE) 100 MG capsule     ENOVARX-LIDOCAINE HCL 5 % cream     Fesoterodine Fumarate 8 MG TB24     hydrocortisone (ANUSOL-HC) 2.5 % cream     lidocaine-prilocaine (EMLA) 2.5-2.5 % external cream     LORazepam (ATIVAN) 0.5 MG tablet     Lutein 20 MG CAPS     mirabegron (MYRBETRIQ) 50 MG 24 hr tablet     Multiple vitamin TABS     naloxone (NARCAN) 4 MG/0.1ML nasal spray     omeprazole (PRILOSEC) 20 MG DR capsule     ondansetron (ZOFRAN) 8 MG tablet     prochlorperazine (COMPAZINE) 10 MG tablet     solifenacin (VESICARE) 10 MG tablet     traMADol (ULTRAM) 50 MG tablet     trospium (SANCTURA XR) 60 MG CP24 24 hr capsule     Current Facility-Administered Medications   Medication     lidocaine (XYLOCAINE) 5 % ointment     Allergies   Allergen Reactions     Penicillins      Sulfa Drugs      Physical Exam:/74 (BP Location: Right arm, Patient Position: Sitting, Cuff Size: Adult Regular)   Pulse 90   Temp 97.6  F (36.4  C) (Oral)   Resp 16   Wt 73.7 kg (162 lb 8 oz)   LMP 11/27/2001 (Approximate)   SpO2 99%   BMI 32.98 kg/m     ECOG PS- 0-1  Constitutional: Alert, cooperative, and in no distress.   ENT: Eyes bright, No mouth sores  Neck: Supple, No adenopathy.  Cardiac: Heart rate and rhythm is regular and strong without murmur  Respiratory: Breathing easy. Lung sounds clear to auscultation  GI: Abdomen is soft, rounded, non-tender, BS normal. No masses or  organomegaly  MS: Muscle tone normal, extremities normal with no edema. Able to get to exam table with minimal assistance.  Skin: No suspicious lesions or rashes  Neuro: Sensory grossly WNL, gait normal.   Lymph: Normal ant/post cervical, axillary, supraclavicular nodes  Psych: Mentation appears normal and affect is normal.     Laboratory Results:   Results for orders placed or performed in visit on 01/27/20   CBC with platelets differential     Status: Abnormal   Result Value Ref Range    WBC 12.8 (H) 4.0 - 11.0 10e9/L    RBC Count 4.36 3.8 - 5.2 10e12/L    Hemoglobin 13.0 11.7 - 15.7 g/dL    Hematocrit 40.7 35.0 - 47.0 %    MCV 93 78 - 100 fl    MCH 29.8 26.5 - 33.0 pg    MCHC 31.9 31.5 - 36.5 g/dL    RDW 17.4 (H) 10.0 - 15.0 %    Platelet Count 217 150 - 450 10e9/L    Diff Method Automated Method     % Neutrophils 61.7 %    % Lymphocytes 29.7 %    % Monocytes 5.2 %    % Eosinophils 1.4 %    % Basophils 0.7 %    % Immature Granulocytes 1.3 %    Absolute Neutrophil 7.9 1.6 - 8.3 10e9/L    Absolute Lymphocytes 3.8 0.8 - 5.3 10e9/L    Absolute Monocytes 0.7 0.0 - 1.3 10e9/L    Absolute Eosinophils 0.2 0.0 - 0.7 10e9/L    Absolute Basophils 0.1 0.0 - 0.2 10e9/L    Abs Immature Granulocytes 0.2 0 - 0.4 10e9/L   Comprehensive metabolic panel     Status: Abnormal   Result Value Ref Range    Sodium 140 133 - 144 mmol/L    Potassium 5.0 3.4 - 5.3 mmol/L    Chloride 107 94 - 109 mmol/L    Carbon Dioxide 30 20 - 32 mmol/L    Anion Gap 3 3 - 14 mmol/L    Glucose 133 (H) 70 - 99 mg/dL    Urea Nitrogen 20 7 - 30 mg/dL    Creatinine 0.73 0.52 - 1.04 mg/dL    GFR Estimate 84 >60 mL/min/[1.73_m2]    GFR Estimate If Black >90 >60 mL/min/[1.73_m2]    Calcium 9.3 8.5 - 10.1 mg/dL    Bilirubin Total 0.3 0.2 - 1.3 mg/dL    Albumin 3.4 3.4 - 5.0 g/dL    Protein Total 7.4 6.8 - 8.8 g/dL    Alkaline Phosphatase 179 (H) 40 - 150 U/L    ALT 28 0 - 50 U/L    AST 33 0 - 45 U/L   Protein qualitative urine     Status: None   Result Value Ref  Range    Protein Albumin Urine Negative NEG^Negative mg/dL     Assessment and Plan:   Stage IV Adenocarcinoma of the rectum-Pt continues treatment with FOLFOX/Avastin since 11/25/2019.She is now due for cycle 5 and is meeting goals to continue with plan though plan is causing nausea and vomiting in first week. She will continue with plan today  Pt will return in 2 weeks to continue with plan. Imaging to be completed after 6 cycles of the plan.   Nausea- use of Aloxi and Emend with prednisone for premed. Since this is ongoing even with oral antiemetic use- will see if we can add Emend on day of pump disconnect.   Bone metastasis- pt started Zometa every 3 months- last given 11/2019. Confirms she is using calcium and vitamin D daily. Encouraged increased exercise.   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   This was a 25 min visit with > 50% in counseling and coordinating care including education and management of concerns.    Felipa Garner CNP    Again, thank you for allowing me to participate in the care of your patient.        Sincerely,        Felipa Garner, LETICIA, APRN CNP

## 2020-01-27 NOTE — PROGRESS NOTES
Infusion Nursing Note:  Alannah Wynn presents today for C5D1 Avastin, Oxaliplatin, 5FU Home Pump Connect.    Patient seen by provider today: Yes: Felipa Garner NP   present during visit today: Not Applicable.    Note: Patient requested a list of all the medications she received today.  Handwrote a list of IV medications patient received today.    Also, patient/ had a discussion with the nurse whom discharged patient.  Nurse informed patient/ how to return chemo pump, with the [employer] return box that was given to patient/family.  Patient/ expressed concern about having more to do to return the box. Discharge nurse listened to patient/ and expressed empathy.    Intravenous Access:  Implanted Port.    Treatment Conditions:  Lab Results   Component Value Date    HGB 13.0 01/27/2020     Lab Results   Component Value Date    WBC 12.8 01/27/2020      Lab Results   Component Value Date    ANEU 7.9 01/27/2020     Lab Results   Component Value Date     01/27/2020      Lab Results   Component Value Date     01/27/2020                   Lab Results   Component Value Date    POTASSIUM 5.0 01/27/2020           No results found for: MAG         Lab Results   Component Value Date    CR 0.73 01/27/2020                   Lab Results   Component Value Date    LAUREN 9.3 01/27/2020                Lab Results   Component Value Date    BILITOTAL 0.3 01/27/2020           Lab Results   Component Value Date    ALBUMIN 3.4 01/27/2020                    Lab Results   Component Value Date    ALT 28 01/27/2020           Lab Results   Component Value Date    AST 33 01/27/2020       Results reviewed, labs MET treatment parameters, ok to proceed with treatment.      Post Infusion Assessment:  Patient tolerated infusion without incident.  Blood return noted pre and post infusion.  Site patent and intact, free from redness, edema or discomfort.  No evidence of extravasations.  Prior to  "discharge: Port is secured in place with tegaderm and flushed with 10cc NS with positive blood return noted.  Continuous home infusion CADD pump connected.    All connectors secured in place and clamps taped open.    Pump started, \"running\" noted on display (CADD): YES.  Patient instructed to call our clinic or [facility] with any questions or concerns at home.  Patient verbalized understanding.    Patient set up for pump disconnect at our clinic on 1/29/2020 1315.       Discharge Plan:   Patient will return 2/10/2020 for next appointment.   Patient discharged in stable condition accompanied by:  and [employer] return box.  Departure Mode: Ambulatory.    Gisell Nuñez RN                        "

## 2020-01-27 NOTE — PROGRESS NOTES
Power port needle left accessed for treatment. Flushed easily without complaint but did not give blood return. Alteplase ordered. Labs will be drawn peripherally.  Tolerated port access and draw without complaint. Port site scrubbed with Chloraprep for 30 seconds and allowed to dry completely prior to dressing application. Accessed using sterile technique. [place] tubes drawn-Red gel/Green/Purple tubes. Double signed by patient and RN. See documentation flowsheet. Cuca Chang, RN, BSN, OCN

## 2020-01-27 NOTE — NURSING NOTE
"Oncology Rooming Note    January 27, 2020 10:03 AM   Alannah Wynn is a 69 year old female who presents for:    Chief Complaint   Patient presents with     Oncology Clinic Visit     prior to chemo     Initial Vitals: /74 (BP Location: Right arm, Patient Position: Sitting, Cuff Size: Adult Regular)   Pulse 90   Temp 97.6  F (36.4  C) (Oral)   Resp 16   Wt 73.7 kg (162 lb 8 oz)   LMP 11/27/2001 (Approximate)   SpO2 99%   BMI 32.98 kg/m   Estimated body mass index is 32.98 kg/m  as calculated from the following:    Height as of 1/13/20: 1.495 m (4' 10.86\").    Weight as of this encounter: 73.7 kg (162 lb 8 oz). Body surface area is 1.75 meters squared.  Mild Pain (3) Comment: back, bottom   Patient's last menstrual period was 11/27/2001 (approximate).  Allergies reviewed: Yes  Medications reviewed: Yes    Medications: Medication refills not needed today.  Pharmacy name entered into [facility]:    [id number] - [place], [place] - [address] AT [address] [facility] - [place], [place] - [address], [address]      Maya Grubbs RN              "

## 2020-01-29 ENCOUNTER — PATIENT OUTREACH (OUTPATIENT)
Dept: CARE COORDINATION | Facility: CLINIC | Age: 70
End: 2020-01-29

## 2020-01-29 ENCOUNTER — INFUSION THERAPY VISIT (OUTPATIENT)
Dept: INFUSION THERAPY | Facility: CLINIC | Age: 70
End: 2020-01-29
Attending: NURSE PRACTITIONER
Payer: COMMERCIAL

## 2020-01-29 VITALS
RESPIRATION RATE: 18 BRPM | OXYGEN SATURATION: 99 % | SYSTOLIC BLOOD PRESSURE: 123 MMHG | DIASTOLIC BLOOD PRESSURE: 75 MMHG | TEMPERATURE: 96.6 F | HEART RATE: 82 BPM

## 2020-01-29 DIAGNOSIS — C20 METASTASIS FROM RECTAL CANCER (H): ICD-10-CM

## 2020-01-29 DIAGNOSIS — D70.1 CHEMOTHERAPY-INDUCED NEUTROPENIA (H): ICD-10-CM

## 2020-01-29 DIAGNOSIS — C79.9 METASTASIS FROM RECTAL CANCER (H): ICD-10-CM

## 2020-01-29 DIAGNOSIS — C79.51 BONE METASTASIS: Primary | ICD-10-CM

## 2020-01-29 DIAGNOSIS — T45.1X5A CHEMOTHERAPY-INDUCED NEUTROPENIA (H): ICD-10-CM

## 2020-01-29 PROCEDURE — 96365 THER/PROPH/DIAG IV INF INIT: CPT | Performed by: NURSE PRACTITIONER

## 2020-01-29 PROCEDURE — 96377 APPLICATON ON-BODY INJECTOR: CPT | Mod: 59 | Performed by: NURSE PRACTITIONER

## 2020-01-29 PROCEDURE — 96361 HYDRATE IV INFUSION ADD-ON: CPT | Mod: 59 | Performed by: NURSE PRACTITIONER

## 2020-01-29 PROCEDURE — 99207 ZZC NO CHARGE LOS: CPT

## 2020-01-29 RX ORDER — HEPARIN SODIUM (PORCINE) LOCK FLUSH IV SOLN 100 UNIT/ML 100 UNIT/ML
5 SOLUTION INTRAVENOUS ONCE
Status: COMPLETED | OUTPATIENT
Start: 2020-01-29 | End: 2020-01-29

## 2020-01-29 RX ADMIN — Medication 1000 ML: at 13:35

## 2020-01-29 RX ADMIN — HEPARIN SODIUM (PORCINE) LOCK FLUSH IV SOLN 100 UNIT/ML 5 ML: 100 SOLUTION at 15:08

## 2020-01-29 NOTE — PATIENT INSTRUCTIONS
OK to use Compazine every 6 hours.  Can use Compazine anytime- before, during or after chemotherapy.  Do not have to wait 3 days after chemo like with Zofran.    Use provided phone number to all [employer] for pump pick if you desire.      Your On-body Neulasta injector was applied today at 3:05 PM.  The injection will start 27 hours after application, at 6:05 PM tomorrow 1/30/20.  Note: the medication will be delivered over 45 minutes.  Before removing the injector, check to see that the light is either solid green or off and that the indicator line is on empty.  If there is a red light on the injector at any time or wetness on the dressing or under the injector after removal, you must report this information.   Call [facility]: [phone] during business hours.  Please refer to the written information given to you for additional information on the injector.

## 2020-01-29 NOTE — PROGRESS NOTES
"Social Work Intervention  [facility]    Data/Intervention:    Patient Name:  Alannah Wynn  /Age:  1950 (69 year old)    Visit Type: in person  Referral Source: Infusion RN  Reason for Referral:  Billing questions    Collaborated With:    Alannah,     Patient Concerns/Issues:   [employer] billing for [place] pump    Intervention/Education/Resources Provided:   COLT met with Alannah and her  in the infusion center. She is here for Modified FOLFOX-6 / Bevacizumab   Day 3 - Pump Disconnect, Cycle 5. Alannah has been vomiting and not feeling well, she rested through the visit, but did acknowledge and greet sw upon her arrival. Her  explains that he has a lot of worry about recent letters from [employer] requesting [employer] insurance information. He states they have always gotten the paperwork from the local office in [place], but these letters have been coming from [place] and he is worried about whether or not they are legitimate. He has often expressed concern about sharing personal information as he knows people can get \"scammed.\" SW validated they're concerns and agreed that it was smart to be aware and cautious, but encouraged him to call [employer] directly. SW suggested the call the [place] office who should be able to guide them as to how to proceed and if for some reason this is not a legitimate need for information, they will be able to tell them that.  Alannah and  agreed and confirmed their understanding of this plan.       Assessment/Plan:  SW will remain available as needed and appropriate.       Provided patient/family with contact information and availability.    MARYCRUZ Anderson, [facility]  Clinical , Adult Oncology  Pager: [phone]  Phone: [phone]      "

## 2020-01-29 NOTE — PROGRESS NOTES
Infusion Nursing Note:  Alannah Wynn presents today for  Pump DC/IVF/Antiemetics  Patient seen by provider today: No   present during visit today: Not Applicable.    Note: Fluorouracil pump was disconnected.  It had infused the whole dose.    Bettie had no emesis Monday, but threw up from Tuesday at about 9PM intermittently until she came in today.  She is currently having emesis.  She is masking urine.  Immodium helped with diarrhea.  She does still cough and sneeze when her pump is on but they use Claritin and Tylenol cold with some relief.  No Benadryl as suggested with last pump disconnect.  It has been discovered that Alannah does not take Compazine.  This Rn discussed using Compazine at any time, even if she get Aloxi.  They are going to begin using this med and not use Zofran unless the Compazine is not working and it is 2-3 days post Aloxi.      Intravenous Access:  Implanted Port.    Treatment Conditions:  Not Applicable.      Post Infusion Assessment:  Patient tolerated infusion without incident.   Pump packed and sealed by this RN into box they brought back.  Instructions were given on how to return the box via calling [employer] or dropping off at [employer].        Discharge Plan:   Patient will return 2/10/20 for next appointment.   Patient discharged in stable condition accompanied by: .  Departure Mode: Ambulatory.    Helen Ha RN

## 2020-02-06 DIAGNOSIS — D70.1 CHEMOTHERAPY-INDUCED NEUTROPENIA (H): ICD-10-CM

## 2020-02-06 DIAGNOSIS — C79.51 BONE METASTASIS: ICD-10-CM

## 2020-02-06 DIAGNOSIS — C20 METASTASIS FROM RECTAL CANCER (H): ICD-10-CM

## 2020-02-06 DIAGNOSIS — C79.9 METASTASIS FROM RECTAL CANCER (H): ICD-10-CM

## 2020-02-06 DIAGNOSIS — T45.1X5A CHEMOTHERAPY-INDUCED NEUTROPENIA (H): ICD-10-CM

## 2020-02-08 ENCOUNTER — HEALTH MAINTENANCE LETTER (OUTPATIENT)
Age: 70
End: 2020-02-08

## 2020-02-10 ENCOUNTER — INFUSION THERAPY VISIT (OUTPATIENT)
Dept: INFUSION THERAPY | Facility: CLINIC | Age: 70
End: 2020-02-10
Payer: COMMERCIAL

## 2020-02-10 ENCOUNTER — ONCOLOGY VISIT (OUTPATIENT)
Dept: ONCOLOGY | Facility: CLINIC | Age: 70
End: 2020-02-10
Payer: COMMERCIAL

## 2020-02-10 VITALS
OXYGEN SATURATION: 99 % | DIASTOLIC BLOOD PRESSURE: 82 MMHG | HEART RATE: 82 BPM | BODY MASS INDEX: 33.34 KG/M2 | SYSTOLIC BLOOD PRESSURE: 128 MMHG | TEMPERATURE: 97.9 F | WEIGHT: 164.3 LBS | RESPIRATION RATE: 16 BRPM

## 2020-02-10 DIAGNOSIS — T45.1X5A CHEMOTHERAPY-INDUCED NEUTROPENIA (H): Primary | ICD-10-CM

## 2020-02-10 DIAGNOSIS — C79.51 BONE METASTASIS: Primary | ICD-10-CM

## 2020-02-10 DIAGNOSIS — C79.9 METASTASIS FROM RECTAL CANCER (H): ICD-10-CM

## 2020-02-10 DIAGNOSIS — D70.1 CHEMOTHERAPY-INDUCED NEUTROPENIA (H): ICD-10-CM

## 2020-02-10 DIAGNOSIS — C20 METASTASIS FROM RECTAL CANCER (H): ICD-10-CM

## 2020-02-10 DIAGNOSIS — R11.0 NAUSEA: ICD-10-CM

## 2020-02-10 DIAGNOSIS — T45.1X5A CHEMOTHERAPY-INDUCED NEUTROPENIA (H): ICD-10-CM

## 2020-02-10 DIAGNOSIS — C79.51 BONE METASTASIS: ICD-10-CM

## 2020-02-10 DIAGNOSIS — G60.8 COLD INDUCED NEUROPATHY: ICD-10-CM

## 2020-02-10 DIAGNOSIS — D70.1 CHEMOTHERAPY-INDUCED NEUTROPENIA (H): Primary | ICD-10-CM

## 2020-02-10 LAB
ALBUMIN SERPL-MCNC: 3.3 G/DL (ref 3.4–5)
ALBUMIN UR-MCNC: NEGATIVE MG/DL
ALP SERPL-CCNC: 197 U/L (ref 40–150)
ALT SERPL W P-5'-P-CCNC: 23 U/L (ref 0–50)
ANION GAP SERPL CALCULATED.3IONS-SCNC: 6 MMOL/L (ref 3–14)
AST SERPL W P-5'-P-CCNC: 22 U/L (ref 0–45)
BASOPHILS # BLD AUTO: 0.1 10E9/L (ref 0–0.2)
BASOPHILS NFR BLD AUTO: 0.8 %
BILIRUB SERPL-MCNC: 0.2 MG/DL (ref 0.2–1.3)
BUN SERPL-MCNC: 13 MG/DL (ref 7–30)
CALCIUM SERPL-MCNC: 8.7 MG/DL (ref 8.5–10.1)
CEA SERPL-MCNC: 3.3 UG/L (ref 0–2.5)
CHLORIDE SERPL-SCNC: 109 MMOL/L (ref 94–109)
CO2 SERPL-SCNC: 26 MMOL/L (ref 20–32)
CREAT SERPL-MCNC: 0.55 MG/DL (ref 0.52–1.04)
DIFFERENTIAL METHOD BLD: ABNORMAL
EOSINOPHIL # BLD AUTO: 0.2 10E9/L (ref 0–0.7)
EOSINOPHIL NFR BLD AUTO: 1.4 %
ERYTHROCYTE [DISTWIDTH] IN BLOOD BY AUTOMATED COUNT: 18.2 % (ref 10–15)
GFR SERPL CREATININE-BSD FRML MDRD: >90 ML/MIN/{1.73_M2}
GLUCOSE SERPL-MCNC: 93 MG/DL (ref 70–99)
HCT VFR BLD AUTO: 37.6 % (ref 35–47)
HGB BLD-MCNC: 11.8 G/DL (ref 11.7–15.7)
IMM GRANULOCYTES # BLD: 0.6 10E9/L (ref 0–0.4)
IMM GRANULOCYTES NFR BLD: 4.3 %
LYMPHOCYTES # BLD AUTO: 3.4 10E9/L (ref 0.8–5.3)
LYMPHOCYTES NFR BLD AUTO: 22.9 %
MCH RBC QN AUTO: 29.8 PG (ref 26.5–33)
MCHC RBC AUTO-ENTMCNC: 31.4 G/DL (ref 31.5–36.5)
MCV RBC AUTO: 95 FL (ref 78–100)
MONOCYTES # BLD AUTO: 0.9 10E9/L (ref 0–1.3)
MONOCYTES NFR BLD AUTO: 5.8 %
NEUTROPHILS # BLD AUTO: 9.5 10E9/L (ref 1.6–8.3)
NEUTROPHILS NFR BLD AUTO: 64.8 %
PLATELET # BLD AUTO: 173 10E9/L (ref 150–450)
POTASSIUM SERPL-SCNC: 4.1 MMOL/L (ref 3.4–5.3)
PROT SERPL-MCNC: 7.1 G/DL (ref 6.8–8.8)
RBC # BLD AUTO: 3.96 10E12/L (ref 3.8–5.2)
SODIUM SERPL-SCNC: 141 MMOL/L (ref 133–144)
WBC # BLD AUTO: 14.7 10E9/L (ref 4–11)

## 2020-02-10 PROCEDURE — 96415 CHEMO IV INFUSION ADDL HR: CPT | Performed by: NURSE PRACTITIONER

## 2020-02-10 PROCEDURE — 81003 URINALYSIS AUTO W/O SCOPE: CPT | Performed by: NURSE PRACTITIONER

## 2020-02-10 PROCEDURE — 96413 CHEMO IV INFUSION 1 HR: CPT | Performed by: NURSE PRACTITIONER

## 2020-02-10 PROCEDURE — 99207 ZZC NO CHARGE LOS: CPT

## 2020-02-10 PROCEDURE — 96417 CHEMO IV INFUS EACH ADDL SEQ: CPT | Performed by: NURSE PRACTITIONER

## 2020-02-10 PROCEDURE — 99207 ZZC NO CHARGE NURSE ONLY: CPT

## 2020-02-10 PROCEDURE — 82378 CARCINOEMBRYONIC ANTIGEN: CPT | Performed by: NURSE PRACTITIONER

## 2020-02-10 PROCEDURE — 80053 COMPREHEN METABOLIC PANEL: CPT | Performed by: NURSE PRACTITIONER

## 2020-02-10 PROCEDURE — 96375 TX/PRO/DX INJ NEW DRUG ADDON: CPT | Performed by: NURSE PRACTITIONER

## 2020-02-10 PROCEDURE — 96416 CHEMO PROLONG INFUSE W/PUMP: CPT | Performed by: NURSE PRACTITIONER

## 2020-02-10 PROCEDURE — 85025 COMPLETE CBC W/AUTO DIFF WBC: CPT | Performed by: NURSE PRACTITIONER

## 2020-02-10 PROCEDURE — 99214 OFFICE O/P EST MOD 30 MIN: CPT | Mod: 25 | Performed by: NURSE PRACTITIONER

## 2020-02-10 PROCEDURE — 96367 TX/PROPH/DG ADDL SEQ IV INF: CPT | Performed by: NURSE PRACTITIONER

## 2020-02-10 RX ORDER — EPINEPHRINE 0.3 MG/.3ML
0.3 INJECTION SUBCUTANEOUS EVERY 5 MIN PRN
Status: CANCELLED | OUTPATIENT
Start: 2020-02-10

## 2020-02-10 RX ORDER — ALBUTEROL SULFATE 0.83 MG/ML
2.5 SOLUTION RESPIRATORY (INHALATION)
Status: CANCELLED | OUTPATIENT
Start: 2020-02-10

## 2020-02-10 RX ORDER — PALONOSETRON 0.05 MG/ML
0.25 INJECTION, SOLUTION INTRAVENOUS ONCE
Status: COMPLETED | OUTPATIENT
Start: 2020-02-10 | End: 2020-02-10

## 2020-02-10 RX ORDER — SODIUM CHLORIDE 9 MG/ML
1000 INJECTION, SOLUTION INTRAVENOUS CONTINUOUS PRN
Status: CANCELLED
Start: 2020-02-10

## 2020-02-10 RX ORDER — METHYLPREDNISOLONE SODIUM SUCCINATE 125 MG/2ML
125 INJECTION, POWDER, LYOPHILIZED, FOR SOLUTION INTRAMUSCULAR; INTRAVENOUS
Status: CANCELLED
Start: 2020-02-10

## 2020-02-10 RX ORDER — DIPHENHYDRAMINE HYDROCHLORIDE 50 MG/ML
50 INJECTION INTRAMUSCULAR; INTRAVENOUS
Status: CANCELLED
Start: 2020-02-10

## 2020-02-10 RX ORDER — LORAZEPAM 2 MG/ML
0.5 INJECTION INTRAMUSCULAR EVERY 4 HOURS PRN
Status: CANCELLED
Start: 2020-02-10

## 2020-02-10 RX ORDER — HEPARIN SODIUM (PORCINE) LOCK FLUSH IV SOLN 100 UNIT/ML 100 UNIT/ML
5 SOLUTION INTRAVENOUS
Status: DISCONTINUED | OUTPATIENT
Start: 2020-02-10 | End: 2020-02-10 | Stop reason: HOSPADM

## 2020-02-10 RX ORDER — EPINEPHRINE 1 MG/ML
0.3 INJECTION, SOLUTION INTRAMUSCULAR; SUBCUTANEOUS EVERY 5 MIN PRN
Status: CANCELLED | OUTPATIENT
Start: 2020-02-10

## 2020-02-10 RX ORDER — MEPERIDINE HYDROCHLORIDE 25 MG/ML
25 INJECTION INTRAMUSCULAR; INTRAVENOUS; SUBCUTANEOUS EVERY 30 MIN PRN
Status: CANCELLED | OUTPATIENT
Start: 2020-02-10

## 2020-02-10 RX ORDER — ALBUTEROL SULFATE 90 UG/1
1-2 AEROSOL, METERED RESPIRATORY (INHALATION)
Status: CANCELLED
Start: 2020-02-10

## 2020-02-10 RX ORDER — PALONOSETRON 0.05 MG/ML
0.25 INJECTION, SOLUTION INTRAVENOUS ONCE
Status: CANCELLED
Start: 2020-02-10

## 2020-02-10 RX ORDER — NALOXONE HYDROCHLORIDE 0.4 MG/ML
.1-.4 INJECTION, SOLUTION INTRAMUSCULAR; INTRAVENOUS; SUBCUTANEOUS
Status: CANCELLED | OUTPATIENT
Start: 2020-02-10

## 2020-02-10 RX ADMIN — Medication 250 ML: at 09:50

## 2020-02-10 RX ADMIN — HEPARIN SODIUM (PORCINE) LOCK FLUSH IV SOLN 100 UNIT/ML 5 ML: 100 SOLUTION at 08:12

## 2020-02-10 RX ADMIN — PALONOSETRON 0.25 MG: 0.05 INJECTION, SOLUTION INTRAVENOUS at 09:50

## 2020-02-10 ASSESSMENT — PAIN SCALES - GENERAL: PAINLEVEL: MILD PAIN (2)

## 2020-02-10 NOTE — PROGRESS NOTES
Power port needle left accessed for treatment. Tolerated port access and draw without complaint. Port site scrubbed with Chloraprep for 30 seconds and allowed to dry completely prior to dressing application. Accessed using sterile technique. [place] tubes drawn-Red gel/Green/Purple tubes. Double signed by patient and RN. See documentation flowsheet. Patient left a urine specimen. Cuca Chang, RN, BSN, OCN

## 2020-02-10 NOTE — PROGRESS NOTES
"Infusion Nursing Note:  Alannah Wynn presents today for C6D1 Avastin, Oxaliplatin and 5FU Home Pump Connect.    Patient seen by provider today: Yes: Felipa Garner NP   present during visit today: Not Applicable.    Note: Patient in a good mood.  States she has been doing well, other than loss of appetite the first week after chemo.      Intravenous Access:  Implanted Port.    Treatment Conditions:  Lab Results   Component Value Date    HGB 11.8 02/10/2020     Lab Results   Component Value Date    WBC 14.7 02/10/2020      Lab Results   Component Value Date    ANEU 9.5 02/10/2020     Lab Results   Component Value Date     02/10/2020      Lab Results   Component Value Date     02/10/2020                   Lab Results   Component Value Date    POTASSIUM 4.1 02/10/2020           No results found for: MAG         Lab Results   Component Value Date    CR PENDING 02/10/2020                   Lab Results   Component Value Date    LAUREN PENDING 02/10/2020                Lab Results   Component Value Date    BILITOTAL PENDING 02/10/2020           Lab Results   Component Value Date    ALBUMIN PENDING 02/10/2020                    Lab Results   Component Value Date    ALT PENDING 02/10/2020           Lab Results   Component Value Date    AST PENDING 02/10/2020       Results reviewed, labs MET treatment parameters, ok to proceed with treatment.  Urine protein negative.      Post Infusion Assessment:  Patient tolerated infusion without incident.  Blood return noted pre and post infusion.  Site patent and intact, free from redness, edema or discomfort.  No evidence of extravasations.  Prior to discharge: Port is secured in place with tegaderm and flushed with 10cc NS with positive blood return noted.  Continuous home infusion CADD pump connected.    All connectors secured in place and clamps taped open.    Pump started, \"running\" noted on display (CADD): YES.  Pump infusing at 5.8 ml/hr. 46 hours of " treatment will equal 266.8 ml. Pump has 277 ml in the bag, that is overfill that does not need to be given.  Patient instructed to call our clinic or [facility] with any questions or concerns at home.  Patient verbalized understanding.    Patient set up for pump disconnect at our clinic on 2/12/2020 1130.       Discharge Plan:   Patient will return 2/25/2020 for next appointment.   Sent [employer], pump return box, home with patient/.  Patient discharged in stable condition accompanied by: .  Departure Mode: Ambulatory.    Gisell Nuñez RN

## 2020-02-10 NOTE — LETTER
"    2/10/2020         RE: Alannah Wynn  [address]  [place] [address]        Dear Colleague,    Thank you for referring your patient, Alannah Wynn, to the [facility]. Please see a copy of my visit note below.    Oncology Follow Up Visit: February 10, 2020    Oncologist: Dr Charline Velazquez  PCP: Earline Mehta    Diagnosis: Stage IV Adenocarcinoma of the rectum   Alannah Wynn is a 70 yo female who presented in 10/2019 with intermittent hematochezia and anorectal irritation since 2011, that have been managed as \"hemorrhoids\".  Diagnostic colonoscopy on 10/18/19 for rectal bleeding showed \"fungating and ulcerated non-obstructing mass was found in the distal rectum <1 cm from the anal verge. The mass was partially circumferential (involving one-half of the lumen circumference). The mass measured four cm in length. In addition, its diameter measured six mm.  Pathology showed invasive moderately differentiated adenocarcinoma with intact mismatch repair protein expression.  Pelvic MRI 11/2019 found fungating polypoid mid/lower rectal mass with luminal narrowing as well as innumerable enlarged lymph nodes as described above with a conglomerate of suspicious lymph nodes abutting the anterior peritoneal reflection. Stage cT3d N2.   Further testing with PET scan proved pulmonarymetastasis and bony metastasis at L3.  Treatment:   11/25/2019 began treatment with FOLFOX/Avastin    Interval History: Ms. Wynn comes to clinic with  for continuation of treatment for her colon cancer with metastasis with FOLFOX/Avastin- due for cycle 6. Pt shares she is still having trouble with poor appetite first week with some nausea- smells trigger the nausea but has started Emend on day 3 but feels it was helpful.No nausea week 2 and no weight loss noted. She continues with 2/10 pain but is not using pain medication regularly. Feels she does have weakness but no falls- again worse first week " after chemo. Cold sensitivity noted first week only- none noted today.denies depression or anxiety and sleep is same with variability.    Rest of comprehensive and complete ROS is reviewed and is negative.   Past Medical History:   Diagnosis Date     Gastroesophageal reflux disease      Current Outpatient Medications   Medication     Ascorbic Acid (VITAMIN C) 500 MG CAPS     B Complex Vitamins (B COMPLEX-B12 PO)     cholecalciferol (VITAMIN D3) 400 unit (10 mcg) TABS tablet     docusate sodium (COLACE) 100 MG capsule     ENOVARX-LIDOCAINE HCL 5 % cream     Fesoterodine Fumarate 8 MG TB24     hydrocortisone (ANUSOL-HC) 2.5 % cream     lidocaine-prilocaine (EMLA) 2.5-2.5 % external cream     LORazepam (ATIVAN) 0.5 MG tablet     Lutein 20 MG CAPS     mirabegron (MYRBETRIQ) 50 MG 24 hr tablet     Multiple vitamin TABS     naloxone (NARCAN) 4 MG/0.1ML nasal spray     omeprazole (PRILOSEC) 20 MG DR capsule     ondansetron (ZOFRAN) 8 MG tablet     prochlorperazine (COMPAZINE) 10 MG tablet     solifenacin (VESICARE) 10 MG tablet     traMADol (ULTRAM) 50 MG tablet     trospium (SANCTURA XR) 60 MG CP24 24 hr capsule     Current Facility-Administered Medications   Medication     lidocaine (XYLOCAINE) 5 % ointment     Allergies   Allergen Reactions     Penicillins      Sulfa Drugs      Physical Exam:/82   Pulse 82   Temp 97.9  F (36.6  C) (Oral)   Resp 16   Wt 74.5 kg (164 lb 4.8 oz)   LMP 11/27/2001 (Approximate)   SpO2 99%   BMI 33.34 kg/m      ECOG PS- 0-1  Constitutional: Alert, cooperative, and in no distress.   ENT: Eyes bright, No mouth sores  Neck: Supple, No adenopathy.  Cardiac: Heart rate and rhythm is regular and strong without murmur  Respiratory: Breathing easy. Lung sounds clear to auscultation  GI: Abdomen is soft, rounded, non-tender, BS normal. No masses or organomegaly  MS: Muscle tone normal, extremities normal with no edema. Able to get to exam table with minimal assistance.  Skin: No  suspicious lesions or rashes  Neuro: Sensory grossly WNL, gait normal.   Lymph: Normal ant/post cervical, axillary, supraclavicular nodes  Psych: Mentation appears normal and affect is normal and bright.    Laboratory Results:   Results for orders placed or performed in visit on 02/10/20   CBC with platelets differential     Status: Abnormal   Result Value Ref Range    WBC 14.7 (H) 4.0 - 11.0 10e9/L    RBC Count 3.96 3.8 - 5.2 10e12/L    Hemoglobin 11.8 11.7 - 15.7 g/dL    Hematocrit 37.6 35.0 - 47.0 %    MCV 95 78 - 100 fl    MCH 29.8 26.5 - 33.0 pg    MCHC 31.4 (L) 31.5 - 36.5 g/dL    RDW 18.2 (H) 10.0 - 15.0 %    Platelet Count 173 150 - 450 10e9/L    Diff Method Automated Method     % Neutrophils 64.8 %    % Lymphocytes 22.9 %    % Monocytes 5.8 %    % Eosinophils 1.4 %    % Basophils 0.8 %    % Immature Granulocytes 4.3 %    Absolute Neutrophil 9.5 (H) 1.6 - 8.3 10e9/L    Absolute Lymphocytes 3.4 0.8 - 5.3 10e9/L    Absolute Monocytes 0.9 0.0 - 1.3 10e9/L    Absolute Eosinophils 0.2 0.0 - 0.7 10e9/L    Absolute Basophils 0.1 0.0 - 0.2 10e9/L    Abs Immature Granulocytes 0.6 (H) 0 - 0.4 10e9/L   Comprehensive metabolic panel     Status: Abnormal   Result Value Ref Range    Sodium 141 133 - 144 mmol/L    Potassium 4.1 3.4 - 5.3 mmol/L    Chloride 109 94 - 109 mmol/L    Carbon Dioxide 26 20 - 32 mmol/L    Anion Gap 6 3 - 14 mmol/L    Glucose 93 70 - 99 mg/dL    Urea Nitrogen 13 7 - 30 mg/dL    Creatinine 0.55 0.52 - 1.04 mg/dL    GFR Estimate >90 >60 mL/min/[1.73_m2]    GFR Estimate If Black >90 >60 mL/min/[1.73_m2]    Calcium 8.7 8.5 - 10.1 mg/dL    Bilirubin Total 0.2 0.2 - 1.3 mg/dL    Albumin 3.3 (L) 3.4 - 5.0 g/dL    Protein Total 7.1 6.8 - 8.8 g/dL    Alkaline Phosphatase 197 (H) 40 - 150 U/L    ALT 23 0 - 50 U/L    AST 22 0 - 45 U/L   Protein qualitative urine     Status: None   Result Value Ref Range    Protein Albumin Urine Negative NEG^Negative mg/dL     Assessment and Plan:   Stage IV Adenocarcinoma of  the rectum-Pt continues treatment with FOLFOX/Avastin since 11/25/2019. She is meeting goals for continuation of cycle 6 though continues with nausea despite medications for this issue but is tolerating without weight loss.   Pt will return in 2 weeks after imaging for follow up with plan to see if improvement noted.   Bone metastasis- pt continuing Zometa every 3 months- due after 2/18/2020. Confirms she is using calcium and vitamin D daily.Encouraged regular walks or some kind of exercise.    Nausea- continues to state first week associated with Nausea and using supplements only. Continues use of Aloxi and Emend with prednisone for premed.Using Emend on day of pump disconnect since last cycle and does feel it was helpful but still having trouble first week but eating well with 2nd week with no significant weight loss.   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   This was a 25 min visit with > 50% in counseling and coordinating care including education and management of concerns.    Felipa Garner CNP        Again, thank you for allowing me to participate in the care of your patient.        Sincerely,        Felipa Garner, NP, APRN CNP

## 2020-02-10 NOTE — NURSING NOTE
"Oncology Rooming Note    February 10, 2020 8:48 AM   Alannah Wynn is a 69 year old female who presents for:    Chief Complaint   Patient presents with     Oncology Clinic Visit     prior to treatment     Initial Vitals: /82   Pulse 82   Temp 97.9  F (36.6  C) (Oral)   Resp 16   Wt 74.5 kg (164 lb 4.8 oz)   LMP 11/27/2001 (Approximate)   SpO2 99%   BMI 33.34 kg/m   Estimated body mass index is 33.34 kg/m  as calculated from the following:    Height as of 1/13/20: 1.495 m (4' 10.86\").    Weight as of this encounter: 74.5 kg (164 lb 4.8 oz). Body surface area is 1.76 meters squared.  Mild Pain (2) Comment: Data Unavailable   Patient's last menstrual period was 11/27/2001 (approximate).  Allergies reviewed: Yes  Medications reviewed: Yes    Medications: Medication refills not needed today.  Pharmacy name entered into [facility]:    [id number] - [place], [place] - [address] AT [address] [facility] - [place], [place] - [address], [address]           ANTONIO HARTMANN RN              "

## 2020-02-12 ENCOUNTER — INFUSION THERAPY VISIT (OUTPATIENT)
Dept: INFUSION THERAPY | Facility: CLINIC | Age: 70
End: 2020-02-12
Payer: COMMERCIAL

## 2020-02-12 DIAGNOSIS — T45.1X5A CHEMOTHERAPY-INDUCED NEUTROPENIA (H): Primary | ICD-10-CM

## 2020-02-12 DIAGNOSIS — C79.51 BONE METASTASIS: ICD-10-CM

## 2020-02-12 DIAGNOSIS — C79.9 METASTASIS FROM RECTAL CANCER (H): ICD-10-CM

## 2020-02-12 DIAGNOSIS — C20 METASTASIS FROM RECTAL CANCER (H): ICD-10-CM

## 2020-02-12 DIAGNOSIS — D70.1 CHEMOTHERAPY-INDUCED NEUTROPENIA (H): Primary | ICD-10-CM

## 2020-02-12 PROCEDURE — 96377 APPLICATON ON-BODY INJECTOR: CPT | Mod: 59 | Performed by: INTERNAL MEDICINE

## 2020-02-12 PROCEDURE — 96361 HYDRATE IV INFUSION ADD-ON: CPT | Mod: 59 | Performed by: INTERNAL MEDICINE

## 2020-02-12 PROCEDURE — 99207 ZZC NO CHARGE NURSE ONLY: CPT

## 2020-02-12 PROCEDURE — 96365 THER/PROPH/DIAG IV INF INIT: CPT | Performed by: INTERNAL MEDICINE

## 2020-02-12 RX ORDER — HEPARIN SODIUM (PORCINE) LOCK FLUSH IV SOLN 100 UNIT/ML 100 UNIT/ML
5 SOLUTION INTRAVENOUS
Status: DISCONTINUED | OUTPATIENT
Start: 2020-02-12 | End: 2020-02-12 | Stop reason: HOSPADM

## 2020-02-12 RX ADMIN — Medication 1000 ML: at 11:45

## 2020-02-12 RX ADMIN — HEPARIN SODIUM (PORCINE) LOCK FLUSH IV SOLN 100 UNIT/ML 5 ML: 100 SOLUTION at 13:27

## 2020-02-12 NOTE — PROGRESS NOTES
"Infusion Nursing Note:  Alannah Wynn presents today for IVF/ emend/ onpro.    Patient seen by provider today: No   present during visit today: Not Applicable.    Intravenous Access:  Implanted Port.    Treatment Conditions:  See systems review flow sheet.      Post Infusion Assessment:  Patient tolerated infusion without incident.  Blood return noted pre and post infusion.  Site patent and intact, free from redness, edema or discomfort.  No evidence of extravasations.  Access discontinued per protocol.       Discharge Plan:  Reviewed next appointments 2/25/20. CT scan on 2/21.   Patient discharged in stable condition accompanied by:  and son.  Departure Mode: Wheelchair.    Onpro placed on left upper arm. See MAR for lot number. Fill line \"full\" and light flashing green. Reviewed home instructions with Alannah and her  who verbalized understanding. Written instructions with on call phone numbers also given to the patient.    Shannan Ferrari RN                        "

## 2020-02-18 ENCOUNTER — MEDICAL CORRESPONDENCE (OUTPATIENT)
Dept: HEALTH INFORMATION MANAGEMENT | Facility: CLINIC | Age: 70
End: 2020-02-18

## 2020-02-21 ENCOUNTER — ANCILLARY PROCEDURE (OUTPATIENT)
Dept: GENERAL RADIOLOGY | Facility: CLINIC | Age: 70
End: 2020-02-21
Payer: COMMERCIAL

## 2020-02-21 ENCOUNTER — ANCILLARY PROCEDURE (OUTPATIENT)
Dept: CT IMAGING | Facility: CLINIC | Age: 70
End: 2020-02-21
Attending: NURSE PRACTITIONER
Payer: COMMERCIAL

## 2020-02-21 DIAGNOSIS — C20 METASTASIS FROM RECTAL CANCER (H): Primary | ICD-10-CM

## 2020-02-21 DIAGNOSIS — C79.9 METASTASIS FROM RECTAL CANCER (H): Primary | ICD-10-CM

## 2020-02-21 DIAGNOSIS — C79.51 BONE METASTASIS: ICD-10-CM

## 2020-02-21 DIAGNOSIS — C20 METASTASIS FROM RECTAL CANCER (H): ICD-10-CM

## 2020-02-21 DIAGNOSIS — C79.9 METASTASIS FROM RECTAL CANCER (H): ICD-10-CM

## 2020-02-21 PROCEDURE — 71260 CT THORAX DX C+: CPT | Performed by: RADIOLOGY

## 2020-02-21 PROCEDURE — 74177 CT ABD & PELVIS W/CONTRAST: CPT | Performed by: RADIOLOGY

## 2020-02-21 RX ORDER — HEPARIN SODIUM (PORCINE) LOCK FLUSH IV SOLN 100 UNIT/ML 100 UNIT/ML
5 SOLUTION INTRAVENOUS ONCE
Status: COMPLETED | OUTPATIENT
Start: 2020-02-21 | End: 2020-02-21

## 2020-02-21 RX ORDER — IOPAMIDOL 755 MG/ML
100 INJECTION, SOLUTION INTRAVASCULAR ONCE
Status: COMPLETED | OUTPATIENT
Start: 2020-02-21 | End: 2020-02-21

## 2020-02-21 RX ADMIN — IOPAMIDOL 100 ML: 755 INJECTION, SOLUTION INTRAVASCULAR at 08:50

## 2020-02-21 RX ADMIN — HEPARIN SODIUM (PORCINE) LOCK FLUSH IV SOLN 100 UNIT/ML 5 ML: 100 SOLUTION at 09:07

## 2020-02-25 ENCOUNTER — ONCOLOGY VISIT (OUTPATIENT)
Dept: ONCOLOGY | Facility: CLINIC | Age: 70
End: 2020-02-25
Payer: COMMERCIAL

## 2020-02-25 ENCOUNTER — INFUSION THERAPY VISIT (OUTPATIENT)
Dept: INFUSION THERAPY | Facility: CLINIC | Age: 70
End: 2020-02-25
Payer: COMMERCIAL

## 2020-02-25 VITALS
SYSTOLIC BLOOD PRESSURE: 132 MMHG | DIASTOLIC BLOOD PRESSURE: 73 MMHG | TEMPERATURE: 98.1 F | RESPIRATION RATE: 18 BRPM | OXYGEN SATURATION: 97 % | WEIGHT: 156.3 LBS | HEART RATE: 81 BPM | BODY MASS INDEX: 31.72 KG/M2

## 2020-02-25 DIAGNOSIS — C20 METASTASIS FROM RECTAL CANCER (H): ICD-10-CM

## 2020-02-25 DIAGNOSIS — C79.9 METASTASIS FROM RECTAL CANCER (H): Primary | ICD-10-CM

## 2020-02-25 DIAGNOSIS — T45.1X5A CHEMOTHERAPY INDUCED NAUSEA AND VOMITING: Primary | ICD-10-CM

## 2020-02-25 DIAGNOSIS — D70.1 CHEMOTHERAPY-INDUCED NEUTROPENIA (H): Primary | ICD-10-CM

## 2020-02-25 DIAGNOSIS — G60.8 COLD INDUCED NEUROPATHY: ICD-10-CM

## 2020-02-25 DIAGNOSIS — R11.2 CHEMOTHERAPY INDUCED NAUSEA AND VOMITING: Primary | ICD-10-CM

## 2020-02-25 DIAGNOSIS — C79.51 BONE METASTASIS: ICD-10-CM

## 2020-02-25 DIAGNOSIS — T45.1X5A CHEMOTHERAPY-INDUCED NEUTROPENIA (H): ICD-10-CM

## 2020-02-25 DIAGNOSIS — C79.9 METASTASIS FROM RECTAL CANCER (H): ICD-10-CM

## 2020-02-25 DIAGNOSIS — C20 METASTASIS FROM RECTAL CANCER (H): Primary | ICD-10-CM

## 2020-02-25 DIAGNOSIS — T45.1X5A CHEMOTHERAPY-INDUCED NEUTROPENIA (H): Primary | ICD-10-CM

## 2020-02-25 DIAGNOSIS — D70.1 CHEMOTHERAPY-INDUCED NEUTROPENIA (H): ICD-10-CM

## 2020-02-25 LAB
ALBUMIN SERPL-MCNC: 3.1 G/DL (ref 3.4–5)
ALBUMIN UR-MCNC: NEGATIVE MG/DL
ALP SERPL-CCNC: 146 U/L (ref 40–150)
ALT SERPL W P-5'-P-CCNC: 30 U/L (ref 0–50)
ANION GAP SERPL CALCULATED.3IONS-SCNC: 4 MMOL/L (ref 3–14)
AST SERPL W P-5'-P-CCNC: 22 U/L (ref 0–45)
BASOPHILS # BLD AUTO: 0.1 10E9/L (ref 0–0.2)
BASOPHILS NFR BLD AUTO: 0.9 %
BILIRUB SERPL-MCNC: 0.2 MG/DL (ref 0.2–1.3)
BUN SERPL-MCNC: 14 MG/DL (ref 7–30)
CALCIUM SERPL-MCNC: 8.3 MG/DL (ref 8.5–10.1)
CHLORIDE SERPL-SCNC: 110 MMOL/L (ref 94–109)
CO2 SERPL-SCNC: 26 MMOL/L (ref 20–32)
CREAT SERPL-MCNC: 0.54 MG/DL (ref 0.52–1.04)
DIFFERENTIAL METHOD BLD: ABNORMAL
EOSINOPHIL # BLD AUTO: 0.4 10E9/L (ref 0–0.7)
EOSINOPHIL NFR BLD AUTO: 3.2 %
ERYTHROCYTE [DISTWIDTH] IN BLOOD BY AUTOMATED COUNT: 18.6 % (ref 10–15)
GFR SERPL CREATININE-BSD FRML MDRD: >90 ML/MIN/{1.73_M2}
GLUCOSE SERPL-MCNC: 88 MG/DL (ref 70–99)
HCT VFR BLD AUTO: 34.1 % (ref 35–47)
HGB BLD-MCNC: 11.2 G/DL (ref 11.7–15.7)
IMM GRANULOCYTES # BLD: 0.3 10E9/L (ref 0–0.4)
IMM GRANULOCYTES NFR BLD: 2.5 %
LYMPHOCYTES # BLD AUTO: 2.7 10E9/L (ref 0.8–5.3)
LYMPHOCYTES NFR BLD AUTO: 23.5 %
MCH RBC QN AUTO: 30.7 PG (ref 26.5–33)
MCHC RBC AUTO-ENTMCNC: 32.8 G/DL (ref 31.5–36.5)
MCV RBC AUTO: 93 FL (ref 78–100)
MONOCYTES # BLD AUTO: 0.7 10E9/L (ref 0–1.3)
MONOCYTES NFR BLD AUTO: 5.9 %
NEUTROPHILS # BLD AUTO: 7.4 10E9/L (ref 1.6–8.3)
NEUTROPHILS NFR BLD AUTO: 64 %
PLATELET # BLD AUTO: 189 10E9/L (ref 150–450)
POTASSIUM SERPL-SCNC: 3.8 MMOL/L (ref 3.4–5.3)
PROT SERPL-MCNC: 6.7 G/DL (ref 6.8–8.8)
RBC # BLD AUTO: 3.65 10E12/L (ref 3.8–5.2)
SODIUM SERPL-SCNC: 140 MMOL/L (ref 133–144)
WBC # BLD AUTO: 11.6 10E9/L (ref 4–11)

## 2020-02-25 PROCEDURE — 96375 TX/PRO/DX INJ NEW DRUG ADDON: CPT | Performed by: INTERNAL MEDICINE

## 2020-02-25 PROCEDURE — 96413 CHEMO IV INFUSION 1 HR: CPT | Performed by: INTERNAL MEDICINE

## 2020-02-25 PROCEDURE — 99207 ZZC NO CHARGE LOS: CPT

## 2020-02-25 PROCEDURE — 96367 TX/PROPH/DG ADDL SEQ IV INF: CPT | Performed by: INTERNAL MEDICINE

## 2020-02-25 PROCEDURE — 96417 CHEMO IV INFUS EACH ADDL SEQ: CPT | Performed by: INTERNAL MEDICINE

## 2020-02-25 PROCEDURE — 96415 CHEMO IV INFUSION ADDL HR: CPT | Performed by: INTERNAL MEDICINE

## 2020-02-25 PROCEDURE — 85025 COMPLETE CBC W/AUTO DIFF WBC: CPT | Performed by: INTERNAL MEDICINE

## 2020-02-25 PROCEDURE — 99214 OFFICE O/P EST MOD 30 MIN: CPT | Mod: 25 | Performed by: INTERNAL MEDICINE

## 2020-02-25 PROCEDURE — 80053 COMPREHEN METABOLIC PANEL: CPT | Performed by: INTERNAL MEDICINE

## 2020-02-25 PROCEDURE — 81003 URINALYSIS AUTO W/O SCOPE: CPT | Performed by: INTERNAL MEDICINE

## 2020-02-25 PROCEDURE — 96416 CHEMO PROLONG INFUSE W/PUMP: CPT | Performed by: INTERNAL MEDICINE

## 2020-02-25 RX ORDER — HEPARIN SODIUM (PORCINE) LOCK FLUSH IV SOLN 100 UNIT/ML 100 UNIT/ML
5 SOLUTION INTRAVENOUS
Status: DISCONTINUED | OUTPATIENT
Start: 2020-02-25 | End: 2020-02-25 | Stop reason: HOSPADM

## 2020-02-25 RX ORDER — DIPHENHYDRAMINE HYDROCHLORIDE 50 MG/ML
50 INJECTION INTRAMUSCULAR; INTRAVENOUS
Status: CANCELLED
Start: 2020-02-25

## 2020-02-25 RX ORDER — ALBUTEROL SULFATE 0.83 MG/ML
2.5 SOLUTION RESPIRATORY (INHALATION)
Status: CANCELLED | OUTPATIENT
Start: 2020-02-25

## 2020-02-25 RX ORDER — ALBUTEROL SULFATE 90 UG/1
1-2 AEROSOL, METERED RESPIRATORY (INHALATION)
Status: CANCELLED
Start: 2020-02-25

## 2020-02-25 RX ORDER — SODIUM CHLORIDE 9 MG/ML
1000 INJECTION, SOLUTION INTRAVENOUS CONTINUOUS PRN
Status: CANCELLED
Start: 2020-02-25

## 2020-02-25 RX ORDER — EPINEPHRINE 0.3 MG/.3ML
0.3 INJECTION SUBCUTANEOUS EVERY 5 MIN PRN
Status: CANCELLED | OUTPATIENT
Start: 2020-02-25

## 2020-02-25 RX ORDER — PALONOSETRON 0.05 MG/ML
0.25 INJECTION, SOLUTION INTRAVENOUS ONCE
Status: CANCELLED
Start: 2020-02-25

## 2020-02-25 RX ORDER — ZOLEDRONIC ACID 0.04 MG/ML
4 INJECTION, SOLUTION INTRAVENOUS ONCE
Status: COMPLETED | OUTPATIENT
Start: 2020-02-25 | End: 2020-02-25

## 2020-02-25 RX ORDER — MEPERIDINE HYDROCHLORIDE 25 MG/ML
25 INJECTION INTRAMUSCULAR; INTRAVENOUS; SUBCUTANEOUS EVERY 30 MIN PRN
Status: CANCELLED | OUTPATIENT
Start: 2020-02-25

## 2020-02-25 RX ORDER — EPINEPHRINE 1 MG/ML
0.3 INJECTION, SOLUTION INTRAMUSCULAR; SUBCUTANEOUS EVERY 5 MIN PRN
Status: CANCELLED | OUTPATIENT
Start: 2020-02-25

## 2020-02-25 RX ORDER — NALOXONE HYDROCHLORIDE 0.4 MG/ML
.1-.4 INJECTION, SOLUTION INTRAMUSCULAR; INTRAVENOUS; SUBCUTANEOUS
Status: CANCELLED | OUTPATIENT
Start: 2020-02-25

## 2020-02-25 RX ORDER — METHYLPREDNISOLONE SODIUM SUCCINATE 125 MG/2ML
125 INJECTION, POWDER, LYOPHILIZED, FOR SOLUTION INTRAMUSCULAR; INTRAVENOUS
Status: CANCELLED
Start: 2020-02-25

## 2020-02-25 RX ORDER — PALONOSETRON 0.05 MG/ML
0.25 INJECTION, SOLUTION INTRAVENOUS ONCE
Status: COMPLETED | OUTPATIENT
Start: 2020-02-25 | End: 2020-02-25

## 2020-02-25 RX ORDER — LORAZEPAM 2 MG/ML
0.5 INJECTION INTRAMUSCULAR EVERY 4 HOURS PRN
Status: CANCELLED
Start: 2020-02-25

## 2020-02-25 RX ORDER — OLANZAPINE 2.5 MG/1
2.5 TABLET, FILM COATED ORAL AT BEDTIME
Qty: 30 TABLET | Refills: 3 | Status: SHIPPED | OUTPATIENT
Start: 2020-02-25 | End: 2021-01-01

## 2020-02-25 RX ADMIN — PALONOSETRON 0.25 MG: 0.05 INJECTION, SOLUTION INTRAVENOUS at 10:00

## 2020-02-25 RX ADMIN — Medication 250 ML: at 09:41

## 2020-02-25 RX ADMIN — HEPARIN SODIUM (PORCINE) LOCK FLUSH IV SOLN 100 UNIT/ML 5 ML: 100 SOLUTION at 08:13

## 2020-02-25 RX ADMIN — ZOLEDRONIC ACID 4 MG: 0.04 INJECTION, SOLUTION INTRAVENOUS at 09:42

## 2020-02-25 ASSESSMENT — PAIN SCALES - GENERAL: PAINLEVEL: NO PAIN (1)

## 2020-02-25 NOTE — NURSING NOTE
"Oncology Rooming Note    February 25, 2020 8:36 AM   Alannah Wynn is a 69 year old female who presents for:    Chief Complaint   Patient presents with     Oncology Clinic Visit     Initial Vitals: /73   Pulse 81   Temp 98.1  F (36.7  C) (Oral)   Resp 18   Wt 70.9 kg (156 lb 4.8 oz)   LMP 11/27/2001 (Approximate)   SpO2 97%   BMI 31.72 kg/m   Estimated body mass index is 31.72 kg/m  as calculated from the following:    Height as of 1/13/20: 1.495 m (4' 10.86\").    Weight as of this encounter: 70.9 kg (156 lb 4.8 oz). Body surface area is 1.72 meters squared.  No Pain (1) Comment: Data Unavailable   Patient's last menstrual period was 11/27/2001 (approximate).  Allergies reviewed: Yes  Medications reviewed: Yes    Medications: MEDICATION REFILLS NEEDED TODAY. Provider was notified.  Pharmacy name entered into [facility]:    [email or web address] #[id number] - [place], [place] - [address] AT [address] [facility]ARM[facility] [place], [place] - [address], [address]    Clinical concerns: results of CT scan MD was notified.      ERIK GONZALES RN              "

## 2020-02-25 NOTE — PROGRESS NOTES
"Infusion Nursing Note:  Alannah Wynn presents today for C7 oxaliplatin/avastin/zometa + 5FU pump.    Patient seen by provider today: Yes: Dr. Velazquez   present during visit today: Not Applicable.    Note:     Intravenous Access:  Implanted Port.    Treatment Conditions:  Lab Results   Component Value Date    HGB 11.2 02/25/2020     Lab Results   Component Value Date    WBC 11.6 02/25/2020      Lab Results   Component Value Date    ANEU 7.4 02/25/2020     Lab Results   Component Value Date     02/25/2020      Lab Results   Component Value Date     02/25/2020                   Lab Results   Component Value Date    POTASSIUM 3.8 02/25/2020           No results found for: MAG         Lab Results   Component Value Date    CR 0.54 02/25/2020                   Lab Results   Component Value Date    LAUREN 8.3 02/25/2020                Lab Results   Component Value Date    BILITOTAL 0.2 02/25/2020           Lab Results   Component Value Date    ALBUMIN 3.1 02/25/2020                    Lab Results   Component Value Date    ALT 30 02/25/2020           Lab Results   Component Value Date    AST 22 02/25/2020       Results reviewed, labs MET treatment parameters, ok to proceed with treatment.      Post Infusion Assessment:  Patient tolerated infusion without incident.  Site patent and intact, free from redness, edema or discomfort.  No evidence of extravasations.     Prior to discharge: Port is secured in place with tegaderm and flushed with 10cc NS with positive blood return noted.  Continuous home infusion CADD pump connected.    All connectors secured in place and clamps taped open.    Pump started, \"running\" noted on display (CADD): YES.  Patient instructed to call our clinic or [facility] with any questions or concerns at home.  Patient verbalized understanding.    Patient set up for pump disconnect at our clinic on Thursday @ 11:30.            Discharge Plan:   Discharge instructions " reviewed with: Patient and Family.  Patient and/or family verbalized understanding of discharge instructions and all questions answered.  Patient discharged in stable condition accompanied by: .  Departure Mode: Ambulatory.    Ernestina Melissa RN

## 2020-02-25 NOTE — PATIENT INSTRUCTIONS
Chemo and zometa today    Cont calcium and D    RTC on 3/9 to see Felipa and I will see her back on 3/24/2020 and 4/7/2020.    Start Zofran every 8 hrs from Sunday    Start Zyprexa 2.5 mg at bedtime

## 2020-02-25 NOTE — LETTER
"    2/25/2020         RE: Alannah Wynn  [address]  [place] [address]        Dear Colleague,    Thank you for referring your patient, Alannah Wynn, to the [facility]. Please see a copy of my visit note below.    ONCOLOGY FOLLOW UP NOTE    2/25/2020     Patient was being followed by Dr. Tavarez and saw her on 11/13/2019.  Now she is transferring her care to me.  I have reviewed her previous records and have copied and updated from prior notes.      DIAGNOSIS:    10/2019 dx adenoCa of rectum,  MMR intact- NGS Negative for KRAS, NRAS and BRAF. PD L1 25%.  Baseline CEA 10.  Stage IV disease with pulmonary and bony mets      HISTORY OF ONCOLOGY ILLNESS:    She has had intermittent hematochezia and anorectal irritation since 2011, that have been managed as \"hemorrhoids\". These symptoms became persistent and progressive since early thi year. Her anorectal pain is managed with Tylenol. Denies fevers, chills, or unintentional weight loss. Does have intermittent urinary and fecal urgency but no incontinence per se.    Diagnostic colonoscopy on 10/18/19 for rectal bleeding showed \"fungating and ulcerated non-obstructing mass was found in the distal rectum <1 cm from the anal verge. The mass was partially circumferential (involving one-half of the lumen circumference). The mass measured four cm in length. In addition, its diameter measured six mm.  Pathology showed invasive moderately differentiated adenocarcinoma with intact mismatch repair protein expression, PD L1 25% and NGS panel showed NO MUTATIONS in KRAS, NRAS and BRAF.      Pelvic MRI 11/2019 found fungating polypoid mid/lower rectal mass with luminal narrowing as well as innumerable enlarged lymph nodes as described above with a conglomerate of suspicious lymph nodes abutting the anterior peritoneal reflection. Stage cT3d N2.     PET 11/2019 found   1. Hypermetabolic mass in the distal rectum, SUV 14 corresponding to the patient's " known rectal cancer.  2. Multiple hypermetabolic pelvic lymph nodes, compatible metastatic disease, SUV 5-6.  3. FDG avid pulmonary nodule measuring 7 mm in the left lower lobe, concerning for metastatic disease. Additional FDG avid pulmonary nodules suspicious for metastatic disease, SUV around 4.  4. Osteoblastic metastatic lesion in the L3 vertebral body, SUV 6.8    She started palliative FOLFOX/Avastin on 11/25/2019.  Cycle #2 given on 12/9/2019.  Cycle #3 scheduled for 12/24/2019.  C#6 on 2/10/2020.    Repeat CT CAP on 2/21/2020 shows good response to therapy with improvement in the rectal mass, mesorectal and superior rectal nodularity as well as resolution of right internal iliac lymphadenopathy.  There is slight improvement in the dominant posterior left lower lobe lung nodule and several of the lung nodules are is stable.  There is increase in size of the sclerotic lesion of the left aspect of L3 vertebral body without pathological fracture and this is likely due to treatment effect.    CEA on 2/10/2020 was down to 3.3.    Cycle #7 FOLFOX/Avastin 2/25/2020.      Because of bone metastatic disease, she was started on Zometa on 11/25/2019. We will give it every 3 months.             INTERVAL HISTORY:     She comes in today accompanied by her  and and son and overall she is tolerating chemotherapy fairly.  For the first week after chemotherapy she gets tiredness and also poor appetite and nausea and vomiting.  She mentions that she has been using Compazine every 6 hours and occasional Zofran and she takes Ativan at night.  She has been trying to drink fluids and Ensure.  She lost some weight during the last 2 weeks.  Last week has been really good without any nausea and vomiting with good energy.  She denies any diarrhea or constipation.  No bleeding.  Overall the rectal pain has significantly improved and now she rates it as 1 out of 10 and she is not taking any medications for it.  Denies any  infections.  She has cold sensitivity for 3 days but denies any neuropathy outside of cold sensitivity.  No new swellings.  No trouble breathing.       ECOG 1    ROS:  Rest of the comprehensive review of the system was essentially unremarkable.      I reviewed with her history in epic as below.    PAST MEDICAL HISTORY  Reflux, hemorrhoid, hepatitis B?  Overactive bladder  Active Ambulatory Problems     Diagnosis Date Noted     External hemorrhoids 04/29/2016     Non morbid obesity due to excess calories 04/29/2016     Hepatitis B immune 04/29/2016     Screening for cervical cancer 04/29/2016     CARDIOVASCULAR SCREENING; LDL GOAL LESS THAN 160 04/29/2016     Gastroesophageal reflux disease 04/29/2016     Overactive bladder 07/21/2017     Nevus of left lower leg- undetermined behavior 12/27/2018     Metastasis from rectal cancer (H) 11/13/2019     Bone metastasis (H) 11/13/2019     Nausea 11/27/2019     Chemotherapy-induced neutropenia (H) 12/24/2019     Resolved Ambulatory Problems     Diagnosis Date Noted     Urgency of urination 04/29/2016     Need for vaccination against Streptococcus pneumoniae 04/29/2016     Morbid obesity (H) 07/21/2017     Obesity (BMI 35.0-39.9) with comorbidity (H) 12/27/2018     No Additional Past Medical History       MEDICATIONS:  Current Outpatient Medications   Medication     Ascorbic Acid (VITAMIN C) 500 MG CAPS     B Complex Vitamins (B COMPLEX-B12 PO)     cholecalciferol (VITAMIN D3) 400 unit (10 mcg) TABS tablet     Fesoterodine Fumarate 8 MG TB24     lidocaine-prilocaine (EMLA) 2.5-2.5 % external cream     LORazepam (ATIVAN) 0.5 MG tablet     Lutein 20 MG CAPS     mirabegron (MYRBETRIQ) 50 MG 24 hr tablet     Multiple vitamin TABS     omeprazole (PRILOSEC) 20 MG DR capsule     ondansetron (ZOFRAN) 8 MG tablet     prochlorperazine (COMPAZINE) 10 MG tablet     solifenacin (VESICARE) 10 MG tablet     docusate sodium (COLACE) 100 MG capsule     naloxone (NARCAN) 4 MG/0.1ML nasal spray      traMADol (ULTRAM) 50 MG tablet     Current Facility-Administered Medications   Medication     lidocaine (XYLOCAINE) 5 % ointment     Facility-Administered Medications Ordered in Other Visits   Medication     heparin 100 UNIT/ML injection 5 mL     sodium chloride (PF) 0.9% PF flush 10-20 mL     ALLERGY:     Allergies   Allergen Reactions     Penicillins      Sulfa Drugs             SOCIAL HISTORY:  She is a [place] immigrants to [place].  Denies smoking denies alcohol abuse.  2 of her sons are doctors.    FAMILY HISTORY:   Denies any family history of cancer.       PHYSICAL EXAMINATION:     /73   Pulse 81   Temp 98.1  F (36.7  C) (Oral)   Resp 18   Wt 70.9 kg (156 lb 4.8 oz)   LMP 11/27/2001 (Approximate)   SpO2 97%   BMI 31.72 kg/m        Wt Readings from Last 4 Encounters:   02/25/20 70.9 kg (156 lb 4.8 oz)   02/10/20 74.5 kg (164 lb 4.8 oz)   01/27/20 73.7 kg (162 lb 8 oz)   01/13/20 74.2 kg (163 lb 8 oz)     CONSTITUTIONAL: No apparent distress  EYES: PERRLA, without pallor or jaundice  ENT/MOUTH: Ears unremarkable. No oral lesions  CVS: s1s2 normal  RESPIRATORY: Chest is clear  GI: Abdomen is benign  NEURO: Alert and oriented ×3  INTEGUMENT: no concerning skin rashes   LYMPHATIC: no palpable lymphadenopathy  MUSCULOSKELETAL: Unremarkable. No bony tenderness.   EXTREMITIES: no pedal edema  PSYCH: Mentation, mood and affect are appropriate          LABS/IMAGING/PATHOLOGY:    Reviewed    Results for MARY SCHULER (MRN [id number]) as of 2/25/2020 08:44   Ref. Range 2/25/2020 08:06 2/25/2020 08:09   Sodium Latest Ref Range: 133 - 144 mmol/L 140    Potassium Latest Ref Range: 3.4 - 5.3 mmol/L 3.8    Chloride Latest Ref Range: 94 - 109 mmol/L 110 (H)    Carbon Dioxide Latest Ref Range: 20 - 32 mmol/L 26    Urea Nitrogen Latest Ref Range: 7 - 30 mg/dL 14    Creatinine Latest Ref Range: 0.52 - 1.04 mg/dL 0.54    GFR Estimate Latest Ref Range: >60 mL/min/1.73_m2 >90    GFR Estimate If Black Latest  Ref Range: >60 mL/min/1.73_m2 >90    Calcium Latest Ref Range: 8.5 - 10.1 mg/dL 8.3 (L)    Anion Gap Latest Ref Range: 3 - 14 mmol/L 4    Albumin Latest Ref Range: 3.4 - 5.0 g/dL 3.1 (L)    Protein Total Latest Ref Range: 6.8 - 8.8 g/dL 6.7 (L)    Bilirubin Total Latest Ref Range: 0.2 - 1.3 mg/dL 0.2    Alkaline Phosphatase Latest Ref Range: 40 - 150 U/L 146    ALT Latest Ref Range: 0 - 50 U/L 30    AST Latest Ref Range: 0 - 45 U/L 22    Glucose Latest Ref Range: 70 - 99 mg/dL 88    WBC Latest Ref Range: 4.0 - 11.0 10e9/L 11.6 (H)    Hemoglobin Latest Ref Range: 11.7 - 15.7 g/dL 11.2 (L)    Hematocrit Latest Ref Range: 35.0 - 47.0 % 34.1 (L)    Platelet Count Latest Ref Range: 150 - 450 10e9/L 189    RBC Count Latest Ref Range: 3.8 - 5.2 10e12/L 3.65 (L)    MCV Latest Ref Range: 78 - 100 fl 93    MCH Latest Ref Range: 26.5 - 33.0 pg 30.7    MCHC Latest Ref Range: 31.5 - 36.5 g/dL 32.8    RDW Latest Ref Range: 10.0 - 15.0 % 18.6 (H)    Diff Method Unknown Automated Method    % Neutrophils Latest Units: % 64.0    % Lymphocytes Latest Units: % 23.5    % Monocytes Latest Units: % 5.9    % Eosinophils Latest Units: % 3.2    % Basophils Latest Units: % 0.9    % Immature Granulocytes Latest Units: % 2.5    Absolute Neutrophil Latest Ref Range: 1.6 - 8.3 10e9/L 7.4    Absolute Lymphocytes Latest Ref Range: 0.8 - 5.3 10e9/L 2.7    Absolute Monocytes Latest Ref Range: 0.0 - 1.3 10e9/L 0.7    Absolute Eosinophils Latest Ref Range: 0.0 - 0.7 10e9/L 0.4    Absolute Basophils Latest Ref Range: 0.0 - 0.2 10e9/L 0.1    Abs Immature Granulocytes Latest Ref Range: 0 - 0.4 10e9/L 0.3    Protein Albumin Urine Latest Ref Range: NEG^Negative mg/dL  Negative       Results for SCHULERAMADOR HICKEYHALEIGH FRANCISCO (MRN [id number]) as of 2/25/2020 07:57   Ref. Range 11/4/2019 11:49 12/24/2019 07:40 12/30/2019 10:00 2/10/2020 08:15   CEA Latest Ref Range: 0 - 2.5 ug/L 10.0 (H) 8.8 (H) 7.6 (H) 3.3 (H)       CT CHEST/ABDOMEN/PELVIS W CONTRAST 2/21/2020 8:57  AM     CLINICAL HISTORY: Rectal cancer- follow up to current treatment.;  Metastasis from rectal cancer (H); Metastasis from rectal cancer (H);  Bone metastasis (H)     TECHNIQUE: CT scan of the chest, abdomen, and pelvis was performed  following injection of IV contrast. Multiplanar reformats were  obtained. Dose reduction techniques were used.   CONTRAST: 100 mL isovue 370     COMPARISON: PET CT 11/7/2019, CT chest, abdomen and pelvis 11/1/2019,  MR pelvis 11/1/2019     FINDINGS:   LUNGS AND PLEURA: The central airways are patent. Improvement in  noncalcified nodule in the posterior left lower lobe measuring 5 mm  series 8/image 113, previously 7 mm. Stable noncalcified nodule in the  anterior left upper lobe measuring 4 mm series 8/image 52. Stable  noncalcified nodule in the lateral left upper lobe measuring 2 mm  series 8/image 59. Stable right middle lobe noncalcified nodule  measuring 3 mm series 8/image 84. Resolution of lateral right upper  lobe noncalcified nodule.     No new or enlarging noncalcified pulmonary nodules.     No pleural effusion, pneumothorax or focal consolidation.     MEDIASTINUM/AXILLAE: No axillary, mediastinal or hilar  lymphadenopathy.     There is a small sliding-type hiatal hernia.     Heart size is normal without significant pericardial effusion. The  thoracic aorta is normal in caliber.     HEPATOBILIARY: No focal hepatic lesion. Cholecystectomy. No biliary  ductal dilatation.     PANCREAS: Normal.     SPLEEN: Normal.     ADRENAL GLANDS: Normal.     KIDNEYS/BLADDER: Normal.     BOWEL: Small and large bowel loops are normal in caliber without  obstruction. Scattered pancolonic diverticula without acute  diverticulitis. The appendix is normal in appearance. Significant  improvement in rectal mass, mesorectal and superior rectal nodularity.     PELVIC ORGANS: Uterus and adnexa unremarkable.     ADDITIONAL FINDINGS: Abdominal aorta normal in caliber with mild mixed  atheromatous  plaque. Major portal veins are patent. No free fluid or  pneumoperitoneum. No new abdominal or pelvic lymphadenopathy.  Resolution of right internal iliac adenopathy.     MUSCULOSKELETAL: Increase in size of sclerotic lesion at the left  aspect of L3 vertebral body without pathologic fracture.                                                                      IMPRESSION:  1.  Significant improvement in rectal mass and perirectal nodularity.  2.  Resolution of right internal iliac lymphadenopathy.  3.  Slight improvement in dominant posterior left lower lobe nodule,  previously hypermetabolic, consistent with pulmonary metastatic  disease.  4.  A few additional stable bilateral noncalcified pulmonary nodules  remain indeterminate. Attention on follow-up is recommended.  5.  Increase in size of presacral hypermetabolic L3 sclerotic lesion,  may be due to treatment effect.  6.  No new abdominopelvic mass or evidence of hepatic metastatic  disease.    ASSESSMENT AND PLAN:      Metastatic rectal adenocarcinoma, MSI intact, K-aimee wild type, PDL 1 + 25% with metastasis to the lungs, bones and pelvic lymph nodes.      She started palliative chemotherapy with FOLFOX/Avastin on 11/25/2019.      She has received 6 cycles of chemotherapy.  With cycle #3, we stopped 5-FU bolus because of excessive tiredness.    Repeat CT CAP on 2/21/2020 shows good response to therapy with improvement in the rectal mass, mesorectal and superior rectal nodularity as well as resolution of right internal iliac lymphadenopathy.  There is slight improvement in the dominant posterior left lower lobe lung nodule and several of the lung nodules are is stable.  There is increase in size of the sclerotic lesion of the left aspect of L3 vertebral body without pathological fracture and this is likely due to treatment effect.    CEA on 2/10/2020 was down to 3.3.    Overall she is tolerating chemotherapy fairly and we will continue the same  chemotherapy.  She will get cycle #7 today.  Nausea.    Nausea and vomiting.  This has been her main complaint and it lasts for the first week after chemotherapy.  She is taking Decadron, Aloxi and Emend as premedication and she also gets Emend on day 3 which has helped to some extent.  I advised her to start taking Zofran every 8 hours on day 3 and continue the Compazine every 6 hours as she is doing.  I would also add Zyprexa 2.5 mg at night.  She will continue to take as needed Ativan.  If this does not help then we can potentially give her Decadron for 3 to 5 days to help with the nausea.    Bone metastasis.  Started Zometa on 11/25/2019.  Overall bone disease seems stable.  The increased sclerosis seen at L3 vertebra is likely a treatment effect.  We will give her Zometa today and then every 3 months.  Continue vitamin D and calcium.     Fatigue.  From chemotherapy.  I hope with the improvement of the nausea and vomiting with the plan mentioned above, her overall feeling of general wellbeing would also improve.  If this does not improve then I recommend cutting down on the chemotherapy dose by 10%.    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  It lasts about 3 days.  She does not have neuropathy at this time.  Continue to monitor.      Anemia.  This is mild and due to chemotherapy.  Continue to observe.    We did not address the following today.    Discussion regarding health care directive  We discussed that it is important that she completes health care directive which would help in making sure that her wishes are followed about her treatment care in case she is not able to make a decision for herself.  We gave her information regarding that.    RTC as scheduled    RTC on 3/9 to see Felipa and I will see her back on 3/24/2020 and 4/7/2020.      All questions answered.  She is agreeable and comfortable with the plan.    Charline Velazquez MD      Again, thank you for allowing me to participate in the care of  your patient.        Sincerely,        Charline Velazquez MD

## 2020-02-25 NOTE — PROGRESS NOTES
"ONCOLOGY FOLLOW UP NOTE    2/25/2020     Patient was being followed by Dr. Tavarez and saw her on 11/13/2019.  Now she is transferring her care to me.  I have reviewed her previous records and have copied and updated from prior notes.      DIAGNOSIS:    10/2019 dx adenoCa of rectum,  MMR intact- NGS Negative for KRAS, NRAS and BRAF. PD L1 25%.  Baseline CEA 10.  Stage IV disease with pulmonary and bony mets      HISTORY OF ONCOLOGY ILLNESS:    She has had intermittent hematochezia and anorectal irritation since 2011, that have been managed as \"hemorrhoids\". These symptoms became persistent and progressive since early thi year. Her anorectal pain is managed with Tylenol. Denies fevers, chills, or unintentional weight loss. Does have intermittent urinary and fecal urgency but no incontinence per se.    Diagnostic colonoscopy on 10/18/19 for rectal bleeding showed \"fungating and ulcerated non-obstructing mass was found in the distal rectum <1 cm from the anal verge. The mass was partially circumferential (involving one-half of the lumen circumference). The mass measured four cm in length. In addition, its diameter measured six mm.  Pathology showed invasive moderately differentiated adenocarcinoma with intact mismatch repair protein expression, PD L1 25% and NGS panel showed NO MUTATIONS in KRAS, NRAS and BRAF.      Pelvic MRI 11/2019 found fungating polypoid mid/lower rectal mass with luminal narrowing as well as innumerable enlarged lymph nodes as described above with a conglomerate of suspicious lymph nodes abutting the anterior peritoneal reflection. Stage cT3d N2.     PET 11/2019 found   1. Hypermetabolic mass in the distal rectum, SUV 14 corresponding to the patient's known rectal cancer.  2. Multiple hypermetabolic pelvic lymph nodes, compatible metastatic disease, SUV 5-6.  3. FDG avid pulmonary nodule measuring 7 mm in the left lower lobe, concerning for metastatic disease. Additional FDG avid pulmonary nodules " suspicious for metastatic disease, SUV around 4.  4. Osteoblastic metastatic lesion in the L3 vertebral body, SUV 6.8    She started palliative FOLFOX/Avastin on 11/25/2019.  Cycle #2 given on 12/9/2019.  Cycle #3 scheduled for 12/24/2019.  C#6 on 2/10/2020.    Repeat CT CAP on 2/21/2020 shows good response to therapy with improvement in the rectal mass, mesorectal and superior rectal nodularity as well as resolution of right internal iliac lymphadenopathy.  There is slight improvement in the dominant posterior left lower lobe lung nodule and several of the lung nodules are is stable.  There is increase in size of the sclerotic lesion of the left aspect of L3 vertebral body without pathological fracture and this is likely due to treatment effect.    CEA on 2/10/2020 was down to 3.3.    Cycle #7 FOLFOX/Avastin 2/25/2020.      Because of bone metastatic disease, she was started on Zometa on 11/25/2019. We will give it every 3 months.             INTERVAL HISTORY:     She comes in today accompanied by her  and and son and overall she is tolerating chemotherapy fairly.  For the first week after chemotherapy she gets tiredness and also poor appetite and nausea and vomiting.  She mentions that she has been using Compazine every 6 hours and occasional Zofran and she takes Ativan at night.  She has been trying to drink fluids and Ensure.  She lost some weight during the last 2 weeks.  Last week has been really good without any nausea and vomiting with good energy.  She denies any diarrhea or constipation.  No bleeding.  Overall the rectal pain has significantly improved and now she rates it as 1 out of 10 and she is not taking any medications for it.  Denies any infections.  She has cold sensitivity for 3 days but denies any neuropathy outside of cold sensitivity.  No new swellings.  No trouble breathing.       ECOG 1    ROS:  Rest of the comprehensive review of the system was essentially unremarkable.      I  reviewed with her history in epic as below.    PAST MEDICAL HISTORY  Reflux, hemorrhoid, hepatitis B?  Overactive bladder  Active Ambulatory Problems     Diagnosis Date Noted     External hemorrhoids 04/29/2016     Non morbid obesity due to excess calories 04/29/2016     Hepatitis B immune 04/29/2016     Screening for cervical cancer 04/29/2016     CARDIOVASCULAR SCREENING; LDL GOAL LESS THAN 160 04/29/2016     Gastroesophageal reflux disease 04/29/2016     Overactive bladder 07/21/2017     Nevus of left lower leg- undetermined behavior 12/27/2018     Metastasis from rectal cancer (H) 11/13/2019     Bone metastasis (H) 11/13/2019     Nausea 11/27/2019     Chemotherapy-induced neutropenia (H) 12/24/2019     Resolved Ambulatory Problems     Diagnosis Date Noted     Urgency of urination 04/29/2016     Need for vaccination against Streptococcus pneumoniae 04/29/2016     Morbid obesity (H) 07/21/2017     Obesity (BMI 35.0-39.9) with comorbidity (H) 12/27/2018     No Additional Past Medical History       MEDICATIONS:  Current Outpatient Medications   Medication     Ascorbic Acid (VITAMIN C) 500 MG CAPS     B Complex Vitamins (B COMPLEX-B12 PO)     cholecalciferol (VITAMIN D3) 400 unit (10 mcg) TABS tablet     Fesoterodine Fumarate 8 MG TB24     lidocaine-prilocaine (EMLA) 2.5-2.5 % external cream     LORazepam (ATIVAN) 0.5 MG tablet     Lutein 20 MG CAPS     mirabegron (MYRBETRIQ) 50 MG 24 hr tablet     Multiple vitamin TABS     omeprazole (PRILOSEC) 20 MG DR capsule     ondansetron (ZOFRAN) 8 MG tablet     prochlorperazine (COMPAZINE) 10 MG tablet     solifenacin (VESICARE) 10 MG tablet     docusate sodium (COLACE) 100 MG capsule     naloxone (NARCAN) 4 MG/0.1ML nasal spray     traMADol (ULTRAM) 50 MG tablet     Current Facility-Administered Medications   Medication     lidocaine (XYLOCAINE) 5 % ointment     Facility-Administered Medications Ordered in Other Visits   Medication     heparin 100 UNIT/ML injection 5 mL      sodium chloride (PF) 0.9% PF flush 10-20 mL     ALLERGY:     Allergies   Allergen Reactions     Penicillins      Sulfa Drugs             SOCIAL HISTORY:  She is a [place] immigrants to [place].  Denies smoking denies alcohol abuse.  2 of her sons are doctors.    FAMILY HISTORY:   Denies any family history of cancer.       PHYSICAL EXAMINATION:     /73   Pulse 81   Temp 98.1  F (36.7  C) (Oral)   Resp 18   Wt 70.9 kg (156 lb 4.8 oz)   LMP 11/27/2001 (Approximate)   SpO2 97%   BMI 31.72 kg/m       Wt Readings from Last 4 Encounters:   02/25/20 70.9 kg (156 lb 4.8 oz)   02/10/20 74.5 kg (164 lb 4.8 oz)   01/27/20 73.7 kg (162 lb 8 oz)   01/13/20 74.2 kg (163 lb 8 oz)     CONSTITUTIONAL: No apparent distress  EYES: PERRLA, without pallor or jaundice  ENT/MOUTH: Ears unremarkable. No oral lesions  CVS: s1s2 normal  RESPIRATORY: Chest is clear  GI: Abdomen is benign  NEURO: Alert and oriented ×3  INTEGUMENT: no concerning skin rashes   LYMPHATIC: no palpable lymphadenopathy  MUSCULOSKELETAL: Unremarkable. No bony tenderness.   EXTREMITIES: no pedal edema  PSYCH: Mentation, mood and affect are appropriate          LABS/IMAGING/PATHOLOGY:    Reviewed    Results for SCHULERMARY (MRN [id number]) as of 2/25/2020 08:44   Ref. Range 2/25/2020 08:06 2/25/2020 08:09   Sodium Latest Ref Range: 133 - 144 mmol/L 140    Potassium Latest Ref Range: 3.4 - 5.3 mmol/L 3.8    Chloride Latest Ref Range: 94 - 109 mmol/L 110 (H)    Carbon Dioxide Latest Ref Range: 20 - 32 mmol/L 26    Urea Nitrogen Latest Ref Range: 7 - 30 mg/dL 14    Creatinine Latest Ref Range: 0.52 - 1.04 mg/dL 0.54    GFR Estimate Latest Ref Range: >60 mL/min/1.73_m2 >90    GFR Estimate If Black Latest Ref Range: >60 mL/min/1.73_m2 >90    Calcium Latest Ref Range: 8.5 - 10.1 mg/dL 8.3 (L)    Anion Gap Latest Ref Range: 3 - 14 mmol/L 4    Albumin Latest Ref Range: 3.4 - 5.0 g/dL 3.1 (L)    Protein Total Latest Ref Range: 6.8 - 8.8 g/dL 6.7 (L)     Bilirubin Total Latest Ref Range: 0.2 - 1.3 mg/dL 0.2    Alkaline Phosphatase Latest Ref Range: 40 - 150 U/L 146    ALT Latest Ref Range: 0 - 50 U/L 30    AST Latest Ref Range: 0 - 45 U/L 22    Glucose Latest Ref Range: 70 - 99 mg/dL 88    WBC Latest Ref Range: 4.0 - 11.0 10e9/L 11.6 (H)    Hemoglobin Latest Ref Range: 11.7 - 15.7 g/dL 11.2 (L)    Hematocrit Latest Ref Range: 35.0 - 47.0 % 34.1 (L)    Platelet Count Latest Ref Range: 150 - 450 10e9/L 189    RBC Count Latest Ref Range: 3.8 - 5.2 10e12/L 3.65 (L)    MCV Latest Ref Range: 78 - 100 fl 93    MCH Latest Ref Range: 26.5 - 33.0 pg 30.7    MCHC Latest Ref Range: 31.5 - 36.5 g/dL 32.8    RDW Latest Ref Range: 10.0 - 15.0 % 18.6 (H)    Diff Method Unknown Automated Method    % Neutrophils Latest Units: % 64.0    % Lymphocytes Latest Units: % 23.5    % Monocytes Latest Units: % 5.9    % Eosinophils Latest Units: % 3.2    % Basophils Latest Units: % 0.9    % Immature Granulocytes Latest Units: % 2.5    Absolute Neutrophil Latest Ref Range: 1.6 - 8.3 10e9/L 7.4    Absolute Lymphocytes Latest Ref Range: 0.8 - 5.3 10e9/L 2.7    Absolute Monocytes Latest Ref Range: 0.0 - 1.3 10e9/L 0.7    Absolute Eosinophils Latest Ref Range: 0.0 - 0.7 10e9/L 0.4    Absolute Basophils Latest Ref Range: 0.0 - 0.2 10e9/L 0.1    Abs Immature Granulocytes Latest Ref Range: 0 - 0.4 10e9/L 0.3    Protein Albumin Urine Latest Ref Range: NEG^Negative mg/dL  Negative       Results for MARY SCHULER (MRN [id number]) as of 2/25/2020 07:57   Ref. Range 11/4/2019 11:49 12/24/2019 07:40 12/30/2019 10:00 2/10/2020 08:15   CEA Latest Ref Range: 0 - 2.5 ug/L 10.0 (H) 8.8 (H) 7.6 (H) 3.3 (H)       CT CHEST/ABDOMEN/PELVIS W CONTRAST 2/21/2020 8:57 AM     CLINICAL HISTORY: Rectal cancer- follow up to current treatment.;  Metastasis from rectal cancer (H); Metastasis from rectal cancer (H);  Bone metastasis (H)     TECHNIQUE: CT scan of the chest, abdomen, and pelvis was performed  following  injection of IV contrast. Multiplanar reformats were  obtained. Dose reduction techniques were used.   CONTRAST: 100 mL isovue 370     COMPARISON: PET CT 11/7/2019, CT chest, abdomen and pelvis 11/1/2019,  MR pelvis 11/1/2019     FINDINGS:   LUNGS AND PLEURA: The central airways are patent. Improvement in  noncalcified nodule in the posterior left lower lobe measuring 5 mm  series 8/image 113, previously 7 mm. Stable noncalcified nodule in the  anterior left upper lobe measuring 4 mm series 8/image 52. Stable  noncalcified nodule in the lateral left upper lobe measuring 2 mm  series 8/image 59. Stable right middle lobe noncalcified nodule  measuring 3 mm series 8/image 84. Resolution of lateral right upper  lobe noncalcified nodule.     No new or enlarging noncalcified pulmonary nodules.     No pleural effusion, pneumothorax or focal consolidation.     MEDIASTINUM/AXILLAE: No axillary, mediastinal or hilar  lymphadenopathy.     There is a small sliding-type hiatal hernia.     Heart size is normal without significant pericardial effusion. The  thoracic aorta is normal in caliber.     HEPATOBILIARY: No focal hepatic lesion. Cholecystectomy. No biliary  ductal dilatation.     PANCREAS: Normal.     SPLEEN: Normal.     ADRENAL GLANDS: Normal.     KIDNEYS/BLADDER: Normal.     BOWEL: Small and large bowel loops are normal in caliber without  obstruction. Scattered pancolonic diverticula without acute  diverticulitis. The appendix is normal in appearance. Significant  improvement in rectal mass, mesorectal and superior rectal nodularity.     PELVIC ORGANS: Uterus and adnexa unremarkable.     ADDITIONAL FINDINGS: Abdominal aorta normal in caliber with mild mixed  atheromatous plaque. Major portal veins are patent. No free fluid or  pneumoperitoneum. No new abdominal or pelvic lymphadenopathy.  Resolution of right internal iliac adenopathy.     MUSCULOSKELETAL: Increase in size of sclerotic lesion at the left  aspect of L3  vertebral body without pathologic fracture.                                                                      IMPRESSION:  1.  Significant improvement in rectal mass and perirectal nodularity.  2.  Resolution of right internal iliac lymphadenopathy.  3.  Slight improvement in dominant posterior left lower lobe nodule,  previously hypermetabolic, consistent with pulmonary metastatic  disease.  4.  A few additional stable bilateral noncalcified pulmonary nodules  remain indeterminate. Attention on follow-up is recommended.  5.  Increase in size of presacral hypermetabolic L3 sclerotic lesion,  may be due to treatment effect.  6.  No new abdominopelvic mass or evidence of hepatic metastatic  disease.    ASSESSMENT AND PLAN:      Metastatic rectal adenocarcinoma, MSI intact, [place] wild type, PDL 1 + 25% with metastasis to the lungs, bones and pelvic lymph nodes.      She started palliative chemotherapy with FOLFOX/Avastin on 11/25/2019.      She has received 6 cycles of chemotherapy.  With cycle #3, we stopped 5-FU bolus because of excessive tiredness.    Repeat CT CAP on 2/21/2020 shows good response to therapy with improvement in the rectal mass, mesorectal and superior rectal nodularity as well as resolution of right internal iliac lymphadenopathy.  There is slight improvement in the dominant posterior left lower lobe lung nodule and several of the lung nodules are is stable.  There is increase in size of the sclerotic lesion of the left aspect of L3 vertebral body without pathological fracture and this is likely due to treatment effect.    CEA on 2/10/2020 was down to 3.3.    Overall she is tolerating chemotherapy fairly and we will continue the same chemotherapy.  She will get cycle #7 today.  Nausea.    Nausea and vomiting.  This has been her main complaint and it lasts for the first week after chemotherapy.  She is taking Decadron, Aloxi and Emend as premedication and she also gets Emend on day 3 which has  helped to some extent.  I advised her to start taking Zofran every 8 hours on day 3 and continue the Compazine every 6 hours as she is doing.  I would also add Zyprexa 2.5 mg at night.  She will continue to take as needed Ativan.  If this does not help then we can potentially give her Decadron for 3 to 5 days to help with the nausea.    Bone metastasis.  Started Zometa on 11/25/2019.  Overall bone disease seems stable.  The increased sclerosis seen at L3 vertebra is likely a treatment effect.  We will give her Zometa today and then every 3 months.  Continue vitamin D and calcium.     Fatigue.  From chemotherapy.  I hope with the improvement of the nausea and vomiting with the plan mentioned above, her overall feeling of general wellbeing would also improve.  If this does not improve then I recommend cutting down on the chemotherapy dose by 10%.    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  It lasts about 3 days.  She does not have neuropathy at this time.  Continue to monitor.      Anemia.  This is mild and due to chemotherapy.  Continue to observe.    We did not address the following today.    Discussion regarding health care directive  We discussed that it is important that she completes health care directive which would help in making sure that her wishes are followed about her treatment care in case she is not able to make a decision for herself.  We gave her information regarding that.    RTC as scheduled    RTC on 3/9 to see Felipa and I will see her back on 3/24/2020 and 4/7/2020.      All questions answered.  She is agreeable and comfortable with the plan.    Charline Velazquez MD

## 2020-02-25 NOTE — PROGRESS NOTES
SPIRITUAL HEALTH SERVICES Progress Note  [facility]    Visited Alannah Wynn and her  Janiya  in the infusion center to introduce Spiritual Health support here at [place]. Offered reflective conversation, support and affirmation of her journey.       Illness Narrative - Patient shared with me that this diagnosis is fairly new for her, she has had several infusions and the first one was the worst, but now she feels she knows what to expect.       Distress - Nothing other than the cancer diagnosis discussed.       Coping - Patient shared that she gets strength from the love of her family, especially  her  of more than 40 years, and three adult sons, all of whom live close.  She mentioned that her esme also gives her strength.  Her  showed me an angle coin that he carries in his pocket and sleeps with.  They have been members of a [employer].  They were very complimentary of their care team today.       Meaning-Making - Family and esme are very important to her.       Plan -  I gave the patient my contact information and she knows that she can request a Spiritual Health encounter as needed.  They were appreciative of the  visit and welcomed future encounters.     Neelam Harris  Staff

## 2020-02-27 ENCOUNTER — INFUSION THERAPY VISIT (OUTPATIENT)
Dept: INFUSION THERAPY | Facility: CLINIC | Age: 70
End: 2020-02-27
Attending: INTERNAL MEDICINE
Payer: COMMERCIAL

## 2020-02-27 VITALS
SYSTOLIC BLOOD PRESSURE: 121 MMHG | RESPIRATION RATE: 16 BRPM | OXYGEN SATURATION: 98 % | HEART RATE: 87 BPM | DIASTOLIC BLOOD PRESSURE: 83 MMHG | TEMPERATURE: 98.1 F

## 2020-02-27 DIAGNOSIS — C79.9 METASTASIS FROM RECTAL CANCER (H): ICD-10-CM

## 2020-02-27 DIAGNOSIS — C20 METASTASIS FROM RECTAL CANCER (H): ICD-10-CM

## 2020-02-27 DIAGNOSIS — D70.1 CHEMOTHERAPY-INDUCED NEUTROPENIA (H): ICD-10-CM

## 2020-02-27 DIAGNOSIS — C79.51 BONE METASTASIS: Primary | ICD-10-CM

## 2020-02-27 DIAGNOSIS — T45.1X5A CHEMOTHERAPY-INDUCED NEUTROPENIA (H): ICD-10-CM

## 2020-02-27 PROCEDURE — 96361 HYDRATE IV INFUSION ADD-ON: CPT | Mod: 59 | Performed by: NURSE PRACTITIONER

## 2020-02-27 PROCEDURE — 96365 THER/PROPH/DIAG IV INF INIT: CPT | Performed by: NURSE PRACTITIONER

## 2020-02-27 PROCEDURE — 99207 ZZC NO CHARGE LOS: CPT

## 2020-02-27 PROCEDURE — 96377 APPLICATON ON-BODY INJECTOR: CPT | Mod: 59 | Performed by: NURSE PRACTITIONER

## 2020-02-27 RX ADMIN — Medication 1000 ML: at 12:06

## 2020-02-27 ASSESSMENT — PAIN SCALES - GENERAL: PAINLEVEL: NO PAIN (0)

## 2020-02-27 NOTE — PROGRESS NOTES
Infusion Nursing Note:  Alannah Wynn presents today for IVF/ emend/Onpro/pump disconnect.    Patient seen by provider today: No   present during visit today: Not Applicable.    Note: Patient's  stating that he is extremely frustraed with option care and the cadd pump.  Would like to discuss other options with Dr. Velazquez going forward.     Struggling with nausea- only able to drink water and ensure, but then stuggling with diarrhea related to excessive ensure consumption. Encouraged her to eat/drink small frequent meals, and to try gatorade. Patient and  verbalized understanding.   IVF and emend given today per orders.       Intravenous Access:  Implanted Port.    Treatment Conditions:  Not Applicable.      Post Infusion Assessment:  Patient tolerated infusion without incident.  Patient tolerated injection without incident.  Site patent and intact, free from redness, edema or discomfort.  No evidence of extravasations.  Access discontinued per protocol.     ONPRO  Was placed on patient's: back of left arm.    Was placed at 1:15 PM    ONPRO injector device Lot number: [id number]    Patient education included: what patient can expect after application, what colored lights mean on the device, when to remove device, when and where to call with questions or issues and all patients questions answered.    Patient tolerated administration well.      Discharge Plan:   Discharge instructions reviewed with: Patient and Family.  Patient and/or family verbalized understanding of discharge instructions and all questions answered.  Patient discharged in stable condition accompanied by: .  Departure Mode: Ambulatory.    Ernestina Melissa RN

## 2020-03-05 ENCOUNTER — PATIENT OUTREACH (OUTPATIENT)
Dept: ONCOLOGY | Facility: CLINIC | Age: 70
End: 2020-03-05

## 2020-03-05 NOTE — TELEPHONE ENCOUNTER
Orders for chemotherapy pump was faxed to [facility] on 3/4 to their pharmacy and fax confirmed as received.

## 2020-03-09 ENCOUNTER — INFUSION THERAPY VISIT (OUTPATIENT)
Dept: INFUSION THERAPY | Facility: CLINIC | Age: 70
End: 2020-03-09
Payer: COMMERCIAL

## 2020-03-09 ENCOUNTER — ONCOLOGY VISIT (OUTPATIENT)
Dept: ONCOLOGY | Facility: CLINIC | Age: 70
End: 2020-03-09
Payer: COMMERCIAL

## 2020-03-09 VITALS
BODY MASS INDEX: 32.35 KG/M2 | WEIGHT: 159.4 LBS | HEART RATE: 86 BPM | TEMPERATURE: 97.4 F | OXYGEN SATURATION: 98 % | DIASTOLIC BLOOD PRESSURE: 74 MMHG | SYSTOLIC BLOOD PRESSURE: 130 MMHG

## 2020-03-09 DIAGNOSIS — C20 METASTASIS FROM RECTAL CANCER (H): ICD-10-CM

## 2020-03-09 DIAGNOSIS — T45.1X5A CHEMOTHERAPY-INDUCED NEUTROPENIA (H): Primary | ICD-10-CM

## 2020-03-09 DIAGNOSIS — D70.1 CHEMOTHERAPY-INDUCED NEUTROPENIA (H): Primary | ICD-10-CM

## 2020-03-09 DIAGNOSIS — C79.51 BONE METASTASIS: ICD-10-CM

## 2020-03-09 DIAGNOSIS — C79.9 METASTASIS FROM RECTAL CANCER (H): ICD-10-CM

## 2020-03-09 LAB
ALBUMIN SERPL-MCNC: 3.5 G/DL (ref 3.4–5)
ALBUMIN UR-MCNC: 10 MG/DL
ALP SERPL-CCNC: 188 U/L (ref 40–150)
ALT SERPL W P-5'-P-CCNC: 26 U/L (ref 0–50)
ANION GAP SERPL CALCULATED.3IONS-SCNC: 6 MMOL/L (ref 3–14)
AST SERPL W P-5'-P-CCNC: 21 U/L (ref 0–45)
BASOPHILS # BLD AUTO: 0.1 10E9/L (ref 0–0.2)
BASOPHILS NFR BLD AUTO: 0.7 %
BILIRUB SERPL-MCNC: 0.3 MG/DL (ref 0.2–1.3)
BUN SERPL-MCNC: 15 MG/DL (ref 7–30)
CALCIUM SERPL-MCNC: 9 MG/DL (ref 8.5–10.1)
CHLORIDE SERPL-SCNC: 104 MMOL/L (ref 94–109)
CO2 SERPL-SCNC: 26 MMOL/L (ref 20–32)
CREAT SERPL-MCNC: 0.58 MG/DL (ref 0.52–1.04)
DIFFERENTIAL METHOD BLD: ABNORMAL
EOSINOPHIL # BLD AUTO: 0.4 10E9/L (ref 0–0.7)
EOSINOPHIL NFR BLD AUTO: 2.6 %
ERYTHROCYTE [DISTWIDTH] IN BLOOD BY AUTOMATED COUNT: 17.6 % (ref 10–15)
GFR SERPL CREATININE-BSD FRML MDRD: >90 ML/MIN/{1.73_M2}
GLUCOSE SERPL-MCNC: 91 MG/DL (ref 70–99)
HCT VFR BLD AUTO: 39.1 % (ref 35–47)
HGB BLD-MCNC: 12.3 G/DL (ref 11.7–15.7)
IMM GRANULOCYTES # BLD: 0.3 10E9/L (ref 0–0.4)
IMM GRANULOCYTES NFR BLD: 1.6 %
LYMPHOCYTES # BLD AUTO: 3.1 10E9/L (ref 0.8–5.3)
LYMPHOCYTES NFR BLD AUTO: 19.1 %
MCH RBC QN AUTO: 30.9 PG (ref 26.5–33)
MCHC RBC AUTO-ENTMCNC: 31.5 G/DL (ref 31.5–36.5)
MCV RBC AUTO: 98 FL (ref 78–100)
MONOCYTES # BLD AUTO: 1 10E9/L (ref 0–1.3)
MONOCYTES NFR BLD AUTO: 6.3 %
NEUTROPHILS # BLD AUTO: 11.5 10E9/L (ref 1.6–8.3)
NEUTROPHILS NFR BLD AUTO: 69.7 %
PLATELET # BLD AUTO: 183 10E9/L (ref 150–450)
POTASSIUM SERPL-SCNC: 4.1 MMOL/L (ref 3.4–5.3)
PROT SERPL-MCNC: 7.6 G/DL (ref 6.8–8.8)
RBC # BLD AUTO: 3.98 10E12/L (ref 3.8–5.2)
SODIUM SERPL-SCNC: 136 MMOL/L (ref 133–144)
WBC # BLD AUTO: 16.5 10E9/L (ref 4–11)

## 2020-03-09 PROCEDURE — 96367 TX/PROPH/DG ADDL SEQ IV INF: CPT | Performed by: NURSE PRACTITIONER

## 2020-03-09 PROCEDURE — 85025 COMPLETE CBC W/AUTO DIFF WBC: CPT | Performed by: NURSE PRACTITIONER

## 2020-03-09 PROCEDURE — 80053 COMPREHEN METABOLIC PANEL: CPT | Performed by: NURSE PRACTITIONER

## 2020-03-09 PROCEDURE — 96413 CHEMO IV INFUSION 1 HR: CPT | Performed by: NURSE PRACTITIONER

## 2020-03-09 PROCEDURE — 81003 URINALYSIS AUTO W/O SCOPE: CPT | Performed by: NURSE PRACTITIONER

## 2020-03-09 PROCEDURE — 96415 CHEMO IV INFUSION ADDL HR: CPT | Performed by: NURSE PRACTITIONER

## 2020-03-09 PROCEDURE — 96375 TX/PRO/DX INJ NEW DRUG ADDON: CPT | Performed by: NURSE PRACTITIONER

## 2020-03-09 PROCEDURE — 96416 CHEMO PROLONG INFUSE W/PUMP: CPT | Performed by: NURSE PRACTITIONER

## 2020-03-09 PROCEDURE — 99207 ZZC NO CHARGE NURSE ONLY: CPT

## 2020-03-09 PROCEDURE — 96417 CHEMO IV INFUS EACH ADDL SEQ: CPT | Performed by: NURSE PRACTITIONER

## 2020-03-09 PROCEDURE — 99214 OFFICE O/P EST MOD 30 MIN: CPT | Mod: 25 | Performed by: NURSE PRACTITIONER

## 2020-03-09 RX ORDER — MEPERIDINE HYDROCHLORIDE 25 MG/ML
25 INJECTION INTRAMUSCULAR; INTRAVENOUS; SUBCUTANEOUS EVERY 30 MIN PRN
Status: CANCELLED | OUTPATIENT
Start: 2020-03-09

## 2020-03-09 RX ORDER — PALONOSETRON 0.05 MG/ML
0.25 INJECTION, SOLUTION INTRAVENOUS ONCE
Status: CANCELLED
Start: 2020-03-09

## 2020-03-09 RX ORDER — PALONOSETRON 0.05 MG/ML
0.25 INJECTION, SOLUTION INTRAVENOUS ONCE
Status: COMPLETED | OUTPATIENT
Start: 2020-03-09 | End: 2020-03-09

## 2020-03-09 RX ORDER — ALBUTEROL SULFATE 0.83 MG/ML
2.5 SOLUTION RESPIRATORY (INHALATION)
Status: CANCELLED | OUTPATIENT
Start: 2020-03-09

## 2020-03-09 RX ORDER — EPINEPHRINE 0.3 MG/.3ML
0.3 INJECTION SUBCUTANEOUS EVERY 5 MIN PRN
Status: CANCELLED | OUTPATIENT
Start: 2020-03-09

## 2020-03-09 RX ORDER — SODIUM CHLORIDE 9 MG/ML
1000 INJECTION, SOLUTION INTRAVENOUS CONTINUOUS PRN
Status: CANCELLED
Start: 2020-03-09

## 2020-03-09 RX ORDER — DIPHENHYDRAMINE HYDROCHLORIDE 50 MG/ML
50 INJECTION INTRAMUSCULAR; INTRAVENOUS
Status: CANCELLED
Start: 2020-03-09

## 2020-03-09 RX ORDER — NALOXONE HYDROCHLORIDE 0.4 MG/ML
.1-.4 INJECTION, SOLUTION INTRAMUSCULAR; INTRAVENOUS; SUBCUTANEOUS
Status: CANCELLED | OUTPATIENT
Start: 2020-03-09

## 2020-03-09 RX ORDER — METHYLPREDNISOLONE SODIUM SUCCINATE 125 MG/2ML
125 INJECTION, POWDER, LYOPHILIZED, FOR SOLUTION INTRAMUSCULAR; INTRAVENOUS
Status: CANCELLED
Start: 2020-03-09

## 2020-03-09 RX ORDER — LORAZEPAM 2 MG/ML
0.5 INJECTION INTRAMUSCULAR EVERY 4 HOURS PRN
Status: CANCELLED
Start: 2020-03-09

## 2020-03-09 RX ORDER — ALBUTEROL SULFATE 90 UG/1
1-2 AEROSOL, METERED RESPIRATORY (INHALATION)
Status: CANCELLED
Start: 2020-03-09

## 2020-03-09 RX ORDER — EPINEPHRINE 1 MG/ML
0.3 INJECTION, SOLUTION INTRAMUSCULAR; SUBCUTANEOUS EVERY 5 MIN PRN
Status: CANCELLED | OUTPATIENT
Start: 2020-03-09

## 2020-03-09 RX ORDER — HEPARIN SODIUM (PORCINE) LOCK FLUSH IV SOLN 100 UNIT/ML 100 UNIT/ML
5 SOLUTION INTRAVENOUS
Status: DISCONTINUED | OUTPATIENT
Start: 2020-03-09 | End: 2020-03-09 | Stop reason: HOSPADM

## 2020-03-09 RX ADMIN — PALONOSETRON 0.25 MG: 0.05 INJECTION, SOLUTION INTRAVENOUS at 08:59

## 2020-03-09 RX ADMIN — Medication 250 ML: at 08:58

## 2020-03-09 RX ADMIN — HEPARIN SODIUM (PORCINE) LOCK FLUSH IV SOLN 100 UNIT/ML 5 ML: 100 SOLUTION at 07:36

## 2020-03-09 ASSESSMENT — PAIN SCALES - GENERAL: PAINLEVEL: NO PAIN (0)

## 2020-03-09 NOTE — PROGRESS NOTES
"Oncology Follow Up Visit: March 9, 2020     Oncologist: Dr Charline Velazquez  PCP: Earline Mehta    Diagnosis: Stage IV Adenocarcinoma of the rectum   Alannah Wynn is a 70 yo female who presented in 10/2019 with intermittent hematochezia and anorectal irritation since 2011, that have been managed as \"hemorrhoids\".  Diagnostic colonoscopy on 10/18/19 for rectal bleeding showed \"fungating and ulcerated non-obstructing mass was found in the distal rectum <1 cm from the anal verge. The mass was partially circumferential (involving one-half of the lumen circumference). The mass measured four cm in length. In addition, its diameter measured six mm.  Pathology showed invasive moderately differentiated adenocarcinoma with intact mismatch repair protein expression.  Pelvic MRI 11/2019 found fungating polypoid mid/lower rectal mass with luminal narrowing as well as innumerable enlarged lymph nodes as described above with a conglomerate of suspicious lymph nodes abutting the anterior peritoneal reflection. Stage cT3d N2.   Further testing with PET scan proved pulmonarymetastasis and bony metastasis at L3.  Treatment:   11/25/2019 began treatment with FOLFOX/Avastin    Interval History: Ms. Wynn comes to clinic with  for continuation of treatment for her colon cancer with metastasis with FOLFOX/Avastin- due for cycle 8 today. Pt reports she is still seeing loss of appetite x 8 days after treatment but denies that this is nausea-she is getting in 1-2 Ensure on these days only and has lost another 2 pounds this week.  States she is eating very good the second week but is not able to make up the loss from the first week of appetite issues.  She is having some pain to the rectum but does not list a pain level and she is not using any pain medication for it.  She does admit to some weakness for the full 2 weeks and again and shares that she has some cold neuropathy the first week after chemotherapy but is gone after that " time.  She mentions also she is seeing some darkening of her skin lines as related to the medications as well.. Bowels are noted to be mildly constipated.  States she is getting some exercise but tends to be fatigued through treatment..    Rest of comprehensive and complete ROS is reviewed and is negative.   Past Medical History:   Diagnosis Date     Gastroesophageal reflux disease      Current Outpatient Medications   Medication     Ascorbic Acid (VITAMIN C) 500 MG CAPS     B Complex Vitamins (B COMPLEX-B12 PO)     cholecalciferol (VITAMIN D3) 400 unit (10 mcg) TABS tablet     docusate sodium (COLACE) 100 MG capsule     Fesoterodine Fumarate 8 MG TB24     lidocaine-prilocaine (EMLA) 2.5-2.5 % external cream     LORazepam (ATIVAN) 0.5 MG tablet     Lutein 20 MG CAPS     mirabegron (MYRBETRIQ) 50 MG 24 hr tablet     Multiple vitamin TABS     naloxone (NARCAN) 4 MG/0.1ML nasal spray     OLANZapine (ZYPREXA) 2.5 MG tablet     omeprazole (PRILOSEC) 20 MG DR capsule     ondansetron (ZOFRAN) 8 MG tablet     prochlorperazine (COMPAZINE) 10 MG tablet     solifenacin (VESICARE) 10 MG tablet     traMADol (ULTRAM) 50 MG tablet     Current Facility-Administered Medications   Medication     lidocaine (XYLOCAINE) 5 % ointment     Allergies   Allergen Reactions     Penicillins      Sulfa Drugs      Physical Exam:/74   Pulse 86   Temp 97.4  F (36.3  C) (Oral)   Wt 72.3 kg (159 lb 6.4 oz)   LMP 11/27/2001 (Approximate)   SpO2 98%   BMI 32.35 kg/m     ECOG PS- 0-1  Constitutional: Alert, cooperative, and in no distress.   ENT: Eyes bright, No mouth sores  Neck: Supple, No adenopathy.  Cardiac: Heart rate and rhythm is regular and strong without murmur  Respiratory: Breathing easy. Lung sounds clear to auscultation  GI: Abdomen is soft, rounded, non-tender, BS normal. No masses or organomegaly  MS: Muscle tone normal, extremities normal with no edema.   Skin: No suspicious lesions or rashes  Neuro: Sensory grossly WNL,  gait normal.   Lymph: Normal ant/post cervical, axillary, supraclavicular nodes  Psych: Mentation appears normal and affect is normal and bright.    Laboratory Results:   Results for orders placed or performed in visit on 03/09/20   CBC with platelets differential     Status: Abnormal   Result Value Ref Range    WBC 16.5 (H) 4.0 - 11.0 10e9/L    RBC Count 3.98 3.8 - 5.2 10e12/L    Hemoglobin 12.3 11.7 - 15.7 g/dL    Hematocrit 39.1 35.0 - 47.0 %    MCV 98 78 - 100 fl    MCH 30.9 26.5 - 33.0 pg    MCHC 31.5 31.5 - 36.5 g/dL    RDW 17.6 (H) 10.0 - 15.0 %    Platelet Count 183 150 - 450 10e9/L    Diff Method Automated Method     % Neutrophils 69.7 %    % Lymphocytes 19.1 %    % Monocytes 6.3 %    % Eosinophils 2.6 %    % Basophils 0.7 %    % Immature Granulocytes 1.6 %    Absolute Neutrophil 11.5 (H) 1.6 - 8.3 10e9/L    Absolute Lymphocytes 3.1 0.8 - 5.3 10e9/L    Absolute Monocytes 1.0 0.0 - 1.3 10e9/L    Absolute Eosinophils 0.4 0.0 - 0.7 10e9/L    Absolute Basophils 0.1 0.0 - 0.2 10e9/L    Abs Immature Granulocytes 0.3 0 - 0.4 10e9/L   Comprehensive metabolic panel     Status: Abnormal   Result Value Ref Range    Sodium 136 133 - 144 mmol/L    Potassium 4.1 3.4 - 5.3 mmol/L    Chloride 104 94 - 109 mmol/L    Carbon Dioxide 26 20 - 32 mmol/L    Anion Gap 6 3 - 14 mmol/L    Glucose 91 70 - 99 mg/dL    Urea Nitrogen 15 7 - 30 mg/dL    Creatinine 0.58 0.52 - 1.04 mg/dL    GFR Estimate >90 >60 mL/min/[1.73_m2]    GFR Estimate If Black >90 >60 mL/min/[1.73_m2]    Calcium 9.0 8.5 - 10.1 mg/dL    Bilirubin Total 0.3 0.2 - 1.3 mg/dL    Albumin 3.5 3.4 - 5.0 g/dL    Protein Total 7.6 6.8 - 8.8 g/dL    Alkaline Phosphatase 188 (H) 40 - 150 U/L    ALT 26 0 - 50 U/L    AST 21 0 - 45 U/L   Protein qualitative urine     Status: Abnormal   Result Value Ref Range    Protein Albumin Urine 10 (A) NEG^Negative mg/dL     Assessment and Plan:   Stage IV Adenocarcinoma of the rectum-Pt continues treatment with FOLFOX/Avastin since  11/25/2019-due for cycle 8.  The patient continues with appetite changes/nausea and is not able to eat the first week and is showing some weight loss she still rebounds nicely and meets goals to continue on with treatment today.  After repeatedly trying some new ideas to take care of the nausea have felt that this is not a nausea issue but appetite change-no need for change in infusion plan today.  Did discuss options for stimulating appetite first week and also discussed need for supplements if that is all she can use the first several days.  Mildly elevated alk phos also noted today.  Using Neulasta for chemotherapy induced neutropenia  Pt will return in 2 weeks after imaging for follow up with plan prior to continuation with cycle 9  Bone metastasis- pt continuing Zometa every 3 months- due after 5/19/2020. Confirms use of calcium and vitamin D most days. Encouraged regular walks or some kind of exercise each day.    Appetite loss/nausea- continues to state first week with noted loss of appetite and does not feel this is nausea.  She is seeing some weight loss and is not able to make it up the second week anymore.  Encouraged her to use 2 Ensure per day with additional fluids to help maintain after treatment.  Cold neuropathy- noted and continues for first week with use of oxaliplatin but not bothering function an is resolving before next cycle -not interfering with daily routines.  Loose stools-noted 1-2 times daily not using additional medication if needed.  Reminded of need for additional fluids daily with loose stools.  This was a 30 min visit with > 50% in counseling and coordinating care including education and management of concerns.    Felipa Garner,CNP

## 2020-03-09 NOTE — LETTER
"    3/9/2020         RE: Alannah Wynn  [address]  [place] [address]        Dear Colleague,    Thank you for referring your patient, Alannah Wynn, to the [facility]. Please see a copy of my visit note below.    Oncology Follow Up Visit: March 9, 2020     Oncologist: Dr Charline Velazquez  PCP: Earline Mehta    Diagnosis: Stage IV Adenocarcinoma of the rectum   Alannah Wynn is a 68 yo female who presented in 10/2019 with intermittent hematochezia and anorectal irritation since 2011, that have been managed as \"hemorrhoids\".  Diagnostic colonoscopy on 10/18/19 for rectal bleeding showed \"fungating and ulcerated non-obstructing mass was found in the distal rectum <1 cm from the anal verge. The mass was partially circumferential (involving one-half of the lumen circumference). The mass measured four cm in length. In addition, its diameter measured six mm.  Pathology showed invasive moderately differentiated adenocarcinoma with intact mismatch repair protein expression.  Pelvic MRI 11/2019 found fungating polypoid mid/lower rectal mass with luminal narrowing as well as innumerable enlarged lymph nodes as described above with a conglomerate of suspicious lymph nodes abutting the anterior peritoneal reflection. Stage cT3d N2.   Further testing with PET scan proved pulmonarymetastasis and bony metastasis at L3.  Treatment:   11/25/2019 began treatment with FOLFOX/Avastin    Interval History: Ms. Wynn comes to clinic with  for continuation of treatment for her colon cancer with metastasis with FOLFOX/Avastin- due for cycle 8 today. Pt reports she is still seeing loss of appetite x 8 days after treatment but denies that this is nausea-she is getting in 1-2 Ensure on these days only and has lost another 2 pounds this week.  States she is eating very good the second week but is not able to make up the loss from the first week of appetite issues.  She is having some pain to the " rectum but does not list a pain level and she is not using any pain medication for it.  She does admit to some weakness for the full 2 weeks and again and shares that she has some cold neuropathy the first week after chemotherapy but is gone after that time.  She mentions also she is seeing some darkening of her skin lines as related to the medications as well.. Bowels are noted to be mildly constipated.  States she is getting some exercise but tends to be fatigued through treatment..    Rest of comprehensive and complete ROS is reviewed and is negative.   Past Medical History:   Diagnosis Date     Gastroesophageal reflux disease      Current Outpatient Medications   Medication     Ascorbic Acid (VITAMIN C) 500 MG CAPS     B Complex Vitamins (B COMPLEX-B12 PO)     cholecalciferol (VITAMIN D3) 400 unit (10 mcg) TABS tablet     docusate sodium (COLACE) 100 MG capsule     Fesoterodine Fumarate 8 MG TB24     lidocaine-prilocaine (EMLA) 2.5-2.5 % external cream     LORazepam (ATIVAN) 0.5 MG tablet     Lutein 20 MG CAPS     mirabegron (MYRBETRIQ) 50 MG 24 hr tablet     Multiple vitamin TABS     naloxone (NARCAN) 4 MG/0.1ML nasal spray     OLANZapine (ZYPREXA) 2.5 MG tablet     omeprazole (PRILOSEC) 20 MG DR capsule     ondansetron (ZOFRAN) 8 MG tablet     prochlorperazine (COMPAZINE) 10 MG tablet     solifenacin (VESICARE) 10 MG tablet     traMADol (ULTRAM) 50 MG tablet     Current Facility-Administered Medications   Medication     lidocaine (XYLOCAINE) 5 % ointment     Allergies   Allergen Reactions     Penicillins      Sulfa Drugs      Physical Exam:/74   Pulse 86   Temp 97.4  F (36.3  C) (Oral)   Wt 72.3 kg (159 lb 6.4 oz)   LMP 11/27/2001 (Approximate)   SpO2 98%   BMI 32.35 kg/m     ECOG PS- 0-1  Constitutional: Alert, cooperative, and in no distress.   ENT: Eyes bright, No mouth sores  Neck: Supple, No adenopathy.  Cardiac: Heart rate and rhythm is regular and strong without murmur  Respiratory:  Breathing easy. Lung sounds clear to auscultation  GI: Abdomen is soft, rounded, non-tender, BS normal. No masses or organomegaly  MS: Muscle tone normal, extremities normal with no edema.   Skin: No suspicious lesions or rashes  Neuro: Sensory grossly WNL, gait normal.   Lymph: Normal ant/post cervical, axillary, supraclavicular nodes  Psych: Mentation appears normal and affect is normal and bright.    Laboratory Results:   Results for orders placed or performed in visit on 03/09/20   CBC with platelets differential     Status: Abnormal   Result Value Ref Range    WBC 16.5 (H) 4.0 - 11.0 10e9/L    RBC Count 3.98 3.8 - 5.2 10e12/L    Hemoglobin 12.3 11.7 - 15.7 g/dL    Hematocrit 39.1 35.0 - 47.0 %    MCV 98 78 - 100 fl    MCH 30.9 26.5 - 33.0 pg    MCHC 31.5 31.5 - 36.5 g/dL    RDW 17.6 (H) 10.0 - 15.0 %    Platelet Count 183 150 - 450 10e9/L    Diff Method Automated Method     % Neutrophils 69.7 %    % Lymphocytes 19.1 %    % Monocytes 6.3 %    % Eosinophils 2.6 %    % Basophils 0.7 %    % Immature Granulocytes 1.6 %    Absolute Neutrophil 11.5 (H) 1.6 - 8.3 10e9/L    Absolute Lymphocytes 3.1 0.8 - 5.3 10e9/L    Absolute Monocytes 1.0 0.0 - 1.3 10e9/L    Absolute Eosinophils 0.4 0.0 - 0.7 10e9/L    Absolute Basophils 0.1 0.0 - 0.2 10e9/L    Abs Immature Granulocytes 0.3 0 - 0.4 10e9/L   Comprehensive metabolic panel     Status: Abnormal   Result Value Ref Range    Sodium 136 133 - 144 mmol/L    Potassium 4.1 3.4 - 5.3 mmol/L    Chloride 104 94 - 109 mmol/L    Carbon Dioxide 26 20 - 32 mmol/L    Anion Gap 6 3 - 14 mmol/L    Glucose 91 70 - 99 mg/dL    Urea Nitrogen 15 7 - 30 mg/dL    Creatinine 0.58 0.52 - 1.04 mg/dL    GFR Estimate >90 >60 mL/min/[1.73_m2]    GFR Estimate If Black >90 >60 mL/min/[1.73_m2]    Calcium 9.0 8.5 - 10.1 mg/dL    Bilirubin Total 0.3 0.2 - 1.3 mg/dL    Albumin 3.5 3.4 - 5.0 g/dL    Protein Total 7.6 6.8 - 8.8 g/dL    Alkaline Phosphatase 188 (H) 40 - 150 U/L    ALT 26 0 - 50 U/L    AST 21  0 - 45 U/L   Protein qualitative urine     Status: Abnormal   Result Value Ref Range    Protein Albumin Urine 10 (A) NEG^Negative mg/dL     Assessment and Plan:   Stage IV Adenocarcinoma of the rectum-Pt continues treatment with FOLFOX/Avastin since 11/25/2019-due for cycle 8.  The patient continues with appetite changes/nausea and is not able to eat the first week and is showing some weight loss she still rebounds nicely and meets goals to continue on with treatment today.  After repeatedly trying some new ideas to take care of the nausea have felt that this is not a nausea issue but appetite change-no need for change in infusion plan today.  Did discuss options for stimulating appetite first week and also discussed need for supplements if that is all she can use the first several days.  Mildly elevated alk phos also noted today.  Using Neulasta for chemotherapy induced neutropenia  Pt will return in 2 weeks after imaging for follow up with plan prior to continuation with cycle 9  Bone metastasis- pt continuing Zometa every 3 months- due after 5/19/2020. Confirms use of calcium and vitamin D most days. Encouraged regular walks or some kind of exercise each day.    Appetite loss/nausea- continues to state first week with noted loss of appetite and does not feel this is nausea.  She is seeing some weight loss and is not able to make it up the second week anymore.  Encouraged her to use 2 Ensure per day with additional fluids to help maintain after treatment.  Cold neuropathy- noted and continues for first week with use of oxaliplatin but not bothering function an is resolving before next cycle -not interfering with daily routines.  Loose stools-noted 1-2 times daily not using additional medication if needed.  Reminded of need for additional fluids daily with loose stools.  This was a 30 min visit with > 50% in counseling and coordinating care including education and management of concerns.    Felipa  Germscheid,CNP        Again, thank you for allowing me to participate in the care of your patient.        Sincerely,        Felipa Garner, LETICIA, APRN CNP

## 2020-03-09 NOTE — NURSING NOTE
"Oncology Rooming Note    March 9, 2020 8:00 AM   Alannah Wynn is a 69 year old female who presents for:    Chief Complaint   Patient presents with     Oncology Clinic Visit     Initial Vitals: /74   Pulse 86   Temp 97.4  F (36.3  C) (Oral)   Wt 72.3 kg (159 lb 6.4 oz)   LMP 11/27/2001 (Approximate)   SpO2 98%   BMI 32.35 kg/m   Estimated body mass index is 32.35 kg/m  as calculated from the following:    Height as of 1/13/20: 1.495 m (4' 10.86\").    Weight as of this encounter: 72.3 kg (159 lb 6.4 oz). Body surface area is 1.73 meters squared.  No Pain (0) Comment: Data Unavailable   Patient's last menstrual period was 11/27/2001 (approximate).  Allergies reviewed: Yes  Medications reviewed: Yes    Medications: Medication refills not needed today.  Pharmacy name entered into [facility]:    [id number] - [place], [place] - [address] AT [address] [facility] - [place], [place] - [address], [id number]    Clinical concerns: none Felipa Garner NP was notified.      Allen Andrade RN              "

## 2020-03-09 NOTE — PROGRESS NOTES
"Infusion Nursing Note:  Alannah Wynn presents today for [id number]/Avastin.    Patient seen by provider today: Yes: Felipa Garner NP   present during visit today: Not Applicable.    Note: Pt admits to first week of chemotherapy she is weak, dizzy, N/V/D and decreased appetite.  See flow sheet for assessment.    Intravenous Access:  Implanted Port.    Treatment Conditions:  Lab Results   Component Value Date    HGB 12.3 03/09/2020     Lab Results   Component Value Date    WBC 16.5 03/09/2020      Lab Results   Component Value Date    ANEU 11.5 03/09/2020     Lab Results   Component Value Date     03/09/2020      Lab Results   Component Value Date     03/09/2020                   Lab Results   Component Value Date    POTASSIUM 4.1 03/09/2020           No results found for: MAG         Lab Results   Component Value Date    CR 0.58 03/09/2020                   Lab Results   Component Value Date    LAUREN 9.0 03/09/2020                Lab Results   Component Value Date    BILITOTAL 0.3 03/09/2020           Lab Results   Component Value Date    ALBUMIN 3.5 03/09/2020                    Lab Results   Component Value Date    ALT 26 03/09/2020           Lab Results   Component Value Date    AST 21 03/09/2020       Results reviewed, labs MET treatment parameters, ok to proceed with treatment.  Urine Negative.      Post Infusion Assessment:  Patient tolerated infusion without incident.  Blood return noted pre and post infusion.  Site patent and intact, free from redness, edema or discomfort.  No evidence of extravasations.  Access discontinued per protocol.  Prior to discharge: Port is secured in place with tegaderm and flushed with 10cc NS with positive blood return noted.  Continuous home infusion CADD pump connected.    All connectors secured in place and clamps taped open.    Pump started, \"running\" noted on display (CADD): YES.  Patient instructed to call our clinic or [facility] with " any questions or concerns at home.  Patient verbalized understanding.    Patient set up for pump disconnect at our clinic on 3/11/20 at 10:30am.      Discharge Plan:   Patient discharged in stable condition accompanied by: .  Departure Mode: Ambulatory.  Pt will RTC 3/11/20 for pump disconnect and IVF.    Allen Andrade RN

## 2020-03-11 ENCOUNTER — INFUSION THERAPY VISIT (OUTPATIENT)
Dept: INFUSION THERAPY | Facility: CLINIC | Age: 70
End: 2020-03-11
Payer: COMMERCIAL

## 2020-03-11 VITALS
RESPIRATION RATE: 16 BRPM | TEMPERATURE: 97.4 F | HEART RATE: 97 BPM | DIASTOLIC BLOOD PRESSURE: 66 MMHG | OXYGEN SATURATION: 98 % | SYSTOLIC BLOOD PRESSURE: 102 MMHG

## 2020-03-11 DIAGNOSIS — C79.51 BONE METASTASIS: Primary | ICD-10-CM

## 2020-03-11 DIAGNOSIS — T45.1X5A CHEMOTHERAPY-INDUCED NEUTROPENIA (H): ICD-10-CM

## 2020-03-11 DIAGNOSIS — D70.1 CHEMOTHERAPY-INDUCED NEUTROPENIA (H): ICD-10-CM

## 2020-03-11 DIAGNOSIS — C79.9 METASTASIS FROM RECTAL CANCER (H): ICD-10-CM

## 2020-03-11 DIAGNOSIS — C20 METASTASIS FROM RECTAL CANCER (H): ICD-10-CM

## 2020-03-11 PROCEDURE — 96361 HYDRATE IV INFUSION ADD-ON: CPT | Performed by: NURSE PRACTITIONER

## 2020-03-11 PROCEDURE — 96365 THER/PROPH/DIAG IV INF INIT: CPT | Performed by: NURSE PRACTITIONER

## 2020-03-11 PROCEDURE — 96377 APPLICATON ON-BODY INJECTOR: CPT | Mod: 59 | Performed by: NURSE PRACTITIONER

## 2020-03-11 PROCEDURE — 99207 ZZC NO CHARGE LOS: CPT

## 2020-03-11 RX ORDER — HEPARIN SODIUM (PORCINE) LOCK FLUSH IV SOLN 100 UNIT/ML 100 UNIT/ML
5 SOLUTION INTRAVENOUS ONCE
Status: COMPLETED | OUTPATIENT
Start: 2020-03-11 | End: 2020-03-11

## 2020-03-11 RX ADMIN — HEPARIN SODIUM (PORCINE) LOCK FLUSH IV SOLN 100 UNIT/ML 5 ML: 100 SOLUTION at 12:14

## 2020-03-11 RX ADMIN — Medication 1000 ML: at 10:43

## 2020-03-11 ASSESSMENT — PAIN SCALES - GENERAL: PAINLEVEL: MILD PAIN (2)

## 2020-03-11 NOTE — PROGRESS NOTES
Infusion Nursing Note:  Alannah Wynn presents today for IVF emend onpro and pump disconnect.    Patient seen by provider today: No   present during visit today: Not Applicable.    Note: N/A.    Intravenous Access:  Implanted Port.    Treatment Conditions:  Not Applicable.      Post Infusion Assessment:  Patient tolerated infusion without incident.  Site patent and intact, free from redness, edema or discomfort.  No evidence of extravasations.  Access discontinued per protocol.       Discharge Plan:   Discharge instructions reviewed with: Patient and Family.  Patient and/or family verbalized understanding of discharge instructions and all questions answered.  Patient discharged in stable condition accompanied by: .  Departure Mode: Wheelchair.    Ernestina Melissa RN

## 2020-03-19 ENCOUNTER — PATIENT OUTREACH (OUTPATIENT)
Dept: ONCOLOGY | Facility: CLINIC | Age: 70
End: 2020-03-19

## 2020-03-19 NOTE — TELEPHONE ENCOUNTER
Orders for chemotherapy pump 5FU, cycle #9 were faxed to [facility] @ [phone].  FAX confirmed as received.  Scheduled for Tues., March 24.

## 2020-03-24 ENCOUNTER — INFUSION THERAPY VISIT (OUTPATIENT)
Dept: INFUSION THERAPY | Facility: CLINIC | Age: 70
End: 2020-03-24
Payer: COMMERCIAL

## 2020-03-24 ENCOUNTER — ONCOLOGY VISIT (OUTPATIENT)
Dept: ONCOLOGY | Facility: CLINIC | Age: 70
End: 2020-03-24
Payer: COMMERCIAL

## 2020-03-24 VITALS
OXYGEN SATURATION: 99 % | BODY MASS INDEX: 32.17 KG/M2 | TEMPERATURE: 97.7 F | HEART RATE: 94 BPM | RESPIRATION RATE: 16 BRPM | SYSTOLIC BLOOD PRESSURE: 125 MMHG | DIASTOLIC BLOOD PRESSURE: 83 MMHG | WEIGHT: 158.5 LBS

## 2020-03-24 DIAGNOSIS — C79.9 METASTASIS FROM RECTAL CANCER (H): ICD-10-CM

## 2020-03-24 DIAGNOSIS — D70.1 CHEMOTHERAPY-INDUCED NEUTROPENIA (H): Primary | ICD-10-CM

## 2020-03-24 DIAGNOSIS — T45.1X5A CHEMOTHERAPY-INDUCED NEUTROPENIA (H): Primary | ICD-10-CM

## 2020-03-24 DIAGNOSIS — C20 METASTASIS FROM RECTAL CANCER (H): ICD-10-CM

## 2020-03-24 DIAGNOSIS — C79.51 BONE METASTASIS: ICD-10-CM

## 2020-03-24 LAB
ALBUMIN SERPL-MCNC: 3.3 G/DL (ref 3.4–5)
ALBUMIN UR-MCNC: 10 MG/DL
ALP SERPL-CCNC: 156 U/L (ref 40–150)
ALT SERPL W P-5'-P-CCNC: 23 U/L (ref 0–50)
ANION GAP SERPL CALCULATED.3IONS-SCNC: 6 MMOL/L (ref 3–14)
AST SERPL W P-5'-P-CCNC: 27 U/L (ref 0–45)
BASOPHILS # BLD AUTO: 0.1 10E9/L (ref 0–0.2)
BASOPHILS NFR BLD AUTO: 0.8 %
BILIRUB SERPL-MCNC: 0.2 MG/DL (ref 0.2–1.3)
BUN SERPL-MCNC: 12 MG/DL (ref 7–30)
CALCIUM SERPL-MCNC: 8.8 MG/DL (ref 8.5–10.1)
CHLORIDE SERPL-SCNC: 105 MMOL/L (ref 94–109)
CO2 SERPL-SCNC: 27 MMOL/L (ref 20–32)
CREAT SERPL-MCNC: 0.56 MG/DL (ref 0.52–1.04)
DIFFERENTIAL METHOD BLD: ABNORMAL
EOSINOPHIL # BLD AUTO: 0.2 10E9/L (ref 0–0.7)
EOSINOPHIL NFR BLD AUTO: 1.8 %
ERYTHROCYTE [DISTWIDTH] IN BLOOD BY AUTOMATED COUNT: 17.4 % (ref 10–15)
GFR SERPL CREATININE-BSD FRML MDRD: >90 ML/MIN/{1.73_M2}
GLUCOSE SERPL-MCNC: 87 MG/DL (ref 70–99)
HCT VFR BLD AUTO: 36 % (ref 35–47)
HGB BLD-MCNC: 11.4 G/DL (ref 11.7–15.7)
IMM GRANULOCYTES # BLD: 0.2 10E9/L (ref 0–0.4)
IMM GRANULOCYTES NFR BLD: 1.6 %
LYMPHOCYTES # BLD AUTO: 2.9 10E9/L (ref 0.8–5.3)
LYMPHOCYTES NFR BLD AUTO: 23.4 %
MCH RBC QN AUTO: 31.1 PG (ref 26.5–33)
MCHC RBC AUTO-ENTMCNC: 31.7 G/DL (ref 31.5–36.5)
MCV RBC AUTO: 98 FL (ref 78–100)
MONOCYTES # BLD AUTO: 0.7 10E9/L (ref 0–1.3)
MONOCYTES NFR BLD AUTO: 5.9 %
NEUTROPHILS # BLD AUTO: 8.3 10E9/L (ref 1.6–8.3)
NEUTROPHILS NFR BLD AUTO: 66.5 %
PLATELET # BLD AUTO: 195 10E9/L (ref 150–450)
POTASSIUM SERPL-SCNC: 3.8 MMOL/L (ref 3.4–5.3)
PROT SERPL-MCNC: 7.1 G/DL (ref 6.8–8.8)
RBC # BLD AUTO: 3.67 10E12/L (ref 3.8–5.2)
SODIUM SERPL-SCNC: 138 MMOL/L (ref 133–144)
WBC # BLD AUTO: 12.5 10E9/L (ref 4–11)

## 2020-03-24 PROCEDURE — 99207 ZZC NO CHARGE LOS: CPT

## 2020-03-24 PROCEDURE — 96416 CHEMO PROLONG INFUSE W/PUMP: CPT | Performed by: INTERNAL MEDICINE

## 2020-03-24 PROCEDURE — 96413 CHEMO IV INFUSION 1 HR: CPT | Performed by: INTERNAL MEDICINE

## 2020-03-24 PROCEDURE — 96367 TX/PROPH/DG ADDL SEQ IV INF: CPT | Performed by: INTERNAL MEDICINE

## 2020-03-24 PROCEDURE — 99215 OFFICE O/P EST HI 40 MIN: CPT | Mod: 25 | Performed by: INTERNAL MEDICINE

## 2020-03-24 PROCEDURE — 99207 ZZC NO CHARGE NURSE ONLY: CPT

## 2020-03-24 PROCEDURE — 96375 TX/PRO/DX INJ NEW DRUG ADDON: CPT | Performed by: INTERNAL MEDICINE

## 2020-03-24 PROCEDURE — 80053 COMPREHEN METABOLIC PANEL: CPT | Performed by: INTERNAL MEDICINE

## 2020-03-24 PROCEDURE — 85025 COMPLETE CBC W/AUTO DIFF WBC: CPT | Performed by: INTERNAL MEDICINE

## 2020-03-24 PROCEDURE — 81003 URINALYSIS AUTO W/O SCOPE: CPT | Performed by: INTERNAL MEDICINE

## 2020-03-24 RX ORDER — ALBUTEROL SULFATE 0.83 MG/ML
2.5 SOLUTION RESPIRATORY (INHALATION)
Status: CANCELLED | OUTPATIENT
Start: 2020-03-24

## 2020-03-24 RX ORDER — HEPARIN SODIUM (PORCINE) LOCK FLUSH IV SOLN 100 UNIT/ML 100 UNIT/ML
5 SOLUTION INTRAVENOUS
Status: DISCONTINUED | OUTPATIENT
Start: 2020-03-24 | End: 2020-03-24 | Stop reason: HOSPADM

## 2020-03-24 RX ORDER — EPINEPHRINE 1 MG/ML
0.3 INJECTION, SOLUTION INTRAMUSCULAR; SUBCUTANEOUS EVERY 5 MIN PRN
Status: CANCELLED | OUTPATIENT
Start: 2020-03-24

## 2020-03-24 RX ORDER — PALONOSETRON 0.05 MG/ML
0.25 INJECTION, SOLUTION INTRAVENOUS ONCE
Status: CANCELLED
Start: 2020-03-24

## 2020-03-24 RX ORDER — DIPHENHYDRAMINE HYDROCHLORIDE 50 MG/ML
50 INJECTION INTRAMUSCULAR; INTRAVENOUS
Status: CANCELLED
Start: 2020-03-24

## 2020-03-24 RX ORDER — METHYLPREDNISOLONE SODIUM SUCCINATE 125 MG/2ML
125 INJECTION, POWDER, LYOPHILIZED, FOR SOLUTION INTRAMUSCULAR; INTRAVENOUS
Status: CANCELLED
Start: 2020-03-24

## 2020-03-24 RX ORDER — SODIUM CHLORIDE 9 MG/ML
1000 INJECTION, SOLUTION INTRAVENOUS CONTINUOUS PRN
Status: CANCELLED
Start: 2020-03-24

## 2020-03-24 RX ORDER — PALONOSETRON 0.05 MG/ML
0.25 INJECTION, SOLUTION INTRAVENOUS ONCE
Status: COMPLETED | OUTPATIENT
Start: 2020-03-24 | End: 2020-03-24

## 2020-03-24 RX ORDER — NALOXONE HYDROCHLORIDE 0.4 MG/ML
.1-.4 INJECTION, SOLUTION INTRAMUSCULAR; INTRAVENOUS; SUBCUTANEOUS
Status: CANCELLED | OUTPATIENT
Start: 2020-03-24

## 2020-03-24 RX ORDER — LORAZEPAM 2 MG/ML
0.5 INJECTION INTRAMUSCULAR EVERY 4 HOURS PRN
Status: CANCELLED
Start: 2020-03-24

## 2020-03-24 RX ORDER — EPINEPHRINE 0.3 MG/.3ML
0.3 INJECTION SUBCUTANEOUS EVERY 5 MIN PRN
Status: CANCELLED | OUTPATIENT
Start: 2020-03-24

## 2020-03-24 RX ORDER — MEPERIDINE HYDROCHLORIDE 25 MG/ML
25 INJECTION INTRAMUSCULAR; INTRAVENOUS; SUBCUTANEOUS EVERY 30 MIN PRN
Status: CANCELLED | OUTPATIENT
Start: 2020-03-24

## 2020-03-24 RX ORDER — ALBUTEROL SULFATE 90 UG/1
1-2 AEROSOL, METERED RESPIRATORY (INHALATION)
Status: CANCELLED
Start: 2020-03-24

## 2020-03-24 RX ADMIN — HEPARIN SODIUM (PORCINE) LOCK FLUSH IV SOLN 100 UNIT/ML 5 ML: 100 SOLUTION at 07:52

## 2020-03-24 RX ADMIN — Medication 250 ML: at 09:53

## 2020-03-24 RX ADMIN — PALONOSETRON 0.25 MG: 0.05 INJECTION, SOLUTION INTRAVENOUS at 09:55

## 2020-03-24 ASSESSMENT — PAIN SCALES - GENERAL: PAINLEVEL: NO PAIN (0)

## 2020-03-24 NOTE — LETTER
"    3/24/2020         RE: Alannah Wynn  [address]  [place] [address]        Dear Colleague,    Thank you for referring your patient, Alannah Wynn, to the [facility]. Please see a copy of my visit note below.    ONCOLOGY FOLLOW UP NOTE    3/24/2020     Patient was being followed by Dr. Tavarez and saw her on 11/13/2019.  Now she is transferring her care to me.  I have reviewed her previous records and have copied and updated from prior notes.      DIAGNOSIS:    10/2019 dx adenoCa of rectum,  MMR intact- NGS Negative for KRAS, NRAS and BRAF. PD L1 25%.  Baseline CEA 10.  Stage IV disease with pulmonary and bony mets      HISTORY OF ONCOLOGY ILLNESS:    She has had intermittent hematochezia and anorectal irritation since 2011, that have been managed as \"hemorrhoids\". These symptoms became persistent and progressive since early thi year. Her anorectal pain is managed with Tylenol. Denies fevers, chills, or unintentional weight loss. Does have intermittent urinary and fecal urgency but no incontinence per se.    Diagnostic colonoscopy on 10/18/19 for rectal bleeding showed \"fungating and ulcerated non-obstructing mass was found in the distal rectum <1 cm from the anal verge. The mass was partially circumferential (involving one-half of the lumen circumference). The mass measured four cm in length. In addition, its diameter measured six mm.  Pathology showed invasive moderately differentiated adenocarcinoma with intact mismatch repair protein expression, PD L1 25% and NGS panel showed NO MUTATIONS in KRAS, NRAS and BRAF.      Pelvic MRI 11/2019 found fungating polypoid mid/lower rectal mass with luminal narrowing as well as innumerable enlarged lymph nodes as described above with a conglomerate of suspicious lymph nodes abutting the anterior peritoneal reflection. Stage cT3d N2.     PET 11/2019 found   1. Hypermetabolic mass in the distal rectum, SUV 14 corresponding to the patient's " known rectal cancer.  2. Multiple hypermetabolic pelvic lymph nodes, compatible metastatic disease, SUV 5-6.  3. FDG avid pulmonary nodule measuring 7 mm in the left lower lobe, concerning for metastatic disease. Additional FDG avid pulmonary nodules suspicious for metastatic disease, SUV around 4.  4. Osteoblastic metastatic lesion in the L3 vertebral body, SUV 6.8    She started palliative FOLFOX/Avastin on 11/25/2019.  Cycle #2 given on 12/9/2019.  Cycle #3 scheduled for 12/24/2019.  C#6 on 2/10/2020.    Repeat CT CAP on 2/21/2020 shows good response to therapy with improvement in the rectal mass, mesorectal and superior rectal nodularity as well as resolution of right internal iliac lymphadenopathy.  There is slight improvement in the dominant posterior left lower lobe lung nodule and several of the lung nodules are is stable.  There is increase in size of the sclerotic lesion of the left aspect of L3 vertebral body without pathological fracture and this is likely due to treatment effect.    CEA on 2/10/2020 was down to 3.3.    Cycle #7 FOLFOX/Avastin 2/25/2020.  C#8 FOLFOX/Avastin 3/9/2020  Cycle#9 5-FU/Avastin 3/24/2020-stopped oxaliplatin due to cumulative fatigue and issues with nausea.    Because of bone metastatic disease, she was started on Zometa on 11/25/2019. We are giving it every 3 months.             INTERVAL HISTORY:     She comes in today accompanied by her  and tells me that she was very fatigued for the first 7 to 8 days.  She was also feeling very nauseous.  She was not able to eat and drink well for the first 1 week.  She was taking antiemetics to some relief.  She had some diarrhea for which she took Imodium which helped.  She did not have any fevers.  She denies any pain.  Now she is feeling better.  She had cold sensitivity for a few days but denies any neuropathy outside of cold sensitivity.     ECOG 1    ROS:  A comprehensive ROS was otherwise neg        I reviewed with her history  in epic as below.    PAST MEDICAL HISTORY  Reflux, hemorrhoid, hepatitis B?  Overactive bladder  Active Ambulatory Problems     Diagnosis Date Noted     External hemorrhoids 04/29/2016     Non morbid obesity due to excess calories 04/29/2016     Hepatitis B immune 04/29/2016     Screening for cervical cancer 04/29/2016     CARDIOVASCULAR SCREENING; LDL GOAL LESS THAN 160 04/29/2016     Gastroesophageal reflux disease 04/29/2016     Overactive bladder 07/21/2017     Nevus of left lower leg- undetermined behavior 12/27/2018     Metastasis from rectal cancer (H) 11/13/2019     Bone metastasis (H) 11/13/2019     Nausea 11/27/2019     Chemotherapy-induced neutropenia (H) 12/24/2019     Resolved Ambulatory Problems     Diagnosis Date Noted     Urgency of urination 04/29/2016     Need for vaccination against Streptococcus pneumoniae 04/29/2016     Morbid obesity (H) 07/21/2017     Obesity (BMI 35.0-39.9) with comorbidity (H) 12/27/2018     No Additional Past Medical History       MEDICATIONS:  Current Outpatient Medications   Medication     Ascorbic Acid (VITAMIN C) 500 MG CAPS     B Complex Vitamins (B COMPLEX-B12 PO)     cholecalciferol (VITAMIN D3) 400 unit (10 mcg) TABS tablet     Fesoterodine Fumarate 8 MG TB24     lidocaine-prilocaine (EMLA) 2.5-2.5 % external cream     LORazepam (ATIVAN) 0.5 MG tablet     Lutein 20 MG CAPS     mirabegron (MYRBETRIQ) 50 MG 24 hr tablet     Multiple vitamin TABS     naloxone (NARCAN) 4 MG/0.1ML nasal spray     omeprazole (PRILOSEC) 20 MG DR capsule     ondansetron (ZOFRAN) 8 MG tablet     prochlorperazine (COMPAZINE) 10 MG tablet     solifenacin (VESICARE) 10 MG tablet     traMADol (ULTRAM) 50 MG tablet     docusate sodium (COLACE) 100 MG capsule     OLANZapine (ZYPREXA) 2.5 MG tablet     Current Facility-Administered Medications   Medication     lidocaine (XYLOCAINE) 5 % ointment     Facility-Administered Medications Ordered in Other Visits   Medication     heparin 100 UNIT/ML  injection 5 mL     sodium chloride (PF) 0.9% PF flush 10-20 mL     ALLERGY:     Allergies   Allergen Reactions     Penicillins      Sulfa Drugs             SOCIAL HISTORY:  She is a [place] immigrants to [place].  Denies smoking denies alcohol abuse.  2 of her sons are doctors.    FAMILY HISTORY:   Denies any family history of cancer.       PHYSICAL EXAMINATION:     /83 (BP Location: Left arm)   Pulse 94   Temp 97.7  F (36.5  C) (Oral)   Resp 16   Wt 71.9 kg (158 lb 8 oz)   LMP 11/27/2001 (Approximate)   SpO2 99%   BMI 32.17 kg/m       Wt Readings from Last 4 Encounters:   03/24/20 71.9 kg (158 lb 8 oz)   03/09/20 72.3 kg (159 lb 6.4 oz)   02/25/20 70.9 kg (156 lb 4.8 oz)   02/10/20 74.5 kg (164 lb 4.8 oz)     CONSTITUTIONAL: no acute distress  EYES: PERRLA, no palor or icterus.   ENT/MOUTH: no mouth lesions. Ears normal  CVS: s1s2 no m r g .   RESPIRATORY: clear to auscultation b/l  GI: soft non tender no hepatosplenomegaly  NEURO: AAOX3  Grossly non focal neuro exam  INTEGUMENT: no obvious rashes  LYMPHATIC: no palpable cervical, supraclavicular, axillary or inguinal LAD  MUSCULOSKELETAL: Unremarkable. No bony tenderness.   EXTREMITIES: no edema  PSYCH: Mentation, mood and affect are normal. Decision making capacity is intact        LABS/IMAGING/PATHOLOGY:    Reviewed  Results for MARY SCHULER (MRN [id number]) as of 3/24/2020 09:24   Ref. Range 3/24/2020 07:50   Sodium Latest Ref Range: 133 - 144 mmol/L 138   Potassium Latest Ref Range: 3.4 - 5.3 mmol/L 3.8   Chloride Latest Ref Range: 94 - 109 mmol/L 105   Carbon Dioxide Latest Ref Range: 20 - 32 mmol/L 27   Urea Nitrogen Latest Ref Range: 7 - 30 mg/dL 12   Creatinine Latest Ref Range: 0.52 - 1.04 mg/dL 0.56   GFR Estimate Latest Ref Range: >60 mL/min/1.73_m2 >90   GFR Estimate If Black Latest Ref Range: >60 mL/min/1.73_m2 >90   Calcium Latest Ref Range: 8.5 - 10.1 mg/dL 8.8   Anion Gap Latest Ref Range: 3 - 14 mmol/L 6   Albumin Latest Ref  Range: 3.4 - 5.0 g/dL 3.3 (L)   Protein Total Latest Ref Range: 6.8 - 8.8 g/dL 7.1   Bilirubin Total Latest Ref Range: 0.2 - 1.3 mg/dL 0.2   Alkaline Phosphatase Latest Ref Range: 40 - 150 U/L 156 (H)   ALT Latest Ref Range: 0 - 50 U/L 23   AST Latest Ref Range: 0 - 45 U/L 27   Glucose Latest Ref Range: 70 - 99 mg/dL 87   WBC Latest Ref Range: 4.0 - 11.0 10e9/L 12.5 (H)   Hemoglobin Latest Ref Range: 11.7 - 15.7 g/dL 11.4 (L)   Hematocrit Latest Ref Range: 35.0 - 47.0 % 36.0   Platelet Count Latest Ref Range: 150 - 450 10e9/L 195   RBC Count Latest Ref Range: 3.8 - 5.2 10e12/L 3.67 (L)   MCV Latest Ref Range: 78 - 100 fl 98   MCH Latest Ref Range: 26.5 - 33.0 pg 31.1   MCHC Latest Ref Range: 31.5 - 36.5 g/dL 31.7   RDW Latest Ref Range: 10.0 - 15.0 % 17.4 (H)   Diff Method Unknown Automated Method   % Neutrophils Latest Units: % 66.5   % Lymphocytes Latest Units: % 23.4   % Monocytes Latest Units: % 5.9   % Eosinophils Latest Units: % 1.8   % Basophils Latest Units: % 0.8   % Immature Granulocytes Latest Units: % 1.6   Absolute Neutrophil Latest Ref Range: 1.6 - 8.3 10e9/L 8.3   Absolute Lymphocytes Latest Ref Range: 0.8 - 5.3 10e9/L 2.9   Absolute Monocytes Latest Ref Range: 0.0 - 1.3 10e9/L 0.7   Absolute Eosinophils Latest Ref Range: 0.0 - 0.7 10e9/L 0.2   Absolute Basophils Latest Ref Range: 0.0 - 0.2 10e9/L 0.1   Abs Immature Granulocytes Latest Ref Range: 0 - 0.4 10e9/L 0.2     Results for MARY SCHULER (MRN [id number]) as of 3/24/2020 09:24   Ref. Range 11/4/2019 11:49 12/24/2019 07:40 12/30/2019 10:00 2/10/2020 08:15   CEA Latest Ref Range: 0 - 2.5 ug/L 10.0 (H) 8.8 (H) 7.6 (H) 3.3 (H)         CT CHEST/ABDOMEN/PELVIS W CONTRAST 2/21/2020 8:57 AM     CLINICAL HISTORY: Rectal cancer- follow up to current treatment.;  Metastasis from rectal cancer (H); Metastasis from rectal cancer (H);  Bone metastasis (H)     TECHNIQUE: CT scan of the chest, abdomen, and pelvis was performed  following injection of IV  contrast. Multiplanar reformats were  obtained. Dose reduction techniques were used.   CONTRAST: 100 mL isovue 370     COMPARISON: PET CT 11/7/2019, CT chest, abdomen and pelvis 11/1/2019,  MR pelvis 11/1/2019     FINDINGS:   LUNGS AND PLEURA: The central airways are patent. Improvement in  noncalcified nodule in the posterior left lower lobe measuring 5 mm  series 8/image 113, previously 7 mm. Stable noncalcified nodule in the  anterior left upper lobe measuring 4 mm series 8/image 52. Stable  noncalcified nodule in the lateral left upper lobe measuring 2 mm  series 8/image 59. Stable right middle lobe noncalcified nodule  measuring 3 mm series 8/image 84. Resolution of lateral right upper  lobe noncalcified nodule.     No new or enlarging noncalcified pulmonary nodules.     No pleural effusion, pneumothorax or focal consolidation.     MEDIASTINUM/AXILLAE: No axillary, mediastinal or hilar  lymphadenopathy.     There is a small sliding-type hiatal hernia.     Heart size is normal without significant pericardial effusion. The  thoracic aorta is normal in caliber.     HEPATOBILIARY: No focal hepatic lesion. Cholecystectomy. No biliary  ductal dilatation.     PANCREAS: Normal.     SPLEEN: Normal.     ADRENAL GLANDS: Normal.     KIDNEYS/BLADDER: Normal.     BOWEL: Small and large bowel loops are normal in caliber without  obstruction. Scattered pancolonic diverticula without acute  diverticulitis. The appendix is normal in appearance. Significant  improvement in rectal mass, mesorectal and superior rectal nodularity.     PELVIC ORGANS: Uterus and adnexa unremarkable.     ADDITIONAL FINDINGS: Abdominal aorta normal in caliber with mild mixed  atheromatous plaque. Major portal veins are patent. No free fluid or  pneumoperitoneum. No new abdominal or pelvic lymphadenopathy.  Resolution of right internal iliac adenopathy.     MUSCULOSKELETAL: Increase in size of sclerotic lesion at the left  aspect of L3 vertebral body  without pathologic fracture.                                                                      IMPRESSION:  1.  Significant improvement in rectal mass and perirectal nodularity.  2.  Resolution of right internal iliac lymphadenopathy.  3.  Slight improvement in dominant posterior left lower lobe nodule,  previously hypermetabolic, consistent with pulmonary metastatic  disease.  4.  A few additional stable bilateral noncalcified pulmonary nodules  remain indeterminate. Attention on follow-up is recommended.  5.  Increase in size of presacral hypermetabolic L3 sclerotic lesion,  may be due to treatment effect.  6.  No new abdominopelvic mass or evidence of hepatic metastatic  disease.    ASSESSMENT AND PLAN:      Metastatic rectal adenocarcinoma, MSI intact, K-aimee wild type, PDL 1 + 25% with metastasis to the lungs, bones and pelvic lymph nodes.      She started palliative chemotherapy with FOLFOX/Avastin on 11/25/2019.      She has received 6 cycles of chemotherapy.  With cycle #3, we stopped 5-FU bolus because of excessive tiredness.    Repeat CT CAP on 2/21/2020 shows good response to therapy with improvement in the rectal mass, mesorectal and superior rectal nodularity as well as resolution of right internal iliac lymphadenopathy.  There is slight improvement in the dominant posterior left lower lobe lung nodule and several of the lung nodules are is stable.  There is increase in size of the sclerotic lesion of the left aspect of L3 vertebral body without pathological fracture and this is likely due to treatment effect.    CEA on 2/10/2020 was down to 3.3.    She has received 8 cycles of chemotherapy.  She has cumulative fatigue and issues with nausea especially for the first 1 week after chemotherapy.  We discussed the situation in detail.  I would like to hold oxaliplatin and continue with 5-FU/Avastin.  Today she will get cycle #9 without oxaliplatin.      We also discussed that considering the coronavirus  situation and the risks of stella it with repeated visits to the clinic and hospital, we are trying to limit patient visits as much as possible so as to decrease their exposure.  Because of this reason one option would be to switch her to oral Xeloda as a single agent going forward.  We discussed the rational schedule and potential side effects of it in detail.  She was concerned how her insurance would cover this so we will look into that before finalizing the plan.  There is certainly a possibility that with next chemotherapy cycle we will switch her to single agent oral Xeloda.      Nausea and vomiting.  Continue Decadron, Aloxi and Emend as premedication and she also gets Emend on day 3 which has helped to some extent.  I will stop oxaliplatin.  She will continue to take Zofran starting day 3 and continue Compazine as needed.  Continue Zyprexa 2.5 mg at night.  Use Ativan as needed.      Bone metastasis.  Started Zometa on 11/25/2019.  She is asymptomatic.  Continue vitamin D and calcium.  She will be next due for Zometa in May 2020.  Last CT scan showed increased sclerosis at L3 vertebra which is likely a treatment effect.      Anemia.  This is mild from chemotherapy.  Continue to monitor.    Cold sensitivity.  We are stopping oxaliplatin due to reasons mentioned above.      Fatigue.  Hopefully this would also be better after stopping oxaliplatin.        We did not address the following today.    Discussion regarding health care directive  We discussed that it is important that she completes health care directive which would help in making sure that her wishes are followed about her treatment care in case she is not able to make a decision for herself.  We gave her information regarding that.    RTC as scheduled    All questions answered.  She is agreeable and comfortable with the plan.    Charline Velazquez MD      Again, thank you for allowing me to participate in the care of your patient.         Sincerely,        Charline Velazquez MD

## 2020-03-24 NOTE — PROGRESS NOTES
"ONCOLOGY FOLLOW UP NOTE    3/24/2020     Patient was being followed by Dr. Tavarez and saw her on 11/13/2019.  Now she is transferring her care to me.  I have reviewed her previous records and have copied and updated from prior notes.      DIAGNOSIS:    10/2019 dx adenoCa of rectum,  MMR intact- NGS Negative for KRAS, NRAS and BRAF. PD L1 25%.  Baseline CEA 10.  Stage IV disease with pulmonary and bony mets      HISTORY OF ONCOLOGY ILLNESS:    She has had intermittent hematochezia and anorectal irritation since 2011, that have been managed as \"hemorrhoids\". These symptoms became persistent and progressive since early thi year. Her anorectal pain is managed with Tylenol. Denies fevers, chills, or unintentional weight loss. Does have intermittent urinary and fecal urgency but no incontinence per se.    Diagnostic colonoscopy on 10/18/19 for rectal bleeding showed \"fungating and ulcerated non-obstructing mass was found in the distal rectum <1 cm from the anal verge. The mass was partially circumferential (involving one-half of the lumen circumference). The mass measured four cm in length. In addition, its diameter measured six mm.  Pathology showed invasive moderately differentiated adenocarcinoma with intact mismatch repair protein expression, PD L1 25% and NGS panel showed NO MUTATIONS in KRAS, NRAS and BRAF.      Pelvic MRI 11/2019 found fungating polypoid mid/lower rectal mass with luminal narrowing as well as innumerable enlarged lymph nodes as described above with a conglomerate of suspicious lymph nodes abutting the anterior peritoneal reflection. Stage cT3d N2.     PET 11/2019 found   1. Hypermetabolic mass in the distal rectum, SUV 14 corresponding to the patient's known rectal cancer.  2. Multiple hypermetabolic pelvic lymph nodes, compatible metastatic disease, SUV 5-6.  3. FDG avid pulmonary nodule measuring 7 mm in the left lower lobe, concerning for metastatic disease. Additional FDG avid pulmonary nodules " suspicious for metastatic disease, SUV around 4.  4. Osteoblastic metastatic lesion in the L3 vertebral body, SUV 6.8    She started palliative FOLFOX/Avastin on 11/25/2019.  Cycle #2 given on 12/9/2019.  Cycle #3 scheduled for 12/24/2019.  C#6 on 2/10/2020.    Repeat CT CAP on 2/21/2020 shows good response to therapy with improvement in the rectal mass, mesorectal and superior rectal nodularity as well as resolution of right internal iliac lymphadenopathy.  There is slight improvement in the dominant posterior left lower lobe lung nodule and several of the lung nodules are is stable.  There is increase in size of the sclerotic lesion of the left aspect of L3 vertebral body without pathological fracture and this is likely due to treatment effect.    CEA on 2/10/2020 was down to 3.3.    Cycle #7 FOLFOX/Avastin 2/25/2020.  C#8 FOLFOX/Avastin 3/9/2020  Cycle#9 5-FU/Avastin 3/24/2020-stopped oxaliplatin due to cumulative fatigue and issues with nausea.    Because of bone metastatic disease, she was started on Zometa on 11/25/2019. We are giving it every 3 months.             INTERVAL HISTORY:     She comes in today accompanied by her  and tells me that she was very fatigued for the first 7 to 8 days.  She was also feeling very nauseous.  She was not able to eat and drink well for the first 1 week.  She was taking antiemetics to some relief.  She had some diarrhea for which she took Imodium which helped.  She did not have any fevers.  She denies any pain.  Now she is feeling better.  She had cold sensitivity for a few days but denies any neuropathy outside of cold sensitivity.     ECOG 1    ROS:  A comprehensive ROS was otherwise neg        I reviewed with her history in epic as below.    PAST MEDICAL HISTORY  Reflux, hemorrhoid, hepatitis B?  Overactive bladder  Active Ambulatory Problems     Diagnosis Date Noted     External hemorrhoids 04/29/2016     Non morbid obesity due to excess calories 04/29/2016      Hepatitis B immune 04/29/2016     Screening for cervical cancer 04/29/2016     CARDIOVASCULAR SCREENING; LDL GOAL LESS THAN 160 04/29/2016     Gastroesophageal reflux disease 04/29/2016     Overactive bladder 07/21/2017     Nevus of left lower leg- undetermined behavior 12/27/2018     Metastasis from rectal cancer (H) 11/13/2019     Bone metastasis (H) 11/13/2019     Nausea 11/27/2019     Chemotherapy-induced neutropenia (H) 12/24/2019     Resolved Ambulatory Problems     Diagnosis Date Noted     Urgency of urination 04/29/2016     Need for vaccination against Streptococcus pneumoniae 04/29/2016     Morbid obesity (H) 07/21/2017     Obesity (BMI 35.0-39.9) with comorbidity (H) 12/27/2018     No Additional Past Medical History       MEDICATIONS:  Current Outpatient Medications   Medication     Ascorbic Acid (VITAMIN C) 500 MG CAPS     B Complex Vitamins (B COMPLEX-B12 PO)     cholecalciferol (VITAMIN D3) 400 unit (10 mcg) TABS tablet     Fesoterodine Fumarate 8 MG TB24     lidocaine-prilocaine (EMLA) 2.5-2.5 % external cream     LORazepam (ATIVAN) 0.5 MG tablet     Lutein 20 MG CAPS     mirabegron (MYRBETRIQ) 50 MG 24 hr tablet     Multiple vitamin TABS     naloxone (NARCAN) 4 MG/0.1ML nasal spray     omeprazole (PRILOSEC) 20 MG DR capsule     ondansetron (ZOFRAN) 8 MG tablet     prochlorperazine (COMPAZINE) 10 MG tablet     solifenacin (VESICARE) 10 MG tablet     traMADol (ULTRAM) 50 MG tablet     docusate sodium (COLACE) 100 MG capsule     OLANZapine (ZYPREXA) 2.5 MG tablet     Current Facility-Administered Medications   Medication     lidocaine (XYLOCAINE) 5 % ointment     Facility-Administered Medications Ordered in Other Visits   Medication     heparin 100 UNIT/ML injection 5 mL     sodium chloride (PF) 0.9% PF flush 10-20 mL     ALLERGY:     Allergies   Allergen Reactions     Penicillins      Sulfa Drugs             SOCIAL HISTORY:  She is a [place] immigrants to [place].  Denies smoking denies alcohol  abuse.  2 of her sons are doctors.    FAMILY HISTORY:   Denies any family history of cancer.       PHYSICAL EXAMINATION:     /83 (BP Location: Left arm)   Pulse 94   Temp 97.7  F (36.5  C) (Oral)   Resp 16   Wt 71.9 kg (158 lb 8 oz)   LMP 11/27/2001 (Approximate)   SpO2 99%   BMI 32.17 kg/m       Wt Readings from Last 4 Encounters:   03/24/20 71.9 kg (158 lb 8 oz)   03/09/20 72.3 kg (159 lb 6.4 oz)   02/25/20 70.9 kg (156 lb 4.8 oz)   02/10/20 74.5 kg (164 lb 4.8 oz)     CONSTITUTIONAL: no acute distress  EYES: PERRLA, no palor or icterus.   ENT/MOUTH: no mouth lesions. Ears normal  CVS: s1s2 no m r g .   RESPIRATORY: clear to auscultation b/l  GI: soft non tender no hepatosplenomegaly  NEURO: AAOX3  Grossly non focal neuro exam  INTEGUMENT: no obvious rashes  LYMPHATIC: no palpable cervical, supraclavicular, axillary or inguinal LAD  MUSCULOSKELETAL: Unremarkable. No bony tenderness.   EXTREMITIES: no edema  PSYCH: Mentation, mood and affect are normal. Decision making capacity is intact        LABS/IMAGING/PATHOLOGY:    Reviewed  Results for AMADOR SCHULERHALEIGH FRANCISCO (MRN [id number]) as of 3/24/2020 09:24   Ref. Range 3/24/2020 07:50   Sodium Latest Ref Range: 133 - 144 mmol/L 138   Potassium Latest Ref Range: 3.4 - 5.3 mmol/L 3.8   Chloride Latest Ref Range: 94 - 109 mmol/L 105   Carbon Dioxide Latest Ref Range: 20 - 32 mmol/L 27   Urea Nitrogen Latest Ref Range: 7 - 30 mg/dL 12   Creatinine Latest Ref Range: 0.52 - 1.04 mg/dL 0.56   GFR Estimate Latest Ref Range: >60 mL/min/1.73_m2 >90   GFR Estimate If Black Latest Ref Range: >60 mL/min/1.73_m2 >90   Calcium Latest Ref Range: 8.5 - 10.1 mg/dL 8.8   Anion Gap Latest Ref Range: 3 - 14 mmol/L 6   Albumin Latest Ref Range: 3.4 - 5.0 g/dL 3.3 (L)   Protein Total Latest Ref Range: 6.8 - 8.8 g/dL 7.1   Bilirubin Total Latest Ref Range: 0.2 - 1.3 mg/dL 0.2   Alkaline Phosphatase Latest Ref Range: 40 - 150 U/L 156 (H)   ALT Latest Ref Range: 0 - 50 U/L 23   AST  Latest Ref Range: 0 - 45 U/L 27   Glucose Latest Ref Range: 70 - 99 mg/dL 87   WBC Latest Ref Range: 4.0 - 11.0 10e9/L 12.5 (H)   Hemoglobin Latest Ref Range: 11.7 - 15.7 g/dL 11.4 (L)   Hematocrit Latest Ref Range: 35.0 - 47.0 % 36.0   Platelet Count Latest Ref Range: 150 - 450 10e9/L 195   RBC Count Latest Ref Range: 3.8 - 5.2 10e12/L 3.67 (L)   MCV Latest Ref Range: 78 - 100 fl 98   MCH Latest Ref Range: 26.5 - 33.0 pg 31.1   MCHC Latest Ref Range: 31.5 - 36.5 g/dL 31.7   RDW Latest Ref Range: 10.0 - 15.0 % 17.4 (H)   Diff Method Unknown Automated Method   % Neutrophils Latest Units: % 66.5   % Lymphocytes Latest Units: % 23.4   % Monocytes Latest Units: % 5.9   % Eosinophils Latest Units: % 1.8   % Basophils Latest Units: % 0.8   % Immature Granulocytes Latest Units: % 1.6   Absolute Neutrophil Latest Ref Range: 1.6 - 8.3 10e9/L 8.3   Absolute Lymphocytes Latest Ref Range: 0.8 - 5.3 10e9/L 2.9   Absolute Monocytes Latest Ref Range: 0.0 - 1.3 10e9/L 0.7   Absolute Eosinophils Latest Ref Range: 0.0 - 0.7 10e9/L 0.2   Absolute Basophils Latest Ref Range: 0.0 - 0.2 10e9/L 0.1   Abs Immature Granulocytes Latest Ref Range: 0 - 0.4 10e9/L 0.2     Results for HARINI AMADORHALEIGH FRANCISCO (MRN [id number]) as of 3/24/2020 09:24   Ref. Range 11/4/2019 11:49 12/24/2019 07:40 12/30/2019 10:00 2/10/2020 08:15   CEA Latest Ref Range: 0 - 2.5 ug/L 10.0 (H) 8.8 (H) 7.6 (H) 3.3 (H)         CT CHEST/ABDOMEN/PELVIS W CONTRAST 2/21/2020 8:57 AM     CLINICAL HISTORY: Rectal cancer- follow up to current treatment.;  Metastasis from rectal cancer (H); Metastasis from rectal cancer (H);  Bone metastasis (H)     TECHNIQUE: CT scan of the chest, abdomen, and pelvis was performed  following injection of IV contrast. Multiplanar reformats were  obtained. Dose reduction techniques were used.   CONTRAST: 100 mL isovue 370     COMPARISON: PET CT 11/7/2019, CT chest, abdomen and pelvis 11/1/2019,  MR pelvis 11/1/2019     FINDINGS:   LUNGS AND PLEURA: The  central airways are patent. Improvement in  noncalcified nodule in the posterior left lower lobe measuring 5 mm  series 8/image 113, previously 7 mm. Stable noncalcified nodule in the  anterior left upper lobe measuring 4 mm series 8/image 52. Stable  noncalcified nodule in the lateral left upper lobe measuring 2 mm  series 8/image 59. Stable right middle lobe noncalcified nodule  measuring 3 mm series 8/image 84. Resolution of lateral right upper  lobe noncalcified nodule.     No new or enlarging noncalcified pulmonary nodules.     No pleural effusion, pneumothorax or focal consolidation.     MEDIASTINUM/AXILLAE: No axillary, mediastinal or hilar  lymphadenopathy.     There is a small sliding-type hiatal hernia.     Heart size is normal without significant pericardial effusion. The  thoracic aorta is normal in caliber.     HEPATOBILIARY: No focal hepatic lesion. Cholecystectomy. No biliary  ductal dilatation.     PANCREAS: Normal.     SPLEEN: Normal.     ADRENAL GLANDS: Normal.     KIDNEYS/BLADDER: Normal.     BOWEL: Small and large bowel loops are normal in caliber without  obstruction. Scattered pancolonic diverticula without acute  diverticulitis. The appendix is normal in appearance. Significant  improvement in rectal mass, mesorectal and superior rectal nodularity.     PELVIC ORGANS: Uterus and adnexa unremarkable.     ADDITIONAL FINDINGS: Abdominal aorta normal in caliber with mild mixed  atheromatous plaque. Major portal veins are patent. No free fluid or  pneumoperitoneum. No new abdominal or pelvic lymphadenopathy.  Resolution of right internal iliac adenopathy.     MUSCULOSKELETAL: Increase in size of sclerotic lesion at the left  aspect of L3 vertebral body without pathologic fracture.                                                                      IMPRESSION:  1.  Significant improvement in rectal mass and perirectal nodularity.  2.  Resolution of right internal iliac lymphadenopathy.  3.  Slight  improvement in dominant posterior left lower lobe nodule,  previously hypermetabolic, consistent with pulmonary metastatic  disease.  4.  A few additional stable bilateral noncalcified pulmonary nodules  remain indeterminate. Attention on follow-up is recommended.  5.  Increase in size of presacral hypermetabolic L3 sclerotic lesion,  may be due to treatment effect.  6.  No new abdominopelvic mass or evidence of hepatic metastatic  disease.    ASSESSMENT AND PLAN:      Metastatic rectal adenocarcinoma, MSI intact, K-aimee wild type, PDL 1 + 25% with metastasis to the lungs, bones and pelvic lymph nodes.      She started palliative chemotherapy with FOLFOX/Avastin on 11/25/2019.      She has received 6 cycles of chemotherapy.  With cycle #3, we stopped 5-FU bolus because of excessive tiredness.    Repeat CT CAP on 2/21/2020 shows good response to therapy with improvement in the rectal mass, mesorectal and superior rectal nodularity as well as resolution of right internal iliac lymphadenopathy.  There is slight improvement in the dominant posterior left lower lobe lung nodule and several of the lung nodules are is stable.  There is increase in size of the sclerotic lesion of the left aspect of L3 vertebral body without pathological fracture and this is likely due to treatment effect.    CEA on 2/10/2020 was down to 3.3.    She has received 8 cycles of chemotherapy.  She has cumulative fatigue and issues with nausea especially for the first 1 week after chemotherapy.  We discussed the situation in detail.  I would like to hold oxaliplatin and continue with 5-FU/Avastin.  Today she will get cycle #9 without oxaliplatin.      We also discussed that considering the coronavirus situation and the risks of stella it with repeated visits to the clinic and hospital, we are trying to limit patient visits as much as possible so as to decrease their exposure.  Because of this reason one option would be to switch her to oral  Xeloda as a single agent going forward.  We discussed the rational schedule and potential side effects of it in detail.  She was concerned how her insurance would cover this so we will look into that before finalizing the plan.  There is certainly a possibility that with next chemotherapy cycle we will switch her to single agent oral Xeloda.      Nausea and vomiting.  Continue Decadron, Aloxi and Emend as premedication and she also gets Emend on day 3 which has helped to some extent.  I will stop oxaliplatin.  She will continue to take Zofran starting day 3 and continue Compazine as needed.  Continue Zyprexa 2.5 mg at night.  Use Ativan as needed.      Bone metastasis.  Started Zometa on 11/25/2019.  She is asymptomatic.  Continue vitamin D and calcium.  She will be next due for Zometa in May 2020.  Last CT scan showed increased sclerosis at L3 vertebra which is likely a treatment effect.      Anemia.  This is mild from chemotherapy.  Continue to monitor.    Cold sensitivity.  We are stopping oxaliplatin due to reasons mentioned above.      Fatigue.  Hopefully this would also be better after stopping oxaliplatin.        We did not address the following today.    Discussion regarding health care directive  We discussed that it is important that she completes health care directive which would help in making sure that her wishes are followed about her treatment care in case she is not able to make a decision for herself.  We gave her information regarding that.    RTC as scheduled    All questions answered.  She is agreeable and comfortable with the plan.    Charline Velazquez MD

## 2020-03-24 NOTE — PROGRESS NOTES
Power port needle left accessed for treatment. Tolerated port access and draw without complaint. Port site scrubbed with Chloraprep for 30 seconds and allowed to dry completely prior to dressing application. Accessed using sterile technique. Bosworth tubes drawn-Red gel/Green/Purple tubes. Double signed by patient and RN. See documentation flowsheet. Patient left a urine specimen. Cuca Chang, RN, BSN, OCN

## 2020-03-24 NOTE — NURSING NOTE
"Oncology Rooming Note    March 24, 2020 8:39 AM   Alannah Wynn is a 69 year old female who presents for:    Chief Complaint   Patient presents with     Oncology Clinic Visit     Follow up/prior to infusion     Initial Vitals: Wt 71.9 kg (158 lb 8 oz)   LMP 11/27/2001 (Approximate)   BMI 32.17 kg/m   Estimated body mass index is 32.17 kg/m  as calculated from the following:    Height as of 1/13/20: 1.495 m (4' 10.86\").    Weight as of this encounter: 71.9 kg (158 lb 8 oz). Body surface area is 1.73 meters squared.  No Pain (0) Comment: Data Unavailable   Patient's last menstrual period was 11/27/2001 (approximate).  Allergies reviewed: Yes  Medications reviewed: Yes    Medications: Medication refills not needed today.  Pharmacy name entered into [facility]:    [id number] - [place], [place] - [address] AT [facility] & [address] [place] [address] [facility] - [place], [place] - [address], [id number]    Ximena Marie LPN              "

## 2020-03-24 NOTE — PROGRESS NOTES
Infusion Nursing Note:  Alannah Wynn presents today for 5FU/ Avastin.    Patient seen by provider today: Yes: Dr. Velazquez   present during visit today: Not Applicable.    Note: N/A.    Intravenous Access:  Implanted Port.    Treatment Conditions:  Lab Results   Component Value Date    HGB 11.4 03/24/2020     Lab Results   Component Value Date    WBC 12.5 03/24/2020      Lab Results   Component Value Date    ANEU 8.3 03/24/2020     Lab Results   Component Value Date     03/24/2020      Lab Results   Component Value Date     03/24/2020                   Lab Results   Component Value Date    POTASSIUM 3.8 03/24/2020           No results found for: MAG         Lab Results   Component Value Date    CR 0.56 03/24/2020                   Lab Results   Component Value Date    LAUREN 8.8 03/24/2020                Lab Results   Component Value Date    BILITOTAL 0.2 03/24/2020           Lab Results   Component Value Date    ALBUMIN 3.3 03/24/2020                    Lab Results   Component Value Date    ALT 23 03/24/2020           Lab Results   Component Value Date    AST 27 03/24/2020       Post Infusion Assessment:  Patient tolerated infusion without incident.  Blood return noted pre and post infusion.  Site patent and intact, free from redness, edema or discomfort.  No evidence of extravasations.  Port left accessed due to home infusion.       Discharge Plan:   Patient will return 3/26 for next appointment.   Patient discharged in stable condition accompanied by: self and .  Departure Mode: Ambulatory.    Maya Grubbs RN

## 2020-03-26 ENCOUNTER — INFUSION THERAPY VISIT (OUTPATIENT)
Dept: INFUSION THERAPY | Facility: CLINIC | Age: 70
End: 2020-03-26
Attending: INTERNAL MEDICINE
Payer: COMMERCIAL

## 2020-03-26 VITALS
RESPIRATION RATE: 18 BRPM | SYSTOLIC BLOOD PRESSURE: 111 MMHG | TEMPERATURE: 97.8 F | OXYGEN SATURATION: 98 % | HEART RATE: 70 BPM | DIASTOLIC BLOOD PRESSURE: 76 MMHG

## 2020-03-26 DIAGNOSIS — C20 METASTASIS FROM RECTAL CANCER (H): ICD-10-CM

## 2020-03-26 DIAGNOSIS — C79.9 METASTASIS FROM RECTAL CANCER (H): ICD-10-CM

## 2020-03-26 DIAGNOSIS — R11.0 NAUSEA: ICD-10-CM

## 2020-03-26 DIAGNOSIS — D70.1 CHEMOTHERAPY-INDUCED NEUTROPENIA (H): ICD-10-CM

## 2020-03-26 DIAGNOSIS — T45.1X5A CHEMOTHERAPY-INDUCED NEUTROPENIA (H): ICD-10-CM

## 2020-03-26 DIAGNOSIS — C79.51 BONE METASTASIS: Primary | ICD-10-CM

## 2020-03-26 PROCEDURE — 96361 HYDRATE IV INFUSION ADD-ON: CPT | Performed by: NURSE PRACTITIONER

## 2020-03-26 PROCEDURE — 99207 ZZC NO CHARGE NURSE ONLY: CPT

## 2020-03-26 PROCEDURE — 96377 APPLICATON ON-BODY INJECTOR: CPT | Mod: 59 | Performed by: NURSE PRACTITIONER

## 2020-03-26 PROCEDURE — 96365 THER/PROPH/DIAG IV INF INIT: CPT | Performed by: NURSE PRACTITIONER

## 2020-03-26 RX ORDER — HEPARIN SODIUM (PORCINE) LOCK FLUSH IV SOLN 100 UNIT/ML 100 UNIT/ML
5 SOLUTION INTRAVENOUS
Status: DISCONTINUED | OUTPATIENT
Start: 2020-03-26 | End: 2020-03-26 | Stop reason: HOSPADM

## 2020-03-26 RX ORDER — HEPARIN SODIUM (PORCINE) LOCK FLUSH IV SOLN 100 UNIT/ML 100 UNIT/ML
5 SOLUTION INTRAVENOUS
Status: CANCELLED | OUTPATIENT
Start: 2020-03-26

## 2020-03-26 RX ORDER — HEPARIN SODIUM,PORCINE 10 UNIT/ML
5 VIAL (ML) INTRAVENOUS
Status: CANCELLED | OUTPATIENT
Start: 2020-03-26

## 2020-03-26 RX ORDER — LORAZEPAM 2 MG/ML
0.5 INJECTION INTRAMUSCULAR ONCE
Status: CANCELLED
Start: 2020-03-26

## 2020-03-26 RX ADMIN — Medication 1000 ML: at 10:05

## 2020-03-26 RX ADMIN — HEPARIN SODIUM (PORCINE) LOCK FLUSH IV SOLN 100 UNIT/ML 5 ML: 100 SOLUTION at 11:46

## 2020-03-26 NOTE — PROGRESS NOTES
Infusion Nursing Note:  Alannah Wynn presents today for 5Fu Pump disconnect.    Patient seen by provider today: No   present during visit today: Not Applicable.    Note: Pt due for 5FU pump disconnect today - Pt states she was nauseated last night and has had some diarrhea today, appetite is decreased, not drinking as much as she wants.  Will give 1 liter of NS with Emend infusion today - Pt due for Onpro - prefers to use her left arm for placement.    Intravenous Access:  Implanted Port.    Treatment Conditions:  Not Applicable.      Post Infusion Assessment:  Patient tolerated infusion without incident.  Patient tolerated injection without incident.  Blood return noted pre and post infusion.  Site patent and intact, free from redness, edema or discomfort.  No evidence of extravasations.  Access discontinued per protocol.    ONPRO  Was placed on patient's: back of left arm.  Was placed at 10:12 AM  Patient education included: what patient can expect after application, what colored lights mean on the device, when to remove device, when and where to call with questions or issues, all patients questions answered and that Neulasta administration will occur at 13:12pm, instructed pt to remove device at 1400. - spouse verbalized understanding    Patient tolerated administration well.      Discharge Plan:   Follow up with Dr Velazquez on April 7, 2020 - pt maybe switching to Xeloda per  as he talked with Dr Velazquez and Madhav in Pharmacy.  Discharge instructions reviewed with: Patient and Family.  Patient and/or family verbalized understanding of discharge instructions and all questions answered.  Patient discharged in stable condition accompanied by: self and .  Departure Mode: Wheelchair.    Yadira Dumas RN

## 2020-03-27 DIAGNOSIS — C79.9 METASTASIS FROM RECTAL CANCER (H): ICD-10-CM

## 2020-03-27 DIAGNOSIS — C20 METASTASIS FROM RECTAL CANCER (H): ICD-10-CM

## 2020-03-27 DIAGNOSIS — T45.1X5A CHEMOTHERAPY-INDUCED NEUTROPENIA (H): Primary | ICD-10-CM

## 2020-03-27 DIAGNOSIS — D70.1 CHEMOTHERAPY-INDUCED NEUTROPENIA (H): Primary | ICD-10-CM

## 2020-03-27 DIAGNOSIS — C79.51 BONE METASTASIS: ICD-10-CM

## 2020-03-30 DIAGNOSIS — C79.9 METASTASIS FROM RECTAL CANCER (H): ICD-10-CM

## 2020-03-30 DIAGNOSIS — C79.51 BONE METASTASIS: ICD-10-CM

## 2020-03-30 DIAGNOSIS — T45.1X5A CHEMOTHERAPY-INDUCED NEUTROPENIA (H): Primary | ICD-10-CM

## 2020-03-30 DIAGNOSIS — D70.1 CHEMOTHERAPY-INDUCED NEUTROPENIA (H): Primary | ICD-10-CM

## 2020-03-30 DIAGNOSIS — C20 METASTASIS FROM RECTAL CANCER (H): ICD-10-CM

## 2020-03-30 RX ORDER — CAPECITABINE 500 MG/1
1000 TABLET, FILM COATED ORAL 2 TIMES DAILY
Qty: 84 TABLET | Refills: 0 | Status: SHIPPED | OUTPATIENT
Start: 2020-03-30 | End: 2020-01-01

## 2020-03-31 ENCOUNTER — DOCUMENTATION ONLY (OUTPATIENT)
Dept: ONCOLOGY | Facility: CLINIC | Age: 70
End: 2020-03-31

## 2020-03-31 NOTE — PROGRESS NOTES
"Oral Chemotherapy Monitoring Program  [facility]    Primary Oncologist: Dr. Velazquez  Primary Oncology Clinic:   Cancer Diagnosis: Colorectal    Drug: Xeloda 1500mg BID 14 days on/ 7 days off  Start Date: 4/7 after labs  Expected duration of therapy: Until disease progression or unacceptable toxicity    Drug Interaction Assessment: None    Subjective/Objective:  Alannah Wynn is a 69 year old female contacted by phone for an initial visit for oral chemotherapy education.      No flowsheet data found.    Vitals:  BP:   BP Readings from Last 1 Encounters:   03/26/20 111/76     Wt Readings from Last 1 Encounters:   03/24/20 71.9 kg (158 lb 8 oz)     Estimated body surface area is 1.73 meters squared as calculated from the following:    Height as of 1/13/20: 1.495 m (4' 10.86\").    Weight as of 3/24/20: 71.9 kg (158 lb 8 oz).      Labs:  Lab Results   Component Value Date     03/24/2020      Lab Results   Component Value Date    POTASSIUM 3.8 03/24/2020     Lab Results   Component Value Date    LAUREN 8.8 03/24/2020     No results found for: MAG  No results found for: PHOS  Lab Results   Component Value Date    ALBUMIN 3.3 03/24/2020     Lab Results   Component Value Date    BUN 12 03/24/2020     Lab Results   Component Value Date    CR 0.56 03/24/2020       Lab Results   Component Value Date    AST 27 03/24/2020     Lab Results   Component Value Date    ALT 23 03/24/2020     Lab Results   Component Value Date    BILITOTAL 0.2 03/24/2020       Lab Results   Component Value Date    WBC 12.5 03/24/2020     Lab Results   Component Value Date    HGB 11.4 03/24/2020     Lab Results   Component Value Date     03/24/2020     Lab Results   Component Value Date    ANEU 8.3 03/24/2020         Assessment/Plan:  Patient is appropriate to start therapy.    Basic chemotherapy teaching was reviewed with the patient's family including indication, start date of therapy, dose, administration, adverse effects, missed doses, food " and drug interactions, monitoring, side effect management, office contact information, and safe handling. Written materials were provided and all questions answered.    Follow-Up:  1 week from start     William Montgomery PharmD, BCPS, BCOP

## 2020-04-07 ENCOUNTER — VIRTUAL VISIT (OUTPATIENT)
Dept: ONCOLOGY | Facility: CLINIC | Age: 70
End: 2020-04-07
Attending: INTERNAL MEDICINE
Payer: COMMERCIAL

## 2020-04-07 ENCOUNTER — ONCOLOGY VISIT (OUTPATIENT)
Dept: ONCOLOGY | Facility: CLINIC | Age: 70
End: 2020-04-07
Payer: COMMERCIAL

## 2020-04-07 ENCOUNTER — TELEPHONE (OUTPATIENT)
Dept: PHARMACY | Facility: CLINIC | Age: 70
End: 2020-04-07

## 2020-04-07 VITALS — BODY MASS INDEX: 32.07 KG/M2 | WEIGHT: 158 LBS

## 2020-04-07 DIAGNOSIS — C20 METASTASIS FROM RECTAL CANCER (H): Primary | ICD-10-CM

## 2020-04-07 DIAGNOSIS — D70.1 CHEMOTHERAPY-INDUCED NEUTROPENIA (H): Primary | ICD-10-CM

## 2020-04-07 DIAGNOSIS — C79.9 METASTASIS FROM RECTAL CANCER (H): Primary | ICD-10-CM

## 2020-04-07 DIAGNOSIS — C79.51 BONE METASTASIS: ICD-10-CM

## 2020-04-07 DIAGNOSIS — C20 METASTASIS FROM RECTAL CANCER (H): ICD-10-CM

## 2020-04-07 DIAGNOSIS — C79.9 METASTASIS FROM RECTAL CANCER (H): ICD-10-CM

## 2020-04-07 DIAGNOSIS — T45.1X5A CHEMOTHERAPY-INDUCED NEUTROPENIA (H): Primary | ICD-10-CM

## 2020-04-07 LAB
ALBUMIN SERPL-MCNC: 3.5 G/DL (ref 3.4–5)
ALP SERPL-CCNC: 152 U/L (ref 40–150)
ALT SERPL W P-5'-P-CCNC: 23 U/L (ref 0–50)
ANION GAP SERPL CALCULATED.3IONS-SCNC: 4 MMOL/L (ref 3–14)
AST SERPL W P-5'-P-CCNC: 21 U/L (ref 0–45)
BASOPHILS # BLD AUTO: 0.1 10E9/L (ref 0–0.2)
BASOPHILS NFR BLD AUTO: 0.6 %
BILIRUB SERPL-MCNC: 0.2 MG/DL (ref 0.2–1.3)
BUN SERPL-MCNC: 17 MG/DL (ref 7–30)
CALCIUM SERPL-MCNC: 9.1 MG/DL (ref 8.5–10.1)
CHLORIDE SERPL-SCNC: 107 MMOL/L (ref 94–109)
CO2 SERPL-SCNC: 28 MMOL/L (ref 20–32)
CREAT SERPL-MCNC: 0.62 MG/DL (ref 0.52–1.04)
DIFFERENTIAL METHOD BLD: ABNORMAL
EOSINOPHIL # BLD AUTO: 0.2 10E9/L (ref 0–0.7)
EOSINOPHIL NFR BLD AUTO: 1.7 %
ERYTHROCYTE [DISTWIDTH] IN BLOOD BY AUTOMATED COUNT: 17.1 % (ref 10–15)
GFR SERPL CREATININE-BSD FRML MDRD: >90 ML/MIN/{1.73_M2}
GLUCOSE SERPL-MCNC: 117 MG/DL (ref 70–99)
HCT VFR BLD AUTO: 37 % (ref 35–47)
HGB BLD-MCNC: 11.6 G/DL (ref 11.7–15.7)
IMM GRANULOCYTES # BLD: 0.2 10E9/L (ref 0–0.4)
IMM GRANULOCYTES NFR BLD: 1.3 %
LYMPHOCYTES # BLD AUTO: 3.1 10E9/L (ref 0.8–5.3)
LYMPHOCYTES NFR BLD AUTO: 21.6 %
MCH RBC QN AUTO: 31.9 PG (ref 26.5–33)
MCHC RBC AUTO-ENTMCNC: 31.4 G/DL (ref 31.5–36.5)
MCV RBC AUTO: 102 FL (ref 78–100)
MONOCYTES # BLD AUTO: 0.6 10E9/L (ref 0–1.3)
MONOCYTES NFR BLD AUTO: 4.5 %
NEUTROPHILS # BLD AUTO: 10 10E9/L (ref 1.6–8.3)
NEUTROPHILS NFR BLD AUTO: 70.3 %
PLATELET # BLD AUTO: 184 10E9/L (ref 150–450)
POTASSIUM SERPL-SCNC: 3.8 MMOL/L (ref 3.4–5.3)
PROT SERPL-MCNC: 7.3 G/DL (ref 6.8–8.8)
RBC # BLD AUTO: 3.64 10E12/L (ref 3.8–5.2)
SODIUM SERPL-SCNC: 139 MMOL/L (ref 133–144)
WBC # BLD AUTO: 14.2 10E9/L (ref 4–11)

## 2020-04-07 PROCEDURE — 80053 COMPREHEN METABOLIC PANEL: CPT | Performed by: INTERNAL MEDICINE

## 2020-04-07 PROCEDURE — 99207 ZZC NO CHARGE NURSE ONLY: CPT

## 2020-04-07 PROCEDURE — 99442 ZZC PHYSICIAN TELEPHONE EVALUATION 11-20 MIN: CPT | Mod: TEL | Performed by: INTERNAL MEDICINE

## 2020-04-07 PROCEDURE — 85025 COMPLETE CBC W/AUTO DIFF WBC: CPT | Performed by: INTERNAL MEDICINE

## 2020-04-07 RX ORDER — HEPARIN SODIUM (PORCINE) LOCK FLUSH IV SOLN 100 UNIT/ML 100 UNIT/ML
5 SOLUTION INTRAVENOUS
Status: DISCONTINUED | OUTPATIENT
Start: 2020-04-07 | End: 2020-04-07 | Stop reason: HOSPADM

## 2020-04-07 RX ADMIN — HEPARIN SODIUM (PORCINE) LOCK FLUSH IV SOLN 100 UNIT/ML 5 ML: 100 SOLUTION at 07:26

## 2020-04-07 ASSESSMENT — PAIN SCALES - GENERAL: PAINLEVEL: NO PAIN (0)

## 2020-04-07 NOTE — PROGRESS NOTES
"ONCOLOGY FOLLOW UP NOTE    4/7/2020     Patient was being followed by Dr. Tavarez and saw her on 11/13/2019.  Now she is transferring her care to me.  I have reviewed her previous records and have copied and updated from prior notes.      DIAGNOSIS:    10/2019 dx adenoCa of rectum,  MMR intact- NGS Negative for KRAS, NRAS and BRAF. PD L1 25%.  Baseline CEA 10.  Stage IV disease with pulmonary and bony mets      HISTORY OF ONCOLOGY ILLNESS:    She has had intermittent hematochezia and anorectal irritation since 2011, that have been managed as \"hemorrhoids\". These symptoms became persistent and progressive since early thi year. Her anorectal pain is managed with Tylenol. Denies fevers, chills, or unintentional weight loss. Does have intermittent urinary and fecal urgency but no incontinence per se.    Diagnostic colonoscopy on 10/18/19 for rectal bleeding showed \"fungating and ulcerated non-obstructing mass was found in the distal rectum <1 cm from the anal verge. The mass was partially circumferential (involving one-half of the lumen circumference). The mass measured four cm in length. In addition, its diameter measured six mm.  Pathology showed invasive moderately differentiated adenocarcinoma with intact mismatch repair protein expression, PD L1 25% and NGS panel showed NO MUTATIONS in KRAS, NRAS and BRAF.      Pelvic MRI 11/2019 found fungating polypoid mid/lower rectal mass with luminal narrowing as well as innumerable enlarged lymph nodes as described above with a conglomerate of suspicious lymph nodes abutting the anterior peritoneal reflection. Stage cT3d N2.     PET 11/2019 found   1. Hypermetabolic mass in the distal rectum, SUV 14 corresponding to the patient's known rectal cancer.  2. Multiple hypermetabolic pelvic lymph nodes, compatible metastatic disease, SUV 5-6.  3. FDG avid pulmonary nodule measuring 7 mm in the left lower lobe, concerning for metastatic disease. Additional FDG avid pulmonary nodules " suspicious for metastatic disease, SUV around 4.  4. Osteoblastic metastatic lesion in the L3 vertebral body, SUV 6.8    She started palliative FOLFOX/Avastin on 11/25/2019.  Cycle #2 given on 12/9/2019.  Cycle #3 scheduled for 12/24/2019.  C#6 on 2/10/2020.    Repeat CT CAP on 2/21/2020 shows good response to therapy with improvement in the rectal mass, mesorectal and superior rectal nodularity as well as resolution of right internal iliac lymphadenopathy.  There is slight improvement in the dominant posterior left lower lobe lung nodule and several of the lung nodules are is stable.  There is increase in size of the sclerotic lesion of the left aspect of L3 vertebral body without pathological fracture and this is likely due to treatment effect.    CEA on 2/10/2020 was down to 3.3.    Cycle #7 FOLFOX/Avastin 2/25/2020.  C#8 FOLFOX/Avastin 3/9/2020  Cycle#9 5-FU/Avastin 3/24/2020-stopped oxaliplatin due to cumulative fatigue and issues with nausea.    Because of bone metastatic disease, she was started on Zometa on 11/25/2019. We are giving it every 3 months.             INTERVAL HISTORY:     This is a phone visit.  Her  is also on the phone.     Overall doing better. This chemo went better with slightly less fatigue, less nausea and less diarrhea. She took anti nausea meds and imodium which helped.  Denies fever. Now feeling good with good energy. Denies any pain. currently denies any neuropathy. No new swellings.     ECOG 1    ROS:  Rest of the comprehensive review of the system was essentially unremarkable.          I reviewed with her history in epic as below.    PAST MEDICAL HISTORY  Reflux, hemorrhoid, hepatitis B?  Overactive bladder  Active Ambulatory Problems     Diagnosis Date Noted     External hemorrhoids 04/29/2016     Non morbid obesity due to excess calories 04/29/2016     Hepatitis B immune 04/29/2016     Screening for cervical cancer 04/29/2016     CARDIOVASCULAR SCREENING; LDL GOAL LESS THAN  160 04/29/2016     Gastroesophageal reflux disease 04/29/2016     Overactive bladder 07/21/2017     Nevus of left lower leg- undetermined behavior 12/27/2018     Metastasis from rectal cancer (H) 11/13/2019     Bone metastasis (H) 11/13/2019     Nausea 11/27/2019     Chemotherapy-induced neutropenia (H) 12/24/2019     Resolved Ambulatory Problems     Diagnosis Date Noted     Urgency of urination 04/29/2016     Need for vaccination against Streptococcus pneumoniae 04/29/2016     Morbid obesity (H) 07/21/2017     Obesity (BMI 35.0-39.9) with comorbidity (H) 12/27/2018     No Additional Past Medical History       MEDICATIONS:  Current Outpatient Medications   Medication     Ascorbic Acid (VITAMIN C) 500 MG CAPS     B Complex Vitamins (B COMPLEX-B12 PO)     capecitabine (XELODA) 500 MG tablet     cholecalciferol (VITAMIN D3) 400 unit (10 mcg) TABS tablet     docusate sodium (COLACE) 100 MG capsule     Fesoterodine Fumarate 8 MG TB24     lidocaine-prilocaine (EMLA) 2.5-2.5 % external cream     LORazepam (ATIVAN) 0.5 MG tablet     Lutein 20 MG CAPS     mirabegron (MYRBETRIQ) 50 MG 24 hr tablet     Multiple vitamin TABS     naloxone (NARCAN) 4 MG/0.1ML nasal spray     omeprazole (PRILOSEC) 20 MG DR capsule     solifenacin (VESICARE) 10 MG tablet     traMADol (ULTRAM) 50 MG tablet     OLANZapine (ZYPREXA) 2.5 MG tablet     Current Facility-Administered Medications   Medication     lidocaine (XYLOCAINE) 5 % ointment     Facility-Administered Medications Ordered in Other Visits   Medication     heparin 100 UNIT/ML injection 5 mL     sodium chloride (PF) 0.9% PF flush 10-20 mL     ALLERGY:     Allergies   Allergen Reactions     Penicillins      Sulfa Drugs             SOCIAL HISTORY:  She is a [place] immigrants to [place].  Denies smoking denies alcohol abuse.  2 of her sons are doctors.    FAMILY HISTORY:   Denies any family history of cancer.       PHYSICAL EXAMINATION:     Wt 71.7 kg (158 lb)   LMP 11/27/2001  (Approximate)   BMI 32.07 kg/m       Wt Readings from Last 4 Encounters:   04/07/20 71.7 kg (158 lb)   03/24/20 71.9 kg (158 lb 8 oz)   03/09/20 72.3 kg (159 lb 6.4 oz)   02/25/20 70.9 kg (156 lb 4.8 oz)       Phone visit so exam is very limited.  Constitutional. Did not sound in any acute distress  Neuro. Alert and oriented  Psych. Mood is fine, decision making capacity intact.      LABS/IMAGING/PATHOLOGY:    Reviewed    Results for MARY SCHULER (MRN [id number]) as of 4/7/2020 08:29   Ref. Range 4/7/2020 07:30   Sodium Latest Ref Range: 133 - 144 mmol/L 139   Potassium Latest Ref Range: 3.4 - 5.3 mmol/L 3.8   Chloride Latest Ref Range: 94 - 109 mmol/L 107   Carbon Dioxide Latest Ref Range: 20 - 32 mmol/L 28   Urea Nitrogen Latest Ref Range: 7 - 30 mg/dL 17   Creatinine Latest Ref Range: 0.52 - 1.04 mg/dL 0.62   GFR Estimate Latest Ref Range: >60 mL/min/1.73_m2 >90   GFR Estimate If Black Latest Ref Range: >60 mL/min/1.73_m2 >90   Calcium Latest Ref Range: 8.5 - 10.1 mg/dL 9.1   Anion Gap Latest Ref Range: 3 - 14 mmol/L 4   Albumin Latest Ref Range: 3.4 - 5.0 g/dL 3.5   Protein Total Latest Ref Range: 6.8 - 8.8 g/dL 7.3   Bilirubin Total Latest Ref Range: 0.2 - 1.3 mg/dL 0.2   Alkaline Phosphatase Latest Ref Range: 40 - 150 U/L 152 (H)   ALT Latest Ref Range: 0 - 50 U/L 23   AST Latest Ref Range: 0 - 45 U/L 21   Glucose Latest Ref Range: 70 - 99 mg/dL 117 (H)   WBC Latest Ref Range: 4.0 - 11.0 10e9/L 14.2 (H)   Hemoglobin Latest Ref Range: 11.7 - 15.7 g/dL 11.6 (L)   Hematocrit Latest Ref Range: 35.0 - 47.0 % 37.0   Platelet Count Latest Ref Range: 150 - 450 10e9/L 184   RBC Count Latest Ref Range: 3.8 - 5.2 10e12/L 3.64 (L)   MCV Latest Ref Range: 78 - 100 fl 102 (H)   MCH Latest Ref Range: 26.5 - 33.0 pg 31.9   MCHC Latest Ref Range: 31.5 - 36.5 g/dL 31.4 (L)   RDW Latest Ref Range: 10.0 - 15.0 % 17.1 (H)   Diff Method Unknown Automated Method   % Neutrophils Latest Units: % 70.3   % Lymphocytes Latest Units:  % 21.6   % Monocytes Latest Units: % 4.5   % Eosinophils Latest Units: % 1.7   % Basophils Latest Units: % 0.6   % Immature Granulocytes Latest Units: % 1.3   Absolute Neutrophil Latest Ref Range: 1.6 - 8.3 10e9/L 10.0 (H)   Absolute Lymphocytes Latest Ref Range: 0.8 - 5.3 10e9/L 3.1   Absolute Monocytes Latest Ref Range: 0.0 - 1.3 10e9/L 0.6   Absolute Eosinophils Latest Ref Range: 0.0 - 0.7 10e9/L 0.2   Absolute Basophils Latest Ref Range: 0.0 - 0.2 10e9/L 0.1   Abs Immature Granulocytes Latest Ref Range: 0 - 0.4 10e9/L 0.2       CT CHEST/ABDOMEN/PELVIS W CONTRAST 2/21/2020 8:57 AM     CLINICAL HISTORY: Rectal cancer- follow up to current treatment.;  Metastasis from rectal cancer (H); Metastasis from rectal cancer (H);  Bone metastasis (H)     TECHNIQUE: CT scan of the chest, abdomen, and pelvis was performed  following injection of IV contrast. Multiplanar reformats were  obtained. Dose reduction techniques were used.   CONTRAST: 100 mL isovue 370     COMPARISON: PET CT 11/7/2019, CT chest, abdomen and pelvis 11/1/2019,  MR pelvis 11/1/2019     FINDINGS:   LUNGS AND PLEURA: The central airways are patent. Improvement in  noncalcified nodule in the posterior left lower lobe measuring 5 mm  series 8/image 113, previously 7 mm. Stable noncalcified nodule in the  anterior left upper lobe measuring 4 mm series 8/image 52. Stable  noncalcified nodule in the lateral left upper lobe measuring 2 mm  series 8/image 59. Stable right middle lobe noncalcified nodule  measuring 3 mm series 8/image 84. Resolution of lateral right upper  lobe noncalcified nodule.     No new or enlarging noncalcified pulmonary nodules.     No pleural effusion, pneumothorax or focal consolidation.     MEDIASTINUM/AXILLAE: No axillary, mediastinal or hilar  lymphadenopathy.     There is a small sliding-type hiatal hernia.     Heart size is normal without significant pericardial effusion. The  thoracic aorta is normal in caliber.     HEPATOBILIARY:  No focal hepatic lesion. Cholecystectomy. No biliary  ductal dilatation.     PANCREAS: Normal.     SPLEEN: Normal.     ADRENAL GLANDS: Normal.     KIDNEYS/BLADDER: Normal.     BOWEL: Small and large bowel loops are normal in caliber without  obstruction. Scattered pancolonic diverticula without acute  diverticulitis. The appendix is normal in appearance. Significant  improvement in rectal mass, mesorectal and superior rectal nodularity.     PELVIC ORGANS: Uterus and adnexa unremarkable.     ADDITIONAL FINDINGS: Abdominal aorta normal in caliber with mild mixed  atheromatous plaque. Major portal veins are patent. No free fluid or  pneumoperitoneum. No new abdominal or pelvic lymphadenopathy.  Resolution of right internal iliac adenopathy.     MUSCULOSKELETAL: Increase in size of sclerotic lesion at the left  aspect of L3 vertebral body without pathologic fracture.                                                                      IMPRESSION:  1.  Significant improvement in rectal mass and perirectal nodularity.  2.  Resolution of right internal iliac lymphadenopathy.  3.  Slight improvement in dominant posterior left lower lobe nodule,  previously hypermetabolic, consistent with pulmonary metastatic  disease.  4.  A few additional stable bilateral noncalcified pulmonary nodules  remain indeterminate. Attention on follow-up is recommended.  5.  Increase in size of presacral hypermetabolic L3 sclerotic lesion,  may be due to treatment effect.  6.  No new abdominopelvic mass or evidence of hepatic metastatic  disease.    ASSESSMENT AND PLAN:      Metastatic rectal adenocarcinoma, MSI intact, K-aimee wild type, PDL 1 + 25% with metastasis to the lungs, bones and pelvic lymph nodes.      She started palliative chemotherapy with FOLFOX/Avastin on 11/25/2019.      She has received 6 cycles of chemotherapy.  With cycle #3, we stopped 5-FU bolus because of excessive tiredness.    Repeat CT CAP on 2/21/2020 shows good response  to therapy with improvement in the rectal mass, mesorectal and superior rectal nodularity as well as resolution of right internal iliac lymphadenopathy.  There is slight improvement in the dominant posterior left lower lobe lung nodule and several of the lung nodules are is stable.  There is increase in size of the sclerotic lesion of the left aspect of L3 vertebral body without pathological fracture and this is likely due to treatment effect.    CEA on 2/10/2020 was down to 3.3.    With cycle #9 we stopped oxaliplatin because of cumulative fatigue and increasing nausea and gave her 5-FU/Avastin.      I also discussed with her, her  as well as her son Gamal about possibly switching to single agent Xeloda going forward so as to limit her visits to the clinic and therefore exposure and risk from coronavirus.  We discussed the pros and cons of this approach and they were all willing to try this.      Today she will start the first cycle of Xeloda as a single agent.      Nausea.  Continue antiemetics.  She can use Compazine, Zofran and Ativan as needed.  Because she is not getting Aloxi anymore, she can start taking Zofran from today.  We discussed that.    Diarrhea.  She can use Imodium as needed.  She is also using Zyprexa 2.5 mg at night.    Anemia.  She has mild anemia from chemotherapy.  Continue to observe.    Prevention of hand-foot syndrome.  We discussed the importance of keeping her hands and well moisturized all the time so as to prevent the complications of hand-foot syndrome due to Xeloda.      Bone metastasis.  Started Zometa on 11/25/2019.  She is getting it every 3 months and her next one will be due in May 2020.  She will continue calcium and vitamin D. Last CT scan showed increased sclerosis at L3 vertebra which is likely a treatment effect.        We did not address the following today.    Discussion regarding health care directive  We discussed that it is important that she completes health  care directive which would help in making sure that her wishes are followed about her treatment care in case she is not able to make a decision for herself.  We gave her information regarding that.    RTC in 3 weeks.    All questions answered.  She is agreeable and comfortable with the plan.    Charline Velazquez MD    Total telephone call time.  12 minutes.

## 2020-04-07 NOTE — TELEPHONE ENCOUNTER
Oral Chemotherapy Monitoring Program- call to patient and lab review     Primary Oncologist: Dr. Velazquez  Primary Oncology Clinic:   Cancer Diagnosis: Colorectal     Drug: Xeloda 1500mg BID 14 days on/ 7 days off  Start Date: 4/7 after labs  Expected duration of therapy: Until disease progression or unacceptable toxicity     Drug Interaction Assessment: None     Subjective/Objective:  Alannah Wynn is a 69 year old contacted by phone for lab review prior to starting Xeloda. He did speak to Dr Velazquez today.  His labs look good and he was instructed to start his Xeloda at 9 AM 4/7/20.    Follow-up- 1 week for tolerance.    Alannah verbalized an understanding of his dose and how to take his Xeloda.    Alfonso Minor, PharmD, BCOP  April 7, 2020

## 2020-04-07 NOTE — NURSING NOTE
"Alannah Wynn is a 69 year old female who is being evaluated via a billable telephone visit.      The patient has been notified of following:     \"This telephone visit will be conducted via a call between you and your physician/provider. We have found that certain health care needs can be provided without the need for a physical exam.  This service lets us provide the care you need with a short phone conversation.  If a prescription is necessary we can send it directly to your pharmacy.  If lab work is needed we can place an order for that and you can then stop by our lab to have the test done at a later time.    If during the course of the call the physician/provider feels a telephone visit is not appropriate, you will not be charged for this service.\"     Patient has given verbal consent for Telephone visit?  Yes    Alannah Wynn complains of    Chief Complaint   Patient presents with     Oncology Clinic Visit     Follow up - labs at 715 - was wondering about WBC count and was wondering when she can take her nausea medication       I have reviewed and updated the patient's Past Medical History, Social History, Family History and Medication List.    ALLERGIES  Penicillins and Sulfa drugs    Annetta Bruno CMA    "

## 2020-04-07 NOTE — PROGRESS NOTES
Port accessed with no incidient. Blood return noted. Labs drawn, tubes labeled and double signed. Port flushed with saline and heparin. Patient tolerated port draw well.   Maya Grubbs RN

## 2020-04-14 ENCOUNTER — DOCUMENTATION ONLY (OUTPATIENT)
Dept: ONCOLOGY | Facility: CLINIC | Age: 70
End: 2020-04-14

## 2020-04-14 NOTE — PROGRESS NOTES
Oral Chemotherapy Monitoring Program  [facility]     Primary Oncologist: Dr. Velazquez  Primary Oncology Clinic:   Cancer Diagnosis: Colorectal     Drug: Xeloda 1500mg BID 14 days on/ 7 days off  Start Date: 4/7/20  Expected duration of therapy: Until disease progression or unacceptable toxicity     Drug Interaction Assessment: None    Subjective/Objective:  Alannah Wynn is a 69 year old female contacted by phone for a follow-up visit for oral chemotherapy.  Alannah states things have been going very well for her since starting her Xeloda on 4/7. She did have 1-2 loose/soft stools for which a change in her diet has relieved this issue without any antidiarrheal use. She also noticed 2-3 bouts of nausea/vomiting, but states she was not using her antiemetics and since beginning to use them regularly everyday (compazine, ativan, zofran) she has had no issues or concerns. She denies any swelling, redness, peeling, or sensations of her hands and feet and states she is diligent with moisturizing.     ORAL CHEMOTHERAPY 4/14/2020   Drug Name Xeloda (Capecitabine)   Current Dosage 1500mg   Current Schedule Other   Cycle Details 2 weeks on 1 week off   Start Date of Last Cycle 4/7/2020   Planned next cycle start date 4/28/2020   Doses missed in last 2 weeks 0   Adherence Assessment Adherent   Adverse Effects (No Data)   Home BPs Not applicable   Any new drug interactions? No   Is the dose as ordered appropriate for the patient? Yes   Is the patient currently in pain? No   Has the patient been assessed within the past 6 months for depression? Yes   Has the patient missed any days of school, work, or other routine activity? No       Assessment/Plan:  Continue current therapy.    Follow-Up:  2 weeks with labs prior to cycle 2 start    Refill Due:  4/21 for 4/28    Juan Blakely, PharmD  April 14, 2020

## 2020-04-20 DIAGNOSIS — C79.51 BONE METASTASIS: ICD-10-CM

## 2020-04-20 DIAGNOSIS — C79.9 METASTASIS FROM RECTAL CANCER (H): ICD-10-CM

## 2020-04-20 DIAGNOSIS — T45.1X5A CHEMOTHERAPY-INDUCED NEUTROPENIA (H): Primary | ICD-10-CM

## 2020-04-20 DIAGNOSIS — D70.1 CHEMOTHERAPY-INDUCED NEUTROPENIA (H): Primary | ICD-10-CM

## 2020-04-20 DIAGNOSIS — C20 METASTASIS FROM RECTAL CANCER (H): ICD-10-CM

## 2020-04-23 ENCOUNTER — ONCOLOGY VISIT (OUTPATIENT)
Dept: ONCOLOGY | Facility: CLINIC | Age: 70
End: 2020-04-23
Payer: COMMERCIAL

## 2020-04-23 DIAGNOSIS — C20 METASTASIS FROM RECTAL CANCER (H): ICD-10-CM

## 2020-04-23 DIAGNOSIS — T45.1X5A CHEMOTHERAPY-INDUCED NEUTROPENIA (H): ICD-10-CM

## 2020-04-23 DIAGNOSIS — C79.9 METASTASIS FROM RECTAL CANCER (H): ICD-10-CM

## 2020-04-23 DIAGNOSIS — C79.51 BONE METASTASIS: ICD-10-CM

## 2020-04-23 DIAGNOSIS — T45.1X5A CHEMOTHERAPY-INDUCED NEUTROPENIA (H): Primary | ICD-10-CM

## 2020-04-23 DIAGNOSIS — D70.1 CHEMOTHERAPY-INDUCED NEUTROPENIA (H): ICD-10-CM

## 2020-04-23 DIAGNOSIS — D70.1 CHEMOTHERAPY-INDUCED NEUTROPENIA (H): Primary | ICD-10-CM

## 2020-04-23 LAB
ALBUMIN SERPL-MCNC: 3.6 G/DL (ref 3.4–5)
ALP SERPL-CCNC: 80 U/L (ref 40–150)
ALT SERPL W P-5'-P-CCNC: 16 U/L (ref 0–50)
ANION GAP SERPL CALCULATED.3IONS-SCNC: 3 MMOL/L (ref 3–14)
AST SERPL W P-5'-P-CCNC: 17 U/L (ref 0–45)
BILIRUB SERPL-MCNC: 0.2 MG/DL (ref 0.2–1.3)
BUN SERPL-MCNC: 14 MG/DL (ref 7–30)
CALCIUM SERPL-MCNC: 9.3 MG/DL (ref 8.5–10.1)
CHLORIDE SERPL-SCNC: 107 MMOL/L (ref 94–109)
CO2 SERPL-SCNC: 30 MMOL/L (ref 20–32)
CREAT SERPL-MCNC: 0.64 MG/DL (ref 0.52–1.04)
DIFFERENTIAL METHOD BLD: ABNORMAL
EOSINOPHIL # BLD AUTO: 0 10E9/L (ref 0–0.7)
EOSINOPHIL NFR BLD AUTO: 1 %
ERYTHROCYTE [DISTWIDTH] IN BLOOD BY AUTOMATED COUNT: 16.6 % (ref 10–15)
GFR SERPL CREATININE-BSD FRML MDRD: >90 ML/MIN/{1.73_M2}
GLUCOSE SERPL-MCNC: 103 MG/DL (ref 70–99)
HCT VFR BLD AUTO: 34.8 % (ref 35–47)
HGB BLD-MCNC: 11.2 G/DL (ref 11.7–15.7)
LYMPHOCYTES # BLD AUTO: 3 10E9/L (ref 0.8–5.3)
LYMPHOCYTES NFR BLD AUTO: 61 %
MCH RBC QN AUTO: 33 PG (ref 26.5–33)
MCHC RBC AUTO-ENTMCNC: 32.2 G/DL (ref 31.5–36.5)
MCV RBC AUTO: 103 FL (ref 78–100)
MONOCYTES # BLD AUTO: 0.1 10E9/L (ref 0–1.3)
MONOCYTES NFR BLD AUTO: 3 %
NEUTROPHILS # BLD AUTO: 1.7 10E9/L (ref 1.6–8.3)
NEUTROPHILS NFR BLD AUTO: 35 %
PLATELET # BLD AUTO: 196 10E9/L (ref 150–450)
PLATELET # BLD EST: ABNORMAL 10*3/UL
POTASSIUM SERPL-SCNC: 3.9 MMOL/L (ref 3.4–5.3)
PROT SERPL-MCNC: 7.2 G/DL (ref 6.8–8.8)
RBC # BLD AUTO: 3.39 10E12/L (ref 3.8–5.2)
RBC MORPH BLD: NORMAL
SODIUM SERPL-SCNC: 140 MMOL/L (ref 133–144)
WBC # BLD AUTO: 4.8 10E9/L (ref 4–11)

## 2020-04-23 PROCEDURE — 85025 COMPLETE CBC W/AUTO DIFF WBC: CPT | Performed by: INTERNAL MEDICINE

## 2020-04-23 PROCEDURE — 80053 COMPREHEN METABOLIC PANEL: CPT | Performed by: INTERNAL MEDICINE

## 2020-04-23 PROCEDURE — 36591 DRAW BLOOD OFF VENOUS DEVICE: CPT

## 2020-04-23 RX ORDER — HEPARIN SODIUM (PORCINE) LOCK FLUSH IV SOLN 100 UNIT/ML 100 UNIT/ML
5 SOLUTION INTRAVENOUS
Status: DISCONTINUED | OUTPATIENT
Start: 2020-04-23 | End: 2020-04-23 | Stop reason: HOSPADM

## 2020-04-23 RX ORDER — CAPECITABINE 500 MG/1
1000 TABLET, FILM COATED ORAL 2 TIMES DAILY
Qty: 84 TABLET | Refills: 0 | Status: SHIPPED | OUTPATIENT
Start: 2020-04-23 | End: 2020-01-01

## 2020-04-23 RX ADMIN — HEPARIN SODIUM (PORCINE) LOCK FLUSH IV SOLN 100 UNIT/ML 5 ML: 100 SOLUTION at 14:25

## 2020-04-23 NOTE — PROGRESS NOTES
"Patient's name and  were verified.  See Doc Flowsheet - IV assess for details.  IVAD accessed with 20G 3/4\" david gripper plus needle  blood return positive: YES  Site without redness, tenderness or swelling: YES  flushed with 30cc NS and 5cc 100u/ml heparin  Needle: de-accessed  Comments: Labs drawn.  Patient tolerated procedure without incident.    Allen Andrade  RN, BSN      "

## 2020-04-28 ENCOUNTER — VIRTUAL VISIT (OUTPATIENT)
Dept: ONCOLOGY | Facility: CLINIC | Age: 70
End: 2020-04-28
Payer: COMMERCIAL

## 2020-04-28 DIAGNOSIS — D70.1 CHEMOTHERAPY-INDUCED NEUTROPENIA (H): Primary | ICD-10-CM

## 2020-04-28 DIAGNOSIS — C79.51 BONE METASTASIS: ICD-10-CM

## 2020-04-28 DIAGNOSIS — C79.9 METASTASIS FROM RECTAL CANCER (H): ICD-10-CM

## 2020-04-28 DIAGNOSIS — C20 METASTASIS FROM RECTAL CANCER (H): ICD-10-CM

## 2020-04-28 DIAGNOSIS — T45.1X5A CHEMOTHERAPY-INDUCED NEUTROPENIA (H): Primary | ICD-10-CM

## 2020-04-28 PROCEDURE — 99215 OFFICE O/P EST HI 40 MIN: CPT | Mod: 95 | Performed by: INTERNAL MEDICINE

## 2020-04-28 NOTE — NURSING NOTE
"Alannah Wynn is a 69 year old female who is being evaluated via a billable telephone visit.      The patient has been notified of following:     \"This telephone visit will be conducted via a call between you and your physician/provider. We have found that certain health care needs can be provided without the need for a physical exam.  This service lets us provide the care you need with a short phone conversation.  If a prescription is necessary we can send it directly to your pharmacy.  If lab work is needed we can place an order for that and you can then stop by our lab to have the test done at a later time.    Telephone visits are billed at different rates depending on your insurance coverage. During this emergency period, for some insurers they may be billed the same as an in-person visit.  Please reach out to your insurance provider with any questions.    If during the course of the call the physician/provider feels a telephone visit is not appropriate, you will not be charged for this service.\"    Patient has given verbal consent for Telephone visit?  Yes    How would you like to obtain your AVS? Blessing Bruno, MOE        "

## 2020-04-28 NOTE — PROGRESS NOTES
"ONCOLOGY FOLLOW UP NOTE    4/28/2020     Patient was being followed by Dr. Tavarez and saw her on 11/13/2019.  Now she is transferring her care to me.  I have reviewed her previous records and have copied and updated from prior notes.      DIAGNOSIS:    10/2019 dx adenoCa of rectum,  MMR intact- NGS Negative for KRAS, NRAS and BRAF. PD L1 25%.  Baseline CEA 10.  Stage IV disease with pulmonary and bony mets      HISTORY OF ONCOLOGY ILLNESS:    She has had intermittent hematochezia and anorectal irritation since 2011, that have been managed as \"hemorrhoids\". These symptoms became persistent and progressive since early thi year. Her anorectal pain is managed with Tylenol. Denies fevers, chills, or unintentional weight loss. Does have intermittent urinary and fecal urgency but no incontinence per se.    Diagnostic colonoscopy on 10/18/19 for rectal bleeding showed \"fungating and ulcerated non-obstructing mass was found in the distal rectum <1 cm from the anal verge. The mass was partially circumferential (involving one-half of the lumen circumference). The mass measured four cm in length. In addition, its diameter measured six mm.  Pathology showed invasive moderately differentiated adenocarcinoma with intact mismatch repair protein expression, PD L1 25% and NGS panel showed NO MUTATIONS in KRAS, NRAS and BRAF.      Pelvic MRI 11/2019 found fungating polypoid mid/lower rectal mass with luminal narrowing as well as innumerable enlarged lymph nodes as described above with a conglomerate of suspicious lymph nodes abutting the anterior peritoneal reflection. Stage cT3d N2.     PET 11/2019 found   1. Hypermetabolic mass in the distal rectum, SUV 14 corresponding to the patient's known rectal cancer.  2. Multiple hypermetabolic pelvic lymph nodes, compatible metastatic disease, SUV 5-6.  3. FDG avid pulmonary nodule measuring 7 mm in the left lower lobe, concerning for metastatic disease. Additional FDG avid pulmonary nodules " suspicious for metastatic disease, SUV around 4.  4. Osteoblastic metastatic lesion in the L3 vertebral body, SUV 6.8    She started palliative FOLFOX/Avastin on 11/25/2019.  Cycle #2 given on 12/9/2019.  Cycle #3 scheduled for 12/24/2019.  C#6 on 2/10/2020.    Repeat CT CAP on 2/21/2020 shows good response to therapy with improvement in the rectal mass, mesorectal and superior rectal nodularity as well as resolution of right internal iliac lymphadenopathy.  There is slight improvement in the dominant posterior left lower lobe lung nodule and several of the lung nodules are is stable.  There is increase in size of the sclerotic lesion of the left aspect of L3 vertebral body without pathological fracture and this is likely due to treatment effect.    CEA on 2/10/2020 was down to 3.3.    Cycle #7 FOLFOX/Avastin 2/25/2020.  C#8 FOLFOX/Avastin 3/9/2020  Cycle#9 5-FU/Avastin 3/24/2020-stopped oxaliplatin due to cumulative fatigue and issues with nausea.    I then discussed with her, her  as well as her son Gamal about possibly switching to single agent Xeloda going forward so as to limit her visits to the clinic and therefore exposure and risk from coronavirus.  We discussed the pros and cons of this approach and they were all willing to try this.     She started Single agent Xeloda on 4/7/2020.    Because of bone metastatic disease, she was started on Zometa on 11/25/2019. We are giving it every 3 months.        INTERVAL HISTORY:     This is a phone visit.  She feels good. Actually felt better. Took anti nausea meds thrice daily x 2 weeks. Nausea was mild x 2 days but then well controlled. Had mild diarrhea and used 1 imodium and it stopped. Denies pain. Weight is the same. Energy is better. No infections or dyspnea. No new swellings. Occasional mild numbness in fingers. Denies any significant hand foot syndrome. Uses a lot of moisturizing lotions.     ECOG 1    ROS:  A comprehensive ROS was otherwise  neg      I reviewed with her history in epic as below.    PAST MEDICAL HISTORY  Reflux, hemorrhoid, hepatitis B?  Overactive bladder  Active Ambulatory Problems     Diagnosis Date Noted     External hemorrhoids 04/29/2016     Non morbid obesity due to excess calories 04/29/2016     Hepatitis B immune 04/29/2016     Screening for cervical cancer 04/29/2016     CARDIOVASCULAR SCREENING; LDL GOAL LESS THAN 160 04/29/2016     Gastroesophageal reflux disease 04/29/2016     Overactive bladder 07/21/2017     Nevus of left lower leg- undetermined behavior 12/27/2018     Metastasis from rectal cancer (H) 11/13/2019     Bone metastasis (H) 11/13/2019     Nausea 11/27/2019     Chemotherapy-induced neutropenia (H) 12/24/2019     Resolved Ambulatory Problems     Diagnosis Date Noted     Urgency of urination 04/29/2016     Need for vaccination against Streptococcus pneumoniae 04/29/2016     Morbid obesity (H) 07/21/2017     Obesity (BMI 35.0-39.9) with comorbidity (H) 12/27/2018     No Additional Past Medical History       MEDICATIONS:  Current Outpatient Medications   Medication     Ascorbic Acid (VITAMIN C) 500 MG CAPS     B Complex Vitamins (B COMPLEX-B12 PO)     capecitabine (XELODA) 500 MG tablet     capecitabine (XELODA) 500 MG tablet     cholecalciferol (VITAMIN D3) 400 unit (10 mcg) TABS tablet     docusate sodium (COLACE) 100 MG capsule     Fesoterodine Fumarate 8 MG TB24     lidocaine-prilocaine (EMLA) 2.5-2.5 % external cream     LORazepam (ATIVAN) 0.5 MG tablet     Lutein 20 MG CAPS     mirabegron (MYRBETRIQ) 50 MG 24 hr tablet     Multiple vitamin TABS     naloxone (NARCAN) 4 MG/0.1ML nasal spray     OLANZapine (ZYPREXA) 2.5 MG tablet     omeprazole (PRILOSEC) 20 MG DR capsule     solifenacin (VESICARE) 10 MG tablet     traMADol (ULTRAM) 50 MG tablet     Current Facility-Administered Medications   Medication     lidocaine (XYLOCAINE) 5 % ointment     ALLERGY:     Allergies   Allergen Reactions     Penicillins       Sulfa Drugs             SOCIAL HISTORY:  She is a [place] immigrants to [place].  Denies smoking denies alcohol abuse.  2 of her sons are doctors.    FAMILY HISTORY:   Denies any family history of cancer.       PHYSICAL EXAMINATION:     LMP 11/27/2001 (Approximate)      Wt Readings from Last 4 Encounters:   04/07/20 71.7 kg (158 lb)   03/24/20 71.9 kg (158 lb 8 oz)   03/09/20 72.3 kg (159 lb 6.4 oz)   02/25/20 70.9 kg (156 lb 4.8 oz)     Constitutional.  Does not seem to be in any acute distress.  Respiratory.  Speaking in full sentences.  No coughing noted.  Neurological.  Alert and oriented x3.  Psychiatric.  Mood, mentation and affect are normal.  Decision making capacity is intact.      The rest of a comprehensive physical examination is deferred due to Public Health Emergency telephone visit restrictions.        LABS/IMAGING/PATHOLOGY:    Reviewed    Results for MARY SCHULER (MRN [id number]) as of 4/28/2020 10:13   Ref. Range 4/23/2020 14:00   Sodium Latest Ref Range: 133 - 144 mmol/L 140   Potassium Latest Ref Range: 3.4 - 5.3 mmol/L 3.9   Chloride Latest Ref Range: 94 - 109 mmol/L 107   Carbon Dioxide Latest Ref Range: 20 - 32 mmol/L 30   Urea Nitrogen Latest Ref Range: 7 - 30 mg/dL 14   Creatinine Latest Ref Range: 0.52 - 1.04 mg/dL 0.64   GFR Estimate Latest Ref Range: >60 mL/min/1.73_m2 >90   GFR Estimate If Black Latest Ref Range: >60 mL/min/1.73_m2 >90   Calcium Latest Ref Range: 8.5 - 10.1 mg/dL 9.3   Anion Gap Latest Ref Range: 3 - 14 mmol/L 3   Albumin Latest Ref Range: 3.4 - 5.0 g/dL 3.6   Protein Total Latest Ref Range: 6.8 - 8.8 g/dL 7.2   Bilirubin Total Latest Ref Range: 0.2 - 1.3 mg/dL 0.2   Alkaline Phosphatase Latest Ref Range: 40 - 150 U/L 80   ALT Latest Ref Range: 0 - 50 U/L 16   AST Latest Ref Range: 0 - 45 U/L 17   Glucose Latest Ref Range: 70 - 99 mg/dL 103 (H)   WBC Latest Ref Range: 4.0 - 11.0 10e9/L 4.8   Hemoglobin Latest Ref Range: 11.7 - 15.7 g/dL 11.2 (L)   Hematocrit Latest  Ref Range: 35.0 - 47.0 % 34.8 (L)   Platelet Count Latest Ref Range: 150 - 450 10e9/L 196   RBC Count Latest Ref Range: 3.8 - 5.2 10e12/L 3.39 (L)   MCV Latest Ref Range: 78 - 100 fl 103 (H)   MCH Latest Ref Range: 26.5 - 33.0 pg 33.0   MCHC Latest Ref Range: 31.5 - 36.5 g/dL 32.2   RDW Latest Ref Range: 10.0 - 15.0 % 16.6 (H)   Diff Method Unknown Manual Differential   % Neutrophils Latest Units: % 35.0   % Lymphocytes Latest Units: % 61.0   % Monocytes Latest Units: % 3.0   % Eosinophils Latest Units: % 1.0   Absolute Neutrophil Latest Ref Range: 1.6 - 8.3 10e9/L 1.7         CT CHEST/ABDOMEN/PELVIS W CONTRAST 2/21/2020 8:57 AM     CLINICAL HISTORY: Rectal cancer- follow up to current treatment.;  Metastasis from rectal cancer (H); Metastasis from rectal cancer (H);  Bone metastasis (H)     TECHNIQUE: CT scan of the chest, abdomen, and pelvis was performed  following injection of IV contrast. Multiplanar reformats were  obtained. Dose reduction techniques were used.   CONTRAST: 100 mL isovue 370     COMPARISON: PET CT 11/7/2019, CT chest, abdomen and pelvis 11/1/2019,  MR pelvis 11/1/2019     FINDINGS:   LUNGS AND PLEURA: The central airways are patent. Improvement in  noncalcified nodule in the posterior left lower lobe measuring 5 mm  series 8/image 113, previously 7 mm. Stable noncalcified nodule in the  anterior left upper lobe measuring 4 mm series 8/image 52. Stable  noncalcified nodule in the lateral left upper lobe measuring 2 mm  series 8/image 59. Stable right middle lobe noncalcified nodule  measuring 3 mm series 8/image 84. Resolution of lateral right upper  lobe noncalcified nodule.     No new or enlarging noncalcified pulmonary nodules.     No pleural effusion, pneumothorax or focal consolidation.     MEDIASTINUM/AXILLAE: No axillary, mediastinal or hilar  lymphadenopathy.     There is a small sliding-type hiatal hernia.     Heart size is normal without significant pericardial effusion. The  thoracic  aorta is normal in caliber.     HEPATOBILIARY: No focal hepatic lesion. Cholecystectomy. No biliary  ductal dilatation.     PANCREAS: Normal.     SPLEEN: Normal.     ADRENAL GLANDS: Normal.     KIDNEYS/BLADDER: Normal.     BOWEL: Small and large bowel loops are normal in caliber without  obstruction. Scattered pancolonic diverticula without acute  diverticulitis. The appendix is normal in appearance. Significant  improvement in rectal mass, mesorectal and superior rectal nodularity.     PELVIC ORGANS: Uterus and adnexa unremarkable.     ADDITIONAL FINDINGS: Abdominal aorta normal in caliber with mild mixed  atheromatous plaque. Major portal veins are patent. No free fluid or  pneumoperitoneum. No new abdominal or pelvic lymphadenopathy.  Resolution of right internal iliac adenopathy.     MUSCULOSKELETAL: Increase in size of sclerotic lesion at the left  aspect of L3 vertebral body without pathologic fracture.                                                                      IMPRESSION:  1.  Significant improvement in rectal mass and perirectal nodularity.  2.  Resolution of right internal iliac lymphadenopathy.  3.  Slight improvement in dominant posterior left lower lobe nodule,  previously hypermetabolic, consistent with pulmonary metastatic  disease.  4.  A few additional stable bilateral noncalcified pulmonary nodules  remain indeterminate. Attention on follow-up is recommended.  5.  Increase in size of presacral hypermetabolic L3 sclerotic lesion,  may be due to treatment effect.  6.  No new abdominopelvic mass or evidence of hepatic metastatic  disease.    ASSESSMENT AND PLAN:      Metastatic rectal adenocarcinoma, MSI intact, K-aimee wild type, PDL 1 + 25% with metastasis to the lungs, bones and pelvic lymph nodes.      She started palliative chemotherapy with FOLFOX/Avastin on 11/25/2019.      She has received 6 cycles of chemotherapy.  With cycle #3, we stopped 5-FU bolus because of excessive  tiredness.    Repeat CT CAP on 2/21/2020 shows good response to therapy with improvement in the rectal mass, mesorectal and superior rectal nodularity as well as resolution of right internal iliac lymphadenopathy.  There is slight improvement in the dominant posterior left lower lobe lung nodule and several of the lung nodules are is stable.  There is increase in size of the sclerotic lesion of the left aspect of L3 vertebral body without pathological fracture and this is likely due to treatment effect.    CEA on 2/10/2020 was down to 3.3.    With cycle #9 we stopped oxaliplatin because of cumulative fatigue and increasing nausea and gave her 5-FU/Avastin.      I then discussed with her, her  as well as her son Gamal about possibly switching to single agent Xeloda going forward so as to limit her visits to the clinic and therefore exposure and risk from coronavirus.  We discussed the pros and cons of this approach and they were all willing to try this.      She is not in single agent Xeloda on 4/7/2020.    She tolerated the first cycle well.  She will start cycle #2 single agent Xeloda today 4/28/2020.    Nausea.  It was mild and well controlled.She was using zofran in morning, compazine in afternoon and ativan at night. I asked her to use the anti nausea meds thrice daily for first 4 days and then try to cut it down as she did not feel any nausea after that but continued to use anti nausea meds.   She is NOT using Zyprexa 2.5 mg at night.       Diarrhea.  It was mild and well controlled with imodium. Cont that .       Anemia.  She is mildly anemic with a hemoglobin of 11.2.  We will continue to monitor.      Prevention of hand-foot syndrome.  Continue using moisturizing lotions. She did not have HFS.       Bone metastasis.  Started Zometa on 11/25/2019.  She is getting it every 3 months and she will get in 3 weeks ( May 2020 ).  She will continue calcium and vitamin D. Last CT scan showed increased  sclerosis at L3 vertebra which is likely a treatment effect.        We did not address the following today.    Discussion regarding health care directive  We discussed that it is important that she completes health care directive which would help in making sure that her wishes are followed about her treatment care in case she is not able to make a decision for herself.  We gave her information regarding that.    RTC in 3 weeks.    All questions answered.  She is agreeable and comfortable with the plan.    Charline Velazquez MD    Total telephone call time.  13 minutes.

## 2020-05-12 DIAGNOSIS — D70.1 CHEMOTHERAPY-INDUCED NEUTROPENIA (H): Primary | ICD-10-CM

## 2020-05-12 DIAGNOSIS — C79.51 BONE METASTASIS: ICD-10-CM

## 2020-05-12 DIAGNOSIS — T45.1X5A CHEMOTHERAPY-INDUCED NEUTROPENIA (H): Primary | ICD-10-CM

## 2020-05-12 DIAGNOSIS — C20 METASTASIS FROM RECTAL CANCER (H): ICD-10-CM

## 2020-05-12 DIAGNOSIS — C79.9 METASTASIS FROM RECTAL CANCER (H): ICD-10-CM

## 2020-05-12 RX ORDER — CAPECITABINE 500 MG/1
1000 TABLET, FILM COATED ORAL 2 TIMES DAILY
Qty: 84 TABLET | Refills: 0 | Status: SHIPPED | OUTPATIENT
Start: 2020-05-12 | End: 2020-01-01

## 2020-05-15 ENCOUNTER — INFUSION THERAPY VISIT (OUTPATIENT)
Dept: INFUSION THERAPY | Facility: CLINIC | Age: 70
End: 2020-05-15
Payer: COMMERCIAL

## 2020-05-15 ENCOUNTER — ONCOLOGY VISIT (OUTPATIENT)
Dept: ONCOLOGY | Facility: CLINIC | Age: 70
End: 2020-05-15
Payer: COMMERCIAL

## 2020-05-15 VITALS
OXYGEN SATURATION: 99 % | TEMPERATURE: 96.8 F | WEIGHT: 156.2 LBS | SYSTOLIC BLOOD PRESSURE: 126 MMHG | RESPIRATION RATE: 16 BRPM | HEART RATE: 77 BPM | BODY MASS INDEX: 28.74 KG/M2 | DIASTOLIC BLOOD PRESSURE: 71 MMHG | HEIGHT: 62 IN

## 2020-05-15 DIAGNOSIS — D70.1 CHEMOTHERAPY-INDUCED NEUTROPENIA (H): ICD-10-CM

## 2020-05-15 DIAGNOSIS — T45.1X5A CHEMOTHERAPY-INDUCED NEUTROPENIA (H): ICD-10-CM

## 2020-05-15 DIAGNOSIS — C79.9 METASTASIS FROM RECTAL CANCER (H): Primary | ICD-10-CM

## 2020-05-15 DIAGNOSIS — C79.51 BONE METASTASIS: ICD-10-CM

## 2020-05-15 DIAGNOSIS — C20 METASTASIS FROM RECTAL CANCER (H): Primary | ICD-10-CM

## 2020-05-15 LAB
ALBUMIN SERPL-MCNC: 3.5 G/DL (ref 3.4–5)
ALP SERPL-CCNC: 67 U/L (ref 40–150)
ALT SERPL W P-5'-P-CCNC: 19 U/L (ref 0–50)
ANION GAP SERPL CALCULATED.3IONS-SCNC: 4 MMOL/L (ref 3–14)
AST SERPL W P-5'-P-CCNC: 22 U/L (ref 0–45)
BASOPHILS # BLD AUTO: 0.1 10E9/L (ref 0–0.2)
BASOPHILS NFR BLD AUTO: 1.2 %
BILIRUB SERPL-MCNC: 0.3 MG/DL (ref 0.2–1.3)
BUN SERPL-MCNC: 14 MG/DL (ref 7–30)
CALCIUM SERPL-MCNC: 9 MG/DL (ref 8.5–10.1)
CEA SERPL-MCNC: 2.8 UG/L (ref 0–2.5)
CHLORIDE SERPL-SCNC: 107 MMOL/L (ref 94–109)
CO2 SERPL-SCNC: 29 MMOL/L (ref 20–32)
CREAT SERPL-MCNC: 0.65 MG/DL (ref 0.52–1.04)
DIFFERENTIAL METHOD BLD: ABNORMAL
EOSINOPHIL # BLD AUTO: 0.1 10E9/L (ref 0–0.7)
EOSINOPHIL NFR BLD AUTO: 1.6 %
ERYTHROCYTE [DISTWIDTH] IN BLOOD BY AUTOMATED COUNT: 16.9 % (ref 10–15)
GFR SERPL CREATININE-BSD FRML MDRD: >90 ML/MIN/{1.73_M2}
GLUCOSE SERPL-MCNC: 94 MG/DL (ref 70–99)
HCT VFR BLD AUTO: 35.8 % (ref 35–47)
HGB BLD-MCNC: 11.7 G/DL (ref 11.7–15.7)
IMM GRANULOCYTES # BLD: 0 10E9/L (ref 0–0.4)
IMM GRANULOCYTES NFR BLD: 0.1 %
LYMPHOCYTES # BLD AUTO: 2.6 10E9/L (ref 0.8–5.3)
LYMPHOCYTES NFR BLD AUTO: 38.3 %
MCH RBC QN AUTO: 33.8 PG (ref 26.5–33)
MCHC RBC AUTO-ENTMCNC: 32.7 G/DL (ref 31.5–36.5)
MCV RBC AUTO: 104 FL (ref 78–100)
MONOCYTES # BLD AUTO: 0.6 10E9/L (ref 0–1.3)
MONOCYTES NFR BLD AUTO: 8.3 %
NEUTROPHILS # BLD AUTO: 3.5 10E9/L (ref 1.6–8.3)
NEUTROPHILS NFR BLD AUTO: 50.5 %
PLATELET # BLD AUTO: 187 10E9/L (ref 150–450)
POTASSIUM SERPL-SCNC: 3.9 MMOL/L (ref 3.4–5.3)
PROT SERPL-MCNC: 7.3 G/DL (ref 6.8–8.8)
RBC # BLD AUTO: 3.46 10E12/L (ref 3.8–5.2)
SODIUM SERPL-SCNC: 140 MMOL/L (ref 133–144)
WBC # BLD AUTO: 6.9 10E9/L (ref 4–11)

## 2020-05-15 PROCEDURE — 96365 THER/PROPH/DIAG IV INF INIT: CPT | Performed by: PHYSICIAN ASSISTANT

## 2020-05-15 PROCEDURE — 80053 COMPREHEN METABOLIC PANEL: CPT | Performed by: INTERNAL MEDICINE

## 2020-05-15 PROCEDURE — 99207 ZZC NO CHARGE NURSE ONLY: CPT

## 2020-05-15 PROCEDURE — 82378 CARCINOEMBRYONIC ANTIGEN: CPT | Performed by: INTERNAL MEDICINE

## 2020-05-15 PROCEDURE — 99207 ZZC NO CHARGE LOS: CPT

## 2020-05-15 PROCEDURE — 85025 COMPLETE CBC W/AUTO DIFF WBC: CPT | Performed by: INTERNAL MEDICINE

## 2020-05-15 RX ORDER — HEPARIN SODIUM (PORCINE) LOCK FLUSH IV SOLN 100 UNIT/ML 100 UNIT/ML
5 SOLUTION INTRAVENOUS
Status: DISCONTINUED | OUTPATIENT
Start: 2020-05-15 | End: 2020-05-15 | Stop reason: HOSPADM

## 2020-05-15 RX ORDER — ZOLEDRONIC ACID 0.04 MG/ML
4 INJECTION, SOLUTION INTRAVENOUS ONCE
Status: COMPLETED | OUTPATIENT
Start: 2020-05-15 | End: 2020-05-15

## 2020-05-15 RX ADMIN — HEPARIN SODIUM (PORCINE) LOCK FLUSH IV SOLN 100 UNIT/ML 5 ML: 100 SOLUTION at 12:27

## 2020-05-15 RX ADMIN — ZOLEDRONIC ACID 4 MG: 0.04 INJECTION, SOLUTION INTRAVENOUS at 12:05

## 2020-05-15 RX ADMIN — HEPARIN SODIUM (PORCINE) LOCK FLUSH IV SOLN 100 UNIT/ML 5 ML: 100 SOLUTION at 11:10

## 2020-05-15 RX ADMIN — Medication 250 ML: at 12:03

## 2020-05-15 ASSESSMENT — MIFFLIN-ST. JEOR: SCORE: 1186.77

## 2020-05-15 ASSESSMENT — PAIN SCALES - GENERAL: PAINLEVEL: MILD PAIN (3)

## 2020-05-15 NOTE — PROGRESS NOTES
Port needle left accessed for treatment. Tolerated port access and draw without complaint. Port site scrubbed with Chloraprep for 30 seconds and allowed to dry completely prior to dressing application. Accessed using sterile technique. [place] tubes drawn-Red gel/Green/Purple tubes. Double signed by patient and RN. See documentation flowsheet. Cuca Chang, RN, BSN, OCN

## 2020-05-15 NOTE — PROGRESS NOTES
Infusion Nursing Note:  Alannah Wynn presents today for C3 D1 Zometa/Labs.    Patient seen by provider today: No   present during visit today: Not Applicable.    Note: Patient complains of redness on Left big toe with some pain. Explained to patient to monitor and ifgets worse or more painful to contact her MD regarding it. Patient verbalized understanding.    Intravenous Access:  Implanted Port.    Treatment Conditions:  Lab Results   Component Value Date     05/15/2020                   Lab Results   Component Value Date    POTASSIUM 3.9 05/15/2020           No results found for: MAG         Lab Results   Component Value Date    CR 0.65 05/15/2020                   Lab Results   Component Value Date    LAUREN 9.0 05/15/2020                Lab Results   Component Value Date    BILITOTAL 0.3 05/15/2020           Lab Results   Component Value Date    ALBUMIN 3.5 05/15/2020                    Lab Results   Component Value Date    ALT 19 05/15/2020           Lab Results   Component Value Date    AST 22 05/15/2020       Results reviewed, labs MET treatment parameters, ok to proceed with treatment.      Post Infusion Assessment:  Patient tolerated infusion without incident.  Blood return noted pre and post infusion.  Site patent and intact, free from redness, edema or discomfort.  No evidence of extravasations.  Access discontinued per protocol.       Discharge Plan:   Discharge instructions reviewed with: Patient.  Patient and/or family verbalized understanding of discharge instructions and all questions answered.  Patient discharged in stable condition accompanied by: self.  Departure Mode: Ambulatory.    Diana Coley RN

## 2020-05-15 NOTE — PATIENT INSTRUCTIONS
Pt to return on 08/15/20 for C4 D1 Zometa. Copies of medication list and upcoming appointments given prior to discharge.

## 2020-05-20 ENCOUNTER — PATIENT OUTREACH (OUTPATIENT)
Dept: ONCOLOGY | Facility: CLINIC | Age: 70
End: 2020-05-20

## 2020-05-20 ENCOUNTER — VIRTUAL VISIT (OUTPATIENT)
Dept: ONCOLOGY | Facility: CLINIC | Age: 70
End: 2020-05-20
Attending: INTERNAL MEDICINE
Payer: COMMERCIAL

## 2020-05-20 ENCOUNTER — TRANSFERRED RECORDS (OUTPATIENT)
Dept: HEALTH INFORMATION MANAGEMENT | Facility: CLINIC | Age: 70
End: 2020-05-20

## 2020-05-20 VITALS — HEIGHT: 62 IN | WEIGHT: 156 LBS | BODY MASS INDEX: 28.71 KG/M2

## 2020-05-20 DIAGNOSIS — L03.032 CELLULITIS OF TOE OF LEFT FOOT: ICD-10-CM

## 2020-05-20 DIAGNOSIS — C79.9 METASTASIS FROM RECTAL CANCER (H): Primary | ICD-10-CM

## 2020-05-20 DIAGNOSIS — C20 METASTASIS FROM RECTAL CANCER (H): Primary | ICD-10-CM

## 2020-05-20 PROCEDURE — 99214 OFFICE O/P EST MOD 30 MIN: CPT | Mod: 95 | Performed by: INTERNAL MEDICINE

## 2020-05-20 RX ORDER — DOXYCYCLINE HYCLATE 100 MG
100 TABLET ORAL 2 TIMES DAILY
Qty: 20 TABLET | Refills: 0 | Status: SHIPPED | OUTPATIENT
Start: 2020-05-20 | End: 2020-05-27

## 2020-05-20 ASSESSMENT — MIFFLIN-ST. JEOR: SCORE: 1185.86

## 2020-05-20 ASSESSMENT — PAIN SCALES - GENERAL: PAINLEVEL: NO PAIN (0)

## 2020-05-20 NOTE — NURSING NOTE
SYMPTOM ASSESSMENT    Pain: no    Nausea/Vomiting: no    Mouth Sores: no    Shortness of Breath: no    Smoking: no    Fever or Chills: no    Hard Stools: no    Soft Stools: no    Weight Loss: no    Weakness: no    Burning, numbness or tingling in hands or feet: numbness fingers    Problems with skin or swelling: swelling with some pain at left toes    Memory Loss: no    Anxiety or Depression: no    Trouble Sleeping: no    Suni Givens LPN on 5/20/2020 at 8:14 AM

## 2020-05-20 NOTE — TELEPHONE ENCOUNTER
Contacted patient per  to start Doxycycline for toe infection - per photograph of toe sent to my chart.  Spoke with spouse - understanding of plan.

## 2020-05-20 NOTE — PROGRESS NOTES
"ONCOLOGY FOLLOW UP NOTE    5/20/2020     Patient was being followed by Dr. Tavarez and saw her on 11/13/2019.  Now she is transferring her care to me.  I have reviewed her previous records and have copied and updated from prior notes.      DIAGNOSIS:    10/2019 dx adenoCa of rectum,  MMR intact- NGS Negative for KRAS, NRAS and BRAF. PD L1 25%.  Baseline CEA 10.  Stage IV disease with pulmonary and bony mets      HISTORY OF ONCOLOGY ILLNESS:    She has had intermittent hematochezia and anorectal irritation since 2011, that have been managed as \"hemorrhoids\". These symptoms became persistent and progressive since early thi year. Her anorectal pain is managed with Tylenol. Denies fevers, chills, or unintentional weight loss. Does have intermittent urinary and fecal urgency but no incontinence per se.    Diagnostic colonoscopy on 10/18/19 for rectal bleeding showed \"fungating and ulcerated non-obstructing mass was found in the distal rectum <1 cm from the anal verge. The mass was partially circumferential (involving one-half of the lumen circumference). The mass measured four cm in length. In addition, its diameter measured six mm.  Pathology showed invasive moderately differentiated adenocarcinoma with intact mismatch repair protein expression, PD L1 25% and NGS panel showed NO MUTATIONS in KRAS, NRAS and BRAF.      Pelvic MRI 11/2019 found fungating polypoid mid/lower rectal mass with luminal narrowing as well as innumerable enlarged lymph nodes as described above with a conglomerate of suspicious lymph nodes abutting the anterior peritoneal reflection. Stage cT3d N2.     PET 11/2019 found   1. Hypermetabolic mass in the distal rectum, SUV 14 corresponding to the patient's known rectal cancer.  2. Multiple hypermetabolic pelvic lymph nodes, compatible metastatic disease, SUV 5-6.  3. FDG avid pulmonary nodule measuring 7 mm in the left lower lobe, concerning for metastatic disease. Additional FDG avid pulmonary nodules " suspicious for metastatic disease, SUV around 4.  4. Osteoblastic metastatic lesion in the L3 vertebral body, SUV 6.8    She started palliative FOLFOX/Avastin on 11/25/2019.  Cycle #2 given on 12/9/2019.  Cycle #3 scheduled for 12/24/2019.  C#6 on 2/10/2020.    Repeat CT CAP on 2/21/2020 shows good response to therapy with improvement in the rectal mass, mesorectal and superior rectal nodularity as well as resolution of right internal iliac lymphadenopathy.  There is slight improvement in the dominant posterior left lower lobe lung nodule and several of the lung nodules are is stable.  There is increase in size of the sclerotic lesion of the left aspect of L3 vertebral body without pathological fracture and this is likely due to treatment effect.    CEA on 2/10/2020 was down to 3.3.    Cycle #7 FOLFOX/Avastin 2/25/2020.  C#8 FOLFOX/Avastin 3/9/2020  Cycle#9 5-FU/Avastin 3/24/2020-stopped oxaliplatin due to cumulative fatigue and issues with nausea.    I then discussed with her, her  as well as her son Gamal about possibly switching to single agent Xeloda going forward so as to limit her visits to the clinic and therefore exposure and risk from coronavirus.  We discussed the pros and cons of this approach and they were all willing to try this.     She started Single agent Xeloda on 4/7/2020.  Cycle #3 started on 5/19/2020.    Because of bone metastatic disease, she was started on Zometa on 11/25/2019. We are giving it every 3 months.        INTERVAL HISTORY:     This is a phone visit.  Overall she is doing well.  She noticed that over the last 1 week the left big toe feels swollen and mildly red. It is painful but not warm/hot. No fevers. There is no peeling of skin.  She has not tried any medication but she is applying BenGay and oil on it which helps a little bit.  No other hand/foot syndrome. No nausea or vomiting. No diarrhea or constipation. No dyspnea. Gained 2 pounds. Has mild numbness in fingers.      ECOG 1    ROS:  Rest of the comprehensive review of the system was essentially unremarkable.        I reviewed with her history in epic as below.    PAST MEDICAL HISTORY  Reflux, hemorrhoid, hepatitis B?  Overactive bladder  Active Ambulatory Problems     Diagnosis Date Noted     External hemorrhoids 04/29/2016     Non morbid obesity due to excess calories 04/29/2016     Hepatitis B immune 04/29/2016     Screening for cervical cancer 04/29/2016     CARDIOVASCULAR SCREENING; LDL GOAL LESS THAN 160 04/29/2016     Gastroesophageal reflux disease 04/29/2016     Overactive bladder 07/21/2017     Nevus of left lower leg- undetermined behavior 12/27/2018     Metastasis from rectal cancer (H) 11/13/2019     Bone metastasis (H) 11/13/2019     Nausea 11/27/2019     Chemotherapy-induced neutropenia (H) 12/24/2019     Resolved Ambulatory Problems     Diagnosis Date Noted     Urgency of urination 04/29/2016     Need for vaccination against Streptococcus pneumoniae 04/29/2016     Morbid obesity (H) 07/21/2017     Obesity (BMI 35.0-39.9) with comorbidity (H) 12/27/2018     No Additional Past Medical History       MEDICATIONS:  Current Outpatient Medications   Medication     Ascorbic Acid (VITAMIN C) 500 MG CAPS     B Complex Vitamins (B COMPLEX-B12 PO)     capecitabine (XELODA) 500 MG tablet     cholecalciferol (VITAMIN D3) 400 unit (10 mcg) TABS tablet     docusate sodium (COLACE) 100 MG capsule     Fesoterodine Fumarate 8 MG TB24     lidocaine-prilocaine (EMLA) 2.5-2.5 % external cream     LORazepam (ATIVAN) 0.5 MG tablet     Lutein 20 MG CAPS     mirabegron (MYRBETRIQ) 50 MG 24 hr tablet     Multiple vitamin TABS     naloxone (NARCAN) 4 MG/0.1ML nasal spray     omeprazole (PRILOSEC) 20 MG DR capsule     solifenacin (VESICARE) 10 MG tablet     traMADol (ULTRAM) 50 MG tablet     capecitabine (XELODA) 500 MG tablet     capecitabine (XELODA) 500 MG tablet     OLANZapine (ZYPREXA) 2.5 MG tablet     Current Facility-Administered  "Medications   Medication     lidocaine (XYLOCAINE) 5 % ointment     ALLERGY:     Allergies   Allergen Reactions     Penicillins      Sulfa Drugs             SOCIAL HISTORY:  She is a [place] immigrants to [place].  Denies smoking denies alcohol abuse.  2 of her sons are doctors.    FAMILY HISTORY:   Denies any family history of cancer.       PHYSICAL EXAMINATION:     Ht 1.575 m (5' 2\")   Wt 70.8 kg (156 lb)   LMP 11/27/2001 (Approximate)   BMI 28.53 kg/m       Wt Readings from Last 4 Encounters:   05/20/20 70.8 kg (156 lb)   05/15/20 70.9 kg (156 lb 3.2 oz)   04/07/20 71.7 kg (158 lb)   03/24/20 71.9 kg (158 lb 8 oz)       Constitutional.  She does not sound to be in any apparent distress.   Respiratory.  Non labored breathing. Speaking in full sentences.    Neurological.  Is alert and oriented.  Psychiatric.  Mood and affect seem appropriate.      The rest of a comprehensive physical examination is deferred due to Public Health Emergency video visit restrictions.          LABS/IMAGING/PATHOLOGY:    Reviewed    Results for MARY SCHULER (MRN [id number]) as of 5/20/2020 08:51   Ref. Range 5/15/2020 11:15   Sodium Latest Ref Range: 133 - 144 mmol/L 140   Potassium Latest Ref Range: 3.4 - 5.3 mmol/L 3.9   Chloride Latest Ref Range: 94 - 109 mmol/L 107   Carbon Dioxide Latest Ref Range: 20 - 32 mmol/L 29   Urea Nitrogen Latest Ref Range: 7 - 30 mg/dL 14   Creatinine Latest Ref Range: 0.52 - 1.04 mg/dL 0.65   GFR Estimate Latest Ref Range: >60 mL/min/1.73_m2 >90   GFR Estimate If Black Latest Ref Range: >60 mL/min/1.73_m2 >90   Calcium Latest Ref Range: 8.5 - 10.1 mg/dL 9.0   Anion Gap Latest Ref Range: 3 - 14 mmol/L 4   Albumin Latest Ref Range: 3.4 - 5.0 g/dL 3.5   Protein Total Latest Ref Range: 6.8 - 8.8 g/dL 7.3   Bilirubin Total Latest Ref Range: 0.2 - 1.3 mg/dL 0.3   Alkaline Phosphatase Latest Ref Range: 40 - 150 U/L 67   ALT Latest Ref Range: 0 - 50 U/L 19   AST Latest Ref Range: 0 - 45 U/L 22   Glucose " Latest Ref Range: 70 - 99 mg/dL 94   WBC Latest Ref Range: 4.0 - 11.0 10e9/L 6.9   Hemoglobin Latest Ref Range: 11.7 - 15.7 g/dL 11.7   Hematocrit Latest Ref Range: 35.0 - 47.0 % 35.8   Platelet Count Latest Ref Range: 150 - 450 10e9/L 187   RBC Count Latest Ref Range: 3.8 - 5.2 10e12/L 3.46 (L)   MCV Latest Ref Range: 78 - 100 fl 104 (H)   MCH Latest Ref Range: 26.5 - 33.0 pg 33.8 (H)   MCHC Latest Ref Range: 31.5 - 36.5 g/dL 32.7   RDW Latest Ref Range: 10.0 - 15.0 % 16.9 (H)   Diff Method Unknown Automated Method   % Neutrophils Latest Units: % 50.5   % Lymphocytes Latest Units: % 38.3   % Monocytes Latest Units: % 8.3   % Eosinophils Latest Units: % 1.6   % Basophils Latest Units: % 1.2   % Immature Granulocytes Latest Units: % 0.1   Absolute Neutrophil Latest Ref Range: 1.6 - 8.3 10e9/L 3.5   Absolute Lymphocytes Latest Ref Range: 0.8 - 5.3 10e9/L 2.6   Absolute Monocytes Latest Ref Range: 0.0 - 1.3 10e9/L 0.6       Results for MARY SCHULER (MRN [id number]) as of 5/20/2020 08:51   Ref. Range 11/4/2019 11:49 12/24/2019 07:40 12/30/2019 10:00 2/10/2020 08:15 5/15/2020 11:15   CEA Latest Ref Range: 0 - 2.5 ug/L 10.0 (H) 8.8 (H) 7.6 (H) 3.3 (H) 2.8 (H)       CT CHEST/ABDOMEN/PELVIS W CONTRAST 2/21/2020 8:57 AM     CLINICAL HISTORY: Rectal cancer- follow up to current treatment.;  Metastasis from rectal cancer (H); Metastasis from rectal cancer (H);  Bone metastasis (H)     TECHNIQUE: CT scan of the chest, abdomen, and pelvis was performed  following injection of IV contrast. Multiplanar reformats were  obtained. Dose reduction techniques were used.   CONTRAST: 100 mL isovue 370     COMPARISON: PET CT 11/7/2019, CT chest, abdomen and pelvis 11/1/2019,  MR pelvis 11/1/2019     FINDINGS:   LUNGS AND PLEURA: The central airways are patent. Improvement in  noncalcified nodule in the posterior left lower lobe measuring 5 mm  series 8/image 113, previously 7 mm. Stable noncalcified nodule in the  anterior left upper  lobe measuring 4 mm series 8/image 52. Stable  noncalcified nodule in the lateral left upper lobe measuring 2 mm  series 8/image 59. Stable right middle lobe noncalcified nodule  measuring 3 mm series 8/image 84. Resolution of lateral right upper  lobe noncalcified nodule.     No new or enlarging noncalcified pulmonary nodules.     No pleural effusion, pneumothorax or focal consolidation.     MEDIASTINUM/AXILLAE: No axillary, mediastinal or hilar  lymphadenopathy.     There is a small sliding-type hiatal hernia.     Heart size is normal without significant pericardial effusion. The  thoracic aorta is normal in caliber.     HEPATOBILIARY: No focal hepatic lesion. Cholecystectomy. No biliary  ductal dilatation.     PANCREAS: Normal.     SPLEEN: Normal.     ADRENAL GLANDS: Normal.     KIDNEYS/BLADDER: Normal.     BOWEL: Small and large bowel loops are normal in caliber without  obstruction. Scattered pancolonic diverticula without acute  diverticulitis. The appendix is normal in appearance. Significant  improvement in rectal mass, mesorectal and superior rectal nodularity.     PELVIC ORGANS: Uterus and adnexa unremarkable.     ADDITIONAL FINDINGS: Abdominal aorta normal in caliber with mild mixed  atheromatous plaque. Major portal veins are patent. No free fluid or  pneumoperitoneum. No new abdominal or pelvic lymphadenopathy.  Resolution of right internal iliac adenopathy.     MUSCULOSKELETAL: Increase in size of sclerotic lesion at the left  aspect of L3 vertebral body without pathologic fracture.                                                                      IMPRESSION:  1.  Significant improvement in rectal mass and perirectal nodularity.  2.  Resolution of right internal iliac lymphadenopathy.  3.  Slight improvement in dominant posterior left lower lobe nodule,  previously hypermetabolic, consistent with pulmonary metastatic  disease.  4.  A few additional stable bilateral noncalcified pulmonary  nodules  remain indeterminate. Attention on follow-up is recommended.  5.  Increase in size of presacral hypermetabolic L3 sclerotic lesion,  may be due to treatment effect.  6.  No new abdominopelvic mass or evidence of hepatic metastatic  disease.    ASSESSMENT AND PLAN:      Metastatic rectal adenocarcinoma, MSI intact, K-aimee wild type, PDL 1 + 25% with metastasis to the lungs, bones and pelvic lymph nodes.      She started palliative chemotherapy with FOLFOX/Avastin on 11/25/2019.      She has received 6 cycles of chemotherapy.  With cycle #3, we stopped 5-FU bolus because of excessive tiredness.    Repeat CT CAP on 2/21/2020 shows good response to therapy with improvement in the rectal mass, mesorectal and superior rectal nodularity as well as resolution of right internal iliac lymphadenopathy.  There is slight improvement in the dominant posterior left lower lobe lung nodule and several of the lung nodules are is stable.  There is increase in size of the sclerotic lesion of the left aspect of L3 vertebral body without pathological fracture and this is likely due to treatment effect.    CEA on 2/10/2020 was down to 3.3.    With cycle #9 we stopped oxaliplatin because of cumulative fatigue and increasing nausea and gave her 5-FU/Avastin.      I then discussed with her, her  as well as her son Gamal about possibly switching to single agent Xeloda going forward so as to limit her visits to the clinic and therefore exposure and risk from coronavirus.  We discussed the pros and cons of this approach and they were all willing to try this.      She started single agent Xeloda on 4/7/2020.    She tolerated the first 2 cycles well.  She started cycle #3 single agent Xeloda 5/19/2020.    She is tolerating it well. CEA is down to 2.8.  If she continues to do well then we will repeat his scans after she completes 4-5 cycles of Xeloda.    Left big toe redness and swelling.  I asked her to send me a picture of this  and she sent a picture through my chart.  It looks like infection and I will start her on doxycycline.  She is allergic to penicillin.      Nausea. Well controlled. She is using zofran in morning, compazine in afternoon and ativan at night for 1 week. Cont that. She is NOT using Zyprexa 2.5 mg at night.     Anemia.  Stable mild anemia from chemo. Cont to observe.       Prevention of hand-foot syndrome.  Continue using moisturizing lotions. She does not have HFS.       Bone metastasis.  Started Zometa on 11/25/2019.  She is getting it every 3 months and last received on 5/15/2020.  She will continue calcium and vitamin D. Last CT scan showed increased sclerosis at L3 vertebra which is likely a treatment effect.        We did not address the following today.    Discussion regarding health care directive  We discussed that it is important that she completes health care directive which would help in making sure that her wishes are followed about her treatment care in case she is not able to make a decision for herself.  We gave her information regarding that.    RTC in 3 weeks.    All questions answered.  She is agreeable and comfortable with the plan.    Charline Velazquez MD    Total telephone call time.  16 minutes.

## 2020-05-20 NOTE — PROGRESS NOTES
"Alannah Wynn is a 69 year old female who is being evaluated via a billable telephone visit.      The patient has been notified of following:     \"This telephone visit will be conducted via a call between you and your physician/provider. We have found that certain health care needs can be provided without the need for a physical exam.  This service lets us provide the care you need with a short phone conversation.  If a prescription is necessary we can send it directly to your pharmacy.  If lab work is needed we can place an order for that and you can then stop by our lab to have the test done at a later time.    Telephone visits are billed at different rates depending on your insurance coverage. During this emergency period, for some insurers they may be billed the same as an in-person visit.  Please reach out to your insurance provider with any questions.    If during the course of the call the physician/provider feels a telephone visit is not appropriate, you will not be charged for this service.\"    Patient has given verbal consent for Telephone visit?  Yes    What phone number would you like to be contacted at? [phone]    How would you like to obtain your AVS? Blessing Givens LPN on 5/20/2020 at 8:14 AM    "

## 2020-05-22 ENCOUNTER — PATIENT OUTREACH (OUTPATIENT)
Dept: ONCOLOGY | Facility: CLINIC | Age: 70
End: 2020-05-22

## 2020-05-22 NOTE — TELEPHONE ENCOUNTER
"Called patient to ask her how she was doing on the Doxycycline for her toe infection.  She states she thinks the redness and swelling is slightly better.  She started the antibiotic late Wednesday afternoon.  She states she noted a \"white\" area around the edge of toe.  Difficult to assess over the phone.  Will follow-up with her next week.    "

## 2020-05-26 ENCOUNTER — TELEPHONE (OUTPATIENT)
Dept: FAMILY MEDICINE | Facility: CLINIC | Age: 70
End: 2020-05-26

## 2020-05-26 ENCOUNTER — PATIENT OUTREACH (OUTPATIENT)
Dept: ONCOLOGY | Facility: CLINIC | Age: 70
End: 2020-05-26

## 2020-05-26 NOTE — TELEPHONE ENCOUNTER
Panel Management Review   One phone call and send letter if unable to reach them or pocketfungameshart message and send letter if not read after 2 weeks (You will get a message to your inbasket)      BP Readings from Last 1 Encounters:   05/15/20 126/71        Health Maintenance Due   Topic Date Due     DEXA  1950     HEPATITIS C SCREENING  1950     MAMMO SCREENING  08/08/2019     MEDICARE ANNUAL WELLNESS VISIT  11/27/2019     FALL RISK ASSESSMENT  11/27/2019     PHQ-2  01/01/2020     EYE EXAM  06/03/2020        Fail List measure: Mammogram        Patient is due/failing the following:   MAMMOGRAM    Action needed:   Mammogram    Type of outreach:    Phone, spoke to patient.  Will call back and schedule.    Questions for provider review:    None                                                                                       Chart routed to JENNA .                                            Rimma Mendes

## 2020-05-26 NOTE — TELEPHONE ENCOUNTER
Left message for patient that Dr. Velazquez would like to see patient in the clinic.  Two appointment times were given to patient - 8:00 or 3:00.  Advised to call our scheduling line if she could make these appointments.

## 2020-05-27 ENCOUNTER — ONCOLOGY VISIT (OUTPATIENT)
Dept: ONCOLOGY | Facility: CLINIC | Age: 70
End: 2020-05-27
Payer: COMMERCIAL

## 2020-05-27 VITALS
TEMPERATURE: 97.9 F | WEIGHT: 155.7 LBS | OXYGEN SATURATION: 100 % | HEART RATE: 70 BPM | BODY MASS INDEX: 28.48 KG/M2 | SYSTOLIC BLOOD PRESSURE: 135 MMHG | DIASTOLIC BLOOD PRESSURE: 86 MMHG

## 2020-05-27 DIAGNOSIS — L03.032 CELLULITIS OF TOE OF LEFT FOOT: Primary | ICD-10-CM

## 2020-05-27 PROCEDURE — 99215 OFFICE O/P EST HI 40 MIN: CPT | Performed by: INTERNAL MEDICINE

## 2020-05-27 RX ORDER — DOXYCYCLINE HYCLATE 100 MG
100 TABLET ORAL 2 TIMES DAILY
Qty: 10 TABLET | Refills: 0 | Status: SHIPPED | OUTPATIENT
Start: 2020-05-27 | End: 2020-01-01

## 2020-05-27 RX ORDER — BACITRACIN ZINC AND POLYMYXIN B SULFATE 500; 1000 [USP'U]/G; [USP'U]/G
OINTMENT TOPICAL 2 TIMES DAILY
Qty: 100 G | Refills: 0 | Status: SHIPPED | OUTPATIENT
Start: 2020-05-27 | End: 2020-06-06

## 2020-05-27 ASSESSMENT — PAIN SCALES - GENERAL: PAINLEVEL: MILD PAIN (3)

## 2020-05-27 NOTE — LETTER
"    5/27/2020         RE: Alannah Wynn  [address]  [place] [address]        Dear Colleague,    Thank you for referring your patient, Alannah Wynn, to the [facility]. Please see a copy of my visit note below.    ONCOLOGY FOLLOW UP NOTE    5/27/2020     Patient was being followed by Dr. Tavarez and saw her on 11/13/2019.  Now she is transferring her care to me.  I have reviewed her previous records and have copied and updated from prior notes.      DIAGNOSIS:    10/2019 dx adenoCa of rectum,  MMR intact- NGS Negative for KRAS, NRAS and BRAF. PD L1 25%.  Baseline CEA 10.  Stage IV disease with pulmonary and bony mets      HISTORY OF ONCOLOGY ILLNESS:    She has had intermittent hematochezia and anorectal irritation since 2011, that have been managed as \"hemorrhoids\". These symptoms became persistent and progressive since early thi year. Her anorectal pain is managed with Tylenol. Denies fevers, chills, or unintentional weight loss. Does have intermittent urinary and fecal urgency but no incontinence per se.    Diagnostic colonoscopy on 10/18/19 for rectal bleeding showed \"fungating and ulcerated non-obstructing mass was found in the distal rectum <1 cm from the anal verge. The mass was partially circumferential (involving one-half of the lumen circumference). The mass measured four cm in length. In addition, its diameter measured six mm.  Pathology showed invasive moderately differentiated adenocarcinoma with intact mismatch repair protein expression, PD L1 25% and NGS panel showed NO MUTATIONS in KRAS, NRAS and BRAF.      Pelvic MRI 11/2019 found fungating polypoid mid/lower rectal mass with luminal narrowing as well as innumerable enlarged lymph nodes as described above with a conglomerate of suspicious lymph nodes abutting the anterior peritoneal reflection. Stage cT3d N2.     PET 11/2019 found   1. Hypermetabolic mass in the distal rectum, SUV 14 corresponding to the patient's " known rectal cancer.  2. Multiple hypermetabolic pelvic lymph nodes, compatible metastatic disease, SUV 5-6.  3. FDG avid pulmonary nodule measuring 7 mm in the left lower lobe, concerning for metastatic disease. Additional FDG avid pulmonary nodules suspicious for metastatic disease, SUV around 4.  4. Osteoblastic metastatic lesion in the L3 vertebral body, SUV 6.8    She started palliative FOLFOX/Avastin on 11/25/2019.  Cycle #2 given on 12/9/2019.  Cycle #3 scheduled for 12/24/2019.  C#6 on 2/10/2020.    Repeat CT CAP on 2/21/2020 shows good response to therapy with improvement in the rectal mass, mesorectal and superior rectal nodularity as well as resolution of right internal iliac lymphadenopathy.  There is slight improvement in the dominant posterior left lower lobe lung nodule and several of the lung nodules are is stable.  There is increase in size of the sclerotic lesion of the left aspect of L3 vertebral body without pathological fracture and this is likely due to treatment effect.    CEA on 2/10/2020 was down to 3.3.    Cycle #7 FOLFOX/Avastin 2/25/2020.  C#8 FOLFOX/Avastin 3/9/2020  Cycle#9 5-FU/Avastin 3/24/2020-stopped oxaliplatin due to cumulative fatigue and issues with nausea.    I then discussed with her, her  as well as her son Gamal about possibly switching to single agent Xeloda going forward so as to limit her visits to the clinic and therefore exposure and risk from coronavirus.  We discussed the pros and cons of this approach and they were all willing to try this.     She started Single agent Xeloda on 4/7/2020.  Cycle #3 started on 5/19/2020.    Because of bone metastatic disease, she was started on Zometa on 11/25/2019. We are giving it every 3 months.        INTERVAL HISTORY:   She comes in today for follow-up for the left toe cellulitis.  Has been on doxycycline now for the last 6 days.  He thinks it is a little better.  The pain is better.  He also mentions that the swelling is  better.  There is still some redness and there were his skin scab which came off.  She denies any fevers.  No chills.  She is on day 9 of cycle #3 of Xeloda.  Overall she is tolerating it well.  Denies nausea vomiting diarrhea or constipation.  She has noticed darkening of the palms and soles.  No calluses or peeling of the skin apart from where she has the left toe infection.  No shortness of breath.  She has minimal numbness of the fingers.    ECOG 1    ROS:  A comprehensive ROS was otherwise neg        I reviewed with her history in epic as below.    PAST MEDICAL HISTORY  Reflux, hemorrhoid, hepatitis B?  Overactive bladder  Active Ambulatory Problems     Diagnosis Date Noted     External hemorrhoids 04/29/2016     Non morbid obesity due to excess calories 04/29/2016     Hepatitis B immune 04/29/2016     Screening for cervical cancer 04/29/2016     CARDIOVASCULAR SCREENING; LDL GOAL LESS THAN 160 04/29/2016     Gastroesophageal reflux disease 04/29/2016     Overactive bladder 07/21/2017     Nevus of left lower leg- undetermined behavior 12/27/2018     Metastasis from rectal cancer (H) 11/13/2019     Bone metastasis (H) 11/13/2019     Nausea 11/27/2019     Chemotherapy-induced neutropenia (H) 12/24/2019     Resolved Ambulatory Problems     Diagnosis Date Noted     Urgency of urination 04/29/2016     Need for vaccination against Streptococcus pneumoniae 04/29/2016     Morbid obesity (H) 07/21/2017     Obesity (BMI 35.0-39.9) with comorbidity (H) 12/27/2018     No Additional Past Medical History       MEDICATIONS:  Current Outpatient Medications   Medication     Ascorbic Acid (VITAMIN C) 500 MG CAPS     B Complex Vitamins (B COMPLEX-B12 PO)     cholecalciferol (VITAMIN D3) 400 unit (10 mcg) TABS tablet     docusate sodium (COLACE) 100 MG capsule     doxycycline hyclate (VIBRA-TABS) 100 MG tablet     Fesoterodine Fumarate 8 MG TB24     lidocaine-prilocaine (EMLA) 2.5-2.5 % external cream     LORazepam (ATIVAN) 0.5 MG  tablet     Lutein 20 MG CAPS     mirabegron (MYRBETRIQ) 50 MG 24 hr tablet     Multiple vitamin TABS     naloxone (NARCAN) 4 MG/0.1ML nasal spray     omeprazole (PRILOSEC) 20 MG DR capsule     solifenacin (VESICARE) 10 MG tablet     traMADol (ULTRAM) 50 MG tablet     capecitabine (XELODA) 500 MG tablet     capecitabine (XELODA) 500 MG tablet     capecitabine (XELODA) 500 MG tablet     OLANZapine (ZYPREXA) 2.5 MG tablet     Current Facility-Administered Medications   Medication     lidocaine (XYLOCAINE) 5 % ointment     ALLERGY:     Allergies   Allergen Reactions     Penicillins      Sulfa Drugs             SOCIAL HISTORY:  She is a [place] immigrants to [place].  Denies smoking denies alcohol abuse.  2 of her sons are doctors.    FAMILY HISTORY:   Denies any family history of cancer.       PHYSICAL EXAMINATION:     /86   Pulse 70   Temp 97.9  F (36.6  C)   Wt 70.6 kg (155 lb 11.2 oz)   LMP 11/27/2001 (Approximate)   SpO2 100%   BMI 28.48 kg/m       Wt Readings from Last 4 Encounters:   05/27/20 70.6 kg (155 lb 11.2 oz)   05/20/20 70.8 kg (156 lb)   05/15/20 70.9 kg (156 lb 3.2 oz)   04/07/20 71.7 kg (158 lb)       CONSTITUTIONAL: no acute distress  EYES: PERRLA, no palor or icterus.   ENT/MOUTH: no mouth lesions. Ears normal  CVS: s1s2 no m r g .   RESPIRATORY: clear to auscultation b/l  GI: soft non tender no hepatosplenomegaly  NEURO: AAOX3  Grossly non focal neuro exam  INTEGUMENT: no obvious rashes  LYMPHATIC: no palpable cervical, supraclavicular, axillary or inguinal LAD  MUSCULOSKELETAL: Unremarkable. No bony tenderness.   EXTREMITIES: there is no edema.  There is hyperpigmentation of the palms and soles.  On the left big toe, there is mild swelling and redness and warmth at the tip of the toe the skin is peeled off with superficial ulcer but no active bleeding or drainage or discharge noticed.  PSYCH: Mentation, mood and affect are normal. Decision making capacity is  intact          LABS/IMAGING/PATHOLOGY:    Reviewed    Results for MARY SCHULER (MRN [id number]) as of 5/20/2020 08:51   Ref. Range 5/15/2020 11:15   Sodium Latest Ref Range: 133 - 144 mmol/L 140   Potassium Latest Ref Range: 3.4 - 5.3 mmol/L 3.9   Chloride Latest Ref Range: 94 - 109 mmol/L 107   Carbon Dioxide Latest Ref Range: 20 - 32 mmol/L 29   Urea Nitrogen Latest Ref Range: 7 - 30 mg/dL 14   Creatinine Latest Ref Range: 0.52 - 1.04 mg/dL 0.65   GFR Estimate Latest Ref Range: >60 mL/min/1.73_m2 >90   GFR Estimate If Black Latest Ref Range: >60 mL/min/1.73_m2 >90   Calcium Latest Ref Range: 8.5 - 10.1 mg/dL 9.0   Anion Gap Latest Ref Range: 3 - 14 mmol/L 4   Albumin Latest Ref Range: 3.4 - 5.0 g/dL 3.5   Protein Total Latest Ref Range: 6.8 - 8.8 g/dL 7.3   Bilirubin Total Latest Ref Range: 0.2 - 1.3 mg/dL 0.3   Alkaline Phosphatase Latest Ref Range: 40 - 150 U/L 67   ALT Latest Ref Range: 0 - 50 U/L 19   AST Latest Ref Range: 0 - 45 U/L 22   Glucose Latest Ref Range: 70 - 99 mg/dL 94   WBC Latest Ref Range: 4.0 - 11.0 10e9/L 6.9   Hemoglobin Latest Ref Range: 11.7 - 15.7 g/dL 11.7   Hematocrit Latest Ref Range: 35.0 - 47.0 % 35.8   Platelet Count Latest Ref Range: 150 - 450 10e9/L 187   RBC Count Latest Ref Range: 3.8 - 5.2 10e12/L 3.46 (L)   MCV Latest Ref Range: 78 - 100 fl 104 (H)   MCH Latest Ref Range: 26.5 - 33.0 pg 33.8 (H)   MCHC Latest Ref Range: 31.5 - 36.5 g/dL 32.7   RDW Latest Ref Range: 10.0 - 15.0 % 16.9 (H)   Diff Method Unknown Automated Method   % Neutrophils Latest Units: % 50.5   % Lymphocytes Latest Units: % 38.3   % Monocytes Latest Units: % 8.3   % Eosinophils Latest Units: % 1.6   % Basophils Latest Units: % 1.2   % Immature Granulocytes Latest Units: % 0.1   Absolute Neutrophil Latest Ref Range: 1.6 - 8.3 10e9/L 3.5   Absolute Lymphocytes Latest Ref Range: 0.8 - 5.3 10e9/L 2.6   Absolute Monocytes Latest Ref Range: 0.0 - 1.3 10e9/L 0.6       Results for MARY SCHULER (MRN  [id number]) as of 5/20/2020 08:51   Ref. Range 11/4/2019 11:49 12/24/2019 07:40 12/30/2019 10:00 2/10/2020 08:15 5/15/2020 11:15   CEA Latest Ref Range: 0 - 2.5 ug/L 10.0 (H) 8.8 (H) 7.6 (H) 3.3 (H) 2.8 (H)       CT CHEST/ABDOMEN/PELVIS W CONTRAST 2/21/2020 8:57 AM     CLINICAL HISTORY: Rectal cancer- follow up to current treatment.;  Metastasis from rectal cancer (H); Metastasis from rectal cancer (H);  Bone metastasis (H)     TECHNIQUE: CT scan of the chest, abdomen, and pelvis was performed  following injection of IV contrast. Multiplanar reformats were  obtained. Dose reduction techniques were used.   CONTRAST: 100 mL isovue 370     COMPARISON: PET CT 11/7/2019, CT chest, abdomen and pelvis 11/1/2019,  MR pelvis 11/1/2019     FINDINGS:   LUNGS AND PLEURA: The central airways are patent. Improvement in  noncalcified nodule in the posterior left lower lobe measuring 5 mm  series 8/image 113, previously 7 mm. Stable noncalcified nodule in the  anterior left upper lobe measuring 4 mm series 8/image 52. Stable  noncalcified nodule in the lateral left upper lobe measuring 2 mm  series 8/image 59. Stable right middle lobe noncalcified nodule  measuring 3 mm series 8/image 84. Resolution of lateral right upper  lobe noncalcified nodule.     No new or enlarging noncalcified pulmonary nodules.     No pleural effusion, pneumothorax or focal consolidation.     MEDIASTINUM/AXILLAE: No axillary, mediastinal or hilar  lymphadenopathy.     There is a small sliding-type hiatal hernia.     Heart size is normal without significant pericardial effusion. The  thoracic aorta is normal in caliber.     HEPATOBILIARY: No focal hepatic lesion. Cholecystectomy. No biliary  ductal dilatation.     PANCREAS: Normal.     SPLEEN: Normal.     ADRENAL GLANDS: Normal.     KIDNEYS/BLADDER: Normal.     BOWEL: Small and large bowel loops are normal in caliber without  obstruction. Scattered pancolonic diverticula without acute  diverticulitis. The  appendix is normal in appearance. Significant  improvement in rectal mass, mesorectal and superior rectal nodularity.     PELVIC ORGANS: Uterus and adnexa unremarkable.     ADDITIONAL FINDINGS: Abdominal aorta normal in caliber with mild mixed  atheromatous plaque. Major portal veins are patent. No free fluid or  pneumoperitoneum. No new abdominal or pelvic lymphadenopathy.  Resolution of right internal iliac adenopathy.     MUSCULOSKELETAL: Increase in size of sclerotic lesion at the left  aspect of L3 vertebral body without pathologic fracture.                                                                      IMPRESSION:  1.  Significant improvement in rectal mass and perirectal nodularity.  2.  Resolution of right internal iliac lymphadenopathy.  3.  Slight improvement in dominant posterior left lower lobe nodule,  previously hypermetabolic, consistent with pulmonary metastatic  disease.  4.  A few additional stable bilateral noncalcified pulmonary nodules  remain indeterminate. Attention on follow-up is recommended.  5.  Increase in size of presacral hypermetabolic L3 sclerotic lesion,  may be due to treatment effect.  6.  No new abdominopelvic mass or evidence of hepatic metastatic  disease.    ASSESSMENT AND PLAN:      Metastatic rectal adenocarcinoma, MSI intact, K-aimee wild type, PDL 1 + 25% with metastasis to the lungs, bones and pelvic lymph nodes.      She started palliative chemotherapy with FOLFOX/Avastin on 11/25/2019.      She has received 6 cycles of chemotherapy.  With cycle #3, we stopped 5-FU bolus because of excessive tiredness.    Repeat CT CAP on 2/21/2020 shows good response to therapy with improvement in the rectal mass, mesorectal and superior rectal nodularity as well as resolution of right internal iliac lymphadenopathy.  There is slight improvement in the dominant posterior left lower lobe lung nodule and several of the lung nodules are is stable.  There is increase in size of the  sclerotic lesion of the left aspect of L3 vertebral body without pathological fracture and this is likely due to treatment effect.    CEA on 2/10/2020 was down to 3.3.    With cycle #9 we stopped oxaliplatin because of cumulative fatigue and increasing nausea and gave her 5-FU/Avastin.      I then discussed with her, her  as well as her son Gamal about possibly switching to single agent Xeloda going forward so as to limit her visits to the clinic and therefore exposure and risk from coronavirus.  We discussed the pros and cons of this approach and they were all willing to try this.      She started single agent Xeloda on 4/7/2020.    Currently she is on day 9 for cycle #3 of single agent Xeloda.  Overall she is tolerating it well.  Last CEA was down to 2.8.  If she continues to do well then I will repeat CT scan after 4-5 cycles.  She should have labs done next week prior to start of cycle #4.    Left big toe cellulitis.  She has been taking doxycycline with slow improvement.  I recommend giving her whole 2 weeks of antibiotics so I gave her additional prescription.  I would also recommend that she starts using topical bacitracin-polymyxin B ointment twice a day for 10 days because she has a superficial ulcer although there is no drainage or bleeding from it.  I gave her prescription for that as well.  She should inform us if it is worsening or if she has fevers or chills.  She mentions that she is allergic to penicillin but she does not remember exactly what the allergy is.    Hand-foot syndrome.  She has hyperpigmentation of the palms and soles but the skin is not peeling.  I recommend continuing to use moisturizing cream several times a day to both palms and soles to prevent development of serious hand-foot syndrome.    Bone metastasis.  Started Zometa on 11/25/2019.  She is getting it every 3 months and last received on 5/15/2020.  Continue vitamin D and calcium. Last CT scan showed increased sclerosis  at L3 vertebra which is likely a treatment effect.       We did not address the following today.    Nausea. Well controlled. She is using zofran in morning, compazine in afternoon and ativan at night for 1 week. Cont that. She is NOT using Zyprexa 2.5 mg at night.     Discussion regarding health care directive  We discussed that it is important that she completes health care directive which would help in making sure that her wishes are followed about her treatment care in case she is not able to make a decision for herself.  We gave her information regarding that.    RTC next week for labs.  She should see nurse practitioner prior to start of cycle #4 of chemotherapy.    I answered all of her questions to her satisfaction.  She is agreeable and comfortable with the plan.      Charline Velazquez MD          Again, thank you for allowing me to participate in the care of your patient.        Sincerely,        Charline Velazquez MD

## 2020-05-27 NOTE — NURSING NOTE
"Oncology Rooming Note    May 27, 2020 12:35 PM   Alannah Wynn is a 69 year old female who presents for:    No chief complaint on file.    Initial Vitals: /86   Pulse 70   Temp 97.9  F (36.6  C)   Wt 70.6 kg (155 lb 11.2 oz)   LMP 11/27/2001 (Approximate)   SpO2 100%   BMI 28.48 kg/m   Estimated body mass index is 28.48 kg/m  as calculated from the following:    Height as of 5/20/20: 1.575 m (5' 2\").    Weight as of this encounter: 70.6 kg (155 lb 11.2 oz). Body surface area is 1.76 meters squared.  Mild Pain (3) Comment: Data Unavailable   Patient's last menstrual period was 11/27/2001 (approximate).  Allergies reviewed: Yes  Medications reviewed: Yes    Medications: MEDICATION REFILLS NEEDED TODAY. Provider was notified.  Pharmacy name entered into [facility]:    [id number] - [place], [place] - [address]  [facility] - [place], [place] - [address], [id number]  [facility] - [place], [place] - [address]    Clinical concerns: Yes Dr. Velazquez was notified.      Annetta Bruno CMA                "

## 2020-05-27 NOTE — PATIENT INSTRUCTIONS
Cont Xeloda as prescribed    Cont doxycycline twice daily for a total of 15 days    Start the antibiotic ointment to the left toe twice daily for 10 days    Labs next week around 6/4 or 6/5    See Felipa ( virtual visit ) 6/8

## 2020-05-27 NOTE — PROGRESS NOTES
"ONCOLOGY FOLLOW UP NOTE    5/27/2020     Patient was being followed by Dr. Tavarez and saw her on 11/13/2019.  Now she is transferring her care to me.  I have reviewed her previous records and have copied and updated from prior notes.      DIAGNOSIS:    10/2019 dx adenoCa of rectum,  MMR intact- NGS Negative for KRAS, NRAS and BRAF. PD L1 25%.  Baseline CEA 10.  Stage IV disease with pulmonary and bony mets      HISTORY OF ONCOLOGY ILLNESS:    She has had intermittent hematochezia and anorectal irritation since 2011, that have been managed as \"hemorrhoids\". These symptoms became persistent and progressive since early thi year. Her anorectal pain is managed with Tylenol. Denies fevers, chills, or unintentional weight loss. Does have intermittent urinary and fecal urgency but no incontinence per se.    Diagnostic colonoscopy on 10/18/19 for rectal bleeding showed \"fungating and ulcerated non-obstructing mass was found in the distal rectum <1 cm from the anal verge. The mass was partially circumferential (involving one-half of the lumen circumference). The mass measured four cm in length. In addition, its diameter measured six mm.  Pathology showed invasive moderately differentiated adenocarcinoma with intact mismatch repair protein expression, PD L1 25% and NGS panel showed NO MUTATIONS in KRAS, NRAS and BRAF.      Pelvic MRI 11/2019 found fungating polypoid mid/lower rectal mass with luminal narrowing as well as innumerable enlarged lymph nodes as described above with a conglomerate of suspicious lymph nodes abutting the anterior peritoneal reflection. Stage cT3d N2.     PET 11/2019 found   1. Hypermetabolic mass in the distal rectum, SUV 14 corresponding to the patient's known rectal cancer.  2. Multiple hypermetabolic pelvic lymph nodes, compatible metastatic disease, SUV 5-6.  3. FDG avid pulmonary nodule measuring 7 mm in the left lower lobe, concerning for metastatic disease. Additional FDG avid pulmonary nodules " suspicious for metastatic disease, SUV around 4.  4. Osteoblastic metastatic lesion in the L3 vertebral body, SUV 6.8    She started palliative FOLFOX/Avastin on 11/25/2019.  Cycle #2 given on 12/9/2019.  Cycle #3 scheduled for 12/24/2019.  C#6 on 2/10/2020.    Repeat CT CAP on 2/21/2020 shows good response to therapy with improvement in the rectal mass, mesorectal and superior rectal nodularity as well as resolution of right internal iliac lymphadenopathy.  There is slight improvement in the dominant posterior left lower lobe lung nodule and several of the lung nodules are is stable.  There is increase in size of the sclerotic lesion of the left aspect of L3 vertebral body without pathological fracture and this is likely due to treatment effect.    CEA on 2/10/2020 was down to 3.3.    Cycle #7 FOLFOX/Avastin 2/25/2020.  C#8 FOLFOX/Avastin 3/9/2020  Cycle#9 5-FU/Avastin 3/24/2020-stopped oxaliplatin due to cumulative fatigue and issues with nausea.    I then discussed with her, her  as well as her son Gamal about possibly switching to single agent Xeloda going forward so as to limit her visits to the clinic and therefore exposure and risk from coronavirus.  We discussed the pros and cons of this approach and they were all willing to try this.     She started Single agent Xeloda on 4/7/2020.  Cycle #3 started on 5/19/2020.    Because of bone metastatic disease, she was started on Zometa on 11/25/2019. We are giving it every 3 months.        INTERVAL HISTORY:   She comes in today for follow-up for the left toe cellulitis.  Has been on doxycycline now for the last 6 days.  He thinks it is a little better.  The pain is better.  He also mentions that the swelling is better.  There is still some redness and there were his skin scab which came off.  She denies any fevers.  No chills.  She is on day 9 of cycle #3 of Xeloda.  Overall she is tolerating it well.  Denies nausea vomiting diarrhea or constipation.  She  has noticed darkening of the palms and soles.  No calluses or peeling of the skin apart from where she has the left toe infection.  No shortness of breath.  She has minimal numbness of the fingers.    ECOG 1    ROS:  A comprehensive ROS was otherwise neg        I reviewed with her history in epic as below.    PAST MEDICAL HISTORY  Reflux, hemorrhoid, hepatitis B?  Overactive bladder  Active Ambulatory Problems     Diagnosis Date Noted     External hemorrhoids 04/29/2016     Non morbid obesity due to excess calories 04/29/2016     Hepatitis B immune 04/29/2016     Screening for cervical cancer 04/29/2016     CARDIOVASCULAR SCREENING; LDL GOAL LESS THAN 160 04/29/2016     Gastroesophageal reflux disease 04/29/2016     Overactive bladder 07/21/2017     Nevus of left lower leg- undetermined behavior 12/27/2018     Metastasis from rectal cancer (H) 11/13/2019     Bone metastasis (H) 11/13/2019     Nausea 11/27/2019     Chemotherapy-induced neutropenia (H) 12/24/2019     Resolved Ambulatory Problems     Diagnosis Date Noted     Urgency of urination 04/29/2016     Need for vaccination against Streptococcus pneumoniae 04/29/2016     Morbid obesity (H) 07/21/2017     Obesity (BMI 35.0-39.9) with comorbidity (H) 12/27/2018     No Additional Past Medical History       MEDICATIONS:  Current Outpatient Medications   Medication     Ascorbic Acid (VITAMIN C) 500 MG CAPS     B Complex Vitamins (B COMPLEX-B12 PO)     cholecalciferol (VITAMIN D3) 400 unit (10 mcg) TABS tablet     docusate sodium (COLACE) 100 MG capsule     doxycycline hyclate (VIBRA-TABS) 100 MG tablet     Fesoterodine Fumarate 8 MG TB24     lidocaine-prilocaine (EMLA) 2.5-2.5 % external cream     LORazepam (ATIVAN) 0.5 MG tablet     Lutein 20 MG CAPS     mirabegron (MYRBETRIQ) 50 MG 24 hr tablet     Multiple vitamin TABS     naloxone (NARCAN) 4 MG/0.1ML nasal spray     omeprazole (PRILOSEC) 20 MG DR capsule     solifenacin (VESICARE) 10 MG tablet     traMADol  (ULTRAM) 50 MG tablet     capecitabine (XELODA) 500 MG tablet     capecitabine (XELODA) 500 MG tablet     capecitabine (XELODA) 500 MG tablet     OLANZapine (ZYPREXA) 2.5 MG tablet     Current Facility-Administered Medications   Medication     lidocaine (XYLOCAINE) 5 % ointment     ALLERGY:     Allergies   Allergen Reactions     Penicillins      Sulfa Drugs             SOCIAL HISTORY:  She is a [place] immigrants to [place].  Denies smoking denies alcohol abuse.  2 of her sons are doctors.    FAMILY HISTORY:   Denies any family history of cancer.       PHYSICAL EXAMINATION:     /86   Pulse 70   Temp 97.9  F (36.6  C)   Wt 70.6 kg (155 lb 11.2 oz)   LMP 11/27/2001 (Approximate)   SpO2 100%   BMI 28.48 kg/m       Wt Readings from Last 4 Encounters:   05/27/20 70.6 kg (155 lb 11.2 oz)   05/20/20 70.8 kg (156 lb)   05/15/20 70.9 kg (156 lb 3.2 oz)   04/07/20 71.7 kg (158 lb)       CONSTITUTIONAL: no acute distress  EYES: PERRLA, no palor or icterus.   ENT/MOUTH: no mouth lesions. Ears normal  CVS: s1s2 no m r g .   RESPIRATORY: clear to auscultation b/l  GI: soft non tender no hepatosplenomegaly  NEURO: AAOX3  Grossly non focal neuro exam  INTEGUMENT: no obvious rashes  LYMPHATIC: no palpable cervical, supraclavicular, axillary or inguinal LAD  MUSCULOSKELETAL: Unremarkable. No bony tenderness.   EXTREMITIES: there is no edema.  There is hyperpigmentation of the palms and soles.  On the left big toe, there is mild swelling and redness and warmth at the tip of the toe the skin is peeled off with superficial ulcer but no active bleeding or drainage or discharge noticed.  PSYCH: Mentation, mood and affect are normal. Decision making capacity is intact          LABS/IMAGING/PATHOLOGY:    Reviewed    Results for MARY SCHULER (MRN [id number]) as of 5/20/2020 08:51   Ref. Range 5/15/2020 11:15   Sodium Latest Ref Range: 133 - 144 mmol/L 140   Potassium Latest Ref Range: 3.4 - 5.3 mmol/L 3.9   Chloride Latest  Ref Range: 94 - 109 mmol/L 107   Carbon Dioxide Latest Ref Range: 20 - 32 mmol/L 29   Urea Nitrogen Latest Ref Range: 7 - 30 mg/dL 14   Creatinine Latest Ref Range: 0.52 - 1.04 mg/dL 0.65   GFR Estimate Latest Ref Range: >60 mL/min/1.73_m2 >90   GFR Estimate If Black Latest Ref Range: >60 mL/min/1.73_m2 >90   Calcium Latest Ref Range: 8.5 - 10.1 mg/dL 9.0   Anion Gap Latest Ref Range: 3 - 14 mmol/L 4   Albumin Latest Ref Range: 3.4 - 5.0 g/dL 3.5   Protein Total Latest Ref Range: 6.8 - 8.8 g/dL 7.3   Bilirubin Total Latest Ref Range: 0.2 - 1.3 mg/dL 0.3   Alkaline Phosphatase Latest Ref Range: 40 - 150 U/L 67   ALT Latest Ref Range: 0 - 50 U/L 19   AST Latest Ref Range: 0 - 45 U/L 22   Glucose Latest Ref Range: 70 - 99 mg/dL 94   WBC Latest Ref Range: 4.0 - 11.0 10e9/L 6.9   Hemoglobin Latest Ref Range: 11.7 - 15.7 g/dL 11.7   Hematocrit Latest Ref Range: 35.0 - 47.0 % 35.8   Platelet Count Latest Ref Range: 150 - 450 10e9/L 187   RBC Count Latest Ref Range: 3.8 - 5.2 10e12/L 3.46 (L)   MCV Latest Ref Range: 78 - 100 fl 104 (H)   MCH Latest Ref Range: 26.5 - 33.0 pg 33.8 (H)   MCHC Latest Ref Range: 31.5 - 36.5 g/dL 32.7   RDW Latest Ref Range: 10.0 - 15.0 % 16.9 (H)   Diff Method Unknown Automated Method   % Neutrophils Latest Units: % 50.5   % Lymphocytes Latest Units: % 38.3   % Monocytes Latest Units: % 8.3   % Eosinophils Latest Units: % 1.6   % Basophils Latest Units: % 1.2   % Immature Granulocytes Latest Units: % 0.1   Absolute Neutrophil Latest Ref Range: 1.6 - 8.3 10e9/L 3.5   Absolute Lymphocytes Latest Ref Range: 0.8 - 5.3 10e9/L 2.6   Absolute Monocytes Latest Ref Range: 0.0 - 1.3 10e9/L 0.6       Results for MARY SCHULER (MRN [id number]) as of 5/20/2020 08:51   Ref. Range 11/4/2019 11:49 12/24/2019 07:40 12/30/2019 10:00 2/10/2020 08:15 5/15/2020 11:15   CEA Latest Ref Range: 0 - 2.5 ug/L 10.0 (H) 8.8 (H) 7.6 (H) 3.3 (H) 2.8 (H)       CT CHEST/ABDOMEN/PELVIS W CONTRAST 2/21/2020 8:57 AM     CLINICAL  HISTORY: Rectal cancer- follow up to current treatment.;  Metastasis from rectal cancer (H); Metastasis from rectal cancer (H);  Bone metastasis (H)     TECHNIQUE: CT scan of the chest, abdomen, and pelvis was performed  following injection of IV contrast. Multiplanar reformats were  obtained. Dose reduction techniques were used.   CONTRAST: 100 mL isovue 370     COMPARISON: PET CT 11/7/2019, CT chest, abdomen and pelvis 11/1/2019,  MR pelvis 11/1/2019     FINDINGS:   LUNGS AND PLEURA: The central airways are patent. Improvement in  noncalcified nodule in the posterior left lower lobe measuring 5 mm  series 8/image 113, previously 7 mm. Stable noncalcified nodule in the  anterior left upper lobe measuring 4 mm series 8/image 52. Stable  noncalcified nodule in the lateral left upper lobe measuring 2 mm  series 8/image 59. Stable right middle lobe noncalcified nodule  measuring 3 mm series 8/image 84. Resolution of lateral right upper  lobe noncalcified nodule.     No new or enlarging noncalcified pulmonary nodules.     No pleural effusion, pneumothorax or focal consolidation.     MEDIASTINUM/AXILLAE: No axillary, mediastinal or hilar  lymphadenopathy.     There is a small sliding-type hiatal hernia.     Heart size is normal without significant pericardial effusion. The  thoracic aorta is normal in caliber.     HEPATOBILIARY: No focal hepatic lesion. Cholecystectomy. No biliary  ductal dilatation.     PANCREAS: Normal.     SPLEEN: Normal.     ADRENAL GLANDS: Normal.     KIDNEYS/BLADDER: Normal.     BOWEL: Small and large bowel loops are normal in caliber without  obstruction. Scattered pancolonic diverticula without acute  diverticulitis. The appendix is normal in appearance. Significant  improvement in rectal mass, mesorectal and superior rectal nodularity.     PELVIC ORGANS: Uterus and adnexa unremarkable.     ADDITIONAL FINDINGS: Abdominal aorta normal in caliber with mild mixed  atheromatous plaque. Major portal  veins are patent. No free fluid or  pneumoperitoneum. No new abdominal or pelvic lymphadenopathy.  Resolution of right internal iliac adenopathy.     MUSCULOSKELETAL: Increase in size of sclerotic lesion at the left  aspect of L3 vertebral body without pathologic fracture.                                                                      IMPRESSION:  1.  Significant improvement in rectal mass and perirectal nodularity.  2.  Resolution of right internal iliac lymphadenopathy.  3.  Slight improvement in dominant posterior left lower lobe nodule,  previously hypermetabolic, consistent with pulmonary metastatic  disease.  4.  A few additional stable bilateral noncalcified pulmonary nodules  remain indeterminate. Attention on follow-up is recommended.  5.  Increase in size of presacral hypermetabolic L3 sclerotic lesion,  may be due to treatment effect.  6.  No new abdominopelvic mass or evidence of hepatic metastatic  disease.    ASSESSMENT AND PLAN:      Metastatic rectal adenocarcinoma, MSI intact, K-aimee wild type, PDL 1 + 25% with metastasis to the lungs, bones and pelvic lymph nodes.      She started palliative chemotherapy with FOLFOX/Avastin on 11/25/2019.      She has received 6 cycles of chemotherapy.  With cycle #3, we stopped 5-FU bolus because of excessive tiredness.    Repeat CT CAP on 2/21/2020 shows good response to therapy with improvement in the rectal mass, mesorectal and superior rectal nodularity as well as resolution of right internal iliac lymphadenopathy.  There is slight improvement in the dominant posterior left lower lobe lung nodule and several of the lung nodules are is stable.  There is increase in size of the sclerotic lesion of the left aspect of L3 vertebral body without pathological fracture and this is likely due to treatment effect.    CEA on 2/10/2020 was down to 3.3.    With cycle #9 we stopped oxaliplatin because of cumulative fatigue and increasing nausea and gave her  5-FU/Avastin.      I then discussed with her, her  as well as her son Gamal about possibly switching to single agent Xeloda going forward so as to limit her visits to the clinic and therefore exposure and risk from coronavirus.  We discussed the pros and cons of this approach and they were all willing to try this.      She started single agent Xeloda on 4/7/2020.    Currently she is on day 9 for cycle #3 of single agent Xeloda.  Overall she is tolerating it well.  Last CEA was down to 2.8.  If she continues to do well then I will repeat CT scan after 4-5 cycles.  She should have labs done next week prior to start of cycle #4.    Left big toe cellulitis.  She has been taking doxycycline with slow improvement.  I recommend giving her whole 2 weeks of antibiotics so I gave her additional prescription.  I would also recommend that she starts using topical bacitracin-polymyxin B ointment twice a day for 10 days because she has a superficial ulcer although there is no drainage or bleeding from it.  I gave her prescription for that as well.  She should inform us if it is worsening or if she has fevers or chills.  She mentions that she is allergic to penicillin but she does not remember exactly what the allergy is.    Hand-foot syndrome.  She has hyperpigmentation of the palms and soles but the skin is not peeling.  I recommend continuing to use moisturizing cream several times a day to both palms and soles to prevent development of serious hand-foot syndrome.    Bone metastasis.  Started Zometa on 11/25/2019.  She is getting it every 3 months and last received on 5/15/2020.  Continue vitamin D and calcium. Last CT scan showed increased sclerosis at L3 vertebra which is likely a treatment effect.       We did not address the following today.    Nausea. Well controlled. She is using zofran in morning, compazine in afternoon and ativan at night for 1 week. Cont that. She is NOT using Zyprexa 2.5 mg at night.      Discussion regarding health care directive  We discussed that it is important that she completes health care directive which would help in making sure that her wishes are followed about her treatment care in case she is not able to make a decision for herself.  We gave her information regarding that.    RTC next week for labs.  She should see nurse practitioner prior to start of cycle #4 of chemotherapy.    I answered all of her questions to her satisfaction.  She is agreeable and comfortable with the plan.      Charline Velazquez MD

## 2020-05-28 DIAGNOSIS — D70.1 CHEMOTHERAPY-INDUCED NEUTROPENIA (H): Primary | ICD-10-CM

## 2020-05-28 DIAGNOSIS — C20 METASTASIS FROM RECTAL CANCER (H): ICD-10-CM

## 2020-05-28 DIAGNOSIS — C79.51 BONE METASTASIS: ICD-10-CM

## 2020-05-28 DIAGNOSIS — C79.9 METASTASIS FROM RECTAL CANCER (H): ICD-10-CM

## 2020-05-28 DIAGNOSIS — T45.1X5A CHEMOTHERAPY-INDUCED NEUTROPENIA (H): Primary | ICD-10-CM

## 2020-06-02 ENCOUNTER — ONCOLOGY VISIT (OUTPATIENT)
Dept: ONCOLOGY | Facility: CLINIC | Age: 70
End: 2020-06-02
Payer: COMMERCIAL

## 2020-06-02 ENCOUNTER — PATIENT OUTREACH (OUTPATIENT)
Dept: ONCOLOGY | Facility: CLINIC | Age: 70
End: 2020-06-02

## 2020-06-02 DIAGNOSIS — C20 METASTASIS FROM RECTAL CANCER (H): ICD-10-CM

## 2020-06-02 DIAGNOSIS — C79.9 METASTASIS FROM RECTAL CANCER (H): Primary | ICD-10-CM

## 2020-06-02 DIAGNOSIS — T45.1X5A CHEMOTHERAPY-INDUCED NEUTROPENIA (H): Primary | ICD-10-CM

## 2020-06-02 DIAGNOSIS — C20 METASTASIS FROM RECTAL CANCER (H): Primary | ICD-10-CM

## 2020-06-02 DIAGNOSIS — L03.032 CELLULITIS OF TOE OF LEFT FOOT: Primary | ICD-10-CM

## 2020-06-02 DIAGNOSIS — C79.9 METASTASIS FROM RECTAL CANCER (H): ICD-10-CM

## 2020-06-02 DIAGNOSIS — D70.1 CHEMOTHERAPY-INDUCED NEUTROPENIA (H): Primary | ICD-10-CM

## 2020-06-02 DIAGNOSIS — C79.51 BONE METASTASIS: ICD-10-CM

## 2020-06-02 PROCEDURE — 36591 DRAW BLOOD OFF VENOUS DEVICE: CPT

## 2020-06-02 RX ORDER — HEPARIN SODIUM (PORCINE) LOCK FLUSH IV SOLN 100 UNIT/ML 100 UNIT/ML
5 SOLUTION INTRAVENOUS
Status: DISCONTINUED | OUTPATIENT
Start: 2020-06-02 | End: 2020-06-02 | Stop reason: HOSPADM

## 2020-06-02 RX ORDER — CEPHALEXIN 500 MG/1
500 CAPSULE ORAL 4 TIMES DAILY
Qty: 28 CAPSULE | Refills: 0 | Status: SHIPPED | OUTPATIENT
Start: 2020-06-02 | End: 2020-01-01

## 2020-06-02 RX ORDER — CAPECITABINE 500 MG/1
1000 TABLET, FILM COATED ORAL 2 TIMES DAILY
Qty: 84 TABLET | Refills: 0 | Status: SHIPPED | OUTPATIENT
Start: 2020-06-02 | End: 2020-01-01

## 2020-06-02 RX ADMIN — HEPARIN SODIUM (PORCINE) LOCK FLUSH IV SOLN 100 UNIT/ML 5 ML: 100 SOLUTION at 09:19

## 2020-06-02 NOTE — PROGRESS NOTES
Tolerated port access and draw without complaint. Port site scrubbed with Chloraprep for 30 seconds and allowed to dry completely prior to dressing application. Accessed using sterile technique. Port [place] tubes drawn-Red gel/Green/Purple tubes. Double signed by patient and RN. See documentation flowsheet. Cuca Chang, RN, BSN, OCN

## 2020-06-05 ENCOUNTER — PATIENT OUTREACH (OUTPATIENT)
Dept: ONCOLOGY | Facility: CLINIC | Age: 70
End: 2020-06-05

## 2020-06-05 NOTE — TELEPHONE ENCOUNTER
Patient called stating she started Keflex one day ago - on June 4. She started have severe stomach pain yesterday, so she only took it 3 times.  She tried taking it with Prilosec which helped somewhat.  Discussed with Dr. Yao; she advised to cut dosage in half - 500 mg twice daily and to take it with Prilosec.  Advised to call if abdominal pain continues, and if abdominal pain becomes severe to stop taking medication if it is over the weekend.

## 2020-06-08 ENCOUNTER — VIRTUAL VISIT (OUTPATIENT)
Dept: ONCOLOGY | Facility: CLINIC | Age: 70
End: 2020-06-08
Payer: COMMERCIAL

## 2020-06-08 DIAGNOSIS — L08.9 TOE INFECTION: Primary | ICD-10-CM

## 2020-06-08 DIAGNOSIS — C20 METASTASIS FROM RECTAL CANCER (H): ICD-10-CM

## 2020-06-08 DIAGNOSIS — C79.51 BONE METASTASIS: ICD-10-CM

## 2020-06-08 DIAGNOSIS — L27.1 HAND FOOT SYNDROME: ICD-10-CM

## 2020-06-08 DIAGNOSIS — K31.89 STOMACH IRRITATION: ICD-10-CM

## 2020-06-08 DIAGNOSIS — C79.9 METASTASIS FROM RECTAL CANCER (H): ICD-10-CM

## 2020-06-08 PROCEDURE — 99214 OFFICE O/P EST MOD 30 MIN: CPT | Mod: 95 | Performed by: NURSE PRACTITIONER

## 2020-06-08 NOTE — PROGRESS NOTES
"Oncology Follow Up Visit: June 8, 2020     Oncologist: Dr Charline Velazquez  PCP: Earline Mehta    Diagnosis: Stage IV Adenocarcinoma of the rectum   Alannah Wynn is a 68 yo female who presented in 10/2019 with intermittent hematochezia and anorectal irritation since 2011, that have been managed as \"hemorrhoids\".  Diagnostic colonoscopy on 10/18/19 for rectal bleeding showed \"fungating and ulcerated non-obstructing mass was found in the distal rectum <1 cm from the anal verge. The mass was partially circumferential (involving one-half of the lumen circumference). The mass measured four cm in length. In addition, its diameter measured six mm.  Pathology showed invasive moderately differentiated adenocarcinoma with intact mismatch repair protein expression.  Pelvic MRI 11/2019 found fungating polypoid mid/lower rectal mass with luminal narrowing as well as innumerable enlarged lymph nodes as described above with a conglomerate of suspicious lymph nodes abutting the anterior peritoneal reflection. Stage cT3d N2.   Further testing with PET scan proved pulmonarymetastasis and bony metastasis at L3.  Treatment:   11/25/2019 began treatment with FOLFOX/Avastin  Began Zometa every 3 months  Currently on Capecitabine    Interval History: Ms. Wynn is contacted for continuation of treatment for her colon cancer with metastasis with Capecitabine 1500 mg bid 14/21 day cycle.  Patient is reporting that she started Keflex 5 mg 4 times a day for a left great toe infection 6/2 had stomach irritation and was told to go down to 2 tablets daily with Prilosec.  Today she shares that she feels the toe is not getting any worse is not significantly getting better and is worried that she is not taking enough of the antibiotic.  Noted blister on the second toe the same she otherwise has right index finger tingling but no peeling is using lotions very often.  She is eating well and with start of Prilosec is not having any stomach problems.  " Patient does get some loose stools on her week off she has no problems.  She is eating well and noting that she is not losing weight at this time.  Feels this is going much better than the infusion for her treatments denies nausea mouth sores shortness of breath fevers or chills depression or trouble sleeping.  Rest of comprehensive and complete ROS is reviewed and is negative.   Past Medical History:   Diagnosis Date     Gastroesophageal reflux disease      Current Outpatient Medications   Medication     Ascorbic Acid (VITAMIN C) 500 MG CAPS     B Complex Vitamins (B COMPLEX-B12 PO)     cephALEXin (KEFLEX) 500 MG capsule     cholecalciferol (VITAMIN D3) 400 unit (10 mcg) TABS tablet     lidocaine-prilocaine (EMLA) 2.5-2.5 % external cream     LORazepam (ATIVAN) 0.5 MG tablet     Lutein 20 MG CAPS     mirabegron (MYRBETRIQ) 50 MG 24 hr tablet     Multiple vitamin TABS     capecitabine (XELODA) 500 MG tablet     capecitabine (XELODA) 500 MG tablet     capecitabine (XELODA) 500 MG tablet     capecitabine (XELODA) 500 MG tablet     docusate sodium (COLACE) 100 MG capsule     Fesoterodine Fumarate 8 MG TB24     naloxone (NARCAN) 4 MG/0.1ML nasal spray     OLANZapine (ZYPREXA) 2.5 MG tablet     omeprazole (PRILOSEC) 20 MG DR capsule     solifenacin (VESICARE) 10 MG tablet     traMADol (ULTRAM) 50 MG tablet     Current Facility-Administered Medications   Medication     lidocaine (XYLOCAINE) 5 % ointment     Allergies   Allergen Reactions     Penicillins      Sulfa Drugs      Physical Exam: States she took her temperature at home is has no fevers.  No cough or sinus congestion with phone visit today.  Psych: Mentation appears normal and affect is normal and bright with good conversation.    Laboratory Results:    Ref. Range 6/2/2020 09:08   Sodium Latest Ref Range: 133 - 144 mmol/L 138   Potassium Latest Ref Range: 3.4 - 5.3 mmol/L 4.1   Chloride Latest Ref Range: 94 - 109 mmol/L 105   Carbon Dioxide Latest Ref Range: 20 -  32 mmol/L 29   Urea Nitrogen Latest Ref Range: 7 - 30 mg/dL 15   Creatinine Latest Ref Range: 0.52 - 1.04 mg/dL 0.70   GFR Estimate Latest Ref Range: >60 mL/min/1.73_m2 88   GFR Estimate If Black Latest Ref Range: >60 mL/min/1.73_m2 >90   Calcium Latest Ref Range: 8.5 - 10.1 mg/dL 8.9   Anion Gap Latest Ref Range: 3 - 14 mmol/L 4   Albumin Latest Ref Range: 3.4 - 5.0 g/dL 3.8   Protein Total Latest Ref Range: 6.8 - 8.8 g/dL 7.4   Bilirubin Total Latest Ref Range: 0.2 - 1.3 mg/dL 0.5   Alkaline Phosphatase Latest Ref Range: 40 - 150 U/L 60   ALT Latest Ref Range: 0 - 50 U/L 19   AST Latest Ref Range: 0 - 45 U/L 21   Glucose Latest Ref Range: 70 - 99 mg/dL 93   WBC Latest Ref Range: 4.0 - 11.0 10e9/L 5.5   Hemoglobin Latest Ref Range: 11.7 - 15.7 g/dL 12.5   Hematocrit Latest Ref Range: 35.0 - 47.0 % 37.6   Platelet Count Latest Ref Range: 150 - 450 10e9/L 212   RBC Count Latest Ref Range: 3.8 - 5.2 10e12/L 3.58 (L)   MCV Latest Ref Range: 78 - 100 fl 105 (H)   MCH Latest Ref Range: 26.5 - 33.0 pg 34.9 (H)   MCHC Latest Ref Range: 31.5 - 36.5 g/dL 33.2   RDW Latest Ref Range: 10.0 - 15.0 % 16.2 (H)   Diff Method Unknown Automated Method   % Neutrophils Latest Units: % 50.8   % Lymphocytes Latest Units: % 40.2   % Monocytes Latest Units: % 8.0   % Eosinophils Latest Units: % 0.5   % Basophils Latest Units: % 0.5   Absolute Neutrophil Latest Ref Range: 1.6 - 8.3 10e9/L 2.8   Absolute Lymphocytes Latest Ref Range: 0.8 - 5.3 10e9/L 2.2   Absolute Monocytes Latest Ref Range: 0.0 - 1.3 10e9/L 0.4   Absolute Eosinophils Latest Ref Range: 0.0 - 0.7 10e9/L 0.0   Absolute Basophils Latest Ref Range: 0.0 - 0.2 10e9/L 0.0     Assessment and Plan:   Stage IV Adenocarcinoma of the rectum-Pt continues on cycle 4 Xeloda 100 mg twice daily 14 of 21-day. She is due to restart treatment tomorrow.  Patient is having trouble with infection of the left great toe probably from the hand-foot syndrome and was put on 100 mg 4 times daily on  6/2/2020 but had difficulty with stomach irritation and was decreased to 2 times daily.  Now question whether this is adequate treatment for the infection months with the addition of Prilosec should her stomach is better she will increase to 3 times a day and finish the dosing of 28 tablets.     We will delay restart of the Xeloda from Tuesday until Friday 6/12 to let infection improve  If by Friday the infection is not significantly better patient will contact Dr. Mehta for change in medication possibly to Levaquin since she is allergic to penicillins and sulfa.   Patient will return in 3 weeks to review with provider-  Labs to be done prior to visit  She will have imaging and review after 6 cycles of treatment with Dr. Velazquez   Bone metastasis- pt continuing Zometa every 3 months- due after 8/7/2020. Confirms use of calcium and vitamin D most days. Encouraged regular walks or some kind of exercise each day.    Appetite loss/nausea-feels her appetite and stomach issues have improved with Prilosec and she is eating better and now denies weight loss.  Based upon CDC guidance and to observe social distancing in the setting of the COVID-19 pandemic, the patient was seen virtually via phone visit which was difficult due to Pt accent.   Location of pt- home  Location of provider- office in clinic  Start of visit -1:52  End of visit- 2:13pm  Total Time 21 min visit  Tuyet Bowie CNP

## 2020-06-08 NOTE — PROGRESS NOTES
"Alannah Wynn is a 69 year old female who is being evaluated via a billable telephone visit.      The patient has been notified of following:     \"This telephone visit will be conducted via a call between you and your physician/provider. We have found that certain health care needs can be provided without the need for a physical exam.  This service lets us provide the care you need with a short phone conversation.  If a prescription is necessary we can send it directly to your pharmacy.  If lab work is needed we can place an order for that and you can then stop by our lab to have the test done at a later time.    Telephone visits are billed at different rates depending on your insurance coverage. During this emergency period, for some insurers they may be billed the same as an in-person visit.  Please reach out to your insurance provider with any questions.    If during the course of the call the physician/provider feels a telephone visit is not appropriate, you will not be charged for this service.\"    Patient has given verbal consent for Telephone visit?  Yes    What phone number would you like to be contacted at? [phone]    How would you like to obtain your AVS? [facility]    Phone call duration: 21 minutes    Felipa Garner CNp    "

## 2020-06-08 NOTE — LETTER
"    6/8/2020         RE: Alannah Wynn  [address]  [place] [address]        Dear Colleague,    Thank you for referring your patient, Alannah Wynn, to the [facility]. Please see a copy of my visit note below.    Alannah Wynn is a 69 year old female who is being evaluated via a billable telephone visit.      The patient has been notified of following:     \"This telephone visit will be conducted via a call between you and your physician/provider. We have found that certain health care needs can be provided without the need for a physical exam.  This service lets us provide the care you need with a short phone conversation.  If a prescription is necessary we can send it directly to your pharmacy.  If lab work is needed we can place an order for that and you can then stop by our lab to have the test done at a later time.    Telephone visits are billed at different rates depending on your insurance coverage. During this emergency period, for some insurers they may be billed the same as an in-person visit.  Please reach out to your insurance provider with any questions.    If during the course of the call the physician/provider feels a telephone visit is not appropriate, you will not be charged for this service.\"    Patient has given verbal consent for Telephone visit?  Yes    What phone number would you like to be contacted at? [phone]    How would you like to obtain your AVS? [employer]    Phone call duration: 21 minutes    Felipa Garner CNp      Oncology Follow Up Visit: June 8, 2020     Oncologist: Dr Charline Velazquez  PCP: Earline Mehta    Diagnosis: Stage IV Adenocarcinoma of the rectum   Alannah Wynn is a 70 yo female who presented in 10/2019 with intermittent hematochezia and anorectal irritation since 2011, that have been managed as \"hemorrhoids\".  Diagnostic colonoscopy on 10/18/19 for rectal bleeding showed \"fungating and ulcerated non-obstructing mass was found " in the distal rectum <1 cm from the anal verge. The mass was partially circumferential (involving one-half of the lumen circumference). The mass measured four cm in length. In addition, its diameter measured six mm.  Pathology showed invasive moderately differentiated adenocarcinoma with intact mismatch repair protein expression.  Pelvic MRI 11/2019 found fungating polypoid mid/lower rectal mass with luminal narrowing as well as innumerable enlarged lymph nodes as described above with a conglomerate of suspicious lymph nodes abutting the anterior peritoneal reflection. Stage cT3d N2.   Further testing with PET scan proved pulmonarymetastasis and bony metastasis at L3.  Treatment:   11/25/2019 began treatment with FOLFOX/Avastin  Began Zometa every 3 months  Currently on Capecitabine    Interval History: Ms. Wynn is contacted for continuation of treatment for her colon cancer with metastasis with Capecitabine 1500 mg bid 14/21 day cycle.  Patient is reporting that she started Keflex 5 mg 4 times a day for a left great toe infection 6/2 had stomach irritation and was told to go down to 2 tablets daily with Prilosec.  Today she shares that she feels the toe is not getting any worse is not significantly getting better and is worried that she is not taking enough of the antibiotic.  Noted blister on the second toe the same she otherwise has right index finger tingling but no peeling is using lotions very often.  She is eating well and with start of Prilosec is not having any stomach problems.  Patient does get some loose stools on her week off she has no problems.  She is eating well and noting that she is not losing weight at this time.  Feels this is going much better than the infusion for her treatments denies nausea mouth sores shortness of breath fevers or chills depression or trouble sleeping.  Rest of comprehensive and complete ROS is reviewed and is negative.   Past Medical History:   Diagnosis Date      Gastroesophageal reflux disease      Current Outpatient Medications   Medication     Ascorbic Acid (VITAMIN C) 500 MG CAPS     B Complex Vitamins (B COMPLEX-B12 PO)     cephALEXin (KEFLEX) 500 MG capsule     cholecalciferol (VITAMIN D3) 400 unit (10 mcg) TABS tablet     lidocaine-prilocaine (EMLA) 2.5-2.5 % external cream     LORazepam (ATIVAN) 0.5 MG tablet     Lutein 20 MG CAPS     mirabegron (MYRBETRIQ) 50 MG 24 hr tablet     Multiple vitamin TABS     capecitabine (XELODA) 500 MG tablet     capecitabine (XELODA) 500 MG tablet     capecitabine (XELODA) 500 MG tablet     capecitabine (XELODA) 500 MG tablet     docusate sodium (COLACE) 100 MG capsule     Fesoterodine Fumarate 8 MG TB24     naloxone (NARCAN) 4 MG/0.1ML nasal spray     OLANZapine (ZYPREXA) 2.5 MG tablet     omeprazole (PRILOSEC) 20 MG DR capsule     solifenacin (VESICARE) 10 MG tablet     traMADol (ULTRAM) 50 MG tablet     Current Facility-Administered Medications   Medication     lidocaine (XYLOCAINE) 5 % ointment     Allergies   Allergen Reactions     Penicillins      Sulfa Drugs      Physical Exam: States she took her temperature at home is has no fevers.  No cough or sinus congestion with phone visit today.  Psych: Mentation appears normal and affect is normal and bright with good conversation.    Laboratory Results:    Ref. Range 6/2/2020 09:08   Sodium Latest Ref Range: 133 - 144 mmol/L 138   Potassium Latest Ref Range: 3.4 - 5.3 mmol/L 4.1   Chloride Latest Ref Range: 94 - 109 mmol/L 105   Carbon Dioxide Latest Ref Range: 20 - 32 mmol/L 29   Urea Nitrogen Latest Ref Range: 7 - 30 mg/dL 15   Creatinine Latest Ref Range: 0.52 - 1.04 mg/dL 0.70   GFR Estimate Latest Ref Range: >60 mL/min/1.73_m2 88   GFR Estimate If Black Latest Ref Range: >60 mL/min/1.73_m2 >90   Calcium Latest Ref Range: 8.5 - 10.1 mg/dL 8.9   Anion Gap Latest Ref Range: 3 - 14 mmol/L 4   Albumin Latest Ref Range: 3.4 - 5.0 g/dL 3.8   Protein Total Latest Ref Range: 6.8 - 8.8  g/dL 7.4   Bilirubin Total Latest Ref Range: 0.2 - 1.3 mg/dL 0.5   Alkaline Phosphatase Latest Ref Range: 40 - 150 U/L 60   ALT Latest Ref Range: 0 - 50 U/L 19   AST Latest Ref Range: 0 - 45 U/L 21   Glucose Latest Ref Range: 70 - 99 mg/dL 93   WBC Latest Ref Range: 4.0 - 11.0 10e9/L 5.5   Hemoglobin Latest Ref Range: 11.7 - 15.7 g/dL 12.5   Hematocrit Latest Ref Range: 35.0 - 47.0 % 37.6   Platelet Count Latest Ref Range: 150 - 450 10e9/L 212   RBC Count Latest Ref Range: 3.8 - 5.2 10e12/L 3.58 (L)   MCV Latest Ref Range: 78 - 100 fl 105 (H)   MCH Latest Ref Range: 26.5 - 33.0 pg 34.9 (H)   MCHC Latest Ref Range: 31.5 - 36.5 g/dL 33.2   RDW Latest Ref Range: 10.0 - 15.0 % 16.2 (H)   Diff Method Unknown Automated Method   % Neutrophils Latest Units: % 50.8   % Lymphocytes Latest Units: % 40.2   % Monocytes Latest Units: % 8.0   % Eosinophils Latest Units: % 0.5   % Basophils Latest Units: % 0.5   Absolute Neutrophil Latest Ref Range: 1.6 - 8.3 10e9/L 2.8   Absolute Lymphocytes Latest Ref Range: 0.8 - 5.3 10e9/L 2.2   Absolute Monocytes Latest Ref Range: 0.0 - 1.3 10e9/L 0.4   Absolute Eosinophils Latest Ref Range: 0.0 - 0.7 10e9/L 0.0   Absolute Basophils Latest Ref Range: 0.0 - 0.2 10e9/L 0.0     Assessment and Plan:   Stage IV Adenocarcinoma of the rectum-Pt continues on cycle 4 Xeloda 100 mg twice daily 14 of 21-day. She is due to restart treatment tomorrow.  Patient is having trouble with infection of the left great toe probably from the hand-foot syndrome and was put on 100 mg 4 times daily on 6/2/2020 but had difficulty with stomach irritation and was decreased to 2 times daily.  Now question whether this is adequate treatment for the infection months with the addition of Prilosec should her stomach is better she will increase to 3 times a day and finish the dosing of 28 tablets.     We will delay restart of the Xeloda from Tuesday until Friday 6/12 to let infection improve  If by Friday the infection is not  significantly better patient will contact Dr. Mehta for change in medication possibly to Levaquin since she is allergic to penicillins and sulfa.   Patient will return in 3 weeks to review with provider-  Labs to be done prior to visit  She will have imaging and review after 6 cycles of treatment with Dr. Velazquez   Bone metastasis- pt continuing Zometa every 3 months- due after 8/7/2020. Confirms use of calcium and vitamin D most days. Encouraged regular walks or some kind of exercise each day.    Appetite loss/nausea-feels her appetite and stomach issues have improved with Prilosec and she is eating better and now denies weight loss.  Based upon CDC guidance and to observe social distancing in the setting of the COVID-19 pandemic, the patient was seen virtually via phone visit which was difficult due to Pt accent.   Location of pt- home  Location of provider- office in clinic  Start of visit -1:52  End of visit- 2:13pm  Total Time 21 min visit  Tuyet Bowie CNP        Again, thank you for allowing me to participate in the care of your patient.        Sincerely,        Felipa Garner, LETICIA, APRN CNP

## 2020-06-16 ENCOUNTER — ONCOLOGY VISIT (OUTPATIENT)
Dept: ONCOLOGY | Facility: CLINIC | Age: 70
End: 2020-06-16
Payer: COMMERCIAL

## 2020-06-16 VITALS
WEIGHT: 156.1 LBS | SYSTOLIC BLOOD PRESSURE: 119 MMHG | TEMPERATURE: 97.4 F | BODY MASS INDEX: 28.55 KG/M2 | OXYGEN SATURATION: 98 % | HEART RATE: 88 BPM | DIASTOLIC BLOOD PRESSURE: 76 MMHG

## 2020-06-16 DIAGNOSIS — G62.0 DRUG-INDUCED POLYNEUROPATHY (H): Primary | ICD-10-CM

## 2020-06-16 DIAGNOSIS — C20 METASTASIS FROM RECTAL CANCER (H): ICD-10-CM

## 2020-06-16 DIAGNOSIS — C79.9 METASTASIS FROM RECTAL CANCER (H): ICD-10-CM

## 2020-06-16 DIAGNOSIS — R11.0 NAUSEA: ICD-10-CM

## 2020-06-16 LAB — URATE SERPL-MCNC: 4.9 MG/DL (ref 2.6–6)

## 2020-06-16 PROCEDURE — 99213 OFFICE O/P EST LOW 20 MIN: CPT | Performed by: NURSE PRACTITIONER

## 2020-06-16 PROCEDURE — 36415 COLL VENOUS BLD VENIPUNCTURE: CPT | Performed by: NURSE PRACTITIONER

## 2020-06-16 PROCEDURE — 84550 ASSAY OF BLOOD/URIC ACID: CPT | Performed by: NURSE PRACTITIONER

## 2020-06-16 RX ORDER — LORAZEPAM 0.5 MG/1
0.5 TABLET ORAL EVERY 6 HOURS PRN
Qty: 30 TABLET | Refills: 2 | Status: SHIPPED | OUTPATIENT
Start: 2020-06-16 | End: 2020-07-22

## 2020-06-16 ASSESSMENT — PAIN SCALES - GENERAL: PAINLEVEL: MILD PAIN (3)

## 2020-06-16 NOTE — LETTER
"    6/16/2020         RE: Alannah Wynn  [address]  [place] [address]        Dear Colleague,    Thank you for referring your patient, Alannah Wynn, to the [facility]. Please see a copy of my visit note below.    Oncology Follow Up Visit: June 16, 2020    Oncologist: Dr Charline Velazquez  PCP: Earline Mehta    Diagnosis: Stage IV Adenocarcinoma of the rectum   Alannah Wynn is a 70 yo female who presented in 10/2019 with intermittent hematochezia and anorectal irritation since 2011, that have been managed as \"hemorrhoids\".  Diagnostic colonoscopy on 10/18/19 for rectal bleeding showed \"fungating and ulcerated non-obstructing mass was found in the distal rectum <1 cm from the anal verge. The mass was partially circumferential (involving one-half of the lumen circumference). The mass measured four cm in length. In addition, its diameter measured six mm.  Pathology showed invasive moderately differentiated adenocarcinoma with intact mismatch repair protein expression.  Pelvic MRI 11/2019 found fungating polypoid mid/lower rectal mass with luminal narrowing as well as innumerable enlarged lymph nodes as described above with a conglomerate of suspicious lymph nodes abutting the anterior peritoneal reflection. Stage cT3d N2.   Further testing with PET scan proved pulmonarymetastasis and bony metastasis at L3.  Treatment:   11/25/2019 began treatment with FOLFOX/Avastin  Began Zometa every 3 months  Currently on Capecitabine    Interval History: Ms. Wynn comes to clinic for review of ongoing toe pain with infection and increasing neuropathy to fingers and toes. Pt continues on Xeloda - now 5 days into cycle 4 using 1500 mg Xeloda bid in first 14 of 21 days cycle. She states she completed all of the antibiotics ordered for the great toes infections by PCP including keflex first then doxycycline -ending about 1 week ago- but is still noting tenderness to lateral left toe and top of " right great toe and is worried there is still infection.Also using antibiotic ointment each night. Son has gout and is concerned that this may be gout or other issue rather than infection. Pt also relating that she has developed numbness of the fingertips and feet but can still button and open a water bottle.  Denies bothering walking at this time and no falls. Also would like lorazepam refilled - using for nausea and sleep.     Rest of comprehensive and complete ROS is reviewed and is negative.   Past Medical History:   Diagnosis Date     Gastroesophageal reflux disease      Current Outpatient Medications   Medication     Ascorbic Acid (VITAMIN C) 500 MG CAPS     B Complex Vitamins (B COMPLEX-B12 PO)     capecitabine (XELODA) 500 MG tablet     cholecalciferol (VITAMIN D3) 400 unit (10 mcg) TABS tablet     docusate sodium (COLACE) 100 MG capsule     Fesoterodine Fumarate 8 MG TB24     lidocaine-prilocaine (EMLA) 2.5-2.5 % external cream     LORazepam (ATIVAN) 0.5 MG tablet     Lutein 20 MG CAPS     mirabegron (MYRBETRIQ) 50 MG 24 hr tablet     Multiple vitamin TABS     naloxone (NARCAN) 4 MG/0.1ML nasal spray     omeprazole (PRILOSEC) 20 MG DR capsule     solifenacin (VESICARE) 10 MG tablet     traMADol (ULTRAM) 50 MG tablet     capecitabine (XELODA) 500 MG tablet     capecitabine (XELODA) 500 MG tablet     capecitabine (XELODA) 500 MG tablet     OLANZapine (ZYPREXA) 2.5 MG tablet     Current Facility-Administered Medications   Medication     lidocaine (XYLOCAINE) 5 % ointment     Allergies   Allergen Reactions     Penicillins      Sulfa Drugs      Physical Exam: /76   Pulse 88   Temp 97.4  F (36.3  C)   Wt 70.8 kg (156 lb 1.6 oz)   LMP 11/27/2001 (Approximate)   SpO2 98%   BMI 28.55 kg/m     Constitutional: Alert, healthy, and in no distress.   ENT: Eyes bright, No mouth sores  Neck: Supple, No adenopathy.Thyroid symmetric  Cardiac: Heart rate and rhythm is regular and strong without  murmur  Respiratory: Breathing easy. Lung sounds clear to auscultation  Abdomen: Soft, non-tender, BS normal. No masses or organomegaly  MS: fingers showing good movement and strong grasp without nail abnormalities but does have skin darkening.  Feet also have darkened color and both great toes with no sign of redness but does have some build up of skin /callus and is tender with exam over lateral corner of great toe nails mild peeling noted to left toe but rest of feet without peeling.   Skin:  As above.   Neuro: Sensory grossly WNL, gait normal.   Psych: Mentation appears normal and affect normal/bright and smiling    Laboratory Results:   Results for orders placed or performed in visit on 06/16/20   Uric acid     Status: None   Result Value Ref Range    Uric Acid 4.9 2.6 - 6.0 mg/dL     Assessment and Plan:   Here with bilateral great toe pain and new peripheral neuropathy-though has been treated for cellulitis of the great toes with keflex then changed to doxycycline and appears the infection has resolved but some pain continues to lateral corners of the toes which is influenced by callus. Gout is ruled out as well. Appears to be related to use of Xeloda.   Since pt is 5 days into 14 days of her Xeloda plan- will continue with this cycle as planned at 1500mg bid but may consider holding additional days or dose reduction for symptoms including some neuropathy of the fingers and toes( noted in 10% of those using Xeloda per UpToDate).   Pt requesting refill of lorazepam used for nausea and sleep. Refill completed to her preferred pharmacy.   This was a 20 min visit with > 50% in education and management of concerns.   Felipa Garner CNP        Again, thank you for allowing me to participate in the care of your patient.        Sincerely,        Felipa Garner, NP, APRN CNP

## 2020-06-16 NOTE — NURSING NOTE
"Oncology Rooming Note    June 16, 2020 3:21 PM   Alannah Wynn is a 69 year old female who presents for:    Chief Complaint   Patient presents with     Oncology Clinic Visit     toe pain     Initial Vitals: /76   Pulse 88   Temp 97.4  F (36.3  C)   Wt 70.8 kg (156 lb 1.6 oz)   LMP 11/27/2001 (Approximate)   SpO2 98%   BMI 28.55 kg/m   Estimated body mass index is 28.55 kg/m  as calculated from the following:    Height as of 5/20/20: 1.575 m (5' 2\").    Weight as of this encounter: 70.8 kg (156 lb 1.6 oz). Body surface area is 1.76 meters squared.  Mild Pain (3) Comment: Data Unavailable   Patient's last menstrual period was 11/27/2001 (approximate).  Allergies reviewed: Yes  Medications reviewed: Yes    Medications: MEDICATION REFILLS NEEDED TODAY. Provider was notified.  Pharmacy name entered into [facility]:    [id number] - [place], [place] - [address] AT [facility] & [address] - [place], [place] - [address], [id number]  [facility] - [place], [place] - [address]     Clinical concerns: Yes Felipa was notified.      Annetta Bruno CMA              "

## 2020-06-16 NOTE — PATIENT INSTRUCTIONS
Does not appear as infection at this time. Is having peripheral neuropathy of hands and toes- may need longer break after this cycle or decrease in dosing- we will talk about this with next visit between cycles.

## 2020-06-16 NOTE — PROGRESS NOTES
"Oncology Follow Up Visit: June 16, 2020    Oncologist: Dr Charline Velazquez  PCP: Earline Mehta    Diagnosis: Stage IV Adenocarcinoma of the rectum   Alannah Wynn is a 70 yo female who presented in 10/2019 with intermittent hematochezia and anorectal irritation since 2011, that have been managed as \"hemorrhoids\".  Diagnostic colonoscopy on 10/18/19 for rectal bleeding showed \"fungating and ulcerated non-obstructing mass was found in the distal rectum <1 cm from the anal verge. The mass was partially circumferential (involving one-half of the lumen circumference). The mass measured four cm in length. In addition, its diameter measured six mm.  Pathology showed invasive moderately differentiated adenocarcinoma with intact mismatch repair protein expression.  Pelvic MRI 11/2019 found fungating polypoid mid/lower rectal mass with luminal narrowing as well as innumerable enlarged lymph nodes as described above with a conglomerate of suspicious lymph nodes abutting the anterior peritoneal reflection. Stage cT3d N2.   Further testing with PET scan proved pulmonarymetastasis and bony metastasis at L3.  Treatment:   11/25/2019 began treatment with FOLFOX/Avastin  Began Zometa every 3 months  Currently on Capecitabine    Interval History: Ms. Wynn comes to clinic for review of ongoing toe pain with infection and increasing neuropathy to fingers and toes. Pt continues on Xeloda - now 5 days into cycle 4 using 1500 mg Xeloda bid in first 14 of 21 days cycle. She states she completed all of the antibiotics ordered for the great toes infections by PCP including keflex first then doxycycline -ending about 1 week ago- but is still noting tenderness to lateral left toe and top of right great toe and is worried there is still infection.Also using antibiotic ointment each night. Son has gout and is concerned that this may be gout or other issue rather than infection. Pt also relating that she has developed numbness of the fingertips " and feet but can still button and open a water bottle.  Denies bothering walking at this time and no falls. Also would like lorazepam refilled - using for nausea and sleep.     Rest of comprehensive and complete ROS is reviewed and is negative.   Past Medical History:   Diagnosis Date     Gastroesophageal reflux disease      Current Outpatient Medications   Medication     Ascorbic Acid (VITAMIN C) 500 MG CAPS     B Complex Vitamins (B COMPLEX-B12 PO)     capecitabine (XELODA) 500 MG tablet     cholecalciferol (VITAMIN D3) 400 unit (10 mcg) TABS tablet     docusate sodium (COLACE) 100 MG capsule     Fesoterodine Fumarate 8 MG TB24     lidocaine-prilocaine (EMLA) 2.5-2.5 % external cream     LORazepam (ATIVAN) 0.5 MG tablet     Lutein 20 MG CAPS     mirabegron (MYRBETRIQ) 50 MG 24 hr tablet     Multiple vitamin TABS     naloxone (NARCAN) 4 MG/0.1ML nasal spray     omeprazole (PRILOSEC) 20 MG DR capsule     solifenacin (VESICARE) 10 MG tablet     traMADol (ULTRAM) 50 MG tablet     capecitabine (XELODA) 500 MG tablet     capecitabine (XELODA) 500 MG tablet     capecitabine (XELODA) 500 MG tablet     OLANZapine (ZYPREXA) 2.5 MG tablet     Current Facility-Administered Medications   Medication     lidocaine (XYLOCAINE) 5 % ointment     Allergies   Allergen Reactions     Penicillins      Sulfa Drugs      Physical Exam: /76   Pulse 88   Temp 97.4  F (36.3  C)   Wt 70.8 kg (156 lb 1.6 oz)   LMP 11/27/2001 (Approximate)   SpO2 98%   BMI 28.55 kg/m     Constitutional: Alert, healthy, and in no distress.   ENT: Eyes bright, No mouth sores  Neck: Supple, No adenopathy.Thyroid symmetric  Cardiac: Heart rate and rhythm is regular and strong without murmur  Respiratory: Breathing easy. Lung sounds clear to auscultation  Abdomen: Soft, non-tender, BS normal. No masses or organomegaly  MS: fingers showing good movement and strong grasp without nail abnormalities but does have skin darkening.  Feet also have darkened color  and both great toes with no sign of redness but does have some build up of skin /callus and is tender with exam over lateral corner of great toe nails mild peeling noted to left toe but rest of feet without peeling.   Skin:  As above.   Neuro: Sensory grossly WNL, gait normal.   Psych: Mentation appears normal and affect normal/bright and smiling    Laboratory Results:   Results for orders placed or performed in visit on 06/16/20   Uric acid     Status: None   Result Value Ref Range    Uric Acid 4.9 2.6 - 6.0 mg/dL     Assessment and Plan:   Here with bilateral great toe pain and new peripheral neuropathy-though has been treated for cellulitis of the great toes with keflex then changed to doxycycline and appears the infection has resolved but some pain continues to lateral corners of the toes which is influenced by callus. Gout is ruled out as well. Appears to be related to use of Xeloda.   Since pt is 5 days into 14 days of her Xeloda plan- will continue with this cycle as planned at 1500mg bid but may consider holding additional days or dose reduction for symptoms including some neuropathy of the fingers and toes( noted in 10% of those using Xeloda per UpToDate).   Pt requesting refill of lorazepam used for nausea and sleep. Refill completed to her preferred pharmacy.   This was a 20 min visit with > 50% in education and management of concerns.   Felipa Garner,CNP

## 2020-06-19 DIAGNOSIS — C79.9 METASTASIS FROM RECTAL CANCER (H): ICD-10-CM

## 2020-06-19 DIAGNOSIS — T45.1X5A CHEMOTHERAPY-INDUCED NEUTROPENIA (H): ICD-10-CM

## 2020-06-19 DIAGNOSIS — C20 METASTASIS FROM RECTAL CANCER (H): ICD-10-CM

## 2020-06-19 DIAGNOSIS — D70.1 CHEMOTHERAPY-INDUCED NEUTROPENIA (H): ICD-10-CM

## 2020-06-19 DIAGNOSIS — C79.51 BONE METASTASIS: ICD-10-CM

## 2020-06-30 ENCOUNTER — ALLIED HEALTH/NURSE VISIT (OUTPATIENT)
Dept: ONCOLOGY | Facility: CLINIC | Age: 70
End: 2020-06-30
Payer: COMMERCIAL

## 2020-06-30 ENCOUNTER — VIRTUAL VISIT (OUTPATIENT)
Dept: ONCOLOGY | Facility: CLINIC | Age: 70
End: 2020-06-30
Payer: COMMERCIAL

## 2020-06-30 DIAGNOSIS — C79.51 BONE METASTASIS: ICD-10-CM

## 2020-06-30 DIAGNOSIS — C79.9 METASTASIS FROM RECTAL CANCER (H): ICD-10-CM

## 2020-06-30 DIAGNOSIS — T45.1X5A CHEMOTHERAPY-INDUCED NEUTROPENIA (H): Primary | ICD-10-CM

## 2020-06-30 DIAGNOSIS — L27.1 HAND FOOT SYNDROME: Primary | ICD-10-CM

## 2020-06-30 DIAGNOSIS — D70.1 CHEMOTHERAPY-INDUCED NEUTROPENIA (H): Primary | ICD-10-CM

## 2020-06-30 DIAGNOSIS — R11.0 NAUSEA: ICD-10-CM

## 2020-06-30 DIAGNOSIS — C20 METASTASIS FROM RECTAL CANCER (H): ICD-10-CM

## 2020-06-30 DIAGNOSIS — L03.032 CELLULITIS OF TOE OF LEFT FOOT: ICD-10-CM

## 2020-06-30 DIAGNOSIS — G47.00 INSOMNIA, UNSPECIFIED TYPE: ICD-10-CM

## 2020-06-30 LAB
ALBUMIN SERPL-MCNC: 3.5 G/DL (ref 3.4–5)
ALP SERPL-CCNC: 89 U/L (ref 40–150)
ALT SERPL W P-5'-P-CCNC: 20 U/L (ref 0–50)
ANION GAP SERPL CALCULATED.3IONS-SCNC: 1 MMOL/L (ref 3–14)
AST SERPL W P-5'-P-CCNC: 15 U/L (ref 0–45)
BASOPHILS # BLD AUTO: 0.1 10E9/L (ref 0–0.2)
BASOPHILS NFR BLD AUTO: 0.9 %
BILIRUB SERPL-MCNC: 0.3 MG/DL (ref 0.2–1.3)
BUN SERPL-MCNC: 18 MG/DL (ref 7–30)
CALCIUM SERPL-MCNC: 8.4 MG/DL (ref 8.5–10.1)
CEA SERPL-MCNC: 2.8 UG/L (ref 0–2.5)
CHLORIDE SERPL-SCNC: 110 MMOL/L (ref 94–109)
CO2 SERPL-SCNC: 31 MMOL/L (ref 20–32)
CREAT SERPL-MCNC: 0.7 MG/DL (ref 0.52–1.04)
DIFFERENTIAL METHOD BLD: ABNORMAL
EOSINOPHIL # BLD AUTO: 0.1 10E9/L (ref 0–0.7)
EOSINOPHIL NFR BLD AUTO: 1.7 %
ERYTHROCYTE [DISTWIDTH] IN BLOOD BY AUTOMATED COUNT: 16.5 % (ref 10–15)
GFR SERPL CREATININE-BSD FRML MDRD: 88 ML/MIN/{1.73_M2}
GLUCOSE SERPL-MCNC: 103 MG/DL (ref 70–99)
HCT VFR BLD AUTO: 37.8 % (ref 35–47)
HGB BLD-MCNC: 12.5 G/DL (ref 11.7–15.7)
IMM GRANULOCYTES # BLD: 0 10E9/L (ref 0–0.4)
IMM GRANULOCYTES NFR BLD: 0.2 %
LYMPHOCYTES # BLD AUTO: 2.6 10E9/L (ref 0.8–5.3)
LYMPHOCYTES NFR BLD AUTO: 40 %
MCH RBC QN AUTO: 34.2 PG (ref 26.5–33)
MCHC RBC AUTO-ENTMCNC: 33.1 G/DL (ref 31.5–36.5)
MCV RBC AUTO: 103 FL (ref 78–100)
MONOCYTES # BLD AUTO: 0.5 10E9/L (ref 0–1.3)
MONOCYTES NFR BLD AUTO: 8.1 %
NEUTROPHILS # BLD AUTO: 3.2 10E9/L (ref 1.6–8.3)
NEUTROPHILS NFR BLD AUTO: 49.1 %
PLATELET # BLD AUTO: 211 10E9/L (ref 150–450)
POTASSIUM SERPL-SCNC: 3.9 MMOL/L (ref 3.4–5.3)
PROT SERPL-MCNC: 7.4 G/DL (ref 6.8–8.8)
RBC # BLD AUTO: 3.66 10E12/L (ref 3.8–5.2)
SODIUM SERPL-SCNC: 142 MMOL/L (ref 133–144)
WBC # BLD AUTO: 6.5 10E9/L (ref 4–11)

## 2020-06-30 PROCEDURE — 82378 CARCINOEMBRYONIC ANTIGEN: CPT | Performed by: NURSE PRACTITIONER

## 2020-06-30 PROCEDURE — 80053 COMPREHEN METABOLIC PANEL: CPT | Performed by: INTERNAL MEDICINE

## 2020-06-30 PROCEDURE — 99207 ZZC NO CHARGE NURSE ONLY: CPT

## 2020-06-30 PROCEDURE — 85025 COMPLETE CBC W/AUTO DIFF WBC: CPT | Performed by: INTERNAL MEDICINE

## 2020-06-30 PROCEDURE — 99213 OFFICE O/P EST LOW 20 MIN: CPT | Mod: 95 | Performed by: NURSE PRACTITIONER

## 2020-06-30 RX ORDER — CAPECITABINE 500 MG/1
1000 TABLET, FILM COATED ORAL 2 TIMES DAILY
Qty: 84 TABLET | Refills: 0 | Status: SHIPPED | OUTPATIENT
Start: 2020-06-30 | End: 2020-01-01

## 2020-06-30 RX ORDER — HEPARIN SODIUM (PORCINE) LOCK FLUSH IV SOLN 100 UNIT/ML 100 UNIT/ML
5 SOLUTION INTRAVENOUS
Status: DISCONTINUED | OUTPATIENT
Start: 2020-06-30 | End: 2020-06-30 | Stop reason: HOSPADM

## 2020-06-30 RX ADMIN — HEPARIN SODIUM (PORCINE) LOCK FLUSH IV SOLN 100 UNIT/ML 5 ML: 100 SOLUTION at 10:22

## 2020-06-30 NOTE — PROGRESS NOTES
"Oncology Follow Up Visit: June 30, 2020    Oncologist: Dr Charline Velazquez  PCP: Earline Mehta    Diagnosis: Stage IV Adenocarcinoma of the rectum   Alannah Wynn is a 68 yo female who presented in 10/2019 with intermittent hematochezia and anorectal irritation since 2011, that have been managed as \"hemorrhoids\".  Diagnostic colonoscopy on 10/18/19 for rectal bleeding showed \"fungating and ulcerated non-obstructing mass was found in the distal rectum <1 cm from the anal verge. The mass was partially circumferential (involving one-half of the lumen circumference). The mass measured four cm in length. In addition, its diameter measured six mm.  Pathology showed invasive moderately differentiated adenocarcinoma with intact mismatch repair protein expression.  Pelvic MRI 11/2019 found fungating polypoid mid/lower rectal mass with luminal narrowing as well as innumerable enlarged lymph nodes as described above with a conglomerate of suspicious lymph nodes abutting the anterior peritoneal reflection. Stage cT3d N2.   Further testing with PET scan proved pulmonarymetastasis and bony metastasis at L3.  Treatment:   11/25/2019 began treatment with FOLFOX/Avastin  Began Zometa every 3 months  Currently on Capecitabine    Interval History: Ms. Wynn is contacted for review of symptoms for ongoing plan with Xeoda 1500 mg .     Rest of comprehensive and complete ROS is reviewed and is negative.   Past Medical History:   Diagnosis Date     Gastroesophageal reflux disease      Current Outpatient Medications   Medication     Ascorbic Acid (VITAMIN C) 500 MG CAPS     B Complex Vitamins (B COMPLEX-B12 PO)     cholecalciferol (VITAMIN D3) 400 unit (10 mcg) TABS tablet     docusate sodium (COLACE) 100 MG capsule     Fesoterodine Fumarate 8 MG TB24     lidocaine-prilocaine (EMLA) 2.5-2.5 % external cream     LORazepam (ATIVAN) 0.5 MG tablet     Lutein 20 MG CAPS     mirabegron (MYRBETRIQ) 50 MG 24 hr tablet     Multiple vitamin TABS "     naloxone (NARCAN) 4 MG/0.1ML nasal spray     omeprazole (PRILOSEC) 20 MG DR capsule     solifenacin (VESICARE) 10 MG tablet     traMADol (ULTRAM) 50 MG tablet     capecitabine (XELODA) 500 MG tablet     capecitabine (XELODA) 500 MG tablet     capecitabine (XELODA) 500 MG tablet     capecitabine (XELODA) 500 MG tablet     OLANZapine (ZYPREXA) 2.5 MG tablet     Current Facility-Administered Medications   Medication     lidocaine (XYLOCAINE) 5 % ointment     Allergies   Allergen Reactions     Penicillins      Sulfa Drugs      Physical Exam: LMP 11/27/2001 (Approximate)    Constitutional: Alert, healthy, and in no distress.   ENT: Eyes bright, No mouth sores  Neck: Supple, No adenopathy.Thyroid symmetric  Cardiac: Heart rate and rhythm is regular and strong without murmur  Respiratory: Breathing easy. Lung sounds clear to auscultation  Abdomen: Soft, non-tender, BS normal. No masses or organomegaly  MS: fingers showing good movement and strong grasp without nail abnormalities but does have skin darkening.  Feet also have darkened color and both great toes with no sign of redness but does have some build up of skin /callus and is tender with exam over lateral corner of great toe nails mild peeling noted to left toe but rest of feet without peeling.   Skin:  As above.   Neuro: Sensory grossly WNL, gait normal.   Psych: Mentation appears normal and affect normal/bright and smiling    Laboratory Results:   Results for orders placed or performed in visit on 06/30/20   CBC with platelets differential     Status: Abnormal   Result Value Ref Range    WBC 6.5 4.0 - 11.0 10e9/L    RBC Count 3.66 (L) 3.8 - 5.2 10e12/L    Hemoglobin 12.5 11.7 - 15.7 g/dL    Hematocrit 37.8 35.0 - 47.0 %     (H) 78 - 100 fl    MCH 34.2 (H) 26.5 - 33.0 pg    MCHC 33.1 31.5 - 36.5 g/dL    RDW 16.5 (H) 10.0 - 15.0 %    Platelet Count 211 150 - 450 10e9/L    Diff Method Automated Method     % Neutrophils 49.1 %    % Lymphocytes 40.0 %    %  Monocytes 8.1 %    % Eosinophils 1.7 %    % Basophils 0.9 %    % Immature Granulocytes 0.2 %    Absolute Neutrophil 3.2 1.6 - 8.3 10e9/L    Absolute Lymphocytes 2.6 0.8 - 5.3 10e9/L    Absolute Monocytes 0.5 0.0 - 1.3 10e9/L    Absolute Eosinophils 0.1 0.0 - 0.7 10e9/L    Absolute Basophils 0.1 0.0 - 0.2 10e9/L    Abs Immature Granulocytes 0.0 0 - 0.4 10e9/L   Comprehensive metabolic panel     Status: Abnormal   Result Value Ref Range    Sodium 142 133 - 144 mmol/L    Potassium 3.9 3.4 - 5.3 mmol/L    Chloride 110 (H) 94 - 109 mmol/L    Carbon Dioxide 31 20 - 32 mmol/L    Anion Gap 1 (L) 3 - 14 mmol/L    Glucose 103 (H) 70 - 99 mg/dL    Urea Nitrogen 18 7 - 30 mg/dL    Creatinine 0.70 0.52 - 1.04 mg/dL    GFR Estimate 88 >60 mL/min/[1.73_m2]    GFR Estimate If Black >90 >60 mL/min/[1.73_m2]    Calcium 8.4 (L) 8.5 - 10.1 mg/dL    Bilirubin Total 0.3 0.2 - 1.3 mg/dL    Albumin 3.5 3.4 - 5.0 g/dL    Protein Total 7.4 6.8 - 8.8 g/dL    Alkaline Phosphatase 89 40 - 150 U/L    ALT 20 0 - 50 U/L    AST 15 0 - 45 U/L     Assessment and Plan:   Stage IV Adenocarcinoma of the rectum - currently using Xeloda 1500mg bid x 14/21 day cycle. She is doing well now that previous toe infection is said to be resolved. She has some peeling of the feet and noted mild nausea but is using joe to help with this. Overall feels she is doing well and ready to continue with plan with restart on Friday 7/3/2020. Labs reviewed to be meeting goals.    Pt will return in 3 weeks with visit and labs prior to continuation of treatment with 6th cycle.   *zometa due again on 8/7/2020  Cellulitis of the left great toe- said to be resolved- no longer on antibiotics and has not pain.   Insomnia- Pt not wanting to use ativan each night so discussed use of melatonin 3-5 mg 30 min prior to bed and may still use ativan every few days if not sleeping. Recommend daily exercise to help with slepp as well.   Based upon CDC guidance and to observe social  distancing in the setting of the COVID-19 pandemic, the patient was seen virtually via phone visit.   Location of pt- home  Location of provider- office at clinic  Start of visit - 1:40pm  End of visit- 1: 51pm  Total Time 11 min visit  Felipa Garnre CNp

## 2020-06-30 NOTE — LETTER
"    6/30/2020         RE: Alannah Wynn  [address]  [place] [address]        Dear Colleague,    Thank you for referring your patient, Alannah Wynn, to the [facility]. Please see a copy of my visit note below.    Oncology Follow Up Visit: June 30, 2020    Oncologist: Dr Charline Velazquez  PCP: Earline Mehta    Diagnosis: Stage IV Adenocarcinoma of the rectum   Alannah Wynn is a 68 yo female who presented in 10/2019 with intermittent hematochezia and anorectal irritation since 2011, that have been managed as \"hemorrhoids\".  Diagnostic colonoscopy on 10/18/19 for rectal bleeding showed \"fungating and ulcerated non-obstructing mass was found in the distal rectum <1 cm from the anal verge. The mass was partially circumferential (involving one-half of the lumen circumference). The mass measured four cm in length. In addition, its diameter measured six mm.  Pathology showed invasive moderately differentiated adenocarcinoma with intact mismatch repair protein expression.  Pelvic MRI 11/2019 found fungating polypoid mid/lower rectal mass with luminal narrowing as well as innumerable enlarged lymph nodes as described above with a conglomerate of suspicious lymph nodes abutting the anterior peritoneal reflection. Stage cT3d N2.   Further testing with PET scan proved pulmonarymetastasis and bony metastasis at L3.  Treatment:   11/25/2019 began treatment with FOLFOX/Avastin  Began Zometa every 3 months  Currently on Capecitabine    Interval History: Ms. Wynn is contacted for review of symptoms for ongoing plan with Xeoda 1500 mg .     Rest of comprehensive and complete ROS is reviewed and is negative.   Past Medical History:   Diagnosis Date     Gastroesophageal reflux disease      Current Outpatient Medications   Medication     Ascorbic Acid (VITAMIN C) 500 MG CAPS     B Complex Vitamins (B COMPLEX-B12 PO)     cholecalciferol (VITAMIN D3) 400 unit (10 mcg) TABS tablet     docusate " sodium (COLACE) 100 MG capsule     Fesoterodine Fumarate 8 MG TB24     lidocaine-prilocaine (EMLA) 2.5-2.5 % external cream     LORazepam (ATIVAN) 0.5 MG tablet     Lutein 20 MG CAPS     mirabegron (MYRBETRIQ) 50 MG 24 hr tablet     Multiple vitamin TABS     naloxone (NARCAN) 4 MG/0.1ML nasal spray     omeprazole (PRILOSEC) 20 MG DR capsule     solifenacin (VESICARE) 10 MG tablet     traMADol (ULTRAM) 50 MG tablet     capecitabine (XELODA) 500 MG tablet     capecitabine (XELODA) 500 MG tablet     capecitabine (XELODA) 500 MG tablet     capecitabine (XELODA) 500 MG tablet     OLANZapine (ZYPREXA) 2.5 MG tablet     Current Facility-Administered Medications   Medication     lidocaine (XYLOCAINE) 5 % ointment     Allergies   Allergen Reactions     Penicillins      Sulfa Drugs      Physical Exam: LMP 11/27/2001 (Approximate)    Constitutional: Alert, healthy, and in no distress.   ENT: Eyes bright, No mouth sores  Neck: Supple, No adenopathy.Thyroid symmetric  Cardiac: Heart rate and rhythm is regular and strong without murmur  Respiratory: Breathing easy. Lung sounds clear to auscultation  Abdomen: Soft, non-tender, BS normal. No masses or organomegaly  MS: fingers showing good movement and strong grasp without nail abnormalities but does have skin darkening.  Feet also have darkened color and both great toes with no sign of redness but does have some build up of skin /callus and is tender with exam over lateral corner of great toe nails mild peeling noted to left toe but rest of feet without peeling.   Skin:  As above.   Neuro: Sensory grossly WNL, gait normal.   Psych: Mentation appears normal and affect normal/bright and smiling    Laboratory Results:   Results for orders placed or performed in visit on 06/30/20   CBC with platelets differential     Status: Abnormal   Result Value Ref Range    WBC 6.5 4.0 - 11.0 10e9/L    RBC Count 3.66 (L) 3.8 - 5.2 10e12/L    Hemoglobin 12.5 11.7 - 15.7 g/dL    Hematocrit 37.8 35.0  - 47.0 %     (H) 78 - 100 fl    MCH 34.2 (H) 26.5 - 33.0 pg    MCHC 33.1 31.5 - 36.5 g/dL    RDW 16.5 (H) 10.0 - 15.0 %    Platelet Count 211 150 - 450 10e9/L    Diff Method Automated Method     % Neutrophils 49.1 %    % Lymphocytes 40.0 %    % Monocytes 8.1 %    % Eosinophils 1.7 %    % Basophils 0.9 %    % Immature Granulocytes 0.2 %    Absolute Neutrophil 3.2 1.6 - 8.3 10e9/L    Absolute Lymphocytes 2.6 0.8 - 5.3 10e9/L    Absolute Monocytes 0.5 0.0 - 1.3 10e9/L    Absolute Eosinophils 0.1 0.0 - 0.7 10e9/L    Absolute Basophils 0.1 0.0 - 0.2 10e9/L    Abs Immature Granulocytes 0.0 0 - 0.4 10e9/L   Comprehensive metabolic panel     Status: Abnormal   Result Value Ref Range    Sodium 142 133 - 144 mmol/L    Potassium 3.9 3.4 - 5.3 mmol/L    Chloride 110 (H) 94 - 109 mmol/L    Carbon Dioxide 31 20 - 32 mmol/L    Anion Gap 1 (L) 3 - 14 mmol/L    Glucose 103 (H) 70 - 99 mg/dL    Urea Nitrogen 18 7 - 30 mg/dL    Creatinine 0.70 0.52 - 1.04 mg/dL    GFR Estimate 88 >60 mL/min/[1.73_m2]    GFR Estimate If Black >90 >60 mL/min/[1.73_m2]    Calcium 8.4 (L) 8.5 - 10.1 mg/dL    Bilirubin Total 0.3 0.2 - 1.3 mg/dL    Albumin 3.5 3.4 - 5.0 g/dL    Protein Total 7.4 6.8 - 8.8 g/dL    Alkaline Phosphatase 89 40 - 150 U/L    ALT 20 0 - 50 U/L    AST 15 0 - 45 U/L     Assessment and Plan:   Stage IV Adenocarcinoma of the rectum - currently using Xeloda 1500mg bid x 14/21 day cycle. She is doing well now that previous toe infection is said to be resolved. She has some peeling of the feet and noted mild nausea but is using joe to help with this. Overall feels she is doing well and ready to continue with plan with restart on Friday 7/3/2020. Labs reviewed to be meeting goals.    Pt will return in 3 weeks with visit and labs prior to continuation of treatment with 6th cycle.   *zometa due again on 8/7/2020  Cellulitis of the left great toe- said to be resolved- no longer on antibiotics and has not pain.   Insomnia- Pt not  wanting to use ativan each night so discussed use of melatonin 3-5 mg 30 min prior to bed and may still use ativan every few days if not sleeping. Recommend daily exercise to help with slepp as well.   Based upon CDC guidance and to observe social distancing in the setting of the COVID-19 pandemic, the patient was seen virtually via phone visit.   Location of pt- home  Location of provider- office at clinic  Start of visit - 1:40pm  End of visit- 1: 51pm  Total Time 11 min visit  Felipa Garner CNp          Again, thank you for allowing me to participate in the care of your patient.        Sincerely,        Felipa Garner NP, APRN CNP

## 2020-06-30 NOTE — NURSING NOTE
"Alannah Wynn is a 69 year old female who is being evaluated via a billable telephone visit.      The patient has been notified of following:     \"This telephone visit will be conducted via a call between you and your physician/provider. We have found that certain health care needs can be provided without the need for a physical exam.  This service lets us provide the care you need with a short phone conversation.  If a prescription is necessary we can send it directly to your pharmacy.  If lab work is needed we can place an order for that and you can then stop by our lab to have the test done at a later time.    Telephone visits are billed at different rates depending on your insurance coverage. During this emergency period, for some insurers they may be billed the same as an in-person visit.  Please reach out to your insurance provider with any questions.    If during the course of the call the physician/provider feels a telephone visit is not appropriate, you will not be charged for this service.\"    Patient has given verbal consent for Telephone visit?  Yes    What phone number would you like to be contacted at? [phone]     How would you like to obtain your AVS? Blessing Bruno CMA      "

## 2020-06-30 NOTE — PROGRESS NOTES
Oral Chemotherapy Monitoring Program  Lab Follow Up    Patient currently on capecitabine therapy for mCRC.    Reviewed lab results from 6/30/20.    No concerning abnormalities.    Assessment & Plan:  Continue current therapy.     Questions answered to patient's satisfaction.    Follow-Up:  Imaging 7/20    Juan Blakely PharmD  June 30, 2020

## 2020-06-30 NOTE — PROGRESS NOTES
Tolerated port access and draw without complaint. Port site scrubbed with Chloraprep for 30 seconds and allowed to dry completely prior to dressing application. Accessed using sterile technique. [place] tubes drawn-Red gel/Green/Purple tubes. Double signed by patient and RN. See documentation flowsheet. Cuca Chang, RN, BSN, OCN

## 2020-07-20 ENCOUNTER — APPOINTMENT (OUTPATIENT)
Dept: GENERAL RADIOLOGY | Facility: CLINIC | Age: 70
End: 2020-07-20
Payer: COMMERCIAL

## 2020-07-20 ENCOUNTER — ANCILLARY PROCEDURE (OUTPATIENT)
Dept: CT IMAGING | Facility: CLINIC | Age: 70
End: 2020-07-20
Attending: NURSE PRACTITIONER
Payer: COMMERCIAL

## 2020-07-20 DIAGNOSIS — C20 METASTASIS FROM RECTAL CANCER (H): ICD-10-CM

## 2020-07-20 DIAGNOSIS — C79.9 METASTASIS FROM RECTAL CANCER (H): ICD-10-CM

## 2020-07-20 PROCEDURE — 71260 CT THORAX DX C+: CPT | Performed by: RADIOLOGY

## 2020-07-20 PROCEDURE — 74177 CT ABD & PELVIS W/CONTRAST: CPT | Performed by: RADIOLOGY

## 2020-07-20 RX ORDER — IOPAMIDOL 755 MG/ML
96 INJECTION, SOLUTION INTRAVASCULAR ONCE
Status: COMPLETED | OUTPATIENT
Start: 2020-07-20 | End: 2020-07-20

## 2020-07-20 RX ADMIN — IOPAMIDOL 96 ML: 755 INJECTION, SOLUTION INTRAVASCULAR at 11:03

## 2020-07-22 ENCOUNTER — VIRTUAL VISIT (OUTPATIENT)
Dept: ONCOLOGY | Facility: CLINIC | Age: 70
End: 2020-07-22
Attending: NURSE PRACTITIONER
Payer: COMMERCIAL

## 2020-07-22 VITALS
WEIGHT: 163.7 LBS | HEART RATE: 84 BPM | RESPIRATION RATE: 16 BRPM | TEMPERATURE: 97 F | BODY MASS INDEX: 29.94 KG/M2 | OXYGEN SATURATION: 100 % | SYSTOLIC BLOOD PRESSURE: 130 MMHG | DIASTOLIC BLOOD PRESSURE: 81 MMHG

## 2020-07-22 DIAGNOSIS — T45.1X5A CHEMOTHERAPY-INDUCED NEUTROPENIA (H): ICD-10-CM

## 2020-07-22 DIAGNOSIS — D70.1 CHEMOTHERAPY-INDUCED NEUTROPENIA (H): ICD-10-CM

## 2020-07-22 DIAGNOSIS — C20 METASTASIS FROM RECTAL CANCER (H): ICD-10-CM

## 2020-07-22 DIAGNOSIS — C79.51 BONE METASTASIS: ICD-10-CM

## 2020-07-22 DIAGNOSIS — C79.9 METASTASIS FROM RECTAL CANCER (H): ICD-10-CM

## 2020-07-22 DIAGNOSIS — T45.1X5A CHEMOTHERAPY-INDUCED NEUTROPENIA (H): Primary | ICD-10-CM

## 2020-07-22 DIAGNOSIS — D70.1 CHEMOTHERAPY-INDUCED NEUTROPENIA (H): Primary | ICD-10-CM

## 2020-07-22 DIAGNOSIS — C79.9 METASTASIS FROM RECTAL CANCER (H): Primary | ICD-10-CM

## 2020-07-22 DIAGNOSIS — C20 METASTASIS FROM RECTAL CANCER (H): Primary | ICD-10-CM

## 2020-07-22 DIAGNOSIS — R11.0 NAUSEA: ICD-10-CM

## 2020-07-22 LAB
ALBUMIN SERPL-MCNC: 3.6 G/DL (ref 3.4–5)
ALBUMIN UR-MCNC: NEGATIVE MG/DL
ALP SERPL-CCNC: 91 U/L (ref 40–150)
ALT SERPL W P-5'-P-CCNC: 23 U/L (ref 0–50)
ANION GAP SERPL CALCULATED.3IONS-SCNC: 2 MMOL/L (ref 3–14)
AST SERPL W P-5'-P-CCNC: 18 U/L (ref 0–45)
BASOPHILS # BLD AUTO: 0.1 10E9/L (ref 0–0.2)
BASOPHILS NFR BLD AUTO: 0.8 %
BILIRUB SERPL-MCNC: 0.3 MG/DL (ref 0.2–1.3)
BUN SERPL-MCNC: 13 MG/DL (ref 7–30)
CALCIUM SERPL-MCNC: 8.4 MG/DL (ref 8.5–10.1)
CEA SERPL-MCNC: 4 UG/L (ref 0–2.5)
CHLORIDE SERPL-SCNC: 111 MMOL/L (ref 94–109)
CO2 SERPL-SCNC: 29 MMOL/L (ref 20–32)
CREAT SERPL-MCNC: 0.65 MG/DL (ref 0.52–1.04)
DIFFERENTIAL METHOD BLD: ABNORMAL
EOSINOPHIL # BLD AUTO: 0.2 10E9/L (ref 0–0.7)
EOSINOPHIL NFR BLD AUTO: 2.3 %
ERYTHROCYTE [DISTWIDTH] IN BLOOD BY AUTOMATED COUNT: 17.1 % (ref 10–15)
GFR SERPL CREATININE-BSD FRML MDRD: >90 ML/MIN/{1.73_M2}
GLUCOSE SERPL-MCNC: 88 MG/DL (ref 70–99)
HCT VFR BLD AUTO: 38.8 % (ref 35–47)
HGB BLD-MCNC: 12.8 G/DL (ref 11.7–15.7)
IMM GRANULOCYTES # BLD: 0 10E9/L (ref 0–0.4)
IMM GRANULOCYTES NFR BLD: 0.2 %
LYMPHOCYTES # BLD AUTO: 2.7 10E9/L (ref 0.8–5.3)
LYMPHOCYTES NFR BLD AUTO: 42.3 %
MCH RBC QN AUTO: 33.6 PG (ref 26.5–33)
MCHC RBC AUTO-ENTMCNC: 33 G/DL (ref 31.5–36.5)
MCV RBC AUTO: 102 FL (ref 78–100)
MONOCYTES # BLD AUTO: 0.6 10E9/L (ref 0–1.3)
MONOCYTES NFR BLD AUTO: 8.9 %
NEUTROPHILS # BLD AUTO: 2.9 10E9/L (ref 1.6–8.3)
NEUTROPHILS NFR BLD AUTO: 45.5 %
PLATELET # BLD AUTO: 221 10E9/L (ref 150–450)
POTASSIUM SERPL-SCNC: 3.9 MMOL/L (ref 3.4–5.3)
PROT SERPL-MCNC: 7.3 G/DL (ref 6.8–8.8)
RBC # BLD AUTO: 3.81 10E12/L (ref 3.8–5.2)
SODIUM SERPL-SCNC: 142 MMOL/L (ref 133–144)
WBC # BLD AUTO: 6.4 10E9/L (ref 4–11)

## 2020-07-22 PROCEDURE — 82378 CARCINOEMBRYONIC ANTIGEN: CPT | Performed by: INTERNAL MEDICINE

## 2020-07-22 PROCEDURE — 99215 OFFICE O/P EST HI 40 MIN: CPT | Performed by: INTERNAL MEDICINE

## 2020-07-22 PROCEDURE — 80053 COMPREHEN METABOLIC PANEL: CPT | Performed by: INTERNAL MEDICINE

## 2020-07-22 PROCEDURE — 36415 COLL VENOUS BLD VENIPUNCTURE: CPT | Performed by: INTERNAL MEDICINE

## 2020-07-22 PROCEDURE — 81003 URINALYSIS AUTO W/O SCOPE: CPT | Performed by: INTERNAL MEDICINE

## 2020-07-22 PROCEDURE — 85025 COMPLETE CBC W/AUTO DIFF WBC: CPT | Performed by: INTERNAL MEDICINE

## 2020-07-22 RX ORDER — HEPARIN SODIUM,PORCINE 10 UNIT/ML
5 VIAL (ML) INTRAVENOUS
Status: CANCELLED | OUTPATIENT
Start: 2020-07-24

## 2020-07-22 RX ORDER — PROCHLORPERAZINE MALEATE 10 MG
10 TABLET ORAL EVERY 6 HOURS PRN
Qty: 30 TABLET | Refills: 2 | Status: CANCELLED | OUTPATIENT
Start: 2020-07-23

## 2020-07-22 RX ORDER — EPINEPHRINE 1 MG/ML
0.3 INJECTION, SOLUTION INTRAMUSCULAR; SUBCUTANEOUS EVERY 5 MIN PRN
Status: CANCELLED | OUTPATIENT
Start: 2020-07-24

## 2020-07-22 RX ORDER — CAPECITABINE 500 MG/1
1500 TABLET, FILM COATED ORAL 2 TIMES DAILY
Qty: 84 TABLET | Refills: 0 | Status: SHIPPED | OUTPATIENT
Start: 2020-07-22 | End: 2020-01-01

## 2020-07-22 RX ORDER — LOPERAMIDE HCL 2 MG
CAPSULE ORAL
Qty: 30 CAPSULE | Refills: 0 | Status: SHIPPED | OUTPATIENT
Start: 2020-07-22 | End: 2021-01-01

## 2020-07-22 RX ORDER — MEPERIDINE HYDROCHLORIDE 25 MG/ML
25 INJECTION INTRAMUSCULAR; INTRAVENOUS; SUBCUTANEOUS EVERY 30 MIN PRN
Status: CANCELLED | OUTPATIENT
Start: 2020-07-24

## 2020-07-22 RX ORDER — PROCHLORPERAZINE MALEATE 10 MG
10 TABLET ORAL EVERY 6 HOURS PRN
Qty: 30 TABLET | Refills: 2 | Status: SHIPPED | OUTPATIENT
Start: 2020-07-22 | End: 2020-01-01

## 2020-07-22 RX ORDER — NALOXONE HYDROCHLORIDE 0.4 MG/ML
.1-.4 INJECTION, SOLUTION INTRAMUSCULAR; INTRAVENOUS; SUBCUTANEOUS
Status: CANCELLED | OUTPATIENT
Start: 2020-07-24

## 2020-07-22 RX ORDER — ONDANSETRON 8 MG/1
8 TABLET, FILM COATED ORAL EVERY 8 HOURS PRN
Qty: 30 TABLET | Refills: 2 | Status: SHIPPED | OUTPATIENT
Start: 2020-07-22 | End: 2020-01-01

## 2020-07-22 RX ORDER — METHYLPREDNISOLONE SODIUM SUCCINATE 125 MG/2ML
125 INJECTION, POWDER, LYOPHILIZED, FOR SOLUTION INTRAMUSCULAR; INTRAVENOUS
Status: CANCELLED
Start: 2020-07-24

## 2020-07-22 RX ORDER — HEPARIN SODIUM (PORCINE) LOCK FLUSH IV SOLN 100 UNIT/ML 100 UNIT/ML
5 SOLUTION INTRAVENOUS
Status: CANCELLED | OUTPATIENT
Start: 2020-07-24

## 2020-07-22 RX ORDER — LORAZEPAM 0.5 MG/1
0.5 TABLET ORAL EVERY 4 HOURS PRN
Qty: 30 TABLET | Refills: 2 | Status: CANCELLED | OUTPATIENT
Start: 2020-07-23

## 2020-07-22 RX ORDER — LORAZEPAM 2 MG/ML
0.5 INJECTION INTRAMUSCULAR EVERY 4 HOURS PRN
Status: CANCELLED
Start: 2020-07-24

## 2020-07-22 RX ORDER — DIPHENHYDRAMINE HYDROCHLORIDE 50 MG/ML
50 INJECTION INTRAMUSCULAR; INTRAVENOUS
Status: CANCELLED
Start: 2020-07-24

## 2020-07-22 RX ORDER — LIDOCAINE/PRILOCAINE 2.5 %-2.5%
CREAM (GRAM) TOPICAL PRN
Qty: 30 G | Refills: 3 | Status: SHIPPED | OUTPATIENT
Start: 2020-07-22

## 2020-07-22 RX ORDER — LORAZEPAM 0.5 MG/1
0.5 TABLET ORAL EVERY 6 HOURS PRN
Qty: 30 TABLET | Refills: 2 | Status: SHIPPED | OUTPATIENT
Start: 2020-07-22 | End: 2020-01-01

## 2020-07-22 RX ORDER — LORAZEPAM 0.5 MG/1
0.5 TABLET ORAL EVERY 4 HOURS PRN
Qty: 30 TABLET | Refills: 2 | Status: SHIPPED | OUTPATIENT
Start: 2020-07-22 | End: 2020-01-01

## 2020-07-22 RX ORDER — ALBUTEROL SULFATE 0.83 MG/ML
2.5 SOLUTION RESPIRATORY (INHALATION)
Status: CANCELLED | OUTPATIENT
Start: 2020-07-24

## 2020-07-22 RX ORDER — ALBUTEROL SULFATE 90 UG/1
1-2 AEROSOL, METERED RESPIRATORY (INHALATION)
Status: CANCELLED
Start: 2020-07-24

## 2020-07-22 RX ORDER — ONDANSETRON 8 MG/1
8 TABLET, FILM COATED ORAL EVERY 8 HOURS PRN
Qty: 30 TABLET | Refills: 2 | Status: CANCELLED | OUTPATIENT
Start: 2020-07-23

## 2020-07-22 RX ORDER — LOPERAMIDE HCL 2 MG
CAPSULE ORAL
Qty: 30 CAPSULE | Refills: 0 | Status: CANCELLED | OUTPATIENT
Start: 2020-07-23

## 2020-07-22 RX ORDER — SODIUM CHLORIDE 9 MG/ML
1000 INJECTION, SOLUTION INTRAVENOUS CONTINUOUS PRN
Status: CANCELLED
Start: 2020-07-24

## 2020-07-22 ASSESSMENT — PAIN SCALES - GENERAL: PAINLEVEL: NO PAIN (0)

## 2020-07-22 NOTE — PROGRESS NOTES
"ONCOLOGY FOLLOW UP NOTE    7/22/2020     Patient was being followed by Dr. Tavarez and saw her on 11/13/2019.  Now she is transferring her care to me.  I have reviewed her previous records and have copied and updated from prior notes.      DIAGNOSIS:    10/2019 dx adenoCa of rectum,  MMR intact- NGS Negative for KRAS, NRAS and BRAF. PD L1 25%.  Baseline CEA 10.  Stage IV disease with pulmonary and bony mets      HISTORY OF ONCOLOGY ILLNESS:    She has had intermittent hematochezia and anorectal irritation since 2011, that have been managed as \"hemorrhoids\". These symptoms became persistent and progressive since early thi year. Her anorectal pain is managed with Tylenol. Denies fevers, chills, or unintentional weight loss. Does have intermittent urinary and fecal urgency but no incontinence per se.    Diagnostic colonoscopy on 10/18/19 for rectal bleeding showed \"fungating and ulcerated non-obstructing mass was found in the distal rectum <1 cm from the anal verge. The mass was partially circumferential (involving one-half of the lumen circumference). The mass measured four cm in length. In addition, its diameter measured six mm.  Pathology showed invasive moderately differentiated adenocarcinoma with intact mismatch repair protein expression, PD L1 25% and NGS panel showed NO MUTATIONS in KRAS, NRAS and BRAF.      Pelvic MRI 11/2019 found fungating polypoid mid/lower rectal mass with luminal narrowing as well as innumerable enlarged lymph nodes as described above with a conglomerate of suspicious lymph nodes abutting the anterior peritoneal reflection. Stage cT3d N2.     PET 11/2019 found   1. Hypermetabolic mass in the distal rectum, SUV 14 corresponding to the patient's known rectal cancer.  2. Multiple hypermetabolic pelvic lymph nodes, compatible metastatic disease, SUV 5-6.  3. FDG avid pulmonary nodule measuring 7 mm in the left lower lobe, concerning for metastatic disease. Additional FDG avid pulmonary nodules " suspicious for metastatic disease, SUV around 4.  4. Osteoblastic metastatic lesion in the L3 vertebral body, SUV 6.8    She started palliative FOLFOX/Avastin on 11/25/2019.  Cycle #2 given on 12/9/2019.  Cycle #3 scheduled for 12/24/2019.  C#6 on 2/10/2020.    Repeat CT CAP on 2/21/2020 shows good response to therapy with improvement in the rectal mass, mesorectal and superior rectal nodularity as well as resolution of right internal iliac lymphadenopathy.  There is slight improvement in the dominant posterior left lower lobe lung nodule and several of the lung nodules are is stable.  There is increase in size of the sclerotic lesion of the left aspect of L3 vertebral body without pathological fracture and this is likely due to treatment effect.    CEA on 2/10/2020 was down to 3.3.    Cycle #7 FOLFOX/Avastin 2/25/2020.  C#8 FOLFOX/Avastin 3/9/2020  Cycle#9 5-FU/Avastin 3/24/2020-stopped oxaliplatin due to cumulative fatigue and issues with nausea.    I then discussed with her, her  as well as her son Gamal about possibly switching to single agent Xeloda going forward so as to limit her visits to the clinic and therefore exposure and risk from coronavirus.  We discussed the pros and cons of this approach and they were all willing to try this.     She started Single agent Xeloda on 4/7/2020.  Cycle #3 started on 5/19/2020.    Because of bone metastatic disease, she was started on Zometa on 11/25/2019. We are giving it every 3 months.  Last received 5/15/2020      CT chest abdomen and pelvis on 7/20/2020 showed mild progression in the lung nodules.  Perirectal nodularity has mildly improved.    Last CEA on 6/30/2020 was 2.8.    INTERVAL HISTORY:   She comes in today accompanied by her son Thomas.  Overall she is doing well and tolerating single agent Xeloda very well.  She denies any significant nausea vomiting diarrhea constipation.  No issues with bleeding.  Overall energy is fair.  She has darkening of the  skin of the hands and feet.  The toe infection has resolved.  She has mild peeling of the skin of the heels.  She has numbness of the fingertips and toes from prior oxaliplatin and she does not believe it has significantly changed.  No new swellings.  No infections.  No shortness of breath.     ECOG 1    ROS:  A comprehensive ROS was otherwise neg        I reviewed with her history in epic as below.    PAST MEDICAL HISTORY  Reflux, hemorrhoid, hepatitis B?  Overactive bladder  Active Ambulatory Problems     Diagnosis Date Noted     External hemorrhoids 04/29/2016     Non morbid obesity due to excess calories 04/29/2016     Hepatitis B immune 04/29/2016     Screening for cervical cancer 04/29/2016     CARDIOVASCULAR SCREENING; LDL GOAL LESS THAN 160 04/29/2016     Gastroesophageal reflux disease 04/29/2016     Overactive bladder 07/21/2017     Nevus of left lower leg- undetermined behavior 12/27/2018     Metastasis from rectal cancer (H) 11/13/2019     Bone metastasis (H) 11/13/2019     Nausea 11/27/2019     Chemotherapy-induced neutropenia (H) 12/24/2019     Resolved Ambulatory Problems     Diagnosis Date Noted     Urgency of urination 04/29/2016     Need for vaccination against Streptococcus pneumoniae 04/29/2016     Morbid obesity (H) 07/21/2017     Obesity (BMI 35.0-39.9) with comorbidity (H) 12/27/2018     No Additional Past Medical History       MEDICATIONS:  Current Outpatient Medications   Medication     Ascorbic Acid (VITAMIN C) 500 MG CAPS     B Complex Vitamins (B COMPLEX-B12 PO)     cholecalciferol (VITAMIN D3) 400 unit (10 mcg) TABS tablet     docusate sodium (COLACE) 100 MG capsule     Fesoterodine Fumarate 8 MG TB24     lidocaine-prilocaine (EMLA) 2.5-2.5 % external cream     LORazepam (ATIVAN) 0.5 MG tablet     Lutein 20 MG CAPS     mirabegron (MYRBETRIQ) 50 MG 24 hr tablet     Multiple vitamin TABS     naloxone (NARCAN) 4 MG/0.1ML nasal spray     omeprazole (PRILOSEC) 20 MG DR capsule     solifenacin  (VESICARE) 10 MG tablet     traMADol (ULTRAM) 50 MG tablet     capecitabine (XELODA) 500 MG tablet     capecitabine (XELODA) 500 MG tablet     capecitabine (XELODA) 500 MG tablet     capecitabine (XELODA) 500 MG tablet     capecitabine (XELODA) 500 MG tablet     OLANZapine (ZYPREXA) 2.5 MG tablet     Current Facility-Administered Medications   Medication     lidocaine (XYLOCAINE) 5 % ointment     ALLERGY:     Allergies   Allergen Reactions     Penicillins      Sulfa Drugs             SOCIAL HISTORY:  She is a [place] immigrants to [place].  Denies smoking denies alcohol abuse.  2 of her sons are doctors.    FAMILY HISTORY:   Denies any family history of cancer.       PHYSICAL EXAMINATION:     /81 (BP Location: Left arm)   Pulse 84   Temp 97  F (36.1  C) (Oral)   Resp 16   Wt 74.3 kg (163 lb 11.2 oz)   LMP 11/27/2001 (Approximate)   SpO2 100%   BMI 29.94 kg/m       Wt Readings from Last 4 Encounters:   07/22/20 74.3 kg (163 lb 11.2 oz)   06/16/20 70.8 kg (156 lb 1.6 oz)   05/27/20 70.6 kg (155 lb 11.2 oz)   05/20/20 70.8 kg (156 lb)     CONSTITUTIONAL: No apparent distress  EYES: PERRLA, without pallor or jaundice  ENT/MOUTH: Ears unremarkable. No oral lesions  CVS: s1s2 normal  RESPIRATORY: Chest is clear  GI: Abdomen is benign  NEURO: Alert and oriented ×3  INTEGUMENT: no concerning skin rashes   LYMPHATIC: no palpable lymphadenopathy  MUSCULOSKELETAL: Unremarkable. No bony tenderness.   EXTREMITIES: no pedal edema.  She has hyperpigmentation of the soles and palms.  There is mild peeling of the skin of the heels but overall it looks good.  PSYCH: Mentation, mood and affect are appropriate        LABS/IMAGING/PATHOLOGY:    Reviewed    Results for MARY SCHULER (MRN [id number]) as of 7/22/2020 09:50   Ref. Range 11/4/2019 11:49 11/18/2019 15:35 12/24/2019 07:40 12/30/2019 10:00 2/10/2020 08:15 5/15/2020 11:15 6/30/2020 10:25   CEA Latest Ref Range: 0 - 2.5 ug/L 10.0 (H)  8.8 (H) 7.6 (H) 3.3 (H) 2.8  (H) 2.8 (H)       CT CHEST/ABDOMEN/PELVIS W CONTRAST 7/20/2020 11:08 AM     CLINICAL HISTORY: metastatic rectal cancer - currently on Xeloda.;  Metastasis from rectal cancer (H); Metastasis from rectal cancer (H)     TECHNIQUE: CT scan of the chest, abdomen, and pelvis was performed  following injection of IV contrast. Multiplanar reformats were  obtained. Dose reduction techniques were used.   CONTRAST: 96 ml isovue 370     COMPARISON: CT chest abdomen pelvis dated 2/21/2020 and prior CT  exams.     FINDINGS:   Chest:  LUNGS AND PLEURA:  Interval increase in left lower lobe nodule on image 124 series 6  measuring 10 mm, previously 5 mm. Interval increase in the right lower  lobe nodule measuring 5 mm on image 87, previously 3 mm. 3 mm nodule  in the right middle lobe on image 87 is also increased. 3 mm nodule  along the right major fissure on image 123 previously measured 1 mm.  New 2 mm nodule in right lower lobe on image 89 series 6. 3 mm left  upper lobe nodule on image 87, previously 1 mm. Newly appreciated 3 mm  left lower lobe nodule on image 67 newly appreciated 1.5 cm  pleural-based nodularity in the left lower lobe on image 106. Stable 4  mm nodule on image 47 series 6 in left upper lobe. Stable 2 mm left  upper lobe nodule image 60 series 6. Stable 3 mm right middle lobe  nodule on image 85 series 6.     MEDIASTINUM/AXILLAE: No axillary, mediastinal or hilar  lymphadenopathy.     There is a small sliding-type hiatal hernia.     Heart size is normal without significant pericardial effusion. The  thoracic aorta is normal in caliber.     Abdomen and pelvis:  Persistent lower rectal thickening, in keeping with patient's known  tumor. Exact interval change is difficult to evaluate due to inherent  poor tissue contrast resolution or CT exam. Perirectal 10 mm nodule on  image 516 and series 3 demonstrates interval improvement since prior  exam when it measured 16 mm. 1.4 cm lymph node in the mesorectum on  image  506 is stable since prior exam. Stable 1.5 cm lymph node along  the superior hemorrhoidal vasculature on image 490.     HEPATOBILIARY: No focal hepatic lesion. Cholecystectomy. No biliary  ductal dilatation.     PANCREAS: Normal.     SPLEEN: Normal.     ADRENAL GLANDS: Normal.     KIDNEYS/BLADDER: Normal.     BOWEL: Small and large bowel loops are normal in caliber without  obstruction. Scattered pancolonic diverticula without acute  diverticulitis. The appendix is normal in appearance.     PELVIC ORGANS: Uterus and adnexa unremarkable.     ADDITIONAL FINDINGS: Abdominal aorta normal in caliber with mild mixed  atheromatous plaque. Major portal veins are patent. No free fluid or  pneumoperitoneum. No new abdominal or pelvic lymphadenopathy.  Resolution of right internal iliac adenopathy.     MUSCULOSKELETAL: Stable size of sclerotic lesion at the left aspect of  L3 vertebral body without pathologic fracture. Stable sclerotic focus  in left pubis on image 546                                                                      IMPRESSION: In this patient with history of metastatic rectal cancer,  there is suggestion of progression:  1.  Although that is stable appearance of rectal mass and minimal  improvement in perirectal nodularity/lymph nodes, there is significant  increase in size and number of bilateral pulmonary nodules.  2.  Stable size of presacral hypermetabolic L3 sclerotic lesion, may  be due to treatment effect.          ASSESSMENT AND PLAN:      Metastatic rectal adenocarcinoma, MSI intact, K-aimee wild type, PDL 1 + 25% with metastasis to the lungs, bones and pelvic lymph nodes.      She started palliative chemotherapy with FOLFOX/Avastin on 11/25/2019.      She has received 6 cycles of chemotherapy.  With cycle #3, we stopped 5-FU bolus because of excessive tiredness.    Repeat CT CAP on 2/21/2020 shows good response to therapy with improvement in the rectal mass, mesorectal and superior rectal  nodularity as well as resolution of right internal iliac lymphadenopathy.  There is slight improvement in the dominant posterior left lower lobe lung nodule and several of the lung nodules are is stable.  There is increase in size of the sclerotic lesion of the left aspect of L3 vertebral body without pathological fracture and this is likely due to treatment effect.    CEA on 2/10/2020 was down to 3.3.    With cycle #9 we stopped oxaliplatin because of cumulative fatigue and increasing nausea and gave her 5-FU/Avastin.      I then discussed with her, her  as well as her son Gamal about possibly switching to single agent Xeloda going forward so as to limit her visits to the clinic and therefore exposure and risk from coronavirus.  We discussed the pros and cons of this approach and they were all willing to try this.      She started single agent Xeloda on 4/7/2020.    CT chest abdomen and pelvis on 7/20/2020 showed mild progression in the lung nodules.  Perirectal nodularity has mildly improved.    CEA on 6/30/2020 was 2.8.  Now on 7/22/2020 it is 4    Overall she is doing well and remains pretty much asymptomatic with single agent Xeloda.      We discussed the situation in detail.  I believe that because of progression in the lung nodules, we need to add back oxaliplatin/Avastin.  After thorough discussion about the pros and cons of this approach, she is willing to try this.  Because of her existing neuropathy, I will decrease the dose of oxaliplatin to 100 mg per metered square.    We will try to get this arranged soon and she will start next cycle including Xeloda the same day.  We will check labs today.  I would like to repeat CT scan in a couple of months.    Hand-foot syndrome.  She has mild hand-foot syndrome.  I recommend that she keeps her hands and feet well moisturized all the time.    Neuropathy.  As mentioned above, we will decrease the dose of oxaliplatin to 100 mg per metered square.  We also  discussed that acupuncture can help with neuropathy.  We will have to keep a close watch now that we are adding back oxaliplatin.    Bone metastasis.  Started Zometa on 11/25/2019.  She is getting it every 3 months and last received on 5/15/2020.  She will continue calcium and vitamin D.  Overall bone metastasis looks a stable.        We did not address the following today.      Left big toe cellulitis.  Resolved.    Nausea. Well controlled. She is using zofran in morning, compazine in afternoon and ativan at night for 1 week. Cont that. She is NOT using Zyprexa 2.5 mg at night.     Discussion regarding health care directive  We discussed that it is important that she completes health care directive which would help in making sure that her wishes are followed about her treatment care in case she is not able to make a decision for herself.  We gave her information regarding that.    RTC 3 weeks after starting chemotherapy.    I answered all of her and Thomas's questions to their satisfaction.  They are agreeable and comfortable with the plan.    Charline Velazquez MD    I spent 41 minutes face-to-face with the patient and almost all of the time was spent in counseling and coordination of care.

## 2020-07-22 NOTE — PATIENT INSTRUCTIONS
Labs today    Schedule Oxaliplatin/avastin asap and then every 3 weeks- start Xeloda the same day you get infusion    See me in 3 weeks after start of chemo

## 2020-07-22 NOTE — NURSING NOTE
"Oncology Rooming Note    July 22, 2020 8:44 AM   Alannah Wynn is a 69 year old female who presents for:    Chief Complaint   Patient presents with     Oncology Clinic Visit     Follow up     Initial Vitals: /81 (BP Location: Left arm)   Pulse 84   Temp 97  F (36.1  C) (Oral)   Resp 16   Wt 74.3 kg (163 lb 11.2 oz)   LMP 11/27/2001 (Approximate)   SpO2 100%   BMI 29.94 kg/m   Estimated body mass index is 29.94 kg/m  as calculated from the following:    Height as of 5/20/20: 1.575 m (5' 2\").    Weight as of this encounter: 74.3 kg (163 lb 11.2 oz). Body surface area is 1.8 meters squared.  No Pain (0) Comment: Data Unavailable   Patient's last menstrual period was 11/27/2001 (approximate).  Allergies reviewed: Yes  Medications reviewed: Yes    Medications: MEDICATION REFILLS NEEDED TODAY. Provider was notified.  Pharmacy name entered into [facility]:    [id number] - [place], [place] - [address] AT [address] [facility] - [place], [place] - [address], [id number]  [facility] - [place], [place] - [address]              "

## 2020-07-22 NOTE — LETTER
"    7/22/2020         RE: Alannah Wynn  [address]  [place] [address]        Dear Colleague,    Thank you for referring your patient, Alannah Wynn, to the [facility]. Please see a copy of my visit note below.    ONCOLOGY FOLLOW UP NOTE    7/22/2020     Patient was being followed by Dr. Tavarez and saw her on 11/13/2019.  Now she is transferring her care to me.  I have reviewed her previous records and have copied and updated from prior notes.      DIAGNOSIS:    10/2019 dx adenoCa of rectum,  MMR intact- NGS Negative for KRAS, NRAS and BRAF. PD L1 25%.  Baseline CEA 10.  Stage IV disease with pulmonary and bony mets      HISTORY OF ONCOLOGY ILLNESS:    She has had intermittent hematochezia and anorectal irritation since 2011, that have been managed as \"hemorrhoids\". These symptoms became persistent and progressive since early thi year. Her anorectal pain is managed with Tylenol. Denies fevers, chills, or unintentional weight loss. Does have intermittent urinary and fecal urgency but no incontinence per se.    Diagnostic colonoscopy on 10/18/19 for rectal bleeding showed \"fungating and ulcerated non-obstructing mass was found in the distal rectum <1 cm from the anal verge. The mass was partially circumferential (involving one-half of the lumen circumference). The mass measured four cm in length. In addition, its diameter measured six mm.  Pathology showed invasive moderately differentiated adenocarcinoma with intact mismatch repair protein expression, PD L1 25% and NGS panel showed NO MUTATIONS in KRAS, NRAS and BRAF.      Pelvic MRI 11/2019 found fungating polypoid mid/lower rectal mass with luminal narrowing as well as innumerable enlarged lymph nodes as described above with a conglomerate of suspicious lymph nodes abutting the anterior peritoneal reflection. Stage cT3d N2.     PET 11/2019 found   1. Hypermetabolic mass in the distal rectum, SUV 14 corresponding to the patient's " known rectal cancer.  2. Multiple hypermetabolic pelvic lymph nodes, compatible metastatic disease, SUV 5-6.  3. FDG avid pulmonary nodule measuring 7 mm in the left lower lobe, concerning for metastatic disease. Additional FDG avid pulmonary nodules suspicious for metastatic disease, SUV around 4.  4. Osteoblastic metastatic lesion in the L3 vertebral body, SUV 6.8    She started palliative FOLFOX/Avastin on 11/25/2019.  Cycle #2 given on 12/9/2019.  Cycle #3 scheduled for 12/24/2019.  C#6 on 2/10/2020.    Repeat CT CAP on 2/21/2020 shows good response to therapy with improvement in the rectal mass, mesorectal and superior rectal nodularity as well as resolution of right internal iliac lymphadenopathy.  There is slight improvement in the dominant posterior left lower lobe lung nodule and several of the lung nodules are is stable.  There is increase in size of the sclerotic lesion of the left aspect of L3 vertebral body without pathological fracture and this is likely due to treatment effect.    CEA on 2/10/2020 was down to 3.3.    Cycle #7 FOLFOX/Avastin 2/25/2020.  C#8 FOLFOX/Avastin 3/9/2020  Cycle#9 5-FU/Avastin 3/24/2020-stopped oxaliplatin due to cumulative fatigue and issues with nausea.    I then discussed with her, her  as well as her son Gamal about possibly switching to single agent Xeloda going forward so as to limit her visits to the clinic and therefore exposure and risk from coronavirus.  We discussed the pros and cons of this approach and they were all willing to try this.     She started Single agent Xeloda on 4/7/2020.  Cycle #3 started on 5/19/2020.    Because of bone metastatic disease, she was started on Zometa on 11/25/2019. We are giving it every 3 months.  Last received 5/15/2020      CT chest abdomen and pelvis on 7/20/2020 showed mild progression in the lung nodules.  Perirectal nodularity has mildly improved.    Last CEA on 6/30/2020 was 2.8.    INTERVAL HISTORY:   She comes in  today accompanied by her son Thomas.  Overall she is doing well and tolerating single agent Xeloda very well.  She denies any significant nausea vomiting diarrhea constipation.  No issues with bleeding.  Overall energy is fair.  She has darkening of the skin of the hands and feet.  The toe infection has resolved.  She has mild peeling of the skin of the heels.  She has numbness of the fingertips and toes from prior oxaliplatin and she does not believe it has significantly changed.  No new swellings.  No infections.  No shortness of breath.     ECOG 1    ROS:  A comprehensive ROS was otherwise neg        I reviewed with her history in epic as below.    PAST MEDICAL HISTORY  Reflux, hemorrhoid, hepatitis B?  Overactive bladder  Active Ambulatory Problems     Diagnosis Date Noted     External hemorrhoids 04/29/2016     Non morbid obesity due to excess calories 04/29/2016     Hepatitis B immune 04/29/2016     Screening for cervical cancer 04/29/2016     CARDIOVASCULAR SCREENING; LDL GOAL LESS THAN 160 04/29/2016     Gastroesophageal reflux disease 04/29/2016     Overactive bladder 07/21/2017     Nevus of left lower leg- undetermined behavior 12/27/2018     Metastasis from rectal cancer (H) 11/13/2019     Bone metastasis (H) 11/13/2019     Nausea 11/27/2019     Chemotherapy-induced neutropenia (H) 12/24/2019     Resolved Ambulatory Problems     Diagnosis Date Noted     Urgency of urination 04/29/2016     Need for vaccination against Streptococcus pneumoniae 04/29/2016     Morbid obesity (H) 07/21/2017     Obesity (BMI 35.0-39.9) with comorbidity (H) 12/27/2018     No Additional Past Medical History       MEDICATIONS:  Current Outpatient Medications   Medication     Ascorbic Acid (VITAMIN C) 500 MG CAPS     B Complex Vitamins (B COMPLEX-B12 PO)     cholecalciferol (VITAMIN D3) 400 unit (10 mcg) TABS tablet     docusate sodium (COLACE) 100 MG capsule     Fesoterodine Fumarate 8 MG TB24     lidocaine-prilocaine (EMLA) 2.5-2.5  % external cream     LORazepam (ATIVAN) 0.5 MG tablet     Lutein 20 MG CAPS     mirabegron (MYRBETRIQ) 50 MG 24 hr tablet     Multiple vitamin TABS     naloxone (NARCAN) 4 MG/0.1ML nasal spray     omeprazole (PRILOSEC) 20 MG DR capsule     solifenacin (VESICARE) 10 MG tablet     traMADol (ULTRAM) 50 MG tablet     capecitabine (XELODA) 500 MG tablet     capecitabine (XELODA) 500 MG tablet     capecitabine (XELODA) 500 MG tablet     capecitabine (XELODA) 500 MG tablet     capecitabine (XELODA) 500 MG tablet     OLANZapine (ZYPREXA) 2.5 MG tablet     Current Facility-Administered Medications   Medication     lidocaine (XYLOCAINE) 5 % ointment     ALLERGY:     Allergies   Allergen Reactions     Penicillins      Sulfa Drugs             SOCIAL HISTORY:  She is a [place] immigrants to [place].  Denies smoking denies alcohol abuse.  2 of her sons are doctors.    FAMILY HISTORY:   Denies any family history of cancer.       PHYSICAL EXAMINATION:     /81 (BP Location: Left arm)   Pulse 84   Temp 97  F (36.1  C) (Oral)   Resp 16   Wt 74.3 kg (163 lb 11.2 oz)   LMP 11/27/2001 (Approximate)   SpO2 100%   BMI 29.94 kg/m       Wt Readings from Last 4 Encounters:   07/22/20 74.3 kg (163 lb 11.2 oz)   06/16/20 70.8 kg (156 lb 1.6 oz)   05/27/20 70.6 kg (155 lb 11.2 oz)   05/20/20 70.8 kg (156 lb)     CONSTITUTIONAL: No apparent distress  EYES: PERRLA, without pallor or jaundice  ENT/MOUTH: Ears unremarkable. No oral lesions  CVS: s1s2 normal  RESPIRATORY: Chest is clear  GI: Abdomen is benign  NEURO: Alert and oriented ×3  INTEGUMENT: no concerning skin rashes   LYMPHATIC: no palpable lymphadenopathy  MUSCULOSKELETAL: Unremarkable. No bony tenderness.   EXTREMITIES: no pedal edema.  She has hyperpigmentation of the soles and palms.  There is mild peeling of the skin of the heels but overall it looks good.  PSYCH: Mentation, mood and affect are appropriate        LABS/IMAGING/PATHOLOGY:    Reviewed    Results for  MARY SCHULER (MRN [id number]) as of 7/22/2020 09:50   Ref. Range 11/4/2019 11:49 11/18/2019 15:35 12/24/2019 07:40 12/30/2019 10:00 2/10/2020 08:15 5/15/2020 11:15 6/30/2020 10:25   CEA Latest Ref Range: 0 - 2.5 ug/L 10.0 (H)  8.8 (H) 7.6 (H) 3.3 (H) 2.8 (H) 2.8 (H)       CT CHEST/ABDOMEN/PELVIS W CONTRAST 7/20/2020 11:08 AM     CLINICAL HISTORY: metastatic rectal cancer - currently on Xeloda.;  Metastasis from rectal cancer (H); Metastasis from rectal cancer (H)     TECHNIQUE: CT scan of the chest, abdomen, and pelvis was performed  following injection of IV contrast. Multiplanar reformats were  obtained. Dose reduction techniques were used.   CONTRAST: 96 ml isovue 370     COMPARISON: CT chest abdomen pelvis dated 2/21/2020 and prior CT  exams.     FINDINGS:   Chest:  LUNGS AND PLEURA:  Interval increase in left lower lobe nodule on image 124 series 6  measuring 10 mm, previously 5 mm. Interval increase in the right lower  lobe nodule measuring 5 mm on image 87, previously 3 mm. 3 mm nodule  in the right middle lobe on image 87 is also increased. 3 mm nodule  along the right major fissure on image 123 previously measured 1 mm.  New 2 mm nodule in right lower lobe on image 89 series 6. 3 mm left  upper lobe nodule on image 87, previously 1 mm. Newly appreciated 3 mm  left lower lobe nodule on image 67 newly appreciated 1.5 cm  pleural-based nodularity in the left lower lobe on image 106. Stable 4  mm nodule on image 47 series 6 in left upper lobe. Stable 2 mm left  upper lobe nodule image 60 series 6. Stable 3 mm right middle lobe  nodule on image 85 series 6.     MEDIASTINUM/AXILLAE: No axillary, mediastinal or hilar  lymphadenopathy.     There is a small sliding-type hiatal hernia.     Heart size is normal without significant pericardial effusion. The  thoracic aorta is normal in caliber.     Abdomen and pelvis:  Persistent lower rectal thickening, in keeping with patient's known  tumor. Exact interval change  is difficult to evaluate due to inherent  poor tissue contrast resolution or CT exam. Perirectal 10 mm nodule on  image 516 and series 3 demonstrates interval improvement since prior  exam when it measured 16 mm. 1.4 cm lymph node in the mesorectum on  image 506 is stable since prior exam. Stable 1.5 cm lymph node along  the superior hemorrhoidal vasculature on image 490.     HEPATOBILIARY: No focal hepatic lesion. Cholecystectomy. No biliary  ductal dilatation.     PANCREAS: Normal.     SPLEEN: Normal.     ADRENAL GLANDS: Normal.     KIDNEYS/BLADDER: Normal.     BOWEL: Small and large bowel loops are normal in caliber without  obstruction. Scattered pancolonic diverticula without acute  diverticulitis. The appendix is normal in appearance.     PELVIC ORGANS: Uterus and adnexa unremarkable.     ADDITIONAL FINDINGS: Abdominal aorta normal in caliber with mild mixed  atheromatous plaque. Major portal veins are patent. No free fluid or  pneumoperitoneum. No new abdominal or pelvic lymphadenopathy.  Resolution of right internal iliac adenopathy.     MUSCULOSKELETAL: Stable size of sclerotic lesion at the left aspect of  L3 vertebral body without pathologic fracture. Stable sclerotic focus  in left pubis on image 546                                                                      IMPRESSION: In this patient with history of metastatic rectal cancer,  there is suggestion of progression:  1.  Although that is stable appearance of rectal mass and minimal  improvement in perirectal nodularity/lymph nodes, there is significant  increase in size and number of bilateral pulmonary nodules.  2.  Stable size of presacral hypermetabolic L3 sclerotic lesion, may  be due to treatment effect.          ASSESSMENT AND PLAN:      Metastatic rectal adenocarcinoma, MSI intact, K-aimee wild type, PDL 1 + 25% with metastasis to the lungs, bones and pelvic lymph nodes.      She started palliative chemotherapy with FOLFOX/Avastin on  11/25/2019.      She has received 6 cycles of chemotherapy.  With cycle #3, we stopped 5-FU bolus because of excessive tiredness.    Repeat CT CAP on 2/21/2020 shows good response to therapy with improvement in the rectal mass, mesorectal and superior rectal nodularity as well as resolution of right internal iliac lymphadenopathy.  There is slight improvement in the dominant posterior left lower lobe lung nodule and several of the lung nodules are is stable.  There is increase in size of the sclerotic lesion of the left aspect of L3 vertebral body without pathological fracture and this is likely due to treatment effect.    CEA on 2/10/2020 was down to 3.3.    With cycle #9 we stopped oxaliplatin because of cumulative fatigue and increasing nausea and gave her 5-FU/Avastin.      I then discussed with her, her  as well as her son Gamal about possibly switching to single agent Xeloda going forward so as to limit her visits to the clinic and therefore exposure and risk from coronavirus.  We discussed the pros and cons of this approach and they were all willing to try this.      She started single agent Xeloda on 4/7/2020.    CT chest abdomen and pelvis on 7/20/2020 showed mild progression in the lung nodules.  Perirectal nodularity has mildly improved.    CEA on 6/30/2020 was 2.8.  Now on 7/22/2020 it is 4    Overall she is doing well and remains pretty much asymptomatic with single agent Xeloda.      We discussed the situation in detail.  I believe that because of progression in the lung nodules, we need to add back oxaliplatin/Avastin.  After thorough discussion about the pros and cons of this approach, she is willing to try this.  Because of her existing neuropathy, I will decrease the dose of oxaliplatin to 100 mg per metered square.    We will try to get this arranged soon and she will start next cycle including Xeloda the same day.  We will check labs today.  I would like to repeat CT scan in a couple of  months.    Hand-foot syndrome.  She has mild hand-foot syndrome.  I recommend that she keeps her hands and feet well moisturized all the time.    Neuropathy.  As mentioned above, we will decrease the dose of oxaliplatin to 100 mg per metered square.  We also discussed that acupuncture can help with neuropathy.  We will have to keep a close watch now that we are adding back oxaliplatin.    Bone metastasis.  Started Zometa on 11/25/2019.  She is getting it every 3 months and last received on 5/15/2020.  She will continue calcium and vitamin D.  Overall bone metastasis looks a stable.        We did not address the following today.      Left big toe cellulitis.  Resolved.    Nausea. Well controlled. She is using zofran in morning, compazine in afternoon and ativan at night for 1 week. Cont that. She is NOT using Zyprexa 2.5 mg at night.     Discussion regarding health care directive  We discussed that it is important that she completes health care directive which would help in making sure that her wishes are followed about her treatment care in case she is not able to make a decision for herself.  We gave her information regarding that.    RTC 3 weeks after starting chemotherapy.    I answered all of her and Thomas's questions to their satisfaction.  They are agreeable and comfortable with the plan.    Charline Velazquez MD    I spent 41 minutes face-to-face with the patient and almost all of the time was spent in counseling and coordination of care.      Again, thank you for allowing me to participate in the care of your patient.        Sincerely,        Charline Velazquez MD

## 2020-07-27 ENCOUNTER — INFUSION THERAPY VISIT (OUTPATIENT)
Dept: INFUSION THERAPY | Facility: CLINIC | Age: 70
End: 2020-07-27
Payer: COMMERCIAL

## 2020-07-27 VITALS
SYSTOLIC BLOOD PRESSURE: 126 MMHG | TEMPERATURE: 98.3 F | WEIGHT: 163 LBS | BODY MASS INDEX: 29.81 KG/M2 | DIASTOLIC BLOOD PRESSURE: 83 MMHG | RESPIRATION RATE: 16 BRPM | HEART RATE: 83 BPM | OXYGEN SATURATION: 99 %

## 2020-07-27 DIAGNOSIS — C79.51 BONE METASTASIS: Primary | ICD-10-CM

## 2020-07-27 DIAGNOSIS — C79.9 METASTASIS FROM RECTAL CANCER (H): ICD-10-CM

## 2020-07-27 DIAGNOSIS — C20 METASTASIS FROM RECTAL CANCER (H): ICD-10-CM

## 2020-07-27 DIAGNOSIS — D70.1 CHEMOTHERAPY-INDUCED NEUTROPENIA (H): ICD-10-CM

## 2020-07-27 DIAGNOSIS — T45.1X5A CHEMOTHERAPY-INDUCED NEUTROPENIA (H): ICD-10-CM

## 2020-07-27 PROCEDURE — 99207 ZZC NO CHARGE LOS: CPT

## 2020-07-27 PROCEDURE — 96413 CHEMO IV INFUSION 1 HR: CPT | Performed by: NURSE PRACTITIONER

## 2020-07-27 PROCEDURE — 96367 TX/PROPH/DG ADDL SEQ IV INF: CPT | Performed by: NURSE PRACTITIONER

## 2020-07-27 PROCEDURE — 96417 CHEMO IV INFUS EACH ADDL SEQ: CPT | Performed by: NURSE PRACTITIONER

## 2020-07-27 PROCEDURE — 96415 CHEMO IV INFUSION ADDL HR: CPT | Performed by: NURSE PRACTITIONER

## 2020-07-27 RX ORDER — HEPARIN SODIUM (PORCINE) LOCK FLUSH IV SOLN 100 UNIT/ML 100 UNIT/ML
5 SOLUTION INTRAVENOUS
Status: DISCONTINUED | OUTPATIENT
Start: 2020-07-27 | End: 2020-07-27 | Stop reason: HOSPADM

## 2020-07-27 RX ADMIN — HEPARIN SODIUM (PORCINE) LOCK FLUSH IV SOLN 100 UNIT/ML 5 ML: 100 SOLUTION at 13:29

## 2020-07-27 RX ADMIN — Medication 250 ML: at 10:19

## 2020-07-27 ASSESSMENT — PAIN SCALES - GENERAL: PAINLEVEL: NO PAIN (0)

## 2020-07-27 NOTE — PROGRESS NOTES
Infusion Nursing Note:  Alannah Wynn presents today for C1D1 Oxaliplatin/MVASI.    Patient seen by provider today: No   present during visit today: Not Applicable.    Note: N/A.    Intravenous Access:  Implanted Port.    Treatment Conditions:  Lab Results   Component Value Date    HGB 12.8 07/22/2020     Lab Results   Component Value Date    WBC 6.4 07/22/2020      Lab Results   Component Value Date    ANEU 2.9 07/22/2020     Lab Results   Component Value Date     07/22/2020      Lab Results   Component Value Date     07/22/2020                   Lab Results   Component Value Date    POTASSIUM 3.9 07/22/2020           No results found for: MAG         Lab Results   Component Value Date    CR 0.65 07/22/2020                   Lab Results   Component Value Date    LAUREN 8.4 07/22/2020                Lab Results   Component Value Date    BILITOTAL 0.3 07/22/2020           Lab Results   Component Value Date    ALBUMIN 3.6 07/22/2020                    Lab Results   Component Value Date    ALT 23 07/22/2020           Lab Results   Component Value Date    AST 18 07/22/2020       Post Infusion Assessment:  Patient tolerated infusion without incident.  Site patent and intact, free from redness, edema or discomfort.  No evidence of extravasations.  Access discontinued per protocol.       Discharge Plan:   Patient will return 8/18 for next appointment.   Patient discharged in stable condition accompanied by: self.  Departure Mode: Ambulatory.    Maya Grubbs RN

## 2020-07-27 NOTE — PROGRESS NOTES
"Oral Chemotherapy Monitoring Program    Primary Oncologist: Dr. Velazquez  Primary Oncology Clinic:   Cancer Diagnosis: Colorectal     Drug: Xeloda 1500mg BID 14 days on/ 7 days off  Start Date: 4/7/20 7/27 Transitioned to Xeloda/oxaliplatin/bevacizumab     Drug Interaction Assessment: None    Subjective/Objective:  Alannah Wynn is a 69 year old female seen in clinic for a follow-up visit for oral chemotherapy.  Alannah reports things are going well. She has some very mild diarrhea occasionally that she takes an imodium for which provides relief. She also confirmed using moisturizers frequently on her hands/feet.     ORAL CHEMOTHERAPY 4/14/2020 7/27/2020   Drug Name Xeloda (Capecitabine) Xeloda (Capecitabine)   Current Dosage 1500mg 1500mg   Current Schedule Other BID   Cycle Details 2 weeks on 1 week off 2 weeks on 1 week off   Start Date of Last Cycle 4/7/2020 -   Planned next cycle start date 4/28/2020 7/27/2020   Doses missed in last 2 weeks 0 0   Adherence Assessment Adherent Adherent   Adverse Effects (No Data) No AE identified during assessment   Home BPs Not applicable -   Any new drug interactions? No No   Is the dose as ordered appropriate for the patient? Yes Yes   Is the patient currently in pain? No -   Has the patient been assessed within the past 6 months for depression? Yes -   Has the patient missed any days of school, work, or other routine activity? No No       Vitals:  BP:   BP Readings from Last 1 Encounters:   07/27/20 126/83     Wt Readings from Last 1 Encounters:   07/27/20 73.9 kg (163 lb)     Estimated body surface area is 1.8 meters squared as calculated from the following:    Height as of 5/20/20: 1.575 m (5' 2\").    Weight as of this encounter: 73.9 kg (163 lb).    Labs:  _  Result Component Current Result Ref Range   Sodium 142 (7/22/2020) 133 - 144 mmol/L     _  Result Component Current Result Ref Range   Potassium 3.9 (7/22/2020) 3.4 - 5.3 mmol/L     _  Result Component Current Result " Ref Range   Calcium 8.4 (L) (7/22/2020) 8.5 - 10.1 mg/dL     _  Result Component Current Result Ref Range   Albumin 3.6 (7/22/2020) 3.4 - 5.0 g/dL     _  Result Component Current Result Ref Range   Urea Nitrogen 13 (7/22/2020) 7 - 30 mg/dL     _  Result Component Current Result Ref Range   Creatinine 0.65 (7/22/2020) 0.52 - 1.04 mg/dL       _  Result Component Current Result Ref Range   AST 18 (7/22/2020) 0 - 45 U/L     _  Result Component Current Result Ref Range   ALT 23 (7/22/2020) 0 - 50 U/L     _  Result Component Current Result Ref Range   Bilirubin Total 0.3 (7/22/2020) 0.2 - 1.3 mg/dL       _  Result Component Current Result Ref Range   WBC 6.4 (7/22/2020) 4.0 - 11.0 10e9/L     _  Result Component Current Result Ref Range   Hemoglobin 12.8 (7/22/2020) 11.7 - 15.7 g/dL     _  Result Component Current Result Ref Range   Platelet Count 221 (7/22/2020) 150 - 450 10e9/L     _  Result Component Current Result Ref Range   Absolute Neutrophil 2.9 (7/22/2020) 1.6 - 8.3 10e9/L       Assessment:  Alannah is tolerating Xeloda well.    Plan:  Continue with Xeloda 1500 mg BID, 2 weeks on 1 week off.     Follow-Up:  8/18 Visit with Marcus and Oxaliplatin infusion     Refill Due:  8/10 Refill SP    Ewa Keys, PharmD  Hematology/Oncology Clinical Pharmacist  [facility]  [phone]

## 2020-08-11 NOTE — PROGRESS NOTES
"Patient's name and  were verified.  See Doc Flowsheet - IV assess for details.  IVAD accessed with 20G  3/4\" david gripper plus needle  blood return positive: YES  Site without redness, tenderness or swelling: YES  flushed with 30cc NS and 5cc 100u/ml heparin  Needle: De-accessed    Comments: Labs drawn.  Patient tolerated procedure without incident.    Genevieve Callahan  RN, BSN, OCN        "

## 2020-08-18 NOTE — PATIENT INSTRUCTIONS
Chemo and Zometa today.    Refilled Ativan and Zofran today.    See me back in 3 weeks before the chemotherapy. Can potentially double book as a video visit at 11 or 1130.

## 2020-08-18 NOTE — PROGRESS NOTES
"Patient's name and  were verified.  See Doc Flowsheet - IV assess for details.  IVAD accessed with 20G  3/4\" david gripper plus needle  blood return positive: YES  Site without redness, tenderness or swelling: YES  flushed with 30cc NS and 5cc 100u/ml heparin  Needle: Left accessed for infusion    Comments: Labs drawn.  Patient tolerated procedure without incident.    Genevieve Callahan  RN, BSN, OCN        "

## 2020-08-18 NOTE — LETTER
"    8/18/2020         RE: Alannah Wynn  [address]  [place] [address]        Dear Colleague,    Thank you for referring your patient, Alannah Wynn, to the [facility]. Please see a copy of my visit note below.    ONCOLOGY FOLLOW UP NOTE    8/18/2020     Patient was being followed by Dr. Tavarez and saw her on 11/13/2019.  Now she is transferring her care to me.  I have reviewed her previous records and have copied and updated from prior notes.      DIAGNOSIS:    10/2019 dx adenoCa of rectum,  MMR intact- NGS Negative for KRAS, NRAS and BRAF. PD L1 25%.  Baseline CEA 10.  Stage IV disease with pulmonary and bony mets      HISTORY OF ONCOLOGY ILLNESS:    She has had intermittent hematochezia and anorectal irritation since 2011, that have been managed as \"hemorrhoids\". These symptoms became persistent and progressive since early thi year. Her anorectal pain is managed with Tylenol. Denies fevers, chills, or unintentional weight loss. Does have intermittent urinary and fecal urgency but no incontinence per se.    Diagnostic colonoscopy on 10/18/19 for rectal bleeding showed \"fungating and ulcerated non-obstructing mass was found in the distal rectum <1 cm from the anal verge. The mass was partially circumferential (involving one-half of the lumen circumference). The mass measured four cm in length. In addition, its diameter measured six mm.  Pathology showed invasive moderately differentiated adenocarcinoma with intact mismatch repair protein expression, PD L1 25% and NGS panel showed NO MUTATIONS in KRAS, NRAS and BRAF.      Pelvic MRI 11/2019 found fungating polypoid mid/lower rectal mass with luminal narrowing as well as innumerable enlarged lymph nodes as described above with a conglomerate of suspicious lymph nodes abutting the anterior peritoneal reflection. Stage cT3d N2.     PET 11/2019 found   1. Hypermetabolic mass in the distal rectum, SUV 14 corresponding to the patient's " known rectal cancer.  2. Multiple hypermetabolic pelvic lymph nodes, compatible metastatic disease, SUV 5-6.  3. FDG avid pulmonary nodule measuring 7 mm in the left lower lobe, concerning for metastatic disease. Additional FDG avid pulmonary nodules suspicious for metastatic disease, SUV around 4.  4. Osteoblastic metastatic lesion in the L3 vertebral body, SUV 6.8    She started palliative FOLFOX/Avastin on 11/25/2019.  Cycle #2 given on 12/9/2019.  Cycle #3 scheduled for 12/24/2019.  C#6 on 2/10/2020.    Repeat CT CAP on 2/21/2020 shows good response to therapy with improvement in the rectal mass, mesorectal and superior rectal nodularity as well as resolution of right internal iliac lymphadenopathy.  There is slight improvement in the dominant posterior left lower lobe lung nodule and several of the lung nodules are is stable.  There is increase in size of the sclerotic lesion of the left aspect of L3 vertebral body without pathological fracture and this is likely due to treatment effect.    CEA on 2/10/2020 was down to 3.3.    Cycle #7 FOLFOX/Avastin 2/25/2020.  C#8 FOLFOX/Avastin 3/9/2020  Cycle#9 5-FU/Avastin 3/24/2020-stopped oxaliplatin due to cumulative fatigue and issues with nausea.    I then discussed with her, her  as well as her son Gamal about possibly switching to single agent Xeloda going forward so as to limit her visits to the clinic and therefore exposure and risk from coronavirus.  We discussed the pros and cons of this approach and they were all willing to try this.     She started Single agent Xeloda on 4/7/2020.  Cycle #3 started on 5/19/2020.    Because of bone metastatic disease, she was started on Zometa on 11/25/2019. We are giving it every 3 months.  Last received 5/15/2020      CT chest abdomen and pelvis on 7/20/2020 showed mild progression in the lung nodules.  Perirectal nodularity has mildly improved.    Last CEA on 7/22/2020 was 4.      7/27/2020- we decided on switching  to XELOX/Avastin.     8/18/2020 -cycle #2 XELOX/Avastin.  CEA on 8/11/2020 was 4.8.    INTERVAL HISTORY:     This is a video visit.    She is doing well. She had nausea on 2nd and 3rd day and took anti nausea medications which helped.   Had diarrhea 1-2 times on 2nd and 3rd day and imodium helped.   She has mild numbness in fingers. She is doing massages which help.   She felt that energy has been fair. Noticed some tiredness for 1 week or so.  She has mild peeling of soles and she is applying lots of moisturizing lotions which help.      ECOG 1    ROS:  Rest of the comprehensive review of the system was essentially unremarkable.          I reviewed with her history in epic as below.    PAST MEDICAL HISTORY  Reflux, hemorrhoid, hepatitis B?  Overactive bladder  Active Ambulatory Problems     Diagnosis Date Noted     External hemorrhoids 04/29/2016     Non morbid obesity due to excess calories 04/29/2016     Hepatitis B immune 04/29/2016     Screening for cervical cancer 04/29/2016     CARDIOVASCULAR SCREENING; LDL GOAL LESS THAN 160 04/29/2016     Gastroesophageal reflux disease 04/29/2016     Overactive bladder 07/21/2017     Nevus of left lower leg- undetermined behavior 12/27/2018     Metastasis from rectal cancer (H) 11/13/2019     Bone metastasis (H) 11/13/2019     Nausea 11/27/2019     Chemotherapy-induced neutropenia (H) 12/24/2019     Resolved Ambulatory Problems     Diagnosis Date Noted     Urgency of urination 04/29/2016     Need for vaccination against Streptococcus pneumoniae 04/29/2016     Morbid obesity (H) 07/21/2017     Obesity (BMI 35.0-39.9) with comorbidity (H) 12/27/2018     No Additional Past Medical History       MEDICATIONS:  Current Outpatient Medications   Medication     Ascorbic Acid (VITAMIN C) 500 MG CAPS     B Complex Vitamins (B COMPLEX-B12 PO)     capecitabine (XELODA) 500 MG tablet     cholecalciferol (VITAMIN D3) 400 unit (10 mcg) TABS tablet     docusate sodium (COLACE) 100 MG  capsule     Fesoterodine Fumarate 8 MG TB24     lidocaine-prilocaine (EMLA) 2.5-2.5 % external cream     loperamide (IMODIUM) 2 MG capsule     LORazepam (ATIVAN) 0.5 MG tablet     LORazepam (ATIVAN) 0.5 MG tablet     Lutein 20 MG CAPS     mirabegron (MYRBETRIQ) 50 MG 24 hr tablet     Multiple vitamin TABS     naloxone (NARCAN) 4 MG/0.1ML nasal spray     omeprazole (PRILOSEC) 20 MG DR capsule     ondansetron (ZOFRAN) 8 MG tablet     prochlorperazine (COMPAZINE) 10 MG tablet     solifenacin (VESICARE) 10 MG tablet     traMADol (ULTRAM) 50 MG tablet     OLANZapine (ZYPREXA) 2.5 MG tablet     Current Facility-Administered Medications   Medication     lidocaine (XYLOCAINE) 5 % ointment     Facility-Administered Medications Ordered in Other Visits   Medication     heparin 100 UNIT/ML injection 5 mL     sodium chloride (PF) 0.9% PF flush 10-20 mL     ALLERGY:     Allergies   Allergen Reactions     Penicillins      Sulfa Drugs             SOCIAL HISTORY:  She is a [place] immigrants to [place].  Denies smoking denies alcohol abuse.  2 of her sons are doctors.    FAMILY HISTORY:   Denies any family history of cancer.       PHYSICAL EXAMINATION:       LMP 11/27/2001 (Approximate)      Wt Readings from Last 4 Encounters:   07/27/20 73.9 kg (163 lb)   07/22/20 74.3 kg (163 lb 11.2 oz)   06/16/20 70.8 kg (156 lb 1.6 oz)   05/27/20 70.6 kg (155 lb 11.2 oz)         Constitutional.  Does not seem to be in any acute distress.  Eyes.  No redness or discharge noted.  Respiratory.  Speaking in full sentences.  Breathing seems comfortable without any accessory use of muscles.    Skin.  Visualized his skin does not show any obvious rashes.  Musculoskeletal.  Range of motion for visualized areas is intact.  Neurological.  Alert and oriented x3.  Psychiatric.  Mood, mentation and affect are normal.  Decision making capacity is intact.      The rest of a comprehensive physical examination is deferred due to Public Health Emergency video  visit restrictions.      LABS/IMAGING/PATHOLOGY:    Reviewed      CT CHEST/ABDOMEN/PELVIS W CONTRAST 7/20/2020 11:08 AM     CLINICAL HISTORY: metastatic rectal cancer - currently on Xeloda.;  Metastasis from rectal cancer (H); Metastasis from rectal cancer (H)     TECHNIQUE: CT scan of the chest, abdomen, and pelvis was performed  following injection of IV contrast. Multiplanar reformats were  obtained. Dose reduction techniques were used.   CONTRAST: 96 ml isovue 370     COMPARISON: CT chest abdomen pelvis dated 2/21/2020 and prior CT  exams.     FINDINGS:   Chest:  LUNGS AND PLEURA:  Interval increase in left lower lobe nodule on image 124 series 6  measuring 10 mm, previously 5 mm. Interval increase in the right lower  lobe nodule measuring 5 mm on image 87, previously 3 mm. 3 mm nodule  in the right middle lobe on image 87 is also increased. 3 mm nodule  along the right major fissure on image 123 previously measured 1 mm.  New 2 mm nodule in right lower lobe on image 89 series 6. 3 mm left  upper lobe nodule on image 87, previously 1 mm. Newly appreciated 3 mm  left lower lobe nodule on image 67 newly appreciated 1.5 cm  pleural-based nodularity in the left lower lobe on image 106. Stable 4  mm nodule on image 47 series 6 in left upper lobe. Stable 2 mm left  upper lobe nodule image 60 series 6. Stable 3 mm right middle lobe  nodule on image 85 series 6.     MEDIASTINUM/AXILLAE: No axillary, mediastinal or hilar  lymphadenopathy.     There is a small sliding-type hiatal hernia.     Heart size is normal without significant pericardial effusion. The  thoracic aorta is normal in caliber.     Abdomen and pelvis:  Persistent lower rectal thickening, in keeping with patient's known  tumor. Exact interval change is difficult to evaluate due to inherent  poor tissue contrast resolution or CT exam. Perirectal 10 mm nodule on  image 516 and series 3 demonstrates interval improvement since prior  exam when it measured 16  mm. 1.4 cm lymph node in the mesorectum on  image 506 is stable since prior exam. Stable 1.5 cm lymph node along  the superior hemorrhoidal vasculature on image 490.     HEPATOBILIARY: No focal hepatic lesion. Cholecystectomy. No biliary  ductal dilatation.     PANCREAS: Normal.     SPLEEN: Normal.     ADRENAL GLANDS: Normal.     KIDNEYS/BLADDER: Normal.     BOWEL: Small and large bowel loops are normal in caliber without  obstruction. Scattered pancolonic diverticula without acute  diverticulitis. The appendix is normal in appearance.     PELVIC ORGANS: Uterus and adnexa unremarkable.     ADDITIONAL FINDINGS: Abdominal aorta normal in caliber with mild mixed  atheromatous plaque. Major portal veins are patent. No free fluid or  pneumoperitoneum. No new abdominal or pelvic lymphadenopathy.  Resolution of right internal iliac adenopathy.     MUSCULOSKELETAL: Stable size of sclerotic lesion at the left aspect of  L3 vertebral body without pathologic fracture. Stable sclerotic focus  in left pubis on image 546                                                                      IMPRESSION: In this patient with history of metastatic rectal cancer,  there is suggestion of progression:  1.  Although that is stable appearance of rectal mass and minimal  improvement in perirectal nodularity/lymph nodes, there is significant  increase in size and number of bilateral pulmonary nodules.  2.  Stable size of presacral hypermetabolic L3 sclerotic lesion, may  be due to treatment effect.          ASSESSMENT AND PLAN:      Metastatic rectal adenocarcinoma, MSI intact, K-aimee wild type, PDL 1 + 25% with metastasis to the lungs, bones and pelvic lymph nodes.      She started palliative chemotherapy with FOLFOX/Avastin on 11/25/2019.      She has received 6 cycles of chemotherapy.  With cycle #3, we stopped 5-FU bolus because of excessive tiredness.    Repeat CT CAP on 2/21/2020 shows good response to therapy with improvement in the  rectal mass, mesorectal and superior rectal nodularity as well as resolution of right internal iliac lymphadenopathy.  There is slight improvement in the dominant posterior left lower lobe lung nodule and several of the lung nodules are is stable.  There is increase in size of the sclerotic lesion of the left aspect of L3 vertebral body without pathological fracture and this is likely due to treatment effect.    CEA on 2/10/2020 was down to 3.3.    With cycle #9 we stopped oxaliplatin because of cumulative fatigue and increasing nausea and gave her 5-FU/Avastin.      I then discussed with her, her  as well as her son Gamal about possibly switching to single agent Xeloda going forward so as to limit her visits to the clinic and therefore exposure and risk from coronavirus.  We discussed the pros and cons of this approach and they were all willing to try this.      She started single agent Xeloda on 4/7/2020.    CT chest abdomen and pelvis on 7/20/2020 showed mild progression in the lung nodules.  Perirectal nodularity has mildly improved.    CEA on 6/30/2020 was 2.8.  On 7/22/2020 it is 4    We decided on switching to Xeloda/Avastin on 7/27/2020.  We decreased the dose of oxaliplatin to 100 mg per metered square.    CEA on 8/11/2020 was 4.8.    She tolerated the chemotherapy well.  We will proceed with cycle #2 today.  I will plan to repeat her scans after 3-4 cycles of this combination chemotherapy.    Nausea.  She had nausea on second and third and she took Zofran, Compazine and Ativan which helped.  Continue that.  I refilled Zofran and Ativan today.  She is not using Zyprexa.    Diarrhea.  She also had diarrhea on second and third day.  She took Imodium which helped.  Diarrhea was not severe.  Continue as needed Imodium.    Hand-foot syndrome.  She has mild peeling of the skin of the soles.  I recommend continuing with moisturizing cream several times a day and apply to both hands and feet.       Neuropathy.  She has mild numbness of the fingertips and toes.  We are giving her a decreased dose of oxaliplatin at 100 mg per metered square.  As the neuropathy is mild, we will keep the same dose of oxaliplatin.  She is doing massages which is helping.  We had also discussed about trying acupuncture.       Bone metastasis.  Started Zometa on 11/25/2019.  She is getting it every 3 months and she will receive this today on 8/18/2020.  Continue vitamin D and calcium.  Bone metastasis seemed stable on CT scan on 7/20/2020.          We did not address the following today.      Left big toe cellulitis.  Resolved.    Discussion regarding health care directive  We discussed that it is important that she completes health care directive which would help in making sure that her wishes are followed about her treatment care in case she is not able to make a decision for herself.  We gave her information regarding that.    RTC 3 weeks     All questions were answered to her satisfaction.  She is agreeable and comfortable with the plan.    Charline Velazquez MD    Video start time.  9:51 AM.    Video stop time.  10:02 AM.    Again, thank you for allowing me to participate in the care of your patient.        Sincerely,        Charline Velazquez MD

## 2020-08-18 NOTE — NURSING NOTE
"Alannah Wynn is a 69 year old female who is being evaluated via a billable video visit.      The patient has been notified of following:     \"This video visit will be conducted via a call between you and your physician/provider. We have found that certain health care needs can be provided without the need for an in-person physical exam.  This service lets us provide the care you need with a video conversation.  If a prescription is necessary we can send it directly to your pharmacy.  If lab work is needed we can place an order for that and you can then stop by our lab to have the test done at a later time.    Video visits are billed at different rates depending on your insurance coverage.  Please reach out to your insurance provider with any questions.    If during the course of the call the physician/provider feels a video visit is not appropriate, you will not be charged for this service.\"    Patient has given verbal consent for Video visit? Yes  How would you like to obtain your AVS? MyChart  If you are dropped from the video visit, the video invite should be resent to: MyCHart  Will anyone else be joining your video visit? No        Video-Visit Details    Type of service:  Video Visit    Originating Location (pt. Location): Other In Clinic prior to tx    Distant Location (provider location):  [facility]     Platform used for Video Visit: Driss     Checklist Positives   Loose stools - taking Imodium   Numbness in fingers and feet   Thickening of skin on the bottoms of feel      REFILLS of Lorazepam and Zofran    Discuss Mala Bruno CMA          "

## 2020-08-18 NOTE — PROGRESS NOTES
Infusion Nursing Note:  Alannah Wynn presents today for C2D1 Oxaliplatin/MVASI/Zometa.    Patient seen by provider today: Yes: Dr Velazquez   present during visit today: Not Applicable.    Note: N/A.    Intravenous Access:  Implanted Port.    Treatment Conditions:  Lab Results   Component Value Date    HGB 12.4 08/18/2020     Lab Results   Component Value Date    WBC 5.7 08/18/2020      Lab Results   Component Value Date    ANEU 2.3 08/18/2020     Lab Results   Component Value Date     08/18/2020      Lab Results   Component Value Date     08/18/2020                   Lab Results   Component Value Date    POTASSIUM 4.2 08/18/2020           No results found for: MAG         Lab Results   Component Value Date    CR 0.63 08/18/2020                   Lab Results   Component Value Date    LAUREN 8.3 08/18/2020                Lab Results   Component Value Date    BILITOTAL 0.5 08/18/2020           Lab Results   Component Value Date    ALBUMIN 3.3 08/18/2020                    Lab Results   Component Value Date    ALT 34 08/18/2020           Lab Results   Component Value Date    AST 34 08/18/2020       Results reviewed, labs MET treatment parameters, ok to proceed with treatment.      Post Infusion Assessment:  Patient tolerated infusion without incident.  Site patent and intact, free from redness, edema or discomfort.  Access discontinued per protocol.       Discharge Plan:    Patient will return 9/8/2020 for next appointment.   Patient discharged in stable condition accompanied by: self and  to drive.    Helen Ha RN

## 2020-08-18 NOTE — PROGRESS NOTES
"ONCOLOGY FOLLOW UP NOTE    8/18/2020     Patient was being followed by Dr. Tavarez and saw her on 11/13/2019.  Now she is transferring her care to me.  I have reviewed her previous records and have copied and updated from prior notes.      DIAGNOSIS:    10/2019 dx adenoCa of rectum,  MMR intact- NGS Negative for KRAS, NRAS and BRAF. PD L1 25%.  Baseline CEA 10.  Stage IV disease with pulmonary and bony mets      HISTORY OF ONCOLOGY ILLNESS:    She has had intermittent hematochezia and anorectal irritation since 2011, that have been managed as \"hemorrhoids\". These symptoms became persistent and progressive since early thi year. Her anorectal pain is managed with Tylenol. Denies fevers, chills, or unintentional weight loss. Does have intermittent urinary and fecal urgency but no incontinence per se.    Diagnostic colonoscopy on 10/18/19 for rectal bleeding showed \"fungating and ulcerated non-obstructing mass was found in the distal rectum <1 cm from the anal verge. The mass was partially circumferential (involving one-half of the lumen circumference). The mass measured four cm in length. In addition, its diameter measured six mm.  Pathology showed invasive moderately differentiated adenocarcinoma with intact mismatch repair protein expression, PD L1 25% and NGS panel showed NO MUTATIONS in KRAS, NRAS and BRAF.      Pelvic MRI 11/2019 found fungating polypoid mid/lower rectal mass with luminal narrowing as well as innumerable enlarged lymph nodes as described above with a conglomerate of suspicious lymph nodes abutting the anterior peritoneal reflection. Stage cT3d N2.     PET 11/2019 found   1. Hypermetabolic mass in the distal rectum, SUV 14 corresponding to the patient's known rectal cancer.  2. Multiple hypermetabolic pelvic lymph nodes, compatible metastatic disease, SUV 5-6.  3. FDG avid pulmonary nodule measuring 7 mm in the left lower lobe, concerning for metastatic disease. Additional FDG avid pulmonary nodules " suspicious for metastatic disease, SUV around 4.  4. Osteoblastic metastatic lesion in the L3 vertebral body, SUV 6.8    She started palliative FOLFOX/Avastin on 11/25/2019.  Cycle #2 given on 12/9/2019.  Cycle #3 scheduled for 12/24/2019.  C#6 on 2/10/2020.    Repeat CT CAP on 2/21/2020 shows good response to therapy with improvement in the rectal mass, mesorectal and superior rectal nodularity as well as resolution of right internal iliac lymphadenopathy.  There is slight improvement in the dominant posterior left lower lobe lung nodule and several of the lung nodules are is stable.  There is increase in size of the sclerotic lesion of the left aspect of L3 vertebral body without pathological fracture and this is likely due to treatment effect.    CEA on 2/10/2020 was down to 3.3.    Cycle #7 FOLFOX/Avastin 2/25/2020.  C#8 FOLFOX/Avastin 3/9/2020  Cycle#9 5-FU/Avastin 3/24/2020-stopped oxaliplatin due to cumulative fatigue and issues with nausea.    I then discussed with her, her  as well as her son Gamal about possibly switching to single agent Xeloda going forward so as to limit her visits to the clinic and therefore exposure and risk from coronavirus.  We discussed the pros and cons of this approach and they were all willing to try this.     She started Single agent Xeloda on 4/7/2020.  Cycle #3 started on 5/19/2020.    Because of bone metastatic disease, she was started on Zometa on 11/25/2019. We are giving it every 3 months.  Last received 5/15/2020      CT chest abdomen and pelvis on 7/20/2020 showed mild progression in the lung nodules.  Perirectal nodularity has mildly improved.    Last CEA on 7/22/2020 was 4.      7/27/2020- we decided on switching to XELOX/Avastin.     8/18/2020 -cycle #2 XELOX/Avastin.  CEA on 8/11/2020 was 4.8.    INTERVAL HISTORY:     This is a video visit.    She is doing well. She had nausea on 2nd and 3rd day and took anti nausea medications which helped.   Had diarrhea  1-2 times on 2nd and 3rd day and imodium helped.   She has mild numbness in fingers. She is doing massages which help.   She felt that energy has been fair. Noticed some tiredness for 1 week or so.  She has mild peeling of soles and she is applying lots of moisturizing lotions which help.      ECOG 1    ROS:  Rest of the comprehensive review of the system was essentially unremarkable.          I reviewed with her history in epic as below.    PAST MEDICAL HISTORY  Reflux, hemorrhoid, hepatitis B?  Overactive bladder  Active Ambulatory Problems     Diagnosis Date Noted     External hemorrhoids 04/29/2016     Non morbid obesity due to excess calories 04/29/2016     Hepatitis B immune 04/29/2016     Screening for cervical cancer 04/29/2016     CARDIOVASCULAR SCREENING; LDL GOAL LESS THAN 160 04/29/2016     Gastroesophageal reflux disease 04/29/2016     Overactive bladder 07/21/2017     Nevus of left lower leg- undetermined behavior 12/27/2018     Metastasis from rectal cancer (H) 11/13/2019     Bone metastasis (H) 11/13/2019     Nausea 11/27/2019     Chemotherapy-induced neutropenia (H) 12/24/2019     Resolved Ambulatory Problems     Diagnosis Date Noted     Urgency of urination 04/29/2016     Need for vaccination against Streptococcus pneumoniae 04/29/2016     Morbid obesity (H) 07/21/2017     Obesity (BMI 35.0-39.9) with comorbidity (H) 12/27/2018     No Additional Past Medical History       MEDICATIONS:  Current Outpatient Medications   Medication     Ascorbic Acid (VITAMIN C) 500 MG CAPS     B Complex Vitamins (B COMPLEX-B12 PO)     capecitabine (XELODA) 500 MG tablet     cholecalciferol (VITAMIN D3) 400 unit (10 mcg) TABS tablet     docusate sodium (COLACE) 100 MG capsule     Fesoterodine Fumarate 8 MG TB24     lidocaine-prilocaine (EMLA) 2.5-2.5 % external cream     loperamide (IMODIUM) 2 MG capsule     LORazepam (ATIVAN) 0.5 MG tablet     LORazepam (ATIVAN) 0.5 MG tablet     Lutein 20 MG CAPS     mirabegron  (MYRBETRIQ) 50 MG 24 hr tablet     Multiple vitamin TABS     naloxone (NARCAN) 4 MG/0.1ML nasal spray     omeprazole (PRILOSEC) 20 MG DR capsule     ondansetron (ZOFRAN) 8 MG tablet     prochlorperazine (COMPAZINE) 10 MG tablet     solifenacin (VESICARE) 10 MG tablet     traMADol (ULTRAM) 50 MG tablet     OLANZapine (ZYPREXA) 2.5 MG tablet     Current Facility-Administered Medications   Medication     lidocaine (XYLOCAINE) 5 % ointment     Facility-Administered Medications Ordered in Other Visits   Medication     heparin 100 UNIT/ML injection 5 mL     sodium chloride (PF) 0.9% PF flush 10-20 mL     ALLERGY:     Allergies   Allergen Reactions     Penicillins      Sulfa Drugs             SOCIAL HISTORY:  She is a [place] immigrants to [place].  Denies smoking denies alcohol abuse.  2 of her sons are doctors.    FAMILY HISTORY:   Denies any family history of cancer.       PHYSICAL EXAMINATION:       LMP 11/27/2001 (Approximate)      Wt Readings from Last 4 Encounters:   07/27/20 73.9 kg (163 lb)   07/22/20 74.3 kg (163 lb 11.2 oz)   06/16/20 70.8 kg (156 lb 1.6 oz)   05/27/20 70.6 kg (155 lb 11.2 oz)         Constitutional.  Does not seem to be in any acute distress.  Eyes.  No redness or discharge noted.  Respiratory.  Speaking in full sentences.  Breathing seems comfortable without any accessory use of muscles.    Skin.  Visualized his skin does not show any obvious rashes.  Musculoskeletal.  Range of motion for visualized areas is intact.  Neurological.  Alert and oriented x3.  Psychiatric.  Mood, mentation and affect are normal.  Decision making capacity is intact.      The rest of a comprehensive physical examination is deferred due to Public Health Emergency video visit restrictions.      LABS/IMAGING/PATHOLOGY:    Reviewed      CT CHEST/ABDOMEN/PELVIS W CONTRAST 7/20/2020 11:08 AM     CLINICAL HISTORY: metastatic rectal cancer - currently on Xeloda.;  Metastasis from rectal cancer (H); Metastasis from rectal  cancer (H)     TECHNIQUE: CT scan of the chest, abdomen, and pelvis was performed  following injection of IV contrast. Multiplanar reformats were  obtained. Dose reduction techniques were used.   CONTRAST: 96 ml isovue 370     COMPARISON: CT chest abdomen pelvis dated 2/21/2020 and prior CT  exams.     FINDINGS:   Chest:  LUNGS AND PLEURA:  Interval increase in left lower lobe nodule on image 124 series 6  measuring 10 mm, previously 5 mm. Interval increase in the right lower  lobe nodule measuring 5 mm on image 87, previously 3 mm. 3 mm nodule  in the right middle lobe on image 87 is also increased. 3 mm nodule  along the right major fissure on image 123 previously measured 1 mm.  New 2 mm nodule in right lower lobe on image 89 series 6. 3 mm left  upper lobe nodule on image 87, previously 1 mm. Newly appreciated 3 mm  left lower lobe nodule on image 67 newly appreciated 1.5 cm  pleural-based nodularity in the left lower lobe on image 106. Stable 4  mm nodule on image 47 series 6 in left upper lobe. Stable 2 mm left  upper lobe nodule image 60 series 6. Stable 3 mm right middle lobe  nodule on image 85 series 6.     MEDIASTINUM/AXILLAE: No axillary, mediastinal or hilar  lymphadenopathy.     There is a small sliding-type hiatal hernia.     Heart size is normal without significant pericardial effusion. The  thoracic aorta is normal in caliber.     Abdomen and pelvis:  Persistent lower rectal thickening, in keeping with patient's known  tumor. Exact interval change is difficult to evaluate due to inherent  poor tissue contrast resolution or CT exam. Perirectal 10 mm nodule on  image 516 and series 3 demonstrates interval improvement since prior  exam when it measured 16 mm. 1.4 cm lymph node in the mesorectum on  image 506 is stable since prior exam. Stable 1.5 cm lymph node along  the superior hemorrhoidal vasculature on image 490.     HEPATOBILIARY: No focal hepatic lesion. Cholecystectomy. No biliary  ductal  dilatation.     PANCREAS: Normal.     SPLEEN: Normal.     ADRENAL GLANDS: Normal.     KIDNEYS/BLADDER: Normal.     BOWEL: Small and large bowel loops are normal in caliber without  obstruction. Scattered pancolonic diverticula without acute  diverticulitis. The appendix is normal in appearance.     PELVIC ORGANS: Uterus and adnexa unremarkable.     ADDITIONAL FINDINGS: Abdominal aorta normal in caliber with mild mixed  atheromatous plaque. Major portal veins are patent. No free fluid or  pneumoperitoneum. No new abdominal or pelvic lymphadenopathy.  Resolution of right internal iliac adenopathy.     MUSCULOSKELETAL: Stable size of sclerotic lesion at the left aspect of  L3 vertebral body without pathologic fracture. Stable sclerotic focus  in left pubis on image 546                                                                      IMPRESSION: In this patient with history of metastatic rectal cancer,  there is suggestion of progression:  1.  Although that is stable appearance of rectal mass and minimal  improvement in perirectal nodularity/lymph nodes, there is significant  increase in size and number of bilateral pulmonary nodules.  2.  Stable size of presacral hypermetabolic L3 sclerotic lesion, may  be due to treatment effect.          ASSESSMENT AND PLAN:      Metastatic rectal adenocarcinoma, MSI intact, K-aimee wild type, PDL 1 + 25% with metastasis to the lungs, bones and pelvic lymph nodes.      She started palliative chemotherapy with FOLFOX/Avastin on 11/25/2019.      She has received 6 cycles of chemotherapy.  With cycle #3, we stopped 5-FU bolus because of excessive tiredness.    Repeat CT CAP on 2/21/2020 shows good response to therapy with improvement in the rectal mass, mesorectal and superior rectal nodularity as well as resolution of right internal iliac lymphadenopathy.  There is slight improvement in the dominant posterior left lower lobe lung nodule and several of the lung nodules are is stable.   There is increase in size of the sclerotic lesion of the left aspect of L3 vertebral body without pathological fracture and this is likely due to treatment effect.    CEA on 2/10/2020 was down to 3.3.    With cycle #9 we stopped oxaliplatin because of cumulative fatigue and increasing nausea and gave her 5-FU/Avastin.      I then discussed with her, her  as well as her son Gamal about possibly switching to single agent Xeloda going forward so as to limit her visits to the clinic and therefore exposure and risk from coronavirus.  We discussed the pros and cons of this approach and they were all willing to try this.      She started single agent Xeloda on 4/7/2020.    CT chest abdomen and pelvis on 7/20/2020 showed mild progression in the lung nodules.  Perirectal nodularity has mildly improved.    CEA on 6/30/2020 was 2.8.  On 7/22/2020 it is 4    We decided on switching to Xeloda/Avastin on 7/27/2020.  We decreased the dose of oxaliplatin to 100 mg per metered square.    CEA on 8/11/2020 was 4.8.    She tolerated the chemotherapy well.  We will proceed with cycle #2 today.  I will plan to repeat her scans after 3-4 cycles of this combination chemotherapy.    Nausea.  She had nausea on second and third and she took Zofran, Compazine and Ativan which helped.  Continue that.  I refilled Zofran and Ativan today.  She is not using Zyprexa.    Diarrhea.  She also had diarrhea on second and third day.  She took Imodium which helped.  Diarrhea was not severe.  Continue as needed Imodium.    Hand-foot syndrome.  She has mild peeling of the skin of the soles.  I recommend continuing with moisturizing cream several times a day and apply to both hands and feet.      Neuropathy.  She has mild numbness of the fingertips and toes.  We are giving her a decreased dose of oxaliplatin at 100 mg per metered square.  As the neuropathy is mild, we will keep the same dose of oxaliplatin.  She is doing massages which is helping.   We had also discussed about trying acupuncture.       Bone metastasis.  Started Zometa on 11/25/2019.  She is getting it every 3 months and she will receive this today on 8/18/2020.  Continue vitamin D and calcium.  Bone metastasis seemed stable on CT scan on 7/20/2020.          We did not address the following today.      Left big toe cellulitis.  Resolved.    Discussion regarding health care directive  We discussed that it is important that she completes health care directive which would help in making sure that her wishes are followed about her treatment care in case she is not able to make a decision for herself.  We gave her information regarding that.    RTC 3 weeks     All questions were answered to her satisfaction.  She is agreeable and comfortable with the plan.    Charline Velazquez MD    Video start time.  9:51 AM.    Video stop time.  10:02 AM.

## 2020-09-02 NOTE — PROGRESS NOTES
Patient stated she needed her labs drawn today so she could take Xeloda before coming to the clinic next week. Urine order not released as pt stated she would do that next week. Cuca Chang RN

## 2020-09-08 NOTE — PROGRESS NOTES
Infusion Nursing Note:  Alannah Wynn presents today for C3D1 Bevacizumab-awwb and Oxaliplatin.    Patient seen by provider today: Yes: Dr Velazquez   present during visit today: Not Applicable.    Note:   Patient states she is taking Xeloda.    Intravenous Access:  Implanted Port.    Treatment Conditions:  BP<150/100  Urine protein negative.  Lab Results   Component Value Date    HGB 12.0 09/02/2020     Lab Results   Component Value Date    WBC 6.3 09/02/2020      Lab Results   Component Value Date    ANEU 2.7 09/02/2020     Lab Results   Component Value Date     09/02/2020      Lab Results   Component Value Date     09/02/2020                   Lab Results   Component Value Date    POTASSIUM 3.9 09/02/2020           No results found for: MAG         Lab Results   Component Value Date    CR 0.68 09/02/2020                   Lab Results   Component Value Date    LAUREN 8.6 09/02/2020                Lab Results   Component Value Date    BILITOTAL 0.3 09/02/2020           Lab Results   Component Value Date    ALBUMIN 3.6 09/02/2020                    Lab Results   Component Value Date    ALT 24 09/02/2020           Lab Results   Component Value Date    AST 23 09/02/2020       Results reviewed, labs MET treatment parameters, ok to proceed with treatment.    Handoff report given to Char MCKEON.    Gisell Nuñez RN

## 2020-09-08 NOTE — LETTER
"    9/8/2020         RE: Alannah Wynn  [address]  [place] [address]        Dear Colleague,    Thank you for referring your patient, Alannah Wynn, to the [facility]. Please see a copy of my visit note below.    ONCOLOGY FOLLOW UP NOTE    9/8/2020     Patient was being followed by Dr. Tavarez and saw her on 11/13/2019.  Now she is transferring her care to me.  I have reviewed her previous records and have copied and updated from prior notes.      DIAGNOSIS:    10/2019 dx adenoCa of rectum,  MMR intact- NGS Negative for KRAS, NRAS and BRAF. PD L1 25%.  Baseline CEA 10.  Stage IV disease with pulmonary and bony mets      HISTORY OF ONCOLOGY ILLNESS:    She has had intermittent hematochezia and anorectal irritation since 2011, that have been managed as \"hemorrhoids\". These symptoms became persistent and progressive since early thi year. Her anorectal pain is managed with Tylenol. Denies fevers, chills, or unintentional weight loss. Does have intermittent urinary and fecal urgency but no incontinence per se.    Diagnostic colonoscopy on 10/18/19 for rectal bleeding showed \"fungating and ulcerated non-obstructing mass was found in the distal rectum <1 cm from the anal verge. The mass was partially circumferential (involving one-half of the lumen circumference). The mass measured four cm in length. In addition, its diameter measured six mm.  Pathology showed invasive moderately differentiated adenocarcinoma with intact mismatch repair protein expression, PD L1 25% and NGS panel showed NO MUTATIONS in KRAS, NRAS and BRAF.      Pelvic MRI 11/2019 found fungating polypoid mid/lower rectal mass with luminal narrowing as well as innumerable enlarged lymph nodes as described above with a conglomerate of suspicious lymph nodes abutting the anterior peritoneal reflection. Stage cT3d N2.     PET 11/2019 found   1. Hypermetabolic mass in the distal rectum, SUV 14 corresponding to the patient's " known rectal cancer.  2. Multiple hypermetabolic pelvic lymph nodes, compatible metastatic disease, SUV 5-6.  3. FDG avid pulmonary nodule measuring 7 mm in the left lower lobe, concerning for metastatic disease. Additional FDG avid pulmonary nodules suspicious for metastatic disease, SUV around 4.  4. Osteoblastic metastatic lesion in the L3 vertebral body, SUV 6.8    She started palliative FOLFOX/Avastin on 11/25/2019.  Cycle #2 given on 12/9/2019.  Cycle #3 scheduled for 12/24/2019.  C#6 on 2/10/2020.    Repeat CT CAP on 2/21/2020 shows good response to therapy with improvement in the rectal mass, mesorectal and superior rectal nodularity as well as resolution of right internal iliac lymphadenopathy.  There is slight improvement in the dominant posterior left lower lobe lung nodule and several of the lung nodules are is stable.  There is increase in size of the sclerotic lesion of the left aspect of L3 vertebral body without pathological fracture and this is likely due to treatment effect.    CEA on 2/10/2020 was down to 3.3.    Cycle #7 FOLFOX/Avastin 2/25/2020.  C#8 FOLFOX/Avastin 3/9/2020  Cycle#9 5-FU/Avastin 3/24/2020-stopped oxaliplatin due to cumulative fatigue and issues with nausea.    I then discussed with her, her  as well as her son Gamal about possibly switching to single agent Xeloda going forward so as to limit her visits to the clinic and therefore exposure and risk from coronavirus.  We discussed the pros and cons of this approach and they were all willing to try this.     She started Single agent Xeloda on 4/7/2020.  Cycle #3 started on 5/19/2020.    Because of bone metastatic disease, she was started on Zometa on 11/25/2019. We are giving it every 3 months.  Last received 5/15/2020      CT chest abdomen and pelvis on 7/20/2020 showed mild progression in the lung nodules.  Perirectal nodularity has mildly improved.    Last CEA on 7/22/2020 was 4.      7/27/2020- we decided on switching  to XELOX/Avastin.     8/18/2020 -cycle #2 XELOX/Avastin.  CEA on 8/11/2020 was 4.8.  9/8/2020 - C#3 XELOX/Avastin. CEA on 9/2/2020 was 5.1          INTERVAL HISTORY:     This is a phone visit as patient unable to connect through video link.    She is feeling well. She tells me that for the first week she had some nausea and poor appetite. She had some diarrhea. Imodium helped. Then she started feeling well. She felt fatigued for the first week and then energy got better. Cold sensitivity lasted 5 days or so.  She still feels numbness in the tips of the fingers and toes. it is not worse. She denies fevers/infections. Has mild HFS and applying moisturizing lotions. She denies pain.       ECOG 1    ROS:  A comprehensive ROS was otherwise neg      I reviewed with her history in epic as below.    PAST MEDICAL HISTORY  Reflux, hemorrhoid, hepatitis B?  Overactive bladder  Active Ambulatory Problems     Diagnosis Date Noted     External hemorrhoids 04/29/2016     Non morbid obesity due to excess calories 04/29/2016     Hepatitis B immune 04/29/2016     Screening for cervical cancer 04/29/2016     CARDIOVASCULAR SCREENING; LDL GOAL LESS THAN 160 04/29/2016     Gastroesophageal reflux disease 04/29/2016     Overactive bladder 07/21/2017     Nevus of left lower leg- undetermined behavior 12/27/2018     Metastasis from rectal cancer (H) 11/13/2019     Bone metastasis (H) 11/13/2019     Nausea 11/27/2019     Chemotherapy-induced neutropenia (H) 12/24/2019     Resolved Ambulatory Problems     Diagnosis Date Noted     Urgency of urination 04/29/2016     Need for vaccination against Streptococcus pneumoniae 04/29/2016     Morbid obesity (H) 07/21/2017     Obesity (BMI 35.0-39.9) with comorbidity (H) 12/27/2018     No Additional Past Medical History       MEDICATIONS:  Current Outpatient Medications   Medication     Ascorbic Acid (VITAMIN C) 500 MG CAPS     B Complex Vitamins (B COMPLEX-B12 PO)     capecitabine (XELODA) 500 MG  tablet     cholecalciferol (VITAMIN D3) 400 unit (10 mcg) TABS tablet     docusate sodium (COLACE) 100 MG capsule     Fesoterodine Fumarate 8 MG TB24     lidocaine-prilocaine (EMLA) 2.5-2.5 % external cream     loperamide (IMODIUM) 2 MG capsule     LORazepam (ATIVAN) 0.5 MG tablet     LORazepam (ATIVAN) 0.5 MG tablet     Lutein 20 MG CAPS     mirabegron (MYRBETRIQ) 50 MG 24 hr tablet     Multiple vitamin TABS     naloxone (NARCAN) 4 MG/0.1ML nasal spray     omeprazole (PRILOSEC) 20 MG DR capsule     ondansetron (ZOFRAN) 8 MG tablet     prochlorperazine (COMPAZINE) 10 MG tablet     solifenacin (VESICARE) 10 MG tablet     traMADol (ULTRAM) 50 MG tablet     capecitabine (XELODA) 500 MG tablet     OLANZapine (ZYPREXA) 2.5 MG tablet     Current Facility-Administered Medications   Medication     lidocaine (XYLOCAINE) 5 % ointment     ALLERGY:     Allergies   Allergen Reactions     Penicillins      Sulfa Drugs             SOCIAL HISTORY:  She is a [place] immigrants to [place].  Denies smoking denies alcohol abuse.  2 of her sons are doctors.    FAMILY HISTORY:   Denies any family history of cancer.       PHYSICAL EXAMINATION:       LMP 11/27/2001 (Approximate)      Wt Readings from Last 4 Encounters:   07/27/20 73.9 kg (163 lb)   07/22/20 74.3 kg (163 lb 11.2 oz)   06/16/20 70.8 kg (156 lb 1.6 oz)   05/27/20 70.6 kg (155 lb 11.2 oz)       Constitutional.  Does not seem to be in any apparent distress.  Respiratory.  Breathing does not seem to be labored.  Speaking in complete sentences without coughing.    Neurological.  Alert and oriented.  Psychiatric.  Appropriate mood and mentation.      LABS/IMAGING/PATHOLOGY:    Reviewed      CT CHEST/ABDOMEN/PELVIS W CONTRAST 7/20/2020 11:08 AM     CLINICAL HISTORY: metastatic rectal cancer - currently on Xeloda.;  Metastasis from rectal cancer (H); Metastasis from rectal cancer (H)     TECHNIQUE: CT scan of the chest, abdomen, and pelvis was performed  following injection of IV  contrast. Multiplanar reformats were  obtained. Dose reduction techniques were used.   CONTRAST: 96 ml isovue 370     COMPARISON: CT chest abdomen pelvis dated 2/21/2020 and prior CT  exams.     FINDINGS:   Chest:  LUNGS AND PLEURA:  Interval increase in left lower lobe nodule on image 124 series 6  measuring 10 mm, previously 5 mm. Interval increase in the right lower  lobe nodule measuring 5 mm on image 87, previously 3 mm. 3 mm nodule  in the right middle lobe on image 87 is also increased. 3 mm nodule  along the right major fissure on image 123 previously measured 1 mm.  New 2 mm nodule in right lower lobe on image 89 series 6. 3 mm left  upper lobe nodule on image 87, previously 1 mm. Newly appreciated 3 mm  left lower lobe nodule on image 67 newly appreciated 1.5 cm  pleural-based nodularity in the left lower lobe on image 106. Stable 4  mm nodule on image 47 series 6 in left upper lobe. Stable 2 mm left  upper lobe nodule image 60 series 6. Stable 3 mm right middle lobe  nodule on image 85 series 6.     MEDIASTINUM/AXILLAE: No axillary, mediastinal or hilar  lymphadenopathy.     There is a small sliding-type hiatal hernia.     Heart size is normal without significant pericardial effusion. The  thoracic aorta is normal in caliber.     Abdomen and pelvis:  Persistent lower rectal thickening, in keeping with patient's known  tumor. Exact interval change is difficult to evaluate due to inherent  poor tissue contrast resolution or CT exam. Perirectal 10 mm nodule on  image 516 and series 3 demonstrates interval improvement since prior  exam when it measured 16 mm. 1.4 cm lymph node in the mesorectum on  image 506 is stable since prior exam. Stable 1.5 cm lymph node along  the superior hemorrhoidal vasculature on image 490.     HEPATOBILIARY: No focal hepatic lesion. Cholecystectomy. No biliary  ductal dilatation.     PANCREAS: Normal.     SPLEEN: Normal.     ADRENAL GLANDS: Normal.     KIDNEYS/BLADDER:  Normal.     BOWEL: Small and large bowel loops are normal in caliber without  obstruction. Scattered pancolonic diverticula without acute  diverticulitis. The appendix is normal in appearance.     PELVIC ORGANS: Uterus and adnexa unremarkable.     ADDITIONAL FINDINGS: Abdominal aorta normal in caliber with mild mixed  atheromatous plaque. Major portal veins are patent. No free fluid or  pneumoperitoneum. No new abdominal or pelvic lymphadenopathy.  Resolution of right internal iliac adenopathy.     MUSCULOSKELETAL: Stable size of sclerotic lesion at the left aspect of  L3 vertebral body without pathologic fracture. Stable sclerotic focus  in left pubis on image 546                                                                      IMPRESSION: In this patient with history of metastatic rectal cancer,  there is suggestion of progression:  1.  Although that is stable appearance of rectal mass and minimal  improvement in perirectal nodularity/lymph nodes, there is significant  increase in size and number of bilateral pulmonary nodules.  2.  Stable size of presacral hypermetabolic L3 sclerotic lesion, may  be due to treatment effect.      ASSESSMENT AND PLAN:      Metastatic rectal adenocarcinoma, MSI intact, K-aimee wild type, PDL 1 + 25% with metastasis to the lungs, bones and pelvic lymph nodes.      She started palliative chemotherapy with FOLFOX/Avastin on 11/25/2019.      She has received 6 cycles of chemotherapy.  With cycle #3, we stopped 5-FU bolus because of excessive tiredness.    Repeat CT CAP on 2/21/2020 shows good response to therapy with improvement in the rectal mass, mesorectal and superior rectal nodularity as well as resolution of right internal iliac lymphadenopathy.  There is slight improvement in the dominant posterior left lower lobe lung nodule and several of the lung nodules are is stable.  There is increase in size of the sclerotic lesion of the left aspect of L3 vertebral body without  pathological fracture and this is likely due to treatment effect.    CEA on 2/10/2020 was down to 3.3.    With cycle #9 we stopped oxaliplatin because of cumulative fatigue and increasing nausea and gave her 5-FU/Avastin.      I then discussed with her, her  as well as her son Gamal about possibly switching to single agent Xeloda going forward so as to limit her visits to the clinic and therefore exposure and risk from coronavirus.  We discussed the pros and cons of this approach and they were all willing to try this.      She started single agent Xeloda on 4/7/2020.    CT chest abdomen and pelvis on 7/20/2020 showed mild progression in the lung nodules.  Perirectal nodularity has mildly improved.    CEA on 6/30/2020 was 2.8.  On 7/22/2020 it was 4    We decided on switching to Xeloda/Avastin on 7/27/2020.  We decreased the dose of oxaliplatin to 100 mg per metered square.    CEA on 8/11/2020 was 4.8.    She has tolerated 2 cycles of chemo well.  We will proceed with C#3 today  CEA on 9/2/2020 was 5.1.   Plan to repeat CT CAP after 4 cycles.      Nausea.  It is mild and decently controlled with anti nausea meds. Cont the same. She is not using Zyprexa.    Diarrhea.  She had some diarrhea for a few days but it was well controlled with imodium and she will continue.     Hand-foot syndrome. She has mild HFS. Cont moisturizing lotions regularly.     Neuropathy.  She has mild numbness of the fingertips and toes and this has not changed. Cont to observe on a lower dose of Oxaliplatin. She continues to do massages which she thinks is helping.     Bone metastasis.  Started Zometa on 11/25/2019.  She is getting it every 3 months and she last received on 8/18/2020.  Continue vitamin D and calcium.  Bone metastasis seemed stable on CT scan on 7/20/2020.        We did not address the following today.      Left big toe cellulitis.  Resolved.    Discussion regarding health care directive  We discussed that it is  important that she completes health care directive which would help in making sure that her wishes are followed about her treatment care in case she is not able to make a decision for herself.  We gave her information regarding that.    RTC 3 weeks     All questions were answered to her satisfaction.  She is agreeable and comfortable with the plan.    Charline Velazquez MD    Total phone call time. 15 Minutes     Again, thank you for allowing me to participate in the care of your patient.        Sincerely,        Charline Velazquez MD

## 2020-09-08 NOTE — PROGRESS NOTES
"ONCOLOGY FOLLOW UP NOTE    9/8/2020     Patient was being followed by Dr. Tavarez and saw her on 11/13/2019.  Now she is transferring her care to me.  I have reviewed her previous records and have copied and updated from prior notes.      DIAGNOSIS:    10/2019 dx adenoCa of rectum,  MMR intact- NGS Negative for KRAS, NRAS and BRAF. PD L1 25%.  Baseline CEA 10.  Stage IV disease with pulmonary and bony mets      HISTORY OF ONCOLOGY ILLNESS:    She has had intermittent hematochezia and anorectal irritation since 2011, that have been managed as \"hemorrhoids\". These symptoms became persistent and progressive since early thi year. Her anorectal pain is managed with Tylenol. Denies fevers, chills, or unintentional weight loss. Does have intermittent urinary and fecal urgency but no incontinence per se.    Diagnostic colonoscopy on 10/18/19 for rectal bleeding showed \"fungating and ulcerated non-obstructing mass was found in the distal rectum <1 cm from the anal verge. The mass was partially circumferential (involving one-half of the lumen circumference). The mass measured four cm in length. In addition, its diameter measured six mm.  Pathology showed invasive moderately differentiated adenocarcinoma with intact mismatch repair protein expression, PD L1 25% and NGS panel showed NO MUTATIONS in KRAS, NRAS and BRAF.      Pelvic MRI 11/2019 found fungating polypoid mid/lower rectal mass with luminal narrowing as well as innumerable enlarged lymph nodes as described above with a conglomerate of suspicious lymph nodes abutting the anterior peritoneal reflection. Stage cT3d N2.     PET 11/2019 found   1. Hypermetabolic mass in the distal rectum, SUV 14 corresponding to the patient's known rectal cancer.  2. Multiple hypermetabolic pelvic lymph nodes, compatible metastatic disease, SUV 5-6.  3. FDG avid pulmonary nodule measuring 7 mm in the left lower lobe, concerning for metastatic disease. Additional FDG avid pulmonary nodules " suspicious for metastatic disease, SUV around 4.  4. Osteoblastic metastatic lesion in the L3 vertebral body, SUV 6.8    She started palliative FOLFOX/Avastin on 11/25/2019.  Cycle #2 given on 12/9/2019.  Cycle #3 scheduled for 12/24/2019.  C#6 on 2/10/2020.    Repeat CT CAP on 2/21/2020 shows good response to therapy with improvement in the rectal mass, mesorectal and superior rectal nodularity as well as resolution of right internal iliac lymphadenopathy.  There is slight improvement in the dominant posterior left lower lobe lung nodule and several of the lung nodules are is stable.  There is increase in size of the sclerotic lesion of the left aspect of L3 vertebral body without pathological fracture and this is likely due to treatment effect.    CEA on 2/10/2020 was down to 3.3.    Cycle #7 FOLFOX/Avastin 2/25/2020.  C#8 FOLFOX/Avastin 3/9/2020  Cycle#9 5-FU/Avastin 3/24/2020-stopped oxaliplatin due to cumulative fatigue and issues with nausea.    I then discussed with her, her  as well as her son Gamla about possibly switching to single agent Xeloda going forward so as to limit her visits to the clinic and therefore exposure and risk from coronavirus.  We discussed the pros and cons of this approach and they were all willing to try this.     She started Single agent Xeloda on 4/7/2020.  Cycle #3 started on 5/19/2020.    Because of bone metastatic disease, she was started on Zometa on 11/25/2019. We are giving it every 3 months.  Last received 5/15/2020      CT chest abdomen and pelvis on 7/20/2020 showed mild progression in the lung nodules.  Perirectal nodularity has mildly improved.    Last CEA on 7/22/2020 was 4.      7/27/2020- we decided on switching to XELOX/Avastin.     8/18/2020 -cycle #2 XELOX/Avastin.  CEA on 8/11/2020 was 4.8.  9/8/2020 - C#3 XELOX/Avastin. CEA on 9/2/2020 was 5.1          INTERVAL HISTORY:     This is a phone visit as patient unable to connect through video link.    She is  feeling well. She tells me that for the first week she had some nausea and poor appetite. She had some diarrhea. Imodium helped. Then she started feeling well. She felt fatigued for the first week and then energy got better. Cold sensitivity lasted 5 days or so.  She still feels numbness in the tips of the fingers and toes. it is not worse. She denies fevers/infections. Has mild HFS and applying moisturizing lotions. She denies pain.       ECOG 1    ROS:  A comprehensive ROS was otherwise neg      I reviewed with her history in epic as below.    PAST MEDICAL HISTORY  Reflux, hemorrhoid, hepatitis B?  Overactive bladder  Active Ambulatory Problems     Diagnosis Date Noted     External hemorrhoids 04/29/2016     Non morbid obesity due to excess calories 04/29/2016     Hepatitis B immune 04/29/2016     Screening for cervical cancer 04/29/2016     CARDIOVASCULAR SCREENING; LDL GOAL LESS THAN 160 04/29/2016     Gastroesophageal reflux disease 04/29/2016     Overactive bladder 07/21/2017     Nevus of left lower leg- undetermined behavior 12/27/2018     Metastasis from rectal cancer (H) 11/13/2019     Bone metastasis (H) 11/13/2019     Nausea 11/27/2019     Chemotherapy-induced neutropenia (H) 12/24/2019     Resolved Ambulatory Problems     Diagnosis Date Noted     Urgency of urination 04/29/2016     Need for vaccination against Streptococcus pneumoniae 04/29/2016     Morbid obesity (H) 07/21/2017     Obesity (BMI 35.0-39.9) with comorbidity (H) 12/27/2018     No Additional Past Medical History       MEDICATIONS:  Current Outpatient Medications   Medication     Ascorbic Acid (VITAMIN C) 500 MG CAPS     B Complex Vitamins (B COMPLEX-B12 PO)     capecitabine (XELODA) 500 MG tablet     cholecalciferol (VITAMIN D3) 400 unit (10 mcg) TABS tablet     docusate sodium (COLACE) 100 MG capsule     Fesoterodine Fumarate 8 MG TB24     lidocaine-prilocaine (EMLA) 2.5-2.5 % external cream     loperamide (IMODIUM) 2 MG capsule      LORazepam (ATIVAN) 0.5 MG tablet     LORazepam (ATIVAN) 0.5 MG tablet     Lutein 20 MG CAPS     mirabegron (MYRBETRIQ) 50 MG 24 hr tablet     Multiple vitamin TABS     naloxone (NARCAN) 4 MG/0.1ML nasal spray     omeprazole (PRILOSEC) 20 MG DR capsule     ondansetron (ZOFRAN) 8 MG tablet     prochlorperazine (COMPAZINE) 10 MG tablet     solifenacin (VESICARE) 10 MG tablet     traMADol (ULTRAM) 50 MG tablet     capecitabine (XELODA) 500 MG tablet     OLANZapine (ZYPREXA) 2.5 MG tablet     Current Facility-Administered Medications   Medication     lidocaine (XYLOCAINE) 5 % ointment     ALLERGY:     Allergies   Allergen Reactions     Penicillins      Sulfa Drugs             SOCIAL HISTORY:  She is a [place] immigrants to [place].  Denies smoking denies alcohol abuse.  2 of her sons are doctors.    FAMILY HISTORY:   Denies any family history of cancer.       PHYSICAL EXAMINATION:       LMP 11/27/2001 (Approximate)      Wt Readings from Last 4 Encounters:   07/27/20 73.9 kg (163 lb)   07/22/20 74.3 kg (163 lb 11.2 oz)   06/16/20 70.8 kg (156 lb 1.6 oz)   05/27/20 70.6 kg (155 lb 11.2 oz)       Constitutional.  Does not seem to be in any apparent distress.  Respiratory.  Breathing does not seem to be labored.  Speaking in complete sentences without coughing.    Neurological.  Alert and oriented.  Psychiatric.  Appropriate mood and mentation.      LABS/IMAGING/PATHOLOGY:    Reviewed      CT CHEST/ABDOMEN/PELVIS W CONTRAST 7/20/2020 11:08 AM     CLINICAL HISTORY: metastatic rectal cancer - currently on Xeloda.;  Metastasis from rectal cancer (H); Metastasis from rectal cancer (H)     TECHNIQUE: CT scan of the chest, abdomen, and pelvis was performed  following injection of IV contrast. Multiplanar reformats were  obtained. Dose reduction techniques were used.   CONTRAST: 96 ml isovue 370     COMPARISON: CT chest abdomen pelvis dated 2/21/2020 and prior CT  exams.     FINDINGS:   Chest:  LUNGS AND PLEURA:  Interval  increase in left lower lobe nodule on image 124 series 6  measuring 10 mm, previously 5 mm. Interval increase in the right lower  lobe nodule measuring 5 mm on image 87, previously 3 mm. 3 mm nodule  in the right middle lobe on image 87 is also increased. 3 mm nodule  along the right major fissure on image 123 previously measured 1 mm.  New 2 mm nodule in right lower lobe on image 89 series 6. 3 mm left  upper lobe nodule on image 87, previously 1 mm. Newly appreciated 3 mm  left lower lobe nodule on image 67 newly appreciated 1.5 cm  pleural-based nodularity in the left lower lobe on image 106. Stable 4  mm nodule on image 47 series 6 in left upper lobe. Stable 2 mm left  upper lobe nodule image 60 series 6. Stable 3 mm right middle lobe  nodule on image 85 series 6.     MEDIASTINUM/AXILLAE: No axillary, mediastinal or hilar  lymphadenopathy.     There is a small sliding-type hiatal hernia.     Heart size is normal without significant pericardial effusion. The  thoracic aorta is normal in caliber.     Abdomen and pelvis:  Persistent lower rectal thickening, in keeping with patient's known  tumor. Exact interval change is difficult to evaluate due to inherent  poor tissue contrast resolution or CT exam. Perirectal 10 mm nodule on  image 516 and series 3 demonstrates interval improvement since prior  exam when it measured 16 mm. 1.4 cm lymph node in the mesorectum on  image 506 is stable since prior exam. Stable 1.5 cm lymph node along  the superior hemorrhoidal vasculature on image 490.     HEPATOBILIARY: No focal hepatic lesion. Cholecystectomy. No biliary  ductal dilatation.     PANCREAS: Normal.     SPLEEN: Normal.     ADRENAL GLANDS: Normal.     KIDNEYS/BLADDER: Normal.     BOWEL: Small and large bowel loops are normal in caliber without  obstruction. Scattered pancolonic diverticula without acute  diverticulitis. The appendix is normal in appearance.     PELVIC ORGANS: Uterus and adnexa  unremarkable.     ADDITIONAL FINDINGS: Abdominal aorta normal in caliber with mild mixed  atheromatous plaque. Major portal veins are patent. No free fluid or  pneumoperitoneum. No new abdominal or pelvic lymphadenopathy.  Resolution of right internal iliac adenopathy.     MUSCULOSKELETAL: Stable size of sclerotic lesion at the left aspect of  L3 vertebral body without pathologic fracture. Stable sclerotic focus  in left pubis on image 546                                                                      IMPRESSION: In this patient with history of metastatic rectal cancer,  there is suggestion of progression:  1.  Although that is stable appearance of rectal mass and minimal  improvement in perirectal nodularity/lymph nodes, there is significant  increase in size and number of bilateral pulmonary nodules.  2.  Stable size of presacral hypermetabolic L3 sclerotic lesion, may  be due to treatment effect.      ASSESSMENT AND PLAN:      Metastatic rectal adenocarcinoma, MSI intact, K-aimee wild type, PDL 1 + 25% with metastasis to the lungs, bones and pelvic lymph nodes.      She started palliative chemotherapy with FOLFOX/Avastin on 11/25/2019.      She has received 6 cycles of chemotherapy.  With cycle #3, we stopped 5-FU bolus because of excessive tiredness.    Repeat CT CAP on 2/21/2020 shows good response to therapy with improvement in the rectal mass, mesorectal and superior rectal nodularity as well as resolution of right internal iliac lymphadenopathy.  There is slight improvement in the dominant posterior left lower lobe lung nodule and several of the lung nodules are is stable.  There is increase in size of the sclerotic lesion of the left aspect of L3 vertebral body without pathological fracture and this is likely due to treatment effect.    CEA on 2/10/2020 was down to 3.3.    With cycle #9 we stopped oxaliplatin because of cumulative fatigue and increasing nausea and gave her 5-FU/Avastin.      I then  discussed with her, her  as well as her son Gamal about possibly switching to single agent Xeloda going forward so as to limit her visits to the clinic and therefore exposure and risk from coronavirus.  We discussed the pros and cons of this approach and they were all willing to try this.      She started single agent Xeloda on 4/7/2020.    CT chest abdomen and pelvis on 7/20/2020 showed mild progression in the lung nodules.  Perirectal nodularity has mildly improved.    CEA on 6/30/2020 was 2.8.  On 7/22/2020 it was 4    We decided on switching to Xeloda/Avastin on 7/27/2020.  We decreased the dose of oxaliplatin to 100 mg per metered square.    CEA on 8/11/2020 was 4.8.    She has tolerated 2 cycles of chemo well.  We will proceed with C#3 today  CEA on 9/2/2020 was 5.1.   Plan to repeat CT CAP after 4 cycles.      Nausea.  It is mild and decently controlled with anti nausea meds. Cont the same. She is not using Zyprexa.    Diarrhea.  She had some diarrhea for a few days but it was well controlled with imodium and she will continue.     Hand-foot syndrome. She has mild HFS. Cont moisturizing lotions regularly.     Neuropathy.  She has mild numbness of the fingertips and toes and this has not changed. Cont to observe on a lower dose of Oxaliplatin. She continues to do massages which she thinks is helping.     Bone metastasis.  Started Zometa on 11/25/2019.  She is getting it every 3 months and she last received on 8/18/2020.  Continue vitamin D and calcium.  Bone metastasis seemed stable on CT scan on 7/20/2020.        We did not address the following today.      Left big toe cellulitis.  Resolved.    Discussion regarding health care directive  We discussed that it is important that she completes health care directive which would help in making sure that her wishes are followed about her treatment care in case she is not able to make a decision for herself.  We gave her information regarding that.    RTC 3  weeks     All questions were answered to her satisfaction.  She is agreeable and comfortable with the plan.    Charline Velazquez MD    Total phone call time. 15 Minutes

## 2020-09-08 NOTE — NURSING NOTE
"Alannah Wynn is a 69 year old female who is being evaluated via a billable video visit.      The patient has been notified of following:     \"This video visit will be conducted via a call between you and your physician/provider. We have found that certain health care needs can be provided without the need for an in-person physical exam.  This service lets us provide the care you need with a video conversation.  If a prescription is necessary we can send it directly to your pharmacy.  If lab work is needed we can place an order for that and you can then stop by our lab to have the test done at a later time.    Video visits are billed at different rates depending on your insurance coverage.  Please reach out to your insurance provider with any questions.    If during the course of the call the physician/provider feels a video visit is not appropriate, you will not be charged for this service.\"    Patient has given verbal consent for Video visit? Yes  How would you like to obtain your AVS? MyChart  If you are dropped from the video visit, the video invite should be resent to: Text to cell phone: [phone]  Will anyone else be joining your video visit? No      Video-Visit Details    Type of service:  Video Visit    Originating Location (pt. Location): Home    Distant Location (provider location):  [facility] [[email or web address]     Platform used for Video Visit: Driss Bruno CMA      "

## 2020-09-23 NOTE — PROGRESS NOTES
My chart picture of great toe showing infection which is recurrent.  Will give Keflex 500 mg 4 times daily x10 days this time since it may be slowing and healing with the Avastin is given for patient.  Picture also appears to show ingrown toenail so would suggest referral to podiatry in about 2 weeks after retrying clear up some of the infection.  Current chemotherapy includes Xeloda which can cause peeling and irritation of the toes and fingers which may play a part in the infection as well.Tania Garner,CNP

## 2020-09-30 NOTE — PROGRESS NOTES
Infusion Nursing Note:  Alannah Wynn presents today for C4D1 MVASI/Oxlaliplatin/Flu shot.    Patient seen by provider today: Yes: Dr Velazquez   present during visit today: Not Applicable.    Note: N/A.    Intravenous Access:  Implanted Port.    Treatment Conditions:  Urine PRO Negative  Lab Results   Component Value Date    HGB 11.7 09/30/2020     Lab Results   Component Value Date    WBC 3.8 09/30/2020      Lab Results   Component Value Date    ANEU 1.3 09/30/2020     Lab Results   Component Value Date     09/30/2020      Lab Results   Component Value Date     09/30/2020                   Lab Results   Component Value Date    POTASSIUM 4.1 09/30/2020           No results found for: MAG         Lab Results   Component Value Date    CR 0.56 09/30/2020                   Lab Results   Component Value Date    LAUREN 8.4 09/30/2020                Lab Results   Component Value Date    BILITOTAL 0.3 09/30/2020           Lab Results   Component Value Date    ALBUMIN 3.3 09/30/2020                    Lab Results   Component Value Date    ALT 31 09/30/2020           Lab Results   Component Value Date    AST 34 09/30/2020       Results reviewed, labs MET treatment parameters, ok to proceed with treatment.      Post Infusion Assessment:  Patient tolerated infusion without incident.  Access discontinued per protocol.       Discharge Plan:   Patient discharged in stable condition accompanied by: self and  to drive.    Helen Ha RN

## 2020-09-30 NOTE — LETTER
"    9/30/2020         RE: Alannah Wynn  [address]  [place] [address]        Dear Colleague,    Thank you for referring your patient, Alannah Wynn, to the [facility]. Please see a copy of my visit note below.    ONCOLOGY FOLLOW UP NOTE    9/30/2020     Patient was being followed by Dr. Tavarez and saw her on 11/13/2019.  Now she is transferring her care to me.  I have reviewed her previous records and have copied and updated from prior notes.      DIAGNOSIS:    10/2019 dx adenoCa of rectum,  MMR intact- NGS Negative for KRAS, NRAS and BRAF. PD L1 25%.  Baseline CEA 10.  Stage IV disease with pulmonary and bony mets      HISTORY OF ONCOLOGY ILLNESS:    She has had intermittent hematochezia and anorectal irritation since 2011, that have been managed as \"hemorrhoids\". These symptoms became persistent and progressive since early thi year. Her anorectal pain is managed with Tylenol. Denies fevers, chills, or unintentional weight loss. Does have intermittent urinary and fecal urgency but no incontinence per se.    Diagnostic colonoscopy on 10/18/19 for rectal bleeding showed \"fungating and ulcerated non-obstructing mass was found in the distal rectum <1 cm from the anal verge. The mass was partially circumferential (involving one-half of the lumen circumference). The mass measured four cm in length. In addition, its diameter measured six mm.  Pathology showed invasive moderately differentiated adenocarcinoma with intact mismatch repair protein expression, PD L1 25% and NGS panel showed NO MUTATIONS in KRAS, NRAS and BRAF.      Pelvic MRI 11/2019 found fungating polypoid mid/lower rectal mass with luminal narrowing as well as innumerable enlarged lymph nodes as described above with a conglomerate of suspicious lymph nodes abutting the anterior peritoneal reflection. Stage cT3d N2.     PET 11/2019 found   1. Hypermetabolic mass in the distal rectum, SUV 14 corresponding to the patient's " known rectal cancer.  2. Multiple hypermetabolic pelvic lymph nodes, compatible metastatic disease, SUV 5-6.  3. FDG avid pulmonary nodule measuring 7 mm in the left lower lobe, concerning for metastatic disease. Additional FDG avid pulmonary nodules suspicious for metastatic disease, SUV around 4.  4. Osteoblastic metastatic lesion in the L3 vertebral body, SUV 6.8    She started palliative FOLFOX/Avastin on 11/25/2019.  Cycle #2 given on 12/9/2019.  Cycle #3 scheduled for 12/24/2019.  C#6 on 2/10/2020.    Repeat CT CAP on 2/21/2020 shows good response to therapy with improvement in the rectal mass, mesorectal and superior rectal nodularity as well as resolution of right internal iliac lymphadenopathy.  There is slight improvement in the dominant posterior left lower lobe lung nodule and several of the lung nodules are is stable.  There is increase in size of the sclerotic lesion of the left aspect of L3 vertebral body without pathological fracture and this is likely due to treatment effect.    CEA on 2/10/2020 was down to 3.3.    Cycle #7 FOLFOX/Avastin 2/25/2020.  C#8 FOLFOX/Avastin 3/9/2020  Cycle#9 5-FU/Avastin 3/24/2020-stopped oxaliplatin due to cumulative fatigue and issues with nausea.    I then discussed with her, her  as well as her son Gamal about possibly switching to single agent Xeloda going forward so as to limit her visits to the clinic and therefore exposure and risk from coronavirus.  We discussed the pros and cons of this approach and they were all willing to try this.     She started Single agent Xeloda on 4/7/2020.  Cycle #3 started on 5/19/2020.    Because of bone metastatic disease, she was started on Zometa on 11/25/2019. We are giving it every 3 months.  Last received 5/15/2020      CT chest abdomen and pelvis on 7/20/2020 showed mild progression in the lung nodules.  Perirectal nodularity has mildly improved.    Last CEA on 7/22/2020 was 4.      7/27/2020- we decided on switching  to XELOX/Avastin.     8/18/2020 -cycle #2 XELOX/Avastin.  CEA on 8/11/2020 was 4.8.  9/8/2020 - C#3 XELOX/Avastin. CEA on 9/2/2020 was 5.1  9/30/2020.  Cycle #4 XELOX/Avastin.  Oxaliplatin dose reduced further to 85 mg per metered square because of worsening neuropathy.        INTERVAL HISTORY:     She comes in today and tells me that overall she has been doing fairly okay but she has issues with the right toe discomfort and what looks like an ingrown toenail with some infection for which she is on Keflex.  It is a little better as she started taking antibiotics 5 days ago.  No fevers.  She has some nausea for about a week after getting the chemotherapy which is controlled with antiemetics.  She also has off-and-on diarrhea for which he takes Imodium but it is not very severe.  She mentions that the neuropathy is worse and she notices burning at the tips of the fingers.  She has mild hand-foot syndrome and she is applying moisturizing lotions.  No shortness of breath.  Denies any issues with bleeding.  She feels a little tired but remains decently functional.      ECOG 1    ROS:  Rest of the comprehensive review of the system was essentially unremarkable.        I reviewed with her history in epic as below.    PAST MEDICAL HISTORY  Reflux, hemorrhoid, hepatitis B?  Overactive bladder  Active Ambulatory Problems     Diagnosis Date Noted     External hemorrhoids 04/29/2016     Non morbid obesity due to excess calories 04/29/2016     Hepatitis B immune 04/29/2016     Screening for cervical cancer 04/29/2016     CARDIOVASCULAR SCREENING; LDL GOAL LESS THAN 160 04/29/2016     Gastroesophageal reflux disease 04/29/2016     Overactive bladder 07/21/2017     Nevus of left lower leg- undetermined behavior 12/27/2018     Metastasis from rectal cancer (H) 11/13/2019     Bone metastasis (H) 11/13/2019     Nausea 11/27/2019     Chemotherapy-induced neutropenia (H) 12/24/2019     Resolved Ambulatory Problems     Diagnosis Date  Noted     Urgency of urination 04/29/2016     Need for vaccination against Streptococcus pneumoniae 04/29/2016     Morbid obesity (H) 07/21/2017     Obesity (BMI 35.0-39.9) with comorbidity (H) 12/27/2018     No Additional Past Medical History       MEDICATIONS:  Current Outpatient Medications   Medication     Ascorbic Acid (VITAMIN C) 500 MG CAPS     B Complex Vitamins (B COMPLEX-B12 PO)     capecitabine (XELODA) 500 MG tablet     cephALEXin (KEFLEX) 500 MG capsule     cholecalciferol (VITAMIN D3) 400 unit (10 mcg) TABS tablet     docusate sodium (COLACE) 100 MG capsule     Fesoterodine Fumarate 8 MG TB24     lidocaine-prilocaine (EMLA) 2.5-2.5 % external cream     loperamide (IMODIUM) 2 MG capsule     LORazepam (ATIVAN) 0.5 MG tablet     LORazepam (ATIVAN) 0.5 MG tablet     Lutein 20 MG CAPS     mirabegron (MYRBETRIQ) 50 MG 24 hr tablet     Multiple vitamin TABS     naloxone (NARCAN) 4 MG/0.1ML nasal spray     omeprazole (PRILOSEC) 20 MG DR capsule     ondansetron (ZOFRAN) 8 MG tablet     prochlorperazine (COMPAZINE) 10 MG tablet     solifenacin (VESICARE) 10 MG tablet     traMADol (ULTRAM) 50 MG tablet     capecitabine (XELODA) 500 MG tablet     OLANZapine (ZYPREXA) 2.5 MG tablet     Current Facility-Administered Medications   Medication     lidocaine (XYLOCAINE) 5 % ointment     Facility-Administered Medications Ordered in Other Visits   Medication     heparin 100 UNIT/ML injection 5 mL     sodium chloride (PF) 0.9% PF flush 10-20 mL     ALLERGY:     Allergies   Allergen Reactions     Penicillins      Sulfa Drugs             SOCIAL HISTORY:  She is a [place] immigrants to [place].  Denies smoking denies alcohol abuse.  2 of her sons are doctors.    FAMILY HISTORY:   Denies any family history of cancer.       PHYSICAL EXAMINATION:       BP (!) 150/87 (BP Location: Left arm, Patient Position: Chair, Cuff Size: Adult Regular)   Pulse 75   Temp 97  F (36.1  C) (Oral)   Wt 72.5 kg (159 lb 12.8 oz)   LMP  11/27/2001 (Approximate)   SpO2 100%   BMI 29.23 kg/m       Wt Readings from Last 4 Encounters:   09/30/20 72.5 kg (159 lb 12.8 oz)   09/08/20 74.3 kg (163 lb 14.4 oz)   07/27/20 73.9 kg (163 lb)   07/22/20 74.3 kg (163 lb 11.2 oz)       CONSTITUTIONAL: no acute distress  EYES: PERRLA, no palor or icterus.   ENT/MOUTH: no mouth lesions. Ears normal  CVS: s1s2 no m r g .   RESPIRATORY: clear to auscultation b/l  GI: soft non tender no hepatosplenomegaly  NEURO: AAOX3  Grossly non focal neuro exam  INTEGUMENT: no obvious rashes  LYMPHATIC: no palpable cervical, supraclavicular, axillary or inguinal LAD  MUSCULOSKELETAL: Unremarkable. No bony tenderness.   EXTREMITIES: no edema.  Right big toe has what looks like an ingrown toenail on the medial side with some swelling and mild redness consistent with infection.  There is mild hand-foot syndrome with peeling of the skin of palms and soles and some hyperpigmentation.  PSYCH: Mentation, mood and affect are normal. Decision making capacity is intact    LABS/IMAGING/PATHOLOGY:    Reviewed      CT CHEST/ABDOMEN/PELVIS W CONTRAST 7/20/2020 11:08 AM     CLINICAL HISTORY: metastatic rectal cancer - currently on Xeloda.;  Metastasis from rectal cancer (H); Metastasis from rectal cancer (H)     TECHNIQUE: CT scan of the chest, abdomen, and pelvis was performed  following injection of IV contrast. Multiplanar reformats were  obtained. Dose reduction techniques were used.   CONTRAST: 96 ml isovue 370     COMPARISON: CT chest abdomen pelvis dated 2/21/2020 and prior CT  exams.     FINDINGS:   Chest:  LUNGS AND PLEURA:  Interval increase in left lower lobe nodule on image 124 series 6  measuring 10 mm, previously 5 mm. Interval increase in the right lower  lobe nodule measuring 5 mm on image 87, previously 3 mm. 3 mm nodule  in the right middle lobe on image 87 is also increased. 3 mm nodule  along the right major fissure on image 123 previously measured 1 mm.  New 2 mm nodule in  right lower lobe on image 89 series 6. 3 mm left  upper lobe nodule on image 87, previously 1 mm. Newly appreciated 3 mm  left lower lobe nodule on image 67 newly appreciated 1.5 cm  pleural-based nodularity in the left lower lobe on image 106. Stable 4  mm nodule on image 47 series 6 in left upper lobe. Stable 2 mm left  upper lobe nodule image 60 series 6. Stable 3 mm right middle lobe  nodule on image 85 series 6.     MEDIASTINUM/AXILLAE: No axillary, mediastinal or hilar  lymphadenopathy.     There is a small sliding-type hiatal hernia.     Heart size is normal without significant pericardial effusion. The  thoracic aorta is normal in caliber.     Abdomen and pelvis:  Persistent lower rectal thickening, in keeping with patient's known  tumor. Exact interval change is difficult to evaluate due to inherent  poor tissue contrast resolution or CT exam. Perirectal 10 mm nodule on  image 516 and series 3 demonstrates interval improvement since prior  exam when it measured 16 mm. 1.4 cm lymph node in the mesorectum on  image 506 is stable since prior exam. Stable 1.5 cm lymph node along  the superior hemorrhoidal vasculature on image 490.     HEPATOBILIARY: No focal hepatic lesion. Cholecystectomy. No biliary  ductal dilatation.     PANCREAS: Normal.     SPLEEN: Normal.     ADRENAL GLANDS: Normal.     KIDNEYS/BLADDER: Normal.     BOWEL: Small and large bowel loops are normal in caliber without  obstruction. Scattered pancolonic diverticula without acute  diverticulitis. The appendix is normal in appearance.     PELVIC ORGANS: Uterus and adnexa unremarkable.     ADDITIONAL FINDINGS: Abdominal aorta normal in caliber with mild mixed  atheromatous plaque. Major portal veins are patent. No free fluid or  pneumoperitoneum. No new abdominal or pelvic lymphadenopathy.  Resolution of right internal iliac adenopathy.     MUSCULOSKELETAL: Stable size of sclerotic lesion at the left aspect of  L3 vertebral body without pathologic  fracture. Stable sclerotic focus  in left pubis on image 546                                                                      IMPRESSION: In this patient with history of metastatic rectal cancer,  there is suggestion of progression:  1.  Although that is stable appearance of rectal mass and minimal  improvement in perirectal nodularity/lymph nodes, there is significant  increase in size and number of bilateral pulmonary nodules.  2.  Stable size of presacral hypermetabolic L3 sclerotic lesion, may  be due to treatment effect.      ASSESSMENT AND PLAN:      Metastatic rectal adenocarcinoma, MSI intact, K-aimee wild type, PDL 1 + 25% with metastasis to the lungs, bones and pelvic lymph nodes.      She started palliative chemotherapy with FOLFOX/Avastin on 11/25/2019.      She has received 6 cycles of chemotherapy.  With cycle #3, we stopped 5-FU bolus because of excessive tiredness.    Repeat CT CAP on 2/21/2020 shows good response to therapy with improvement in the rectal mass, mesorectal and superior rectal nodularity as well as resolution of right internal iliac lymphadenopathy.  There is slight improvement in the dominant posterior left lower lobe lung nodule and several of the lung nodules are is stable.  There is increase in size of the sclerotic lesion of the left aspect of L3 vertebral body without pathological fracture and this is likely due to treatment effect.    CEA on 2/10/2020 was down to 3.3.    With cycle #9 we stopped oxaliplatin because of cumulative fatigue and increasing nausea and gave her 5-FU/Avastin.      I then discussed with her, her  as well as her son Gamal about possibly switching to single agent Xeloda going forward so as to limit her visits to the clinic and therefore exposure and risk from coronavirus.  We discussed the pros and cons of this approach and they were all willing to try this.      She started single agent Xeloda on 4/7/2020.    CT chest abdomen and pelvis on  7/20/2020 showed mild progression in the lung nodules.  Perirectal nodularity has mildly improved.    CEA on 6/30/2020 was 2.8.  On 7/22/2020 it was 4    We decided on switching to Xeloda/Avastin on 7/27/2020.  We decreased the dose of oxaliplatin to 100 mg per metered square.    CEA on 8/11/2020 was 4.8.    Overall she is tolerating chemotherapy fairly well with some nausea and diarrhea and some fatigue and worsening neuropathy.  We will proceed with cycle #4 today but I will decrease the dose of oxaliplatin to now 85 mg per metered square.    We will repeat CT scan before cycle #5.    Ingrown infected toenail.  Continue Keflex.  I would like her to be seen by podiatrist.    Neuropathy.  It is slowly worsening and I will further dose reduce oxaliplatin to 85 mg per metered square.  I also recommend that she tries acupuncture.  She is doing massages which help.    Nausea.  Continue antiemetics.  She usually gets nausea for the first week.  She is not on Zyprexa.    Diarrhea.  This is off and on usually for the first week.  Try Imodium as needed.    Hand-foot syndrome.  She has evidence of hand-foot syndrome from Xeloda. I would like her to start using urea cream to help with hand-foot syndrome.      Bone metastasis.  Started Zometa on 11/25/2019.  She is getting it every 3 months and she last received on 8/18/2020.  Continue vitamin D and calcium.  Bone metastasis seemed stable on CT scan on 7/20/2020.        We did not address the following today.        Discussion regarding health care directive  We discussed that it is important that she completes health care directive which would help in making sure that her wishes are followed about her treatment care in case she is not able to make a decision for herself.  We gave her information regarding that.    RTC 3 weeks     All questions were answered to her satisfaction.  She is agreeable and comfortable with the plan.    Charline Velazquez MD      Again, thank you for  allowing me to participate in the care of your patient.        Sincerely,        Charline Velazquez MD

## 2020-09-30 NOTE — PROGRESS NOTES
"ONCOLOGY FOLLOW UP NOTE    9/30/2020     Patient was being followed by Dr. Tavarez and saw her on 11/13/2019.  Now she is transferring her care to me.  I have reviewed her previous records and have copied and updated from prior notes.      DIAGNOSIS:    10/2019 dx adenoCa of rectum,  MMR intact- NGS Negative for KRAS, NRAS and BRAF. PD L1 25%.  Baseline CEA 10.  Stage IV disease with pulmonary and bony mets      HISTORY OF ONCOLOGY ILLNESS:    She has had intermittent hematochezia and anorectal irritation since 2011, that have been managed as \"hemorrhoids\". These symptoms became persistent and progressive since early thi year. Her anorectal pain is managed with Tylenol. Denies fevers, chills, or unintentional weight loss. Does have intermittent urinary and fecal urgency but no incontinence per se.    Diagnostic colonoscopy on 10/18/19 for rectal bleeding showed \"fungating and ulcerated non-obstructing mass was found in the distal rectum <1 cm from the anal verge. The mass was partially circumferential (involving one-half of the lumen circumference). The mass measured four cm in length. In addition, its diameter measured six mm.  Pathology showed invasive moderately differentiated adenocarcinoma with intact mismatch repair protein expression, PD L1 25% and NGS panel showed NO MUTATIONS in KRAS, NRAS and BRAF.      Pelvic MRI 11/2019 found fungating polypoid mid/lower rectal mass with luminal narrowing as well as innumerable enlarged lymph nodes as described above with a conglomerate of suspicious lymph nodes abutting the anterior peritoneal reflection. Stage cT3d N2.     PET 11/2019 found   1. Hypermetabolic mass in the distal rectum, SUV 14 corresponding to the patient's known rectal cancer.  2. Multiple hypermetabolic pelvic lymph nodes, compatible metastatic disease, SUV 5-6.  3. FDG avid pulmonary nodule measuring 7 mm in the left lower lobe, concerning for metastatic disease. Additional FDG avid pulmonary nodules " suspicious for metastatic disease, SUV around 4.  4. Osteoblastic metastatic lesion in the L3 vertebral body, SUV 6.8    She started palliative FOLFOX/Avastin on 11/25/2019.  Cycle #2 given on 12/9/2019.  Cycle #3 scheduled for 12/24/2019.  C#6 on 2/10/2020.    Repeat CT CAP on 2/21/2020 shows good response to therapy with improvement in the rectal mass, mesorectal and superior rectal nodularity as well as resolution of right internal iliac lymphadenopathy.  There is slight improvement in the dominant posterior left lower lobe lung nodule and several of the lung nodules are is stable.  There is increase in size of the sclerotic lesion of the left aspect of L3 vertebral body without pathological fracture and this is likely due to treatment effect.    CEA on 2/10/2020 was down to 3.3.    Cycle #7 FOLFOX/Avastin 2/25/2020.  C#8 FOLFOX/Avastin 3/9/2020  Cycle#9 5-FU/Avastin 3/24/2020-stopped oxaliplatin due to cumulative fatigue and issues with nausea.    I then discussed with her, her  as well as her son Gamal about possibly switching to single agent Xeloda going forward so as to limit her visits to the clinic and therefore exposure and risk from coronavirus.  We discussed the pros and cons of this approach and they were all willing to try this.     She started Single agent Xeloda on 4/7/2020.  Cycle #3 started on 5/19/2020.    Because of bone metastatic disease, she was started on Zometa on 11/25/2019. We are giving it every 3 months.  Last received 5/15/2020      CT chest abdomen and pelvis on 7/20/2020 showed mild progression in the lung nodules.  Perirectal nodularity has mildly improved.    Last CEA on 7/22/2020 was 4.      7/27/2020- we decided on switching to XELOX/Avastin.     8/18/2020 -cycle #2 XELOX/Avastin.  CEA on 8/11/2020 was 4.8.  9/8/2020 - C#3 XELOX/Avastin. CEA on 9/2/2020 was 5.1  9/30/2020.  Cycle #4 XELOX/Avastin.  Oxaliplatin dose reduced further to 85 mg per metered square because of  worsening neuropathy.        INTERVAL HISTORY:     She comes in today and tells me that overall she has been doing fairly okay but she has issues with the right toe discomfort and what looks like an ingrown toenail with some infection for which she is on Keflex.  It is a little better as she started taking antibiotics 5 days ago.  No fevers.  She has some nausea for about a week after getting the chemotherapy which is controlled with antiemetics.  She also has off-and-on diarrhea for which he takes Imodium but it is not very severe.  She mentions that the neuropathy is worse and she notices burning at the tips of the fingers.  She has mild hand-foot syndrome and she is applying moisturizing lotions.  No shortness of breath.  Denies any issues with bleeding.  She feels a little tired but remains decently functional.      ECOG 1    ROS:  Rest of the comprehensive review of the system was essentially unremarkable.        I reviewed with her history in epic as below.    PAST MEDICAL HISTORY  Reflux, hemorrhoid, hepatitis B?  Overactive bladder  Active Ambulatory Problems     Diagnosis Date Noted     External hemorrhoids 04/29/2016     Non morbid obesity due to excess calories 04/29/2016     Hepatitis B immune 04/29/2016     Screening for cervical cancer 04/29/2016     CARDIOVASCULAR SCREENING; LDL GOAL LESS THAN 160 04/29/2016     Gastroesophageal reflux disease 04/29/2016     Overactive bladder 07/21/2017     Nevus of left lower leg- undetermined behavior 12/27/2018     Metastasis from rectal cancer (H) 11/13/2019     Bone metastasis (H) 11/13/2019     Nausea 11/27/2019     Chemotherapy-induced neutropenia (H) 12/24/2019     Resolved Ambulatory Problems     Diagnosis Date Noted     Urgency of urination 04/29/2016     Need for vaccination against Streptococcus pneumoniae 04/29/2016     Morbid obesity (H) 07/21/2017     Obesity (BMI 35.0-39.9) with comorbidity (H) 12/27/2018     No Additional Past Medical History        MEDICATIONS:  Current Outpatient Medications   Medication     Ascorbic Acid (VITAMIN C) 500 MG CAPS     B Complex Vitamins (B COMPLEX-B12 PO)     capecitabine (XELODA) 500 MG tablet     cephALEXin (KEFLEX) 500 MG capsule     cholecalciferol (VITAMIN D3) 400 unit (10 mcg) TABS tablet     docusate sodium (COLACE) 100 MG capsule     Fesoterodine Fumarate 8 MG TB24     lidocaine-prilocaine (EMLA) 2.5-2.5 % external cream     loperamide (IMODIUM) 2 MG capsule     LORazepam (ATIVAN) 0.5 MG tablet     LORazepam (ATIVAN) 0.5 MG tablet     Lutein 20 MG CAPS     mirabegron (MYRBETRIQ) 50 MG 24 hr tablet     Multiple vitamin TABS     naloxone (NARCAN) 4 MG/0.1ML nasal spray     omeprazole (PRILOSEC) 20 MG DR capsule     ondansetron (ZOFRAN) 8 MG tablet     prochlorperazine (COMPAZINE) 10 MG tablet     solifenacin (VESICARE) 10 MG tablet     traMADol (ULTRAM) 50 MG tablet     capecitabine (XELODA) 500 MG tablet     OLANZapine (ZYPREXA) 2.5 MG tablet     Current Facility-Administered Medications   Medication     lidocaine (XYLOCAINE) 5 % ointment     Facility-Administered Medications Ordered in Other Visits   Medication     heparin 100 UNIT/ML injection 5 mL     sodium chloride (PF) 0.9% PF flush 10-20 mL     ALLERGY:     Allergies   Allergen Reactions     Penicillins      Sulfa Drugs             SOCIAL HISTORY:  She is a [place] immigrants to [place].  Denies smoking denies alcohol abuse.  2 of her sons are doctors.    FAMILY HISTORY:   Denies any family history of cancer.       PHYSICAL EXAMINATION:       BP (!) 150/87 (BP Location: Left arm, Patient Position: Chair, Cuff Size: Adult Regular)   Pulse 75   Temp 97  F (36.1  C) (Oral)   Wt 72.5 kg (159 lb 12.8 oz)   LMP 11/27/2001 (Approximate)   SpO2 100%   BMI 29.23 kg/m       Wt Readings from Last 4 Encounters:   09/30/20 72.5 kg (159 lb 12.8 oz)   09/08/20 74.3 kg (163 lb 14.4 oz)   07/27/20 73.9 kg (163 lb)   07/22/20 74.3 kg (163 lb 11.2 oz)        CONSTITUTIONAL: no acute distress  EYES: PERRLA, no palor or icterus.   ENT/MOUTH: no mouth lesions. Ears normal  CVS: s1s2 no m r g .   RESPIRATORY: clear to auscultation b/l  GI: soft non tender no hepatosplenomegaly  NEURO: AAOX3  Grossly non focal neuro exam  INTEGUMENT: no obvious rashes  LYMPHATIC: no palpable cervical, supraclavicular, axillary or inguinal LAD  MUSCULOSKELETAL: Unremarkable. No bony tenderness.   EXTREMITIES: no edema.  Right big toe has what looks like an ingrown toenail on the medial side with some swelling and mild redness consistent with infection.  There is mild hand-foot syndrome with peeling of the skin of palms and soles and some hyperpigmentation.  PSYCH: Mentation, mood and affect are normal. Decision making capacity is intact    LABS/IMAGING/PATHOLOGY:    Reviewed      CT CHEST/ABDOMEN/PELVIS W CONTRAST 7/20/2020 11:08 AM     CLINICAL HISTORY: metastatic rectal cancer - currently on Xeloda.;  Metastasis from rectal cancer (H); Metastasis from rectal cancer (H)     TECHNIQUE: CT scan of the chest, abdomen, and pelvis was performed  following injection of IV contrast. Multiplanar reformats were  obtained. Dose reduction techniques were used.   CONTRAST: 96 ml isovue 370     COMPARISON: CT chest abdomen pelvis dated 2/21/2020 and prior CT  exams.     FINDINGS:   Chest:  LUNGS AND PLEURA:  Interval increase in left lower lobe nodule on image 124 series 6  measuring 10 mm, previously 5 mm. Interval increase in the right lower  lobe nodule measuring 5 mm on image 87, previously 3 mm. 3 mm nodule  in the right middle lobe on image 87 is also increased. 3 mm nodule  along the right major fissure on image 123 previously measured 1 mm.  New 2 mm nodule in right lower lobe on image 89 series 6. 3 mm left  upper lobe nodule on image 87, previously 1 mm. Newly appreciated 3 mm  left lower lobe nodule on image 67 newly appreciated 1.5 cm  pleural-based nodularity in the left lower lobe on  image 106. Stable 4  mm nodule on image 47 series 6 in left upper lobe. Stable 2 mm left  upper lobe nodule image 60 series 6. Stable 3 mm right middle lobe  nodule on image 85 series 6.     MEDIASTINUM/AXILLAE: No axillary, mediastinal or hilar  lymphadenopathy.     There is a small sliding-type hiatal hernia.     Heart size is normal without significant pericardial effusion. The  thoracic aorta is normal in caliber.     Abdomen and pelvis:  Persistent lower rectal thickening, in keeping with patient's known  tumor. Exact interval change is difficult to evaluate due to inherent  poor tissue contrast resolution or CT exam. Perirectal 10 mm nodule on  image 516 and series 3 demonstrates interval improvement since prior  exam when it measured 16 mm. 1.4 cm lymph node in the mesorectum on  image 506 is stable since prior exam. Stable 1.5 cm lymph node along  the superior hemorrhoidal vasculature on image 490.     HEPATOBILIARY: No focal hepatic lesion. Cholecystectomy. No biliary  ductal dilatation.     PANCREAS: Normal.     SPLEEN: Normal.     ADRENAL GLANDS: Normal.     KIDNEYS/BLADDER: Normal.     BOWEL: Small and large bowel loops are normal in caliber without  obstruction. Scattered pancolonic diverticula without acute  diverticulitis. The appendix is normal in appearance.     PELVIC ORGANS: Uterus and adnexa unremarkable.     ADDITIONAL FINDINGS: Abdominal aorta normal in caliber with mild mixed  atheromatous plaque. Major portal veins are patent. No free fluid or  pneumoperitoneum. No new abdominal or pelvic lymphadenopathy.  Resolution of right internal iliac adenopathy.     MUSCULOSKELETAL: Stable size of sclerotic lesion at the left aspect of  L3 vertebral body without pathologic fracture. Stable sclerotic focus  in left pubis on image 546                                                                      IMPRESSION: In this patient with history of metastatic rectal cancer,  there is suggestion of  progression:  1.  Although that is stable appearance of rectal mass and minimal  improvement in perirectal nodularity/lymph nodes, there is significant  increase in size and number of bilateral pulmonary nodules.  2.  Stable size of presacral hypermetabolic L3 sclerotic lesion, may  be due to treatment effect.      ASSESSMENT AND PLAN:      Metastatic rectal adenocarcinoma, MSI intact, K-aimee wild type, PDL 1 + 25% with metastasis to the lungs, bones and pelvic lymph nodes.      She started palliative chemotherapy with FOLFOX/Avastin on 11/25/2019.      She has received 6 cycles of chemotherapy.  With cycle #3, we stopped 5-FU bolus because of excessive tiredness.    Repeat CT CAP on 2/21/2020 shows good response to therapy with improvement in the rectal mass, mesorectal and superior rectal nodularity as well as resolution of right internal iliac lymphadenopathy.  There is slight improvement in the dominant posterior left lower lobe lung nodule and several of the lung nodules are is stable.  There is increase in size of the sclerotic lesion of the left aspect of L3 vertebral body without pathological fracture and this is likely due to treatment effect.    CEA on 2/10/2020 was down to 3.3.    With cycle #9 we stopped oxaliplatin because of cumulative fatigue and increasing nausea and gave her 5-FU/Avastin.      I then discussed with her, her  as well as her son Gamal about possibly switching to single agent Xeloda going forward so as to limit her visits to the clinic and therefore exposure and risk from coronavirus.  We discussed the pros and cons of this approach and they were all willing to try this.      She started single agent Xeloda on 4/7/2020.    CT chest abdomen and pelvis on 7/20/2020 showed mild progression in the lung nodules.  Perirectal nodularity has mildly improved.    CEA on 6/30/2020 was 2.8.  On 7/22/2020 it was 4    We decided on switching to Xeloda/Avastin on 7/27/2020.  We decreased the  dose of oxaliplatin to 100 mg per metered square.    CEA on 8/11/2020 was 4.8.    Overall she is tolerating chemotherapy fairly well with some nausea and diarrhea and some fatigue and worsening neuropathy.  We will proceed with cycle #4 today but I will decrease the dose of oxaliplatin to now 85 mg per metered square.    We will repeat CT scan before cycle #5.    Ingrown infected toenail.  Continue Keflex.  I would like her to be seen by podiatrist.    Neuropathy.  It is slowly worsening and I will further dose reduce oxaliplatin to 85 mg per metered square.  I also recommend that she tries acupuncture.  She is doing massages which help.    Nausea.  Continue antiemetics.  She usually gets nausea for the first week.  She is not on Zyprexa.    Diarrhea.  This is off and on usually for the first week.  Try Imodium as needed.    Hand-foot syndrome.  She has evidence of hand-foot syndrome from Xeloda. I would like her to start using urea cream to help with hand-foot syndrome.      Bone metastasis.  Started Zometa on 11/25/2019.  She is getting it every 3 months and she last received on 8/18/2020.  Continue vitamin D and calcium.  Bone metastasis seemed stable on CT scan on 7/20/2020.        We did not address the following today.        Discussion regarding health care directive  We discussed that it is important that she completes health care directive which would help in making sure that her wishes are followed about her treatment care in case she is not able to make a decision for herself.  We gave her information regarding that.    RTC 3 weeks     All questions were answered to her satisfaction.  She is agreeable and comfortable with the plan.    Charline Velazquez MD

## 2020-09-30 NOTE — PATIENT INSTRUCTIONS
Chemo today    In 2 weeks, repeat CT Scan    See a Podiatrist    Use Urea Cream to hands/feet to help with Hand foot syndrome    Cont Antibiotics.    Try acupuncture for neuropathy    See me back in 3 weeks and chemo

## 2020-10-01 NOTE — LETTER
10/1/2020         RE: Alannah Wynn  [address]  [place] [address]        Dear Colleague,    Thank you for referring your patient, Alannah Wynn, to the [facility]. Please see a copy of my visit note below.    Past Medical History:   Diagnosis Date     Gastroesophageal reflux disease      Patient Active Problem List   Diagnosis     External hemorrhoids     Non morbid obesity due to excess calories     Hepatitis B immune     Screening for cervical cancer     CARDIOVASCULAR SCREENING; LDL GOAL LESS THAN 160     Gastroesophageal reflux disease     Overactive bladder     Nevus of left lower leg- undetermined behavior     Metastasis from rectal cancer (H)     Bone metastasis (H)     Nausea     Chemotherapy-induced neutropenia (H)     Past Surgical History:   Procedure Laterality Date     COLONOSCOPY  10/18/2019    1 polyp     COLONOSCOPY N/A 10/18/2019    Procedure: Colonoscopy, With Polypectomy And Biopsy;  Surgeon: Duane, William Charles, MD;  Location: [facility] OR     COLONOSCOPY WITH CO2 INSUFFLATION N/A 10/18/2019    Procedure: COLONOSCOPY, WITH CO2 INSUFFLATION;  Surgeon: Duane, William Charles, MD;  Location: [facility] OR     IR CHEST PORT PLACEMENT > 5 YRS OF AGE  11/21/2019     Social History     Socioeconomic History     Marital status:      Spouse name: Not on file     Number of children: Not on file     Years of education: Not on file     Highest education level: Not on file   Occupational History     Not on file   Social Needs     Financial resource strain: Not on file     Food insecurity     Worry: Not on file     Inability: Not on file     Transportation needs     Medical: Not on file     Non-medical: Not on file   Tobacco Use     Smoking status: Never Smoker     Smokeless tobacco: Never Used   Substance and Sexual Activity     Alcohol use: No     Alcohol/week: 0.0 standard drinks     Drug use: No     Sexual activity: Never   Lifestyle     Physical activity      Days per week: Not on file     Minutes per session: Not on file     Stress: Not on file   Relationships     Social connections     Talks on phone: Not on file     Gets together: Not on file     Attends [employer] service: Not on file     Active member of club or organization: Not on file     Attends meetings of clubs or organizations: Not on file     Relationship status: Not on file     Intimate partner violence     Fear of current or ex partner: Not on file     Emotionally abused: Not on file     Physically abused: Not on file     Forced sexual activity: Not on file   Other Topics Concern     Parent/sibling w/ CABG, MI or angioplasty before 65F 55M? No   Social History Narrative     Not on file     Family History   Problem Relation Age of Onset     Hypertension Father      Lab Results   Component Value Date    WBC 3.8 09/30/2020     Lab Results   Component Value Date    RBC 3.39 09/30/2020     Lab Results   Component Value Date    HGB 11.7 09/30/2020     Lab Results   Component Value Date    HCT 35.3 09/30/2020     No components found for: MCT  Lab Results   Component Value Date     09/30/2020     Lab Results   Component Value Date    MCH 34.5 09/30/2020     Lab Results   Component Value Date    MCHC 33.1 09/30/2020     Lab Results   Component Value Date    RDW 18.4 09/30/2020     Lab Results   Component Value Date     09/30/2020     Last Comprehensive Metabolic Panel:  Sodium   Date Value Ref Range Status   09/30/2020 140 133 - 144 mmol/L Final     Potassium   Date Value Ref Range Status   09/30/2020 4.1 3.4 - 5.3 mmol/L Final     Chloride   Date Value Ref Range Status   09/30/2020 111 (H) 94 - 109 mmol/L Final     Carbon Dioxide   Date Value Ref Range Status   09/30/2020 26 20 - 32 mmol/L Final     Anion Gap   Date Value Ref Range Status   09/30/2020 3 3 - 14 mmol/L Final     Glucose   Date Value Ref Range Status   09/30/2020 89 70 - 99 mg/dL Final     Urea Nitrogen   Date Value Ref Range Status    09/30/2020 14 7 - 30 mg/dL Final     Creatinine   Date Value Ref Range Status   09/30/2020 0.56 0.52 - 1.04 mg/dL Final     GFR Estimate   Date Value Ref Range Status   09/30/2020 >90 >60 mL/min/[1.73_m2] Final     Comment:     Non  GFR Calc  Starting 12/18/2018, serum creatinine based estimated GFR (eGFR) will be   calculated using the Chronic Kidney Disease Epidemiology Collaboration   (CKD-EPI) equation.       Calcium   Date Value Ref Range Status   09/30/2020 8.4 (L) 8.5 - 10.1 mg/dL Final     Lab Results   Component Value Date    AST 34 09/30/2020     Lab Results   Component Value Date    ALT 31 09/30/2020     No results found for: BILICONJ   Lab Results   Component Value Date    BILITOTAL 0.3 09/30/2020     Lab Results   Component Value Date    ALBUMIN 3.3 09/30/2020     Lab Results   Component Value Date    PROTTOTAL 7.2 09/30/2020      Lab Results   Component Value Date    ALKPHOS 111 09/30/2020     SUBJECTIVE FINDINGS:  A 69-year-old female presents from Charline Velazquez MD, for ingrown toenail on the right toe.  She relates this has been present for about 2-4 weeks.  She has been taking Keflex.  She maybe has 2-3 days of that left.  She relates that it has gotten better but it still hurts and it is still swollen.  She relates that it hurts and there is no injuries.  She relates she does have some skin changes on her feet from chemotherapy and she does have some lotion for this.  It looks like she has urea cream 40% for hand-foot syndrome.  Previous notes were reviewed.        OBJECTIVE FINDINGS:  DP and PT are 2/4, right.  She has a right hallux nail that is thick, dystrophic, incurvated, with subungual debris.  She has medial nail border hyperkeratotic tissue buildup.  There is some erythema and edema, no odor, no calor, no active drainage.  The erythema and edema is from pressure changes of the nail in the medial nail border.        ASSESSMENT AND PLAN:  Paronychia, right medial hallux nail  border.  She does have some onychomycosis changes as well.  Diagnosis and treatment were discussed with her.  Right hallux nail was debrided upon consent and local wound care done upon consent.  I am going to have her clean this daily with MicroKlenz, apply Iodosorb gel and a light dressing.  These were dispensed and use discussed with her.  Finish the Keflex.  Return to clinic and see me in about 1 week.           Also, she does have noted neuropathy as well.       Again, thank you for allowing me to participate in the care of your patient.        Sincerely,        Ady Haji DPM

## 2020-10-01 NOTE — NURSING NOTE
"Alannah Wynn's chief complaint for this visit includes:  Chief Complaint   Patient presents with     New Patient     infected ingrown toenail(right hallux)     PCP: Earline Mehta    Referring Provider:  No referring provider defined for this encounter.    /74 (BP Location: Left arm, Patient Position: Sitting, Cuff Size: Adult Regular)   Pulse 91   Ht 1.53 m (5' 0.24\")   Wt 73.7 kg (162 lb 6.4 oz)   LMP 11/27/2001 (Approximate)   SpO2 98%   BMI 31.47 kg/m    Data Unavailable     Do you need any medication refills at today's visit? No      "

## 2020-10-01 NOTE — PROGRESS NOTES
Past Medical History:   Diagnosis Date     Gastroesophageal reflux disease      Patient Active Problem List   Diagnosis     External hemorrhoids     Non morbid obesity due to excess calories     Hepatitis B immune     Screening for cervical cancer     CARDIOVASCULAR SCREENING; LDL GOAL LESS THAN 160     Gastroesophageal reflux disease     Overactive bladder     Nevus of left lower leg- undetermined behavior     Metastasis from rectal cancer (H)     Bone metastasis (H)     Nausea     Chemotherapy-induced neutropenia (H)     Past Surgical History:   Procedure Laterality Date     COLONOSCOPY  10/18/2019    1 polyp     COLONOSCOPY N/A 10/18/2019    Procedure: Colonoscopy, With Polypectomy And Biopsy;  Surgeon: Duane, William Charles, MD;  Location: [facility]     COLONOSCOPY WITH CO2 INSUFFLATION N/A 10/18/2019    Procedure: COLONOSCOPY, WITH CO2 INSUFFLATION;  Surgeon: Duane, William Charles, MD;  Location: [facility]     IR CHEST PORT PLACEMENT > 5 YRS OF AGE  11/21/2019     Social History     Socioeconomic History     Marital status:      Spouse name: Not on file     Number of children: Not on file     Years of education: Not on file     Highest education level: Not on file   Occupational History     Not on file   Social Needs     Financial resource strain: Not on file     Food insecurity     Worry: Not on file     Inability: Not on file     Transportation needs     Medical: Not on file     Non-medical: Not on file   Tobacco Use     Smoking status: Never Smoker     Smokeless tobacco: Never Used   Substance and Sexual Activity     Alcohol use: No     Alcohol/week: 0.0 standard drinks     Drug use: No     Sexual activity: Never   Lifestyle     Physical activity     Days per week: Not on file     Minutes per session: Not on file     Stress: Not on file   Relationships     Social connections     Talks on phone: Not on file     Gets together: Not on file     Attends [employer]ist service: Not on file     Active member of club or  organization: Not on file     Attends meetings of clubs or organizations: Not on file     Relationship status: Not on file     Intimate partner violence     Fear of current or ex partner: Not on file     Emotionally abused: Not on file     Physically abused: Not on file     Forced sexual activity: Not on file   Other Topics Concern     Parent/sibling w/ CABG, MI or angioplasty before 65F 55M? No   Social History Narrative     Not on file     Family History   Problem Relation Age of Onset     Hypertension Father      Lab Results   Component Value Date    WBC 3.8 09/30/2020     Lab Results   Component Value Date    RBC 3.39 09/30/2020     Lab Results   Component Value Date    HGB 11.7 09/30/2020     Lab Results   Component Value Date    HCT 35.3 09/30/2020     No components found for: MCT  Lab Results   Component Value Date     09/30/2020     Lab Results   Component Value Date    MCH 34.5 09/30/2020     Lab Results   Component Value Date    MCHC 33.1 09/30/2020     Lab Results   Component Value Date    RDW 18.4 09/30/2020     Lab Results   Component Value Date     09/30/2020     Last Comprehensive Metabolic Panel:  Sodium   Date Value Ref Range Status   09/30/2020 140 133 - 144 mmol/L Final     Potassium   Date Value Ref Range Status   09/30/2020 4.1 3.4 - 5.3 mmol/L Final     Chloride   Date Value Ref Range Status   09/30/2020 111 (H) 94 - 109 mmol/L Final     Carbon Dioxide   Date Value Ref Range Status   09/30/2020 26 20 - 32 mmol/L Final     Anion Gap   Date Value Ref Range Status   09/30/2020 3 3 - 14 mmol/L Final     Glucose   Date Value Ref Range Status   09/30/2020 89 70 - 99 mg/dL Final     Urea Nitrogen   Date Value Ref Range Status   09/30/2020 14 7 - 30 mg/dL Final     Creatinine   Date Value Ref Range Status   09/30/2020 0.56 0.52 - 1.04 mg/dL Final     GFR Estimate   Date Value Ref Range Status   09/30/2020 >90 >60 mL/min/[1.73_m2] Final     Comment:     Non  GFR  Calc  Starting 12/18/2018, serum creatinine based estimated GFR (eGFR) will be   calculated using the Chronic Kidney Disease Epidemiology Collaboration   (CKD-EPI) equation.       Calcium   Date Value Ref Range Status   09/30/2020 8.4 (L) 8.5 - 10.1 mg/dL Final     Lab Results   Component Value Date    AST 34 09/30/2020     Lab Results   Component Value Date    ALT 31 09/30/2020     No results found for: BILICONJ   Lab Results   Component Value Date    BILITOTAL 0.3 09/30/2020     Lab Results   Component Value Date    ALBUMIN 3.3 09/30/2020     Lab Results   Component Value Date    PROTTOTAL 7.2 09/30/2020      Lab Results   Component Value Date    ALKPHOS 111 09/30/2020     SUBJECTIVE FINDINGS:  A 69-year-old female presents from [facility], for ingrown toenail on the right toe.  She relates this has been present for about 2-4 weeks.  She has been taking Keflex.  She maybe has 2-3 days of that left.  She relates that it has gotten better but it still hurts and it is still swollen.  She relates that it hurts and there is no injuries.  She relates she does have some skin changes on her feet from chemotherapy and she does have some lotion for this.  It looks like she has urea cream 40% for hand-foot syndrome.  Previous notes were reviewed.        OBJECTIVE FINDINGS:  DP and PT are 2/4, right.  She has a right hallux nail that is thick, dystrophic, incurvated, with subungual debris.  She has medial nail border hyperkeratotic tissue buildup.  There is some erythema and edema, no odor, no calor, no active drainage.  The erythema and edema is from pressure changes of the nail in the medial nail border.        ASSESSMENT AND PLAN:  Paronychia, right medial hallux nail border.  She does have some onychomycosis changes as well.  Diagnosis and treatment were discussed with her.  Right hallux nail was debrided upon consent and local wound care done upon consent.  I am going to have her clean this daily with MicroKlenz,  apply Iodosorb gel and a light dressing.  These were dispensed and use discussed with her.  Finish the Keflex.  Return to clinic and see me in about 1 week.

## 2020-10-01 NOTE — PATIENT INSTRUCTIONS
Thanks for coming today.  [facility]  [address] [place] [address]    To schedule future appointments in Ortho Clinic, you may call [phone].    To schedule ordered imaging by your provider:   Call Central Imaging [phone]    To schedule an injection ordered by your provider:  Call [facility] Injection scheduling line: [phone]  [facility] available online at:  [email or web address]    Please call if any further questions or concerns ([phone]).  Clinic hours 8 am to 5 pm.    Return to clinic (call) if symptoms worsen or fail to improve.

## 2020-10-09 NOTE — NURSING NOTE
Alannah Wynn's chief complaint for this visit includes:  Chief Complaint   Patient presents with     RECHECK     follow up ingrown toenail right hallux     PCP: Earline Mehta    Referring Provider:  No referring provider defined for this encounter.    /75 (BP Location: Right arm, Patient Position: Sitting, Cuff Size: Adult Regular)   Pulse 72   LMP 11/27/2001 (Approximate)   SpO2 100%   Data Unavailable     Do you need any medication refills at today's visit? Desiree Stephenson CMA

## 2020-10-09 NOTE — PATIENT INSTRUCTIONS
Thanks for coming today.  [facility]  [address] [place] [address]    To schedule future appointments in Ortho Clinic, you may call [phone].    To schedule ordered imaging by your provider:   Call [facility] [phone]    To schedule an injection ordered by your provider:  Call [facility] Injection scheduling line: [phone]  [employer] available online at:  [email or web address]    Please call if any further questions or concerns ([phone]).  Clinic hours 8 am to 5 pm.    Return to clinic (call) if symptoms worsen or fail to improve.

## 2020-10-09 NOTE — PROGRESS NOTES
Past Medical History:   Diagnosis Date     Gastroesophageal reflux disease      Patient Active Problem List   Diagnosis     External hemorrhoids     Non morbid obesity due to excess calories     Hepatitis B immune     Screening for cervical cancer     CARDIOVASCULAR SCREENING; LDL GOAL LESS THAN 160     Gastroesophageal reflux disease     Overactive bladder     Nevus of left lower leg- undetermined behavior     Metastasis from rectal cancer (H)     Bone metastasis (H)     Nausea     Chemotherapy-induced neutropenia (H)     Past Surgical History:   Procedure Laterality Date     COLONOSCOPY  10/18/2019    1 polyp     COLONOSCOPY N/A 10/18/2019    Procedure: Colonoscopy, With Polypectomy And Biopsy;  Surgeon: Duane, William Charles, MD;  Location: [facility]     COLONOSCOPY WITH CO2 INSUFFLATION N/A 10/18/2019    Procedure: COLONOSCOPY, WITH CO2 INSUFFLATION;  Surgeon: Duane, William Charles, MD;  Location: [facility]     IR CHEST PORT PLACEMENT > 5 YRS OF AGE  11/21/2019     Social History     Socioeconomic History     Marital status:      Spouse name: Not on file     Number of children: Not on file     Years of education: Not on file     Highest education level: Not on file   Occupational History     Not on file   Social Needs     Financial resource strain: Not on file     Food insecurity     Worry: Not on file     Inability: Not on file     Transportation needs     Medical: Not on file     Non-medical: Not on file   Tobacco Use     Smoking status: Never Smoker     Smokeless tobacco: Never Used   Substance and Sexual Activity     Alcohol use: No     Alcohol/week: 0.0 standard drinks     Drug use: No     Sexual activity: Never   Lifestyle     Physical activity     Days per week: Not on file     Minutes per session: Not on file     Stress: Not on file   Relationships     Social connections     Talks on phone: Not on file     Gets together: Not on file     Attends Nondenominational service: Not on file     Active member of club or  organization: Not on file     Attends meetings of clubs or organizations: Not on file     Relationship status: Not on file     Intimate partner violence     Fear of current or ex partner: Not on file     Emotionally abused: Not on file     Physically abused: Not on file     Forced sexual activity: Not on file   Other Topics Concern     Parent/sibling w/ CABG, MI or angioplasty before 65F 55M? No   Social History Narrative     Not on file     Family History   Problem Relation Age of Onset     Hypertension Father      Lab Results   Component Value Date    WBC 3.8 09/30/2020     Lab Results   Component Value Date    RBC 3.39 09/30/2020     Lab Results   Component Value Date    HGB 11.7 09/30/2020     Lab Results   Component Value Date    HCT 35.3 09/30/2020     No components found for: MCT  Lab Results   Component Value Date     09/30/2020     Lab Results   Component Value Date    MCH 34.5 09/30/2020     Lab Results   Component Value Date    MCHC 33.1 09/30/2020     Lab Results   Component Value Date    RDW 18.4 09/30/2020     Lab Results   Component Value Date     09/30/2020     Last Comprehensive Metabolic Panel:  Sodium   Date Value Ref Range Status   09/30/2020 140 133 - 144 mmol/L Final     Potassium   Date Value Ref Range Status   09/30/2020 4.1 3.4 - 5.3 mmol/L Final     Chloride   Date Value Ref Range Status   09/30/2020 111 (H) 94 - 109 mmol/L Final     Carbon Dioxide   Date Value Ref Range Status   09/30/2020 26 20 - 32 mmol/L Final     Anion Gap   Date Value Ref Range Status   09/30/2020 3 3 - 14 mmol/L Final     Glucose   Date Value Ref Range Status   09/30/2020 89 70 - 99 mg/dL Final     Urea Nitrogen   Date Value Ref Range Status   09/30/2020 14 7 - 30 mg/dL Final     Creatinine   Date Value Ref Range Status   09/30/2020 0.56 0.52 - 1.04 mg/dL Final     GFR Estimate   Date Value Ref Range Status   09/30/2020 >90 >60 mL/min/[1.73_m2] Final     Comment:     Non  GFR  Calc  Starting 12/18/2018, serum creatinine based estimated GFR (eGFR) will be   calculated using the Chronic Kidney Disease Epidemiology Collaboration   (CKD-EPI) equation.       Calcium   Date Value Ref Range Status   09/30/2020 8.4 (L) 8.5 - 10.1 mg/dL Final     Lab Results   Component Value Date    AST 34 09/30/2020     Lab Results   Component Value Date    ALT 31 09/30/2020     No results found for: BILICONJ   Lab Results   Component Value Date    BILITOTAL 0.3 09/30/2020     Lab Results   Component Value Date    ALBUMIN 3.3 09/30/2020     Lab Results   Component Value Date    PROTTOTAL 7.2 09/30/2020      Lab Results   Component Value Date    ALKPHOS 111 09/30/2020     SUBJECTIVE FINDINGS:  69-year-old female returns to clinic for paronychia, right medial hallux nail border.  She relates it is doing better but is still a little swollen and red.  She relates that she has been doing the wound cares.  She could use some more MicroKlenz.  She finished the Keflex 1-2 days ago and was wondering if she should be still be on that.        OBJECTIVE FINDINGS:  DP and PT are 2/4 right.  She has a right medial hallux nail border with still some residual edema, minimal erythema, no odor, no calor.  Some hyperkeratotic tissue buildup.  Thick and dystrophic nail.      IMPRESSION AND PLAN:  Paronychia, right medial hallux nail border.  This is improved.  Diagnosis and treatment discussed with her.  Medial nail border was debrided upon consent.  Local wound care done upon consent.  I am going to have her continue the Iodosorb, MicroKlenz and a light dressing.  I put her on Keflex for 1 more week and she will return to clinic to see me in 2 weeks.

## 2020-10-09 NOTE — LETTER
10/9/2020         RE: Alannah Wynn  [address]  [place] [address]        Dear Colleague,    Thank you for referring your patient, Alannah Wynn, to the [facility]. Please see a copy of my visit note below.    Past Medical History:   Diagnosis Date     Gastroesophageal reflux disease      Patient Active Problem List   Diagnosis     External hemorrhoids     Non morbid obesity due to excess calories     Hepatitis B immune     Screening for cervical cancer     CARDIOVASCULAR SCREENING; LDL GOAL LESS THAN 160     Gastroesophageal reflux disease     Overactive bladder     Nevus of left lower leg- undetermined behavior     Metastasis from rectal cancer (H)     Bone metastasis (H)     Nausea     Chemotherapy-induced neutropenia (H)     Past Surgical History:   Procedure Laterality Date     COLONOSCOPY  10/18/2019    1 polyp     COLONOSCOPY N/A 10/18/2019    Procedure: Colonoscopy, With Polypectomy And Biopsy;  Surgeon: Duane, William Charles, MD;  Location: [facility]     COLONOSCOPY WITH CO2 INSUFFLATION N/A 10/18/2019    Procedure: COLONOSCOPY, WITH CO2 INSUFFLATION;  Surgeon: Duane, William Charles, MD;  Location: [facility]     IR CHEST PORT PLACEMENT > 5 YRS OF AGE  11/21/2019     Social History     Socioeconomic History     Marital status:      Spouse name: Not on file     Number of children: Not on file     Years of education: Not on file     Highest education level: Not on file   Occupational History     Not on file   Social Needs     Financial resource strain: Not on file     Food insecurity     Worry: Not on file     Inability: Not on file     Transportation needs     Medical: Not on file     Non-medical: Not on file   Tobacco Use     Smoking status: Never Smoker     Smokeless tobacco: Never Used   Substance and Sexual Activity     Alcohol use: No     Alcohol/week: 0.0 standard drinks     Drug use: No     Sexual activity: Never   Lifestyle     Physical activity      Days per week: Not on file     Minutes per session: Not on file     Stress: Not on file   Relationships     Social connections     Talks on phone: Not on file     Gets together: Not on file     Attends Buddhism service: Not on file     Active member of club or organization: Not on file     Attends meetings of clubs or organizations: Not on file     Relationship status: Not on file     Intimate partner violence     Fear of current or ex partner: Not on file     Emotionally abused: Not on file     Physically abused: Not on file     Forced sexual activity: Not on file   Other Topics Concern     Parent/sibling w/ CABG, MI or angioplasty before 65F 55M? No   Social History Narrative     Not on file     Family History   Problem Relation Age of Onset     Hypertension Father      Lab Results   Component Value Date    WBC 3.8 09/30/2020     Lab Results   Component Value Date    RBC 3.39 09/30/2020     Lab Results   Component Value Date    HGB 11.7 09/30/2020     Lab Results   Component Value Date    HCT 35.3 09/30/2020     No components found for: MCT  Lab Results   Component Value Date     09/30/2020     Lab Results   Component Value Date    MCH 34.5 09/30/2020     Lab Results   Component Value Date    MCHC 33.1 09/30/2020     Lab Results   Component Value Date    RDW 18.4 09/30/2020     Lab Results   Component Value Date     09/30/2020     Last Comprehensive Metabolic Panel:  Sodium   Date Value Ref Range Status   09/30/2020 140 133 - 144 mmol/L Final     Potassium   Date Value Ref Range Status   09/30/2020 4.1 3.4 - 5.3 mmol/L Final     Chloride   Date Value Ref Range Status   09/30/2020 111 (H) 94 - 109 mmol/L Final     Carbon Dioxide   Date Value Ref Range Status   09/30/2020 26 20 - 32 mmol/L Final     Anion Gap   Date Value Ref Range Status   09/30/2020 3 3 - 14 mmol/L Final     Glucose   Date Value Ref Range Status   09/30/2020 89 70 - 99 mg/dL Final     Urea Nitrogen   Date Value Ref Range Status    09/30/2020 14 7 - 30 mg/dL Final     Creatinine   Date Value Ref Range Status   09/30/2020 0.56 0.52 - 1.04 mg/dL Final     GFR Estimate   Date Value Ref Range Status   09/30/2020 >90 >60 mL/min/[1.73_m2] Final     Comment:     Non  GFR Calc  Starting 12/18/2018, serum creatinine based estimated GFR (eGFR) will be   calculated using the Chronic Kidney Disease Epidemiology Collaboration   (CKD-EPI) equation.       Calcium   Date Value Ref Range Status   09/30/2020 8.4 (L) 8.5 - 10.1 mg/dL Final     Lab Results   Component Value Date    AST 34 09/30/2020     Lab Results   Component Value Date    ALT 31 09/30/2020     No results found for: BILICONJ   Lab Results   Component Value Date    BILITOTAL 0.3 09/30/2020     Lab Results   Component Value Date    ALBUMIN 3.3 09/30/2020     Lab Results   Component Value Date    PROTTOTAL 7.2 09/30/2020      Lab Results   Component Value Date    ALKPHOS 111 09/30/2020     SUBJECTIVE FINDINGS:  69-year-old female returns to clinic for paronychia, right medial hallux nail border.  She relates it is doing better but is still a little swollen and red.  She relates that she has been doing the wound cares.  She could use some more MicroKlenz.  She finished the Keflex 1-2 days ago and was wondering if she should be still be on that.        OBJECTIVE FINDINGS:  DP and PT are 2/4 right.  She has a right medial hallux nail border with still some residual edema, minimal erythema, no odor, no calor.  Some hyperkeratotic tissue buildup.  Thick and dystrophic nail.      IMPRESSION AND PLAN:  Paronychia, right medial hallux nail border.  This is improved.  Diagnosis and treatment discussed with her.  Medial nail border was debrided upon consent.  Local wound care done upon consent.  I am going to have her continue the Iodosorb, MicroKlenz and a light dressing.  I put her on Keflex for 1 more week and she will return to clinic to see me in 2 weeks.           Again, thank you for  allowing me to participate in the care of your patient.        Sincerely,        Ady Haji DPM

## 2020-10-16 NOTE — TELEPHONE ENCOUNTER
Telephone call placed to patient. Put on speaker for son to listen also. Instructed patient, CT showed a problem with her port. The catheter is coiled and needs to be repositioned by interventional radiology. She will be scheduled at [facility] because that is where her port was placed and also because [facility] does not have an IR department. Also a covid test will be needed within 4 days of procedure. She will receive a phone call when these appointments are made.   Patient asking if this will be done prior to her next appointment with Dr Velazquez. Patient has lab and visit with Dr Velazquez 10/21 and treatment 10/22. Advised, we are trying to make appointments prior to her treatment.  Genevieve Callahan  RN, BSN, OCN

## 2020-10-16 NOTE — PROGRESS NOTES
Call received from Radiology to report following finding on CT from 10/15/20    Chest: Right port coiled within the right internal jugular vein with  tip terminating in the mid SVC.    InCode Blueet message sent to Dr. Velazquez.

## 2020-10-19 NOTE — PROGRESS NOTES
Past Medical History:   Diagnosis Date     Gastroesophageal reflux disease      Patient Active Problem List   Diagnosis     External hemorrhoids     Non morbid obesity due to excess calories     Hepatitis B immune     Screening for cervical cancer     CARDIOVASCULAR SCREENING; LDL GOAL LESS THAN 160     Gastroesophageal reflux disease     Overactive bladder     Nevus of left lower leg- undetermined behavior     Metastasis from rectal cancer (H)     Bone metastasis (H)     Nausea     Chemotherapy-induced neutropenia (H)     Past Surgical History:   Procedure Laterality Date     COLONOSCOPY  10/18/2019    1 polyp     COLONOSCOPY N/A 10/18/2019    Procedure: Colonoscopy, With Polypectomy And Biopsy;  Surgeon: Duane, William Charles, MD;  Location: [facility]     COLONOSCOPY WITH CO2 INSUFFLATION N/A 10/18/2019    Procedure: COLONOSCOPY, WITH CO2 INSUFFLATION;  Surgeon: Duane, William Charles, MD;  Location: [facility]     IR CHEST PORT PLACEMENT > 5 YRS OF AGE  11/21/2019     Social History     Socioeconomic History     Marital status:      Spouse name: Not on file     Number of children: Not on file     Years of education: Not on file     Highest education level: Not on file   Occupational History     Not on file   Social Needs     Financial resource strain: Not on file     Food insecurity     Worry: Not on file     Inability: Not on file     Transportation needs     Medical: Not on file     Non-medical: Not on file   Tobacco Use     Smoking status: Never Smoker     Smokeless tobacco: Never Used   Substance and Sexual Activity     Alcohol use: No     Alcohol/week: 0.0 standard drinks     Drug use: No     Sexual activity: Never   Lifestyle     Physical activity     Days per week: Not on file     Minutes per session: Not on file     Stress: Not on file   Relationships     Social connections     Talks on phone: Not on file     Gets together: Not on file     Attends [employer] service: Not on file     Active member of club or  organization: Not on file     Attends meetings of clubs or organizations: Not on file     Relationship status: Not on file     Intimate partner violence     Fear of current or ex partner: Not on file     Emotionally abused: Not on file     Physically abused: Not on file     Forced sexual activity: Not on file   Other Topics Concern     Parent/sibling w/ CABG, MI or angioplasty before 65F 55M? No   Social History Narrative     Not on file     Family History   Problem Relation Age of Onset     Hypertension Father      SUBJECTIVE FINDINGS:  A 69-year-old female returns to clinic for paronychia, right medial hallux nail border.  She relates it feels better, but it hurts more on the end of the toe now is where it still hurts some, on the distal medial toe.      OBJECTIVE FINDINGS:  Vascular status intact, right.  She has right hallux nails, incurvated.  Some of the margins of the nails loosened up.  There is no erythema, no drainage, no odor, no calor.  There is some pain on palpation of the distal end of the toe.      ASSESSMENT AND PLAN:  Paronychia, right medial hallux nail border.  This is improved.  Diagnosis and treatment options discussed with the patient.  I reduced the nail border upon consent.  I am going to have her d/c the local wound cares and the Iodosorb.  She can just clean this daily with MicroKlenz.  Return to clinic and see me as needed if symptoms do not resolve.

## 2020-10-19 NOTE — LETTER
10/19/2020         RE: Alannah Wynn  [address]  [place] [address]        Dear Colleague,    Thank you for referring your patient, Alannah Wynn, to the [facility]. Please see a copy of my visit note below.    Past Medical History:   Diagnosis Date     Gastroesophageal reflux disease      Patient Active Problem List   Diagnosis     External hemorrhoids     Non morbid obesity due to excess calories     Hepatitis B immune     Screening for cervical cancer     CARDIOVASCULAR SCREENING; LDL GOAL LESS THAN 160     Gastroesophageal reflux disease     Overactive bladder     Nevus of left lower leg- undetermined behavior     Metastasis from rectal cancer (H)     Bone metastasis (H)     Nausea     Chemotherapy-induced neutropenia (H)     Past Surgical History:   Procedure Laterality Date     COLONOSCOPY  10/18/2019    1 polyp     COLONOSCOPY N/A 10/18/2019    Procedure: Colonoscopy, With Polypectomy And Biopsy;  Surgeon: Duane, William Charles, MD;  Location: [facility] OR     COLONOSCOPY WITH CO2 INSUFFLATION N/A 10/18/2019    Procedure: COLONOSCOPY, WITH CO2 INSUFFLATION;  Surgeon: Duane, William Charles, MD;  Location: [facility] OR     IR CHEST PORT PLACEMENT > 5 YRS OF AGE  11/21/2019     Social History     Socioeconomic History     Marital status:      Spouse name: Not on file     Number of children: Not on file     Years of education: Not on file     Highest education level: Not on file   Occupational History     Not on file   Social Needs     Financial resource strain: Not on file     Food insecurity     Worry: Not on file     Inability: Not on file     Transportation needs     Medical: Not on file     Non-medical: Not on file   Tobacco Use     Smoking status: Never Smoker     Smokeless tobacco: Never Used   Substance and Sexual Activity     Alcohol use: No     Alcohol/week: 0.0 standard drinks     Drug use: No     Sexual activity: Never   Lifestyle     Physical activity      Days per week: Not on file     Minutes per session: Not on file     Stress: Not on file   Relationships     Social connections     Talks on phone: Not on file     Gets together: Not on file     Attends Restorationism service: Not on file     Active member of club or organization: Not on file     Attends meetings of clubs or organizations: Not on file     Relationship status: Not on file     Intimate partner violence     Fear of current or ex partner: Not on file     Emotionally abused: Not on file     Physically abused: Not on file     Forced sexual activity: Not on file   Other Topics Concern     Parent/sibling w/ CABG, MI or angioplasty before 65F 55M? No   Social History Narrative     Not on file     Family History   Problem Relation Age of Onset     Hypertension Father      SUBJECTIVE FINDINGS:  A 69-year-old female returns to clinic for paronychia, right medial hallux nail border.  She relates it feels better, but it hurts more on the end of the toe now is where it still hurts some, on the distal medial toe.      OBJECTIVE FINDINGS:  Vascular status intact, right.  She has right hallux nails, incurvated.  Some of the margins of the nails loosened up.  There is no erythema, no drainage, no odor, no calor.  There is some pain on palpation of the distal end of the toe.      ASSESSMENT AND PLAN:  Paronychia, right medial hallux nail border.  This is improved.  Diagnosis and treatment options discussed with the patient.  I reduced the nail border upon consent.  I am going to have her d/c the local wound cares and the Iodosorb.  She can just clean this daily with Micro[employer]len[employer].  Return to clinic and see me as needed if symptoms do not resolve.           Again, thank you for allowing me to participate in the care of your patient.        Sincerely,        Ady Haji DPM

## 2020-10-19 NOTE — PATIENT INSTRUCTIONS
Thanks for coming today.  [facility]  [address] [place] [address]    To schedule future appointments in Ortho Clinic, you may call [phone].    To schedule ordered imaging by your provider:   Call Central Imaging [phone]    To schedule an injection ordered by your provider:  Call Central Imaging Injection scheduling line: [phone]  Integration Managementhart available online at:  [email or web address]    Please call if any further questions or concerns ([phone]).  Clinic hours 8 am to 5 pm.    Return to clinic (call) if symptoms worsen or fail to improve.

## 2020-10-19 NOTE — NURSING NOTE
Alannah Wynn's chief complaint for this visit includes:  Chief Complaint   Patient presents with     RECHECK     follow up ingorwn toenail right hallux     PCP: Earline Mehta    Referring Provider:  No referring provider defined for this encounter.    BP (!) 151/85 (BP Location: Right arm, Patient Position: Sitting, Cuff Size: Adult Regular)   Pulse 74   LMP 11/27/2001 (Approximate)   SpO2 100%   Data Unavailable     Do you need any medication refills at today's visit? No

## 2020-10-19 NOTE — TELEPHONE ENCOUNTER
10/19 1st attempt. Provided phone number [phone] to schedule  in about 1 month (around 11/19/2020).    Mely Henriquez   Procedure    Ortho/Sports Med/Pod/Ent/Eye  [facility]   [phone]

## 2020-10-20 NOTE — PROGRESS NOTES
Care Suites Admission Nursing Note    Patient Information  Name: Alannah Wynn  Age: 69 year old  Reason for admission: Port Study/replacement  Care Suites arrival time: 0720    Visitor Information  Name: Khoi  Informed of visitor restrictions: Yes  1 visitor allowed per patient   Visitor must screen negative for COVID symptoms   Visitor must wear a mask  Waiting rooms closed to visitors    Patient Admission/Assessment   Pre-procedure assessment complete: Yes  If abnormal assessment/labs, provider notified: N/A  NPO: Yes  Medications held per instructions/orders: N/A  Consent: deferred  If applicable, pregnancy test status: deferred  Patient oriented to room: Yes  Education/questions answered: Yes  Plan/other: Possible replacement or dye study.  Continues chemotherapy at this time.  No removal, however spouse states port is not working.    Discharge Planning  Discharge name/phone number: spouse here   Overnight post sedation caregiver: spouse  Discharge location: home    AVS reviewed with pt and spouse.  Both state understanding, pt has copy  Madison Carbajal RN     PATIENT/VISITOR WELLNESS SCREENING    Step 1 Patient Screening    1. In the last month, have you been in contact with someone who was confirmed or suspected to have Coronavirus/COVID-19? No    2. Do you have the following symptoms?  Fever/Chills? No   Cough? No   Shortness of breath? No   New loss of taste or smell? No  Sore throat? No  Muscle or body aches? No  Headaches? No  Fatigue? No  Vomiting or diarrhea? No    Step 2 Visitor Screening    1. Name of Visitor (1 visitor per patient): Khoi    2. In the last month, have you been in contact with someone who was confirmed or suspected to have Coronavirus/COVID-19? No    3. Do you have the following symptoms?  Fever/Chills? No   Cough? No   Shortness of breath? No   Skin rash? No   Loss of taste or smell? No  Sore throat? No  Runny or stuffy nose? No  Muscle or body aches? No  Headaches? No  Fatigue?  No  Vomiting or diarrhea? No    If the visitor has positive symptoms, notify supervisor/manger  Per policy, the visitor will need to leave the facility     Step 3 Refer to logic grid below for actions    NO SYMPTOM(S)    ACTIONS:  1. Standard rooming process  2. Provider to assess per normal protocol  3. Implement precautions as needed and per guidelines     POSITIVE SYMPTOM(S)  If positive for ANY of the following symptoms: fever, cough, shortness of breath, rash    ACTION:  1. Continue to have the patient wear a mask   2. Room patient as soon as possible  3. Don appropriate PPE when entering room  4. Provider evaluation

## 2020-10-20 NOTE — DISCHARGE INSTRUCTIONS
Port Insertion Discharge Instructions     After you go home:      Have an adult stay with you for the first 6 hours    You may resume your normal diet       For 24 hours - due to the sedation you received:    Relax and take it easy    Do NOT make any important or legal decisions    Do NOT drive or operate machines at home or at work    Do NOT drink alcohol    Care of Puncture Sites:      Keep the dressings on your sites clean & dry for 3 days. Change it only if it gets wet or dirty.    You may shower after the dressing comes off in 3 days    Do not remove the small white strips of tape, if present. Allow them to fall off on their own.     You may cover the wound with a bandaid after the dressing is removed if needed for comfort      Activity       Avoid heavy lifting (greater than 10 pounds) or the overuse of your shoulder for 3 days    Bleeding:      If you start bleeding from the incision sites in your chest or neck - or have swelling in your neck, sit down and press on the site for 5-10 minutes.     If bleeding has not stopped after 10 minutes, call your provider.        Call 911 right away if you have heavy bleeding or bleeding that does not stop.      Medicines:      You may resume all medications    Resume your Warfarin/Coumadin at your regular dose today. Follow up with your provider to have your INR rechecked    Resume your Platelet Inhibitors and Aspirin tomorrow at your regular dose    For minor pain, you may take Acetaminophen (Tylenol) or Ibuprofen (Advil)    Call the provider who ordered this procedure if:      You have swelling in your neck or over your port site    The incision area is red, swollen, hot or tender    You have chills or a fever greater than 101 F (38 C)    Any questions or concerns    Call  911 or go to the Emergency Room if you have:      Severe chest pain or trouble breathing    Bleeding that you cannot control    Additional Information:      Your port may be accessed right away.      You will need to have your port flushed every 30 days or after each use.      If you have questions call:          [facility] Radiology Dept @ [phone]      The provider who performed your procedure was _________________.

## 2020-10-20 NOTE — PROGRESS NOTES
Care Suites Discharge Nursing Note    Patient Information  Name: Alannah Wynn  Age: 69 year old    Discharge Education:  Discharge instructions reviewed: Yes  Additional education/resources provided: Port booklet, card  Patient/patient representative verbalizes understanding: Yes  Patient discharging on new medications: No  Medication education completed: N/A    Discharge Plans:   Discharge location: home  Discharge ride contacted: Yes  Approximate discharge time: 1025      Discharge Criteria:  Discharge criteria met and vital signs stable: Yes    Patient Belongs:  Patient belongings returned to patient: N/A    Madison Carbajal RN

## 2020-10-20 NOTE — IR NOTE
Interventional Radiology Intra-procedural Nursing Note    Patient Name: Alannah Wynn  Medical Record Number: [id number]  Today's Date: October 20, 2020    Start Time: 0909     End of procedure time: 0939  Procedure: Right Port Exchange   Report given to: Tyler Muniz Suites RN  Time pt departs:  0948      Other Notes:   Versed 2mg  Fentanyl 100mcg    Cherly Reed RN

## 2020-10-20 NOTE — PROGRESS NOTES
Pt here for port exchange as tip of the catheter was coiled.Will remove and replace with a new CVC port.

## 2020-10-20 NOTE — PRE-PROCEDURE
GENERAL PRE-PROCEDURE:   Procedure:  CVC port placement  Date/Time:  10/20/2020 8:14 AM    Written consent obtained?: Yes    Risks and benefits: Risks, benefits and alternatives were discussed    Consent given by:  Patient  Patient states understanding of procedure being performed: Yes    Patient's understanding of procedure matches consent: Yes    Procedure consent matches procedure scheduled: Yes    Expected level of sedation:  Moderate  Appropriately NPO:  Yes  ASA Class:  Class 1- healthy patient  Mallampati  :  Grade 1- soft palate, uvula, tonsillar pillars, and posterior pharyngeal wall visible  Lungs:  Lungs clear with good breath sounds bilaterally  Heart:  Normal heart sounds and rate  History & Physical reviewed:  History and physical reviewed and no updates needed  Statement of review:  I have reviewed the lab findings, diagnostic data, medications, and the plan for sedation

## 2020-10-21 NOTE — NURSING NOTE
"Oncology Rooming Note    October 21, 2020 7:55 AM   Alannah Wynn is a 69 year old female who presents for:    Chief Complaint   Patient presents with     Oncology Clinic Visit     prior to tx     Initial Vitals: /84 (BP Location: Left arm, Patient Position: Chair, Cuff Size: Adult Regular)   Pulse 82   Temp 97.8  F (36.6  C) (Oral)   Ht 1.53 m (5' 0.24\")   Wt 72.7 kg (160 lb 4.8 oz)   LMP 11/27/2001 (Approximate)   SpO2 98%   BMI 31.06 kg/m   Estimated body mass index is 31.06 kg/m  as calculated from the following:    Height as of this encounter: 1.53 m (5' 0.24\").    Weight as of this encounter: 72.7 kg (160 lb 4.8 oz). Body surface area is 1.76 meters squared.  Extreme Pain (8) Comment: Data Unavailable   Patient's last menstrual period was 11/27/2001 (approximate).  Allergies reviewed: Yes  Medications reviewed: Yes    Medications: Medication refills not needed today.  Pharmacy name entered into [facility]:    [id number] - [place], [place] - [address] AT [address] [facility] - [place], [place] [address]  [facility] - [place], [place] - [address]    Clinical concerns: Yes Dr. Velazquez was notified.      Annetta Bruno CMA              "

## 2020-10-21 NOTE — PATIENT INSTRUCTIONS
Chemo tomorrow    See me back as scheduled    Please use Urea cream to hands/feet several times a day

## 2020-10-21 NOTE — LETTER
"    10/21/2020         RE: Alannah Wynn  [address]  [place] [address]        Dear Colleague,    Thank you for referring your patient, Alannah Wynn, to the [facility]. Please see a copy of my visit note below.    ONCOLOGY FOLLOW UP NOTE    10/21/2020     Patient was being followed by Dr. Tavarez and saw her on 11/13/2019.  Now she is transferring her care to me.  I have reviewed her previous records and have copied and updated from prior notes.      DIAGNOSIS:    10/2019 dx adenoCa of rectum,  MMR intact- NGS Negative for KRAS, NRAS and BRAF. PD L1 25%.  Baseline CEA 10.  Stage IV disease with pulmonary and bony mets      HISTORY OF ONCOLOGY ILLNESS:    She has had intermittent hematochezia and anorectal irritation since 2011, that have been managed as \"hemorrhoids\". These symptoms became persistent and progressive since early thi year. Her anorectal pain is managed with Tylenol. Denies fevers, chills, or unintentional weight loss. Does have intermittent urinary and fecal urgency but no incontinence per se.    Diagnostic colonoscopy on 10/18/19 for rectal bleeding showed \"fungating and ulcerated non-obstructing mass was found in the distal rectum <1 cm from the anal verge. The mass was partially circumferential (involving one-half of the lumen circumference). The mass measured four cm in length. In addition, its diameter measured six mm.  Pathology showed invasive moderately differentiated adenocarcinoma with intact mismatch repair protein expression, PD L1 25% and NGS panel showed NO MUTATIONS in KRAS, NRAS and BRAF.      Pelvic MRI 11/2019 found fungating polypoid mid/lower rectal mass with luminal narrowing as well as innumerable enlarged lymph nodes as described above with a conglomerate of suspicious lymph nodes abutting the anterior peritoneal reflection. Stage cT3d N2.     PET 11/2019 found   1. Hypermetabolic mass in the distal rectum, SUV 14 corresponding " to the patient's known rectal cancer.  2. Multiple hypermetabolic pelvic lymph nodes, compatible metastatic disease, SUV 5-6.  3. FDG avid pulmonary nodule measuring 7 mm in the left lower lobe, concerning for metastatic disease. Additional FDG avid pulmonary nodules suspicious for metastatic disease, SUV around 4.  4. Osteoblastic metastatic lesion in the L3 vertebral body, SUV 6.8    She started palliative FOLFOX/Avastin on 11/25/2019.  Cycle #2 given on 12/9/2019.  Cycle #3 scheduled for 12/24/2019.  C#6 on 2/10/2020.    Repeat CT CAP on 2/21/2020 shows good response to therapy with improvement in the rectal mass, mesorectal and superior rectal nodularity as well as resolution of right internal iliac lymphadenopathy.  There is slight improvement in the dominant posterior left lower lobe lung nodule and several of the lung nodules are is stable.  There is increase in size of the sclerotic lesion of the left aspect of L3 vertebral body without pathological fracture and this is likely due to treatment effect.    CEA on 2/10/2020 was down to 3.3.    Cycle #7 FOLFOX/Avastin 2/25/2020.  C#8 FOLFOX/Avastin 3/9/2020  Cycle#9 5-FU/Avastin 3/24/2020-stopped oxaliplatin due to cumulative fatigue and issues with nausea.    I then discussed with her, her  as well as her son Gamal about possibly switching to single agent Xeloda going forward so as to limit her visits to the clinic and therefore exposure and risk from coronavirus.  We discussed the pros and cons of this approach and they were all willing to try this.     She started Single agent Xeloda on 4/7/2020.  Cycle #3 started on 5/19/2020.    Because of bone metastatic disease, she was started on Zometa on 11/25/2019. We are giving it every 3 months.  Last received 5/15/2020      CT chest abdomen and pelvis on 7/20/2020 showed mild progression in the lung nodules.  Perirectal nodularity has mildly improved.    Last CEA on 7/22/2020 was 4.      7/27/2020- we  decided on switching to XELOX/Avastin.     8/18/2020 -cycle #2 XELOX/Avastin.  CEA on 8/11/2020 was 4.8.  9/8/2020 - C#3 XELOX/Avastin. CEA on 9/2/2020 was 5.1  9/30/2020.  Cycle #4 XELOX/Avastin.  Oxaliplatin dose reduced further to 85 mg per metered square because of worsening neuropathy.    Repeat CT chest abdomen and pelvis on 10/15/2020 overall shows stable disease with 1 to 2 mm increase in some of the lung nodules and about 2 mm decrease in the lymph node in the mesorectum.    10/22/2020.  Cycle #5 XELOX/Avastin planned.  Oxaliplatin dose 85 mg/m .    INTERVAL HISTORY:     She comes in today accompanied by her  and tells me overall she has been doing well.  She had nausea and vomiting for the first 1 week after chemotherapy and she felt fatigued.  She also had diarrhea during the first week.  She was taking antinausea medications as well as Imodium which helped.  She thinks neuropathy is a little better and now she has mild numbness of the fingertips and feet.  She has mild peeling of the skin of palms and soles.  She is applying urea-based cream 2 times a day.  She was seen by podiatrist and was given topical antibiotics and the right toe is much better now.  She had her port replaced yesterday because it was coiled in the internal jugular vein and she has some pain in that area.  Denies any abdominal pain.  No new swellings.  No fevers or infections.  No shortness of breath.  No mouth sores.   Now her energy is decent.      ECOG 1    ROS:  Rest of the comprehensive review of the system was essentially unremarkable.        I reviewed with her history in epic as below.    PAST MEDICAL HISTORY  Reflux, hemorrhoid, hepatitis B?  Overactive bladder  Active Ambulatory Problems     Diagnosis Date Noted     External hemorrhoids 04/29/2016     Non morbid obesity due to excess calories 04/29/2016     Hepatitis B immune 04/29/2016     Screening for cervical cancer 04/29/2016     CARDIOVASCULAR SCREENING; LDL  GOAL LESS THAN 160 04/29/2016     Gastroesophageal reflux disease 04/29/2016     Overactive bladder 07/21/2017     Nevus of left lower leg- undetermined behavior 12/27/2018     Metastasis from rectal cancer (H) 11/13/2019     Bone metastasis (H) 11/13/2019     Nausea 11/27/2019     Chemotherapy-induced neutropenia (H) 12/24/2019     Resolved Ambulatory Problems     Diagnosis Date Noted     Urgency of urination 04/29/2016     Need for vaccination against Streptococcus pneumoniae 04/29/2016     Morbid obesity (H) 07/21/2017     Obesity (BMI 35.0-39.9) with comorbidity (H) 12/27/2018     No Additional Past Medical History       MEDICATIONS:  Current Outpatient Medications   Medication     Ascorbic Acid (VITAMIN C) 500 MG CAPS     B Complex Vitamins (B COMPLEX-B12 PO)     capecitabine (XELODA) 500 MG tablet     cholecalciferol (VITAMIN D3) 400 unit (10 mcg) TABS tablet     docusate sodium (COLACE) 100 MG capsule     Fesoterodine Fumarate 8 MG TB24     lidocaine-prilocaine (EMLA) 2.5-2.5 % external cream     loperamide (IMODIUM) 2 MG capsule     LORazepam (ATIVAN) 0.5 MG tablet     LORazepam (ATIVAN) 0.5 MG tablet     Lutein 20 MG CAPS     mirabegron (MYRBETRIQ) 50 MG 24 hr tablet     Multiple vitamin TABS     naloxone (NARCAN) 4 MG/0.1ML nasal spray     omeprazole (PRILOSEC) 20 MG DR capsule     ondansetron (ZOFRAN) 8 MG tablet     prochlorperazine (COMPAZINE) 10 MG tablet     solifenacin (VESICARE) 10 MG tablet     traMADol (ULTRAM) 50 MG tablet     urea (GORDONS UREA) 40 % external ointment     capecitabine (XELODA) 500 MG tablet     capecitabine (XELODA) 500 MG tablet     OLANZapine (ZYPREXA) 2.5 MG tablet     Current Facility-Administered Medications   Medication     lidocaine (XYLOCAINE) 5 % ointment     ALLERGY:     Allergies   Allergen Reactions     Penicillins      Sulfa Drugs             SOCIAL HISTORY:  She is a [place] immigrants to [place].  Denies smoking denies alcohol abuse.  2 of her sons are  "doctors.    FAMILY HISTORY:   Denies any family history of cancer.       PHYSICAL EXAMINATION:       /84 (BP Location: Left arm, Patient Position: Chair, Cuff Size: Adult Regular)   Pulse 82   Temp 97.8  F (36.6  C) (Oral)   Ht 1.53 m (5' 0.24\")   Wt 72.7 kg (160 lb 4.8 oz)   LMP 11/27/2001 (Approximate)   SpO2 98%   BMI 31.06 kg/m       Wt Readings from Last 4 Encounters:   10/21/20 72.7 kg (160 lb 4.8 oz)   10/20/20 70.3 kg (155 lb)   10/01/20 73.7 kg (162 lb 6.4 oz)   09/30/20 72.5 kg (159 lb 12.8 oz)       CONSTITUTIONAL: No apparent distress  EYES: PERRLA, without pallor or jaundice  ENT/MOUTH: Ears unremarkable. No oral lesions  CVS: s1s2 normal  RESPIRATORY: Chest is clear.  Port site has bandage on it but it does not look infected.  GI: Abdomen is benign  NEURO: Alert and oriented ×3  INTEGUMENT: no concerning skin rashes   LYMPHATIC: no palpable lymphadenopathy  MUSCULOSKELETAL: Unremarkable. No bony tenderness.   EXTREMITIES: no pedal edema.  The right big toe looks much better.  She has mild peeling of the skin of the palms and the soles consistent with mild hand-foot syndrome.  PSYCH: Mentation, mood and affect are appropriate      LABS/IMAGING/PATHOLOGY:    Reviewed    Results for MARY SCHULER (MRN [id number]) as of 10/21/2020 08:00   Ref. Range 11/4/2019 11:49 11/18/2019 15:35 12/24/2019 07:40 12/30/2019 10:00 2/10/2020 08:15 5/15/2020 11:15 6/30/2020 10:25 7/22/2020 09:52 8/11/2020 14:40 9/2/2020 13:05 9/30/2020 08:35   CEA Latest Ref Range: 0 - 2.5 ug/L 10.0 (H)  8.8 (H) 7.6 (H) 3.3 (H) 2.8 (H) 2.8 (H) 4.0 (H) 4.8 (H) 5.1 (H) 5.7 (H)       EXAMINATION: CT CHEST/ABDOMEN/PELVIS W CONTRAST, 10/15/2020 10:55 AM     TECHNIQUE:  Helical CT images from the thoracic inlet through the  symphysis pubis were obtained  with contrast. Contrast dose: isovue  370 99ml     COMPARISON: CT: 7/20/2020, 2/21/2020     HISTORY: follow up colon cancer; Metastasis from rectal cancer (H);  Metastasis from " rectal cancer (H); Bone metastasis (H)     FINDINGS:     Chest: Right port coiled within the right internal jugular vein with  tip terminating in the mid SVC. The heart is not enlarged. No  pericardial effusion. The thoracic aorta and pulmonary arteries are  not enlarged. Thyroid is within normal limits. No suspicious  mediastinal lymphadenopathy. Multiple pulmonary nodules, most  representative examples as described below:  -3 mm nodule in the left upper lobe (series 7, image 47), slightly  increased in size from prior examination at which time it measured 2  mm.  -5 mm nodule in the left upper lobe (series 7, image 62), slightly  increased in size from prior at which time it measured 4 mm.  -5 mm nodule in the subpleural left upper lobe (series 7, image 71),  slightly increased from prior at which time it measured 3 mm. 1 cm  nodule in the left lower lobe posterior to the aorta (series 7, image  78), slightly increased from prior at which time it measured 8 mm.  -5 mm nodule in the right lower lobe (series 7, image 67), similar to  prior exam.     Abdomen and pelvis: The liver is unremarkable. No evidence of  metastatic disease. Pancreas is within normal limits. Postoperative  changes of cholecystectomy with the common bile duct measuring roughly  9 mm, likely reservoir effect. The adrenals are normal.No  hydronephrosis, hydroureter, or nephrolithiasis. The bladder is  partially decompressed without evidence of focal mass.  Persistent thickening of the lower rectum in keeping with patient's  known tumor. No evidence of small or large bowel obstruction. Small  hiatal hernia.   0.8 x 0.8 cm lymph node in the perirectal region, grossly stable from  prior.  1.1 x 0.7 cm lymph node in the mesorectum (Series 3, image 489),  slightly decreased from prior at which time it measured 1.3 x 0.9 cm.     Bones and soft tissues:  Soft tissues unremarkable. Stable sclerotic lesion in the L3 vertebral  body on the left. Stable  sclerotic lesion in the left pubis (series 3,  image 530). No new osseous metastases.                                                                      IMPRESSION: In this patient with metastatic colon cancer:  1. Right port coiled within the right internal jugular vein with tip  terminating in the mid SVC.   2. Stable rectal mass, though difficult to accurately measure on CT.  Stable perirectal nodularity/lymph nodes.  3. Multiple pulmonary metastases, several increased in size as  described above.  4. Stable size of the presacral (known to be hypermetabolic) L3  sclerotic lesion.    ASSESSMENT AND PLAN:      Metastatic rectal adenocarcinoma, MSI intact, K-aimee wild type, PDL 1 + 25% with metastasis to the lungs, bones and pelvic lymph nodes.      She started palliative chemotherapy with FOLFOX/Avastin on 11/25/2019.      She has received 6 cycles of chemotherapy.  With cycle #3, we stopped 5-FU bolus because of excessive tiredness.    Repeat CT CAP on 2/21/2020 shows good response to therapy with improvement in the rectal mass, mesorectal and superior rectal nodularity as well as resolution of right internal iliac lymphadenopathy.  There is slight improvement in the dominant posterior left lower lobe lung nodule and several of the lung nodules are is stable.  There is increase in size of the sclerotic lesion of the left aspect of L3 vertebral body without pathological fracture and this is likely due to treatment effect.    CEA on 2/10/2020 was down to 3.3.    With cycle #9 we stopped oxaliplatin because of cumulative fatigue and increasing nausea and gave her 5-FU/Avastin.      I then discussed with her, her  as well as her son Gamal about possibly switching to single agent Xeloda going forward so as to limit her visits to the clinic and therefore exposure and risk from coronavirus.  We discussed the pros and cons of this approach and they were all willing to try this.      She started single agent  Xeloda on 4/7/2020.    CT chest abdomen and pelvis on 7/20/2020 showed mild progression in the lung nodules.  Perirectal nodularity has mildly improved.    CEA on 6/30/2020 was 2.8.  On 7/22/2020 it was 4    We decided on switching to Xeloda/Avastin on 7/27/2020.  We decreased the dose of oxaliplatin to 100 mg per metered square.    CEA on 8/11/2020 was 4.8.    Cycle #4 was given with further dose reduction of oxaliplatin to 85 mg per metered squared because of worsening neuropathy.    Repeat CT chest abdomen and pelvis on 10/15/2020 overall shows stable disease with 1 to 2 mm increase in some of the lung nodules and about 2 mm decrease in the lymph node in the mesorectum.    Overall she is tolerating chemotherapy fairly well and my plan is to proceed with cycle #5 tomorrow.    Nausea and vomiting.  This usually is during the first week so I will add Emend.  She will continue to take antiemetics as before.  She is not on Zyprexa.    Right big toe infection/ingrown toenail.  She is done with systemic antibiotics.  She is using topical antibiotics under the care of podiatrist and this is getting better.    Diarrhea.  Usually this happens during the first week of chemotherapy and she uses Imodium with good results so she will continue that.    Hand-foot syndrome.  She has mild hand-foot syndrome.  I advised her to use the urea-based cream several times a day rather than twice a day.    Neuropathy.  This has somewhat improved after dose reduction of oxaliplatin and now she has mild numbness of the fingertips and toes.  Continue the same dose reduced oxaliplatin.      Bone metastasis.  Started Zometa on 11/25/2019.  She is getting it every 3 months and she last received on 8/18/2020.  Continue vitamin D and calcium.  CT scan shows a stable bone metastasis.     Port-A-Cath.  It was coiled in the right internal jugular vein so it was replaced yesterday 10/20/2020.  She has pain at that site.  I advised her that tomorrow  when she comes for the infusion, if she is a still having pain then a peripheral line could be inserted to give her chemotherapy to give her more time to heal from the recent Port-A-Cath placement.    We did not address the following today.        Discussion regarding health care directive  We discussed that it is important that she completes health care directive which would help in making sure that her wishes are followed about her treatment care in case she is not able to make a decision for herself.  We gave her information regarding that.    RTC as a scheduled    All questions were answered to her satisfaction.  She is agreeable and comfortable with the plan.    Charline Velazquez MD        Again, thank you for allowing me to participate in the care of your patient.        Sincerely,        Charline Velazquez MD

## 2020-10-21 NOTE — PROGRESS NOTES
"ONCOLOGY FOLLOW UP NOTE    10/21/2020     Patient was being followed by Dr. Tavarez and saw her on 11/13/2019.  Now she is transferring her care to me.  I have reviewed her previous records and have copied and updated from prior notes.      DIAGNOSIS:    10/2019 dx adenoCa of rectum,  MMR intact- NGS Negative for KRAS, NRAS and BRAF. PD L1 25%.  Baseline CEA 10.  Stage IV disease with pulmonary and bony mets      HISTORY OF ONCOLOGY ILLNESS:    She has had intermittent hematochezia and anorectal irritation since 2011, that have been managed as \"hemorrhoids\". These symptoms became persistent and progressive since early thi year. Her anorectal pain is managed with Tylenol. Denies fevers, chills, or unintentional weight loss. Does have intermittent urinary and fecal urgency but no incontinence per se.    Diagnostic colonoscopy on 10/18/19 for rectal bleeding showed \"fungating and ulcerated non-obstructing mass was found in the distal rectum <1 cm from the anal verge. The mass was partially circumferential (involving one-half of the lumen circumference). The mass measured four cm in length. In addition, its diameter measured six mm.  Pathology showed invasive moderately differentiated adenocarcinoma with intact mismatch repair protein expression, PD L1 25% and NGS panel showed NO MUTATIONS in KRAS, NRAS and BRAF.      Pelvic MRI 11/2019 found fungating polypoid mid/lower rectal mass with luminal narrowing as well as innumerable enlarged lymph nodes as described above with a conglomerate of suspicious lymph nodes abutting the anterior peritoneal reflection. Stage cT3d N2.     PET 11/2019 found   1. Hypermetabolic mass in the distal rectum, SUV 14 corresponding to the patient's known rectal cancer.  2. Multiple hypermetabolic pelvic lymph nodes, compatible metastatic disease, SUV 5-6.  3. FDG avid pulmonary nodule measuring 7 mm in the left lower lobe, concerning for metastatic disease. Additional FDG avid pulmonary nodules " suspicious for metastatic disease, SUV around 4.  4. Osteoblastic metastatic lesion in the L3 vertebral body, SUV 6.8    She started palliative FOLFOX/Avastin on 11/25/2019.  Cycle #2 given on 12/9/2019.  Cycle #3 scheduled for 12/24/2019.  C#6 on 2/10/2020.    Repeat CT CAP on 2/21/2020 shows good response to therapy with improvement in the rectal mass, mesorectal and superior rectal nodularity as well as resolution of right internal iliac lymphadenopathy.  There is slight improvement in the dominant posterior left lower lobe lung nodule and several of the lung nodules are is stable.  There is increase in size of the sclerotic lesion of the left aspect of L3 vertebral body without pathological fracture and this is likely due to treatment effect.    CEA on 2/10/2020 was down to 3.3.    Cycle #7 FOLFOX/Avastin 2/25/2020.  C#8 FOLFOX/Avastin 3/9/2020  Cycle#9 5-FU/Avastin 3/24/2020-stopped oxaliplatin due to cumulative fatigue and issues with nausea.    I then discussed with her, her  as well as her son Gamal about possibly switching to single agent Xeloda going forward so as to limit her visits to the clinic and therefore exposure and risk from coronavirus.  We discussed the pros and cons of this approach and they were all willing to try this.     She started Single agent Xeloda on 4/7/2020.  Cycle #3 started on 5/19/2020.    Because of bone metastatic disease, she was started on Zometa on 11/25/2019. We are giving it every 3 months.  Last received 5/15/2020      CT chest abdomen and pelvis on 7/20/2020 showed mild progression in the lung nodules.  Perirectal nodularity has mildly improved.    Last CEA on 7/22/2020 was 4.      7/27/2020- we decided on switching to XELOX/Avastin.     8/18/2020 -cycle #2 XELOX/Avastin.  CEA on 8/11/2020 was 4.8.  9/8/2020 - C#3 XELOX/Avastin. CEA on 9/2/2020 was 5.1  9/30/2020.  Cycle #4 XELOX/Avastin.  Oxaliplatin dose reduced further to 85 mg per metered square because of  worsening neuropathy.    Repeat CT chest abdomen and pelvis on 10/15/2020 overall shows stable disease with 1 to 2 mm increase in some of the lung nodules and about 2 mm decrease in the lymph node in the mesorectum.    10/22/2020.  Cycle #5 XELOX/Avastin planned.  Oxaliplatin dose 85 mg/m .    INTERVAL HISTORY:     She comes in today accompanied by her  and tells me overall she has been doing well.  She had nausea and vomiting for the first 1 week after chemotherapy and she felt fatigued.  She also had diarrhea during the first week.  She was taking antinausea medications as well as Imodium which helped.  She thinks neuropathy is a little better and now she has mild numbness of the fingertips and feet.  She has mild peeling of the skin of palms and soles.  She is applying urea-based cream 2 times a day.  She was seen by podiatrist and was given topical antibiotics and the right toe is much better now.  She had her port replaced yesterday because it was coiled in the internal jugular vein and she has some pain in that area.  Denies any abdominal pain.  No new swellings.  No fevers or infections.  No shortness of breath.  No mouth sores.   Now her energy is decent.      ECOG 1    ROS:  Rest of the comprehensive review of the system was essentially unremarkable.        I reviewed with her history in epic as below.    PAST MEDICAL HISTORY  Reflux, hemorrhoid, hepatitis B?  Overactive bladder  Active Ambulatory Problems     Diagnosis Date Noted     External hemorrhoids 04/29/2016     Non morbid obesity due to excess calories 04/29/2016     Hepatitis B immune 04/29/2016     Screening for cervical cancer 04/29/2016     CARDIOVASCULAR SCREENING; LDL GOAL LESS THAN 160 04/29/2016     Gastroesophageal reflux disease 04/29/2016     Overactive bladder 07/21/2017     Nevus of left lower leg- undetermined behavior 12/27/2018     Metastasis from rectal cancer (H) 11/13/2019     Bone metastasis (H) 11/13/2019     Nausea  "11/27/2019     Chemotherapy-induced neutropenia (H) 12/24/2019     Resolved Ambulatory Problems     Diagnosis Date Noted     Urgency of urination 04/29/2016     Need for vaccination against Streptococcus pneumoniae 04/29/2016     Morbid obesity (H) 07/21/2017     Obesity (BMI 35.0-39.9) with comorbidity (H) 12/27/2018     No Additional Past Medical History       MEDICATIONS:  Current Outpatient Medications   Medication     Ascorbic Acid (VITAMIN C) 500 MG CAPS     B Complex Vitamins (B COMPLEX-B12 PO)     capecitabine (XELODA) 500 MG tablet     cholecalciferol (VITAMIN D3) 400 unit (10 mcg) TABS tablet     docusate sodium (COLACE) 100 MG capsule     Fesoterodine Fumarate 8 MG TB24     lidocaine-prilocaine (EMLA) 2.5-2.5 % external cream     loperamide (IMODIUM) 2 MG capsule     LORazepam (ATIVAN) 0.5 MG tablet     LORazepam (ATIVAN) 0.5 MG tablet     Lutein 20 MG CAPS     mirabegron (MYRBETRIQ) 50 MG 24 hr tablet     Multiple vitamin TABS     naloxone (NARCAN) 4 MG/0.1ML nasal spray     omeprazole (PRILOSEC) 20 MG DR capsule     ondansetron (ZOFRAN) 8 MG tablet     prochlorperazine (COMPAZINE) 10 MG tablet     solifenacin (VESICARE) 10 MG tablet     traMADol (ULTRAM) 50 MG tablet     urea (GORDONS UREA) 40 % external ointment     capecitabine (XELODA) 500 MG tablet     capecitabine (XELODA) 500 MG tablet     OLANZapine (ZYPREXA) 2.5 MG tablet     Current Facility-Administered Medications   Medication     lidocaine (XYLOCAINE) 5 % ointment     ALLERGY:     Allergies   Allergen Reactions     Penicillins      Sulfa Drugs             SOCIAL HISTORY:  She is a [place] immigrants to [place].  Denies smoking denies alcohol abuse.  2 of her sons are doctors.    FAMILY HISTORY:   Denies any family history of cancer.       PHYSICAL EXAMINATION:       /84 (BP Location: Left arm, Patient Position: Chair, Cuff Size: Adult Regular)   Pulse 82   Temp 97.8  F (36.6  C) (Oral)   Ht 1.53 m (5' 0.24\")   Wt 72.7 kg (160 lb " 4.8 oz)   LMP 11/27/2001 (Approximate)   SpO2 98%   BMI 31.06 kg/m       Wt Readings from Last 4 Encounters:   10/21/20 72.7 kg (160 lb 4.8 oz)   10/20/20 70.3 kg (155 lb)   10/01/20 73.7 kg (162 lb 6.4 oz)   09/30/20 72.5 kg (159 lb 12.8 oz)       CONSTITUTIONAL: No apparent distress  EYES: PERRLA, without pallor or jaundice  ENT/MOUTH: Ears unremarkable. No oral lesions  CVS: s1s2 normal  RESPIRATORY: Chest is clear.  Port site has bandage on it but it does not look infected.  GI: Abdomen is benign  NEURO: Alert and oriented ×3  INTEGUMENT: no concerning skin rashes   LYMPHATIC: no palpable lymphadenopathy  MUSCULOSKELETAL: Unremarkable. No bony tenderness.   EXTREMITIES: no pedal edema.  The right big toe looks much better.  She has mild peeling of the skin of the palms and the soles consistent with mild hand-foot syndrome.  PSYCH: Mentation, mood and affect are appropriate      LABS/IMAGING/PATHOLOGY:    Reviewed    Results for MARY SCHULER (MRN [id number]) as of 10/21/2020 08:00   Ref. Range 11/4/2019 11:49 11/18/2019 15:35 12/24/2019 07:40 12/30/2019 10:00 2/10/2020 08:15 5/15/2020 11:15 6/30/2020 10:25 7/22/2020 09:52 8/11/2020 14:40 9/2/2020 13:05 9/30/2020 08:35   CEA Latest Ref Range: 0 - 2.5 ug/L 10.0 (H)  8.8 (H) 7.6 (H) 3.3 (H) 2.8 (H) 2.8 (H) 4.0 (H) 4.8 (H) 5.1 (H) 5.7 (H)       EXAMINATION: CT CHEST/ABDOMEN/PELVIS W CONTRAST, 10/15/2020 10:55 AM     TECHNIQUE:  Helical CT images from the thoracic inlet through the  symphysis pubis were obtained  with contrast. Contrast dose: isovue  370 99ml     COMPARISON: CT: 7/20/2020, 2/21/2020     HISTORY: follow up colon cancer; Metastasis from rectal cancer (H);  Metastasis from rectal cancer (H); Bone metastasis (H)     FINDINGS:     Chest: Right port coiled within the right internal jugular vein with  tip terminating in the mid SVC. The heart is not enlarged. No  pericardial effusion. The thoracic aorta and pulmonary arteries are  not enlarged. Thyroid  is within normal limits. No suspicious  mediastinal lymphadenopathy. Multiple pulmonary nodules, most  representative examples as described below:  -3 mm nodule in the left upper lobe (series 7, image 47), slightly  increased in size from prior examination at which time it measured 2  mm.  -5 mm nodule in the left upper lobe (series 7, image 62), slightly  increased in size from prior at which time it measured 4 mm.  -5 mm nodule in the subpleural left upper lobe (series 7, image 71),  slightly increased from prior at which time it measured 3 mm. 1 cm  nodule in the left lower lobe posterior to the aorta (series 7, image  78), slightly increased from prior at which time it measured 8 mm.  -5 mm nodule in the right lower lobe (series 7, image 67), similar to  prior exam.     Abdomen and pelvis: The liver is unremarkable. No evidence of  metastatic disease. Pancreas is within normal limits. Postoperative  changes of cholecystectomy with the common bile duct measuring roughly  9 mm, likely reservoir effect. The adrenals are normal.No  hydronephrosis, hydroureter, or nephrolithiasis. The bladder is  partially decompressed without evidence of focal mass.  Persistent thickening of the lower rectum in keeping with patient's  known tumor. No evidence of small or large bowel obstruction. Small  hiatal hernia.   0.8 x 0.8 cm lymph node in the perirectal region, grossly stable from  prior.  1.1 x 0.7 cm lymph node in the mesorectum (Series 3, image 489),  slightly decreased from prior at which time it measured 1.3 x 0.9 cm.     Bones and soft tissues:  Soft tissues unremarkable. Stable sclerotic lesion in the L3 vertebral  body on the left. Stable sclerotic lesion in the left pubis (series 3,  image 530). No new osseous metastases.                                                                      IMPRESSION: In this patient with metastatic colon cancer:  1. Right port coiled within the right internal jugular vein with  tip  terminating in the mid SVC.   2. Stable rectal mass, though difficult to accurately measure on CT.  Stable perirectal nodularity/lymph nodes.  3. Multiple pulmonary metastases, several increased in size as  described above.  4. Stable size of the presacral (known to be hypermetabolic) L3  sclerotic lesion.    ASSESSMENT AND PLAN:      Metastatic rectal adenocarcinoma, MSI intact, K-aimee wild type, PDL 1 + 25% with metastasis to the lungs, bones and pelvic lymph nodes.      She started palliative chemotherapy with FOLFOX/Avastin on 11/25/2019.      She has received 6 cycles of chemotherapy.  With cycle #3, we stopped 5-FU bolus because of excessive tiredness.    Repeat CT CAP on 2/21/2020 shows good response to therapy with improvement in the rectal mass, mesorectal and superior rectal nodularity as well as resolution of right internal iliac lymphadenopathy.  There is slight improvement in the dominant posterior left lower lobe lung nodule and several of the lung nodules are is stable.  There is increase in size of the sclerotic lesion of the left aspect of L3 vertebral body without pathological fracture and this is likely due to treatment effect.    CEA on 2/10/2020 was down to 3.3.    With cycle #9 we stopped oxaliplatin because of cumulative fatigue and increasing nausea and gave her 5-FU/Avastin.      I then discussed with her, her  as well as her son Gamal about possibly switching to single agent Xeloda going forward so as to limit her visits to the clinic and therefore exposure and risk from coronavirus.  We discussed the pros and cons of this approach and they were all willing to try this.      She started single agent Xeloda on 4/7/2020.    CT chest abdomen and pelvis on 7/20/2020 showed mild progression in the lung nodules.  Perirectal nodularity has mildly improved.    CEA on 6/30/2020 was 2.8.  On 7/22/2020 it was 4    We decided on switching to Xeloda/Avastin on 7/27/2020.  We decreased the  dose of oxaliplatin to 100 mg per metered square.    CEA on 8/11/2020 was 4.8.    Cycle #4 was given with further dose reduction of oxaliplatin to 85 mg per metered squared because of worsening neuropathy.    Repeat CT chest abdomen and pelvis on 10/15/2020 overall shows stable disease with 1 to 2 mm increase in some of the lung nodules and about 2 mm decrease in the lymph node in the mesorectum.    Overall she is tolerating chemotherapy fairly well and my plan is to proceed with cycle #5 tomorrow.    Nausea and vomiting.  This usually is during the first week so I will add Emend.  She will continue to take antiemetics as before.  She is not on Zyprexa.    Right big toe infection/ingrown toenail.  She is done with systemic antibiotics.  She is using topical antibiotics under the care of podiatrist and this is getting better.    Diarrhea.  Usually this happens during the first week of chemotherapy and she uses Imodium with good results so she will continue that.    Hand-foot syndrome.  She has mild hand-foot syndrome.  I advised her to use the urea-based cream several times a day rather than twice a day.    Neuropathy.  This has somewhat improved after dose reduction of oxaliplatin and now she has mild numbness of the fingertips and toes.  Continue the same dose reduced oxaliplatin.      Bone metastasis.  Started Zometa on 11/25/2019.  She is getting it every 3 months and she last received on 8/18/2020.  Continue vitamin D and calcium.  CT scan shows a stable bone metastasis.     Port-A-Cath.  It was coiled in the right internal jugular vein so it was replaced yesterday 10/20/2020.  She has pain at that site.  I advised her that tomorrow when she comes for the infusion, if she is a still having pain then a peripheral line could be inserted to give her chemotherapy to give her more time to heal from the recent Port-A-Cath placement.    We did not address the following today.        Discussion regarding health care  directive  We discussed that it is important that she completes health care directive which would help in making sure that her wishes are followed about her treatment care in case she is not able to make a decision for herself.  We gave her information regarding that.    RTC as a scheduled    All questions were answered to her satisfaction.  She is agreeable and comfortable with the plan.    Charline Velazquez MD

## 2020-10-22 NOTE — PROGRESS NOTES
SPIRITUAL HEALTH SERVICES Progress Note  [facility]    Visited Alannah Wynn in the infusion center for ongoing emotional/spiritual support.  Offered reflective conversation, support and affirmation as she shared her  journey.       Illness Narrative - Patient shared that she continues to receive infusions every three weeks. She states that her tumor is shrinking so that gives her hope.       Concerns - She reports some finger neuropathy that causes her some discomfort.       Coping - Patient voiced that she is feeling well supported by her  and sons and her [employer].  She feels God's love through their support.       Meaning-Making - Not discussed.       Plan - Patient knows that she can request a Spiritual Health encounter as needed.     Neelam Harris  Staff

## 2020-10-22 NOTE — PROGRESS NOTES
Infusion Nursing Note:  Alannah Wynn presents today for C5,D1 of Oxaliplatin/MVASI.    Patient seen by provider today: Yes: Dr Velazquez on 10/21/2020   present during visit today: Not Applicable.    Note: Pt states she is doing well, states she has had neuropathy to her hands and feet but states it is stable - skin to the fingers is dry and some slight peeling.  Controlling nausea at home and eating well.    Intravenous Access:  Implanted Port.    Treatment Conditions:  Lab Results   Component Value Date    HGB 12.3 10/21/2020     Lab Results   Component Value Date    WBC 4.3 10/21/2020      Lab Results   Component Value Date    ANEU 1.7 10/21/2020     Lab Results   Component Value Date     10/21/2020      Lab Results   Component Value Date     10/21/2020                   Lab Results   Component Value Date    POTASSIUM 4.1 10/21/2020           No results found for: MAG         Lab Results   Component Value Date    CR 0.62 10/21/2020                   Lab Results   Component Value Date    LAUREN 8.6 10/21/2020                Lab Results   Component Value Date    BILITOTAL 0.4 10/21/2020           Lab Results   Component Value Date    ALBUMIN 3.5 10/21/2020                    Lab Results   Component Value Date    ALT 34 10/21/2020           Lab Results   Component Value Date    AST 34 10/21/2020       Results reviewed, labs MET treatment parameters, ok to proceed with treatment.  Urine negative today.    Post Infusion Assessment:  Patient tolerated infusion without incident.  Blood return noted pre and post infusion.  Site patent and intact, free from redness, edema or discomfort.  No evidence of extravasations.  Access discontinued per protocol.       Discharge Plan:   Return 11/11/2020 with Dr Velazquez for C6 of Oxaliplatin/MVASI  Discharge instructions reviewed with: Patient.  Patient and/or family verbalized understanding of discharge instructions and all questions answered.  Patient discharged in  stable condition accompanied by: self.  Departure Mode: Ambulatory.    Yadira Dumas RN

## 2020-11-02 NOTE — TELEPHONE ENCOUNTER
11/2 2nd attempt. Provided phone number [phone] to schedule  in about 1 month (around 11/19/2020).    Mely Henriquez   Procedure    Ortho/Sports Med/Pod/Ent/Eye  [facility]   [phone]

## 2020-11-09 NOTE — TELEPHONE ENCOUNTER
11/9  3rd attempt. Provided phone number [phone] to schedule  in about 1 month (around 11/19/2020).    Mely Henriquez   Procedure    Ortho/Sports Med/Pod/Ent/Eye  [facility]   [phone]

## 2020-11-11 NOTE — PROGRESS NOTES
"ONCOLOGY FOLLOW UP NOTE    11/11/2020     Patient was being followed by Dr. Tavarez and saw her on 11/13/2019.  Now she is transferring her care to me.  I have reviewed her previous records and have copied and updated from prior notes.      DIAGNOSIS:    10/2019 dx adenoCa of rectum,  MMR intact- NGS Negative for KRAS, NRAS and BRAF. PD L1 25%.  Baseline CEA 10.  Stage IV disease with pulmonary and bony mets      HISTORY OF ONCOLOGY ILLNESS:    She has had intermittent hematochezia and anorectal irritation since 2011, that have been managed as \"hemorrhoids\". These symptoms became persistent and progressive since early thi year. Her anorectal pain is managed with Tylenol. Denies fevers, chills, or unintentional weight loss. Does have intermittent urinary and fecal urgency but no incontinence per se.    Diagnostic colonoscopy on 10/18/19 for rectal bleeding showed \"fungating and ulcerated non-obstructing mass was found in the distal rectum <1 cm from the anal verge. The mass was partially circumferential (involving one-half of the lumen circumference). The mass measured four cm in length. In addition, its diameter measured six mm.  Pathology showed invasive moderately differentiated adenocarcinoma with intact mismatch repair protein expression, PD L1 25% and NGS panel showed NO MUTATIONS in KRAS, NRAS and BRAF.      Pelvic MRI 11/2019 found fungating polypoid mid/lower rectal mass with luminal narrowing as well as innumerable enlarged lymph nodes as described above with a conglomerate of suspicious lymph nodes abutting the anterior peritoneal reflection. Stage cT3d N2.     PET 11/2019 found   1. Hypermetabolic mass in the distal rectum, SUV 14 corresponding to the patient's known rectal cancer.  2. Multiple hypermetabolic pelvic lymph nodes, compatible metastatic disease, SUV 5-6.  3. FDG avid pulmonary nodule measuring 7 mm in the left lower lobe, concerning for metastatic disease. Additional FDG avid pulmonary nodules " suspicious for metastatic disease, SUV around 4.  4. Osteoblastic metastatic lesion in the L3 vertebral body, SUV 6.8    She started palliative FOLFOX/Avastin on 11/25/2019.  Cycle #2 given on 12/9/2019.  Cycle #3 scheduled for 12/24/2019.  C#6 on 2/10/2020.    Repeat CT CAP on 2/21/2020 shows good response to therapy with improvement in the rectal mass, mesorectal and superior rectal nodularity as well as resolution of right internal iliac lymphadenopathy.  There is slight improvement in the dominant posterior left lower lobe lung nodule and several of the lung nodules are is stable.  There is increase in size of the sclerotic lesion of the left aspect of L3 vertebral body without pathological fracture and this is likely due to treatment effect.    CEA on 2/10/2020 was down to 3.3.    Cycle #7 FOLFOX/Avastin 2/25/2020.  C#8 FOLFOX/Avastin 3/9/2020  Cycle#9 5-FU/Avastin 3/24/2020-stopped oxaliplatin due to cumulative fatigue and issues with nausea.    I then discussed with her, her  as well as her son Gamal about possibly switching to single agent Xeloda going forward so as to limit her visits to the clinic and therefore exposure and risk from coronavirus.  We discussed the pros and cons of this approach and they were all willing to try this.     She started Single agent Xeloda on 4/7/2020.  Cycle #3 started on 5/19/2020.    Because of bone metastatic disease, she was started on Zometa on 11/25/2019. We are giving it every 3 months.  Last received 5/15/2020      CT chest abdomen and pelvis on 7/20/2020 showed mild progression in the lung nodules.  Perirectal nodularity has mildly improved.    Last CEA on 7/22/2020 was 4.      7/27/2020- we decided on switching to XELOX/Avastin.     8/18/2020 -cycle #2 XELOX/Avastin.  CEA on 8/11/2020 was 4.8.  9/8/2020 - C#3 XELOX/Avastin. CEA on 9/2/2020 was 5.1  9/30/2020.  Cycle #4 XELOX/Avastin.  Oxaliplatin dose reduced further to 85 mg per metered square because of  worsening neuropathy.    Repeat CT chest abdomen and pelvis on 10/15/2020 overall shows stable disease with 1 to 2 mm increase in some of the lung nodules and about 2 mm decrease in the lymph node in the mesorectum.    CEA stable at 5.3 on 10/21/2020.    10/22/2020.  Cycle #5 XELOX/Avastin.  Oxaliplatin dose 85 mg/m .  11/11/2020 C#6 XELOX/Avastin. Oxaliplatin dose 85 mg/m .    INTERVAL HISTORY:     She comes in today to the clinic and tells me that she is feeling well.  For the first week after chemotherapy she was feeling some nausea but this was better this time.  We added Emend with the previous cycle.  She also had some diarrhea for which she took Imodium which helped.  She was feeling somewhat tired and fatigued but then after the first week she started feeling better.  She has cold sensitivity for a few days but overall neuropathy is stable with numbness of the fingertips and feet.  The toe has healed although she has a little bit of discomfort there.  No other infections.  She is applying moisturizing cream to hands and feet but it is not the urea cream which we prescribed to her.  She has some peeling of the skin of the palms.  There is also hyperpigmentation.  Denies bleeding.  No shortness of breath.        ECOG 1    ROS:  A comprehensive ROS was otherwise neg      I reviewed with her history in epic as below.    PAST MEDICAL HISTORY  Reflux, hemorrhoid, hepatitis B?  Overactive bladder  Active Ambulatory Problems     Diagnosis Date Noted     External hemorrhoids 04/29/2016     Non morbid obesity due to excess calories 04/29/2016     Hepatitis B immune 04/29/2016     Screening for cervical cancer 04/29/2016     CARDIOVASCULAR SCREENING; LDL GOAL LESS THAN 160 04/29/2016     Gastroesophageal reflux disease 04/29/2016     Overactive bladder 07/21/2017     Nevus of left lower leg- undetermined behavior 12/27/2018     Metastasis from rectal cancer (H) 11/13/2019     Bone metastasis (H) 11/13/2019     Nausea  11/27/2019     Chemotherapy-induced neutropenia (H) 12/24/2019     Resolved Ambulatory Problems     Diagnosis Date Noted     Urgency of urination 04/29/2016     Need for vaccination against Streptococcus pneumoniae 04/29/2016     Morbid obesity (H) 07/21/2017     Obesity (BMI 35.0-39.9) with comorbidity (H) 12/27/2018     No Additional Past Medical History       MEDICATIONS:  Current Outpatient Medications   Medication     Ascorbic Acid (VITAMIN C) 500 MG CAPS     B Complex Vitamins (B COMPLEX-B12 PO)     capecitabine (XELODA) 500 MG tablet     capecitabine (XELODA) 500 MG tablet     cholecalciferol (VITAMIN D3) 400 unit (10 mcg) TABS tablet     docusate sodium (COLACE) 100 MG capsule     Fesoterodine Fumarate 8 MG TB24     lidocaine-prilocaine (EMLA) 2.5-2.5 % external cream     loperamide (IMODIUM) 2 MG capsule     LORazepam (ATIVAN) 0.5 MG tablet     LORazepam (ATIVAN) 0.5 MG tablet     Lutein 20 MG CAPS     mirabegron (MYRBETRIQ) 50 MG 24 hr tablet     Multiple vitamin TABS     naloxone (NARCAN) 4 MG/0.1ML nasal spray     OLANZapine (ZYPREXA) 2.5 MG tablet     omeprazole (PRILOSEC) 20 MG DR capsule     ondansetron (ZOFRAN) 8 MG tablet     prochlorperazine (COMPAZINE) 10 MG tablet     solifenacin (VESICARE) 10 MG tablet     traMADol (ULTRAM) 50 MG tablet     urea (GORDONS UREA) 40 % external ointment     Current Facility-Administered Medications   Medication     lidocaine (XYLOCAINE) 5 % ointment     ALLERGY:     Allergies   Allergen Reactions     Penicillins      Sulfa Drugs             SOCIAL HISTORY:  She is a [place] immigrants to [place].  Denies smoking denies alcohol abuse.  2 of her sons are doctors.    FAMILY HISTORY:   Denies any family history of cancer.       PHYSICAL EXAMINATION:       LMP 11/27/2001 (Approximate)      Wt Readings from Last 4 Encounters:   10/22/20 72.4 kg (159 lb 9.6 oz)   10/21/20 72.7 kg (160 lb 4.8 oz)   10/20/20 70.3 kg (155 lb)   10/01/20 73.7 kg (162 lb 6.4 oz)        CONSTITUTIONAL: no acute distress  EYES: PERRLA, no palor or icterus.   ENT/MOUTH: no mouth lesions. Ears normal  CVS: s1s2 no m r g .   RESPIRATORY: clear to auscultation b/l  GI: soft non tender no hepatosplenomegaly  NEURO: AAOX3  Grossly non focal neuro exam  INTEGUMENT: She has some peeling of the palms consistent with mild hand-foot syndrome.  LYMPHATIC: no palpable cervical, supraclavicular, axillary or inguinal LAD  MUSCULOSKELETAL: Unremarkable. No bony tenderness.   EXTREMITIES: no edema  PSYCH: Mentation, mood and affect are normal. Decision making capacity is intact      LABS/IMAGING/PATHOLOGY:    Reviewed  Results for MARY SCHULER (MRN [id number]) as of 11/11/2020 08:56   Ref. Range 11/11/2020 08:15   Sodium Latest Ref Range: 133 - 144 mmol/L 141   Potassium Latest Ref Range: 3.4 - 5.3 mmol/L 3.9   Chloride Latest Ref Range: 94 - 109 mmol/L 109   Carbon Dioxide Latest Ref Range: 20 - 32 mmol/L 29   Urea Nitrogen Latest Ref Range: 7 - 30 mg/dL 13   Creatinine Latest Ref Range: 0.52 - 1.04 mg/dL 0.56   GFR Estimate Latest Ref Range: >60 mL/min/1.73_m2 >90   GFR Estimate If Black Latest Ref Range: >60 mL/min/1.73_m2 >90   Calcium Latest Ref Range: 8.5 - 10.1 mg/dL 8.5   Anion Gap Latest Ref Range: 3 - 14 mmol/L 3   Albumin Latest Ref Range: 3.4 - 5.0 g/dL 3.5   Protein Total Latest Ref Range: 6.8 - 8.8 g/dL 7.0   Bilirubin Total Latest Ref Range: 0.2 - 1.3 mg/dL 0.3   Alkaline Phosphatase Latest Ref Range: 40 - 150 U/L 83   ALT Latest Ref Range: 0 - 50 U/L 52 (H)   AST Latest Ref Range: 0 - 45 U/L 59 (H)   Glucose Latest Ref Range: 70 - 99 mg/dL 85   WBC Latest Ref Range: 4.0 - 11.0 10e9/L 4.2   Hemoglobin Latest Ref Range: 11.7 - 15.7 g/dL 11.9   Hematocrit Latest Ref Range: 35.0 - 47.0 % 35.4   Platelet Count Latest Ref Range: 150 - 450 10e9/L 157   RBC Count Latest Ref Range: 3.8 - 5.2 10e12/L 3.39 (L)   MCV Latest Ref Range: 78 - 100 fl 104 (H)   MCH Latest Ref Range: 26.5 - 33.0 pg 35.1 (H)    MCHC Latest Ref Range: 31.5 - 36.5 g/dL 33.6   RDW Latest Ref Range: 10.0 - 15.0 % 17.9 (H)       Results for MARY SCHULER (MRN [id number]) as of 11/11/2020 07:46   Ref. Range 11/4/2019 11:49 12/24/2019 07:40 12/30/2019 10:00 2/10/2020 08:15 5/15/2020 11:15 6/30/2020 10:25 7/22/2020 09:52 8/11/2020 14:40 9/2/2020 13:05 9/30/2020 08:35 10/21/2020 08:22   CEA Latest Ref Range: 0 - 2.5 ug/L 10.0 (H) 8.8 (H) 7.6 (H) 3.3 (H) 2.8 (H) 2.8 (H) 4.0 (H) 4.8 (H) 5.1 (H) 5.7 (H) 5.3 (H)       EXAMINATION: CT CHEST/ABDOMEN/PELVIS W CONTRAST, 10/15/2020 10:55 AM     TECHNIQUE:  Helical CT images from the thoracic inlet through the  symphysis pubis were obtained  with contrast. Contrast dose: isovue  370 99ml     COMPARISON: CT: 7/20/2020, 2/21/2020     HISTORY: follow up colon cancer; Metastasis from rectal cancer (H);  Metastasis from rectal cancer (H); Bone metastasis (H)     FINDINGS:     Chest: Right port coiled within the right internal jugular vein with  tip terminating in the mid SVC. The heart is not enlarged. No  pericardial effusion. The thoracic aorta and pulmonary arteries are  not enlarged. Thyroid is within normal limits. No suspicious  mediastinal lymphadenopathy. Multiple pulmonary nodules, most  representative examples as described below:  -3 mm nodule in the left upper lobe (series 7, image 47), slightly  increased in size from prior examination at which time it measured 2  mm.  -5 mm nodule in the left upper lobe (series 7, image 62), slightly  increased in size from prior at which time it measured 4 mm.  -5 mm nodule in the subpleural left upper lobe (series 7, image 71),  slightly increased from prior at which time it measured 3 mm. 1 cm  nodule in the left lower lobe posterior to the aorta (series 7, image  78), slightly increased from prior at which time it measured 8 mm.  -5 mm nodule in the right lower lobe (series 7, image 67), similar to  prior exam.     Abdomen and pelvis: The liver is  unremarkable. No evidence of  metastatic disease. Pancreas is within normal limits. Postoperative  changes of cholecystectomy with the common bile duct measuring roughly  9 mm, likely reservoir effect. The adrenals are normal.No  hydronephrosis, hydroureter, or nephrolithiasis. The bladder is  partially decompressed without evidence of focal mass.  Persistent thickening of the lower rectum in keeping with patient's  known tumor. No evidence of small or large bowel obstruction. Small  hiatal hernia.   0.8 x 0.8 cm lymph node in the perirectal region, grossly stable from  prior.  1.1 x 0.7 cm lymph node in the mesorectum (Series 3, image 489),  slightly decreased from prior at which time it measured 1.3 x 0.9 cm.     Bones and soft tissues:  Soft tissues unremarkable. Stable sclerotic lesion in the L3 vertebral  body on the left. Stable sclerotic lesion in the left pubis (series 3,  image 530). No new osseous metastases.                                                                      IMPRESSION: In this patient with metastatic colon cancer:  1. Right port coiled within the right internal jugular vein with tip  terminating in the mid SVC.   2. Stable rectal mass, though difficult to accurately measure on CT.  Stable perirectal nodularity/lymph nodes.  3. Multiple pulmonary metastases, several increased in size as  described above.  4. Stable size of the presacral (known to be hypermetabolic) L3  sclerotic lesion.    ASSESSMENT AND PLAN:      Metastatic rectal adenocarcinoma, MSI intact, K-aimee wild type, PDL 1 + 25% with metastasis to the lungs, bones and pelvic lymph nodes.      She started palliative chemotherapy with FOLFOX/Avastin on 11/25/2019.      She has received 6 cycles of chemotherapy.  With cycle #3, we stopped 5-FU bolus because of excessive tiredness.    Repeat CT CAP on 2/21/2020 shows good response to therapy with improvement in the rectal mass, mesorectal and superior rectal nodularity as well as  resolution of right internal iliac lymphadenopathy.  There is slight improvement in the dominant posterior left lower lobe lung nodule and several of the lung nodules are is stable.  There is increase in size of the sclerotic lesion of the left aspect of L3 vertebral body without pathological fracture and this is likely due to treatment effect.    CEA on 2/10/2020 was down to 3.3.    With cycle #9 we stopped oxaliplatin because of cumulative fatigue and increasing nausea and gave her 5-FU/Avastin.      I then discussed with her, her  as well as her son Gamal about possibly switching to single agent Xeloda going forward so as to limit her visits to the clinic and therefore exposure and risk from coronavirus.  We discussed the pros and cons of this approach and they were all willing to try this.      She started single agent Xeloda on 4/7/2020.    CT chest abdomen and pelvis on 7/20/2020 showed mild progression in the lung nodules.  Perirectal nodularity has mildly improved.    CEA on 6/30/2020 was 2.8.  On 7/22/2020 it was 4    We decided on switching to Xeloda/Avastin on 7/27/2020.  We decreased the dose of oxaliplatin to 100 mg per metered square.    CEA on 8/11/2020 was 4.8.    Cycle #4 was given with further dose reduction of oxaliplatin to 85 mg per metered squared because of worsening neuropathy.    Repeat CT chest abdomen and pelvis on 10/15/2020 overall shows stable disease with 1 to 2 mm increase in some of the lung nodules and about 2 mm decrease in the lymph node in the mesorectum.    Overall she is tolerating chemotherapy fairly well and we to proceed with same chemo. CEA was stable at 5.3.    She will get C#6 today 11/11/2020     Nausea and vomiting.  With the addition of Emend with cycle #5, her nausea was better.  We will continue with Emend.  She will use Zofran and Compazine as before.  She is not taking Zyprexa.     Diarrhea.  She has some diarrhea over the first 1 week but then it gets  better with Imodium.  Continue that.  Currently she has normal bowel movements.    Hand-foot syndrome.  I advised her to start using the urea cream several times a day to both hands and feet so that the hand-foot syndrome does not get worse.  Currently she has some peeling of the skin of the hands.    Neuropathy.  She has some cold sensitivity from oxaliplatin with mild numbness of the fingertips and toes.  With the reduced dose of oxaliplatin, it has been a stable and is not worsening.  Continue to observe.    Bone metastasis.  Started Zometa on 11/25/2019.  She is getting it every 3 months and she last received on 8/18/2020.  We will give it to her today 11/11/2020.  Continue calcium and vitamin D.  CT scan shows stable bone metastasis.       We did not address the following today.    Right big toe infection/ingrown toenail.  She is done with systemic antibiotics.  She is using topical antibiotics under the care of podiatrist and this is getting better.      Port-A-Cath.  It was coiled in the right internal jugular vein so it was replaced 10/20/2020.       Discussion regarding health care directive  We discussed that it is important that she completes health care directive which would help in making sure that her wishes are followed about her treatment care in case she is not able to make a decision for herself.  We gave her information regarding that.    RTC in 3 weeks    All questions were answered to her satisfaction.  She is agreeable and comfortable with the plan.    Charline Velazquez MD

## 2020-11-11 NOTE — PATIENT INSTRUCTIONS
Chemo today  Please apply UREA cream several times to hand/feet daily    See me back in 3 and 6 weeks with chemo

## 2020-11-11 NOTE — ORAL ONC MGMT
Oral Chemotherapy Monitoring Program    Subjective/Objective:  Alannah Wynn is a 69 year old female seen in clinic for a follow-up visit for oral chemotherapy. She confirmed the correct dose and schedule of xeloda. Next cycle starts today. She is having some redness of fingers on both hands. She has been using 40% urea ointment and udder cream throughout the day. Encouraged her to continue to utilize these and apply the urea cream followed by the udder cream. She denies any missed doses or medication changes today. She was very appreciative for our discussion today.     ORAL CHEMOTHERAPY 4/14/2020 7/27/2020 11/3/2020 11/11/2020   Assessment Type - - Refill Monthly Follow up   Diagnosis Code - - Colon Cancer Colon Cancer   Providers - - Marcus Velazquez   Clinic Name/Location - - [facility]   Drug Name Xeloda (Capecitabine) Xeloda (Capecitabine) Xeloda (Capecitabine) Xeloda (Capecitabine)   Dose - - 1,500 mg 1,500 mg   Current Schedule Other BID BID BID   Cycle Details 2 weeks on 1 week off 2 weeks on 1 week off 2 weeks on, 1 week off 2 weeks on, 1 week off   Start Date of Last Cycle 4/7/2020 - - 10/21/2020   Planned next cycle start date 4/28/2020 7/27/2020 - 11/11/2020   Doses missed in last 2 weeks 0 0 - 0   Adherence Assessment Adherent Adherent - Adherent   Adverse Effects (No Data) No AE identified during assessment - Palmar-plantar Erythrodysethesia Syndrome   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 No No - No   Is the dose as ordered appropriate for the patient? Yes Yes - Yes   Is the patient currently in pain? No - - No   Has the patient been assessed within the past 6 months for depression? Yes - - -   Has the patient missed any days of school, work, or other routine activity? No No - No   Since  "the last time we talked, have you been hospitalized or used the emergency room? - - - No       Last PHQ-2 Score on record:   PHQ-2 ( 1999 [employer]) 5/14/2019 11/27/2018   Q1: Little interest or pleasure in doing things 0 0   Q2: Feeling down, depressed or hopeless 0 0   PHQ-2 Score 0 0       Vitals:  BP:   BP Readings from Last 1 Encounters:   11/11/20 126/77     Wt Readings from Last 1 Encounters:   11/11/20 71.3 kg (157 lb 1.6 oz)     Estimated body surface area is 1.74 meters squared as calculated from the following:    Height as of 10/21/20: 1.53 m (5' 0.24\").    Weight as of an earlier encounter on 11/11/20: 71.3 kg (157 lb 1.6 oz).    Labs:  _  Result Component Current Result Ref Range   Sodium 141 (11/11/2020) 133 - 144 mmol/L     _  Result Component Current Result Ref Range   Potassium 3.9 (11/11/2020) 3.4 - 5.3 mmol/L     _  Result Component Current Result Ref Range   Calcium 8.5 (11/11/2020) 8.5 - 10.1 mg/dL     No results found for Mag within last 30 days.     No results found for Phos within last 30 days.     _  Result Component Current Result Ref Range   Albumin 3.5 (11/11/2020) 3.4 - 5.0 g/dL     _  Result Component Current Result Ref Range   Urea Nitrogen 13 (11/11/2020) 7 - 30 mg/dL     _  Result Component Current Result Ref Range   Creatinine 0.56 (11/11/2020) 0.52 - 1.04 mg/dL     _  Result Component Current Result Ref Range   AST 59 (H) (11/11/2020) 0 - 45 U/L     _  Result Component Current Result Ref Range   ALT 52 (H) (11/11/2020) 0 - 50 U/L     _  Result Component Current Result Ref Range   Bilirubin Total 0.3 (11/11/2020) 0.2 - 1.3 mg/dL     _  Result Component Current Result Ref Range   WBC 4.2 (11/11/2020) 4.0 - 11.0 10e9/L     _  Result Component Current Result Ref Range   Hemoglobin 11.9 (11/11/2020) 11.7 - 15.7 g/dL     _  Result Component Current Result Ref Range   Platelet Count 157 (11/11/2020) 150 - 450 10e9/L     _  Result Component Current Result Ref Range   Absolute Neutrophil 1.4 " (L) (11/11/2020) 1.6 - 8.3 10e9/L         Assessment/Plan:  Tolerating xeloda with grade 1 hand foot syndrome. Encouraged her to continue applying urea and udder cream throughout the day and at least 4 times daily. She confirmed understanding.     Labs: counts are adequate for next cycle. AST/ALT slightly elevated above ULN. Will continue to monitor with future cycles.     Follow-Up:  12/2: visit in infusion    Refill Due:  11/25: release to [facility]    Nicholas FreemanD, MS  Hematology/Oncology Clinical Pharmacist  [facility]  [phone]

## 2020-11-11 NOTE — LETTER
"    11/11/2020         RE: Alannah Wynn  [address]  [place] [address]        Dear Colleague,    Thank you for referring your patient, Alannah Wynn, to the [facility]. Please see a copy of my visit note below.    ONCOLOGY FOLLOW UP NOTE    11/11/2020     Patient was being followed by Dr. Tavarez and saw her on 11/13/2019.  Now she is transferring her care to me.  I have reviewed her previous records and have copied and updated from prior notes.      DIAGNOSIS:    10/2019 dx adenoCa of rectum,  MMR intact- NGS Negative for KRAS, NRAS and BRAF. PD L1 25%.  Baseline CEA 10.  Stage IV disease with pulmonary and bony mets      HISTORY OF ONCOLOGY ILLNESS:    She has had intermittent hematochezia and anorectal irritation since 2011, that have been managed as \"hemorrhoids\". These symptoms became persistent and progressive since early thi year. Her anorectal pain is managed with Tylenol. Denies fevers, chills, or unintentional weight loss. Does have intermittent urinary and fecal urgency but no incontinence per se.    Diagnostic colonoscopy on 10/18/19 for rectal bleeding showed \"fungating and ulcerated non-obstructing mass was found in the distal rectum <1 cm from the anal verge. The mass was partially circumferential (involving one-half of the lumen circumference). The mass measured four cm in length. In addition, its diameter measured six mm.  Pathology showed invasive moderately differentiated adenocarcinoma with intact mismatch repair protein expression, PD L1 25% and NGS panel showed NO MUTATIONS in KRAS, NRAS and BRAF.      Pelvic MRI 11/2019 found fungating polypoid mid/lower rectal mass with luminal narrowing as well as innumerable enlarged lymph nodes as described above with a conglomerate of suspicious lymph nodes abutting the anterior peritoneal reflection. Stage cT3d N2.     PET 11/2019 found   1. Hypermetabolic mass in the distal rectum, SUV 14 corresponding " Received call back from pt. Informed of nasal swab results and that RN called [facility]up[facility] to [facility].  Pt to apply to ea nostril intranasally twice a day for 5 days beginning :5/21/17 to the patient's known rectal cancer.  2. Multiple hypermetabolic pelvic lymph nodes, compatible metastatic disease, SUV 5-6.  3. FDG avid pulmonary nodule measuring 7 mm in the left lower lobe, concerning for metastatic disease. Additional FDG avid pulmonary nodules suspicious for metastatic disease, SUV around 4.  4. Osteoblastic metastatic lesion in the L3 vertebral body, SUV 6.8    She started palliative FOLFOX/Avastin on 11/25/2019.  Cycle #2 given on 12/9/2019.  Cycle #3 scheduled for 12/24/2019.  C#6 on 2/10/2020.    Repeat CT CAP on 2/21/2020 shows good response to therapy with improvement in the rectal mass, mesorectal and superior rectal nodularity as well as resolution of right internal iliac lymphadenopathy.  There is slight improvement in the dominant posterior left lower lobe lung nodule and several of the lung nodules are is stable.  There is increase in size of the sclerotic lesion of the left aspect of L3 vertebral body without pathological fracture and this is likely due to treatment effect.    CEA on 2/10/2020 was down to 3.3.    Cycle #7 FOLFOX/Avastin 2/25/2020.  C#8 FOLFOX/Avastin 3/9/2020  Cycle#9 5-FU/Avastin 3/24/2020-stopped oxaliplatin due to cumulative fatigue and issues with nausea.    I then discussed with her, her  as well as her son Gamal about possibly switching to single agent Xeloda going forward so as to limit her visits to the clinic and therefore exposure and risk from coronavirus.  We discussed the pros and cons of this approach and they were all willing to try this.     She started Single agent Xeloda on 4/7/2020.  Cycle #3 started on 5/19/2020.    Because of bone metastatic disease, she was started on Zometa on 11/25/2019. We are giving it every 3 months.  Last received 5/15/2020      CT chest abdomen and pelvis on 7/20/2020 showed mild progression in the lung nodules.  Perirectal nodularity has mildly improved.    Last CEA on 7/22/2020 was 4.      7/27/2020- we  decided on switching to XELOX/Avastin.     8/18/2020 -cycle #2 XELOX/Avastin.  CEA on 8/11/2020 was 4.8.  9/8/2020 - C#3 XELOX/Avastin. CEA on 9/2/2020 was 5.1  9/30/2020.  Cycle #4 XELOX/Avastin.  Oxaliplatin dose reduced further to 85 mg per metered square because of worsening neuropathy.    Repeat CT chest abdomen and pelvis on 10/15/2020 overall shows stable disease with 1 to 2 mm increase in some of the lung nodules and about 2 mm decrease in the lymph node in the mesorectum.    CEA stable at 5.3 on 10/21/2020.    10/22/2020.  Cycle #5 XELOX/Avastin.  Oxaliplatin dose 85 mg/m .  11/11/2020 C#6 XELOX/Avastin. Oxaliplatin dose 85 mg/m .    INTERVAL HISTORY:     She comes in today to the clinic and tells me that she is feeling well.  For the first week after chemotherapy she was feeling some nausea but this was better this time.  We added Emend with the previous cycle.  She also had some diarrhea for which she took Imodium which helped.  She was feeling somewhat tired and fatigued but then after the first week she started feeling better.  She has cold sensitivity for a few days but overall neuropathy is stable with numbness of the fingertips and feet.  The toe has healed although she has a little bit of discomfort there.  No other infections.  She is applying moisturizing cream to hands and feet but it is not the urea cream which we prescribed to her.  She has some peeling of the skin of the palms.  There is also hyperpigmentation.  Denies bleeding.  No shortness of breath.        ECOG 1    ROS:  A comprehensive ROS was otherwise neg      I reviewed with her history in epic as below.    PAST MEDICAL HISTORY  Reflux, hemorrhoid, hepatitis B?  Overactive bladder  Active Ambulatory Problems     Diagnosis Date Noted     External hemorrhoids 04/29/2016     Non morbid obesity due to excess calories 04/29/2016     Hepatitis B immune 04/29/2016     Screening for cervical cancer 04/29/2016     CARDIOVASCULAR SCREENING; LDL  GOAL LESS THAN 160 04/29/2016     Gastroesophageal reflux disease 04/29/2016     Overactive bladder 07/21/2017     Nevus of left lower leg- undetermined behavior 12/27/2018     Metastasis from rectal cancer (H) 11/13/2019     Bone metastasis (H) 11/13/2019     Nausea 11/27/2019     Chemotherapy-induced neutropenia (H) 12/24/2019     Resolved Ambulatory Problems     Diagnosis Date Noted     Urgency of urination 04/29/2016     Need for vaccination against Streptococcus pneumoniae 04/29/2016     Morbid obesity (H) 07/21/2017     Obesity (BMI 35.0-39.9) with comorbidity (H) 12/27/2018     No Additional Past Medical History       MEDICATIONS:  Current Outpatient Medications   Medication     Ascorbic Acid (VITAMIN C) 500 MG CAPS     B Complex Vitamins (B COMPLEX-B12 PO)     capecitabine (XELODA) 500 MG tablet     capecitabine (XELODA) 500 MG tablet     cholecalciferol (VITAMIN D3) 400 unit (10 mcg) TABS tablet     docusate sodium (COLACE) 100 MG capsule     Fesoterodine Fumarate 8 MG TB24     lidocaine-prilocaine (EMLA) 2.5-2.5 % external cream     loperamide (IMODIUM) 2 MG capsule     LORazepam (ATIVAN) 0.5 MG tablet     LORazepam (ATIVAN) 0.5 MG tablet     Lutein 20 MG CAPS     mirabegron (MYRBETRIQ) 50 MG 24 hr tablet     Multiple vitamin TABS     naloxone (NARCAN) 4 MG/0.1ML nasal spray     OLANZapine (ZYPREXA) 2.5 MG tablet     omeprazole (PRILOSEC) 20 MG DR capsule     ondansetron (ZOFRAN) 8 MG tablet     prochlorperazine (COMPAZINE) 10 MG tablet     solifenacin (VESICARE) 10 MG tablet     traMADol (ULTRAM) 50 MG tablet     urea (GORDONS UREA) 40 % external ointment     Current Facility-Administered Medications   Medication     lidocaine (XYLOCAINE) 5 % ointment     ALLERGY:     Allergies   Allergen Reactions     Penicillins      Sulfa Drugs             SOCIAL HISTORY:  She is a [place] immigrants to [place].  Denies smoking denies alcohol abuse.  2 of her sons are doctors.    FAMILY HISTORY:   Denies any family  history of cancer.       PHYSICAL EXAMINATION:       LMP 11/27/2001 (Approximate)      Wt Readings from Last 4 Encounters:   10/22/20 72.4 kg (159 lb 9.6 oz)   10/21/20 72.7 kg (160 lb 4.8 oz)   10/20/20 70.3 kg (155 lb)   10/01/20 73.7 kg (162 lb 6.4 oz)       CONSTITUTIONAL: no acute distress  EYES: PERRLA, no palor or icterus.   ENT/MOUTH: no mouth lesions. Ears normal  CVS: s1s2 no m r g .   RESPIRATORY: clear to auscultation b/l  GI: soft non tender no hepatosplenomegaly  NEURO: AAOX3  Grossly non focal neuro exam  INTEGUMENT: She has some peeling of the palms consistent with mild hand-foot syndrome.  LYMPHATIC: no palpable cervical, supraclavicular, axillary or inguinal LAD  MUSCULOSKELETAL: Unremarkable. No bony tenderness.   EXTREMITIES: no edema  PSYCH: Mentation, mood and affect are normal. Decision making capacity is intact      LABS/IMAGING/PATHOLOGY:    Reviewed  Results for MARY SCHULER (MRN [id number]) as of 11/11/2020 08:56   Ref. Range 11/11/2020 08:15   Sodium Latest Ref Range: 133 - 144 mmol/L 141   Potassium Latest Ref Range: 3.4 - 5.3 mmol/L 3.9   Chloride Latest Ref Range: 94 - 109 mmol/L 109   Carbon Dioxide Latest Ref Range: 20 - 32 mmol/L 29   Urea Nitrogen Latest Ref Range: 7 - 30 mg/dL 13   Creatinine Latest Ref Range: 0.52 - 1.04 mg/dL 0.56   GFR Estimate Latest Ref Range: >60 mL/min/1.73_m2 >90   GFR Estimate If Black Latest Ref Range: >60 mL/min/1.73_m2 >90   Calcium Latest Ref Range: 8.5 - 10.1 mg/dL 8.5   Anion Gap Latest Ref Range: 3 - 14 mmol/L 3   Albumin Latest Ref Range: 3.4 - 5.0 g/dL 3.5   Protein Total Latest Ref Range: 6.8 - 8.8 g/dL 7.0   Bilirubin Total Latest Ref Range: 0.2 - 1.3 mg/dL 0.3   Alkaline Phosphatase Latest Ref Range: 40 - 150 U/L 83   ALT Latest Ref Range: 0 - 50 U/L 52 (H)   AST Latest Ref Range: 0 - 45 U/L 59 (H)   Glucose Latest Ref Range: 70 - 99 mg/dL 85   WBC Latest Ref Range: 4.0 - 11.0 10e9/L 4.2   Hemoglobin Latest Ref Range: 11.7 - 15.7 g/dL 11.9    Hematocrit Latest Ref Range: 35.0 - 47.0 % 35.4   Platelet Count Latest Ref Range: 150 - 450 10e9/L 157   RBC Count Latest Ref Range: 3.8 - 5.2 10e12/L 3.39 (L)   MCV Latest Ref Range: 78 - 100 fl 104 (H)   MCH Latest Ref Range: 26.5 - 33.0 pg 35.1 (H)   MCHC Latest Ref Range: 31.5 - 36.5 g/dL 33.6   RDW Latest Ref Range: 10.0 - 15.0 % 17.9 (H)       Results for MARY SCHULER (MRN [id number]) as of 11/11/2020 07:46   Ref. Range 11/4/2019 11:49 12/24/2019 07:40 12/30/2019 10:00 2/10/2020 08:15 5/15/2020 11:15 6/30/2020 10:25 7/22/2020 09:52 8/11/2020 14:40 9/2/2020 13:05 9/30/2020 08:35 10/21/2020 08:22   CEA Latest Ref Range: 0 - 2.5 ug/L 10.0 (H) 8.8 (H) 7.6 (H) 3.3 (H) 2.8 (H) 2.8 (H) 4.0 (H) 4.8 (H) 5.1 (H) 5.7 (H) 5.3 (H)       EXAMINATION: CT CHEST/ABDOMEN/PELVIS W CONTRAST, 10/15/2020 10:55 AM     TECHNIQUE:  Helical CT images from the thoracic inlet through the  symphysis pubis were obtained  with contrast. Contrast dose: isovue  370 99ml     COMPARISON: CT: 7/20/2020, 2/21/2020     HISTORY: follow up colon cancer; Metastasis from rectal cancer (H);  Metastasis from rectal cancer (H); Bone metastasis (H)     FINDINGS:     Chest: Right port coiled within the right internal jugular vein with  tip terminating in the mid SVC. The heart is not enlarged. No  pericardial effusion. The thoracic aorta and pulmonary arteries are  not enlarged. Thyroid is within normal limits. No suspicious  mediastinal lymphadenopathy. Multiple pulmonary nodules, most  representative examples as described below:  -3 mm nodule in the left upper lobe (series 7, image 47), slightly  increased in size from prior examination at which time it measured 2  mm.  -5 mm nodule in the left upper lobe (series 7, image 62), slightly  increased in size from prior at which time it measured 4 mm.  -5 mm nodule in the subpleural left upper lobe (series 7, image 71),  slightly increased from prior at which time it measured 3 mm. 1 cm  nodule in the  left lower lobe posterior to the aorta (series 7, image  78), slightly increased from prior at which time it measured 8 mm.  -5 mm nodule in the right lower lobe (series 7, image 67), similar to  prior exam.     Abdomen and pelvis: The liver is unremarkable. No evidence of  metastatic disease. Pancreas is within normal limits. Postoperative  changes of cholecystectomy with the common bile duct measuring roughly  9 mm, likely reservoir effect. The adrenals are normal.No  hydronephrosis, hydroureter, or nephrolithiasis. The bladder is  partially decompressed without evidence of focal mass.  Persistent thickening of the lower rectum in keeping with patient's  known tumor. No evidence of small or large bowel obstruction. Small  hiatal hernia.   0.8 x 0.8 cm lymph node in the perirectal region, grossly stable from  prior.  1.1 x 0.7 cm lymph node in the mesorectum (Series 3, image 489),  slightly decreased from prior at which time it measured 1.3 x 0.9 cm.     Bones and soft tissues:  Soft tissues unremarkable. Stable sclerotic lesion in the L3 vertebral  body on the left. Stable sclerotic lesion in the left pubis (series 3,  image 530). No new osseous metastases.                                                                      IMPRESSION: In this patient with metastatic colon cancer:  1. Right port coiled within the right internal jugular vein with tip  terminating in the mid SVC.   2. Stable rectal mass, though difficult to accurately measure on CT.  Stable perirectal nodularity/lymph nodes.  3. Multiple pulmonary metastases, several increased in size as  described above.  4. Stable size of the presacral (known to be hypermetabolic) L3  sclerotic lesion.    ASSESSMENT AND PLAN:      Metastatic rectal adenocarcinoma, MSI intact, K-aimee wild type, PDL 1 + 25% with metastasis to the lungs, bones and pelvic lymph nodes.      She started palliative chemotherapy with FOLFOX/Avastin on 11/25/2019.      She has received 6  cycles of chemotherapy.  With cycle #3, we stopped 5-FU bolus because of excessive tiredness.    Repeat CT CAP on 2/21/2020 shows good response to therapy with improvement in the rectal mass, mesorectal and superior rectal nodularity as well as resolution of right internal iliac lymphadenopathy.  There is slight improvement in the dominant posterior left lower lobe lung nodule and several of the lung nodules are is stable.  There is increase in size of the sclerotic lesion of the left aspect of L3 vertebral body without pathological fracture and this is likely due to treatment effect.    CEA on 2/10/2020 was down to 3.3.    With cycle #9 we stopped oxaliplatin because of cumulative fatigue and increasing nausea and gave her 5-FU/Avastin.      I then discussed with her, her  as well as her son Gamal about possibly switching to single agent Xeloda going forward so as to limit her visits to the clinic and therefore exposure and risk from coronavirus.  We discussed the pros and cons of this approach and they were all willing to try this.      She started single agent Xeloda on 4/7/2020.    CT chest abdomen and pelvis on 7/20/2020 showed mild progression in the lung nodules.  Perirectal nodularity has mildly improved.    CEA on 6/30/2020 was 2.8.  On 7/22/2020 it was 4    We decided on switching to Xeloda/Avastin on 7/27/2020.  We decreased the dose of oxaliplatin to 100 mg per metered square.    CEA on 8/11/2020 was 4.8.    Cycle #4 was given with further dose reduction of oxaliplatin to 85 mg per metered squared because of worsening neuropathy.    Repeat CT chest abdomen and pelvis on 10/15/2020 overall shows stable disease with 1 to 2 mm increase in some of the lung nodules and about 2 mm decrease in the lymph node in the mesorectum.    Overall she is tolerating chemotherapy fairly well and we to proceed with same chemo. CEA was stable at 5.3.    She will get C#6 today 11/11/2020     Nausea and vomiting.   With the addition of Emend with cycle #5, her nausea was better.  We will continue with Emend.  She will use Zofran and Compazine as before.  She is not taking Zyprexa.     Diarrhea.  She has some diarrhea over the first 1 week but then it gets better with Imodium.  Continue that.  Currently she has normal bowel movements.    Hand-foot syndrome.  I advised her to start using the urea cream several times a day to both hands and feet so that the hand-foot syndrome does not get worse.  Currently she has some peeling of the skin of the hands.    Neuropathy.  She has some cold sensitivity from oxaliplatin with mild numbness of the fingertips and toes.  With the reduced dose of oxaliplatin, it has been a stable and is not worsening.  Continue to observe.    Bone metastasis.  Started Zometa on 11/25/2019.  She is getting it every 3 months and she last received on 8/18/2020.  We will give it to her today 11/11/2020.  Continue calcium and vitamin D.  CT scan shows stable bone metastasis.       We did not address the following today.    Right big toe infection/ingrown toenail.  She is done with systemic antibiotics.  She is using topical antibiotics under the care of podiatrist and this is getting better.      Port-A-Cath.  It was coiled in the right internal jugular vein so it was replaced 10/20/2020.       Discussion regarding health care directive  We discussed that it is important that she completes health care directive which would help in making sure that her wishes are followed about her treatment care in case she is not able to make a decision for herself.  We gave her information regarding that.    RTC in 3 weeks    All questions were answered to her satisfaction.  She is agreeable and comfortable with the plan.    Charline Velazquez MD        Again, thank you for allowing me to participate in the care of your patient.        Sincerely,        Charline Velazquez MD

## 2020-11-11 NOTE — NURSING NOTE
"Oncology Rooming Note    November 11, 2020 8:39 AM   Alannah Wynn is a 69 year old female who presents for:    Chief Complaint   Patient presents with     Oncology Clinic Visit     Follow up/Prior to treatment     Initial Vitals: BP (!) 146/84 (BP Location: Left arm)   Pulse 77   Temp 98  F (36.7  C) (Oral)   Resp 16   Wt 71.3 kg (157 lb 1.6 oz)   LMP 11/27/2001 (Approximate)   SpO2 99%   BMI 30.44 kg/m   Estimated body mass index is 30.44 kg/m  as calculated from the following:    Height as of 10/21/20: 1.53 m (5' 0.24\").    Weight as of this encounter: 71.3 kg (157 lb 1.6 oz). Body surface area is 1.74 meters squared.  No Pain (1) Comment: Data Unavailable   Patient's last menstrual period was 11/27/2001 (approximate).  Allergies reviewed: Yes  Medications reviewed: Yes    Medications: MEDICATION REFILLS NEEDED TODAY. Provider was notified.  Pharmacy name entered into [facility]:    [id number] - [place], [place] - [address] AT [address] [facility] - [place], [place] - [address]  [facility] - [place], [place] - [address]    Suni Givens LPN              "

## 2020-11-25 NOTE — TELEPHONE ENCOUNTER
Patient called into Clinic, requesting the Xeolda be sent to her home address prior to her appointment with Dr. Velazquez on 12/2.    Please call the patient at home to discuss further.     Thank you.    Lisa DENNIS [facility]   [phone]

## 2020-12-02 NOTE — NURSING NOTE
"Oncology Rooming Note    December 2, 2020 8:06 AM   Alannah Wynn is a 69 year old female who presents for:    Chief Complaint   Patient presents with     Oncology Clinic Visit     follow up     Initial Vitals: /77 (BP Location: Right arm, Patient Position: Chair, Cuff Size: Adult Regular)   Pulse 80   Temp 97.8  F (36.6  C) (Oral)   Wt 71.4 kg (157 lb 5.8 oz)   LMP 11/27/2001 (Approximate)   SpO2 99%   BMI 30.49 kg/m   Estimated body mass index is 30.49 kg/m  as calculated from the following:    Height as of 10/21/20: 1.53 m (5' 0.24\").    Weight as of this encounter: 71.4 kg (157 lb 5.8 oz). Body surface area is 1.74 meters squared.  No Pain (0) Comment: Data Unavailable   Patient's last menstrual period was 11/27/2001 (approximate).  Allergies reviewed: Yes  Medications reviewed: Yes    Medications: Medication refills not needed today.  Pharmacy name entered into [facility]:    [id number] - [place], [place] - [address] AT [address] [facility] - [place], [place] - [address]  [facility] - [place], [place] - [address]    Clinical concerns: NO Dr. Velazquez was notified.      Annetta Bruno CMA              "

## 2020-12-02 NOTE — LETTER
"    12/2/2020         RE: Alannah Wynn  [address]  [place] [address]        Dear Colleague,    Thank you for referring your patient, Alannah Wynn, to the [facility]. Please see a copy of my visit note below.    ONCOLOGY FOLLOW UP NOTE    11/11/2020     Patient was being followed by Dr. Tavarez and saw her on 11/13/2019.  Now she is transferring her care to me.  I have reviewed her previous records and have copied and updated from prior notes.      DIAGNOSIS:    10/2019 dx adenoCa of rectum,  MMR intact- NGS Negative for KRAS, NRAS and BRAF. PD L1 25%.  Baseline CEA 10.  Stage IV disease with pulmonary and bony mets      HISTORY OF ONCOLOGY ILLNESS:    She has had intermittent hematochezia and anorectal irritation since 2011, that have been managed as \"hemorrhoids\". These symptoms became persistent and progressive since early thi year. Her anorectal pain is managed with Tylenol. Denies fevers, chills, or unintentional weight loss. Does have intermittent urinary and fecal urgency but no incontinence per se.    Diagnostic colonoscopy on 10/18/19 for rectal bleeding showed \"fungating and ulcerated non-obstructing mass was found in the distal rectum <1 cm from the anal verge. The mass was partially circumferential (involving one-half of the lumen circumference). The mass measured four cm in length. In addition, its diameter measured six mm.  Pathology showed invasive moderately differentiated adenocarcinoma with intact mismatch repair protein expression, PD L1 25% and NGS panel showed NO MUTATIONS in KRAS, NRAS and BRAF.      Pelvic MRI 11/2019 found fungating polypoid mid/lower rectal mass with luminal narrowing as well as innumerable enlarged lymph nodes as described above with a conglomerate of suspicious lymph nodes abutting the anterior peritoneal reflection. Stage cT3d N2.     PET 11/2019 found   1. Hypermetabolic mass in the distal rectum, SUV 14 corresponding to " the patient's known rectal cancer.  2. Multiple hypermetabolic pelvic lymph nodes, compatible metastatic disease, SUV 5-6.  3. FDG avid pulmonary nodule measuring 7 mm in the left lower lobe, concerning for metastatic disease. Additional FDG avid pulmonary nodules suspicious for metastatic disease, SUV around 4.  4. Osteoblastic metastatic lesion in the L3 vertebral body, SUV 6.8    She started palliative FOLFOX/Avastin on 11/25/2019.  Cycle #2 given on 12/9/2019.  Cycle #3 scheduled for 12/24/2019.  C#6 on 2/10/2020.    Repeat CT CAP on 2/21/2020 shows good response to therapy with improvement in the rectal mass, mesorectal and superior rectal nodularity as well as resolution of right internal iliac lymphadenopathy.  There is slight improvement in the dominant posterior left lower lobe lung nodule and several of the lung nodules are is stable.  There is increase in size of the sclerotic lesion of the left aspect of L3 vertebral body without pathological fracture and this is likely due to treatment effect.    CEA on 2/10/2020 was down to 3.3.    Cycle #7 FOLFOX/Avastin 2/25/2020.  C#8 FOLFOX/Avastin 3/9/2020  Cycle#9 5-FU/Avastin 3/24/2020-stopped oxaliplatin due to cumulative fatigue and issues with nausea.    I then discussed with her, her  as well as her son Gamal about possibly switching to single agent Xeloda going forward so as to limit her visits to the clinic and therefore exposure and risk from coronavirus.  We discussed the pros and cons of this approach and they were all willing to try this.     She started Single agent Xeloda on 4/7/2020.  Cycle #3 started on 5/19/2020.    Because of bone metastatic disease, she was started on Zometa on 11/25/2019. We are giving it every 3 months.  Last received 5/15/2020      CT chest abdomen and pelvis on 7/20/2020 showed mild progression in the lung nodules.  Perirectal nodularity has mildly improved.    Last CEA on 7/22/2020 was 4.      7/27/2020- we decided  on switching to XELOX/Avastin.     8/18/2020 -cycle #2 XELOX/Avastin.  CEA on 8/11/2020 was 4.8.  9/8/2020 - C#3 XELOX/Avastin. CEA on 9/2/2020 was 5.1  9/30/2020.  Cycle #4 XELOX/Avastin.  Oxaliplatin dose reduced further to 85 mg per metered square because of worsening neuropathy.    Repeat CT chest abdomen and pelvis on 10/15/2020 overall shows stable disease with 1 to 2 mm increase in some of the lung nodules and about 2 mm decrease in the lymph node in the mesorectum.    CEA stable at 5.3 on 10/21/2020.    10/22/2020.  Cycle #5 XELOX/Avastin.  Oxaliplatin dose 85 mg/m .  11/11/2020 C#6 XELOX/Avastin. Oxaliplatin dose 85 mg/m .  12/2/2020 C#7 XELOX/Avastin. Oxaliplatin dose 85 mg/m .    INTERVAL HISTORY:     She is here in the clinic.  Overall she tolerated the last chemotherapy fairly well with expected fatigue.  She did not have any nausea or vomiting.  She had some diarrhea for which she took Imodium.  She has noticed mildly increased neuropathy in the left hand but otherwise it is stable.  She is using the urea-based cream to hands and feet and the hand-foot syndrome is mild with occasional peeling of the skin.  There is some hyperpigmentation of the palms.  She has noticed mild pain in the right big toe but no infection and she does not believe that it has worsened.  No infections.  No dyspnea.  No issues with bleeding.    ECOG 1    ROS:  Rest of the comprehensive review of the system was essentially unremarkable.        I reviewed with her history in epic as below.    PAST MEDICAL HISTORY  Reflux, hemorrhoid, hepatitis B?  Overactive bladder  Active Ambulatory Problems     Diagnosis Date Noted     External hemorrhoids 04/29/2016     Non morbid obesity due to excess calories 04/29/2016     Hepatitis B immune 04/29/2016     Screening for cervical cancer 04/29/2016     CARDIOVASCULAR SCREENING; LDL GOAL LESS THAN 160 04/29/2016     Gastroesophageal reflux disease 04/29/2016     Overactive bladder 07/21/2017      Nevus of left lower leg- undetermined behavior 12/27/2018     Metastasis from rectal cancer (H) 11/13/2019     Bone metastasis (H) 11/13/2019     Nausea 11/27/2019     Chemotherapy-induced neutropenia (H) 12/24/2019     Resolved Ambulatory Problems     Diagnosis Date Noted     Urgency of urination 04/29/2016     Need for vaccination against Streptococcus pneumoniae 04/29/2016     Morbid obesity (H) 07/21/2017     Obesity (BMI 35.0-39.9) with comorbidity (H) 12/27/2018     No Additional Past Medical History       MEDICATIONS:  Current Outpatient Medications   Medication     Ascorbic Acid (VITAMIN C) 500 MG CAPS     B Complex Vitamins (B COMPLEX-B12 PO)     capecitabine (XELODA) 500 MG tablet     cholecalciferol (VITAMIN D3) 400 unit (10 mcg) TABS tablet     docusate sodium (COLACE) 100 MG capsule     Fesoterodine Fumarate 8 MG TB24     lidocaine-prilocaine (EMLA) 2.5-2.5 % external cream     loperamide (IMODIUM) 2 MG capsule     LORazepam (ATIVAN) 0.5 MG tablet     LORazepam (ATIVAN) 0.5 MG tablet     Lutein 20 MG CAPS     mirabegron (MYRBETRIQ) 50 MG 24 hr tablet     Multiple vitamin TABS     naloxone (NARCAN) 4 MG/0.1ML nasal spray     omeprazole (PRILOSEC) 20 MG DR capsule     ondansetron (ZOFRAN) 8 MG tablet     prochlorperazine (COMPAZINE) 10 MG tablet     solifenacin (VESICARE) 10 MG tablet     traMADol (ULTRAM) 50 MG tablet     urea (GORDONS UREA) 40 % external ointment     capecitabine (XELODA) 500 MG tablet     capecitabine (XELODA) 500 MG tablet     OLANZapine (ZYPREXA) 2.5 MG tablet     Current Facility-Administered Medications   Medication     lidocaine (XYLOCAINE) 5 % ointment     ALLERGY:     Allergies   Allergen Reactions     Penicillins      Sulfa Drugs             SOCIAL HISTORY:  She is a [place] immigrants to [place].  Denies smoking denies alcohol abuse.  2 of her sons are doctors.    FAMILY HISTORY:   Denies any family history of cancer.       PHYSICAL EXAMINATION:       /77 (BP  Location: Right arm, Patient Position: Chair, Cuff Size: Adult Regular)   Pulse 80   Temp 97.8  F (36.6  C) (Oral)   Wt 71.4 kg (157 lb 5.8 oz)   LMP 11/27/2001 (Approximate)   SpO2 99%   BMI 30.49 kg/m       Wt Readings from Last 4 Encounters:   12/02/20 71.4 kg (157 lb 4.8 oz)   12/02/20 71.4 kg (157 lb 5.8 oz)   11/11/20 71.3 kg (157 lb 1.6 oz)   10/22/20 72.4 kg (159 lb 9.6 oz)       CONSTITUTIONAL: No apparent distress  EYES:  without pallor or jaundice  ENT/MOUTH: Ears unremarkable. No oral lesions  CVS: s1s2 normal  RESPIRATORY: Chest is clear  GI: Abdomen is benign  NEURO: Alert and oriented ×3  INTEGUMENT: There is some hyperpigmentation of the palms and mild peeling of the skin of palms.  LYMPHATIC: no palpable lymphadenopathy  MUSCULOSKELETAL: Unremarkable. No bony tenderness.   EXTREMITIES: no pedal edema.  Minimal swelling of the right big toe but no signs of infection.  PSYCH: Mentation, mood and affect are appropriate        LABS/IMAGING/PATHOLOGY:    Reviewed    EXAMINATION: CT CHEST/ABDOMEN/PELVIS W CONTRAST, 10/15/2020 10:55 AM     TECHNIQUE:  Helical CT images from the thoracic inlet through the  symphysis pubis were obtained  with contrast. Contrast dose: isovue  370 99ml     COMPARISON: CT: 7/20/2020, 2/21/2020     HISTORY: follow up colon cancer; Metastasis from rectal cancer (H);  Metastasis from rectal cancer (H); Bone metastasis (H)     FINDINGS:     Chest: Right port coiled within the right internal jugular vein with  tip terminating in the mid SVC. The heart is not enlarged. No  pericardial effusion. The thoracic aorta and pulmonary arteries are  not enlarged. Thyroid is within normal limits. No suspicious  mediastinal lymphadenopathy. Multiple pulmonary nodules, most  representative examples as described below:  -3 mm nodule in the left upper lobe (series 7, image 47), slightly  increased in size from prior examination at which time it measured 2  mm.  -5 mm nodule in the left upper  lobe (series 7, image 62), slightly  increased in size from prior at which time it measured 4 mm.  -5 mm nodule in the subpleural left upper lobe (series 7, image 71),  slightly increased from prior at which time it measured 3 mm. 1 cm  nodule in the left lower lobe posterior to the aorta (series 7, image  78), slightly increased from prior at which time it measured 8 mm.  -5 mm nodule in the right lower lobe (series 7, image 67), similar to  prior exam.     Abdomen and pelvis: The liver is unremarkable. No evidence of  metastatic disease. Pancreas is within normal limits. Postoperative  changes of cholecystectomy with the common bile duct measuring roughly  9 mm, likely reservoir effect. The adrenals are normal.No  hydronephrosis, hydroureter, or nephrolithiasis. The bladder is  partially decompressed without evidence of focal mass.  Persistent thickening of the lower rectum in keeping with patient's  known tumor. No evidence of small or large bowel obstruction. Small  hiatal hernia.   0.8 x 0.8 cm lymph node in the perirectal region, grossly stable from  prior.  1.1 x 0.7 cm lymph node in the mesorectum (Series 3, image 489),  slightly decreased from prior at which time it measured 1.3 x 0.9 cm.     Bones and soft tissues:  Soft tissues unremarkable. Stable sclerotic lesion in the L3 vertebral  body on the left. Stable sclerotic lesion in the left pubis (series 3,  image 530). No new osseous metastases.                                                                      IMPRESSION: In this patient with metastatic colon cancer:  1. Right port coiled within the right internal jugular vein with tip  terminating in the mid SVC.   2. Stable rectal mass, though difficult to accurately measure on CT.  Stable perirectal nodularity/lymph nodes.  3. Multiple pulmonary metastases, several increased in size as  described above.  4. Stable size of the presacral (known to be hypermetabolic) L3  sclerotic lesion.    ASSESSMENT  AND PLAN:      Metastatic rectal adenocarcinoma, MSI intact, K-aimee wild type, PDL 1 + 25% with metastasis to the lungs, bones and pelvic lymph nodes.      She started palliative chemotherapy with FOLFOX/Avastin on 11/25/2019.      She has received 6 cycles of chemotherapy.  With cycle #3, we stopped 5-FU bolus because of excessive tiredness.    Repeat CT CAP on 2/21/2020 shows good response to therapy with improvement in the rectal mass, mesorectal and superior rectal nodularity as well as resolution of right internal iliac lymphadenopathy.  There is slight improvement in the dominant posterior left lower lobe lung nodule and several of the lung nodules are is stable.  There is increase in size of the sclerotic lesion of the left aspect of L3 vertebral body without pathological fracture and this is likely due to treatment effect.    CEA on 2/10/2020 was down to 3.3.    With cycle #9 we stopped oxaliplatin because of cumulative fatigue and increasing nausea and gave her 5-FU/Avastin.      I then discussed with her, her  as well as her son Gamal about possibly switching to single agent Xeloda going forward so as to limit her visits to the clinic and therefore exposure and risk from coronavirus.  We discussed the pros and cons of this approach and they were all willing to try this.      She started single agent Xeloda on 4/7/2020.    CT chest abdomen and pelvis on 7/20/2020 showed mild progression in the lung nodules.  Perirectal nodularity has mildly improved.    CEA on 6/30/2020 was 2.8.  On 7/22/2020 it was 4    We decided on switching to Xeloda/Avastin on 7/27/2020.  We decreased the dose of oxaliplatin to 100 mg per metered square.    CEA on 8/11/2020 was 4.8.    Cycle #4 was given with further dose reduction of oxaliplatin to 85 mg per metered squared because of worsening neuropathy.    Repeat CT chest abdomen and pelvis on 10/15/2020 overall shows stable disease with 1 to 2 mm increase in some of the  lung nodules and about 2 mm decrease in the lymph node in the mesorectum.    She is doing well in today we will proceed with cycle #7.  Last CEA was a stable at 5.7.      Nausea and vomiting.  With the addition of Emend with cycle #5, her nausea was better.  We will continue with Emend.  Continue to use Zofran and Compazine as needed.  She is not on Zyprexa.      Diarrhea.  Diarrhea is mild and she takes Imodium as needed.  Continue the same.        Hand-foot syndrome.  She now she is using urea cream and hand-foot syndrome has improved and it is minimal right now.  Continue the same.     Neuropathy.  She has noticed slight worsening of numbness in the left hand but otherwise it is a stable.  Continue the same dose of oxaliplatin for now.      Bone metastasis.  Started Zometa on 11/25/2019.  She is getting it every 3 months and she last received on 11/11/2020.  Continue calcium and vitamin D.  CT scan shows stable bone metastasis.       We did not address the following today.    Right big toe infection/ingrown toenail.  She is done with systemic antibiotics.  She is using topical antibiotics under the care of podiatrist and this is getting better.      Port-A-Cath.  It was coiled in the right internal jugular vein so it was replaced 10/20/2020.       Discussion regarding health care directive  We discussed that it is important that she completes health care directive which would help in making sure that her wishes are followed about her treatment care in case she is not able to make a decision for herself.  We gave her information regarding that.    RTC in 3 weeks    All questions were answered to her satisfaction.  She is agreeable and comfortable with the plan.    Charline Velazquez MD        Again, thank you for allowing me to participate in the care of your patient.        Sincerely,        Charline Velazquez MD

## 2020-12-02 NOTE — PROGRESS NOTES
Infusion Nursing Note:  Alannah Wynn presents today for C7D1 mvasi/ xelox.    Patient seen by provider today: Yes: Dr Velazquez   present during visit today: Not Applicable.    Note: xeloda mailed to Alannah's home.    Intravenous Access:  Implanted Port.    Treatment Conditions:  Lab Results   Component Value Date    HGB 11.8 12/02/2020     Lab Results   Component Value Date    WBC 4.7 12/02/2020      Lab Results   Component Value Date    ANEU 1.2 12/02/2020     Lab Results   Component Value Date     12/02/2020      Lab Results   Component Value Date     12/02/2020                   Lab Results   Component Value Date    POTASSIUM 3.8 12/02/2020           No results found for: MAG         Lab Results   Component Value Date    CR 0.67 12/02/2020                   Lab Results   Component Value Date    LAUREN 8.5 12/02/2020                Lab Results   Component Value Date    BILITOTAL 0.3 12/02/2020           Lab Results   Component Value Date    ALBUMIN 3.3 12/02/2020                    Lab Results   Component Value Date    ALT 74 12/02/2020           Lab Results   Component Value Date    AST 72 12/02/2020       Urine protein negative.      Post Infusion Assessment:  Patient tolerated infusion without incident.  Blood return noted pre and post infusion.  Site patent and intact, free from redness, edema or discomfort.  No evidence of extravasations.  Access discontinued per protocol.       Discharge Plan:   Copy of AVS reviewed with patient and/or family.  Patient will return 12/23/20 for next appointment.  Patient discharged in stable condition accompanied by: self.  Departure Mode: Ambulatory.    Shannan Ferrari RN

## 2020-12-02 NOTE — PROGRESS NOTES
"ONCOLOGY FOLLOW UP NOTE    11/11/2020     Patient was being followed by Dr. Tavarez and saw her on 11/13/2019.  Now she is transferring her care to me.  I have reviewed her previous records and have copied and updated from prior notes.      DIAGNOSIS:    10/2019 dx adenoCa of rectum,  MMR intact- NGS Negative for KRAS, NRAS and BRAF. PD L1 25%.  Baseline CEA 10.  Stage IV disease with pulmonary and bony mets      HISTORY OF ONCOLOGY ILLNESS:    She has had intermittent hematochezia and anorectal irritation since 2011, that have been managed as \"hemorrhoids\". These symptoms became persistent and progressive since early thi year. Her anorectal pain is managed with Tylenol. Denies fevers, chills, or unintentional weight loss. Does have intermittent urinary and fecal urgency but no incontinence per se.    Diagnostic colonoscopy on 10/18/19 for rectal bleeding showed \"fungating and ulcerated non-obstructing mass was found in the distal rectum <1 cm from the anal verge. The mass was partially circumferential (involving one-half of the lumen circumference). The mass measured four cm in length. In addition, its diameter measured six mm.  Pathology showed invasive moderately differentiated adenocarcinoma with intact mismatch repair protein expression, PD L1 25% and NGS panel showed NO MUTATIONS in KRAS, NRAS and BRAF.      Pelvic MRI 11/2019 found fungating polypoid mid/lower rectal mass with luminal narrowing as well as innumerable enlarged lymph nodes as described above with a conglomerate of suspicious lymph nodes abutting the anterior peritoneal reflection. Stage cT3d N2.     PET 11/2019 found   1. Hypermetabolic mass in the distal rectum, SUV 14 corresponding to the patient's known rectal cancer.  2. Multiple hypermetabolic pelvic lymph nodes, compatible metastatic disease, SUV 5-6.  3. FDG avid pulmonary nodule measuring 7 mm in the left lower lobe, concerning for metastatic disease. Additional FDG avid pulmonary nodules " suspicious for metastatic disease, SUV around 4.  4. Osteoblastic metastatic lesion in the L3 vertebral body, SUV 6.8    She started palliative FOLFOX/Avastin on 11/25/2019.  Cycle #2 given on 12/9/2019.  Cycle #3 scheduled for 12/24/2019.  C#6 on 2/10/2020.    Repeat CT CAP on 2/21/2020 shows good response to therapy with improvement in the rectal mass, mesorectal and superior rectal nodularity as well as resolution of right internal iliac lymphadenopathy.  There is slight improvement in the dominant posterior left lower lobe lung nodule and several of the lung nodules are is stable.  There is increase in size of the sclerotic lesion of the left aspect of L3 vertebral body without pathological fracture and this is likely due to treatment effect.    CEA on 2/10/2020 was down to 3.3.    Cycle #7 FOLFOX/Avastin 2/25/2020.  C#8 FOLFOX/Avastin 3/9/2020  Cycle#9 5-FU/Avastin 3/24/2020-stopped oxaliplatin due to cumulative fatigue and issues with nausea.    I then discussed with her, her  as well as her son Gamal about possibly switching to single agent Xeloda going forward so as to limit her visits to the clinic and therefore exposure and risk from coronavirus.  We discussed the pros and cons of this approach and they were all willing to try this.     She started Single agent Xeloda on 4/7/2020.  Cycle #3 started on 5/19/2020.    Because of bone metastatic disease, she was started on Zometa on 11/25/2019. We are giving it every 3 months.  Last received 5/15/2020      CT chest abdomen and pelvis on 7/20/2020 showed mild progression in the lung nodules.  Perirectal nodularity has mildly improved.    Last CEA on 7/22/2020 was 4.      7/27/2020- we decided on switching to XELOX/Avastin.     8/18/2020 -cycle #2 XELOX/Avastin.  CEA on 8/11/2020 was 4.8.  9/8/2020 - C#3 XELOX/Avastin. CEA on 9/2/2020 was 5.1  9/30/2020.  Cycle #4 XELOX/Avastin.  Oxaliplatin dose reduced further to 85 mg per metered square because of  worsening neuropathy.    Repeat CT chest abdomen and pelvis on 10/15/2020 overall shows stable disease with 1 to 2 mm increase in some of the lung nodules and about 2 mm decrease in the lymph node in the mesorectum.    CEA stable at 5.3 on 10/21/2020.    10/22/2020.  Cycle #5 XELOX/Avastin.  Oxaliplatin dose 85 mg/m .  11/11/2020 C#6 XELOX/Avastin. Oxaliplatin dose 85 mg/m .  12/2/2020 C#7 XELOX/Avastin. Oxaliplatin dose 85 mg/m .    INTERVAL HISTORY:     She is here in the clinic.  Overall she tolerated the last chemotherapy fairly well with expected fatigue.  She did not have any nausea or vomiting.  She had some diarrhea for which she took Imodium.  She has noticed mildly increased neuropathy in the left hand but otherwise it is stable.  She is using the urea-based cream to hands and feet and the hand-foot syndrome is mild with occasional peeling of the skin.  There is some hyperpigmentation of the palms.  She has noticed mild pain in the right big toe but no infection and she does not believe that it has worsened.  No infections.  No dyspnea.  No issues with bleeding.    ECOG 1    ROS:  Rest of the comprehensive review of the system was essentially unremarkable.        I reviewed with her history in epic as below.    PAST MEDICAL HISTORY  Reflux, hemorrhoid, hepatitis B?  Overactive bladder  Active Ambulatory Problems     Diagnosis Date Noted     External hemorrhoids 04/29/2016     Non morbid obesity due to excess calories 04/29/2016     Hepatitis B immune 04/29/2016     Screening for cervical cancer 04/29/2016     CARDIOVASCULAR SCREENING; LDL GOAL LESS THAN 160 04/29/2016     Gastroesophageal reflux disease 04/29/2016     Overactive bladder 07/21/2017     Nevus of left lower leg- undetermined behavior 12/27/2018     Metastasis from rectal cancer (H) 11/13/2019     Bone metastasis (H) 11/13/2019     Nausea 11/27/2019     Chemotherapy-induced neutropenia (H) 12/24/2019     Resolved Ambulatory Problems      Diagnosis Date Noted     Urgency of urination 04/29/2016     Need for vaccination against Streptococcus pneumoniae 04/29/2016     Morbid obesity (H) 07/21/2017     Obesity (BMI 35.0-39.9) with comorbidity (H) 12/27/2018     No Additional Past Medical History       MEDICATIONS:  Current Outpatient Medications   Medication     Ascorbic Acid (VITAMIN C) 500 MG CAPS     B Complex Vitamins (B COMPLEX-B12 PO)     capecitabine (XELODA) 500 MG tablet     cholecalciferol (VITAMIN D3) 400 unit (10 mcg) TABS tablet     docusate sodium (COLACE) 100 MG capsule     Fesoterodine Fumarate 8 MG TB24     lidocaine-prilocaine (EMLA) 2.5-2.5 % external cream     loperamide (IMODIUM) 2 MG capsule     LORazepam (ATIVAN) 0.5 MG tablet     LORazepam (ATIVAN) 0.5 MG tablet     Lutein 20 MG CAPS     mirabegron (MYRBETRIQ) 50 MG 24 hr tablet     Multiple vitamin TABS     naloxone (NARCAN) 4 MG/0.1ML nasal spray     omeprazole (PRILOSEC) 20 MG DR capsule     ondansetron (ZOFRAN) 8 MG tablet     prochlorperazine (COMPAZINE) 10 MG tablet     solifenacin (VESICARE) 10 MG tablet     traMADol (ULTRAM) 50 MG tablet     urea (GORDONS UREA) 40 % external ointment     capecitabine (XELODA) 500 MG tablet     capecitabine (XELODA) 500 MG tablet     OLANZapine (ZYPREXA) 2.5 MG tablet     Current Facility-Administered Medications   Medication     lidocaine (XYLOCAINE) 5 % ointment     ALLERGY:     Allergies   Allergen Reactions     Penicillins      Sulfa Drugs             SOCIAL HISTORY:  She is a [place] immigrants to [place].  Denies smoking denies alcohol abuse.  2 of her sons are doctors.    FAMILY HISTORY:   Denies any family history of cancer.       PHYSICAL EXAMINATION:       /77 (BP Location: Right arm, Patient Position: Chair, Cuff Size: Adult Regular)   Pulse 80   Temp 97.8  F (36.6  C) (Oral)   Wt 71.4 kg (157 lb 5.8 oz)   LMP 11/27/2001 (Approximate)   SpO2 99%   BMI 30.49 kg/m       Wt Readings from Last 4 Encounters:   12/02/20  71.4 kg (157 lb 4.8 oz)   12/02/20 71.4 kg (157 lb 5.8 oz)   11/11/20 71.3 kg (157 lb 1.6 oz)   10/22/20 72.4 kg (159 lb 9.6 oz)       CONSTITUTIONAL: No apparent distress  EYES:  without pallor or jaundice  ENT/MOUTH: Ears unremarkable. No oral lesions  CVS: s1s2 normal  RESPIRATORY: Chest is clear  GI: Abdomen is benign  NEURO: Alert and oriented ×3  INTEGUMENT: There is some hyperpigmentation of the palms and mild peeling of the skin of palms.  LYMPHATIC: no palpable lymphadenopathy  MUSCULOSKELETAL: Unremarkable. No bony tenderness.   EXTREMITIES: no pedal edema.  Minimal swelling of the right big toe but no signs of infection.  PSYCH: Mentation, mood and affect are appropriate        LABS/IMAGING/PATHOLOGY:    Reviewed    EXAMINATION: CT CHEST/ABDOMEN/PELVIS W CONTRAST, 10/15/2020 10:55 AM     TECHNIQUE:  Helical CT images from the thoracic inlet through the  symphysis pubis were obtained  with contrast. Contrast dose: isovue  370 99ml     COMPARISON: CT: 7/20/2020, 2/21/2020     HISTORY: follow up colon cancer; Metastasis from rectal cancer (H);  Metastasis from rectal cancer (H); Bone metastasis (H)     FINDINGS:     Chest: Right port coiled within the right internal jugular vein with  tip terminating in the mid SVC. The heart is not enlarged. No  pericardial effusion. The thoracic aorta and pulmonary arteries are  not enlarged. Thyroid is within normal limits. No suspicious  mediastinal lymphadenopathy. Multiple pulmonary nodules, most  representative examples as described below:  -3 mm nodule in the left upper lobe (series 7, image 47), slightly  increased in size from prior examination at which time it measured 2  mm.  -5 mm nodule in the left upper lobe (series 7, image 62), slightly  increased in size from prior at which time it measured 4 mm.  -5 mm nodule in the subpleural left upper lobe (series 7, image 71),  slightly increased from prior at which time it measured 3 mm. 1 cm  nodule in the left lower  lobe posterior to the aorta (series 7, image  78), slightly increased from prior at which time it measured 8 mm.  -5 mm nodule in the right lower lobe (series 7, image 67), similar to  prior exam.     Abdomen and pelvis: The liver is unremarkable. No evidence of  metastatic disease. Pancreas is within normal limits. Postoperative  changes of cholecystectomy with the common bile duct measuring roughly  9 mm, likely reservoir effect. The adrenals are normal.No  hydronephrosis, hydroureter, or nephrolithiasis. The bladder is  partially decompressed without evidence of focal mass.  Persistent thickening of the lower rectum in keeping with patient's  known tumor. No evidence of small or large bowel obstruction. Small  hiatal hernia.   0.8 x 0.8 cm lymph node in the perirectal region, grossly stable from  prior.  1.1 x 0.7 cm lymph node in the mesorectum (Series 3, image 489),  slightly decreased from prior at which time it measured 1.3 x 0.9 cm.     Bones and soft tissues:  Soft tissues unremarkable. Stable sclerotic lesion in the L3 vertebral  body on the left. Stable sclerotic lesion in the left pubis (series 3,  image 530). No new osseous metastases.                                                                      IMPRESSION: In this patient with metastatic colon cancer:  1. Right port coiled within the right internal jugular vein with tip  terminating in the mid SVC.   2. Stable rectal mass, though difficult to accurately measure on CT.  Stable perirectal nodularity/lymph nodes.  3. Multiple pulmonary metastases, several increased in size as  described above.  4. Stable size of the presacral (known to be hypermetabolic) L3  sclerotic lesion.    ASSESSMENT AND PLAN:      Metastatic rectal adenocarcinoma, MSI intact, K-aimee wild type, PDL 1 + 25% with metastasis to the lungs, bones and pelvic lymph nodes.      She started palliative chemotherapy with FOLFOX/Avastin on 11/25/2019.      She has received 6 cycles of  chemotherapy.  With cycle #3, we stopped 5-FU bolus because of excessive tiredness.    Repeat CT CAP on 2/21/2020 shows good response to therapy with improvement in the rectal mass, mesorectal and superior rectal nodularity as well as resolution of right internal iliac lymphadenopathy.  There is slight improvement in the dominant posterior left lower lobe lung nodule and several of the lung nodules are is stable.  There is increase in size of the sclerotic lesion of the left aspect of L3 vertebral body without pathological fracture and this is likely due to treatment effect.    CEA on 2/10/2020 was down to 3.3.    With cycle #9 we stopped oxaliplatin because of cumulative fatigue and increasing nausea and gave her 5-FU/Avastin.      I then discussed with her, her  as well as her son Gamal about possibly switching to single agent Xeloda going forward so as to limit her visits to the clinic and therefore exposure and risk from coronavirus.  We discussed the pros and cons of this approach and they were all willing to try this.      She started single agent Xeloda on 4/7/2020.    CT chest abdomen and pelvis on 7/20/2020 showed mild progression in the lung nodules.  Perirectal nodularity has mildly improved.    CEA on 6/30/2020 was 2.8.  On 7/22/2020 it was 4    We decided on switching to Xeloda/Avastin on 7/27/2020.  We decreased the dose of oxaliplatin to 100 mg per metered square.    CEA on 8/11/2020 was 4.8.    Cycle #4 was given with further dose reduction of oxaliplatin to 85 mg per metered squared because of worsening neuropathy.    Repeat CT chest abdomen and pelvis on 10/15/2020 overall shows stable disease with 1 to 2 mm increase in some of the lung nodules and about 2 mm decrease in the lymph node in the mesorectum.    She is doing well in today we will proceed with cycle #7.  Last CEA was a stable at 5.7.      Nausea and vomiting.  With the addition of Emend with cycle #5, her nausea was better.  We  will continue with Emend.  Continue to use Zofran and Compazine as needed.  She is not on Zyprexa.      Diarrhea.  Diarrhea is mild and she takes Imodium as needed.  Continue the same.        Hand-foot syndrome.  She now she is using urea cream and hand-foot syndrome has improved and it is minimal right now.  Continue the same.     Neuropathy.  She has noticed slight worsening of numbness in the left hand but otherwise it is a stable.  Continue the same dose of oxaliplatin for now.      Bone metastasis.  Started Zometa on 11/25/2019.  She is getting it every 3 months and she last received on 11/11/2020.  Continue calcium and vitamin D.  CT scan shows stable bone metastasis.       We did not address the following today.    Right big toe infection/ingrown toenail.  She is done with systemic antibiotics.  She is using topical antibiotics under the care of podiatrist and this is getting better.      Port-A-Cath.  It was coiled in the right internal jugular vein so it was replaced 10/20/2020.       Discussion regarding health care directive  We discussed that it is important that she completes health care directive which would help in making sure that her wishes are followed about her treatment care in case she is not able to make a decision for herself.  We gave her information regarding that.    RTC in 3 weeks    All questions were answered to her satisfaction.  She is agreeable and comfortable with the plan.    Charline Velazquez MD

## 2020-12-23 NOTE — PROGRESS NOTES
Infusion Nursing Note:  Alannah Wynn presents today for C8 D1 Oxaliplatin/Avastin .    Patient seen by provider today: Yes: Felipa Garner NP   present during visit today: Not Applicable.    Note: N/A.    Intravenous Access:  Implanted Port.    Treatment Conditions:    Urine protein negative  Lab Results   Component Value Date    HGB 11.9 12/23/2020     Lab Results   Component Value Date    WBC 4.5 12/23/2020      Lab Results   Component Value Date    ANEU 1.5 12/23/2020     Lab Results   Component Value Date     12/23/2020      Lab Results   Component Value Date     12/23/2020                   Lab Results   Component Value Date    POTASSIUM 3.8 12/23/2020           No results found for: MAG         Lab Results   Component Value Date    CR 0.60 12/23/2020                   Lab Results   Component Value Date    LAUREN 8.6 12/23/2020                Lab Results   Component Value Date    BILITOTAL 0.4 12/23/2020           Lab Results   Component Value Date    ALBUMIN 3.4 12/23/2020                    Lab Results   Component Value Date     12/23/2020           Lab Results   Component Value Date    AST 83 12/23/2020       Results reviewed, labs MET treatment parameters, ok to proceed with treatment.      Post Infusion Assessment:  Patient tolerated infusion without incident.       Discharge Plan:   Patient discharged in stable condition accompanied by: self with  to drive.    Helen Ha RN

## 2020-12-23 NOTE — LETTER
"    12/23/2020         RE: Alannah Wynn  [address]  [place] [address]        Dear Colleague,    Thank you for referring your patient, Alannah Wynn, to the [facility]. Please see a copy of my visit note below.    ONCOLOGY FOLLOW UP NOTE    12/23/2020     Patient was being followed by Dr. Tavarez and saw her on 11/13/2019.  Now she is transferring her care to me.  I have reviewed her previous records and have copied and updated from prior notes.      DIAGNOSIS:    10/2019 dx adenoCa of rectum,  MMR intact- NGS Negative for KRAS, NRAS and BRAF. PD L1 25%.  Baseline CEA 10.  Stage IV disease with pulmonary and bony mets      HISTORY OF ONCOLOGY ILLNESS:    She has had intermittent hematochezia and anorectal irritation since 2011, that have been managed as \"hemorrhoids\". These symptoms became persistent and progressive since early thi year. Her anorectal pain is managed with Tylenol. Denies fevers, chills, or unintentional weight loss. Does have intermittent urinary and fecal urgency but no incontinence per se.    Diagnostic colonoscopy on 10/18/19 for rectal bleeding showed \"fungating and ulcerated non-obstructing mass was found in the distal rectum <1 cm from the anal verge. The mass was partially circumferential (involving one-half of the lumen circumference). The mass measured four cm in length. In addition, its diameter measured six mm.  Pathology showed invasive moderately differentiated adenocarcinoma with intact mismatch repair protein expression, PD L1 25% and NGS panel showed NO MUTATIONS in KRAS, NRAS and BRAF.      Pelvic MRI 11/2019 found fungating polypoid mid/lower rectal mass with luminal narrowing as well as innumerable enlarged lymph nodes as described above with a conglomerate of suspicious lymph nodes abutting the anterior peritoneal reflection. Stage cT3d N2.     PET 11/2019 found   1. Hypermetabolic mass in the distal rectum, SUV 14 corresponding " to the patient's known rectal cancer.  2. Multiple hypermetabolic pelvic lymph nodes, compatible metastatic disease, SUV 5-6.  3. FDG avid pulmonary nodule measuring 7 mm in the left lower lobe, concerning for metastatic disease. Additional FDG avid pulmonary nodules suspicious for metastatic disease, SUV around 4.  4. Osteoblastic metastatic lesion in the L3 vertebral body, SUV 6.8    She started palliative FOLFOX/Avastin on 11/25/2019.  Cycle #2 given on 12/9/2019.  Cycle #3 scheduled for 12/24/2019.  C#6 on 2/10/2020.    Repeat CT CAP on 2/21/2020 shows good response to therapy with improvement in the rectal mass, mesorectal and superior rectal nodularity as well as resolution of right internal iliac lymphadenopathy.  There is slight improvement in the dominant posterior left lower lobe lung nodule and several of the lung nodules are is stable.  There is increase in size of the sclerotic lesion of the left aspect of L3 vertebral body without pathological fracture and this is likely due to treatment effect.    CEA on 2/10/2020 was down to 3.3.    Cycle #7 FOLFOX/Avastin 2/25/2020.  C#8 FOLFOX/Avastin 3/9/2020  Cycle#9 5-FU/Avastin 3/24/2020-stopped oxaliplatin due to cumulative fatigue and issues with nausea.    I then discussed with her, her  as well as her son Gamal about possibly switching to single agent Xeloda going forward so as to limit her visits to the clinic and therefore exposure and risk from coronavirus.  We discussed the pros and cons of this approach and they were all willing to try this.     She started Single agent Xeloda on 4/7/2020.  Cycle #3 started on 5/19/2020.    Because of bone metastatic disease, she was started on Zometa on 11/25/2019. We are giving it every 3 months.  Last received 5/15/2020      CT chest abdomen and pelvis on 7/20/2020 showed mild progression in the lung nodules.  Perirectal nodularity has mildly improved.    Last CEA on 7/22/2020 was 4.      7/27/2020- we  decided on switching to XELOX/Avastin.     8/18/2020 -cycle #2 XELOX/Avastin.  CEA on 8/11/2020 was 4.8.  9/8/2020 - C#3 XELOX/Avastin. CEA on 9/2/2020 was 5.1  9/30/2020.  Cycle #4 XELOX/Avastin.  Oxaliplatin dose reduced further to 85 mg per metered square because of worsening neuropathy.    Repeat CT chest abdomen and pelvis on 10/15/2020 overall shows stable disease with 1 to 2 mm increase in some of the lung nodules and about 2 mm decrease in the lymph node in the mesorectum.    CEA stable at 5.3 on 10/21/2020.    10/22/2020.  Cycle #5 XELOX/Avastin.  Oxaliplatin dose 85 mg/m .  11/11/2020 C#6 XELOX/Avastin. Oxaliplatin dose 85 mg/m .  12/2/2020 C#7 XELOX/Avastin. Oxaliplatin dose 85 mg/m .  12/23/2020 C#8 XELOX/Avastin. Oxaliplatin dose 85 mg/m .    INTERVAL HISTORY:     She is here in the clinic.  She mentioned that this chemotherapy went better than last time.  She did not have much nausea or vomiting.  She had diarrhea for 2 to 3 days and Imodium helped.  She continues to apply urea cream to hands and feet and has mild hand-foot syndrome.  Over the last few days she has noticed mild pain around the left shoulder and she is applying heat packs which help.  Right now she mentions that the pain is minimal.  Massage also helps with the pain.  She has cold sensitivity for a few days.  She mentions that neuropathy overall seems to be stable and mild although she has noticed some worsening in the left hand and when she is very tired then she also notices that the fingers of the left hand shake if she picks up something.  She has not noticed any fevers chills or infections or shortness of breath or new swellings.      ECOG 1    ROS:  A comprehensive ROS was otherwise neg        I reviewed with her history in epic as below.    PAST MEDICAL HISTORY  Reflux, hemorrhoid, hepatitis B?  Overactive bladder  Active Ambulatory Problems     Diagnosis Date Noted     External hemorrhoids 04/29/2016     Non morbid obesity due  to excess calories 04/29/2016     Hepatitis B immune 04/29/2016     Screening for cervical cancer 04/29/2016     CARDIOVASCULAR SCREENING; LDL GOAL LESS THAN 160 04/29/2016     Gastroesophageal reflux disease 04/29/2016     Overactive bladder 07/21/2017     Nevus of left lower leg- undetermined behavior 12/27/2018     Metastasis from rectal cancer (H) 11/13/2019     Bone metastasis (H) 11/13/2019     Nausea 11/27/2019     Chemotherapy-induced neutropenia (H) 12/24/2019     Resolved Ambulatory Problems     Diagnosis Date Noted     Urgency of urination 04/29/2016     Need for vaccination against Streptococcus pneumoniae 04/29/2016     Morbid obesity (H) 07/21/2017     Obesity (BMI 35.0-39.9) with comorbidity (H) 12/27/2018     No Additional Past Medical History       MEDICATIONS:  Current Outpatient Medications   Medication     Ascorbic Acid (VITAMIN C) 500 MG CAPS     B Complex Vitamins (B COMPLEX-B12 PO)     capecitabine (XELODA) 500 MG tablet     cholecalciferol (VITAMIN D3) 400 unit (10 mcg) TABS tablet     docusate sodium (COLACE) 100 MG capsule     Fesoterodine Fumarate 8 MG TB24     lidocaine-prilocaine (EMLA) 2.5-2.5 % external cream     loperamide (IMODIUM) 2 MG capsule     LORazepam (ATIVAN) 0.5 MG tablet     LORazepam (ATIVAN) 0.5 MG tablet     Lutein 20 MG CAPS     mirabegron (MYRBETRIQ) 50 MG 24 hr tablet     Multiple vitamin TABS     omeprazole (PRILOSEC) 20 MG DR capsule     ondansetron (ZOFRAN) 8 MG tablet     prochlorperazine (COMPAZINE) 10 MG tablet     solifenacin (VESICARE) 10 MG tablet     traMADol (ULTRAM) 50 MG tablet     urea (GORDONS UREA) 40 % external ointment     capecitabine (XELODA) 500 MG tablet     capecitabine (XELODA) 500 MG tablet     capecitabine (XELODA) 500 MG tablet     naloxone (NARCAN) 4 MG/0.1ML nasal spray     OLANZapine (ZYPREXA) 2.5 MG tablet     Current Facility-Administered Medications   Medication     lidocaine (XYLOCAINE) 5 % ointment     Facility-Administered  Medications Ordered in Other Visits   Medication     heparin 100 UNIT/ML injection 5 mL     sodium chloride (PF) 0.9% PF flush 10-20 mL     ALLERGY:     Allergies   Allergen Reactions     Penicillins      Sulfa Drugs             SOCIAL HISTORY:  She is a [place] immigrants to [place].  Denies smoking denies alcohol abuse.  2 of her sons are doctors.    FAMILY HISTORY:   Denies any family history of cancer.       PHYSICAL EXAMINATION:       BP (!) 157/84 (BP Location: Left arm)   Pulse 84   Temp 97  F (36.1  C) (Oral)   Resp 18   Wt 69.8 kg (153 lb 12.8 oz)   LMP 11/27/2001 (Approximate)   SpO2 97%   BMI 29.80 kg/m       Wt Readings from Last 4 Encounters:   12/23/20 69.8 kg (153 lb 12.8 oz)   12/02/20 71.4 kg (157 lb 4.8 oz)   12/02/20 71.4 kg (157 lb 5.8 oz)   11/11/20 71.3 kg (157 lb 1.6 oz)       CONSTITUTIONAL: no acute distress  EYES: PERRLA, no palor or icterus.   ENT/MOUTH: no mouth lesions. Ears normal  CVS: s1s2 no m r g .   RESPIRATORY: clear to auscultation b/l  GI: soft non tender no hepatosplenomegaly  NEURO: AAOX3  Grossly non focal neuro exam  INTEGUMENT: no obvious rashes  LYMPHATIC: no palpable cervical, supraclavicular, axillary or inguinal LAD  MUSCULOSKELETAL: Unremarkable. No bony tenderness even in the left shoulder/shoulder blade region.   EXTREMITIES: no edema.  She has mild peeling of the skin of the palms and there is hyperpigmentation consistent with changes related to Xeloda.  PSYCH: Mentation, mood and affect are normal. Decision making capacity is intact        LABS/IMAGING/PATHOLOGY:    Reviewed  Results for MARY SCHULER (MRN [id number]) as of 12/23/2020 09:25   Ref. Range 12/23/2020 08:40   Sodium Latest Ref Range: 133 - 144 mmol/L 139   Potassium Latest Ref Range: 3.4 - 5.3 mmol/L 3.8   Chloride Latest Ref Range: 94 - 109 mmol/L 108   Carbon Dioxide Latest Ref Range: 20 - 32 mmol/L 28   Urea Nitrogen Latest Ref Range: 7 - 30 mg/dL 13   Creatinine Latest Ref Range: 0.52 -  1.04 mg/dL 0.60   GFR Estimate Latest Ref Range: >60 mL/min/1.73_m2 >90   GFR Estimate If Black Latest Ref Range: >60 mL/min/1.73_m2 >90   Calcium Latest Ref Range: 8.5 - 10.1 mg/dL 8.6   Anion Gap Latest Ref Range: 3 - 14 mmol/L 3   Albumin Latest Ref Range: 3.4 - 5.0 g/dL 3.4   Protein Total Latest Ref Range: 6.8 - 8.8 g/dL 6.9   Bilirubin Total Latest Ref Range: 0.2 - 1.3 mg/dL 0.4   Alkaline Phosphatase Latest Ref Range: 40 - 150 U/L 130   ALT Latest Ref Range: 0 - 50 U/L 118 (H)   AST Latest Ref Range: 0 - 45 U/L 83 (H)   Glucose Latest Ref Range: 70 - 99 mg/dL 88   WBC Latest Ref Range: 4.0 - 11.0 10e9/L 4.5   Hemoglobin Latest Ref Range: 11.7 - 15.7 g/dL 11.9   Hematocrit Latest Ref Range: 35.0 - 47.0 % 36.5   Platelet Count Latest Ref Range: 150 - 450 10e9/L 142 (L)   RBC Count Latest Ref Range: 3.8 - 5.2 10e12/L 3.34 (L)   MCV Latest Ref Range: 78 - 100 fl 109 (H)   MCH Latest Ref Range: 26.5 - 33.0 pg 35.6 (H)   MCHC Latest Ref Range: 31.5 - 36.5 g/dL 32.6   RDW Latest Ref Range: 10.0 - 15.0 % 18.2 (H)   Diff Method Unknown Automated Method   % Neutrophils Latest Units: % 31.9   % Lymphocytes Latest Units: % 49.8   % Monocytes Latest Units: % 15.2   % Eosinophils Latest Units: % 2.0   % Basophils Latest Units: % 0.9   % Immature Granulocytes Latest Units: % 0.2   Absolute Neutrophil Latest Ref Range: 1.6 - 8.3 10e9/L 1.5 (L)   Absolute Lymphocytes Latest Ref Range: 0.8 - 5.3 10e9/L 2.3   Absolute Monocytes Latest Ref Range: 0.0 - 1.3 10e9/L 0.7   Absolute Eosinophils Latest Ref Range: 0.0 - 0.7 10e9/L 0.1   Absolute Basophils Latest Ref Range: 0.0 - 0.2 10e9/L 0.0   Abs Immature Granulocytes Latest Ref Range: 0 - 0.4 10e9/L 0.0     Results for MARY SCHULER (MRN [id number]) as of 12/23/2020 09:02   Ref. Range 12/30/2019 10:00 2/10/2020 08:15 5/15/2020 11:15 6/30/2020 10:25 7/22/2020 09:52 8/11/2020 14:40 9/2/2020 13:05 9/30/2020 08:35 10/21/2020 08:22 11/11/2020 08:15 12/2/2020 07:30   CEA Latest Ref  Range: 0 - 2.5 ug/L 7.6 (H) 3.3 (H) 2.8 (H) 2.8 (H) 4.0 (H) 4.8 (H) 5.1 (H) 5.7 (H) 5.3 (H) 5.7 (H) 7.0 (H)       EXAMINATION: CT CHEST/ABDOMEN/PELVIS W CONTRAST, 10/15/2020 10:55 AM     TECHNIQUE:  Helical CT images from the thoracic inlet through the  symphysis pubis were obtained  with contrast. Contrast dose: isovue  370 99ml     COMPARISON: CT: 7/20/2020, 2/21/2020     HISTORY: follow up colon cancer; Metastasis from rectal cancer (H);  Metastasis from rectal cancer (H); Bone metastasis (H)     FINDINGS:     Chest: Right port coiled within the right internal jugular vein with  tip terminating in the mid SVC. The heart is not enlarged. No  pericardial effusion. The thoracic aorta and pulmonary arteries are  not enlarged. Thyroid is within normal limits. No suspicious  mediastinal lymphadenopathy. Multiple pulmonary nodules, most  representative examples as described below:  -3 mm nodule in the left upper lobe (series 7, image 47), slightly  increased in size from prior examination at which time it measured 2  mm.  -5 mm nodule in the left upper lobe (series 7, image 62), slightly  increased in size from prior at which time it measured 4 mm.  -5 mm nodule in the subpleural left upper lobe (series 7, image 71),  slightly increased from prior at which time it measured 3 mm. 1 cm  nodule in the left lower lobe posterior to the aorta (series 7, image  78), slightly increased from prior at which time it measured 8 mm.  -5 mm nodule in the right lower lobe (series 7, image 67), similar to  prior exam.     Abdomen and pelvis: The liver is unremarkable. No evidence of  metastatic disease. Pancreas is within normal limits. Postoperative  changes of cholecystectomy with the common bile duct measuring roughly  9 mm, likely reservoir effect. The adrenals are normal.No  hydronephrosis, hydroureter, or nephrolithiasis. The bladder is  partially decompressed without evidence of focal mass.  Persistent thickening of the lower  rectum in keeping with patient's  known tumor. No evidence of small or large bowel obstruction. Small  hiatal hernia.   0.8 x 0.8 cm lymph node in the perirectal region, grossly stable from  prior.  1.1 x 0.7 cm lymph node in the mesorectum (Series 3, image 489),  slightly decreased from prior at which time it measured 1.3 x 0.9 cm.     Bones and soft tissues:  Soft tissues unremarkable. Stable sclerotic lesion in the L3 vertebral  body on the left. Stable sclerotic lesion in the left pubis (series 3,  image 530). No new osseous metastases.                                                                      IMPRESSION: In this patient with metastatic colon cancer:  1. Right port coiled within the right internal jugular vein with tip  terminating in the mid SVC.   2. Stable rectal mass, though difficult to accurately measure on CT.  Stable perirectal nodularity/lymph nodes.  3. Multiple pulmonary metastases, several increased in size as  described above.  4. Stable size of the presacral (known to be hypermetabolic) L3  sclerotic lesion.    ASSESSMENT AND PLAN:      Metastatic rectal adenocarcinoma, MSI intact, K-aimee wild type, PDL 1 + 25% with metastasis to the lungs, bones and pelvic lymph nodes.      She started palliative chemotherapy with FOLFOX/Avastin on 11/25/2019.      She has received 6 cycles of chemotherapy.  With cycle #3, we stopped 5-FU bolus because of excessive tiredness.    Repeat CT CAP on 2/21/2020 shows good response to therapy with improvement in the rectal mass, mesorectal and superior rectal nodularity as well as resolution of right internal iliac lymphadenopathy.  There is slight improvement in the dominant posterior left lower lobe lung nodule and several of the lung nodules are is stable.  There is increase in size of the sclerotic lesion of the left aspect of L3 vertebral body without pathological fracture and this is likely due to treatment effect.    CEA on 2/10/2020 was down to  3.3.    With cycle #9 we stopped oxaliplatin because of cumulative fatigue and increasing nausea and gave her 5-FU/Avastin.      I then discussed with her, her  as well as her son Gamal about possibly switching to single agent Xeloda going forward so as to limit her visits to the clinic and therefore exposure and risk from coronavirus.  We discussed the pros and cons of this approach and they were all willing to try this.      She started single agent Xeloda on 4/7/2020.    CT chest abdomen and pelvis on 7/20/2020 showed mild progression in the lung nodules.  Perirectal nodularity has mildly improved.    CEA on 6/30/2020 was 2.8.  On 7/22/2020 it was 4    We decided on switching to Xeloda/Avastin on 7/27/2020.  We decreased the dose of oxaliplatin to 100 mg per metered square.    CEA on 8/11/2020 was 4.8.    Cycle #4 was given with further dose reduction of oxaliplatin to 85 mg per metered squared because of worsening neuropathy.    Repeat CT chest abdomen and pelvis on 10/15/2020 overall shows stable disease with 1 to 2 mm increase in some of the lung nodules and about 2 mm decrease in the lymph node in the mesorectum.  We decided on continuing the same chemotherapy and she has been tolerating chemotherapy well.  Today she will receive cycle #8.  Last CEA was 7 and repeat one from today is pending.  My plan is to repeat CT scan before next cycle of chemotherapy.      Nausea and vomiting.  With the addition of Emend with cycle #5, her nausea has been much better.  Continue the same and continue as needed Compazine and Zofran.  She is not taking Zyprexa.      Diarrhea.  She noticed diarrhea for 2 to 3 days but Imodium helped.  Continue Imodium as needed.        Hand-foot syndrome.  She has mild hand-foot syndrome from Xeloda.  Continue to use urea cream regularly.      Neuropathy.  Overall it is a stable although there is some worsening in the left hand.  For now I would like to keep the same dose of  oxaliplatin.      Thrombocytopenia.  She has mild thrombocytopenia from chemotherapy.  Continue to monitor.    Elevated LFTs.  She has mild elevation of transaminases which could be from oxaliplatin.  We will repeat CT scan in the next couple of weeks to visualize the liver.    Bone metastasis.  Started Zometa on 11/25/2019.  We are giving it to her every 3 months and she last received on 11/11/2020.  Continue vitamin D and calcium.        We did not address the following today.    Right big toe infection/ingrown toenail.  She is done with systemic antibiotics.  She is using topical antibiotics under the care of podiatrist and this is getting better.      Port-A-Cath.  It was coiled in the right internal jugular vein so it was replaced 10/20/2020.       Discussion regarding health care directive  We discussed that it is important that she completes health care directive which would help in making sure that her wishes are followed about her treatment care in case she is not able to make a decision for herself.  We gave her information regarding that.    RTC in 3 weeks    All questions were answered to her satisfaction.  She is agreeable and comfortable with the plan.    Charline Velazquez MD        Again, thank you for allowing me to participate in the care of your patient.        Sincerely,        Charline Velazquez MD

## 2020-12-23 NOTE — NURSING NOTE
"Oncology Rooming Note    December 23, 2020 9:00 AM   Alannah Wynn is a 69 year old female who presents for:    Chief Complaint   Patient presents with     Oncology Clinic Visit     Initial Vitals: BP (!) 157/84 (BP Location: Left arm)   Pulse 84   Temp 97  F (36.1  C) (Oral)   Resp 18   Wt 69.8 kg (153 lb 12.8 oz)   LMP 11/27/2001 (Approximate)   SpO2 97%   BMI 29.80 kg/m   Estimated body mass index is 29.8 kg/m  as calculated from the following:    Height as of 10/21/20: 1.53 m (5' 0.24\").    Weight as of this encounter: 69.8 kg (153 lb 12.8 oz). Body surface area is 1.72 meters squared.  Mild Pain (3) Comment: Data Unavailable   Patient's last menstrual period was 11/27/2001 (approximate).  Allergies reviewed: Yes  Medications reviewed: Yes    Medications: MEDICATION REFILLS NEEDED TODAY. Provider was notified. Zofran, Compazine, Ativan  Pharmacy name entered into [facility]:    [facility][id number] - [place], [place] - [address] AT [facility] [address] [place] [address] [facility] - [place], [place] - [address], [address]  [facility] - [place], [place] - [address] [facility] [facility] [address]    Clinical concerns: Concerned tumor marker is rising.     Cuca Chang RN              "

## 2020-12-23 NOTE — PROGRESS NOTES
"ONCOLOGY FOLLOW UP NOTE    12/23/2020     Patient was being followed by Dr. Tavarez and saw her on 11/13/2019.  Now she is transferring her care to me.  I have reviewed her previous records and have copied and updated from prior notes.      DIAGNOSIS:    10/2019 dx adenoCa of rectum,  MMR intact- NGS Negative for KRAS, NRAS and BRAF. PD L1 25%.  Baseline CEA 10.  Stage IV disease with pulmonary and bony mets      HISTORY OF ONCOLOGY ILLNESS:    She has had intermittent hematochezia and anorectal irritation since 2011, that have been managed as \"hemorrhoids\". These symptoms became persistent and progressive since early thi year. Her anorectal pain is managed with Tylenol. Denies fevers, chills, or unintentional weight loss. Does have intermittent urinary and fecal urgency but no incontinence per se.    Diagnostic colonoscopy on 10/18/19 for rectal bleeding showed \"fungating and ulcerated non-obstructing mass was found in the distal rectum <1 cm from the anal verge. The mass was partially circumferential (involving one-half of the lumen circumference). The mass measured four cm in length. In addition, its diameter measured six mm.  Pathology showed invasive moderately differentiated adenocarcinoma with intact mismatch repair protein expression, PD L1 25% and NGS panel showed NO MUTATIONS in KRAS, NRAS and BRAF.      Pelvic MRI 11/2019 found fungating polypoid mid/lower rectal mass with luminal narrowing as well as innumerable enlarged lymph nodes as described above with a conglomerate of suspicious lymph nodes abutting the anterior peritoneal reflection. Stage cT3d N2.     PET 11/2019 found   1. Hypermetabolic mass in the distal rectum, SUV 14 corresponding to the patient's known rectal cancer.  2. Multiple hypermetabolic pelvic lymph nodes, compatible metastatic disease, SUV 5-6.  3. FDG avid pulmonary nodule measuring 7 mm in the left lower lobe, concerning for metastatic disease. Additional FDG avid pulmonary nodules " suspicious for metastatic disease, SUV around 4.  4. Osteoblastic metastatic lesion in the L3 vertebral body, SUV 6.8    She started palliative FOLFOX/Avastin on 11/25/2019.  Cycle #2 given on 12/9/2019.  Cycle #3 scheduled for 12/24/2019.  C#6 on 2/10/2020.    Repeat CT CAP on 2/21/2020 shows good response to therapy with improvement in the rectal mass, mesorectal and superior rectal nodularity as well as resolution of right internal iliac lymphadenopathy.  There is slight improvement in the dominant posterior left lower lobe lung nodule and several of the lung nodules are is stable.  There is increase in size of the sclerotic lesion of the left aspect of L3 vertebral body without pathological fracture and this is likely due to treatment effect.    CEA on 2/10/2020 was down to 3.3.    Cycle #7 FOLFOX/Avastin 2/25/2020.  C#8 FOLFOX/Avastin 3/9/2020  Cycle#9 5-FU/Avastin 3/24/2020-stopped oxaliplatin due to cumulative fatigue and issues with nausea.    I then discussed with her, her  as well as her son Gamal about possibly switching to single agent Xeloda going forward so as to limit her visits to the clinic and therefore exposure and risk from coronavirus.  We discussed the pros and cons of this approach and they were all willing to try this.     She started Single agent Xeloda on 4/7/2020.  Cycle #3 started on 5/19/2020.    Because of bone metastatic disease, she was started on Zometa on 11/25/2019. We are giving it every 3 months.  Last received 5/15/2020      CT chest abdomen and pelvis on 7/20/2020 showed mild progression in the lung nodules.  Perirectal nodularity has mildly improved.    Last CEA on 7/22/2020 was 4.      7/27/2020- we decided on switching to XELOX/Avastin.     8/18/2020 -cycle #2 XELOX/Avastin.  CEA on 8/11/2020 was 4.8.  9/8/2020 - C#3 XELOX/Avastin. CEA on 9/2/2020 was 5.1  9/30/2020.  Cycle #4 XELOX/Avastin.  Oxaliplatin dose reduced further to 85 mg per metered square because of  worsening neuropathy.    Repeat CT chest abdomen and pelvis on 10/15/2020 overall shows stable disease with 1 to 2 mm increase in some of the lung nodules and about 2 mm decrease in the lymph node in the mesorectum.    CEA stable at 5.3 on 10/21/2020.    10/22/2020.  Cycle #5 XELOX/Avastin.  Oxaliplatin dose 85 mg/m .  11/11/2020 C#6 XELOX/Avastin. Oxaliplatin dose 85 mg/m .  12/2/2020 C#7 XELOX/Avastin. Oxaliplatin dose 85 mg/m .  12/23/2020 C#8 XELOX/Avastin. Oxaliplatin dose 85 mg/m .    INTERVAL HISTORY:     She is here in the clinic.  She mentioned that this chemotherapy went better than last time.  She did not have much nausea or vomiting.  She had diarrhea for 2 to 3 days and Imodium helped.  She continues to apply urea cream to hands and feet and has mild hand-foot syndrome.  Over the last few days she has noticed mild pain around the left shoulder and she is applying heat packs which help.  Right now she mentions that the pain is minimal.  Massage also helps with the pain.  She has cold sensitivity for a few days.  She mentions that neuropathy overall seems to be stable and mild although she has noticed some worsening in the left hand and when she is very tired then she also notices that the fingers of the left hand shake if she picks up something.  She has not noticed any fevers chills or infections or shortness of breath or new swellings.      ECOG 1    ROS:  A comprehensive ROS was otherwise neg        I reviewed with her history in epic as below.    PAST MEDICAL HISTORY  Reflux, hemorrhoid, hepatitis B?  Overactive bladder  Active Ambulatory Problems     Diagnosis Date Noted     External hemorrhoids 04/29/2016     Non morbid obesity due to excess calories 04/29/2016     Hepatitis B immune 04/29/2016     Screening for cervical cancer 04/29/2016     CARDIOVASCULAR SCREENING; LDL GOAL LESS THAN 160 04/29/2016     Gastroesophageal reflux disease 04/29/2016     Overactive bladder 07/21/2017     Nevus of left  lower leg- undetermined behavior 12/27/2018     Metastasis from rectal cancer (H) 11/13/2019     Bone metastasis (H) 11/13/2019     Nausea 11/27/2019     Chemotherapy-induced neutropenia (H) 12/24/2019     Resolved Ambulatory Problems     Diagnosis Date Noted     Urgency of urination 04/29/2016     Need for vaccination against Streptococcus pneumoniae 04/29/2016     Morbid obesity (H) 07/21/2017     Obesity (BMI 35.0-39.9) with comorbidity (H) 12/27/2018     No Additional Past Medical History       MEDICATIONS:  Current Outpatient Medications   Medication     Ascorbic Acid (VITAMIN C) 500 MG CAPS     B Complex Vitamins (B COMPLEX-B12 PO)     capecitabine (XELODA) 500 MG tablet     cholecalciferol (VITAMIN D3) 400 unit (10 mcg) TABS tablet     docusate sodium (COLACE) 100 MG capsule     Fesoterodine Fumarate 8 MG TB24     lidocaine-prilocaine (EMLA) 2.5-2.5 % external cream     loperamide (IMODIUM) 2 MG capsule     LORazepam (ATIVAN) 0.5 MG tablet     LORazepam (ATIVAN) 0.5 MG tablet     Lutein 20 MG CAPS     mirabegron (MYRBETRIQ) 50 MG 24 hr tablet     Multiple vitamin TABS     omeprazole (PRILOSEC) 20 MG DR capsule     ondansetron (ZOFRAN) 8 MG tablet     prochlorperazine (COMPAZINE) 10 MG tablet     solifenacin (VESICARE) 10 MG tablet     traMADol (ULTRAM) 50 MG tablet     urea (GORDONS UREA) 40 % external ointment     capecitabine (XELODA) 500 MG tablet     capecitabine (XELODA) 500 MG tablet     capecitabine (XELODA) 500 MG tablet     naloxone (NARCAN) 4 MG/0.1ML nasal spray     OLANZapine (ZYPREXA) 2.5 MG tablet     Current Facility-Administered Medications   Medication     lidocaine (XYLOCAINE) 5 % ointment     Facility-Administered Medications Ordered in Other Visits   Medication     heparin 100 UNIT/ML injection 5 mL     sodium chloride (PF) 0.9% PF flush 10-20 mL     ALLERGY:     Allergies   Allergen Reactions     Penicillins      Sulfa Drugs             SOCIAL HISTORY:  She is a [place] immigrants [place].  Denies smoking denies alcohol abuse.  2 of her sons are doctors.    FAMILY HISTORY:   Denies any family history of cancer.       PHYSICAL EXAMINATION:       BP (!) 157/84 (BP Location: Left arm)   Pulse 84   Temp 97  F (36.1  C) (Oral)   Resp 18   Wt 69.8 kg (153 lb 12.8 oz)   LMP 11/27/2001 (Approximate)   SpO2 97%   BMI 29.80 kg/m       Wt Readings from Last 4 Encounters:   12/23/20 69.8 kg (153 lb 12.8 oz)   12/02/20 71.4 kg (157 lb 4.8 oz)   12/02/20 71.4 kg (157 lb 5.8 oz)   11/11/20 71.3 kg (157 lb 1.6 oz)       CONSTITUTIONAL: no acute distress  EYES: PERRLA, no palor or icterus.   ENT/MOUTH: no mouth lesions. Ears normal  CVS: s1s2 no m r g .   RESPIRATORY: clear to auscultation b/l  GI: soft non tender no hepatosplenomegaly  NEURO: AAOX3  Grossly non focal neuro exam  INTEGUMENT: no obvious rashes  LYMPHATIC: no palpable cervical, supraclavicular, axillary or inguinal LAD  MUSCULOSKELETAL: Unremarkable. No bony tenderness even in the left shoulder/shoulder blade region.   EXTREMITIES: no edema.  She has mild peeling of the skin of the palms and there is hyperpigmentation consistent with changes related to Xeloda.  PSYCH: Mentation, mood and affect are normal. Decision making capacity is intact        LABS/IMAGING/PATHOLOGY:    Reviewed  Results for MARY SCHULER (MRN [id number]) as of 12/23/2020 09:25   Ref. Range 12/23/2020 08:40   Sodium Latest Ref Range: 133 - 144 mmol/L 139   Potassium Latest Ref Range: 3.4 - 5.3 mmol/L 3.8   Chloride Latest Ref Range: 94 - 109 mmol/L 108   Carbon Dioxide Latest Ref Range: 20 - 32 mmol/L 28   Urea Nitrogen Latest Ref Range: 7 - 30 mg/dL 13   Creatinine Latest Ref Range: 0.52 - 1.04 mg/dL 0.60   GFR Estimate Latest Ref Range: >60 mL/min/1.73_m2 >90   GFR Estimate If Black Latest Ref Range: >60 mL/min/1.73_m2 >90   Calcium Latest Ref Range: 8.5 - 10.1 mg/dL 8.6   Anion Gap Latest Ref Range: 3 - 14 mmol/L 3   Albumin Latest Ref Range: 3.4 - 5.0 g/dL 3.4    Protein Total Latest Ref Range: 6.8 - 8.8 g/dL 6.9   Bilirubin Total Latest Ref Range: 0.2 - 1.3 mg/dL 0.4   Alkaline Phosphatase Latest Ref Range: 40 - 150 U/L 130   ALT Latest Ref Range: 0 - 50 U/L 118 (H)   AST Latest Ref Range: 0 - 45 U/L 83 (H)   Glucose Latest Ref Range: 70 - 99 mg/dL 88   WBC Latest Ref Range: 4.0 - 11.0 10e9/L 4.5   Hemoglobin Latest Ref Range: 11.7 - 15.7 g/dL 11.9   Hematocrit Latest Ref Range: 35.0 - 47.0 % 36.5   Platelet Count Latest Ref Range: 150 - 450 10e9/L 142 (L)   RBC Count Latest Ref Range: 3.8 - 5.2 10e12/L 3.34 (L)   MCV Latest Ref Range: 78 - 100 fl 109 (H)   MCH Latest Ref Range: 26.5 - 33.0 pg 35.6 (H)   MCHC Latest Ref Range: 31.5 - 36.5 g/dL 32.6   RDW Latest Ref Range: 10.0 - 15.0 % 18.2 (H)   Diff Method Unknown Automated Method   % Neutrophils Latest Units: % 31.9   % Lymphocytes Latest Units: % 49.8   % Monocytes Latest Units: % 15.2   % Eosinophils Latest Units: % 2.0   % Basophils Latest Units: % 0.9   % Immature Granulocytes Latest Units: % 0.2   Absolute Neutrophil Latest Ref Range: 1.6 - 8.3 10e9/L 1.5 (L)   Absolute Lymphocytes Latest Ref Range: 0.8 - 5.3 10e9/L 2.3   Absolute Monocytes Latest Ref Range: 0.0 - 1.3 10e9/L 0.7   Absolute Eosinophils Latest Ref Range: 0.0 - 0.7 10e9/L 0.1   Absolute Basophils Latest Ref Range: 0.0 - 0.2 10e9/L 0.0   Abs Immature Granulocytes Latest Ref Range: 0 - 0.4 10e9/L 0.0     Results for MARY SCHULER (MRN [id number]) as of 12/23/2020 09:02   Ref. Range 12/30/2019 10:00 2/10/2020 08:15 5/15/2020 11:15 6/30/2020 10:25 7/22/2020 09:52 8/11/2020 14:40 9/2/2020 13:05 9/30/2020 08:35 10/21/2020 08:22 11/11/2020 08:15 12/2/2020 07:30   CEA Latest Ref Range: 0 - 2.5 ug/L 7.6 (H) 3.3 (H) 2.8 (H) 2.8 (H) 4.0 (H) 4.8 (H) 5.1 (H) 5.7 (H) 5.3 (H) 5.7 (H) 7.0 (H)       EXAMINATION: CT CHEST/ABDOMEN/PELVIS W CONTRAST, 10/15/2020 10:55 AM     TECHNIQUE:  Helical CT images from the thoracic inlet through the  symphysis pubis were  obtained  with contrast. Contrast dose: isovue  370 99ml     COMPARISON: CT: 7/20/2020, 2/21/2020     HISTORY: follow up colon cancer; Metastasis from rectal cancer (H);  Metastasis from rectal cancer (H); Bone metastasis (H)     FINDINGS:     Chest: Right port coiled within the right internal jugular vein with  tip terminating in the mid SVC. The heart is not enlarged. No  pericardial effusion. The thoracic aorta and pulmonary arteries are  not enlarged. Thyroid is within normal limits. No suspicious  mediastinal lymphadenopathy. Multiple pulmonary nodules, most  representative examples as described below:  -3 mm nodule in the left upper lobe (series 7, image 47), slightly  increased in size from prior examination at which time it measured 2  mm.  -5 mm nodule in the left upper lobe (series 7, image 62), slightly  increased in size from prior at which time it measured 4 mm.  -5 mm nodule in the subpleural left upper lobe (series 7, image 71),  slightly increased from prior at which time it measured 3 mm. 1 cm  nodule in the left lower lobe posterior to the aorta (series 7, image  78), slightly increased from prior at which time it measured 8 mm.  -5 mm nodule in the right lower lobe (series 7, image 67), similar to  prior exam.     Abdomen and pelvis: The liver is unremarkable. No evidence of  metastatic disease. Pancreas is within normal limits. Postoperative  changes of cholecystectomy with the common bile duct measuring roughly  9 mm, likely reservoir effect. The adrenals are normal.No  hydronephrosis, hydroureter, or nephrolithiasis. The bladder is  partially decompressed without evidence of focal mass.  Persistent thickening of the lower rectum in keeping with patient's  known tumor. No evidence of small or large bowel obstruction. Small  hiatal hernia.   0.8 x 0.8 cm lymph node in the perirectal region, grossly stable from  prior.  1.1 x 0.7 cm lymph node in the mesorectum (Series 3, image 489),  slightly  decreased from prior at which time it measured 1.3 x 0.9 cm.     Bones and soft tissues:  Soft tissues unremarkable. Stable sclerotic lesion in the L3 vertebral  body on the left. Stable sclerotic lesion in the left pubis (series 3,  image 530). No new osseous metastases.                                                                      IMPRESSION: In this patient with metastatic colon cancer:  1. Right port coiled within the right internal jugular vein with tip  terminating in the mid SVC.   2. Stable rectal mass, though difficult to accurately measure on CT.  Stable perirectal nodularity/lymph nodes.  3. Multiple pulmonary metastases, several increased in size as  described above.  4. Stable size of the presacral (known to be hypermetabolic) L3  sclerotic lesion.    ASSESSMENT AND PLAN:      Metastatic rectal adenocarcinoma, MSI intact, K-aimee wild type, PDL 1 + 25% with metastasis to the lungs, bones and pelvic lymph nodes.      She started palliative chemotherapy with FOLFOX/Avastin on 11/25/2019.      She has received 6 cycles of chemotherapy.  With cycle #3, we stopped 5-FU bolus because of excessive tiredness.    Repeat CT CAP on 2/21/2020 shows good response to therapy with improvement in the rectal mass, mesorectal and superior rectal nodularity as well as resolution of right internal iliac lymphadenopathy.  There is slight improvement in the dominant posterior left lower lobe lung nodule and several of the lung nodules are is stable.  There is increase in size of the sclerotic lesion of the left aspect of L3 vertebral body without pathological fracture and this is likely due to treatment effect.    CEA on 2/10/2020 was down to 3.3.    With cycle #9 we stopped oxaliplatin because of cumulative fatigue and increasing nausea and gave her 5-FU/Avastin.      I then discussed with her, her  as well as her son Gamal about possibly switching to single agent Xeloda going forward so as to limit her visits  to the clinic and therefore exposure and risk from coronavirus.  We discussed the pros and cons of this approach and they were all willing to try this.      She started single agent Xeloda on 4/7/2020.    CT chest abdomen and pelvis on 7/20/2020 showed mild progression in the lung nodules.  Perirectal nodularity has mildly improved.    CEA on 6/30/2020 was 2.8.  On 7/22/2020 it was 4    We decided on switching to Xeloda/Avastin on 7/27/2020.  We decreased the dose of oxaliplatin to 100 mg per metered square.    CEA on 8/11/2020 was 4.8.    Cycle #4 was given with further dose reduction of oxaliplatin to 85 mg per metered squared because of worsening neuropathy.    Repeat CT chest abdomen and pelvis on 10/15/2020 overall shows stable disease with 1 to 2 mm increase in some of the lung nodules and about 2 mm decrease in the lymph node in the mesorectum.  We decided on continuing the same chemotherapy and she has been tolerating chemotherapy well.  Today she will receive cycle #8.  Last CEA was 7 and repeat one from today is pending.  My plan is to repeat CT scan before next cycle of chemotherapy.      Nausea and vomiting.  With the addition of Emend with cycle #5, her nausea has been much better.  Continue the same and continue as needed Compazine and Zofran.  She is not taking Zyprexa.      Diarrhea.  She noticed diarrhea for 2 to 3 days but Imodium helped.  Continue Imodium as needed.        Hand-foot syndrome.  She has mild hand-foot syndrome from Xeloda.  Continue to use urea cream regularly.      Neuropathy.  Overall it is a stable although there is some worsening in the left hand.  For now I would like to keep the same dose of oxaliplatin.      Left shoulder/shoulder blade pain.  This is very mild so I advised her to keep a watch on that.  We will repeat CT scan in a couple of weeks.    Thrombocytopenia.  She has mild thrombocytopenia from chemotherapy.  Continue to monitor.    Elevated LFTs.  She has mild  elevation of transaminases which could be from oxaliplatin.  We will repeat CT scan in the next couple of weeks to visualize the liver.    Bone metastasis.  Started Zometa on 11/25/2019.  We are giving it to her every 3 months and she last received on 11/11/2020.  Continue vitamin D and calcium.      Elevated blood pressure.    Her blood pressure in the clinic was elevated.    She is asymptomatic.  I recommend that she checks her blood pressures at home regularly when she is relaxed and keep a log of it.  If it is persistently high then she should talk to her primary care physician for better blood pressure management.  I am not starting any antihypertensives today.         We did not address the following today.    Right big toe infection/ingrown toenail.  She is done with systemic antibiotics.  She is using topical antibiotics under the care of podiatrist and this is getting better.      Port-A-Cath.  It was coiled in the right internal jugular vein so it was replaced 10/20/2020.       Discussion regarding health care directive  We discussed that it is important that she completes health care directive which would help in making sure that her wishes are followed about her treatment care in case she is not able to make a decision for herself.  We gave her information regarding that.    RTC in 3 weeks    All questions were answered to her satisfaction.  She is agreeable and comfortable with the plan.    Charline Velazquez MD

## 2021-01-01 ENCOUNTER — ANCILLARY PROCEDURE (OUTPATIENT)
Dept: CT IMAGING | Facility: CLINIC | Age: 71
End: 2021-01-01
Attending: INTERNAL MEDICINE
Payer: COMMERCIAL

## 2021-01-01 ENCOUNTER — ONCOLOGY VISIT (OUTPATIENT)
Dept: ONCOLOGY | Facility: CLINIC | Age: 71
End: 2021-01-01
Attending: INTERNAL MEDICINE
Payer: COMMERCIAL

## 2021-01-01 ENCOUNTER — ONCOLOGY VISIT (OUTPATIENT)
Dept: ONCOLOGY | Facility: CLINIC | Age: 71
End: 2021-01-01
Payer: COMMERCIAL

## 2021-01-01 ENCOUNTER — ANCILLARY PROCEDURE (OUTPATIENT)
Dept: NUCLEAR MEDICINE | Facility: CLINIC | Age: 71
End: 2021-01-01
Attending: INTERNAL MEDICINE
Payer: COMMERCIAL

## 2021-01-01 ENCOUNTER — INFUSION THERAPY VISIT (OUTPATIENT)
Dept: INFUSION THERAPY | Facility: CLINIC | Age: 71
End: 2021-01-01
Payer: COMMERCIAL

## 2021-01-01 ENCOUNTER — HEALTH MAINTENANCE LETTER (OUTPATIENT)
Age: 71
End: 2021-01-01

## 2021-01-01 ENCOUNTER — IMMUNIZATION (OUTPATIENT)
Dept: PEDIATRICS | Facility: CLINIC | Age: 71
End: 2021-01-01
Payer: COMMERCIAL

## 2021-01-01 ENCOUNTER — OFFICE VISIT (OUTPATIENT)
Dept: ONCOLOGY | Facility: CLINIC | Age: 71
End: 2021-01-01
Payer: COMMERCIAL

## 2021-01-01 ENCOUNTER — LAB (OUTPATIENT)
Dept: LAB | Facility: CLINIC | Age: 71
End: 2021-01-01
Payer: COMMERCIAL

## 2021-01-01 ENCOUNTER — OFFICE VISIT (OUTPATIENT)
Dept: URGENT CARE | Facility: URGENT CARE | Age: 71
End: 2021-01-01
Payer: COMMERCIAL

## 2021-01-01 ENCOUNTER — MEDICAL CORRESPONDENCE (OUTPATIENT)
Dept: HEALTH INFORMATION MANAGEMENT | Facility: CLINIC | Age: 71
End: 2021-01-01

## 2021-01-01 ENCOUNTER — TELEPHONE (OUTPATIENT)
Dept: ONCOLOGY | Facility: CLINIC | Age: 71
End: 2021-01-01

## 2021-01-01 ENCOUNTER — VIRTUAL VISIT (OUTPATIENT)
Dept: ONCOLOGY | Facility: CLINIC | Age: 71
End: 2021-01-01
Payer: COMMERCIAL

## 2021-01-01 ENCOUNTER — APPOINTMENT (OUTPATIENT)
Dept: GENERAL RADIOLOGY | Facility: CLINIC | Age: 71
End: 2021-01-01
Payer: COMMERCIAL

## 2021-01-01 ENCOUNTER — PATIENT OUTREACH (OUTPATIENT)
Dept: ONCOLOGY | Facility: CLINIC | Age: 71
End: 2021-01-01

## 2021-01-01 ENCOUNTER — OFFICE VISIT (OUTPATIENT)
Dept: ONCOLOGY | Facility: CLINIC | Age: 71
End: 2021-01-01
Attending: NURSE PRACTITIONER
Payer: COMMERCIAL

## 2021-01-01 ENCOUNTER — OFFICE VISIT (OUTPATIENT)
Dept: PODIATRY | Facility: CLINIC | Age: 71
End: 2021-01-01
Payer: COMMERCIAL

## 2021-01-01 ENCOUNTER — INFUSION THERAPY VISIT (OUTPATIENT)
Dept: INFUSION THERAPY | Facility: CLINIC | Age: 71
End: 2021-01-01
Attending: NURSE PRACTITIONER
Payer: COMMERCIAL

## 2021-01-01 ENCOUNTER — INFUSION THERAPY VISIT (OUTPATIENT)
Dept: INFUSION THERAPY | Facility: CLINIC | Age: 71
End: 2021-01-01
Attending: INTERNAL MEDICINE
Payer: COMMERCIAL

## 2021-01-01 ENCOUNTER — IMMUNIZATION (OUTPATIENT)
Dept: PEDIATRICS | Facility: CLINIC | Age: 71
End: 2021-01-01
Attending: INTERNAL MEDICINE
Payer: COMMERCIAL

## 2021-01-01 VITALS
RESPIRATION RATE: 14 BRPM | DIASTOLIC BLOOD PRESSURE: 79 MMHG | SYSTOLIC BLOOD PRESSURE: 120 MMHG | OXYGEN SATURATION: 99 % | HEIGHT: 62 IN | BODY MASS INDEX: 26.06 KG/M2 | HEART RATE: 87 BPM | TEMPERATURE: 98.1 F | WEIGHT: 141.6 LBS

## 2021-01-01 VITALS
TEMPERATURE: 97.4 F | SYSTOLIC BLOOD PRESSURE: 145 MMHG | DIASTOLIC BLOOD PRESSURE: 86 MMHG | OXYGEN SATURATION: 98 % | HEART RATE: 87 BPM

## 2021-01-01 VITALS
DIASTOLIC BLOOD PRESSURE: 71 MMHG | WEIGHT: 125 LBS | OXYGEN SATURATION: 100 % | SYSTOLIC BLOOD PRESSURE: 106 MMHG | HEART RATE: 80 BPM | BODY MASS INDEX: 23 KG/M2 | HEIGHT: 62 IN

## 2021-01-01 VITALS
TEMPERATURE: 97.8 F | WEIGHT: 145.3 LBS | DIASTOLIC BLOOD PRESSURE: 77 MMHG | SYSTOLIC BLOOD PRESSURE: 117 MMHG | BODY MASS INDEX: 26.74 KG/M2 | OXYGEN SATURATION: 99 % | HEART RATE: 83 BPM | HEIGHT: 62 IN

## 2021-01-01 VITALS
SYSTOLIC BLOOD PRESSURE: 116 MMHG | HEART RATE: 93 BPM | TEMPERATURE: 97.8 F | WEIGHT: 128 LBS | BODY MASS INDEX: 23.41 KG/M2 | OXYGEN SATURATION: 100 % | DIASTOLIC BLOOD PRESSURE: 74 MMHG

## 2021-01-01 VITALS
TEMPERATURE: 97.7 F | HEART RATE: 73 BPM | OXYGEN SATURATION: 98 % | BODY MASS INDEX: 28.79 KG/M2 | DIASTOLIC BLOOD PRESSURE: 71 MMHG | SYSTOLIC BLOOD PRESSURE: 105 MMHG | WEIGHT: 148.6 LBS

## 2021-01-01 VITALS
HEART RATE: 76 BPM | BODY MASS INDEX: 23.81 KG/M2 | RESPIRATION RATE: 14 BRPM | HEIGHT: 62 IN | DIASTOLIC BLOOD PRESSURE: 70 MMHG | TEMPERATURE: 97.3 F | OXYGEN SATURATION: 100 % | WEIGHT: 129.4 LBS | SYSTOLIC BLOOD PRESSURE: 113 MMHG

## 2021-01-01 VITALS
TEMPERATURE: 97.6 F | SYSTOLIC BLOOD PRESSURE: 115 MMHG | HEART RATE: 80 BPM | OXYGEN SATURATION: 100 % | DIASTOLIC BLOOD PRESSURE: 76 MMHG | WEIGHT: 135.5 LBS | BODY MASS INDEX: 24.78 KG/M2

## 2021-01-01 VITALS
DIASTOLIC BLOOD PRESSURE: 68 MMHG | WEIGHT: 130 LBS | HEIGHT: 62 IN | HEART RATE: 84 BPM | BODY MASS INDEX: 23.92 KG/M2 | TEMPERATURE: 97.6 F | OXYGEN SATURATION: 100 % | SYSTOLIC BLOOD PRESSURE: 99 MMHG

## 2021-01-01 VITALS
HEART RATE: 89 BPM | DIASTOLIC BLOOD PRESSURE: 80 MMHG | RESPIRATION RATE: 16 BRPM | OXYGEN SATURATION: 100 % | TEMPERATURE: 97.3 F | SYSTOLIC BLOOD PRESSURE: 118 MMHG | BODY MASS INDEX: 22.38 KG/M2 | HEIGHT: 62 IN | WEIGHT: 121.6 LBS

## 2021-01-01 VITALS
RESPIRATION RATE: 16 BRPM | BODY MASS INDEX: 22.02 KG/M2 | WEIGHT: 120.38 LBS | HEART RATE: 96 BPM | OXYGEN SATURATION: 100 % | SYSTOLIC BLOOD PRESSURE: 131 MMHG | TEMPERATURE: 98.3 F | DIASTOLIC BLOOD PRESSURE: 81 MMHG

## 2021-01-01 VITALS — HEART RATE: 96 BPM | SYSTOLIC BLOOD PRESSURE: 116 MMHG | DIASTOLIC BLOOD PRESSURE: 79 MMHG | OXYGEN SATURATION: 99 %

## 2021-01-01 VITALS
HEIGHT: 62 IN | BODY MASS INDEX: 26.33 KG/M2 | DIASTOLIC BLOOD PRESSURE: 82 MMHG | OXYGEN SATURATION: 99 % | HEART RATE: 88 BPM | RESPIRATION RATE: 16 BRPM | TEMPERATURE: 97.7 F | SYSTOLIC BLOOD PRESSURE: 121 MMHG | WEIGHT: 143.1 LBS

## 2021-01-01 VITALS
HEART RATE: 90 BPM | HEIGHT: 62 IN | DIASTOLIC BLOOD PRESSURE: 78 MMHG | WEIGHT: 128.8 LBS | TEMPERATURE: 97.5 F | OXYGEN SATURATION: 98 % | RESPIRATION RATE: 14 BRPM | BODY MASS INDEX: 23.7 KG/M2 | SYSTOLIC BLOOD PRESSURE: 114 MMHG

## 2021-01-01 VITALS
DIASTOLIC BLOOD PRESSURE: 73 MMHG | OXYGEN SATURATION: 100 % | WEIGHT: 138.3 LBS | RESPIRATION RATE: 14 BRPM | SYSTOLIC BLOOD PRESSURE: 107 MMHG | HEART RATE: 83 BPM | HEIGHT: 62 IN | TEMPERATURE: 98 F | BODY MASS INDEX: 25.45 KG/M2

## 2021-01-01 VITALS
SYSTOLIC BLOOD PRESSURE: 123 MMHG | HEIGHT: 62 IN | BODY MASS INDEX: 25.23 KG/M2 | OXYGEN SATURATION: 99 % | DIASTOLIC BLOOD PRESSURE: 83 MMHG | WEIGHT: 137.1 LBS | TEMPERATURE: 97.2 F | HEART RATE: 87 BPM

## 2021-01-01 VITALS
SYSTOLIC BLOOD PRESSURE: 95 MMHG | RESPIRATION RATE: 16 BRPM | HEART RATE: 89 BPM | WEIGHT: 134.1 LBS | DIASTOLIC BLOOD PRESSURE: 64 MMHG | BODY MASS INDEX: 24.68 KG/M2 | HEIGHT: 62 IN | OXYGEN SATURATION: 99 % | TEMPERATURE: 97.8 F

## 2021-01-01 VITALS — SYSTOLIC BLOOD PRESSURE: 123 MMHG | HEART RATE: 90 BPM | DIASTOLIC BLOOD PRESSURE: 83 MMHG | OXYGEN SATURATION: 99 %

## 2021-01-01 VITALS
DIASTOLIC BLOOD PRESSURE: 74 MMHG | SYSTOLIC BLOOD PRESSURE: 107 MMHG | BODY MASS INDEX: 24.11 KG/M2 | HEIGHT: 62 IN | OXYGEN SATURATION: 100 % | WEIGHT: 131 LBS | TEMPERATURE: 98.4 F | HEART RATE: 82 BPM

## 2021-01-01 DIAGNOSIS — D70.1 CHEMOTHERAPY-INDUCED NEUTROPENIA (H): Primary | ICD-10-CM

## 2021-01-01 DIAGNOSIS — K59.03 DRUG-INDUCED CONSTIPATION: ICD-10-CM

## 2021-01-01 DIAGNOSIS — C20 METASTASIS FROM RECTAL CANCER (H): ICD-10-CM

## 2021-01-01 DIAGNOSIS — C79.51 BONE METASTASIS: ICD-10-CM

## 2021-01-01 DIAGNOSIS — T45.1X5A CHEMOTHERAPY-INDUCED NEUTROPENIA (H): ICD-10-CM

## 2021-01-01 DIAGNOSIS — D64.81 ANEMIA ASSOCIATED WITH CHEMOTHERAPY: ICD-10-CM

## 2021-01-01 DIAGNOSIS — C79.9 METASTASIS FROM RECTAL CANCER (H): ICD-10-CM

## 2021-01-01 DIAGNOSIS — L60.0 INGROWN TOENAIL OF BOTH FEET: ICD-10-CM

## 2021-01-01 DIAGNOSIS — E83.42 HYPOMAGNESEMIA: ICD-10-CM

## 2021-01-01 DIAGNOSIS — D70.1 CHEMOTHERAPY-INDUCED NEUTROPENIA (H): ICD-10-CM

## 2021-01-01 DIAGNOSIS — C20 METASTASIS FROM RECTAL CANCER (H): Primary | ICD-10-CM

## 2021-01-01 DIAGNOSIS — C79.51 BONE METASTASIS: Primary | ICD-10-CM

## 2021-01-01 DIAGNOSIS — T45.1X5A CHEMOTHERAPY-INDUCED NEUTROPENIA (H): Primary | ICD-10-CM

## 2021-01-01 DIAGNOSIS — R11.0 NAUSEA: ICD-10-CM

## 2021-01-01 DIAGNOSIS — G62.0 DRUG-INDUCED POLYNEUROPATHY (H): ICD-10-CM

## 2021-01-01 DIAGNOSIS — L03.032 PARONYCHIA OF GREAT TOE OF LEFT FOOT: Primary | ICD-10-CM

## 2021-01-01 DIAGNOSIS — K59.00 CONSTIPATION, UNSPECIFIED CONSTIPATION TYPE: ICD-10-CM

## 2021-01-01 DIAGNOSIS — T45.1X5A ANEMIA ASSOCIATED WITH CHEMOTHERAPY: ICD-10-CM

## 2021-01-01 DIAGNOSIS — L08.9 LOCAL INFECTION OF SKIN AND SUBCUTANEOUS TISSUE: Primary | ICD-10-CM

## 2021-01-01 DIAGNOSIS — L27.0 DRUG-INDUCED SKIN RASH: ICD-10-CM

## 2021-01-01 DIAGNOSIS — C79.9 METASTASIS FROM RECTAL CANCER (H): Primary | ICD-10-CM

## 2021-01-01 DIAGNOSIS — L27.0 DRUG RASH: ICD-10-CM

## 2021-01-01 DIAGNOSIS — L08.9 LOCAL INFECTION OF SKIN AND SUBCUTANEOUS TISSUE: ICD-10-CM

## 2021-01-01 DIAGNOSIS — L60.0 INGROWN TOENAIL OF BOTH FEET: Primary | ICD-10-CM

## 2021-01-01 DIAGNOSIS — R74.8 ELEVATED LIVER ENZYMES: ICD-10-CM

## 2021-01-01 DIAGNOSIS — K64.4 EXTERNAL HEMORRHOIDS: ICD-10-CM

## 2021-01-01 DIAGNOSIS — R63.4 WEIGHT LOSS: ICD-10-CM

## 2021-01-01 DIAGNOSIS — L03.031 PARONYCHIA OF GREAT TOE OF RIGHT FOOT: ICD-10-CM

## 2021-01-01 DIAGNOSIS — B35.3 TINEA PEDIS OF BOTH FEET: ICD-10-CM

## 2021-01-01 DIAGNOSIS — R21 RASH: ICD-10-CM

## 2021-01-01 DIAGNOSIS — R15.9 INCONTINENCE OF FECES, UNSPECIFIED FECAL INCONTINENCE TYPE: ICD-10-CM

## 2021-01-01 DIAGNOSIS — R11.0 NAUSEA: Primary | ICD-10-CM

## 2021-01-01 DIAGNOSIS — H01.00B BLEPHARITIS OF BOTH UPPER AND LOWER EYELID OF LEFT EYE, UNSPECIFIED TYPE: Primary | ICD-10-CM

## 2021-01-01 DIAGNOSIS — H10.32 ACUTE CONJUNCTIVITIS OF LEFT EYE, UNSPECIFIED ACUTE CONJUNCTIVITIS TYPE: ICD-10-CM

## 2021-01-01 DIAGNOSIS — K59.00 CONSTIPATION, UNSPECIFIED CONSTIPATION TYPE: Primary | ICD-10-CM

## 2021-01-01 DIAGNOSIS — K59.03 DRUG-INDUCED CONSTIPATION: Primary | ICD-10-CM

## 2021-01-01 DIAGNOSIS — E83.42 HYPOMAGNESEMIA: Primary | ICD-10-CM

## 2021-01-01 DIAGNOSIS — L27.0 DRUG RASH: Primary | ICD-10-CM

## 2021-01-01 DIAGNOSIS — R15.1 FECAL SMEARING: ICD-10-CM

## 2021-01-01 LAB
ALBUMIN SERPL-MCNC: 3 G/DL (ref 3.4–5)
ALBUMIN SERPL-MCNC: 3.1 G/DL (ref 3.4–5)
ALBUMIN SERPL-MCNC: 3.2 G/DL (ref 3.4–5)
ALBUMIN SERPL-MCNC: 3.3 G/DL (ref 3.4–5)
ALP SERPL-CCNC: 103 U/L (ref 40–150)
ALP SERPL-CCNC: 116 U/L (ref 40–150)
ALP SERPL-CCNC: 117 U/L (ref 40–150)
ALP SERPL-CCNC: 125 U/L (ref 40–150)
ALP SERPL-CCNC: 64 U/L (ref 40–150)
ALP SERPL-CCNC: 66 U/L (ref 40–150)
ALP SERPL-CCNC: 68 U/L (ref 40–150)
ALP SERPL-CCNC: 73 U/L (ref 40–150)
ALP SERPL-CCNC: 78 U/L (ref 40–150)
ALP SERPL-CCNC: 80 U/L (ref 40–150)
ALP SERPL-CCNC: 80 U/L (ref 40–150)
ALP SERPL-CCNC: 81 U/L (ref 40–150)
ALP SERPL-CCNC: 84 U/L (ref 40–150)
ALP SERPL-CCNC: 84 U/L (ref 40–150)
ALP SERPL-CCNC: 87 U/L (ref 40–150)
ALT SERPL W P-5'-P-CCNC: 26 U/L (ref 0–50)
ALT SERPL W P-5'-P-CCNC: 27 U/L
ALT SERPL W P-5'-P-CCNC: 27 U/L (ref 0–50)
ALT SERPL W P-5'-P-CCNC: 28 U/L (ref 0–50)
ALT SERPL W P-5'-P-CCNC: 30 U/L (ref 0–50)
ALT SERPL W P-5'-P-CCNC: 30 U/L (ref 0–50)
ALT SERPL W P-5'-P-CCNC: 31 U/L (ref 0–50)
ALT SERPL W P-5'-P-CCNC: 31 U/L (ref 0–50)
ALT SERPL W P-5'-P-CCNC: 34 U/L (ref 0–50)
ALT SERPL W P-5'-P-CCNC: 34 U/L (ref 0–50)
ALT SERPL W P-5'-P-CCNC: 36 U/L (ref 0–50)
ALT SERPL W P-5'-P-CCNC: 36 U/L (ref 0–50)
ALT SERPL W P-5'-P-CCNC: 37 U/L (ref 0–50)
ALT SERPL W P-5'-P-CCNC: 53 U/L (ref 0–50)
ALT SERPL W P-5'-P-CCNC: 56 U/L (ref 0–50)
ANION GAP SERPL CALCULATED.3IONS-SCNC: 1 MMOL/L (ref 3–14)
ANION GAP SERPL CALCULATED.3IONS-SCNC: 2 MMOL/L (ref 3–14)
ANION GAP SERPL CALCULATED.3IONS-SCNC: 2 MMOL/L (ref 3–14)
ANION GAP SERPL CALCULATED.3IONS-SCNC: 3 MMOL/L (ref 3–14)
ANION GAP SERPL CALCULATED.3IONS-SCNC: 4 MMOL/L (ref 3–14)
ANION GAP SERPL CALCULATED.3IONS-SCNC: 5 MMOL/L (ref 3–14)
ANION GAP SERPL CALCULATED.3IONS-SCNC: <1 MMOL/L (ref 3–14)
ANION GAP SERPL CALCULATED.3IONS-SCNC: <1 MMOL/L (ref 3–14)
AST SERPL W P-5'-P-CCNC: 23 U/L (ref 0–45)
AST SERPL W P-5'-P-CCNC: 24 U/L (ref 0–45)
AST SERPL W P-5'-P-CCNC: 25 U/L (ref 0–45)
AST SERPL W P-5'-P-CCNC: 26 U/L (ref 0–45)
AST SERPL W P-5'-P-CCNC: 26 U/L (ref 0–45)
AST SERPL W P-5'-P-CCNC: 27 U/L (ref 0–45)
AST SERPL W P-5'-P-CCNC: 27 U/L (ref 0–45)
AST SERPL W P-5'-P-CCNC: 28 U/L (ref 0–45)
AST SERPL W P-5'-P-CCNC: 29 U/L (ref 0–45)
AST SERPL W P-5'-P-CCNC: 31 U/L (ref 0–45)
AST SERPL W P-5'-P-CCNC: 47 U/L (ref 0–45)
AST SERPL W P-5'-P-CCNC: 48 U/L (ref 0–45)
BASOPHILS # BLD AUTO: 0 10E9/L (ref 0–0.2)
BASOPHILS # BLD AUTO: 0.1 10E3/UL (ref 0–0.2)
BASOPHILS # BLD AUTO: 0.1 10E3/UL (ref 0–0.2)
BASOPHILS # BLD AUTO: 0.1 10E9/L (ref 0–0.2)
BASOPHILS NFR BLD AUTO: 1 %
BASOPHILS NFR BLD AUTO: 1.1 %
BASOPHILS NFR BLD AUTO: 1.1 %
BASOPHILS NFR BLD AUTO: 1.2 %
BASOPHILS NFR BLD AUTO: 1.2 %
BASOPHILS NFR BLD AUTO: 1.3 %
BASOPHILS NFR BLD AUTO: 1.3 %
BASOPHILS NFR BLD AUTO: 1.4 %
BASOPHILS NFR BLD AUTO: 1.5 %
BASOPHILS NFR BLD AUTO: 2 %
BASOPHILS NFR BLD AUTO: 3 %
BILIRUB SERPL-MCNC: 0.2 MG/DL (ref 0.2–1.3)
BILIRUB SERPL-MCNC: 0.3 MG/DL (ref 0.2–1.3)
BILIRUB SERPL-MCNC: 0.4 MG/DL (ref 0.2–1.3)
BUN SERPL-MCNC: 10 MG/DL (ref 7–30)
BUN SERPL-MCNC: 10 MG/DL (ref 7–30)
BUN SERPL-MCNC: 11 MG/DL (ref 7–30)
BUN SERPL-MCNC: 12 MG/DL (ref 7–30)
BUN SERPL-MCNC: 5 MG/DL (ref 7–30)
BUN SERPL-MCNC: 5 MG/DL (ref 7–30)
BUN SERPL-MCNC: 7 MG/DL (ref 7–30)
BUN SERPL-MCNC: 8 MG/DL (ref 7–30)
BUN SERPL-MCNC: 9 MG/DL (ref 7–30)
BUN SERPL-MCNC: 9 MG/DL (ref 7–30)
CALCIUM SERPL-MCNC: 7.5 MG/DL (ref 8.5–10.1)
CALCIUM SERPL-MCNC: 8.1 MG/DL (ref 8.5–10.1)
CALCIUM SERPL-MCNC: 8.2 MG/DL (ref 8.5–10.1)
CALCIUM SERPL-MCNC: 8.3 MG/DL (ref 8.5–10.1)
CALCIUM SERPL-MCNC: 8.5 MG/DL (ref 8.5–10.1)
CALCIUM SERPL-MCNC: 8.6 MG/DL (ref 8.5–10.1)
CALCIUM SERPL-MCNC: 8.7 MG/DL (ref 8.5–10.1)
CALCIUM SERPL-MCNC: 8.7 MG/DL (ref 8.5–10.1)
CALCIUM SERPL-MCNC: 8.8 MG/DL (ref 8.5–10.1)
CALCIUM SERPL-MCNC: 9.3 MG/DL (ref 8.5–10.1)
CALCIUM SERPL-MCNC: 9.6 MG/DL (ref 8.5–10.1)
CEA SERPL-MCNC: 10 UG/L (ref 0–2.5)
CEA SERPL-MCNC: 11 UG/L (ref 0–2.5)
CEA SERPL-MCNC: 11.4 UG/L (ref 0–2.5)
CEA SERPL-MCNC: 6.4 UG/L (ref 0–2.5)
CEA SERPL-MCNC: 6.4 UG/L (ref 0–2.5)
CEA SERPL-MCNC: 6.8 UG/L (ref 0–2.5)
CEA SERPL-MCNC: 6.9 UG/L (ref 0–2.5)
CEA SERPL-MCNC: 7.2 UG/L (ref 0–2.5)
CEA SERPL-MCNC: 7.9 UG/L (ref 0–2.5)
CEA SERPL-MCNC: 7.9 UG/L (ref 0–2.5)
CEA SERPL-MCNC: 8.7 UG/L (ref 0–2.5)
CEA SERPL-MCNC: 8.8 UG/L (ref 0–2.5)
CEA SERPL-MCNC: 9.6 UG/L (ref 0–2.5)
CHLORIDE BLD-SCNC: 107 MMOL/L (ref 94–109)
CHLORIDE BLD-SCNC: 110 MMOL/L
CHLORIDE SERPL-SCNC: 108 MMOL/L (ref 94–109)
CHLORIDE SERPL-SCNC: 109 MMOL/L (ref 94–109)
CHLORIDE SERPL-SCNC: 109 MMOL/L (ref 94–109)
CHLORIDE SERPL-SCNC: 110 MMOL/L (ref 94–109)
CHLORIDE SERPL-SCNC: 111 MMOL/L (ref 94–109)
CHLORIDE SERPL-SCNC: 112 MMOL/L (ref 94–109)
CHLORIDE SERPL-SCNC: 112 MMOL/L (ref 94–109)
CHLORIDE SERPL-SCNC: 114 MMOL/L (ref 94–109)
CO2 SERPL-SCNC: 26 MMOL/L (ref 20–32)
CO2 SERPL-SCNC: 26 MMOL/L (ref 20–32)
CO2 SERPL-SCNC: 27 MMOL/L (ref 20–32)
CO2 SERPL-SCNC: 29 MMOL/L (ref 20–32)
CO2 SERPL-SCNC: 30 MMOL/L (ref 20–32)
CO2 SERPL-SCNC: 31 MMOL/L (ref 20–32)
CO2 SERPL-SCNC: 31 MMOL/L (ref 20–32)
CO2 SERPL-SCNC: 32 MMOL/L (ref 20–32)
CO2 SERPL-SCNC: 34 MMOL/L (ref 20–32)
CO2 SERPL-SCNC: 34 MMOL/L (ref 20–32)
CREAT SERPL-MCNC: 0.48 MG/DL (ref 0.52–1.04)
CREAT SERPL-MCNC: 0.51 MG/DL (ref 0.52–1.04)
CREAT SERPL-MCNC: 0.53 MG/DL (ref 0.52–1.04)
CREAT SERPL-MCNC: 0.56 MG/DL
CREAT SERPL-MCNC: 0.56 MG/DL (ref 0.52–1.04)
CREAT SERPL-MCNC: 0.59 MG/DL (ref 0.52–1.04)
CREAT SERPL-MCNC: 0.61 MG/DL (ref 0.52–1.04)
CREAT SERPL-MCNC: 0.61 MG/DL (ref 0.52–1.04)
CREAT SERPL-MCNC: 0.62 MG/DL (ref 0.52–1.04)
CREAT SERPL-MCNC: 0.63 MG/DL (ref 0.52–1.04)
CREAT SERPL-MCNC: 0.63 MG/DL (ref 0.52–1.04)
CREAT SERPL-MCNC: 0.64 MG/DL (ref 0.52–1.04)
CREAT SERPL-MCNC: 0.65 MG/DL (ref 0.52–1.04)
CREAT SERPL-MCNC: 0.66 MG/DL (ref 0.52–1.04)
CREAT SERPL-MCNC: 0.66 MG/DL (ref 0.52–1.04)
DIFFERENTIAL METHOD BLD: ABNORMAL
EOSINOPHIL # BLD AUTO: 0.1 10E9/L (ref 0–0.7)
EOSINOPHIL # BLD AUTO: 0.2 10E3/UL (ref 0–0.7)
EOSINOPHIL # BLD AUTO: 0.2 10E3/UL (ref 0–0.7)
EOSINOPHIL # BLD AUTO: 0.2 10E9/L (ref 0–0.7)
EOSINOPHIL # BLD AUTO: 0.3 10E9/L (ref 0–0.7)
EOSINOPHIL # BLD AUTO: 0.4 10E9/L (ref 0–0.7)
EOSINOPHIL # BLD AUTO: 0.4 10E9/L (ref 0–0.7)
EOSINOPHIL NFR BLD AUTO: 2.8 %
EOSINOPHIL NFR BLD AUTO: 3 %
EOSINOPHIL NFR BLD AUTO: 3.9 %
EOSINOPHIL NFR BLD AUTO: 4 %
EOSINOPHIL NFR BLD AUTO: 4 %
EOSINOPHIL NFR BLD AUTO: 4.8 %
EOSINOPHIL NFR BLD AUTO: 5.2 %
EOSINOPHIL NFR BLD AUTO: 5.4 %
EOSINOPHIL NFR BLD AUTO: 5.5 %
EOSINOPHIL NFR BLD AUTO: 5.5 %
EOSINOPHIL NFR BLD AUTO: 6 %
EOSINOPHIL NFR BLD AUTO: 6.5 %
EOSINOPHIL NFR BLD AUTO: 6.7 %
EOSINOPHIL NFR BLD AUTO: 8.4 %
EOSINOPHIL NFR BLD AUTO: 9 %
ERYTHROCYTE [DISTWIDTH] IN BLOOD BY AUTOMATED COUNT: 15 % (ref 10–15)
ERYTHROCYTE [DISTWIDTH] IN BLOOD BY AUTOMATED COUNT: 15.1 % (ref 10–15)
ERYTHROCYTE [DISTWIDTH] IN BLOOD BY AUTOMATED COUNT: 15.3 % (ref 10–15)
ERYTHROCYTE [DISTWIDTH] IN BLOOD BY AUTOMATED COUNT: 15.4 % (ref 10–15)
ERYTHROCYTE [DISTWIDTH] IN BLOOD BY AUTOMATED COUNT: 15.4 % (ref 10–15)
ERYTHROCYTE [DISTWIDTH] IN BLOOD BY AUTOMATED COUNT: 15.5 % (ref 10–15)
ERYTHROCYTE [DISTWIDTH] IN BLOOD BY AUTOMATED COUNT: 15.5 % (ref 10–15)
ERYTHROCYTE [DISTWIDTH] IN BLOOD BY AUTOMATED COUNT: 15.6 % (ref 10–15)
ERYTHROCYTE [DISTWIDTH] IN BLOOD BY AUTOMATED COUNT: 15.7 % (ref 10–15)
ERYTHROCYTE [DISTWIDTH] IN BLOOD BY AUTOMATED COUNT: 15.7 % (ref 10–15)
ERYTHROCYTE [DISTWIDTH] IN BLOOD BY AUTOMATED COUNT: 15.8 % (ref 10–15)
ERYTHROCYTE [DISTWIDTH] IN BLOOD BY AUTOMATED COUNT: 15.9 % (ref 10–15)
ERYTHROCYTE [DISTWIDTH] IN BLOOD BY AUTOMATED COUNT: 16 % (ref 10–15)
ERYTHROCYTE [DISTWIDTH] IN BLOOD BY AUTOMATED COUNT: 16.1 % (ref 10–15)
ERYTHROCYTE [DISTWIDTH] IN BLOOD BY AUTOMATED COUNT: 17.9 % (ref 10–15)
GFR SERPL CREATININE-BSD FRML MDRD: 89 ML/MIN/{1.73_M2}
GFR SERPL CREATININE-BSD FRML MDRD: 90 ML/MIN/1.73M2
GFR SERPL CREATININE-BSD FRML MDRD: >90 ML/MIN/1.73M2
GFR SERPL CREATININE-BSD FRML MDRD: >90 ML/MIN/{1.73_M2}
GLUCOSE BLD-MCNC: 100 MG/DL (ref 70–99)
GLUCOSE BLD-MCNC: 89 MG/DL (ref 70–99)
GLUCOSE SERPL-MCNC: 102 MG/DL (ref 70–99)
GLUCOSE SERPL-MCNC: 106 MG/DL (ref 70–99)
GLUCOSE SERPL-MCNC: 113 MG/DL (ref 70–99)
GLUCOSE SERPL-MCNC: 127 MG/DL (ref 70–99)
GLUCOSE SERPL-MCNC: 128 MG/DL (ref 70–99)
GLUCOSE SERPL-MCNC: 74 MG/DL (ref 70–99)
GLUCOSE SERPL-MCNC: 74 MG/DL (ref 70–99)
GLUCOSE SERPL-MCNC: 81 MG/DL (ref 70–99)
GLUCOSE SERPL-MCNC: 93 MG/DL (ref 70–99)
GLUCOSE SERPL-MCNC: 94 MG/DL (ref 70–99)
GLUCOSE SERPL-MCNC: 96 MG/DL (ref 70–99)
GLUCOSE SERPL-MCNC: 97 MG/DL (ref 70–99)
GLUCOSE SERPL-MCNC: 98 MG/DL (ref 70–99)
HCT VFR BLD AUTO: 35.9 % (ref 35–47)
HCT VFR BLD AUTO: 36.7 % (ref 35–47)
HCT VFR BLD AUTO: 36.8 % (ref 35–47)
HCT VFR BLD AUTO: 36.9 % (ref 35–47)
HCT VFR BLD AUTO: 37.1 % (ref 35–47)
HCT VFR BLD AUTO: 37.7 % (ref 35–47)
HCT VFR BLD AUTO: 38 % (ref 35–47)
HCT VFR BLD AUTO: 38.1 % (ref 35–47)
HCT VFR BLD AUTO: 38.2 % (ref 35–47)
HCT VFR BLD AUTO: 38.3 % (ref 35–47)
HCT VFR BLD AUTO: 38.5 % (ref 35–47)
HCT VFR BLD AUTO: 39.1 % (ref 35–47)
HCT VFR BLD AUTO: 39.4 % (ref 35–47)
HGB BLD-MCNC: 11.4 G/DL (ref 11.7–15.7)
HGB BLD-MCNC: 11.5 G/DL (ref 11.7–15.7)
HGB BLD-MCNC: 11.5 G/DL (ref 11.7–15.7)
HGB BLD-MCNC: 11.7 G/DL (ref 11.7–15.7)
HGB BLD-MCNC: 11.8 G/DL (ref 11.7–15.7)
HGB BLD-MCNC: 11.8 G/DL (ref 11.7–15.7)
HGB BLD-MCNC: 11.9 G/DL (ref 11.7–15.7)
HGB BLD-MCNC: 12 G/DL (ref 11.7–15.7)
HGB BLD-MCNC: 12 G/DL (ref 11.7–15.7)
HGB BLD-MCNC: 12.2 G/DL (ref 11.7–15.7)
HGB BLD-MCNC: 12.2 G/DL (ref 11.7–15.7)
HGB BLD-MCNC: 12.3 G/DL (ref 11.7–15.7)
HGB BLD-MCNC: 12.4 G/DL (ref 11.7–15.7)
HGB BLD-MCNC: 12.6 G/DL (ref 11.7–15.7)
HGB BLD-MCNC: 12.7 G/DL (ref 11.7–15.7)
IMM GRANULOCYTES # BLD: 0 10E3/UL
IMM GRANULOCYTES # BLD: 0 10E3/UL
IMM GRANULOCYTES # BLD: 0 10E9/L (ref 0–0.4)
IMM GRANULOCYTES NFR BLD: 0 %
IMM GRANULOCYTES NFR BLD: 0.2 %
IMM GRANULOCYTES NFR BLD: 0.4 %
LYMPHOCYTES # BLD AUTO: 1.8 10E9/L (ref 0.8–5.3)
LYMPHOCYTES # BLD AUTO: 1.8 10E9/L (ref 0.8–5.3)
LYMPHOCYTES # BLD AUTO: 1.9 10E9/L (ref 0.8–5.3)
LYMPHOCYTES # BLD AUTO: 2 10E9/L (ref 0.8–5.3)
LYMPHOCYTES # BLD AUTO: 2.1 10E3/UL (ref 0.8–5.3)
LYMPHOCYTES # BLD AUTO: 2.1 10E3/UL (ref 0.8–5.3)
LYMPHOCYTES # BLD AUTO: 2.1 10E9/L (ref 0.8–5.3)
LYMPHOCYTES # BLD AUTO: 2.3 10E9/L (ref 0.8–5.3)
LYMPHOCYTES # BLD AUTO: 2.3 10E9/L (ref 0.8–5.3)
LYMPHOCYTES # BLD AUTO: 2.4 10E9/L (ref 0.8–5.3)
LYMPHOCYTES # BLD AUTO: 2.5 10E9/L (ref 0.8–5.3)
LYMPHOCYTES NFR BLD AUTO: 37 %
LYMPHOCYTES NFR BLD AUTO: 38.1 %
LYMPHOCYTES NFR BLD AUTO: 38.9 %
LYMPHOCYTES NFR BLD AUTO: 39 %
LYMPHOCYTES NFR BLD AUTO: 43.4 %
LYMPHOCYTES NFR BLD AUTO: 44.1 %
LYMPHOCYTES NFR BLD AUTO: 44.1 %
LYMPHOCYTES NFR BLD AUTO: 45.2 %
LYMPHOCYTES NFR BLD AUTO: 46.3 %
LYMPHOCYTES NFR BLD AUTO: 46.4 %
LYMPHOCYTES NFR BLD AUTO: 48.3 %
LYMPHOCYTES NFR BLD AUTO: 49 %
LYMPHOCYTES NFR BLD AUTO: 51 %
LYMPHOCYTES NFR BLD AUTO: 55 %
LYMPHOCYTES NFR BLD AUTO: 56 %
MAGNESIUM SERPL-MCNC: 1.1 MG/DL (ref 1.6–2.3)
MAGNESIUM SERPL-MCNC: 1.3 MG/DL (ref 1.6–2.3)
MAGNESIUM SERPL-MCNC: 1.4 MG/DL (ref 1.6–2.3)
MAGNESIUM SERPL-MCNC: 1.5 MG/DL (ref 1.6–2.3)
MAGNESIUM SERPL-MCNC: 1.5 MG/DL (ref 1.6–2.3)
MAGNESIUM SERPL-MCNC: 1.6 MG/DL (ref 1.6–2.3)
MAGNESIUM SERPL-MCNC: 1.7 MG/DL (ref 1.6–2.3)
MAGNESIUM SERPL-MCNC: 2.1 MG/DL (ref 1.6–2.3)
MAGNESIUM SERPL-MCNC: 2.3 MG/DL (ref 1.6–2.3)
MAGNESIUM SERPL-MCNC: 2.3 MG/DL (ref 1.6–2.3)
MCH RBC QN AUTO: 28.5 PG (ref 26.5–33)
MCH RBC QN AUTO: 28.9 PG (ref 26.5–33)
MCH RBC QN AUTO: 29.4 PG (ref 26.5–33)
MCH RBC QN AUTO: 29.8 PG (ref 26.5–33)
MCH RBC QN AUTO: 29.8 PG (ref 26.5–33)
MCH RBC QN AUTO: 29.9 PG (ref 26.5–33)
MCH RBC QN AUTO: 30.5 PG (ref 26.5–33)
MCH RBC QN AUTO: 30.6 PG (ref 26.5–33)
MCH RBC QN AUTO: 31.1 PG (ref 26.5–33)
MCH RBC QN AUTO: 31.8 PG (ref 26.5–33)
MCH RBC QN AUTO: 32.8 PG (ref 26.5–33)
MCH RBC QN AUTO: 33.6 PG (ref 26.5–33)
MCH RBC QN AUTO: 34 PG (ref 26.5–33)
MCH RBC QN AUTO: 35.1 PG (ref 26.5–33)
MCH RBC QN AUTO: 35.3 PG (ref 26.5–33)
MCHC RBC AUTO-ENTMCNC: 30.8 G/DL (ref 31.5–36.5)
MCHC RBC AUTO-ENTMCNC: 30.9 G/DL (ref 31.5–36.5)
MCHC RBC AUTO-ENTMCNC: 31.2 G/DL (ref 31.5–36.5)
MCHC RBC AUTO-ENTMCNC: 31.2 G/DL (ref 31.5–36.5)
MCHC RBC AUTO-ENTMCNC: 31.3 G/DL (ref 31.5–36.5)
MCHC RBC AUTO-ENTMCNC: 31.3 G/DL (ref 31.5–36.5)
MCHC RBC AUTO-ENTMCNC: 31.5 G/DL (ref 31.5–36.5)
MCHC RBC AUTO-ENTMCNC: 31.8 G/DL (ref 31.5–36.5)
MCHC RBC AUTO-ENTMCNC: 31.8 G/DL (ref 31.5–36.5)
MCHC RBC AUTO-ENTMCNC: 31.9 G/DL (ref 31.5–36.5)
MCHC RBC AUTO-ENTMCNC: 31.9 G/DL (ref 31.5–36.5)
MCHC RBC AUTO-ENTMCNC: 32.3 G/DL (ref 31.5–36.5)
MCHC RBC AUTO-ENTMCNC: 32.5 G/DL (ref 31.5–36.5)
MCHC RBC AUTO-ENTMCNC: 32.9 G/DL (ref 31.5–36.5)
MCHC RBC AUTO-ENTMCNC: 32.9 G/DL (ref 31.5–36.5)
MCV RBC AUTO: 101 FL (ref 78–100)
MCV RBC AUTO: 103 FL (ref 78–100)
MCV RBC AUTO: 103 FL (ref 78–100)
MCV RBC AUTO: 105 FL (ref 78–100)
MCV RBC AUTO: 107 FL (ref 78–100)
MCV RBC AUTO: 108 FL (ref 78–100)
MCV RBC AUTO: 91 FL (ref 78–100)
MCV RBC AUTO: 93 FL (ref 78–100)
MCV RBC AUTO: 94 FL (ref 78–100)
MCV RBC AUTO: 95 FL (ref 78–100)
MCV RBC AUTO: 96 FL (ref 78–100)
MCV RBC AUTO: 98 FL (ref 78–100)
MCV RBC AUTO: 98 FL (ref 78–100)
MONOCYTES # BLD AUTO: 0.1 10E9/L (ref 0–1.3)
MONOCYTES # BLD AUTO: 0.2 10E9/L (ref 0–1.3)
MONOCYTES # BLD AUTO: 0.3 10E9/L (ref 0–1.3)
MONOCYTES # BLD AUTO: 0.3 10E9/L (ref 0–1.3)
MONOCYTES # BLD AUTO: 0.4 10E3/UL (ref 0–1.3)
MONOCYTES # BLD AUTO: 0.4 10E9/L (ref 0–1.3)
MONOCYTES # BLD AUTO: 0.5 10E3/UL (ref 0–1.3)
MONOCYTES # BLD AUTO: 0.5 10E9/L (ref 0–1.3)
MONOCYTES NFR BLD AUTO: 11.5 %
MONOCYTES NFR BLD AUTO: 13.6 %
MONOCYTES NFR BLD AUTO: 2 %
MONOCYTES NFR BLD AUTO: 4 %
MONOCYTES NFR BLD AUTO: 6.9 %
MONOCYTES NFR BLD AUTO: 7.7 %
MONOCYTES NFR BLD AUTO: 7.8 %
MONOCYTES NFR BLD AUTO: 8 %
MONOCYTES NFR BLD AUTO: 8.6 %
MONOCYTES NFR BLD AUTO: 8.8 %
MONOCYTES NFR BLD AUTO: 8.9 %
MONOCYTES NFR BLD AUTO: 9 %
MONOCYTES NFR BLD AUTO: 9 %
NEUTROPHILS # BLD AUTO: 1.3 10E9/L (ref 1.6–8.3)
NEUTROPHILS # BLD AUTO: 1.3 10E9/L (ref 1.6–8.3)
NEUTROPHILS # BLD AUTO: 1.4 10E9/L (ref 1.6–8.3)
NEUTROPHILS # BLD AUTO: 1.5 10E9/L (ref 1.6–8.3)
NEUTROPHILS # BLD AUTO: 1.5 10E9/L (ref 1.6–8.3)
NEUTROPHILS # BLD AUTO: 1.6 10E9/L (ref 1.6–8.3)
NEUTROPHILS # BLD AUTO: 1.7 10E9/L (ref 1.6–8.3)
NEUTROPHILS # BLD AUTO: 1.8 10E9/L (ref 1.6–8.3)
NEUTROPHILS # BLD AUTO: 1.9 10E9/L (ref 1.6–8.3)
NEUTROPHILS # BLD AUTO: 2 10E9/L (ref 1.6–8.3)
NEUTROPHILS # BLD AUTO: 2.2 10E9/L (ref 1.6–8.3)
NEUTROPHILS # BLD AUTO: 2.4 10E9/L (ref 1.6–8.3)
NEUTROPHILS # BLD AUTO: 2.6 10E3/UL (ref 1.6–8.3)
NEUTROPHILS # BLD AUTO: 2.9 10E3/UL (ref 1.6–8.3)
NEUTROPHILS # BLD AUTO: 2.9 10E9/L (ref 1.6–8.3)
NEUTROPHILS NFR BLD AUTO: 31 %
NEUTROPHILS NFR BLD AUTO: 32 %
NEUTROPHILS NFR BLD AUTO: 33.1 %
NEUTROPHILS NFR BLD AUTO: 34.9 %
NEUTROPHILS NFR BLD AUTO: 35 %
NEUTROPHILS NFR BLD AUTO: 35.8 %
NEUTROPHILS NFR BLD AUTO: 36.4 %
NEUTROPHILS NFR BLD AUTO: 38.2 %
NEUTROPHILS NFR BLD AUTO: 39.7 %
NEUTROPHILS NFR BLD AUTO: 41.9 %
NEUTROPHILS NFR BLD AUTO: 42.8 %
NEUTROPHILS NFR BLD AUTO: 47 %
NEUTROPHILS NFR BLD AUTO: 47.4 %
NEUTROPHILS NFR BLD AUTO: 49 %
NEUTROPHILS NFR BLD AUTO: 50 %
NRBC # BLD AUTO: 0 10E3/UL
NRBC # BLD AUTO: 0 10E3/UL
NRBC BLD AUTO-RTO: 0 /100
NRBC BLD AUTO-RTO: 0 /100
PLATELET # BLD AUTO: 124 10E9/L (ref 150–450)
PLATELET # BLD AUTO: 186 10E9/L (ref 150–450)
PLATELET # BLD AUTO: 204 10E9/L (ref 150–450)
PLATELET # BLD AUTO: 223 10E9/L (ref 150–450)
PLATELET # BLD AUTO: 233 10E9/L (ref 150–450)
PLATELET # BLD AUTO: 253 10E9/L (ref 150–450)
PLATELET # BLD AUTO: 254 10E9/L (ref 150–450)
PLATELET # BLD AUTO: 273 10E9/L (ref 150–450)
PLATELET # BLD AUTO: 279 10E9/L (ref 150–450)
PLATELET # BLD AUTO: 288 10E9/L (ref 150–450)
PLATELET # BLD AUTO: 322 10E3/UL (ref 150–450)
PLATELET # BLD AUTO: 322 10E9/L (ref 150–450)
PLATELET # BLD AUTO: 327 10E9/L (ref 150–450)
PLATELET # BLD AUTO: 331 10E9/L (ref 150–450)
PLATELET # BLD AUTO: 413 10E3/UL (ref 150–450)
PLATELET # BLD EST: ABNORMAL 10*3/UL
PLATELET # BLD EST: ABNORMAL 10*3/UL
POTASSIUM BLD-SCNC: 3.9 MMOL/L (ref 3.4–5.3)
POTASSIUM BLD-SCNC: 4.1 MMOL/L (ref 3.4–5.3)
POTASSIUM SERPL-SCNC: 3.6 MMOL/L (ref 3.4–5.3)
POTASSIUM SERPL-SCNC: 3.8 MMOL/L (ref 3.4–5.3)
POTASSIUM SERPL-SCNC: 3.8 MMOL/L (ref 3.4–5.3)
POTASSIUM SERPL-SCNC: 3.9 MMOL/L (ref 3.4–5.3)
POTASSIUM SERPL-SCNC: 4 MMOL/L (ref 3.4–5.3)
POTASSIUM SERPL-SCNC: 4.3 MMOL/L (ref 3.4–5.3)
POTASSIUM SERPL-SCNC: 4.3 MMOL/L (ref 3.4–5.3)
POTASSIUM SERPL-SCNC: 4.5 MMOL/L (ref 3.4–5.3)
PROT SERPL-MCNC: 6.5 G/DL (ref 6.8–8.8)
PROT SERPL-MCNC: 6.6 G/DL (ref 6.8–8.8)
PROT SERPL-MCNC: 6.7 G/DL (ref 6.8–8.8)
PROT SERPL-MCNC: 6.8 G/DL (ref 6.8–8.8)
PROT SERPL-MCNC: 6.9 G/DL (ref 6.8–8.8)
PROT SERPL-MCNC: 7.1 G/DL (ref 6.8–8.8)
PROT SERPL-MCNC: 7.2 G/DL (ref 6.8–8.8)
PROT SERPL-MCNC: 7.3 G/DL (ref 6.8–8.8)
RBC # BLD AUTO: 3.34 10E12/L (ref 3.8–5.2)
RBC # BLD AUTO: 3.59 10E12/L (ref 3.8–5.2)
RBC # BLD AUTO: 3.62 10E12/L (ref 3.8–5.2)
RBC # BLD AUTO: 3.66 10E12/L (ref 3.8–5.2)
RBC # BLD AUTO: 3.77 10E12/L (ref 3.8–5.2)
RBC # BLD AUTO: 3.78 10E12/L (ref 3.8–5.2)
RBC # BLD AUTO: 3.8 10E12/L (ref 3.8–5.2)
RBC # BLD AUTO: 3.85 10E12/L (ref 3.8–5.2)
RBC # BLD AUTO: 3.9 10E12/L (ref 3.8–5.2)
RBC # BLD AUTO: 3.94 10E6/UL (ref 3.8–5.2)
RBC # BLD AUTO: 3.98 10E12/L (ref 3.8–5.2)
RBC # BLD AUTO: 3.99 10E12/L (ref 3.8–5.2)
RBC # BLD AUTO: 4 10E12/L (ref 3.8–5.2)
RBC # BLD AUTO: 4.1 10E12/L (ref 3.8–5.2)
RBC # BLD AUTO: 4.21 10E6/UL (ref 3.8–5.2)
RBC MORPH BLD: NORMAL
RBC MORPH BLD: NORMAL
SODIUM SERPL-SCNC: 138 MMOL/L (ref 133–144)
SODIUM SERPL-SCNC: 140 MMOL/L (ref 133–144)
SODIUM SERPL-SCNC: 141 MMOL/L (ref 133–144)
SODIUM SERPL-SCNC: 142 MMOL/L (ref 133–144)
SODIUM SERPL-SCNC: 143 MMOL/L (ref 133–144)
SODIUM SERPL-SCNC: 143 MMOL/L (ref 133–144)
SODIUM SERPL-SCNC: 144 MMOL/L (ref 133–144)
SODIUM SERPL-SCNC: 145 MMOL/L (ref 133–144)
WBC # BLD AUTO: 3.9 10E9/L (ref 4–11)
WBC # BLD AUTO: 4 10E9/L (ref 4–11)
WBC # BLD AUTO: 4.1 10E9/L (ref 4–11)
WBC # BLD AUTO: 4.2 10E9/L (ref 4–11)
WBC # BLD AUTO: 4.3 10E9/L (ref 4–11)
WBC # BLD AUTO: 4.6 10E9/L (ref 4–11)
WBC # BLD AUTO: 4.9 10E9/L (ref 4–11)
WBC # BLD AUTO: 5.1 10E9/L (ref 4–11)
WBC # BLD AUTO: 5.4 10E3/UL (ref 4–11)
WBC # BLD AUTO: 5.6 10E9/L (ref 4–11)
WBC # BLD AUTO: 5.8 10E3/UL (ref 4–11)
WBC # BLD AUTO: 6.1 10E9/L (ref 4–11)

## 2021-01-01 PROCEDURE — 99207 PR NO CHARGE LOS: CPT

## 2021-01-01 PROCEDURE — A9503 TC99M MEDRONATE: HCPCS

## 2021-01-01 PROCEDURE — 74177 CT ABD & PELVIS W/CONTRAST: CPT | Performed by: RADIOLOGY

## 2021-01-01 PROCEDURE — 80053 COMPREHEN METABOLIC PANEL: CPT | Performed by: INTERNAL MEDICINE

## 2021-01-01 PROCEDURE — 96417 CHEMO IV INFUS EACH ADDL SEQ: CPT | Performed by: NURSE PRACTITIONER

## 2021-01-01 PROCEDURE — 85025 COMPLETE CBC W/AUTO DIFF WBC: CPT | Performed by: INTERNAL MEDICINE

## 2021-01-01 PROCEDURE — 96375 TX/PRO/DX INJ NEW DRUG ADDON: CPT | Performed by: NURSE PRACTITIONER

## 2021-01-01 PROCEDURE — 96415 CHEMO IV INFUSION ADDL HR: CPT | Performed by: NURSE PRACTITIONER

## 2021-01-01 PROCEDURE — 96413 CHEMO IV INFUSION 1 HR: CPT | Performed by: NURSE PRACTITIONER

## 2021-01-01 PROCEDURE — 80053 COMPREHEN METABOLIC PANEL: CPT | Performed by: NURSE PRACTITIONER

## 2021-01-01 PROCEDURE — 82378 CARCINOEMBRYONIC ANTIGEN: CPT | Performed by: NURSE PRACTITIONER

## 2021-01-01 PROCEDURE — 99214 OFFICE O/P EST MOD 30 MIN: CPT | Mod: 95 | Performed by: NURSE PRACTITIONER

## 2021-01-01 PROCEDURE — 96367 TX/PROPH/DG ADDL SEQ IV INF: CPT | Performed by: NURSE PRACTITIONER

## 2021-01-01 PROCEDURE — 96368 THER/DIAG CONCURRENT INF: CPT | Performed by: NURSE PRACTITIONER

## 2021-01-01 PROCEDURE — 99214 OFFICE O/P EST MOD 30 MIN: CPT | Mod: 25 | Performed by: NURSE PRACTITIONER

## 2021-01-01 PROCEDURE — 99207 PR NO CHARGE NURSE ONLY: CPT

## 2021-01-01 PROCEDURE — 83735 ASSAY OF MAGNESIUM: CPT | Performed by: INTERNAL MEDICINE

## 2021-01-01 PROCEDURE — 99213 OFFICE O/P EST LOW 20 MIN: CPT | Performed by: PODIATRIST

## 2021-01-01 PROCEDURE — 36415 COLL VENOUS BLD VENIPUNCTURE: CPT | Performed by: INTERNAL MEDICINE

## 2021-01-01 PROCEDURE — 99214 OFFICE O/P EST MOD 30 MIN: CPT | Mod: 95 | Performed by: INTERNAL MEDICINE

## 2021-01-01 PROCEDURE — 78306 BONE IMAGING WHOLE BODY: CPT | Performed by: RADIOLOGY

## 2021-01-01 PROCEDURE — 0001A PR COVID VAC PFIZER DIL RECON 30 MCG/0.3 ML IM: CPT

## 2021-01-01 PROCEDURE — 96417 CHEMO IV INFUS EACH ADDL SEQ: CPT | Performed by: INTERNAL MEDICINE

## 2021-01-01 PROCEDURE — 71260 CT THORAX DX C+: CPT | Mod: GC | Performed by: RADIOLOGY

## 2021-01-01 PROCEDURE — 74177 CT ABD & PELVIS W/CONTRAST: CPT | Mod: GC | Performed by: RADIOLOGY

## 2021-01-01 PROCEDURE — 82378 CARCINOEMBRYONIC ANTIGEN: CPT | Performed by: INTERNAL MEDICINE

## 2021-01-01 PROCEDURE — 83735 ASSAY OF MAGNESIUM: CPT | Performed by: NURSE PRACTITIONER

## 2021-01-01 PROCEDURE — 74177 CT ABD & PELVIS W/CONTRAST: CPT | Mod: TC | Performed by: RADIOLOGY

## 2021-01-01 PROCEDURE — 96413 CHEMO IV INFUSION 1 HR: CPT | Performed by: INTERNAL MEDICINE

## 2021-01-01 PROCEDURE — 91300 PR COVID VAC PFIZER DIL RECON 30 MCG/0.3 ML IM: CPT

## 2021-01-01 PROCEDURE — 96372 THER/PROPH/DIAG INJ SC/IM: CPT | Mod: 59 | Performed by: NURSE PRACTITIONER

## 2021-01-01 PROCEDURE — 99214 OFFICE O/P EST MOD 30 MIN: CPT | Mod: 25 | Performed by: INTERNAL MEDICINE

## 2021-01-01 PROCEDURE — 85025 COMPLETE CBC W/AUTO DIFF WBC: CPT | Performed by: NURSE PRACTITIONER

## 2021-01-01 PROCEDURE — 96411 CHEMO IV PUSH ADDL DRUG: CPT | Performed by: NURSE PRACTITIONER

## 2021-01-01 PROCEDURE — 71260 CT THORAX DX C+: CPT | Mod: TC | Performed by: RADIOLOGY

## 2021-01-01 PROCEDURE — 96415 CHEMO IV INFUSION ADDL HR: CPT | Performed by: INTERNAL MEDICINE

## 2021-01-01 PROCEDURE — 96375 TX/PRO/DX INJ NEW DRUG ADDON: CPT | Performed by: INTERNAL MEDICINE

## 2021-01-01 PROCEDURE — 71260 CT THORAX DX C+: CPT | Performed by: RADIOLOGY

## 2021-01-01 PROCEDURE — 99213 OFFICE O/P EST LOW 20 MIN: CPT | Performed by: PHYSICIAN ASSISTANT

## 2021-01-01 PROCEDURE — 78306 BONE IMAGING WHOLE BODY: CPT | Mod: GC

## 2021-01-01 PROCEDURE — 96367 TX/PROPH/DG ADDL SEQ IV INF: CPT | Performed by: INTERNAL MEDICINE

## 2021-01-01 PROCEDURE — A9503 TC99M MEDRONATE: HCPCS | Performed by: RADIOLOGY

## 2021-01-01 PROCEDURE — 36415 COLL VENOUS BLD VENIPUNCTURE: CPT | Performed by: NURSE PRACTITIONER

## 2021-01-01 PROCEDURE — 0002A PR COVID VAC PFIZER DIL RECON 30 MCG/0.3 ML IM: CPT

## 2021-01-01 RX ORDER — DOXYCYCLINE HYCLATE 100 MG
100 TABLET ORAL 2 TIMES DAILY
Qty: 14 TABLET | Refills: 0 | Status: SHIPPED | OUTPATIENT
Start: 2021-01-01 | End: 2021-01-01

## 2021-01-01 RX ORDER — ALBUTEROL SULFATE 90 UG/1
1-2 AEROSOL, METERED RESPIRATORY (INHALATION)
Status: CANCELLED
Start: 2021-01-01

## 2021-01-01 RX ORDER — DOXYCYCLINE HYCLATE 100 MG
100 TABLET ORAL 2 TIMES DAILY
Qty: 14 TABLET | Refills: 1 | Status: SHIPPED | OUTPATIENT
Start: 2021-01-01

## 2021-01-01 RX ORDER — DIPHENHYDRAMINE HYDROCHLORIDE 50 MG/ML
50 INJECTION INTRAMUSCULAR; INTRAVENOUS
Status: CANCELLED
Start: 2021-01-01

## 2021-01-01 RX ORDER — AMOXICILLIN 250 MG
1-2 CAPSULE ORAL 2 TIMES DAILY
Qty: 120 TABLET | Refills: 1 | Status: SHIPPED | OUTPATIENT
Start: 2021-01-01

## 2021-01-01 RX ORDER — ATROPINE SULFATE 0.4 MG/ML
0.4 AMPUL (ML) INJECTION
Status: CANCELLED | OUTPATIENT
Start: 2021-01-01

## 2021-01-01 RX ORDER — EPINEPHRINE 1 MG/ML
0.3 INJECTION, SOLUTION INTRAMUSCULAR; SUBCUTANEOUS EVERY 5 MIN PRN
Status: CANCELLED | OUTPATIENT
Start: 2021-01-01

## 2021-01-01 RX ORDER — NALOXONE HYDROCHLORIDE 0.4 MG/ML
.1-.4 INJECTION, SOLUTION INTRAMUSCULAR; INTRAVENOUS; SUBCUTANEOUS
Status: CANCELLED | OUTPATIENT
Start: 2021-01-01

## 2021-01-01 RX ORDER — HEPARIN SODIUM,PORCINE 10 UNIT/ML
5 VIAL (ML) INTRAVENOUS
Status: CANCELLED | OUTPATIENT
Start: 2021-01-01

## 2021-01-01 RX ORDER — MEPERIDINE HYDROCHLORIDE 25 MG/ML
25 INJECTION INTRAMUSCULAR; INTRAVENOUS; SUBCUTANEOUS EVERY 30 MIN PRN
Status: CANCELLED | OUTPATIENT
Start: 2021-01-01

## 2021-01-01 RX ORDER — TC 99M MEDRONATE 20 MG/10ML
20-30 INJECTION, POWDER, LYOPHILIZED, FOR SOLUTION INTRAVENOUS ONCE
Status: COMPLETED | OUTPATIENT
Start: 2021-01-01 | End: 2021-01-01

## 2021-01-01 RX ORDER — ALBUTEROL SULFATE 0.83 MG/ML
2.5 SOLUTION RESPIRATORY (INHALATION)
Status: CANCELLED | OUTPATIENT
Start: 2021-01-01

## 2021-01-01 RX ORDER — IOPAMIDOL 755 MG/ML
80 INJECTION, SOLUTION INTRAVASCULAR ONCE
Status: COMPLETED | OUTPATIENT
Start: 2021-01-01 | End: 2021-01-01

## 2021-01-01 RX ORDER — HEPARIN SODIUM (PORCINE) LOCK FLUSH IV SOLN 100 UNIT/ML 100 UNIT/ML
5 SOLUTION INTRAVENOUS ONCE
Status: COMPLETED | OUTPATIENT
Start: 2021-01-01 | End: 2021-01-01

## 2021-01-01 RX ORDER — METHYLPREDNISOLONE SODIUM SUCCINATE 125 MG/2ML
125 INJECTION, POWDER, LYOPHILIZED, FOR SOLUTION INTRAMUSCULAR; INTRAVENOUS
Status: CANCELLED
Start: 2021-01-01

## 2021-01-01 RX ORDER — ERYTHROMYCIN 5 MG/G
0.5 OINTMENT OPHTHALMIC 4 TIMES DAILY
Qty: 3.5 G | Refills: 0 | Status: SHIPPED | OUTPATIENT
Start: 2021-01-01

## 2021-01-01 RX ORDER — HEPARIN SODIUM (PORCINE) LOCK FLUSH IV SOLN 100 UNIT/ML 100 UNIT/ML
5 SOLUTION INTRAVENOUS
Status: CANCELLED | OUTPATIENT
Start: 2021-01-01

## 2021-01-01 RX ORDER — LORAZEPAM 0.5 MG/1
0.5 TABLET ORAL EVERY 4 HOURS PRN
Qty: 30 TABLET | Refills: 2 | Status: SHIPPED | OUTPATIENT
Start: 2021-01-01 | End: 2021-01-01

## 2021-01-01 RX ORDER — IOPAMIDOL 755 MG/ML
74 INJECTION, SOLUTION INTRAVASCULAR ONCE
Status: COMPLETED | OUTPATIENT
Start: 2021-01-01 | End: 2021-01-01

## 2021-01-01 RX ORDER — SODIUM CHLORIDE 9 MG/ML
1000 INJECTION, SOLUTION INTRAVENOUS CONTINUOUS PRN
Status: CANCELLED
Start: 2021-01-01

## 2021-01-01 RX ORDER — LOPERAMIDE HCL 2 MG
CAPSULE ORAL
Qty: 30 CAPSULE | Refills: 0 | Status: CANCELLED | OUTPATIENT
Start: 2021-01-01

## 2021-01-01 RX ORDER — PROCHLORPERAZINE MALEATE 10 MG
10 TABLET ORAL EVERY 6 HOURS PRN
Qty: 30 TABLET | Refills: 2 | Status: SHIPPED | OUTPATIENT
Start: 2021-01-01 | End: 2021-01-01

## 2021-01-01 RX ORDER — ONDANSETRON 8 MG/1
8 TABLET, FILM COATED ORAL EVERY 8 HOURS PRN
Qty: 10 TABLET | Refills: 2 | Status: SHIPPED | OUTPATIENT
Start: 2021-01-01 | End: 2021-01-01

## 2021-01-01 RX ORDER — ATROPINE SULFATE 0.4 MG/ML
0.4 AMPUL (ML) INJECTION
Status: DISCONTINUED | OUTPATIENT
Start: 2021-01-01 | End: 2021-01-01 | Stop reason: HOSPADM

## 2021-01-01 RX ORDER — HEPARIN SODIUM (PORCINE) LOCK FLUSH IV SOLN 100 UNIT/ML 100 UNIT/ML
500 SOLUTION INTRAVENOUS ONCE
Status: COMPLETED | OUTPATIENT
Start: 2021-01-01 | End: 2021-01-01

## 2021-01-01 RX ORDER — ZOLEDRONIC ACID 0.04 MG/ML
4 INJECTION, SOLUTION INTRAVENOUS ONCE
Status: COMPLETED | OUTPATIENT
Start: 2021-01-01 | End: 2021-01-01

## 2021-01-01 RX ORDER — LORAZEPAM 0.5 MG/1
0.5 TABLET ORAL EVERY 4 HOURS PRN
Qty: 30 TABLET | Refills: 2 | Status: SHIPPED | OUTPATIENT
Start: 2021-01-01

## 2021-01-01 RX ORDER — HEPARIN SODIUM (PORCINE) LOCK FLUSH IV SOLN 100 UNIT/ML 100 UNIT/ML
5 SOLUTION INTRAVENOUS
Status: DISCONTINUED | OUTPATIENT
Start: 2021-01-01 | End: 2021-01-01 | Stop reason: HOSPADM

## 2021-01-01 RX ORDER — ZOLEDRONIC ACID 0.04 MG/ML
4 INJECTION, SOLUTION INTRAVENOUS ONCE
Status: CANCELLED | OUTPATIENT
Start: 2021-01-01 | End: 2021-01-01

## 2021-01-01 RX ORDER — ATROPINE SULFATE 0.4 MG/ML
0.4 AMPUL (ML) INJECTION
Status: COMPLETED | OUTPATIENT
Start: 2021-01-01 | End: 2021-01-01

## 2021-01-01 RX ORDER — SENNOSIDES 8.6 MG
1-2 TABLET ORAL DAILY PRN
Qty: 60 TABLET | Refills: 3 | Status: SHIPPED | OUTPATIENT
Start: 2021-01-01 | End: 2021-01-01 | Stop reason: ALTCHOICE

## 2021-01-01 RX ORDER — MAGNESIUM OXIDE 400 MG/1
400 TABLET ORAL 2 TIMES DAILY
Qty: 60 TABLET | Refills: 1 | Status: SHIPPED | OUTPATIENT
Start: 2021-01-01 | End: 2021-01-01

## 2021-01-01 RX ORDER — ONDANSETRON 8 MG/1
8 TABLET, FILM COATED ORAL EVERY 8 HOURS PRN
Qty: 30 TABLET | Refills: 2 | Status: SHIPPED | OUTPATIENT
Start: 2021-01-01

## 2021-01-01 RX ORDER — MAGNESIUM OXIDE 400 MG/1
400 TABLET ORAL 2 TIMES DAILY
Qty: 60 TABLET | Refills: 3 | Status: SHIPPED | OUTPATIENT
Start: 2021-01-01 | End: 2021-01-01

## 2021-01-01 RX ORDER — PROCHLORPERAZINE MALEATE 10 MG
10 TABLET ORAL EVERY 6 HOURS PRN
Qty: 30 TABLET | Refills: 2 | Status: CANCELLED | OUTPATIENT
Start: 2021-01-01

## 2021-01-01 RX ORDER — AMOXICILLIN 250 MG
1-2 CAPSULE ORAL DAILY
Qty: 60 TABLET | Refills: 1 | Status: SHIPPED | OUTPATIENT
Start: 2021-01-01 | End: 2021-01-01

## 2021-01-01 RX ORDER — AZITHROMYCIN 250 MG/1
TABLET, FILM COATED ORAL
Qty: 6 TABLET | Refills: 0 | Status: SHIPPED | OUTPATIENT
Start: 2021-01-01

## 2021-01-01 RX ORDER — POLYETHYLENE GLYCOL 3350 17 G/17G
1 POWDER, FOR SOLUTION ORAL 2 TIMES DAILY
Qty: 578 G | Refills: 2 | Status: SHIPPED | OUTPATIENT
Start: 2021-01-01

## 2021-01-01 RX ORDER — LORAZEPAM 2 MG/ML
0.5 INJECTION INTRAMUSCULAR ONCE
Status: CANCELLED
Start: 2021-01-01 | End: 2021-01-01

## 2021-01-01 RX ORDER — IOPAMIDOL 755 MG/ML
93 INJECTION, SOLUTION INTRAVASCULAR ONCE
Status: COMPLETED | OUTPATIENT
Start: 2021-01-01 | End: 2021-01-01

## 2021-01-01 RX ORDER — CLINDAMYCIN PHOSPHATE 10 MG/G
GEL TOPICAL 2 TIMES DAILY
Qty: 100 G | Refills: 3 | Status: SHIPPED | OUTPATIENT
Start: 2021-01-01

## 2021-01-01 RX ORDER — LEVOFLOXACIN 500 MG/1
500 TABLET, FILM COATED ORAL DAILY
Qty: 5 TABLET | Refills: 0 | Status: SHIPPED | OUTPATIENT
Start: 2021-01-01 | End: 2021-01-01

## 2021-01-01 RX ORDER — ONDANSETRON 8 MG/1
8 TABLET, FILM COATED ORAL EVERY 8 HOURS PRN
Qty: 30 TABLET | Refills: 2 | Status: SHIPPED | OUTPATIENT
Start: 2021-01-01 | End: 2021-01-01

## 2021-01-01 RX ORDER — HEPARIN SODIUM (PORCINE) LOCK FLUSH IV SOLN 100 UNIT/ML 100 UNIT/ML
500 SOLUTION INTRAVENOUS ONCE
Status: DISCONTINUED | OUTPATIENT
Start: 2021-01-01 | End: 2021-01-01 | Stop reason: HOSPADM

## 2021-01-01 RX ORDER — MAGNESIUM SULFATE HEPTAHYDRATE 40 MG/ML
4 INJECTION, SOLUTION INTRAVENOUS ONCE
Status: DISCONTINUED | OUTPATIENT
Start: 2021-01-01 | End: 2021-01-01

## 2021-01-01 RX ORDER — PROCHLORPERAZINE MALEATE 10 MG
10 TABLET ORAL EVERY 6 HOURS PRN
Qty: 30 TABLET | Refills: 2 | Status: SHIPPED | OUTPATIENT
Start: 2021-01-01

## 2021-01-01 RX ORDER — HEPARIN SODIUM (PORCINE) LOCK FLUSH IV SOLN 100 UNIT/ML 100 UNIT/ML
5 SOLUTION INTRAVENOUS EVERY 8 HOURS
Status: DISCONTINUED | OUTPATIENT
Start: 2021-01-01 | End: 2021-01-01 | Stop reason: HOSPADM

## 2021-01-01 RX ORDER — HYDROCORTISONE 25 MG/G
OINTMENT TOPICAL 2 TIMES DAILY
Qty: 100 G | Refills: 3 | Status: SHIPPED | OUTPATIENT
Start: 2021-01-01

## 2021-01-01 RX ORDER — LOPERAMIDE HCL 2 MG
CAPSULE ORAL
Qty: 30 CAPSULE | Refills: 0 | Status: SHIPPED | OUTPATIENT
Start: 2021-01-01

## 2021-01-01 RX ORDER — AMOXICILLIN 250 MG
1-2 CAPSULE ORAL 2 TIMES DAILY
Qty: 120 TABLET | Refills: 1 | Status: SHIPPED | OUTPATIENT
Start: 2021-01-01 | End: 2021-01-01

## 2021-01-01 RX ORDER — HEPARIN SODIUM (PORCINE) LOCK FLUSH IV SOLN 100 UNIT/ML 100 UNIT/ML
5 SOLUTION INTRAVENOUS
Status: DISCONTINUED | OUTPATIENT
Start: 2021-01-01 | End: 2021-01-01

## 2021-01-01 RX ORDER — ZOLEDRONIC ACID 0.04 MG/ML
4 INJECTION, SOLUTION INTRAVENOUS ONCE
Status: DISCONTINUED | OUTPATIENT
Start: 2021-01-01 | End: 2021-01-01

## 2021-01-01 RX ORDER — CICLOPIROX OLAMINE 7.7 MG/G
CREAM TOPICAL 2 TIMES DAILY
Qty: 90 G | Refills: 5 | Status: SHIPPED | OUTPATIENT
Start: 2021-01-01

## 2021-01-01 RX ORDER — ONDANSETRON 8 MG/1
8 TABLET, FILM COATED ORAL EVERY 8 HOURS PRN
Qty: 10 TABLET | Refills: 2 | Status: CANCELLED | OUTPATIENT
Start: 2021-01-01

## 2021-01-01 RX ORDER — CEPHALEXIN 500 MG/1
500 CAPSULE ORAL 4 TIMES DAILY
Qty: 28 CAPSULE | Refills: 0 | Status: SHIPPED | OUTPATIENT
Start: 2021-01-01 | End: 2021-01-01

## 2021-01-01 RX ORDER — LORAZEPAM 0.5 MG/1
0.5 TABLET ORAL EVERY 4 HOURS PRN
Qty: 30 TABLET | Refills: 2 | Status: CANCELLED | OUTPATIENT
Start: 2021-01-01

## 2021-01-01 RX ADMIN — Medication 250 ML: at 09:22

## 2021-01-01 RX ADMIN — Medication 0.4 MG: at 14:06

## 2021-01-01 RX ADMIN — HEPARIN SODIUM (PORCINE) LOCK FLUSH IV SOLN 100 UNIT/ML 5 ML: 100 SOLUTION at 13:59

## 2021-01-01 RX ADMIN — Medication 250 ML: at 08:50

## 2021-01-01 RX ADMIN — Medication 0.4 MG: at 13:51

## 2021-01-01 RX ADMIN — Medication 0.4 MG: at 09:44

## 2021-01-01 RX ADMIN — Medication 0.4 MG: at 11:54

## 2021-01-01 RX ADMIN — Medication 0.4 MG: at 12:53

## 2021-01-01 RX ADMIN — TC 99M MEDRONATE 25.6 MCI.: 20 INJECTION, POWDER, LYOPHILIZED, FOR SOLUTION INTRAVENOUS at 09:50

## 2021-01-01 RX ADMIN — Medication 0.4 MG: at 10:35

## 2021-01-01 RX ADMIN — ZOLEDRONIC ACID 4 MG: 0.04 INJECTION, SOLUTION INTRAVENOUS at 12:55

## 2021-01-01 RX ADMIN — HEPARIN SODIUM (PORCINE) LOCK FLUSH IV SOLN 100 UNIT/ML 500 UNITS: 100 SOLUTION at 09:23

## 2021-01-01 RX ADMIN — HEPARIN SODIUM (PORCINE) LOCK FLUSH IV SOLN 100 UNIT/ML 5 ML: 100 SOLUTION at 13:24

## 2021-01-01 RX ADMIN — Medication 0.4 MG: at 13:10

## 2021-01-01 RX ADMIN — HEPARIN SODIUM (PORCINE) LOCK FLUSH IV SOLN 100 UNIT/ML 5 ML: 100 SOLUTION at 13:04

## 2021-01-01 RX ADMIN — IOPAMIDOL 74 ML: 755 INJECTION, SOLUTION INTRAVASCULAR at 09:50

## 2021-01-01 RX ADMIN — HEPARIN SODIUM (PORCINE) LOCK FLUSH IV SOLN 100 UNIT/ML 5 ML: 100 SOLUTION at 11:35

## 2021-01-01 RX ADMIN — Medication 500 ML: at 08:39

## 2021-01-01 RX ADMIN — HEPARIN SODIUM (PORCINE) LOCK FLUSH IV SOLN 100 UNIT/ML 500 UNITS: 100 SOLUTION at 09:16

## 2021-01-01 RX ADMIN — Medication 0.4 MG: at 12:21

## 2021-01-01 RX ADMIN — Medication 0.4 MG: at 13:01

## 2021-01-01 RX ADMIN — Medication 0.4 MG: at 15:41

## 2021-01-01 RX ADMIN — Medication 0.4 MG: at 09:37

## 2021-01-01 RX ADMIN — ZOLEDRONIC ACID 4 MG: 0.04 INJECTION, SOLUTION INTRAVENOUS at 15:31

## 2021-01-01 RX ADMIN — Medication 250 ML: at 10:56

## 2021-01-01 RX ADMIN — IOPAMIDOL 80 ML: 755 INJECTION, SOLUTION INTRAVASCULAR at 09:12

## 2021-01-01 RX ADMIN — Medication 250 ML: at 09:25

## 2021-01-01 RX ADMIN — Medication 500 ML: at 12:35

## 2021-01-01 RX ADMIN — Medication 500 ML: at 13:57

## 2021-01-01 RX ADMIN — Medication 0.4 MG: at 16:54

## 2021-01-01 RX ADMIN — Medication 250 ML: at 10:12

## 2021-01-01 RX ADMIN — HEPARIN SODIUM (PORCINE) LOCK FLUSH IV SOLN 100 UNIT/ML 5 ML: 100 SOLUTION at 13:43

## 2021-01-01 RX ADMIN — HEPARIN SODIUM (PORCINE) LOCK FLUSH IV SOLN 100 UNIT/ML 5 ML: 100 SOLUTION at 11:14

## 2021-01-01 RX ADMIN — HEPARIN SODIUM (PORCINE) LOCK FLUSH IV SOLN 100 UNIT/ML 5 ML: 100 SOLUTION at 12:10

## 2021-01-01 RX ADMIN — Medication 0.4 MG: at 10:09

## 2021-01-01 RX ADMIN — Medication 250 ML: at 11:43

## 2021-01-01 RX ADMIN — Medication 250 ML: at 08:27

## 2021-01-01 RX ADMIN — Medication 250 ML: at 11:07

## 2021-01-01 RX ADMIN — Medication 250 ML: at 09:30

## 2021-01-01 RX ADMIN — HEPARIN SODIUM (PORCINE) LOCK FLUSH IV SOLN 100 UNIT/ML 500 UNITS: 100 SOLUTION at 10:09

## 2021-01-01 RX ADMIN — Medication 250 ML: at 12:38

## 2021-01-01 RX ADMIN — Medication 0.4 MG: at 11:39

## 2021-01-01 RX ADMIN — Medication 0.4 MG: at 13:40

## 2021-01-01 RX ADMIN — ZOLEDRONIC ACID 4 MG: 0.04 INJECTION, SOLUTION INTRAVENOUS at 14:20

## 2021-01-01 RX ADMIN — IOPAMIDOL 93 ML: 755 INJECTION, SOLUTION INTRAVASCULAR at 08:27

## 2021-01-01 RX ADMIN — HEPARIN SODIUM (PORCINE) LOCK FLUSH IV SOLN 100 UNIT/ML 5 ML: 100 SOLUTION at 07:39

## 2021-01-01 RX ADMIN — TC 99M MEDRONATE 24.9 MCI.: 20 INJECTION, POWDER, LYOPHILIZED, FOR SOLUTION INTRAVENOUS at 09:06

## 2021-01-01 RX ADMIN — Medication 0.4 MG: at 12:49

## 2021-01-01 RX ADMIN — Medication 250 ML: at 10:36

## 2021-01-01 RX ADMIN — Medication 0.4 MG: at 10:36

## 2021-01-01 RX ADMIN — Medication 500 ML: at 10:22

## 2021-01-01 RX ADMIN — HEPARIN SODIUM (PORCINE) LOCK FLUSH IV SOLN 100 UNIT/ML 5 ML: 100 SOLUTION at 08:17

## 2021-01-01 RX ADMIN — HEPARIN SODIUM (PORCINE) LOCK FLUSH IV SOLN 100 UNIT/ML 5 ML: 100 SOLUTION at 07:54

## 2021-01-01 RX ADMIN — HEPARIN SODIUM (PORCINE) LOCK FLUSH IV SOLN 100 UNIT/ML 5 ML: 100 SOLUTION at 08:03

## 2021-01-01 RX ADMIN — Medication 0.4 MG: at 11:20

## 2021-01-01 RX ADMIN — Medication 0.4 MG: at 15:08

## 2021-01-01 ASSESSMENT — ENCOUNTER SYMPTOMS
VOMITING: 0
SINUS PAIN: 0
BACK PAIN: 0
DIZZINESS: 0
GASTROINTESTINAL NEGATIVE: 1
COUGH: 0
WOUND: 1
WHEEZING: 0
MUSCULOSKELETAL NEGATIVE: 1
RESPIRATORY NEGATIVE: 1
ENDOCRINE NEGATIVE: 1
SORE THROAT: 0
RHINORRHEA: 0
NECK STIFFNESS: 0
WEAKNESS: 0
PALPITATIONS: 0
SINUS PRESSURE: 0
HEADACHES: 0
CONSTITUTIONAL NEGATIVE: 1
JOINT SWELLING: 0
ALLERGIC/IMMUNOLOGIC NEGATIVE: 1
CARDIOVASCULAR NEGATIVE: 1
EYE REDNESS: 1
COUGH: 0
CHILLS: 0
NECK PAIN: 0
NAUSEA: 0
FATIGUE: 0
PALPITATIONS: 0
SHORTNESS OF BREATH: 0
RESPIRATORY NEGATIVE: 1
EYES NEGATIVE: 1
EYE PAIN: 0
RHINORRHEA: 0
FEVER: 0
FEVER: 0
ARTHRALGIAS: 0
CHILLS: 0
DIARRHEA: 0
EYE ITCHING: 0
EYE DISCHARGE: 1
SHORTNESS OF BREATH: 0
MYALGIAS: 0
LIGHT-HEADEDNESS: 0
SORE THROAT: 0
PHOTOPHOBIA: 0
CARDIOVASCULAR NEGATIVE: 1

## 2021-01-01 ASSESSMENT — PAIN SCALES - GENERAL
PAINLEVEL: NO PAIN (0)
PAINLEVEL: MODERATE PAIN (4)
PAINLEVEL: NO PAIN (0)
PAINLEVEL: EXTREME PAIN (8)

## 2021-01-01 ASSESSMENT — MIFFLIN-ST. JEOR
SCORE: 1115.54
SCORE: 1060.36
SCORE: 1132.33
SCORE: 1095.13
SCORE: 1040.25
SCORE: 1122.48
SCORE: 1024.82
SCORE: 1081.52
SCORE: 1062.93
SCORE: 1067.46
SCORE: 1057.61
SCORE: 1100.7

## 2021-01-01 ASSESSMENT — VISUAL ACUITY: OU: 1

## 2021-01-11 NOTE — PROGRESS NOTES
Power Port needle left for access of CT, Sterile technique performed and maintained. Patient tolerated procedure well. Transparent dressing placed with use of tegaderm.     Lois Mendez RN  [facility]

## 2021-01-13 NOTE — PROGRESS NOTES
See IV flow sheet for details of port access. Labs sent cbc, cmp, mag, cea. IVAD left accessed for chemotherapy to follow provider appointment.    Shannan Ferrari RN

## 2021-01-13 NOTE — PATIENT INSTRUCTIONS
Patient Education   Vectibix  Generic Name: Panitumumab  Drug type  Vectibix works to stop cancer cells from growing and helps get rid of the cancer.   What this drug is used for  Colon cancer or ____________________.  How this drug is given  This drug is given through an IV line, a small tube inserted in a vein. Please let your nurse know right away if you feel pain, discomfort or heat during your infusion; or if you see redness on the skin where the IV is placed.   The amount of medicine that you receive depends on many things. Your doctor will decide on the best dose for you.  Make sure your health care team knows about all the medicines and supplements you take. This will help prevent drug interactions.   Side effects  Most people do not have all the side effects listed. Side effects will usually go away after the treatment is complete. Some of the rare side effects are not listed here, so tell your doctor if you have any unusual symptoms.  Common side effects   (occur in more than 3 out of 10 people):    Low magnesium level in blood tests    Rash and skin changes (red, dry, itchy or acne-like rash)    Sunlight can make a skin reaction worse. Limit your time in the sun and wear sunscreen and hats. Do this for 2 months after your last dose.  Less common side effects   (occur in less than 3 out of 10 people):    Belly pain    Cough    Feel sick to your stomach (nausea)    Loose, watery stools (diarrhea)    Mouth sores    Swelling in the hands or feet    Throwing up (vomiting)    Tiredness and weakness (fatigue)  Rare but serious side effects    Lung and kidney problems  Call your doctor right away if you have:    Signs of a drug allergy: Breathing problems; feel like your throat is closing up; swelling of the mouth, face, lips or tongue; or hives (red, itchy blotches on the skin).    Fever of 100.5  F (38  C) or higher or chills  Call your doctor within 24 hours if you have:    Extreme tiredness that prevents  you from caring for yourself    Vomiting more than 5 times a day    Loose, watery stools 4 to 6 times in a day    Swelling of the feet or ankles    Severe rash or skin changes    Red, sore eyes    Other information  __________________________________________  __________________________________________  __________________________________________  If you have any side effects, call us. We can help you manage these problems.  For more information, see:  www.[facility].com  http://www.nlm.nih.gov/medlineplus/druginformation.html  http://www.cancer.gov/cancertopics/coping/chemotherapy-and-you  For informational purposes only. Not to replace the advice of your health care provider.   Copyright   2017 [employer]. All rights reserved. [employer] 227341 - 01/18.       Patient Education   Camptosar  Generic Name: Irinotecan  Drug type  Camptosar works by preventing the cancer cells from dividing and growing.   What this drug is used for:  Colon cancer, rectal cancer or ___________________.  How this drug is given  This drug is given through an IV line, a small tube inserted in a vein. Please let your nurse know right away if you feel pain, discomfort or heat during your infusion; or if you see redness on the skin where the IV is placed.  The amount of medicine that you receive depends on many things. Your doctor will decide on the best dose for you.  Make sure your health care team knows about all the medicines and supplements you take. This will help prevent drug interactions.   Side effects  Most people do not have all the side effects listed. Side effects will usually go away after the treatment is complete. Some of the rare side effects are not listed here, so tell your doctor if you have any unusual symptoms.  Common side effects   (occur in more than 3 out of 10 people):    Loose stools (diarrhea); during infusion or up to 1 to 2 weeks after. It is important that you follow your doctor's instructions for  managing diarrhea.    Runny nose, watery eyes, sweating, face flushing, and increase in saliva during infusion    Do not feel like eating    Feel sick to your stomach (nausea)    Throwing up (vomiting)    Weakness or tiredness    Hair loss    Dizziness    Fever    Low blood counts    Weight loss  Less common side effects   (occur in less than 3 out of 10 people):    Belly pain    Cough    Chills    Dry, hard stools (constipation)    Dizziness    Headache    Mouth sores    Rash    Shortness of breath    Sleep problems    Swelling of feet and ankles  Call your doctor right away if you have:    Signs of a drug allergy: Breathing problems; feel like your throat is closing up; swelling of the mouth, face, lips or tongue; or hives (red, itchy blotches on the skin).    Fever of 100.5  F (38  C) or higher or chills    Fainting or dizziness  Call your doctor within 24 hours if you have:    Extreme tiredness that prevents you from caring for yourself    Vomiting more than 5 times a day    Loose, watery stools 4 to 6 times in a day    Unusual bruising or bleeding    Red or painful mouth    Swelling of the feet or ankles  Other information  __________________________________________  __________________________________________  __________________________________________  If you have any side effects, call us. We can help you manage these problems.  For more information, see:  www.[email or web address]  Chemo today    In 2 weeks, see Felipa and chemo    In 4 and 6 weeks, see me and chemo    http://www.nlm.nih.gov/medlineplus/druginformation.html  http://www.cancer.gov/cancertopics/coping/chemotherapy-and-you  For informational purposes only. Not to replace the advice of your health care provider.   Copyright   2017 [facility]. All rights reserved. LUXA [id number] - 01/18.

## 2021-01-13 NOTE — NURSING NOTE
Alannah is a 70 year old who is being evaluated via a billable video visit.      How would you like to obtain your AVS? MyChart  If the video visit is dropped, the invitation should be resent by:   Will anyone else be joining your video visit? No      Video-Visit Details    Type of service:  Video Visit    Originating Location (pt. Location): Other in clinic prior to tx    Distant Location (provider location):  [facility]     Platform used for Video Visit: [employer]

## 2021-01-13 NOTE — PROGRESS NOTES
Infusion Nursing Note:  Alannah Wynn presents today for C1 D1 Vectibix & Irinotecan.    Patient seen by provider today: Yes: Video visit with Dr. Velazquez   present during visit today: Not Applicable.    Note: Patient was about 1 hour into irinotecan and RN noticed she had been up to the bathroom several times. Asked patient how she was feeling and if her stomach was hurting. She stated feeling ok but had the feeling of needing to use the bathroom a lot. Also reported some minor cramping.  Gave 1 dose of atropine at that time  & completed infusion. Patient reported relief within about 5 minutes. Instructed her that we would give her atropine at the start of her iriniotecan next time to avoid some abdominal cramping.     Patient verbalized understanding. Instructed her to stop at pharmacy on her way out to  her cream along with nausea meds. Patient verbalized understanding.     Patient did not meet criteria for an asymptomatic covid-19 PCR test in infusion today. Patient declined the covid-19 test.    Intravenous Access:  Implanted Port.    Treatment Conditions:  Lab Results   Component Value Date    HGB 11.8 01/13/2021     Lab Results   Component Value Date    WBC 3.9 01/13/2021      Lab Results   Component Value Date    ANEU 1.3 01/13/2021     Lab Results   Component Value Date     01/13/2021      Lab Results   Component Value Date     01/13/2021                   Lab Results   Component Value Date    POTASSIUM 3.9 01/13/2021           Lab Results   Component Value Date    MAG 2.3 01/13/2021            Lab Results   Component Value Date    CR 0.61 01/13/2021                   Lab Results   Component Value Date    LAUREN 8.2 01/13/2021                Lab Results   Component Value Date    BILITOTAL 0.3 01/13/2021           Lab Results   Component Value Date    ALBUMIN 3.2 01/13/2021                    Lab Results   Component Value Date    ALT 53 01/13/2021           Lab Results   Component  Value Date    AST 47 01/13/2021       Results reviewed, labs MET treatment parameters, ok to proceed with treatment.      Post Infusion Assessment:  Patient tolerated infusion without incident.  Blood return noted pre and post infusion.  Site patent and intact, free from redness, edema or discomfort.  No evidence of extravasations.  Access discontinued per protocol.       Discharge Plan:   Discharge instructions reviewed with: Patient.  Patient and/or family verbalized understanding of discharge instructions and all questions answered.  Patient discharged in stable condition accompanied by: self.  Departure Mode: Ambulatory.    Ernestina Melissa RN

## 2021-01-13 NOTE — LETTER
"    1/13/2021         RE: Alannah Wynn  [address]  [place] [address]        Dear Colleague,    Thank you for referring your patient, Alannah Wynn, to the [facility]. Please see a copy of my visit note below.    ONCOLOGY FOLLOW UP NOTE    1/13/2021     Patient was being followed by Dr. Tavarez and saw her on 11/13/2019.  Now she is transferring her care to me.  I have reviewed her previous records and have copied and updated from prior notes.      DIAGNOSIS:    10/2019 dx adenoCa of rectum,  MMR intact- NGS Negative for KRAS, NRAS and BRAF. PD L1 25%.  Baseline CEA 10.  Stage IV disease with pulmonary and bony mets      HISTORY OF ONCOLOGY ILLNESS:    She has had intermittent hematochezia and anorectal irritation since 2011, that have been managed as \"hemorrhoids\". These symptoms became persistent and progressive since early thi year. Her anorectal pain is managed with Tylenol. Denies fevers, chills, or unintentional weight loss. Does have intermittent urinary and fecal urgency but no incontinence per se.    Diagnostic colonoscopy on 10/18/19 for rectal bleeding showed \"fungating and ulcerated non-obstructing mass was found in the distal rectum <1 cm from the anal verge. The mass was partially circumferential (involving one-half of the lumen circumference). The mass measured four cm in length. In addition, its diameter measured six mm.  Pathology showed invasive moderately differentiated adenocarcinoma with intact mismatch repair protein expression, PD L1 25% and NGS panel showed NO MUTATIONS in KRAS, NRAS and BRAF.      Pelvic MRI 11/2019 found fungating polypoid mid/lower rectal mass with luminal narrowing as well as innumerable enlarged lymph nodes as described above with a conglomerate of suspicious lymph nodes abutting the anterior peritoneal reflection. Stage cT3d N2.     PET 11/2019 found   1. Hypermetabolic mass in the distal rectum, SUV 14 corresponding to " the patient's known rectal cancer.  2. Multiple hypermetabolic pelvic lymph nodes, compatible metastatic disease, SUV 5-6.  3. FDG avid pulmonary nodule measuring 7 mm in the left lower lobe, concerning for metastatic disease. Additional FDG avid pulmonary nodules suspicious for metastatic disease, SUV around 4.  4. Osteoblastic metastatic lesion in the L3 vertebral body, SUV 6.8    She started palliative FOLFOX/Avastin on 11/25/2019.  Cycle #2 given on 12/9/2019.  Cycle #3 scheduled for 12/24/2019.  C#6 on 2/10/2020.    Repeat CT CAP on 2/21/2020 shows good response to therapy with improvement in the rectal mass, mesorectal and superior rectal nodularity as well as resolution of right internal iliac lymphadenopathy.  There is slight improvement in the dominant posterior left lower lobe lung nodule and several of the lung nodules are is stable.  There is increase in size of the sclerotic lesion of the left aspect of L3 vertebral body without pathological fracture and this is likely due to treatment effect.    CEA on 2/10/2020 was down to 3.3.    Cycle #7 FOLFOX/Avastin 2/25/2020.  C#8 FOLFOX/Avastin 3/9/2020  Cycle#9 5-FU/Avastin 3/24/2020-stopped oxaliplatin due to cumulative fatigue and issues with nausea.    I then discussed with her, her  as well as her son Gamal about possibly switching to single agent Xeloda going forward so as to limit her visits to the clinic and therefore exposure and risk from coronavirus.  We discussed the pros and cons of this approach and they were all willing to try this.     She started Single agent Xeloda on 4/7/2020.  Cycle #3 started on 5/19/2020.    Because of bone metastatic disease, she was started on Zometa on 11/25/2019. We are giving it every 3 months.        CT chest abdomen and pelvis on 7/20/2020 showed mild progression in the lung nodules.  Perirectal nodularity has mildly improved.    Last CEA on 7/22/2020 was 4.      7/27/2020- we decided on switching to  XELOX/Avastin.     8/18/2020 -cycle #2 XELOX/Avastin.  CEA on 8/11/2020 was 4.8.  9/8/2020 - C#3 XELOX/Avastin. CEA on 9/2/2020 was 5.1  9/30/2020.  Cycle #4 XELOX/Avastin.  Oxaliplatin dose reduced further to 85 mg per metered square because of worsening neuropathy.    Repeat CT chest abdomen and pelvis on 10/15/2020 overall shows stable disease with 1 to 2 mm increase in some of the lung nodules and about 2 mm decrease in the lymph node in the mesorectum.    CEA stable at 5.3 on 10/21/2020.    10/22/2020.  Cycle #5 XELOX/Avastin.  Oxaliplatin dose 85 mg/m .  11/11/2020 C#6 XELOX/Avastin. Oxaliplatin dose 85 mg/m .  12/2/2020 C#7 XELOX/Avastin. Oxaliplatin dose 85 mg/m .  12/23/2020 C#8 XELOX/Avastin. Oxaliplatin dose 85 mg/m .    1/11/2021-CT chest abdomen and pelvis shows progressive disease with progression in the lung nodules.  There was a new ill-defined soft tissue density material at the upper mid abdomen adjacent to the hemidiaphragm rangel, celiac access, and most proximal abdominal aorta and this is also concerning for malignancy.  There is a stable sclerosis of L3 and stable left ischial tuberosity tiny sclerosis.  CEA on 12/23/2020 was 9.2.    INTERVAL HISTORY:     This is a video visit through [facility].  Overall she tolerated the last chemotherapy well.  She did not have much nausea vomiting.  She had a little bit of diarrhea for which she took Imodium.  She thinks the neuropathy is the same and especially the left hand.  Denies any infections.  No new swellings.  Overall energy is the same.  She has a little bit of peeling of the skin of the palms.  Denies any pain.    ECOG 1     ROS:  Rest of the comprehensive review of the system was essentially unremarkable.      I reviewed with her history in epic as below.    PAST MEDICAL HISTORY  Reflux, hemorrhoid, hepatitis B?  Overactive bladder  Active Ambulatory Problems     Diagnosis Date Noted     External hemorrhoids 04/29/2016     Non morbid obesity due  to excess calories 04/29/2016     Hepatitis B immune 04/29/2016     Screening for cervical cancer 04/29/2016     CARDIOVASCULAR SCREENING; LDL GOAL LESS THAN 160 04/29/2016     Gastroesophageal reflux disease 04/29/2016     Overactive bladder 07/21/2017     Nevus of left lower leg- undetermined behavior 12/27/2018     Metastasis from rectal cancer (H) 11/13/2019     Bone metastasis (H) 11/13/2019     Nausea 11/27/2019     Chemotherapy-induced neutropenia (H) 12/24/2019     Resolved Ambulatory Problems     Diagnosis Date Noted     Urgency of urination 04/29/2016     Need for vaccination against Streptococcus pneumoniae 04/29/2016     Morbid obesity (H) 07/21/2017     Obesity (BMI 35.0-39.9) with comorbidity (H) 12/27/2018     No Additional Past Medical History       MEDICATIONS:  Current Outpatient Medications   Medication     Ascorbic Acid (VITAMIN C) 500 MG CAPS     B Complex Vitamins (B COMPLEX-B12 PO)     capecitabine (XELODA) 500 MG tablet     capecitabine (XELODA) 500 MG tablet     capecitabine (XELODA) 500 MG tablet     capecitabine (XELODA) 500 MG tablet     cholecalciferol (VITAMIN D3) 400 unit (10 mcg) TABS tablet     docusate sodium (COLACE) 100 MG capsule     Fesoterodine Fumarate 8 MG TB24     lidocaine-prilocaine (EMLA) 2.5-2.5 % external cream     loperamide (IMODIUM) 2 MG capsule     LORazepam (ATIVAN) 0.5 MG tablet     LORazepam (ATIVAN) 0.5 MG tablet     Lutein 20 MG CAPS     mirabegron (MYRBETRIQ) 50 MG 24 hr tablet     Multiple vitamin TABS     naloxone (NARCAN) 4 MG/0.1ML nasal spray     OLANZapine (ZYPREXA) 2.5 MG tablet     omeprazole (PRILOSEC) 20 MG DR capsule     ondansetron (ZOFRAN) 8 MG tablet     prochlorperazine (COMPAZINE) 10 MG tablet     solifenacin (VESICARE) 10 MG tablet     traMADol (ULTRAM) 50 MG tablet     urea (GORDONS UREA) 40 % external ointment     Current Facility-Administered Medications   Medication     lidocaine (XYLOCAINE) 5 % ointment     Facility-Administered  Medications Ordered in Other Visits   Medication     heparin 100 UNIT/ML injection 5 mL     sodium chloride (PF) 0.9% PF flush 10-20 mL     ALLERGY:     Allergies   Allergen Reactions     Penicillins      Sulfa Drugs             SOCIAL HISTORY:  She is a [place] immigrants to the [place].  Denies smoking denies alcohol abuse.  2 of her sons are doctors.    FAMILY HISTORY:   Denies any family history of cancer.       PHYSICAL EXAMINATION:       LMP 11/27/2001 (Approximate)      Wt Readings from Last 4 Encounters:   12/23/20 69.8 kg (153 lb 12.8 oz)   12/02/20 71.4 kg (157 lb 4.8 oz)   12/02/20 71.4 kg (157 lb 5.8 oz)   11/11/20 71.3 kg (157 lb 1.6 oz)       Constitutional.  Looks well and in no apparent distress.   Eyes.  Without eye redness or apparent jaundice.   Respiratory.  Non labored breathing. Speaking in full sentences.    Skin.  No concerning skin rashes on the skin visualized.   Neurological.  Is alert and oriented.  Psychiatric.  Mood and affect seem appropriate.      The rest of a comprehensive physical examination is deferred due to Public Health Emergency video visit restrictions.    LABS/IMAGING/PATHOLOGY:    Reviewed  CBC shows WBC 3.9 ANC 1.3.  Hemoglobin 11.8.  Platelets 124.  CMP shows normal renal functions.  Albumin 3.2.  AST 47 and ALT 53 and these are better.    CEA on 12/23/2020 was 9.2.    1/11/2021-CT chest abdomen and pelvis shows progressive disease with progression in the lung nodules.  There was a new ill-defined soft tissue density material at the upper mid abdomen adjacent to the hemidiaphragm rangel, celiac access, and most proximal abdominal aorta and this is also concerning for malignancy.  This measures approximately 1.7 x 1.8 cm.  There is a stable sclerosis of L3 and stable left ischial tuberosity tiny sclerosis.    EXAM: CT CHEST/ABDOMEN/PELVIS W CONTRAST  LOCATION: [facility]  DATE/TIME: 1/11/2021 8:13 AM     INDICATION: Metastatic colon cancer. Bony metastatic  disease.  COMPARISON: CT chest, abdomen and pelvis 10/15/2020.  TECHNIQUE: CT scan of the chest, abdomen, and pelvis was performed following injection of IV contrast. Multiplanar reformats were obtained. Dose reduction techniques were used.   CONTRAST: 93 ml isovue 370     FINDINGS:   LUNGS AND PLEURA: No effusions. Multiple bilateral pulmonary nodules within all lobes of both lungs consistent with pulmonary metastatic disease. Several are larger in size. An example at the medial left lower lobe now measures 1.7 x 0.9 cm, previously   1.5 x 0.7 cm, series 7 image 106. Another larger example at the posterior left lower lobe is 1.0 x 0.9 cm, previously 0.6 x 0.4 cm, image 129. At the right upper lobe an example is 0.6 x 0.6 cm, previously 0.4 x 0.4 cm, image 71.     MEDIASTINUM/AXILLAE: Right chest Port-A-Cath is identified, the previously noted looping of this catheter within the right internal jugular vein is not currently seen. The catheter tip is at the most superior aspect of the right atrium. No acute   mediastinal abnormality. Thoracic aortic and minimal coronary artery calcifications. Small hiatal hernia is stable. No new chest enlarged lymph nodes identified.     HEPATOBILIARY: Status post cholecystectomy. No focal hepatic lesions identified.     PANCREAS: Normal.     SPLEEN: Normal.     ADRENAL GLANDS: Stable mild thickening of the left adrenal. Normal right adrenal.     KIDNEYS/BLADDER: Normal.     BOWEL: Previously noted rectal wall thickening distally is stable versus slightly less prominent. Perirectal nodules again identified appearing stable. One of these is 1.0 x 0.6 cm posteriorly towards the left, series 3 image 509. There are 2 other   stable lesions towards the left posteriorly on image 519. Other small nodules superiorly towards the left posteriorly on image 474. Some of the perirectal fat has mild haziness. No bowel obstruction or inflammation.     LYMPH NODES: There is new indeterminate hazy  soft tissue density at the upper mid abdomen posteriorly lying anterior to the hemidiaphragm rangel, and surrounding the celiac axis and most proximal abdominal aorta. This is difficult to precisely measure but   is approximately 1.7 x 1.8 cm, series 3 image 255.     VASCULATURE: Scattered mild vascular calcifications.     PELVIC ORGANS: Normal.     MUSCULOSKELETAL: Stable left L3 sclerosis measuring 2.3 cm image 332. Stable left ischial tuberosity tiny sclerosis image 542. This could just be a small bone island.                                                                      IMPRESSION:  1.  Enlargement of several bilateral metastatic pulmonary nodules suggesting progression of disease.  2.  New ill-defined soft tissue density material at the upper mid abdomen adjacent to the hemidiaphragm rangel, celiac access, and most proximal abdominal aorta. This could represent new area of adenopathy, potentially malignant. Inflammatory change in   this region is also possible, nonspecific as to precise etiology.  3.  Stable perirectal nodular opacities that could represent malignant lymph nodes.  4.  Stable sclerosis at L3 consistent with metastatic disease.      ASSESSMENT AND PLAN:      Metastatic rectal adenocarcinoma, MSI intact, K-aimee wild type, PDL 1 + 25% with metastasis to the lungs, bones and pelvic lymph nodes.      She started palliative chemotherapy with FOLFOX/Avastin on 11/25/2019.      She has received 6 cycles of chemotherapy.  With cycle #3, we stopped 5-FU bolus because of excessive tiredness.    Repeat CT CAP on 2/21/2020 shows good response to therapy with improvement in the rectal mass, mesorectal and superior rectal nodularity as well as resolution of right internal iliac lymphadenopathy.  There is slight improvement in the dominant posterior left lower lobe lung nodule and several of the lung nodules are is stable.  There is increase in size of the sclerotic lesion of the left aspect of L3 vertebral  body without pathological fracture and this is likely due to treatment effect.    CEA on 2/10/2020 was down to 3.3.    With cycle #9 we stopped oxaliplatin because of cumulative fatigue and increasing nausea and gave her 5-FU/Avastin.      I then discussed with her, her  as well as her son Gamal about possibly switching to single agent Xeloda going forward so as to limit her visits to the clinic and therefore exposure and risk from coronavirus.  We discussed the pros and cons of this approach and they were all willing to try this.      She started single agent Xeloda on 4/7/2020.    CT chest abdomen and pelvis on 7/20/2020 showed mild progression in the lung nodules.  Perirectal nodularity has mildly improved.    CEA on 6/30/2020 was 2.8.  On 7/22/2020 it was 4    We decided on switching to Xeloda/Avastin on 7/27/2020.  We decreased the dose of oxaliplatin to 100 mg per metered square.    CEA on 8/11/2020 was 4.8.    Cycle #4 was given with further dose reduction of oxaliplatin to 85 mg per metered squared because of worsening neuropathy.    Repeat CT chest abdomen and pelvis on 10/15/2020 overall shows stable disease with 1 to 2 mm increase in some of the lung nodules and about 2 mm decrease in the lymph node in the mesorectum.  We decided on continuing the same chemotherapy and she has been tolerating chemotherapy well.    CT scan from 1/11/2021 shows progressive disease with progression in the lung nodules and there is also a soft tissue density in the upper mid abdomen which could be new lymphadenopathy versus soft tissue deposit concerning for malignancy.  CEA on 12/23/2020 was also rising and it was 9.2.  Overall this is consistent with progression of the disease.      I recommend switching to second line irinotecan/Panitumumab.  The tumor is K-aimee wild type.  We discussed the rational schedule and potential side effects of it in detail.     We will plan to give it to her today  1/13/2021.    Panitumumab related rash.  We discussed that rash can happen from Panitumumab and if that happens then she should use Hydrocortisone 2.5% and clindamycin 1% topically twice daily if need be.  If this does not improve then we will give her minocycline.      Nausea and vomiting.  Continue as needed Zofran/Compazine.       Diarrhea.  She will use Imodium as needed.  We can also give her Lomotil if need be.        Hand-foot syndrome.  She has mild hand-foot syndrome from Xeloda.  Continue to use urea cream regularly.  This should get better because as she will be of Xeloda now.    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  Hopefully this will get better now that she would be off oxaliplatin.        Thrombocytopenia.  Platelets are 124 due to chemotherapy.  No bleeding.  Continue to observe.      Elevated LFTs.  She has mild elevation of transaminases which could be from oxaliplatin.  LFTs are better today.  Continue to monitor.       Bone metastasis.  Started Zometa on 11/25/2019.  We are giving it to her every 3 months and she last received on 11/11/2020.  Continue vitamin D and calcium.        We did not address the following today.    Right big toe infection/ingrown toenail.  She is done with systemic antibiotics.  She is using topical antibiotics under the care of podiatrist and this is getting better.      Port-A-Cath.  It was coiled in the right internal jugular vein so it was replaced 10/20/2020.       Discussion regarding health care directive  We discussed that it is important that she completes health care directive which would help in making sure that her wishes are followed about her treatment care in case she is not able to make a decision for herself.  We gave her information regarding that.    RTC in 2 weeks to see nurse practitioner.  I will see her back in 4 weeks.    All questions were answered to her satisfaction.  She is agreeable and comfortable with the plan.    Charline Velazquez MD    Video start  time. 8:18 AM  Video stop time. 8:29 AM      Again, thank you for allowing me to participate in the care of your patient.        Sincerely,        Charline Velazquez MD

## 2021-01-13 NOTE — PROGRESS NOTES
"ONCOLOGY FOLLOW UP NOTE    1/13/2021     Patient was being followed by Dr. Tavarez and saw her on 11/13/2019.  Now she is transferring her care to me.  I have reviewed her previous records and have copied and updated from prior notes.      DIAGNOSIS:    10/2019 dx adenoCa of rectum,  MMR intact- NGS Negative for KRAS, NRAS and BRAF. PD L1 25%.  Baseline CEA 10.  Stage IV disease with pulmonary and bony mets      HISTORY OF ONCOLOGY ILLNESS:    She has had intermittent hematochezia and anorectal irritation since 2011, that have been managed as \"hemorrhoids\". These symptoms became persistent and progressive since early thi year. Her anorectal pain is managed with Tylenol. Denies fevers, chills, or unintentional weight loss. Does have intermittent urinary and fecal urgency but no incontinence per se.    Diagnostic colonoscopy on 10/18/19 for rectal bleeding showed \"fungating and ulcerated non-obstructing mass was found in the distal rectum <1 cm from the anal verge. The mass was partially circumferential (involving one-half of the lumen circumference). The mass measured four cm in length. In addition, its diameter measured six mm.  Pathology showed invasive moderately differentiated adenocarcinoma with intact mismatch repair protein expression, PD L1 25% and NGS panel showed NO MUTATIONS in KRAS, NRAS and BRAF.      Pelvic MRI 11/2019 found fungating polypoid mid/lower rectal mass with luminal narrowing as well as innumerable enlarged lymph nodes as described above with a conglomerate of suspicious lymph nodes abutting the anterior peritoneal reflection. Stage cT3d N2.     PET 11/2019 found   1. Hypermetabolic mass in the distal rectum, SUV 14 corresponding to the patient's known rectal cancer.  2. Multiple hypermetabolic pelvic lymph nodes, compatible metastatic disease, SUV 5-6.  3. FDG avid pulmonary nodule measuring 7 mm in the left lower lobe, concerning for metastatic disease. Additional FDG avid pulmonary nodules " suspicious for metastatic disease, SUV around 4.  4. Osteoblastic metastatic lesion in the L3 vertebral body, SUV 6.8    She started palliative FOLFOX/Avastin on 11/25/2019.  Cycle #2 given on 12/9/2019.  Cycle #3 scheduled for 12/24/2019.  C#6 on 2/10/2020.    Repeat CT CAP on 2/21/2020 shows good response to therapy with improvement in the rectal mass, mesorectal and superior rectal nodularity as well as resolution of right internal iliac lymphadenopathy.  There is slight improvement in the dominant posterior left lower lobe lung nodule and several of the lung nodules are is stable.  There is increase in size of the sclerotic lesion of the left aspect of L3 vertebral body without pathological fracture and this is likely due to treatment effect.    CEA on 2/10/2020 was down to 3.3.    Cycle #7 FOLFOX/Avastin 2/25/2020.  C#8 FOLFOX/Avastin 3/9/2020  Cycle#9 5-FU/Avastin 3/24/2020-stopped oxaliplatin due to cumulative fatigue and issues with nausea.    I then discussed with her, her  as well as her son Gamal about possibly switching to single agent Xeloda going forward so as to limit her visits to the clinic and therefore exposure and risk from coronavirus.  We discussed the pros and cons of this approach and they were all willing to try this.     She started Single agent Xeloda on 4/7/2020.  Cycle #3 started on 5/19/2020.    Because of bone metastatic disease, she was started on Zometa on 11/25/2019. We are giving it every 3 months.        CT chest abdomen and pelvis on 7/20/2020 showed mild progression in the lung nodules.  Perirectal nodularity has mildly improved.    Last CEA on 7/22/2020 was 4.      7/27/2020- we decided on switching to XELOX/Avastin.     8/18/2020 -cycle #2 XELOX/Avastin.  CEA on 8/11/2020 was 4.8.  9/8/2020 - C#3 XELOX/Avastin. CEA on 9/2/2020 was 5.1  9/30/2020.  Cycle #4 XELOX/Avastin.  Oxaliplatin dose reduced further to 85 mg per metered square because of worsening  neuropathy.    Repeat CT chest abdomen and pelvis on 10/15/2020 overall shows stable disease with 1 to 2 mm increase in some of the lung nodules and about 2 mm decrease in the lymph node in the mesorectum.    CEA stable at 5.3 on 10/21/2020.    10/22/2020.  Cycle #5 XELOX/Avastin.  Oxaliplatin dose 85 mg/m .  11/11/2020 C#6 XELOX/Avastin. Oxaliplatin dose 85 mg/m .  12/2/2020 C#7 XELOX/Avastin. Oxaliplatin dose 85 mg/m .  12/23/2020 C#8 XELOX/Avastin. Oxaliplatin dose 85 mg/m .    1/11/2021-CT chest abdomen and pelvis shows progressive disease with progression in the lung nodules.  There was a new ill-defined soft tissue density material at the upper mid abdomen adjacent to the hemidiaphragm rangel, celiac access, and most proximal abdominal aorta and this is also concerning for malignancy.  There is a stable sclerosis of L3 and stable left ischial tuberosity tiny sclerosis.  CEA on 12/23/2020 was 9.2.    INTERVAL HISTORY:     This is a video visit through [facility].  Overall she tolerated the last chemotherapy well.  She did not have much nausea vomiting.  She had a little bit of diarrhea for which she took Imodium.  She thinks the neuropathy is the same and especially the left hand.  Denies any infections.  No new swellings.  Overall energy is the same.  She has a little bit of peeling of the skin of the palms.  Denies any pain.    ECOG 1     ROS:  Rest of the comprehensive review of the system was essentially unremarkable.      I reviewed with her history in epic as below.    PAST MEDICAL HISTORY  Reflux, hemorrhoid, hepatitis B?  Overactive bladder  Active Ambulatory Problems     Diagnosis Date Noted     External hemorrhoids 04/29/2016     Non morbid obesity due to excess calories 04/29/2016     Hepatitis B immune 04/29/2016     Screening for cervical cancer 04/29/2016     CARDIOVASCULAR SCREENING; LDL GOAL LESS THAN 160 04/29/2016     Gastroesophageal reflux disease 04/29/2016     Overactive bladder 07/21/2017      Nevus of left lower leg- undetermined behavior 12/27/2018     Metastasis from rectal cancer (H) 11/13/2019     Bone metastasis (H) 11/13/2019     Nausea 11/27/2019     Chemotherapy-induced neutropenia (H) 12/24/2019     Resolved Ambulatory Problems     Diagnosis Date Noted     Urgency of urination 04/29/2016     Need for vaccination against Streptococcus pneumoniae 04/29/2016     Morbid obesity (H) 07/21/2017     Obesity (BMI 35.0-39.9) with comorbidity (H) 12/27/2018     No Additional Past Medical History       MEDICATIONS:  Current Outpatient Medications   Medication     Ascorbic Acid (VITAMIN C) 500 MG CAPS     B Complex Vitamins (B COMPLEX-B12 PO)     capecitabine (XELODA) 500 MG tablet     capecitabine (XELODA) 500 MG tablet     capecitabine (XELODA) 500 MG tablet     capecitabine (XELODA) 500 MG tablet     cholecalciferol (VITAMIN D3) 400 unit (10 mcg) TABS tablet     docusate sodium (COLACE) 100 MG capsule     Fesoterodine Fumarate 8 MG TB24     lidocaine-prilocaine (EMLA) 2.5-2.5 % external cream     loperamide (IMODIUM) 2 MG capsule     LORazepam (ATIVAN) 0.5 MG tablet     LORazepam (ATIVAN) 0.5 MG tablet     Lutein 20 MG CAPS     mirabegron (MYRBETRIQ) 50 MG 24 hr tablet     Multiple vitamin TABS     naloxone (NARCAN) 4 MG/0.1ML nasal spray     OLANZapine (ZYPREXA) 2.5 MG tablet     omeprazole (PRILOSEC) 20 MG DR capsule     ondansetron (ZOFRAN) 8 MG tablet     prochlorperazine (COMPAZINE) 10 MG tablet     solifenacin (VESICARE) 10 MG tablet     traMADol (ULTRAM) 50 MG tablet     urea (GORDONS UREA) 40 % external ointment     Current Facility-Administered Medications   Medication     lidocaine (XYLOCAINE) 5 % ointment     Facility-Administered Medications Ordered in Other Visits   Medication     heparin 100 UNIT/ML injection 5 mL     sodium chloride (PF) 0.9% PF flush 10-20 mL     ALLERGY:     Allergies   Allergen Reactions     Penicillins      Sulfa Drugs             SOCIAL HISTORY:  She is a [place]  immigrants to the [place].  Denies smoking denies alcohol abuse.  2 of her sons are doctors.    FAMILY HISTORY:   Denies any family history of cancer.       PHYSICAL EXAMINATION:       LMP 11/27/2001 (Approximate)      Wt Readings from Last 4 Encounters:   12/23/20 69.8 kg (153 lb 12.8 oz)   12/02/20 71.4 kg (157 lb 4.8 oz)   12/02/20 71.4 kg (157 lb 5.8 oz)   11/11/20 71.3 kg (157 lb 1.6 oz)       Constitutional.  Looks well and in no apparent distress.   Eyes.  Without eye redness or apparent jaundice.   Respiratory.  Non labored breathing. Speaking in full sentences.    Skin.  No concerning skin rashes on the skin visualized.   Neurological.  Is alert and oriented.  Psychiatric.  Mood and affect seem appropriate.      The rest of a comprehensive physical examination is deferred due to [facility] video visit restrictions.    LABS/IMAGING/PATHOLOGY:    Reviewed  CBC shows WBC 3.9 ANC 1.3.  Hemoglobin 11.8.  Platelets 124.  CMP shows normal renal functions.  Albumin 3.2.  AST 47 and ALT 53 and these are better.    CEA on 12/23/2020 was 9.2.    1/11/2021-CT chest abdomen and pelvis shows progressive disease with progression in the lung nodules.  There was a new ill-defined soft tissue density material at the upper mid abdomen adjacent to the hemidiaphragm rangel, celiac access, and most proximal abdominal aorta and this is also concerning for malignancy.  This measures approximately 1.7 x 1.8 cm.  There is a stable sclerosis of L3 and stable left ischial tuberosity tiny sclerosis.    EXAM: CT CHEST/ABDOMEN/PELVIS W CONTRAST  LOCATION: [facility]  DATE/TIME: 1/11/2021 8:13 AM     INDICATION: Metastatic colon cancer. Bony metastatic disease.  COMPARISON: CT chest, abdomen and pelvis 10/15/2020.  TECHNIQUE: CT scan of the chest, abdomen, and pelvis was performed following injection of IV contrast. Multiplanar reformats were obtained. Dose reduction techniques were used.   CONTRAST: 93 ml isovue  370     FINDINGS:   LUNGS AND PLEURA: No effusions. Multiple bilateral pulmonary nodules within all lobes of both lungs consistent with pulmonary metastatic disease. Several are larger in size. An example at the medial left lower lobe now measures 1.7 x 0.9 cm, previously   1.5 x 0.7 cm, series 7 image 106. Another larger example at the posterior left lower lobe is 1.0 x 0.9 cm, previously 0.6 x 0.4 cm, image 129. At the right upper lobe an example is 0.6 x 0.6 cm, previously 0.4 x 0.4 cm, image 71.     MEDIASTINUM/AXILLAE: Right chest Port-A-Cath is identified, the previously noted looping of this catheter within the right internal jugular vein is not currently seen. The catheter tip is at the most superior aspect of the right atrium. No acute   mediastinal abnormality. Thoracic aortic and minimal coronary artery calcifications. Small hiatal hernia is stable. No new chest enlarged lymph nodes identified.     HEPATOBILIARY: Status post cholecystectomy. No focal hepatic lesions identified.     PANCREAS: Normal.     SPLEEN: Normal.     ADRENAL GLANDS: Stable mild thickening of the left adrenal. Normal right adrenal.     KIDNEYS/BLADDER: Normal.     BOWEL: Previously noted rectal wall thickening distally is stable versus slightly less prominent. Perirectal nodules again identified appearing stable. One of these is 1.0 x 0.6 cm posteriorly towards the left, series 3 image 509. There are 2 other   stable lesions towards the left posteriorly on image 519. Other small nodules superiorly towards the left posteriorly on image 474. Some of the perirectal fat has mild haziness. No bowel obstruction or inflammation.     LYMPH NODES: There is new indeterminate hazy soft tissue density at the upper mid abdomen posteriorly lying anterior to the hemidiaphragm rangel, and surrounding the celiac axis and most proximal abdominal aorta. This is difficult to precisely measure but   is approximately 1.7 x 1.8 cm, series 3 image  255.     VASCULATURE: Scattered mild vascular calcifications.     PELVIC ORGANS: Normal.     MUSCULOSKELETAL: Stable left L3 sclerosis measuring 2.3 cm image 332. Stable left ischial tuberosity tiny sclerosis image 542. This could just be a small bone island.                                                                      IMPRESSION:  1.  Enlargement of several bilateral metastatic pulmonary nodules suggesting progression of disease.  2.  New ill-defined soft tissue density material at the upper mid abdomen adjacent to the hemidiaphragm rangel, celiac access, and most proximal abdominal aorta. This could represent new area of adenopathy, potentially malignant. Inflammatory change in   this region is also possible, nonspecific as to precise etiology.  3.  Stable perirectal nodular opacities that could represent malignant lymph nodes.  4.  Stable sclerosis at L3 consistent with metastatic disease.      ASSESSMENT AND PLAN:      Metastatic rectal adenocarcinoma, MSI intact, K-aimee wild type, PDL 1 + 25% with metastasis to the lungs, bones and pelvic lymph nodes.      She started palliative chemotherapy with FOLFOX/Avastin on 11/25/2019.      She has received 6 cycles of chemotherapy.  With cycle #3, we stopped 5-FU bolus because of excessive tiredness.    Repeat CT CAP on 2/21/2020 shows good response to therapy with improvement in the rectal mass, mesorectal and superior rectal nodularity as well as resolution of right internal iliac lymphadenopathy.  There is slight improvement in the dominant posterior left lower lobe lung nodule and several of the lung nodules are is stable.  There is increase in size of the sclerotic lesion of the left aspect of L3 vertebral body without pathological fracture and this is likely due to treatment effect.    CEA on 2/10/2020 was down to 3.3.    With cycle #9 we stopped oxaliplatin because of cumulative fatigue and increasing nausea and gave her 5-FU/Avastin.      I then discussed  with her, her  as well as her son Gamal about possibly switching to single agent Xeloda going forward so as to limit her visits to the clinic and therefore exposure and risk from coronavirus.  We discussed the pros and cons of this approach and they were all willing to try this.      She started single agent Xeloda on 4/7/2020.    CT chest abdomen and pelvis on 7/20/2020 showed mild progression in the lung nodules.  Perirectal nodularity has mildly improved.    CEA on 6/30/2020 was 2.8.  On 7/22/2020 it was 4    We decided on switching to Xeloda/Avastin on 7/27/2020.  We decreased the dose of oxaliplatin to 100 mg per metered square.    CEA on 8/11/2020 was 4.8.    Cycle #4 was given with further dose reduction of oxaliplatin to 85 mg per metered squared because of worsening neuropathy.    Repeat CT chest abdomen and pelvis on 10/15/2020 overall shows stable disease with 1 to 2 mm increase in some of the lung nodules and about 2 mm decrease in the lymph node in the mesorectum.  We decided on continuing the same chemotherapy and she has been tolerating chemotherapy well.    CT scan from 1/11/2021 shows progressive disease with progression in the lung nodules and there is also a soft tissue density in the upper mid abdomen which could be new lymphadenopathy versus soft tissue deposit concerning for malignancy.  CEA on 12/23/2020 was also rising and it was 9.2.  Overall this is consistent with progression of the disease.      I recommend switching to second line irinotecan/Panitumumab.  The tumor is K-aimee wild type.  We discussed the rational schedule and potential side effects of it in detail.     We will plan to give it to her today 1/13/2021.    Panitumumab related rash.  We discussed that rash can happen from Panitumumab and if that happens then she should use Hydrocortisone 2.5% and clindamycin 1% topically twice daily if need be.  If this does not improve then we will give her minocycline.      Nausea  and vomiting.  Continue as needed Zofran/Compazine.       Diarrhea.  She will use Imodium as needed.  We can also give her Lomotil if need be.        Hand-foot syndrome.  She has mild hand-foot syndrome from Xeloda.  Continue to use urea cream regularly.  This should get better because as she will be of Xeloda now.    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  Hopefully this will get better now that she would be off oxaliplatin.        Thrombocytopenia.  Platelets are 124 due to chemotherapy.  No bleeding.  Continue to observe.      Elevated LFTs.  She has mild elevation of transaminases which could be from oxaliplatin.  LFTs are better today.  Continue to monitor.       Bone metastasis.  Started Zometa on 11/25/2019.  We are giving it to her every 3 months and she last received on 11/11/2020.  Continue vitamin D and calcium.        We did not address the following today.    Right big toe infection/ingrown toenail.  She is done with systemic antibiotics.  She is using topical antibiotics under the care of podiatrist and this is getting better.      Port-A-Cath.  It was coiled in the right internal jugular vein so it was replaced 10/20/2020.       Discussion regarding health care directive  We discussed that it is important that she completes health care directive which would help in making sure that her wishes are followed about her treatment care in case she is not able to make a decision for herself.  We gave her information regarding that.    RTC in 2 weeks to see nurse practitioner.  I will see her back in 4 weeks.    All questions were answered to her satisfaction.  She is agreeable and comfortable with the plan.    Charline Velazquez MD    Video start time. 8:18 AM  Video stop time. 8:29 AM

## 2021-01-13 NOTE — PROGRESS NOTES
[facility] Oral Chemotherapy Monitoring Program    Thank you for the opportunity to be a part in the care of this patient's oral chemotherapy. The oncology pharmacy will no longer be following this patient for oral chemotherapy. If there are any questions or the plan changes, feel free to contact us.    ORAL CHEMOTHERAPY 4/14/2020 7/27/2020 11/3/2020 11/11/2020 12/17/2020 1/13/2021   Assessment Type - - Refill Monthly Follow up Refill Discontinuation   Stop Date - - - - - 1/13/2021   Reason for Discontinuation - - - - - Disease progression   Diagnosis Code - - Colon Cancer Colon Cancer Colon Cancer Colon Cancer   Providers - - Charline Pierre Aazim   Clinic Name/Location - - [facility] [place]   Drug Name Xeloda (Capecitabine) Xeloda (Capecitabine) Xeloda (Capecitabine) Xeloda (Capecitabine) Xeloda (Capecitabine) Xeloda (Capecitabine)   Dose - - 1,500 mg 1,500 mg 1,500 mg 1,500 mg   Current Schedule Other BID BID BID BID BID   Cycle Details 2 weeks on 1 week off 2 weeks on 1 week off 2 weeks on, 1 week off 2 weeks on, 1 week off 2 weeks on, 1 week off 2 weeks on, 1 week off   Start Date of Last Cycle 4/7/2020 - - 10/21/2020 - -   Planned next cycle start date 4/28/2020 7/27/2020 - 11/11/2020 - -   Doses missed in last 2 weeks 0 0 - 0 - -   Adherence Assessment Adherent Adherent - Adherent - -   Adverse Effects (No Data) No AE identified during assessment - Palmar-plantar Erythrodysethesia Syndrome - -   Palmar-plantar Erythrodysethesia syndrome[hand-foot syndrome] - - - Grade 1 - -   Pharmacist Intervention(Palmar-plantar) - - - Yes - -   Intervention(s) - - - OTC recommendation - -   Home BPs Not applicable - - Not applicable - -   Any new drug interactions? No No - No - -   Is the dose as ordered appropriate for the patient? Yes Yes - Yes - -   Is the patient currently in pain? No - - No - -   Has the patient been assessed within the  past 6 months for depression? Yes - - - - -   Has the patient missed any days of school, work, or other routine activity? No No - No - -   Since the last time we talked, have you been hospitalized or used the emergency room? - - - No - -       Ewa Keys, PharmD  Hematology/Oncology Clinical Pharmacist  [facility]  [phone]

## 2021-01-27 NOTE — PROGRESS NOTES
Infusion Nursing Note:  Alannah Wynn presents today for C2 X9Gseszaxy/ Irinotecan + Zometa.    Patient seen by provider today: Yes: Video Visit with Felipa Garner NP   present during visit today: Not Applicable.    Note: Patient received atropine prior to irinotecan today. Patient stated she felt pretty good, but as she was getting her coat on made a statement about a few cramps coming on. Told her next time we would do atropine at the beginning AND the end of irinotecan. Patient verbalized understanding.     Patient did meet criteria for an asymptomatic covid-19 PCR test in infusion today. Patient declined the covid-19 test.    Intravenous Access:  Implanted Port.    Treatment Conditions:  Lab Results   Component Value Date    HGB 12.6 01/27/2021     Lab Results   Component Value Date    WBC 4.0 01/27/2021      Lab Results   Component Value Date    ANEU 1.4 01/27/2021     Lab Results   Component Value Date     01/27/2021      Lab Results   Component Value Date     01/27/2021                   Lab Results   Component Value Date    POTASSIUM 3.9 01/27/2021           Lab Results   Component Value Date    MAG 2.3 01/27/2021            Lab Results   Component Value Date    CR 0.62 01/27/2021                   Lab Results   Component Value Date    LAUREN 8.8 01/27/2021                Lab Results   Component Value Date    BILITOTAL 0.3 01/27/2021           Lab Results   Component Value Date    ALBUMIN 3.2 01/27/2021                    Lab Results   Component Value Date    ALT 56 01/27/2021           Lab Results   Component Value Date    AST 48 01/27/2021       Results reviewed, labs MET treatment parameters, ok to proceed with treatment.      Post Infusion Assessment:  Patient tolerated infusion without incident.  Blood return noted pre and post infusion.  Site patent and intact, free from redness, edema or discomfort.  No evidence of extravasations.  Access discontinued per protocol.        Discharge Plan:   Discharge instructions reviewed with: Patient.  Patient and/or family verbalized understanding of discharge instructions and all questions answered.  Patient discharged in stable condition accompanied by: self.  Departure Mode: Ambulatory.    Ernestina Melissa RN

## 2021-01-27 NOTE — PROGRESS NOTES
See IV flow sheet for details of port access. Labs sent cbc, cmp, magnesium, cea. IVAD needle left in place for chemotherapy to follow.    Shannan Ferrari RN

## 2021-01-27 NOTE — NURSING NOTE
Alannah is a 70 year old who is being evaluated via a billable telephone visit.      What phone number would you like to be contacted at? [phone]  How would you like to obtain your AVS? Blessing Bruno CMA

## 2021-01-27 NOTE — LETTER
"    1/27/2021         RE: Alannah Wynn  [address]  [place] [address]        Dear Colleague,    Thank you for referring your patient, Alannah Wynn, to the [facility]. Please see a copy of my visit note below.    Oncology Follow Up Visit: January 27, 2021     Oncologist: Dr Charline Velazquez  PCP: Earline Mehta    Diagnosis: Stage IV Adenocarcinoma of the rectum   Alannah Wynn is a 69 yo female who presented in 10/2019 with intermittent hematochezia and anorectal irritation since 2011, that have been managed as \"hemorrhoids\".  Diagnostic colonoscopy on 10/18/19 for rectal bleeding showed \"fungating and ulcerated non-obstructing mass was found in the distal rectum <1 cm from the anal verge. The mass was partially circumferential (involving one-half of the lumen circumference). The mass measured four cm in length. In addition, its diameter measured six mm.  Pathology showed invasive moderately differentiated adenocarcinoma with intact mismatch repair protein expression.  Pelvic MRI 11/2019 found fungating polypoid mid/lower rectal mass with luminal narrowing as well as innumerable enlarged lymph nodes as described above with a conglomerate of suspicious lymph nodes abutting the anterior peritoneal reflection. Stage cT3d N2.   Further testing with PET scan proved pulmonarymetastasis and bony metastasis at L3.  Has KRAS - wild type  Treatment:   11/25/2019 began treatment with FOLFOX/Avastin  11/25/2019 Began Zometa every 3 months  4/7/2020-7/2020 capecitabine x 4 cycles  12/23/2020 completed Xelox/Avastin x 8 cycles  1/13/2021  Began Irinotecan/ panitumumab every 2 weeks    Interval History: Ms. Wynn is contacted for review of symptoms for start of cycle 2 of Irinotecan/ panitumumab. Pt reports she has new facial rash and did start use of hydrocortisone and clindamycin cream but face continues to get more red with rash spreading she shares. She is eating now but " noted up to 4 days of nausea- used compazine and zofran and does not feel she has lost much weight. Bowels should be seeing some loose stools with irinotecan but pt shares she has had constipation- tried colace but feels it was not as helpful as she wishes- would like other medication for help with this . Neuropathy of the hands and feet is improving. Denies pain, mouth sores, fevers or sleep issues. Asking about Covid 19 vaccination.   Rest of comprehensive and complete ROS is reviewed and is negative.   Past Medical History:   Diagnosis Date     Gastroesophageal reflux disease      Current Outpatient Medications   Medication     Ascorbic Acid (VITAMIN C) 500 MG CAPS     B Complex Vitamins (B COMPLEX-B12 PO)     capecitabine (XELODA) 500 MG tablet     capecitabine (XELODA) 500 MG tablet     capecitabine (XELODA) 500 MG tablet     capecitabine (XELODA) 500 MG tablet     cholecalciferol (VITAMIN D3) 400 unit (10 mcg) TABS tablet     clindamycin (CLINDAMAX) 1 % external gel     docusate sodium (COLACE) 100 MG capsule     Fesoterodine Fumarate 8 MG TB24     hydrocortisone 2.5 % ointment     lidocaine-prilocaine (EMLA) 2.5-2.5 % external cream     loperamide (IMODIUM) 2 MG capsule     LORazepam (ATIVAN) 0.5 MG tablet     Lutein 20 MG CAPS     mirabegron (MYRBETRIQ) 50 MG 24 hr tablet     Multiple vitamin TABS     naloxone (NARCAN) 4 MG/0.1ML nasal spray     OLANZapine (ZYPREXA) 2.5 MG tablet     omeprazole (PRILOSEC) 20 MG DR capsule     ondansetron (ZOFRAN) 8 MG tablet     prochlorperazine (COMPAZINE) 10 MG tablet     solifenacin (VESICARE) 10 MG tablet     traMADol (ULTRAM) 50 MG tablet     urea (GORDONS UREA) 40 % external ointment     Current Facility-Administered Medications   Medication     lidocaine (XYLOCAINE) 5 % ointment     Allergies   Allergen Reactions     Penicillins      Sulfa Drugs      Physical Exam: LMP 11/27/2001 (Approximate)    Pt is alert and does not sound strained.   No cough or trouble breathing  noted - speaking in full sentences.   Able to understand her well.   Pleasant and not rushed - son in background with visit.   Pt does have accent as this is a second language for her but communication was good.     Laboratory Results:   No results found for any visits on 01/27/21.- will have completed prior to visit later today    Assessment and Plan:   Stage IV Adenocarcinoma of the rectum - Pt started new plan with irinotecan/Panitumumab 2 weeks previous. She has noted the facial rash beginning, early nausea, and reports new constipation which is unusual with use of irinotecan.   No changes will be made to plan today and we will work with the side effects for pt.   She will return in 2 weeks for review prior to cycle 3 of the plan.   Rash- Hydrocortisone 2.5% and clindamycin 1% topically twice daily as started. Pt does reports she is seeing more redness as well. Warned that this can progress and use of the creams is highly recommended. May need to start the minocycline in near future.  Bone metastasis- Due for zometa today as well- receiving every 3 months.   Elevated LFTs-  Continue to monitor.   Nausea-Zofran/Compazine will be renewed today.  Constipation- unusual since irinotecan typically causes loose stools but will recommend a stools softener but warned this may turn around in near future with more treatment cycles.  Peripheral neuropathy- from previous use of Oxaliplatin- no current treatment. Improving.  The total time of this phone encounter amounted to 30 minutes. This time included time spent with the patient, prep work, ordering tests, and performing post visit documentation.  Felipa Garner Cnp              Again, thank you for allowing me to participate in the care of your patient.        Sincerely,        Felipa Garner, LETICIA, APRN CNP

## 2021-01-27 NOTE — PROGRESS NOTES
"Oncology Follow Up Visit: January 27, 2021     Oncologist: Dr Charline Velazquez  PCP: Earline Mehta    Diagnosis: Stage IV Adenocarcinoma of the rectum   Alannah Wynn is a 69 yo female who presented in 10/2019 with intermittent hematochezia and anorectal irritation since 2011, that have been managed as \"hemorrhoids\".  Diagnostic colonoscopy on 10/18/19 for rectal bleeding showed \"fungating and ulcerated non-obstructing mass was found in the distal rectum <1 cm from the anal verge. The mass was partially circumferential (involving one-half of the lumen circumference). The mass measured four cm in length. In addition, its diameter measured six mm.  Pathology showed invasive moderately differentiated adenocarcinoma with intact mismatch repair protein expression.  Pelvic MRI 11/2019 found fungating polypoid mid/lower rectal mass with luminal narrowing as well as innumerable enlarged lymph nodes as described above with a conglomerate of suspicious lymph nodes abutting the anterior peritoneal reflection. Stage cT3d N2.   Further testing with PET scan proved pulmonarymetastasis and bony metastasis at L3.  Has KRAS - wild type  Treatment:   11/25/2019 began treatment with FOLFOX/Avastin  11/25/2019 Began Zometa every 3 months  4/7/2020-7/2020 capecitabine x 4 cycles  12/23/2020 completed Xelox/Avastin x 8 cycles  1/13/2021  Began Irinotecan/ panitumumab every 2 weeks    Interval History: Ms. Wynn is contacted for review of symptoms for start of cycle 2 of Irinotecan/ panitumumab. Pt reports she has new facial rash and did start use of hydrocortisone and clindamycin cream but face continues to get more red with rash spreading she shares. She is eating now but noted up to 4 days of nausea- used compazine and zofran and does not feel she has lost much weight. Bowels should be seeing some loose stools with irinotecan but pt shares she has had constipation- tried colace but feels it was not as helpful as she wishes- would like " other medication for help with this . Neuropathy of the hands and feet is improving. Denies pain, mouth sores, fevers or sleep issues. Asking about Covid 19 vaccination.   Rest of comprehensive and complete ROS is reviewed and is negative.   Past Medical History:   Diagnosis Date     Gastroesophageal reflux disease      Current Outpatient Medications   Medication     Ascorbic Acid (VITAMIN C) 500 MG CAPS     B Complex Vitamins (B COMPLEX-B12 PO)     capecitabine (XELODA) 500 MG tablet     capecitabine (XELODA) 500 MG tablet     capecitabine (XELODA) 500 MG tablet     capecitabine (XELODA) 500 MG tablet     cholecalciferol (VITAMIN D3) 400 unit (10 mcg) TABS tablet     clindamycin (CLINDAMAX) 1 % external gel     docusate sodium (COLACE) 100 MG capsule     Fesoterodine Fumarate 8 MG TB24     hydrocortisone 2.5 % ointment     lidocaine-prilocaine (EMLA) 2.5-2.5 % external cream     loperamide (IMODIUM) 2 MG capsule     LORazepam (ATIVAN) 0.5 MG tablet     Lutein 20 MG CAPS     mirabegron (MYRBETRIQ) 50 MG 24 hr tablet     Multiple vitamin TABS     naloxone (NARCAN) 4 MG/0.1ML nasal spray     OLANZapine (ZYPREXA) 2.5 MG tablet     omeprazole (PRILOSEC) 20 MG DR capsule     ondansetron (ZOFRAN) 8 MG tablet     prochlorperazine (COMPAZINE) 10 MG tablet     solifenacin (VESICARE) 10 MG tablet     traMADol (ULTRAM) 50 MG tablet     urea (GORDONS UREA) 40 % external ointment     Current Facility-Administered Medications   Medication     lidocaine (XYLOCAINE) 5 % ointment     Allergies   Allergen Reactions     Penicillins      Sulfa Drugs      Physical Exam: LMP 11/27/2001 (Approximate)    Pt is alert and does not sound strained.   No cough or trouble breathing noted - speaking in full sentences.   Able to understand her well.   Pleasant and not rushed - son in background with visit.   Pt does have accent as this is a second language for her but communication was good.     Laboratory Results:   No results found for any  visits on 01/27/21.- will have completed prior to visit later today    Assessment and Plan:   Stage IV Adenocarcinoma of the rectum - Pt started new plan with irinotecan/Panitumumab 2 weeks previous. She has noted the facial rash beginning, early nausea, and reports new constipation which is unusual with use of irinotecan.   No changes will be made to plan today and we will work with the side effects for pt.   She will return in 2 weeks for review prior to cycle 3 of the plan.   Rash- Hydrocortisone 2.5% and clindamycin 1% topically twice daily as started. Pt does reports she is seeing more redness as well. Warned that this can progress and use of the creams is highly recommended. May need to start the minocycline in near future.  Bone metastasis- Due for zometa today as well- receiving every 3 months.   Elevated LFTs-  Continue to monitor.   Nausea-Zofran/Compazine will be renewed today.  Constipation- unusual since irinotecan typically causes loose stools but will recommend a stools softener but warned this may turn around in near future with more treatment cycles.  Peripheral neuropathy- from previous use of Oxaliplatin- no current treatment. Improving.  The total time of this phone encounter amounted to 30 minutes. This time included time spent with the patient, prep work, ordering tests, and performing post visit documentation.  Felipa Garner,Cnp

## 2021-02-10 NOTE — PROGRESS NOTES
Infusion Nursing Note:  Jacquelinemike FRANCISCO Wynn presents today for [id number] and Irinotecan.    Patient seen by provider today: Yes: Dr Velazquez   present during visit today: Not Applicable.    Note:   Atropine given just prior to Irinotecan, and just after Irinotecan completed, due to history of cramps with Irinotecan.    Intravenous Access:  Implanted Port.    Treatment Conditions:  Lab Results   Component Value Date    HGB 12.3 02/10/2021     Lab Results   Component Value Date    WBC 4.2 02/10/2021      Lab Results   Component Value Date    ANEU 1.5 02/10/2021     Lab Results   Component Value Date     02/10/2021      Lab Results   Component Value Date     02/10/2021                   Lab Results   Component Value Date    POTASSIUM 3.8 02/10/2021           Lab Results   Component Value Date    MAG 2.1 02/10/2021            Lab Results   Component Value Date    CR 0.48 02/10/2021                   Lab Results   Component Value Date    LAUREN 8.5 02/10/2021                Lab Results   Component Value Date    BILITOTAL 0.3 02/10/2021           Lab Results   Component Value Date    ALBUMIN 3.2 02/10/2021                    Lab Results   Component Value Date    ALT 34 02/10/2021           Lab Results   Component Value Date    AST 29 02/10/2021       Results reviewed, labs MET treatment parameters, ok to proceed with treatment.      Post Infusion Assessment:  Patient tolerated infusion without incident.  Blood return noted pre and post infusion.  Site patent and intact, free from redness, edema or discomfort.  No evidence of extravasations.  Access discontinued per protocol.       Discharge Plan:   [facility]  Patient will return 2/24/21 for next appointment.   Patient discharged in stable condition accompanied by: self.  Departure Mode: Ambulatory.    Gisell Nuñez RN

## 2021-02-10 NOTE — PATIENT INSTRUCTIONS
Chemo today    RTC on 2/24 as scheduled    See me on 3/10 at 10 AM ( ok to double book )- chemo to follow  See me on 3/24 - chemo to follow

## 2021-02-10 NOTE — PROGRESS NOTES
"ONCOLOGY FOLLOW UP NOTE    2/10/2021     Patient was being followed by Dr. Tavarez and saw her on 11/13/2019.  Now she is transferring her care to me.  I have reviewed her previous records and have copied and updated from prior notes.      DIAGNOSIS:    10/2019 dx adenoCa of rectum,  MMR intact- NGS Negative for KRAS, NRAS and BRAF. PD L1 25%.  Baseline CEA 10.  Stage IV disease with pulmonary and bony mets      HISTORY OF ONCOLOGY ILLNESS:    She has had intermittent hematochezia and anorectal irritation since 2011, that have been managed as \"hemorrhoids\". These symptoms became persistent and progressive since early thi year. Her anorectal pain is managed with Tylenol. Denies fevers, chills, or unintentional weight loss. Does have intermittent urinary and fecal urgency but no incontinence per se.    Diagnostic colonoscopy on 10/18/19 for rectal bleeding showed \"fungating and ulcerated non-obstructing mass was found in the distal rectum <1 cm from the anal verge. The mass was partially circumferential (involving one-half of the lumen circumference). The mass measured four cm in length. In addition, its diameter measured six mm.  Pathology showed invasive moderately differentiated adenocarcinoma with intact mismatch repair protein expression, PD L1 25% and NGS panel showed NO MUTATIONS in KRAS, NRAS and BRAF.      Pelvic MRI 11/2019 found fungating polypoid mid/lower rectal mass with luminal narrowing as well as innumerable enlarged lymph nodes as described above with a conglomerate of suspicious lymph nodes abutting the anterior peritoneal reflection. Stage cT3d N2.     PET 11/2019 found   1. Hypermetabolic mass in the distal rectum, SUV 14 corresponding to the patient's known rectal cancer.  2. Multiple hypermetabolic pelvic lymph nodes, compatible metastatic disease, SUV 5-6.  3. FDG avid pulmonary nodule measuring 7 mm in the left lower lobe, concerning for metastatic disease. Additional FDG avid pulmonary nodules " suspicious for metastatic disease, SUV around 4.  4. Osteoblastic metastatic lesion in the L3 vertebral body, SUV 6.8    She started palliative FOLFOX/Avastin on 11/25/2019.  Cycle #2 given on 12/9/2019.  Cycle #3 scheduled for 12/24/2019.  C#6 on 2/10/2020.    Repeat CT CAP on 2/21/2020 shows good response to therapy with improvement in the rectal mass, mesorectal and superior rectal nodularity as well as resolution of right internal iliac lymphadenopathy.  There is slight improvement in the dominant posterior left lower lobe lung nodule and several of the lung nodules are is stable.  There is increase in size of the sclerotic lesion of the left aspect of L3 vertebral body without pathological fracture and this is likely due to treatment effect.    CEA on 2/10/2020 was down to 3.3.    Cycle #7 FOLFOX/Avastin 2/25/2020.  C#8 FOLFOX/Avastin 3/9/2020  Cycle#9 5-FU/Avastin 3/24/2020-stopped oxaliplatin due to cumulative fatigue and issues with nausea.    I then discussed with her, her  as well as her son Gamal about possibly switching to single agent Xeloda going forward so as to limit her visits to the clinic and therefore exposure and risk from coronavirus.  We discussed the pros and cons of this approach and they were all willing to try this.     She started Single agent Xeloda on 4/7/2020.  Cycle #3 started on 5/19/2020.    Because of bone metastatic disease, she was started on Zometa on 11/25/2019. We are giving it every 3 months.        CT chest abdomen and pelvis on 7/20/2020 showed mild progression in the lung nodules.  Perirectal nodularity has mildly improved.    Last CEA on 7/22/2020 was 4.      7/27/2020- we decided on switching to XELOX/Avastin.     8/18/2020 -cycle #2 XELOX/Avastin.  CEA on 8/11/2020 was 4.8.  9/8/2020 - C#3 XELOX/Avastin. CEA on 9/2/2020 was 5.1  9/30/2020.  Cycle #4 XELOX/Avastin.  Oxaliplatin dose reduced further to 85 mg per metered square because of worsening  neuropathy.    Repeat CT chest abdomen and pelvis on 10/15/2020 overall shows stable disease with 1 to 2 mm increase in some of the lung nodules and about 2 mm decrease in the lymph node in the mesorectum.    CEA stable at 5.3 on 10/21/2020.    10/22/2020.  Cycle #5 XELOX/Avastin.  Oxaliplatin dose 85 mg/m .  11/11/2020 C#6 XELOX/Avastin. Oxaliplatin dose 85 mg/m .  12/2/2020 C#7 XELOX/Avastin. Oxaliplatin dose 85 mg/m .  12/23/2020 C#8 XELOX/Avastin. Oxaliplatin dose 85 mg/m .    1/11/2021-CT chest abdomen and pelvis shows progressive disease with progression in the lung nodules.  There was a new ill-defined soft tissue density material at the upper mid abdomen adjacent to the hemidiaphragm rangel, celiac access, and most proximal abdominal aorta and this is also concerning for malignancy.  There is a stable sclerosis of L3 and stable left ischial tuberosity tiny sclerosis.  CEA on 12/23/2020 was 9.2.      1/13/2021.  Switch to irinotecan/Panitumumab.  1/27/2021.  Cycle #2 irinotecan/Panitumumab.  2/10/2021.  Cycle #3 irinotecan/Panitumumab.    INTERVAL HISTORY:     She comes in today and tells me that she has been doing well and overall chemotherapy is going well.  She sometimes has constipation and sometimes diarrhea.  She takes MiraLAX for constipation.  Had some nausea for first few days and took antinausea medications.  She had mild rash from Panitumumab involving the face and used hydrocortisone ointment which helped.  Did not have a bad rash.  No pain.  Overall neuropathy is better.  She still feels it more in the left hand.  Overall energy is stable.      ECOG 1     ROS:  Rest of the comprehensive review of the system was essentially unremarkable.      I reviewed with her history in epic as below.    PAST MEDICAL HISTORY  Reflux, hemorrhoid, hepatitis B?  Overactive bladder  Active Ambulatory Problems     Diagnosis Date Noted     External hemorrhoids 04/29/2016     Non morbid obesity due to excess calories  04/29/2016     Hepatitis B immune 04/29/2016     Screening for cervical cancer 04/29/2016     CARDIOVASCULAR SCREENING; LDL GOAL LESS THAN 160 04/29/2016     Gastroesophageal reflux disease 04/29/2016     Overactive bladder 07/21/2017     Nevus of left lower leg- undetermined behavior 12/27/2018     Metastasis from rectal cancer (H) 11/13/2019     Bone metastasis (H) 11/13/2019     Nausea 11/27/2019     Chemotherapy-induced neutropenia (H) 12/24/2019     Resolved Ambulatory Problems     Diagnosis Date Noted     Urgency of urination 04/29/2016     Need for vaccination against Streptococcus pneumoniae 04/29/2016     Morbid obesity (H) 07/21/2017     Obesity (BMI 35.0-39.9) with comorbidity (H) 12/27/2018     No Additional Past Medical History       MEDICATIONS:  Current Outpatient Medications   Medication     Ascorbic Acid (VITAMIN C) 500 MG CAPS     B Complex Vitamins (B COMPLEX-B12 PO)     cholecalciferol (VITAMIN D3) 400 unit (10 mcg) TABS tablet     clindamycin (CLINDAMAX) 1 % external gel     docusate sodium (COLACE) 100 MG capsule     Fesoterodine Fumarate 8 MG TB24     hydrocortisone 2.5 % ointment     lidocaine-prilocaine (EMLA) 2.5-2.5 % external cream     loperamide (IMODIUM) 2 MG capsule     LORazepam (ATIVAN) 0.5 MG tablet     Lutein 20 MG CAPS     mirabegron (MYRBETRIQ) 50 MG 24 hr tablet     Multiple vitamin TABS     naloxone (NARCAN) 4 MG/0.1ML nasal spray     omeprazole (PRILOSEC) 20 MG DR capsule     ondansetron (ZOFRAN) 8 MG tablet     prochlorperazine (COMPAZINE) 10 MG tablet     senna-docusate (SENNA S) 8.6-50 MG tablet     solifenacin (VESICARE) 10 MG tablet     traMADol (ULTRAM) 50 MG tablet     urea (GORDONS UREA) 40 % external ointment     Current Facility-Administered Medications   Medication     lidocaine (XYLOCAINE) 5 % ointment     ALLERGY:     Allergies   Allergen Reactions     Penicillins      Sulfa Drugs             SOCIAL HISTORY:  She is a [place] immigrants to [place].  Denies  "smoking denies alcohol abuse.  2 of her sons are doctors.    FAMILY HISTORY:   Denies any family history of cancer.       PHYSICAL EXAMINATION:       /77 (BP Location: Right arm)   Pulse 83   Temp 97.8  F (36.6  C) (Oral)   Ht 1.575 m (5' 2\")   Wt 65.9 kg (145 lb 4.8 oz)   LMP 11/27/2001 (Approximate)   SpO2 99%   BMI 26.58 kg/m       Wt Readings from Last 4 Encounters:   02/10/21 65.9 kg (145 lb 4.8 oz)   01/27/21 67.4 kg (148 lb 9.6 oz)   12/23/20 69.8 kg (153 lb 12.8 oz)   12/02/20 71.4 kg (157 lb 4.8 oz)       CONSTITUTIONAL: no acute distress  EYES: PERRLA, no palor or icterus.   ENT/MOUTH: no mouth lesions. Ears normal  CVS: s1s2 no m r g .   RESPIRATORY: clear to auscultation b/l  GI: soft non tender no hepatosplenomegaly  NEURO: AAOX3  Grossly non focal neuro exam  INTEGUMENT: no obvious rashes  LYMPHATIC: no palpable cervical, supraclavicular, axillary or inguinal LAD  MUSCULOSKELETAL: Unremarkable. No bony tenderness.   EXTREMITIES: no edema  PSYCH: Mentation, mood and affect are normal. Decision making capacity is intact      LABS/IMAGING/PATHOLOGY:    Reviewed  CBC shows WBC 4.2.  ANC 1.5.  Rest is unremarkable.    CMP is unremarkable except albumin 3.2.    Last CEA was 10 on 1/27/2020.        1/11/2021-CT chest abdomen and pelvis shows progressive disease with progression in the lung nodules.  There was a new ill-defined soft tissue density material at the upper mid abdomen adjacent to the hemidiaphragm rangel, celiac access, and most proximal abdominal aorta and this is also concerning for malignancy.  This measures approximately 1.7 x 1.8 cm.  There is a stable sclerosis of L3 and stable left ischial tuberosity tiny sclerosis.    EXAM: CT CHEST/ABDOMEN/PELVIS W CONTRAST  LOCATION: [facility]  DATE/TIME: 1/11/2021 8:13 AM     INDICATION: Metastatic colon cancer. Bony metastatic disease.  COMPARISON: CT chest, abdomen and pelvis 10/15/2020.  TECHNIQUE: CT scan of the chest, " abdomen, and pelvis was performed following injection of IV contrast. Multiplanar reformats were obtained. Dose reduction techniques were used.   CONTRAST: 93 ml isovue 370     FINDINGS:   LUNGS AND PLEURA: No effusions. Multiple bilateral pulmonary nodules within all lobes of both lungs consistent with pulmonary metastatic disease. Several are larger in size. An example at the medial left lower lobe now measures 1.7 x 0.9 cm, previously   1.5 x 0.7 cm, series 7 image 106. Another larger example at the posterior left lower lobe is 1.0 x 0.9 cm, previously 0.6 x 0.4 cm, image 129. At the right upper lobe an example is 0.6 x 0.6 cm, previously 0.4 x 0.4 cm, image 71.     MEDIASTINUM/AXILLAE: Right chest Port-A-Cath is identified, the previously noted looping of this catheter within the right internal jugular vein is not currently seen. The catheter tip is at the most superior aspect of the right atrium. No acute   mediastinal abnormality. Thoracic aortic and minimal coronary artery calcifications. Small hiatal hernia is stable. No new chest enlarged lymph nodes identified.     HEPATOBILIARY: Status post cholecystectomy. No focal hepatic lesions identified.     PANCREAS: Normal.     SPLEEN: Normal.     ADRENAL GLANDS: Stable mild thickening of the left adrenal. Normal right adrenal.     KIDNEYS/BLADDER: Normal.     BOWEL: Previously noted rectal wall thickening distally is stable versus slightly less prominent. Perirectal nodules again identified appearing stable. One of these is 1.0 x 0.6 cm posteriorly towards the left, series 3 image 509. There are 2 other   stable lesions towards the left posteriorly on image 519. Other small nodules superiorly towards the left posteriorly on image 474. Some of the perirectal fat has mild haziness. No bowel obstruction or inflammation.     LYMPH NODES: There is new indeterminate hazy soft tissue density at the upper mid abdomen posteriorly lying anterior to the hemidiaphragm rangel,  and surrounding the celiac axis and most proximal abdominal aorta. This is difficult to precisely measure but   is approximately 1.7 x 1.8 cm, series 3 image 255.     VASCULATURE: Scattered mild vascular calcifications.     PELVIC ORGANS: Normal.     MUSCULOSKELETAL: Stable left L3 sclerosis measuring 2.3 cm image 332. Stable left ischial tuberosity tiny sclerosis image 542. This could just be a small bone island.                                                                      IMPRESSION:  1.  Enlargement of several bilateral metastatic pulmonary nodules suggesting progression of disease.  2.  New ill-defined soft tissue density material at the upper mid abdomen adjacent to the hemidiaphragm rangel, celiac access, and most proximal abdominal aorta. This could represent new area of adenopathy, potentially malignant. Inflammatory change in   this region is also possible, nonspecific as to precise etiology.  3.  Stable perirectal nodular opacities that could represent malignant lymph nodes.  4.  Stable sclerosis at L3 consistent with metastatic disease.      ASSESSMENT AND PLAN:      Metastatic rectal adenocarcinoma, MSI intact, K-aimee wild type, PDL 1 + 25% with metastasis to the lungs, bones and pelvic lymph nodes.      She started palliative chemotherapy with FOLFOX/Avastin on 11/25/2019.      She has received 6 cycles of chemotherapy.  With cycle #3, we stopped 5-FU bolus because of excessive tiredness.    Repeat CT CAP on 2/21/2020 shows good response to therapy with improvement in the rectal mass, mesorectal and superior rectal nodularity as well as resolution of right internal iliac lymphadenopathy.  There is slight improvement in the dominant posterior left lower lobe lung nodule and several of the lung nodules are is stable.  There is increase in size of the sclerotic lesion of the left aspect of L3 vertebral body without pathological fracture and this is likely due to treatment effect.    CEA on 2/10/2020  was down to 3.3.    With cycle #9 we stopped oxaliplatin because of cumulative fatigue and increasing nausea and gave her 5-FU/Avastin.      I then discussed with her, her  as well as her son Gamal about possibly switching to single agent Xeloda going forward so as to limit her visits to the clinic and therefore exposure and risk from coronavirus.  We discussed the pros and cons of this approach and they were all willing to try this.      She started single agent Xeloda on 4/7/2020.    CT chest abdomen and pelvis on 7/20/2020 showed mild progression in the lung nodules.  Perirectal nodularity has mildly improved.    CEA on 6/30/2020 was 2.8.  On 7/22/2020 it was 4    We decided on switching to Xeloda/Avastin on 7/27/2020.  We decreased the dose of oxaliplatin to 100 mg per metered square.    CEA on 8/11/2020 was 4.8.    Cycle #4 was given with further dose reduction of oxaliplatin to 85 mg per metered squared because of worsening neuropathy.    Repeat CT chest abdomen and pelvis on 10/15/2020 overall shows stable disease with 1 to 2 mm increase in some of the lung nodules and about 2 mm decrease in the lymph node in the mesorectum.  We decided on continuing the same chemotherapy and she has been tolerating chemotherapy well.    CT scan from 1/11/2021 shows progressive disease with progression in the lung nodules and there is also a soft tissue density in the upper mid abdomen which could be new lymphadenopathy versus soft tissue deposit concerning for malignancy.  CEA on 12/23/2020 was also rising and it was 9.2.  Overall this is consistent with progression of the disease.      Because of slow progression I switched to irinotecan/Panitumumab on 1/13/2021.  Overall she is doing well and today we will proceed with cycle #3.    Last CEA was 10.    Neutropenia.  She has mild neutropenia and ANC is 1.5.  At this time we will continue the same dose of medications and keep a close watch on this.    Nausea.   Continue antinausea medications as before.    Bowel issues.  She has constipation and then she takes MiraLAX and then she has diarrhea.  I recommend starting taking Senokot for constipation and use Imodium for diarrhea.    Panitumumab related rash.  Continue hydrocortisone 2.5% and clindamycin 1% topical ointment twice a day as needed for the rash.  If it gets worse then we will give her minocycline.  For now rash is under decent control with topical medications.    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  This has improved after stopping oxaliplatin.  I hope to see continued improvement with time.       Bone metastasis.  Started Zometa on 11/25/2019.  She last received on 1/27/2021. She will get it every 3 months. Continue vitamin D and calcium.        We did not address the following today.    Right big toe infection/ingrown toenail.  She is done with systemic antibiotics.  She is using topical antibiotics under the care of podiatrist and this is getting better.      Port-A-Cath.  It was coiled in the right internal jugular vein so it was replaced 10/20/2020.       Discussion regarding health care directive  We discussed that it is important that she completes health care directive which would help in making sure that her wishes are followed about her treatment care in case she is not able to make a decision for herself.  We gave her information regarding that.    RTC in 2 weeks to see nurse practitioner.  I will see her back in 4 weeks.    All questions were answered to her satisfaction.  She is agreeable and comfortable with the plan.    Charline Velazquez MD

## 2021-02-10 NOTE — PROGRESS NOTES
See IV flow sheet for details of port access. Labs sent cbc, cmp, magnesium, cea. IVAD needle in place for chemotherapy to follow.    Shannan Ferrari RN

## 2021-02-10 NOTE — NURSING NOTE
"Oncology Rooming Note    February 10, 2021 8:46 AM   Alannah Wynn is a 70 year old female who presents for:    Chief Complaint   Patient presents with     Oncology Clinic Visit     Follow up     Initial Vitals: /77 (BP Location: Right arm)   Pulse 83   Temp 97.8  F (36.6  C) (Oral)   Ht 1.575 m (5' 2\")   Wt 65.9 kg (145 lb 4.8 oz)   LMP 11/27/2001 (Approximate)   SpO2 99%   BMI 26.58 kg/m   Estimated body mass index is 26.58 kg/m  as calculated from the following:    Height as of this encounter: 1.575 m (5' 2\").    Weight as of this encounter: 65.9 kg (145 lb 4.8 oz). Body surface area is 1.7 meters squared.  No Pain (0) Comment: Data Unavailable   Patient's last menstrual period was 11/27/2001 (approximate).  Allergies reviewed: Yes  Medications reviewed: Yes    Medications: Medication refills not needed today.  Pharmacy name entered into [facility]:    [id number] - [place], [place] - [address] AT [facility] & [address] [place] [address] [facility] - [place], [place] - [address], [id number]  [facility] - [place], [place] - [address]     Clinical concerns: Discuss stool pattern and review blood tests Dr. Velazquez was notified.      Mone Singh CMA            "

## 2021-02-10 NOTE — LETTER
"    2/10/2021         RE: Alannah Wynn  [address]  [place] [address]        Dear Colleague,    Thank you for referring your patient, Alannah Wynn, to the [facility]. Please see a copy of my visit note below.    ONCOLOGY FOLLOW UP NOTE    2/10/2021     Patient was being followed by Dr. Tavarez and saw her on 11/13/2019.  Now she is transferring her care to me.  I have reviewed her previous records and have copied and updated from prior notes.      DIAGNOSIS:    10/2019 dx adenoCa of rectum,  MMR intact- NGS Negative for KRAS, NRAS and BRAF. PD L1 25%.  Baseline CEA 10.  Stage IV disease with pulmonary and bony mets      HISTORY OF ONCOLOGY ILLNESS:    She has had intermittent hematochezia and anorectal irritation since 2011, that have been managed as \"hemorrhoids\". These symptoms became persistent and progressive since early thi year. Her anorectal pain is managed with Tylenol. Denies fevers, chills, or unintentional weight loss. Does have intermittent urinary and fecal urgency but no incontinence per se.    Diagnostic colonoscopy on 10/18/19 for rectal bleeding showed \"fungating and ulcerated non-obstructing mass was found in the distal rectum <1 cm from the anal verge. The mass was partially circumferential (involving one-half of the lumen circumference). The mass measured four cm in length. In addition, its diameter measured six mm.  Pathology showed invasive moderately differentiated adenocarcinoma with intact mismatch repair protein expression, PD L1 25% and NGS panel showed NO MUTATIONS in KRAS, NRAS and BRAF.      Pelvic MRI 11/2019 found fungating polypoid mid/lower rectal mass with luminal narrowing as well as innumerable enlarged lymph nodes as described above with a conglomerate of suspicious lymph nodes abutting the anterior peritoneal reflection. Stage cT3d N2.     PET 11/2019 found   1. Hypermetabolic mass in the distal rectum, SUV 14 corresponding to " the patient's known rectal cancer.  2. Multiple hypermetabolic pelvic lymph nodes, compatible metastatic disease, SUV 5-6.  3. FDG avid pulmonary nodule measuring 7 mm in the left lower lobe, concerning for metastatic disease. Additional FDG avid pulmonary nodules suspicious for metastatic disease, SUV around 4.  4. Osteoblastic metastatic lesion in the L3 vertebral body, SUV 6.8    She started palliative FOLFOX/Avastin on 11/25/2019.  Cycle #2 given on 12/9/2019.  Cycle #3 scheduled for 12/24/2019.  C#6 on 2/10/2020.    Repeat CT CAP on 2/21/2020 shows good response to therapy with improvement in the rectal mass, mesorectal and superior rectal nodularity as well as resolution of right internal iliac lymphadenopathy.  There is slight improvement in the dominant posterior left lower lobe lung nodule and several of the lung nodules are is stable.  There is increase in size of the sclerotic lesion of the left aspect of L3 vertebral body without pathological fracture and this is likely due to treatment effect.    CEA on 2/10/2020 was down to 3.3.    Cycle #7 FOLFOX/Avastin 2/25/2020.  C#8 FOLFOX/Avastin 3/9/2020  Cycle#9 5-FU/Avastin 3/24/2020-stopped oxaliplatin due to cumulative fatigue and issues with nausea.    I then discussed with her, her  as well as her son Gamal about possibly switching to single agent Xeloda going forward so as to limit her visits to the clinic and therefore exposure and risk from coronavirus.  We discussed the pros and cons of this approach and they were all willing to try this.     She started Single agent Xeloda on 4/7/2020.  Cycle #3 started on 5/19/2020.    Because of bone metastatic disease, she was started on Zometa on 11/25/2019. We are giving it every 3 months.        CT chest abdomen and pelvis on 7/20/2020 showed mild progression in the lung nodules.  Perirectal nodularity has mildly improved.    Last CEA on 7/22/2020 was 4.      7/27/2020- we decided on switching to  XELOX/Avastin.     8/18/2020 -cycle #2 XELOX/Avastin.  CEA on 8/11/2020 was 4.8.  9/8/2020 - C#3 XELOX/Avastin. CEA on 9/2/2020 was 5.1  9/30/2020.  Cycle #4 XELOX/Avastin.  Oxaliplatin dose reduced further to 85 mg per metered square because of worsening neuropathy.    Repeat CT chest abdomen and pelvis on 10/15/2020 overall shows stable disease with 1 to 2 mm increase in some of the lung nodules and about 2 mm decrease in the lymph node in the mesorectum.    CEA stable at 5.3 on 10/21/2020.    10/22/2020.  Cycle #5 XELOX/Avastin.  Oxaliplatin dose 85 mg/m .  11/11/2020 C#6 XELOX/Avastin. Oxaliplatin dose 85 mg/m .  12/2/2020 C#7 XELOX/Avastin. Oxaliplatin dose 85 mg/m .  12/23/2020 C#8 XELOX/Avastin. Oxaliplatin dose 85 mg/m .    1/11/2021-CT chest abdomen and pelvis shows progressive disease with progression in the lung nodules.  There was a new ill-defined soft tissue density material at the upper mid abdomen adjacent to the hemidiaphragm rangel, celiac access, and most proximal abdominal aorta and this is also concerning for malignancy.  There is a stable sclerosis of L3 and stable left ischial tuberosity tiny sclerosis.  CEA on 12/23/2020 was 9.2.      1/13/2021.  Switch to irinotecan/Panitumumab.  1/27/2021.  Cycle #2 irinotecan/Panitumumab.  2/10/2021.  Cycle #3 irinotecan/Panitumumab.    INTERVAL HISTORY:     She comes in today and tells me that she has been doing well and overall chemotherapy is going well.  She sometimes has constipation and sometimes diarrhea.  She takes MiraLAX for constipation.  Had some nausea for first few days and took antinausea medications.  She had mild rash from Panitumumab involving the face and used hydrocortisone ointment which helped.  Did not have a bad rash.  No pain.  Overall neuropathy is better.  She still feels it more in the left hand.  Overall energy is stable.      ECOG 1     ROS:  Rest of the comprehensive review of the system was essentially unremarkable.      I  reviewed with her history in epic as below.    PAST MEDICAL HISTORY  Reflux, hemorrhoid, hepatitis B?  Overactive bladder  Active Ambulatory Problems     Diagnosis Date Noted     External hemorrhoids 04/29/2016     Non morbid obesity due to excess calories 04/29/2016     Hepatitis B immune 04/29/2016     Screening for cervical cancer 04/29/2016     CARDIOVASCULAR SCREENING; LDL GOAL LESS THAN 160 04/29/2016     Gastroesophageal reflux disease 04/29/2016     Overactive bladder 07/21/2017     Nevus of left lower leg- undetermined behavior 12/27/2018     Metastasis from rectal cancer (H) 11/13/2019     Bone metastasis (H) 11/13/2019     Nausea 11/27/2019     Chemotherapy-induced neutropenia (H) 12/24/2019     Resolved Ambulatory Problems     Diagnosis Date Noted     Urgency of urination 04/29/2016     Need for vaccination against Streptococcus pneumoniae 04/29/2016     Morbid obesity (H) 07/21/2017     Obesity (BMI 35.0-39.9) with comorbidity (H) 12/27/2018     No Additional Past Medical History       MEDICATIONS:  Current Outpatient Medications   Medication     Ascorbic Acid (VITAMIN C) 500 MG CAPS     B Complex Vitamins (B COMPLEX-B12 PO)     cholecalciferol (VITAMIN D3) 400 unit (10 mcg) TABS tablet     clindamycin (CLINDAMAX) 1 % external gel     docusate sodium (COLACE) 100 MG capsule     Fesoterodine Fumarate 8 MG TB24     hydrocortisone 2.5 % ointment     lidocaine-prilocaine (EMLA) 2.5-2.5 % external cream     loperamide (IMODIUM) 2 MG capsule     LORazepam (ATIVAN) 0.5 MG tablet     Lutein 20 MG CAPS     mirabegron (MYRBETRIQ) 50 MG 24 hr tablet     Multiple vitamin TABS     naloxone (NARCAN) 4 MG/0.1ML nasal spray     omeprazole (PRILOSEC) 20 MG DR capsule     ondansetron (ZOFRAN) 8 MG tablet     prochlorperazine (COMPAZINE) 10 MG tablet     senna-docusate (SENNA S) 8.6-50 MG tablet     solifenacin (VESICARE) 10 MG tablet     traMADol (ULTRAM) 50 MG tablet     urea (GORDONS UREA) 40 % external ointment  "    Current Facility-Administered Medications   Medication     lidocaine (XYLOCAINE) 5 % ointment     ALLERGY:     Allergies   Allergen Reactions     Penicillins      Sulfa Drugs             SOCIAL HISTORY:  She is a [place] immigrants to [place].  Denies smoking denies alcohol abuse.  2 of her sons are doctors.    FAMILY HISTORY:   Denies any family history of cancer.       PHYSICAL EXAMINATION:       /77 (BP Location: Right arm)   Pulse 83   Temp 97.8  F (36.6  C) (Oral)   Ht 1.575 m (5' 2\")   Wt 65.9 kg (145 lb 4.8 oz)   LMP 11/27/2001 (Approximate)   SpO2 99%   BMI 26.58 kg/m       Wt Readings from Last 4 Encounters:   02/10/21 65.9 kg (145 lb 4.8 oz)   01/27/21 67.4 kg (148 lb 9.6 oz)   12/23/20 69.8 kg (153 lb 12.8 oz)   12/02/20 71.4 kg (157 lb 4.8 oz)       CONSTITUTIONAL: no acute distress  EYES: PERRLA, no palor or icterus.   ENT/MOUTH: no mouth lesions. Ears normal  CVS: s1s2 no m r g .   RESPIRATORY: clear to auscultation b/l  GI: soft non tender no hepatosplenomegaly  NEURO: AAOX3  Grossly non focal neuro exam  INTEGUMENT: no obvious rashes  LYMPHATIC: no palpable cervical, supraclavicular, axillary or inguinal LAD  MUSCULOSKELETAL: Unremarkable. No bony tenderness.   EXTREMITIES: no edema  PSYCH: Mentation, mood and affect are normal. Decision making capacity is intact      LABS/IMAGING/PATHOLOGY:    Reviewed  CBC shows WBC 4.2.  ANC 1.5.  Rest is unremarkable.    CMP is unremarkable except albumin 3.2.    Last CEA was 10 on 1/27/2020.        1/11/2021-CT chest abdomen and pelvis shows progressive disease with progression in the lung nodules.  There was a new ill-defined soft tissue density material at the upper mid abdomen adjacent to the hemidiaphragm rangel, celiac access, and most proximal abdominal aorta and this is also concerning for malignancy.  This measures approximately 1.7 x 1.8 cm.  There is a stable sclerosis of L3 and stable left ischial tuberosity tiny sclerosis.    EXAM: " CT CHEST/ABDOMEN/PELVIS W CONTRAST  LOCATION: [facility]  DATE/TIME: 1/11/2021 8:13 AM     INDICATION: Metastatic colon cancer. Bony metastatic disease.  COMPARISON: CT chest, abdomen and pelvis 10/15/2020.  TECHNIQUE: CT scan of the chest, abdomen, and pelvis was performed following injection of IV contrast. Multiplanar reformats were obtained. Dose reduction techniques were used.   CONTRAST: 93 ml isovue 370     FINDINGS:   LUNGS AND PLEURA: No effusions. Multiple bilateral pulmonary nodules within all lobes of both lungs consistent with pulmonary metastatic disease. Several are larger in size. An example at the medial left lower lobe now measures 1.7 x 0.9 cm, previously   1.5 x 0.7 cm, series 7 image 106. Another larger example at the posterior left lower lobe is 1.0 x 0.9 cm, previously 0.6 x 0.4 cm, image 129. At the right upper lobe an example is 0.6 x 0.6 cm, previously 0.4 x 0.4 cm, image 71.     MEDIASTINUM/AXILLAE: Right chest Port-A-Cath is identified, the previously noted looping of this catheter within the right internal jugular vein is not currently seen. The catheter tip is at the most superior aspect of the right atrium. No acute   mediastinal abnormality. Thoracic aortic and minimal coronary artery calcifications. Small hiatal hernia is stable. No new chest enlarged lymph nodes identified.     HEPATOBILIARY: Status post cholecystectomy. No focal hepatic lesions identified.     PANCREAS: Normal.     SPLEEN: Normal.     ADRENAL GLANDS: Stable mild thickening of the left adrenal. Normal right adrenal.     KIDNEYS/BLADDER: Normal.     BOWEL: Previously noted rectal wall thickening distally is stable versus slightly less prominent. Perirectal nodules again identified appearing stable. One of these is 1.0 x 0.6 cm posteriorly towards the left, series 3 image 509. There are 2 other   stable lesions towards the left posteriorly on image 519. Other small nodules superiorly towards the left  posteriorly on image 474. Some of the perirectal fat has mild haziness. No bowel obstruction or inflammation.     LYMPH NODES: There is new indeterminate hazy soft tissue density at the upper mid abdomen posteriorly lying anterior to the hemidiaphragm rangel, and surrounding the celiac axis and most proximal abdominal aorta. This is difficult to precisely measure but   is approximately 1.7 x 1.8 cm, series 3 image 255.     VASCULATURE: Scattered mild vascular calcifications.     PELVIC ORGANS: Normal.     MUSCULOSKELETAL: Stable left L3 sclerosis measuring 2.3 cm image 332. Stable left ischial tuberosity tiny sclerosis image 542. This could just be a small bone island.                                                                      IMPRESSION:  1.  Enlargement of several bilateral metastatic pulmonary nodules suggesting progression of disease.  2.  New ill-defined soft tissue density material at the upper mid abdomen adjacent to the hemidiaphragm rangel, celiac access, and most proximal abdominal aorta. This could represent new area of adenopathy, potentially malignant. Inflammatory change in   this region is also possible, nonspecific as to precise etiology.  3.  Stable perirectal nodular opacities that could represent malignant lymph nodes.  4.  Stable sclerosis at L3 consistent with metastatic disease.      ASSESSMENT AND PLAN:      Metastatic rectal adenocarcinoma, MSI intact, K-aimee wild type, PDL 1 + 25% with metastasis to the lungs, bones and pelvic lymph nodes.      She started palliative chemotherapy with FOLFOX/Avastin on 11/25/2019.      She has received 6 cycles of chemotherapy.  With cycle #3, we stopped 5-FU bolus because of excessive tiredness.    Repeat CT CAP on 2/21/2020 shows good response to therapy with improvement in the rectal mass, mesorectal and superior rectal nodularity as well as resolution of right internal iliac lymphadenopathy.  There is slight improvement in the dominant posterior  left lower lobe lung nodule and several of the lung nodules are is stable.  There is increase in size of the sclerotic lesion of the left aspect of L3 vertebral body without pathological fracture and this is likely due to treatment effect.    CEA on 2/10/2020 was down to 3.3.    With cycle #9 we stopped oxaliplatin because of cumulative fatigue and increasing nausea and gave her 5-FU/Avastin.      I then discussed with her, her  as well as her son Gamal about possibly switching to single agent Xeloda going forward so as to limit her visits to the clinic and therefore exposure and risk from coronavirus.  We discussed the pros and cons of this approach and they were all willing to try this.      She started single agent Xeloda on 4/7/2020.    CT chest abdomen and pelvis on 7/20/2020 showed mild progression in the lung nodules.  Perirectal nodularity has mildly improved.    CEA on 6/30/2020 was 2.8.  On 7/22/2020 it was 4    We decided on switching to Xeloda/Avastin on 7/27/2020.  We decreased the dose of oxaliplatin to 100 mg per metered square.    CEA on 8/11/2020 was 4.8.    Cycle #4 was given with further dose reduction of oxaliplatin to 85 mg per metered squared because of worsening neuropathy.    Repeat CT chest abdomen and pelvis on 10/15/2020 overall shows stable disease with 1 to 2 mm increase in some of the lung nodules and about 2 mm decrease in the lymph node in the mesorectum.  We decided on continuing the same chemotherapy and she has been tolerating chemotherapy well.    CT scan from 1/11/2021 shows progressive disease with progression in the lung nodules and there is also a soft tissue density in the upper mid abdomen which could be new lymphadenopathy versus soft tissue deposit concerning for malignancy.  CEA on 12/23/2020 was also rising and it was 9.2.  Overall this is consistent with progression of the disease.      Because of slow progression I switched to irinotecan/Panitumumab on  1/13/2021.  Overall she is doing well and today we will proceed with cycle #3.    Last CEA was 10.    Neutropenia.  She has mild neutropenia and ANC is 1.5.  At this time we will continue the same dose of medications and keep a close watch on this.    Nausea.  Continue antinausea medications as before.    Bowel issues.  She has constipation and then she takes MiraLAX and then she has diarrhea.  I recommend starting taking Senokot for constipation and use Imodium for diarrhea.    Panitumumab related rash.  Continue hydrocortisone 2.5% and clindamycin 1% topical ointment twice a day as needed for the rash.  If it gets worse then we will give her minocycline.  For now rash is under decent control with topical medications.    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  This has improved after stopping oxaliplatin.  I hope to see continued improvement with time.       Bone metastasis.  Started Zometa on 11/25/2019.  She last received on 1/27/2021. She will get it every 3 months. Continue vitamin D and calcium.        We did not address the following today.    Right big toe infection/ingrown toenail.  She is done with systemic antibiotics.  She is using topical antibiotics under the care of podiatrist and this is getting better.      Port-A-Cath.  It was coiled in the right internal jugular vein so it was replaced 10/20/2020.       Discussion regarding health care directive  We discussed that it is important that she completes health care directive which would help in making sure that her wishes are followed about her treatment care in case she is not able to make a decision for herself.  We gave her information regarding that.    RTC in 2 weeks to see nurse practitioner.  I will see her back in 4 weeks.    All questions were answered to her satisfaction.  She is agreeable and comfortable with the plan.    Charline Velazquez MD        Again, thank you for allowing me to participate in the care of your patient.         Sincerely,        Charline Velazquez MD

## 2021-02-24 NOTE — PROGRESS NOTES
"Oncology Follow Up Visit: February 24, 2021     Oncologist: Dr Charline Velazquez  PCP: Earline Mehta    Diagnosis: Stage IV Adenocarcinoma of the rectum   Alannah Wynn is a 71 yo female who presented in 10/2019 with intermittent hematochezia and anorectal irritation since 2011, that have been managed as \"hemorrhoids\".  Diagnostic colonoscopy on 10/18/19 for rectal bleeding showed \"fungating and ulcerated non-obstructing mass was found in the distal rectum <1 cm from the anal verge. The mass was partially circumferential (involving one-half of the lumen circumference). The mass measured four cm in length. In addition, its diameter measured six mm.  Pathology showed invasive moderately differentiated adenocarcinoma with intact mismatch repair protein expression.  Pelvic MRI 11/2019 found fungating polypoid mid/lower rectal mass with luminal narrowing as well as innumerable enlarged lymph nodes as described above with a conglomerate of suspicious lymph nodes abutting the anterior peritoneal reflection. Stage cT3d N2.   Further testing with PET scan proved pulmonarymetastasis and bony metastasis at L3.  Has KRAS - wild type  Treatment:   11/25/2019 began treatment with FOLFOX/Avastin  11/25/2019 Began Zometa every 3 months  4/7/2020-7/2020 capecitabine x 4 cycles  12/23/2020 completed Xelox/Avastin x 8 cycles  1/13/2021  Began Irinotecan/ panitumumab every 2 weeks    Interval History: Ms. Wynn is contacted for review of symptoms for start of cycle 4 of Irinotecan/ panitumumab. Pt complaining of hair loss and constipation today. She has tried the senna 2 tabs bid and use of MOM as needed but still constipated- using 3 bottles of water daily and tries to stay active around the house. She does have some nausea early in plan and uses antiemetics but states she make up for this later in plan and has seen no significant weight loss. Neuropathy is improving slowly. Recurrent rash to around face, knees and occasionally in " "elbows is treated with hydrocortisone and lotion and is not pruritic at present. Mentions hair loss noted recently.   Rest of comprehensive and complete ROS is reviewed and is negative.   Past Medical History:   Diagnosis Date     Gastroesophageal reflux disease      Current Outpatient Medications   Medication     Ascorbic Acid (VITAMIN C) 500 MG CAPS     B Complex Vitamins (B COMPLEX-B12 PO)     cholecalciferol (VITAMIN D3) 400 unit (10 mcg) TABS tablet     clindamycin (CLINDAMAX) 1 % external gel     docusate sodium (COLACE) 100 MG capsule     Fesoterodine Fumarate 8 MG TB24     hydrocortisone 2.5 % ointment     lidocaine-prilocaine (EMLA) 2.5-2.5 % external cream     loperamide (IMODIUM) 2 MG capsule     LORazepam (ATIVAN) 0.5 MG tablet     Lutein 20 MG CAPS     mirabegron (MYRBETRIQ) 50 MG 24 hr tablet     Multiple vitamin TABS     naloxone (NARCAN) 4 MG/0.1ML nasal spray     omeprazole (PRILOSEC) 20 MG DR capsule     ondansetron (ZOFRAN) 8 MG tablet     prochlorperazine (COMPAZINE) 10 MG tablet     senna-docusate (SENNA S) 8.6-50 MG tablet     sennosides (SENOKOT) 8.6 MG tablet     solifenacin (VESICARE) 10 MG tablet     traMADol (ULTRAM) 50 MG tablet     urea (GORDONS UREA) 40 % external ointment     Current Facility-Administered Medications   Medication     lidocaine (XYLOCAINE) 5 % ointment     Allergies   Allergen Reactions     Penicillins      Sulfa Drugs      Physical Exam: /82 (BP Location: Right arm)   Pulse 88   Temp 97.7  F (36.5  C) (Oral)   Resp 16   Ht 1.575 m (5' 2.01\")   Wt 64.9 kg (143 lb 1.6 oz)   LMP 11/27/2001 (Approximate)   SpO2 99%   BMI 26.17 kg/m     GENERAL: Healthy, alert and no distress  EYES: Eyes grossly normal to inspection.  No discharge or erythema, or obvious scleral/conjunctival abnormalities.  RESP: No audible wheeze, cough, or visible cyanosis.  No visible retractions or increased work of breathing.    SKIN: has a few red papules at back of knees - none to face " today. Hair is thin generally.  NEURO: Cranial nerves grossly intact.  Mentation and speech appropriate for age.  PSYCH: Mentation appears normal, affect normal/bright, judgement and insight intact, normal speech and appearance well-groomed.  Pt does have accent as this is a second language for her but communication was good.   The rest of a comprehensive physical examination is deferred due to PHE (public health emergency) video visit restrictions     Laboratory Results:   Results for orders placed or performed in visit on 02/24/21   CBC with platelets differential     Status: Abnormal   Result Value Ref Range    WBC 4.6 4.0 - 11.0 10e9/L    RBC Count 3.78 (L) 3.8 - 5.2 10e12/L    Hemoglobin 12.7 11.7 - 15.7 g/dL    Hematocrit 39.1 35.0 - 47.0 %     (H) 78 - 100 fl    MCH 33.6 (H) 26.5 - 33.0 pg    MCHC 32.5 31.5 - 36.5 g/dL    RDW 15.4 (H) 10.0 - 15.0 %    Platelet Count 233 150 - 450 10e9/L    % Neutrophils 32.0 %    % Lymphocytes 55.0 %    % Monocytes 4.0 %    % Eosinophils 6.0 %    % Basophils 3.0 %    Absolute Neutrophil 1.5 (L) 1.6 - 8.3 10e9/L    Absolute Lymphocytes 2.5 0.8 - 5.3 10e9/L    Absolute Monocytes 0.2 0.0 - 1.3 10e9/L    Absolute Eosinophils 0.3 0.0 - 0.7 10e9/L    Absolute Basophils 0.1 0.0 - 0.2 10e9/L    RBC Morphology Normal     Platelet Estimate       Automated count confirmed.  Platelet morphology is normal.    Diff Method Manual Differential    Comprehensive metabolic panel     Status: Abnormal   Result Value Ref Range    Sodium 142 133 - 144 mmol/L    Potassium 3.9 3.4 - 5.3 mmol/L    Chloride 109 94 - 109 mmol/L    Carbon Dioxide 31 20 - 32 mmol/L    Anion Gap 2 (L) 3 - 14 mmol/L    Glucose 127 (H) 70 - 99 mg/dL    Urea Nitrogen 8 7 - 30 mg/dL    Creatinine 0.65 0.52 - 1.04 mg/dL    GFR Estimate >90 >60 mL/min/[1.73_m2]    GFR Estimate If Black >90 >60 mL/min/[1.73_m2]    Calcium 9.3 8.5 - 10.1 mg/dL    Bilirubin Total 0.4 0.2 - 1.3 mg/dL    Albumin 3.3 (L) 3.4 - 5.0 g/dL     Protein Total 7.2 6.8 - 8.8 g/dL    Alkaline Phosphatase 103 40 - 150 U/L    ALT 37 0 - 50 U/L    AST 29 0 - 45 U/L   Magnesium     Status: None   Result Value Ref Range    Magnesium 1.7 1.6 - 2.3 mg/dL     Assessment and Plan:   Stage IV Adenocarcinoma of the rectum - Pt continues with irinotecan/Panitumumab every 2 weeks. She is noting early nausea, light rash to around face and knees and now some hair thinning related to plan- these are being treated and are acceptable. Labs remain with good results so will continue with plan without changes necessary.  She will return in 2 weeks for review prior to cycle 5 of the plan.   Bone metastasis- Using Zometa- receiving every 3 months- next due in April. Confirmed use of   Nausea-Zofran/Compazine and lorazepam will be renewed today.Reviewed she should start day 2 and 3 with antiemetics and may use zofran if she would like.  Drug rash- noted intermittently- using hydrocortisone cream and moisturizing creams    Constipation- unusual since irinotecan typically causes loose stools but using stool softener- 4 daily but shared she can increase to as many as 8 if needed but warned this may turn around in near future with more treatment cycles.  Peripheral neuropathy- from previous use of Oxaliplatin- Improving.No current treatment.  The total time of this encounter amounted to 30 minutes. This time included face to face time spent with the patient, prep work, ordering/ reviewing tests, and performing post visit documentation.  Felipa Garner,Cnp

## 2021-02-24 NOTE — PROGRESS NOTES
Infusion Nursing Note:  Alannah Wynn presents today for C4D1 Vectibix/Irinotecan.    Patient seen by provider today: Yes: Felipa Garner NP   present during visit today: Not Applicable.    Note: Pt c/o constipation that is relieved with stool softner, peripheral neuropathy in L hand and decreased appetite for couple days after chemotherapy.  See flow sheet for assessment    Intravenous Access:  Implanted Port.    Treatment Conditions:  Lab Results   Component Value Date    HGB 12.7 02/24/2021     Lab Results   Component Value Date    WBC 4.6 02/24/2021      Lab Results   Component Value Date    ANEU 1.5 02/24/2021     Lab Results   Component Value Date     02/24/2021      Lab Results   Component Value Date     02/24/2021                   Lab Results   Component Value Date    POTASSIUM 3.9 02/24/2021           Lab Results   Component Value Date    MAG 1.7 02/24/2021            Lab Results   Component Value Date    CR 0.65 02/24/2021                   Lab Results   Component Value Date    LAUREN 9.3 02/24/2021                Lab Results   Component Value Date    BILITOTAL 0.4 02/24/2021           Lab Results   Component Value Date    ALBUMIN 3.3 02/24/2021                    Lab Results   Component Value Date    ALT 37 02/24/2021           Lab Results   Component Value Date    AST 29 02/24/2021       Results reviewed, labs MET treatment parameters, ok to proceed with treatment.      Post Infusion Assessment:  Patient tolerated infusion without incident.  Blood return noted pre and post infusion.  Site patent and intact, free from redness, edema or discomfort.  No evidence of extravasations.  Access discontinued per protocol.       Discharge Plan:   Patient discharged in stable condition accompanied by: self.  Departure Mode: Ambulatory.  Pt will RTC 3/10/21 for C5D1 Vectibix/Irinotecan.    Allen Andrade RN

## 2021-02-24 NOTE — LETTER
"    2/24/2021         RE: Alannah Wynn  [address]  [place] [address]        Dear Colleague,    Thank you for referring your patient, Alannah Wynn, to the [facility]. Please see a copy of my visit note below.    Oncology Follow Up Visit: February 24, 2021     Oncologist: Dr Charline Velazquez  PCP: Earline Mehta    Diagnosis: Stage IV Adenocarcinoma of the rectum   Alannah Wynn is a 71 yo female who presented in 10/2019 with intermittent hematochezia and anorectal irritation since 2011, that have been managed as \"hemorrhoids\".  Diagnostic colonoscopy on 10/18/19 for rectal bleeding showed \"fungating and ulcerated non-obstructing mass was found in the distal rectum <1 cm from the anal verge. The mass was partially circumferential (involving one-half of the lumen circumference). The mass measured four cm in length. In addition, its diameter measured six mm.  Pathology showed invasive moderately differentiated adenocarcinoma with intact mismatch repair protein expression.  Pelvic MRI 11/2019 found fungating polypoid mid/lower rectal mass with luminal narrowing as well as innumerable enlarged lymph nodes as described above with a conglomerate of suspicious lymph nodes abutting the anterior peritoneal reflection. Stage cT3d N2.   Further testing with PET scan proved pulmonarymetastasis and bony metastasis at L3.  Has KRAS - wild type  Treatment:   11/25/2019 began treatment with FOLFOX/Avastin  11/25/2019 Began Zometa every 3 months  4/7/2020-7/2020 capecitabine x 4 cycles  12/23/2020 completed Xelox/Avastin x 8 cycles  1/13/2021  Began Irinotecan/ panitumumab every 2 weeks    Interval History: Ms. Wynn is contacted for review of symptoms for start of cycle 4 of Irinotecan/ panitumumab. Pt complaining of hair loss and constipation today. She has tried the senna 2 tabs bid and use of MOM as needed but still constipated- using 3 bottles of water daily and tries to " "stay active around the house. She does have some nausea early in plan and uses antiemetics but states she make up for this later in plan and has seen no significant weight loss. Neuropathy is improving slowly. Recurrent rash to around face, knees and occasionally in elbows is treated with hydrocortisone and lotion and is not pruritic at present. Mentions hair loss noted recently.   Rest of comprehensive and complete ROS is reviewed and is negative.   Past Medical History:   Diagnosis Date     Gastroesophageal reflux disease      Current Outpatient Medications   Medication     Ascorbic Acid (VITAMIN C) 500 MG CAPS     B Complex Vitamins (B COMPLEX-B12 PO)     cholecalciferol (VITAMIN D3) 400 unit (10 mcg) TABS tablet     clindamycin (CLINDAMAX) 1 % external gel     docusate sodium (COLACE) 100 MG capsule     Fesoterodine Fumarate 8 MG TB24     hydrocortisone 2.5 % ointment     lidocaine-prilocaine (EMLA) 2.5-2.5 % external cream     loperamide (IMODIUM) 2 MG capsule     LORazepam (ATIVAN) 0.5 MG tablet     Lutein 20 MG CAPS     mirabegron (MYRBETRIQ) 50 MG 24 hr tablet     Multiple vitamin TABS     naloxone (NARCAN) 4 MG/0.1ML nasal spray     omeprazole (PRILOSEC) 20 MG DR capsule     ondansetron (ZOFRAN) 8 MG tablet     prochlorperazine (COMPAZINE) 10 MG tablet     senna-docusate (SENNA S) 8.6-50 MG tablet     sennosides (SENOKOT) 8.6 MG tablet     solifenacin (VESICARE) 10 MG tablet     traMADol (ULTRAM) 50 MG tablet     urea (GORDONS UREA) 40 % external ointment     Current Facility-Administered Medications   Medication     lidocaine (XYLOCAINE) 5 % ointment     Allergies   Allergen Reactions     Penicillins      Sulfa Drugs      Physical Exam: /82 (BP Location: Right arm)   Pulse 88   Temp 97.7  F (36.5  C) (Oral)   Resp 16   Ht 1.575 m (5' 2.01\")   Wt 64.9 kg (143 lb 1.6 oz)   LMP 11/27/2001 (Approximate)   SpO2 99%   BMI 26.17 kg/m     GENERAL: Healthy, alert and no distress  EYES: Eyes grossly " normal to inspection.  No discharge or erythema, or obvious scleral/conjunctival abnormalities.  RESP: No audible wheeze, cough, or visible cyanosis.  No visible retractions or increased work of breathing.    SKIN: has a few red papules at back of knees - none to face today. Hair is thin generally.  NEURO: Cranial nerves grossly intact.  Mentation and speech appropriate for age.  PSYCH: Mentation appears normal, affect normal/bright, judgement and insight intact, normal speech and appearance well-groomed.  Pt does have accent as this is a second language for her but communication was good.   The rest of a comprehensive physical examination is deferred due to PHE (public health emergency) video visit restrictions     Laboratory Results:   Results for orders placed or performed in visit on 02/24/21   CBC with platelets differential     Status: Abnormal   Result Value Ref Range    WBC 4.6 4.0 - 11.0 10e9/L    RBC Count 3.78 (L) 3.8 - 5.2 10e12/L    Hemoglobin 12.7 11.7 - 15.7 g/dL    Hematocrit 39.1 35.0 - 47.0 %     (H) 78 - 100 fl    MCH 33.6 (H) 26.5 - 33.0 pg    MCHC 32.5 31.5 - 36.5 g/dL    RDW 15.4 (H) 10.0 - 15.0 %    Platelet Count 233 150 - 450 10e9/L    % Neutrophils 32.0 %    % Lymphocytes 55.0 %    % Monocytes 4.0 %    % Eosinophils 6.0 %    % Basophils 3.0 %    Absolute Neutrophil 1.5 (L) 1.6 - 8.3 10e9/L    Absolute Lymphocytes 2.5 0.8 - 5.3 10e9/L    Absolute Monocytes 0.2 0.0 - 1.3 10e9/L    Absolute Eosinophils 0.3 0.0 - 0.7 10e9/L    Absolute Basophils 0.1 0.0 - 0.2 10e9/L    RBC Morphology Normal     Platelet Estimate       Automated count confirmed.  Platelet morphology is normal.    Diff Method Manual Differential    Comprehensive metabolic panel     Status: Abnormal   Result Value Ref Range    Sodium 142 133 - 144 mmol/L    Potassium 3.9 3.4 - 5.3 mmol/L    Chloride 109 94 - 109 mmol/L    Carbon Dioxide 31 20 - 32 mmol/L    Anion Gap 2 (L) 3 - 14 mmol/L    Glucose 127 (H) 70 - 99 mg/dL     Urea Nitrogen 8 7 - 30 mg/dL    Creatinine 0.65 0.52 - 1.04 mg/dL    GFR Estimate >90 >60 mL/min/[1.73_m2]    GFR Estimate If Black >90 >60 mL/min/[1.73_m2]    Calcium 9.3 8.5 - 10.1 mg/dL    Bilirubin Total 0.4 0.2 - 1.3 mg/dL    Albumin 3.3 (L) 3.4 - 5.0 g/dL    Protein Total 7.2 6.8 - 8.8 g/dL    Alkaline Phosphatase 103 40 - 150 U/L    ALT 37 0 - 50 U/L    AST 29 0 - 45 U/L   Magnesium     Status: None   Result Value Ref Range    Magnesium 1.7 1.6 - 2.3 mg/dL     Assessment and Plan:   Stage IV Adenocarcinoma of the rectum - Pt continues with irinotecan/Panitumumab every 2 weeks. She is noting early nausea, light rash to around face and knees and now some hair thinning related to plan- these are being treated and are acceptable. Labs remain with good results so will continue with plan without changes necessary.  She will return in 2 weeks for review prior to cycle 5 of the plan.   Bone metastasis- Using Zometa- receiving every 3 months- next due in April. Confirmed use of   Nausea-Zofran/Compazine and lorazepam will be renewed today.Reviewed she should start day 2 and 3 with antiemetics and may use zofran if she would like.  Drug rash- noted intermittently- using hydrocortisone cream and moisturizing creams    Constipation- unusual since irinotecan typically causes loose stools but using stool softener- 4 daily but shared she can increase to as many as 8 if needed but warned this may turn around in near future with more treatment cycles.  Peripheral neuropathy- from previous use of Oxaliplatin- Improving.No current treatment.  The total time of this encounter amounted to 30 minutes. This time included face to face time spent with the patient, prep work, ordering/ reviewing tests, and performing post visit documentation.  Felipa Garner Cnp                Again, thank you for allowing me to participate in the care of your patient.        Sincerely,        Felipa Garnre, NP, APRN CNP

## 2021-02-24 NOTE — NURSING NOTE
"Oncology Rooming Note    February 24, 2021 8:34 AM   Alannah Wynn is a 70 year old female who presents for:    Chief Complaint   Patient presents with     Oncology Clinic Visit     Prior to treatment      Initial Vitals: /82 (BP Location: Right arm)   Pulse 88   Temp 97.7  F (36.5  C) (Oral)   Resp 16   Ht 1.575 m (5' 2.01\")   Wt 64.9 kg (143 lb 1.6 oz)   LMP 11/27/2001 (Approximate)   SpO2 99%   BMI 26.17 kg/m   Estimated body mass index is 26.17 kg/m  as calculated from the following:    Height as of this encounter: 1.575 m (5' 2.01\").    Weight as of this encounter: 64.9 kg (143 lb 1.6 oz). Body surface area is 1.69 meters squared.  No Pain (0) Comment: Data Unavailable   Patient's last menstrual period was 11/27/2001 (approximate).  Allergies reviewed: Yes  Medications reviewed: Yes    Medications: MEDICATION REFILLS NEEDED TODAY. Provider was notified.  Pharmacy name entered into [facility]:    [id number] - [place], [place] - [address] AT [address] [facility] - [place], [place] - [address], [address]  [facility] - [place], [place] - [address]              "

## 2021-02-24 NOTE — PROGRESS NOTES
Port was accessed, labs drawn, heparin flushed per protocol.  Port was left accessed for infusion.      Helen Beasley RN at [facility]  [phone]

## 2021-03-10 NOTE — NURSING NOTE
"Oncology Rooming Note    March 10, 2021 10:13 AM   Alannah Wynn is a 70 year old female who presents for:    Chief Complaint   Patient presents with     Oncology Clinic Visit     Follow up     Initial Vitals: /79 (BP Location: Left arm)   Pulse 87   Temp 98.1  F (36.7  C) (Oral)   Resp 14   Ht 1.575 m (5' 2\")   Wt 64.2 kg (141 lb 9.6 oz)   LMP 11/27/2001 (Approximate)   SpO2 99%   BMI 25.90 kg/m   Estimated body mass index is 25.9 kg/m  as calculated from the following:    Height as of this encounter: 1.575 m (5' 2\").    Weight as of this encounter: 64.2 kg (141 lb 9.6 oz). Body surface area is 1.68 meters squared.  No Pain (0) Comment: Data Unavailable   Patient's last menstrual period was 11/27/2001 (approximate).  Allergies reviewed: Yes  Medications reviewed: Yes    Medications: Medication refills not needed today.  Pharmacy name entered into [facility]:    [id number] - [place], [place] - [address] AT [facility] & [address] [place] [address] [facility] - [place], [place] - [address], [address]  [facility] - [place], [place] - [address]     Clinical concerns: Patient had 1st COVID19 vaccine on 03/05/21, patient is scheduled for an infusion on 03/24/21 and her 2nd COVID19 vaccine is scheduled on 03/26/21 is it ok for her to have the COVID19 vaccine at that time or need to reschedule? Dr. Velazquez was notified.      Mone Singh CMA            "

## 2021-03-10 NOTE — PROGRESS NOTES
Infusion Nursing Note:  Alannah Wynn presents today for Vectibix/Irinotecan.    Patient seen by provider today: Yes: Dr. Velazquez   present during visit today: Not Applicable.    Note: Patient starting oral magnesium home prescription today.  Patient requesting to NOT have IV magnesium today but rather try taking the oral meds to see if that helps. Reviewed potential side effects and patient verbalized understanding.     Upon departure patient want to re-schedule her 2nd covid vaccination based on a conversation with Dr. Velazquez today.     Patient did meet criteria for an asymptomatic covid-19 PCR test in infusion today. Patient declined the covid-19 test.    Intravenous Access:  Implanted Port.    Treatment Conditions:  Lab Results   Component Value Date    HGB 12.0 03/10/2021     Lab Results   Component Value Date    WBC 4.1 03/10/2021      Lab Results   Component Value Date    ANEU 1.8 03/10/2021     Lab Results   Component Value Date     03/10/2021      Lab Results   Component Value Date     03/10/2021                   Lab Results   Component Value Date    POTASSIUM 4.0 03/10/2021           Lab Results   Component Value Date    MAG 1.5 03/10/2021            Lab Results   Component Value Date    CR 0.51 03/10/2021                   Lab Results   Component Value Date    LAUREN 8.2 03/10/2021                Lab Results   Component Value Date    BILITOTAL 0.2 03/10/2021           Lab Results   Component Value Date    ALBUMIN 3.0 03/10/2021                    Lab Results   Component Value Date    ALT 27 03/10/2021           Lab Results   Component Value Date    AST 31 03/10/2021       Results reviewed, labs MET treatment parameters, ok to proceed with treatment.      Post Infusion Assessment:  Patient tolerated infusion without incident.  Blood return noted pre and post infusion.  Site patent and intact, free from redness, edema or discomfort.  No evidence of extravasations.  Access discontinued per  protocol.       Discharge Plan:   Discharge instructions reviewed with: Patient.  Patient and/or family verbalized understanding of discharge instructions and all questions answered.  Patient discharged in stable condition accompanied by: self.  Departure Mode: Ambulatory.    Ernestina Melissa RN

## 2021-03-10 NOTE — PROGRESS NOTES
"ONCOLOGY FOLLOW UP NOTE    3/10/2021     Patient was being followed by Dr. Tavarez and saw her on 11/13/2019.  Now she is transferring her care to me.  I have reviewed her previous records and have copied and updated from prior notes.      DIAGNOSIS:    10/2019 dx adenoCa of rectum,  MMR intact- NGS Negative for KRAS, NRAS and BRAF. PD L1 25%.  Baseline CEA 10.  Stage IV disease with pulmonary and bony mets      HISTORY OF ONCOLOGY ILLNESS:    She has had intermittent hematochezia and anorectal irritation since 2011, that have been managed as \"hemorrhoids\". These symptoms became persistent and progressive since early thi year. Her anorectal pain is managed with Tylenol. Denies fevers, chills, or unintentional weight loss. Does have intermittent urinary and fecal urgency but no incontinence per se.    Diagnostic colonoscopy on 10/18/19 for rectal bleeding showed \"fungating and ulcerated non-obstructing mass was found in the distal rectum <1 cm from the anal verge. The mass was partially circumferential (involving one-half of the lumen circumference). The mass measured four cm in length. In addition, its diameter measured six mm.  Pathology showed invasive moderately differentiated adenocarcinoma with intact mismatch repair protein expression, PD L1 25% and NGS panel showed NO MUTATIONS in KRAS, NRAS and BRAF.      Pelvic MRI 11/2019 found fungating polypoid mid/lower rectal mass with luminal narrowing as well as innumerable enlarged lymph nodes as described above with a conglomerate of suspicious lymph nodes abutting the anterior peritoneal reflection. Stage cT3d N2.     PET 11/2019 found   1. Hypermetabolic mass in the distal rectum, SUV 14 corresponding to the patient's known rectal cancer.  2. Multiple hypermetabolic pelvic lymph nodes, compatible metastatic disease, SUV 5-6.  3. FDG avid pulmonary nodule measuring 7 mm in the left lower lobe, concerning for metastatic disease. Additional FDG avid pulmonary nodules " suspicious for metastatic disease, SUV around 4.  4. Osteoblastic metastatic lesion in the L3 vertebral body, SUV 6.8    She started palliative FOLFOX/Avastin on 11/25/2019.  Cycle #2 given on 12/9/2019.  Cycle #3 scheduled for 12/24/2019.  C#6 on 2/10/2020.    Repeat CT CAP on 2/21/2020 shows good response to therapy with improvement in the rectal mass, mesorectal and superior rectal nodularity as well as resolution of right internal iliac lymphadenopathy.  There is slight improvement in the dominant posterior left lower lobe lung nodule and several of the lung nodules are is stable.  There is increase in size of the sclerotic lesion of the left aspect of L3 vertebral body without pathological fracture and this is likely due to treatment effect.    CEA on 2/10/2020 was down to 3.3.    Cycle #7 FOLFOX/Avastin 2/25/2020.  C#8 FOLFOX/Avastin 3/9/2020  Cycle#9 5-FU/Avastin 3/24/2020-stopped oxaliplatin due to cumulative fatigue and issues with nausea.    I then discussed with her, her  as well as her son Gamal about possibly switching to single agent Xeloda going forward so as to limit her visits to the clinic and therefore exposure and risk from coronavirus.  We discussed the pros and cons of this approach and they were all willing to try this.     She started Single agent Xeloda on 4/7/2020.  Cycle #3 started on 5/19/2020.    Because of bone metastatic disease, she was started on Zometa on 11/25/2019. We are giving it every 3 months.        CT chest abdomen and pelvis on 7/20/2020 showed mild progression in the lung nodules.  Perirectal nodularity has mildly improved.    Last CEA on 7/22/2020 was 4.      7/27/2020- we decided on switching to XELOX/Avastin.     8/18/2020 -cycle #2 XELOX/Avastin.  CEA on 8/11/2020 was 4.8.  9/8/2020 - C#3 XELOX/Avastin. CEA on 9/2/2020 was 5.1  9/30/2020.  Cycle #4 XELOX/Avastin.  Oxaliplatin dose reduced further to 85 mg per metered square because of worsening  neuropathy.    Repeat CT chest abdomen and pelvis on 10/15/2020 overall shows stable disease with 1 to 2 mm increase in some of the lung nodules and about 2 mm decrease in the lymph node in the mesorectum.    CEA stable at 5.3 on 10/21/2020.    10/22/2020.  Cycle #5 XELOX/Avastin.  Oxaliplatin dose 85 mg/m .  11/11/2020 C#6 XELOX/Avastin. Oxaliplatin dose 85 mg/m .  12/2/2020 C#7 XELOX/Avastin. Oxaliplatin dose 85 mg/m .  12/23/2020 C#8 XELOX/Avastin. Oxaliplatin dose 85 mg/m .    1/11/2021-CT chest abdomen and pelvis shows progressive disease with progression in the lung nodules.  There was a new ill-defined soft tissue density material at the upper mid abdomen adjacent to the hemidiaphragm rangel, celiac access, and most proximal abdominal aorta and this is also concerning for malignancy.  There is a stable sclerosis of L3 and stable left ischial tuberosity tiny sclerosis.  CEA on 12/23/2020 was 9.2.      1/13/2021.  Switch to irinotecan/Panitumumab.  1/27/2021.  Cycle #2 irinotecan/Panitumumab.  2/10/2021.  Cycle #3 irinotecan/Panitumumab.  2/24/2021.  Cycle #4 irinotecan/Panitumumab  3/10/2021.  Cycle #5 irinotecan/Panitumumab.    INTERVAL HISTORY:     She tells me that she is tolerating the chemotherapy fairly well.  She has mild nausea for which she takes antinausea medications.  She feels a little constipated for which she takes senna 2 tablets twice a day.  She also takes MiraLAX as needed.  She denies any pain.  She is eating better.  Overall energy is decent.  No fevers infections or new swellings.  Her hair are falling out.  Neuropathy has improved and she notices mild neuropathy in the left hand.  She developed a rash from Panitumumab involving the face and applied hydrocortisone and clindamycin and other ointments.  It was under fair control with this.  Rash has resolved now.         ECOG 1     ROS:  Otherwise a comprehensive review of system was negative.    I reviewed with her history in epic as  below.    PAST MEDICAL HISTORY  Reflux, hemorrhoid, hepatitis B?  Overactive bladder  Active Ambulatory Problems     Diagnosis Date Noted     External hemorrhoids 04/29/2016     Non morbid obesity due to excess calories 04/29/2016     Hepatitis B immune 04/29/2016     Screening for cervical cancer 04/29/2016     CARDIOVASCULAR SCREENING; LDL GOAL LESS THAN 160 04/29/2016     Gastroesophageal reflux disease 04/29/2016     Overactive bladder 07/21/2017     Nevus of left lower leg- undetermined behavior 12/27/2018     Metastasis from rectal cancer (H) 11/13/2019     Bone metastasis (H) 11/13/2019     Nausea 11/27/2019     Chemotherapy-induced neutropenia (H) 12/24/2019     Resolved Ambulatory Problems     Diagnosis Date Noted     Urgency of urination 04/29/2016     Need for vaccination against Streptococcus pneumoniae 04/29/2016     Morbid obesity (H) 07/21/2017     Obesity (BMI 35.0-39.9) with comorbidity (H) 12/27/2018     No Additional Past Medical History       MEDICATIONS:  Current Outpatient Medications   Medication     Ascorbic Acid (VITAMIN C) 500 MG CAPS     B Complex Vitamins (B COMPLEX-B12 PO)     cholecalciferol (VITAMIN D3) 400 unit (10 mcg) TABS tablet     clindamycin (CLINDAMAX) 1 % external gel     docusate sodium (COLACE) 100 MG capsule     Fesoterodine Fumarate 8 MG TB24     hydrocortisone 2.5 % ointment     lidocaine-prilocaine (EMLA) 2.5-2.5 % external cream     loperamide (IMODIUM) 2 MG capsule     LORazepam (ATIVAN) 0.5 MG tablet     Lutein 20 MG CAPS     mirabegron (MYRBETRIQ) 50 MG 24 hr tablet     Multiple vitamin TABS     naloxone (NARCAN) 4 MG/0.1ML nasal spray     omeprazole (PRILOSEC) 20 MG DR capsule     ondansetron (ZOFRAN) 8 MG tablet     prochlorperazine (COMPAZINE) 10 MG tablet     senna-docusate (SENNA S) 8.6-50 MG tablet     sennosides (SENOKOT) 8.6 MG tablet     solifenacin (VESICARE) 10 MG tablet     traMADol (ULTRAM) 50 MG tablet     urea (GORDONS UREA) 40 % external ointment      Current Facility-Administered Medications   Medication     lidocaine (XYLOCAINE) 5 % ointment     ALLERGY:     Allergies   Allergen Reactions     Penicillins      Sulfa Drugs             SOCIAL HISTORY:  She is a [place] immigrants to [place].  Denies smoking denies alcohol abuse.  2 of her sons are doctors.    FAMILY HISTORY:   Denies any family history of cancer.       PHYSICAL EXAMINATION:       LMP 11/27/2001 (Approximate)      Wt Readings from Last 4 Encounters:   02/24/21 64.9 kg (143 lb 1.6 oz)   02/10/21 65.9 kg (145 lb 4.8 oz)   01/27/21 67.4 kg (148 lb 9.6 oz)   12/23/20 69.8 kg (153 lb 12.8 oz)     CONSTITUTIONAL: No apparent distress  EYES: PERRLA, without pallor or jaundice  ENT/MOUTH: Ears unremarkable. No oral lesions  CVS: s1s2 normal  RESPIRATORY: Chest is clear  GI: Abdomen is benign  NEURO: Alert and oriented ×3  INTEGUMENT: No obvious rashes seen.  LYMPHATIC: no palpable lymphadenopathy  MUSCULOSKELETAL: Unremarkable. No bony tenderness.   EXTREMITIES: no pedal edema  PSYCH: Mentation, mood and affect are appropriate          LABS/IMAGING/PATHOLOGY:    Reviewed  CBC shows WBC 4.1.  Hemoglobin 12.  Platelets 223.  ANC 1.8.      CMP is unremarkable except albumin 3 and magnesium 1.5.     CEA was 10 on 1/27/2020.  It was 7.9 on 2/24/2021.      1/11/2021-CT chest abdomen and pelvis shows progressive disease with progression in the lung nodules.  There was a new ill-defined soft tissue density material at the upper mid abdomen adjacent to the hemidiaphragm rangel, celiac access, and most proximal abdominal aorta and this is also concerning for malignancy.  This measures approximately 1.7 x 1.8 cm.  There is a stable sclerosis of L3 and stable left ischial tuberosity tiny sclerosis.    EXAM: CT CHEST/ABDOMEN/PELVIS W CONTRAST  LOCATION: [facility]  DATE/TIME: 1/11/2021 8:13 AM     INDICATION: Metastatic colon cancer. Bony metastatic disease.  COMPARISON: CT chest, abdomen and pelvis  10/15/2020.  TECHNIQUE: CT scan of the chest, abdomen, and pelvis was performed following injection of IV contrast. Multiplanar reformats were obtained. Dose reduction techniques were used.   CONTRAST: 93 ml isovue 370     FINDINGS:   LUNGS AND PLEURA: No effusions. Multiple bilateral pulmonary nodules within all lobes of both lungs consistent with pulmonary metastatic disease. Several are larger in size. An example at the medial left lower lobe now measures 1.7 x 0.9 cm, previously   1.5 x 0.7 cm, series 7 image 106. Another larger example at the posterior left lower lobe is 1.0 x 0.9 cm, previously 0.6 x 0.4 cm, image 129. At the right upper lobe an example is 0.6 x 0.6 cm, previously 0.4 x 0.4 cm, image 71.     MEDIASTINUM/AXILLAE: Right chest Port-A-Cath is identified, the previously noted looping of this catheter within the right internal jugular vein is not currently seen. The catheter tip is at the most superior aspect of the right atrium. No acute   mediastinal abnormality. Thoracic aortic and minimal coronary artery calcifications. Small hiatal hernia is stable. No new chest enlarged lymph nodes identified.     HEPATOBILIARY: Status post cholecystectomy. No focal hepatic lesions identified.     PANCREAS: Normal.     SPLEEN: Normal.     ADRENAL GLANDS: Stable mild thickening of the left adrenal. Normal right adrenal.     KIDNEYS/BLADDER: Normal.     BOWEL: Previously noted rectal wall thickening distally is stable versus slightly less prominent. Perirectal nodules again identified appearing stable. One of these is 1.0 x 0.6 cm posteriorly towards the left, series 3 image 509. There are 2 other   stable lesions towards the left posteriorly on image 519. Other small nodules superiorly towards the left posteriorly on image 474. Some of the perirectal fat has mild haziness. No bowel obstruction or inflammation.     LYMPH NODES: There is new indeterminate hazy soft tissue density at the upper mid abdomen  posteriorly lying anterior to the hemidiaphragm rangel, and surrounding the celiac axis and most proximal abdominal aorta. This is difficult to precisely measure but   is approximately 1.7 x 1.8 cm, series 3 image 255.     VASCULATURE: Scattered mild vascular calcifications.     PELVIC ORGANS: Normal.     MUSCULOSKELETAL: Stable left L3 sclerosis measuring 2.3 cm image 332. Stable left ischial tuberosity tiny sclerosis image 542. This could just be a small bone island.                                                                      IMPRESSION:  1.  Enlargement of several bilateral metastatic pulmonary nodules suggesting progression of disease.  2.  New ill-defined soft tissue density material at the upper mid abdomen adjacent to the hemidiaphragm rangel, celiac access, and most proximal abdominal aorta. This could represent new area of adenopathy, potentially malignant. Inflammatory change in   this region is also possible, nonspecific as to precise etiology.  3.  Stable perirectal nodular opacities that could represent malignant lymph nodes.  4.  Stable sclerosis at L3 consistent with metastatic disease.      ASSESSMENT AND PLAN:      Metastatic rectal adenocarcinoma, MSI intact, K-aimee wild type, PDL 1 + 25% with metastasis to the lungs, bones and pelvic lymph nodes.      She was on palliative XELOX/Avastin but CT scan from 1/11/2021 shows progressive disease with progression in the lung nodules and there is also a soft tissue density in the upper mid abdomen which could be new lymphadenopathy versus soft tissue deposit concerning for malignancy.  CEA on 12/23/2020 was also rising and it was 9.2.  Overall this is consistent with progression of the disease.      Because of slow progression I switched to irinotecan/Panitumumab on 1/13/2021.    She is tolerating this chemotherapy fairly.  Last CEA was down to 7.9.   Today she will receive cycle #5.   If CEA continues to improve then I will repeat CT scan after 7 or 8  cycles.    Nausea.  She feels nausea for a few days but controls it with antinausea medications.  Continue antinausea medications as before.    Constipation.  Continue senna and I also recommend that she takes MiraLAX.        Panitumumab related rash.  This was under fair control with hydrocortisone 2.5% and clindamycin 1% topical ointment twice a day.  Currently rash has resolved.  We discussed that if it gets worse then she would be given minocycline and she will let us know.  In the meantime she will continue hydrocortisone/clindamycin as before.     Hypomagnesemia.  From Panitumumab.  Start magnesium oxide 400 mg twice a day.  Repeat in 2 weeks.    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  This has significantly improved and she is now noticing mild neuropathy in the left hand.        Bone metastasis.  Started Zometa on 11/25/2019.  She last received on 1/27/2021. She will get it every 3 months. Continue vitamin D and calcium.      COVID-19 vaccine.  She is supposed to get second COVID-19 shot on 3/26/2021.  She has chemotherapy scheduled on 3/24/2021.  It is reasonable to schedule the COVID-19 shot for the following week.      We did not address the following today.    Right big toe infection/ingrown toenail.  She is done with systemic antibiotics.  She is using topical antibiotics under the care of podiatrist and this is getting better.      Port-A-Cath.  It was coiled in the right internal jugular vein so it was replaced 10/20/2020.       Discussion regarding health care directive  We discussed that it is important that she completes health care directive which would help in making sure that her wishes are followed about her treatment care in case she is not able to make a decision for herself.  We gave her information regarding that.    RTC in 2 weeks    All questions were answered to her satisfaction.  She is agreeable and comfortable with the plan.    Charline Velazquez MD

## 2021-03-10 NOTE — LETTER
"    3/10/2021         RE: Alannah Wynn  [address]  [place] [address]        Dear Colleague,    Thank you for referring your patient, Alannah Wynn, to the [facility]. Please see a copy of my visit note below.    ONCOLOGY FOLLOW UP NOTE    3/10/2021     Patient was being followed by Dr. Tavarez and saw her on 11/13/2019.  Now she is transferring her care to me.  I have reviewed her previous records and have copied and updated from prior notes.      DIAGNOSIS:    10/2019 dx adenoCa of rectum,  MMR intact- NGS Negative for KRAS, NRAS and BRAF. PD L1 25%.  Baseline CEA 10.  Stage IV disease with pulmonary and bony mets      HISTORY OF ONCOLOGY ILLNESS:    She has had intermittent hematochezia and anorectal irritation since 2011, that have been managed as \"hemorrhoids\". These symptoms became persistent and progressive since early thi year. Her anorectal pain is managed with Tylenol. Denies fevers, chills, or unintentional weight loss. Does have intermittent urinary and fecal urgency but no incontinence per se.    Diagnostic colonoscopy on 10/18/19 for rectal bleeding showed \"fungating and ulcerated non-obstructing mass was found in the distal rectum <1 cm from the anal verge. The mass was partially circumferential (involving one-half of the lumen circumference). The mass measured four cm in length. In addition, its diameter measured six mm.  Pathology showed invasive moderately differentiated adenocarcinoma with intact mismatch repair protein expression, PD L1 25% and NGS panel showed NO MUTATIONS in KRAS, NRAS and BRAF.      Pelvic MRI 11/2019 found fungating polypoid mid/lower rectal mass with luminal narrowing as well as innumerable enlarged lymph nodes as described above with a conglomerate of suspicious lymph nodes abutting the anterior peritoneal reflection. Stage cT3d N2.     PET 11/2019 found   1. Hypermetabolic mass in the distal rectum, SUV 14 corresponding to " the patient's known rectal cancer.  2. Multiple hypermetabolic pelvic lymph nodes, compatible metastatic disease, SUV 5-6.  3. FDG avid pulmonary nodule measuring 7 mm in the left lower lobe, concerning for metastatic disease. Additional FDG avid pulmonary nodules suspicious for metastatic disease, SUV around 4.  4. Osteoblastic metastatic lesion in the L3 vertebral body, SUV 6.8    She started palliative FOLFOX/Avastin on 11/25/2019.  Cycle #2 given on 12/9/2019.  Cycle #3 scheduled for 12/24/2019.  C#6 on 2/10/2020.    Repeat CT CAP on 2/21/2020 shows good response to therapy with improvement in the rectal mass, mesorectal and superior rectal nodularity as well as resolution of right internal iliac lymphadenopathy.  There is slight improvement in the dominant posterior left lower lobe lung nodule and several of the lung nodules are is stable.  There is increase in size of the sclerotic lesion of the left aspect of L3 vertebral body without pathological fracture and this is likely due to treatment effect.    CEA on 2/10/2020 was down to 3.3.    Cycle #7 FOLFOX/Avastin 2/25/2020.  C#8 FOLFOX/Avastin 3/9/2020  Cycle#9 5-FU/Avastin 3/24/2020-stopped oxaliplatin due to cumulative fatigue and issues with nausea.    I then discussed with her, her  as well as her son Gamal about possibly switching to single agent Xeloda going forward so as to limit her visits to the clinic and therefore exposure and risk from coronavirus.  We discussed the pros and cons of this approach and they were all willing to try this.     She started Single agent Xeloda on 4/7/2020.  Cycle #3 started on 5/19/2020.    Because of bone metastatic disease, she was started on Zometa on 11/25/2019. We are giving it every 3 months.        CT chest abdomen and pelvis on 7/20/2020 showed mild progression in the lung nodules.  Perirectal nodularity has mildly improved.    Last CEA on 7/22/2020 was 4.      7/27/2020- we decided on switching to  XELOX/Avastin.     8/18/2020 -cycle #2 XELOX/Avastin.  CEA on 8/11/2020 was 4.8.  9/8/2020 - C#3 XELOX/Avastin. CEA on 9/2/2020 was 5.1  9/30/2020.  Cycle #4 XELOX/Avastin.  Oxaliplatin dose reduced further to 85 mg per metered square because of worsening neuropathy.    Repeat CT chest abdomen and pelvis on 10/15/2020 overall shows stable disease with 1 to 2 mm increase in some of the lung nodules and about 2 mm decrease in the lymph node in the mesorectum.    CEA stable at 5.3 on 10/21/2020.    10/22/2020.  Cycle #5 XELOX/Avastin.  Oxaliplatin dose 85 mg/m .  11/11/2020 C#6 XELOX/Avastin. Oxaliplatin dose 85 mg/m .  12/2/2020 C#7 XELOX/Avastin. Oxaliplatin dose 85 mg/m .  12/23/2020 C#8 XELOX/Avastin. Oxaliplatin dose 85 mg/m .    1/11/2021-CT chest abdomen and pelvis shows progressive disease with progression in the lung nodules.  There was a new ill-defined soft tissue density material at the upper mid abdomen adjacent to the hemidiaphragm rangel, celiac access, and most proximal abdominal aorta and this is also concerning for malignancy.  There is a stable sclerosis of L3 and stable left ischial tuberosity tiny sclerosis.  CEA on 12/23/2020 was 9.2.      1/13/2021.  Switch to irinotecan/Panitumumab.  1/27/2021.  Cycle #2 irinotecan/Panitumumab.  2/10/2021.  Cycle #3 irinotecan/Panitumumab.  2/24/2021.  Cycle #4 irinotecan/Panitumumab  3/10/2021.  Cycle #5 irinotecan/Panitumumab.    INTERVAL HISTORY:     She tells me that she is tolerating the chemotherapy fairly well.  She has mild nausea for which she takes antinausea medications.  She feels a little constipated for which she takes senna 2 tablets twice a day.  She also takes MiraLAX as needed.  She denies any pain.  She is eating better.  Overall energy is decent.  No fevers infections or new swellings.  Her hair are falling out.  Neuropathy has improved and she notices mild neuropathy in the left hand.  She developed a rash from Panitumumab involving the face  and applied hydrocortisone and clindamycin and other ointments.  It was under fair control with this.  Rash has resolved now.         ECOG 1     ROS:  Otherwise a comprehensive review of system was negative.    I reviewed with her history in epic as below.    PAST MEDICAL HISTORY  Reflux, hemorrhoid, hepatitis B?  Overactive bladder  Active Ambulatory Problems     Diagnosis Date Noted     External hemorrhoids 04/29/2016     Non morbid obesity due to excess calories 04/29/2016     Hepatitis B immune 04/29/2016     Screening for cervical cancer 04/29/2016     CARDIOVASCULAR SCREENING; LDL GOAL LESS THAN 160 04/29/2016     Gastroesophageal reflux disease 04/29/2016     Overactive bladder 07/21/2017     Nevus of left lower leg- undetermined behavior 12/27/2018     Metastasis from rectal cancer (H) 11/13/2019     Bone metastasis (H) 11/13/2019     Nausea 11/27/2019     Chemotherapy-induced neutropenia (H) 12/24/2019     Resolved Ambulatory Problems     Diagnosis Date Noted     Urgency of urination 04/29/2016     Need for vaccination against Streptococcus pneumoniae 04/29/2016     Morbid obesity (H) 07/21/2017     Obesity (BMI 35.0-39.9) with comorbidity (H) 12/27/2018     No Additional Past Medical History       MEDICATIONS:  Current Outpatient Medications   Medication     Ascorbic Acid (VITAMIN C) 500 MG CAPS     B Complex Vitamins (B COMPLEX-B12 PO)     cholecalciferol (VITAMIN D3) 400 unit (10 mcg) TABS tablet     clindamycin (CLINDAMAX) 1 % external gel     docusate sodium (COLACE) 100 MG capsule     Fesoterodine Fumarate 8 MG TB24     hydrocortisone 2.5 % ointment     lidocaine-prilocaine (EMLA) 2.5-2.5 % external cream     loperamide (IMODIUM) 2 MG capsule     LORazepam (ATIVAN) 0.5 MG tablet     Lutein 20 MG CAPS     mirabegron (MYRBETRIQ) 50 MG 24 hr tablet     Multiple vitamin TABS     naloxone (NARCAN) 4 MG/0.1ML nasal spray     omeprazole (PRILOSEC) 20 MG DR capsule     ondansetron (ZOFRAN) 8 MG tablet      prochlorperazine (COMPAZINE) 10 MG tablet     senna-docusate (SENNA S) 8.6-50 MG tablet     sennosides (SENOKOT) 8.6 MG tablet     solifenacin (VESICARE) 10 MG tablet     traMADol (ULTRAM) 50 MG tablet     urea (GORDONS UREA) 40 % external ointment     Current Facility-Administered Medications   Medication     lidocaine (XYLOCAINE) 5 % ointment     ALLERGY:     Allergies   Allergen Reactions     Penicillins      Sulfa Drugs             SOCIAL HISTORY:  She is a [place] immigrants to [place].  Denies smoking denies alcohol abuse.  2 of her sons are doctors.    FAMILY HISTORY:   Denies any family history of cancer.       PHYSICAL EXAMINATION:       LMP 11/27/2001 (Approximate)      Wt Readings from Last 4 Encounters:   02/24/21 64.9 kg (143 lb 1.6 oz)   02/10/21 65.9 kg (145 lb 4.8 oz)   01/27/21 67.4 kg (148 lb 9.6 oz)   12/23/20 69.8 kg (153 lb 12.8 oz)     CONSTITUTIONAL: No apparent distress  EYES: PERRLA, without pallor or jaundice  ENT/MOUTH: Ears unremarkable. No oral lesions  CVS: s1s2 normal  RESPIRATORY: Chest is clear  GI: Abdomen is benign  NEURO: Alert and oriented ×3  INTEGUMENT: No obvious rashes seen.  LYMPHATIC: no palpable lymphadenopathy  MUSCULOSKELETAL: Unremarkable. No bony tenderness.   EXTREMITIES: no pedal edema  PSYCH: Mentation, mood and affect are appropriate          LABS/IMAGING/PATHOLOGY:    Reviewed  CBC shows WBC 4.1.  Hemoglobin 12.  Platelets 223.  ANC 1.8.      CMP is unremarkable except albumin 3 and magnesium 1.5.     CEA was 10 on 1/27/2020.  It was 7.9 on 2/24/2021.      1/11/2021-CT chest abdomen and pelvis shows progressive disease with progression in the lung nodules.  There was a new ill-defined soft tissue density material at the upper mid abdomen adjacent to the hemidiaphragm rangel, celiac access, and most proximal abdominal aorta and this is also concerning for malignancy.  This measures approximately 1.7 x 1.8 cm.  There is a stable sclerosis of L3 and stable left  ischial tuberosity tiny sclerosis.    EXAM: CT CHEST/ABDOMEN/PELVIS W CONTRAST  LOCATION: [facility]  DATE/TIME: 1/11/2021 8:13 AM     INDICATION: Metastatic colon cancer. Bony metastatic disease.  COMPARISON: CT chest, abdomen and pelvis 10/15/2020.  TECHNIQUE: CT scan of the chest, abdomen, and pelvis was performed following injection of IV contrast. Multiplanar reformats were obtained. Dose reduction techniques were used.   CONTRAST: 93 ml isovue 370     FINDINGS:   LUNGS AND PLEURA: No effusions. Multiple bilateral pulmonary nodules within all lobes of both lungs consistent with pulmonary metastatic disease. Several are larger in size. An example at the medial left lower lobe now measures 1.7 x 0.9 cm, previously   1.5 x 0.7 cm, series 7 image 106. Another larger example at the posterior left lower lobe is 1.0 x 0.9 cm, previously 0.6 x 0.4 cm, image 129. At the right upper lobe an example is 0.6 x 0.6 cm, previously 0.4 x 0.4 cm, image 71.     MEDIASTINUM/AXILLAE: Right chest Port-A-Cath is identified, the previously noted looping of this catheter within the right internal jugular vein is not currently seen. The catheter tip is at the most superior aspect of the right atrium. No acute   mediastinal abnormality. Thoracic aortic and minimal coronary artery calcifications. Small hiatal hernia is stable. No new chest enlarged lymph nodes identified.     HEPATOBILIARY: Status post cholecystectomy. No focal hepatic lesions identified.     PANCREAS: Normal.     SPLEEN: Normal.     ADRENAL GLANDS: Stable mild thickening of the left adrenal. Normal right adrenal.     KIDNEYS/BLADDER: Normal.     BOWEL: Previously noted rectal wall thickening distally is stable versus slightly less prominent. Perirectal nodules again identified appearing stable. One of these is 1.0 x 0.6 cm posteriorly towards the left, series 3 image 509. There are 2 other   stable lesions towards the left posteriorly on image 519. Other  small nodules superiorly towards the left posteriorly on image 474. Some of the perirectal fat has mild haziness. No bowel obstruction or inflammation.     LYMPH NODES: There is new indeterminate hazy soft tissue density at the upper mid abdomen posteriorly lying anterior to the hemidiaphragm rangel, and surrounding the celiac axis and most proximal abdominal aorta. This is difficult to precisely measure but   is approximately 1.7 x 1.8 cm, series 3 image 255.     VASCULATURE: Scattered mild vascular calcifications.     PELVIC ORGANS: Normal.     MUSCULOSKELETAL: Stable left L3 sclerosis measuring 2.3 cm image 332. Stable left ischial tuberosity tiny sclerosis image 542. This could just be a small bone island.                                                                      IMPRESSION:  1.  Enlargement of several bilateral metastatic pulmonary nodules suggesting progression of disease.  2.  New ill-defined soft tissue density material at the upper mid abdomen adjacent to the hemidiaphragm rangel, celiac access, and most proximal abdominal aorta. This could represent new area of adenopathy, potentially malignant. Inflammatory change in   this region is also possible, nonspecific as to precise etiology.  3.  Stable perirectal nodular opacities that could represent malignant lymph nodes.  4.  Stable sclerosis at L3 consistent with metastatic disease.      ASSESSMENT AND PLAN:      Metastatic rectal adenocarcinoma, MSI intact, K-aimee wild type, PDL 1 + 25% with metastasis to the lungs, bones and pelvic lymph nodes.      She was on palliative XELOX/Avastin but CT scan from 1/11/2021 shows progressive disease with progression in the lung nodules and there is also a soft tissue density in the upper mid abdomen which could be new lymphadenopathy versus soft tissue deposit concerning for malignancy.  CEA on 12/23/2020 was also rising and it was 9.2.  Overall this is consistent with progression of the disease.      Because of  slow progression I switched to irinotecan/Panitumumab on 1/13/2021.    She is tolerating this chemotherapy fairly.  Last CEA was down to 7.9.   Today she will receive cycle #5.   If CEA continues to improve then I will repeat CT scan after 7 or 8 cycles.    Nausea.  She feels nausea for a few days but controls it with antinausea medications.  Continue antinausea medications as before.    Constipation.  Continue senna and I also recommend that she takes MiraLAX.        Panitumumab related rash.  This was under fair control with hydrocortisone 2.5% and clindamycin 1% topical ointment twice a day.  Currently rash has resolved.  We discussed that if it gets worse then she would be given minocycline and she will let us know.  In the meantime she will continue hydrocortisone/clindamycin as before.     Hypomagnesemia.  From Panitumumab.  Start magnesium oxide 400 mg twice a day.  Repeat in 2 weeks.    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  This has significantly improved and she is now noticing mild neuropathy in the left hand.        Bone metastasis.  Started Zometa on 11/25/2019.  She last received on 1/27/2021. She will get it every 3 months. Continue vitamin D and calcium.        We did not address the following today.    Right big toe infection/ingrown toenail.  She is done with systemic antibiotics.  She is using topical antibiotics under the care of podiatrist and this is getting better.      Port-A-Cath.  It was coiled in the right internal jugular vein so it was replaced 10/20/2020.       Discussion regarding health care directive  We discussed that it is important that she completes health care directive which would help in making sure that her wishes are followed about her treatment care in case she is not able to make a decision for herself.  We gave her information regarding that.    RTC in 2 weeks    All questions were answered to her satisfaction.  She is agreeable and comfortable with the plan.    Charline Velazquez,  MD        Again, thank you for allowing me to participate in the care of your patient.        Sincerely,        Charline Velazquez MD

## 2021-03-10 NOTE — PATIENT INSTRUCTIONS
Chemo today.  Return to clinic as a scheduled.    See Felipa on 4/7/2021 and chemo to follow.  See me on 4/21/2021 at 9:45 AM and chemo to follow.    Start Magnesium Oxide 400 mg twice daily

## 2021-03-24 NOTE — NURSING NOTE
"Oncology Rooming Note    March 24, 2021 10:22 AM   Alannah Wynn is a 70 year old female who presents for:    Chief Complaint   Patient presents with     Oncology Clinic Visit     Prior to treatment     Initial Vitals: /73 (BP Location: Left arm)   Pulse 83   Temp 98  F (36.7  C) (Oral)   Resp 14   Ht 1.575 m (5' 2.01\")   Wt 62.7 kg (138 lb 4.8 oz)   LMP 11/27/2001 (Approximate)   SpO2 100%   BMI 25.29 kg/m   Estimated body mass index is 25.29 kg/m  as calculated from the following:    Height as of this encounter: 1.575 m (5' 2.01\").    Weight as of this encounter: 62.7 kg (138 lb 4.8 oz). Body surface area is 1.66 meters squared.  No Pain (0) Comment: Data Unavailable   Patient's last menstrual period was 11/27/2001 (approximate).  Allergies reviewed: Yes  Medications reviewed: Yes    Medications: MEDICATION REFILLS NEEDED TODAY. Provider was notified.  Pharmacy name entered into [facility]:    [id number] - [place], [place] - [address] AT [address] [facility] - [place], [place] - [address], [id number]  [facility] - [place], [place] - [address]              "

## 2021-03-24 NOTE — PROGRESS NOTES
"ONCOLOGY FOLLOW UP NOTE    3/24/2021     Patient was being followed by Dr. Tavarez and saw her on 11/13/2019.  Now she is transferring her care to me.  I have reviewed her previous records and have copied and updated from prior notes.      DIAGNOSIS:    10/2019 dx adenoCa of rectum,  MMR intact- NGS Negative for KRAS, NRAS and BRAF. PD L1 25%.  Baseline CEA 10.  Stage IV disease with pulmonary and bony mets      HISTORY OF ONCOLOGY ILLNESS:    She has had intermittent hematochezia and anorectal irritation since 2011, that have been managed as \"hemorrhoids\". These symptoms became persistent and progressive since early thi year. Her anorectal pain is managed with Tylenol. Denies fevers, chills, or unintentional weight loss. Does have intermittent urinary and fecal urgency but no incontinence per se.    Diagnostic colonoscopy on 10/18/19 for rectal bleeding showed \"fungating and ulcerated non-obstructing mass was found in the distal rectum <1 cm from the anal verge. The mass was partially circumferential (involving one-half of the lumen circumference). The mass measured four cm in length. In addition, its diameter measured six mm.  Pathology showed invasive moderately differentiated adenocarcinoma with intact mismatch repair protein expression, PD L1 25% and NGS panel showed NO MUTATIONS in KRAS, NRAS and BRAF.      Pelvic MRI 11/2019 found fungating polypoid mid/lower rectal mass with luminal narrowing as well as innumerable enlarged lymph nodes as described above with a conglomerate of suspicious lymph nodes abutting the anterior peritoneal reflection. Stage cT3d N2.     PET 11/2019 found   1. Hypermetabolic mass in the distal rectum, SUV 14 corresponding to the patient's known rectal cancer.  2. Multiple hypermetabolic pelvic lymph nodes, compatible metastatic disease, SUV 5-6.  3. FDG avid pulmonary nodule measuring 7 mm in the left lower lobe, concerning for metastatic disease. Additional FDG avid pulmonary nodules " suspicious for metastatic disease, SUV around 4.  4. Osteoblastic metastatic lesion in the L3 vertebral body, SUV 6.8    She started palliative FOLFOX/Avastin on 11/25/2019.  Cycle #2 given on 12/9/2019.  Cycle #3 scheduled for 12/24/2019.  C#6 on 2/10/2020.    Repeat CT CAP on 2/21/2020 shows good response to therapy with improvement in the rectal mass, mesorectal and superior rectal nodularity as well as resolution of right internal iliac lymphadenopathy.  There is slight improvement in the dominant posterior left lower lobe lung nodule and several of the lung nodules are is stable.  There is increase in size of the sclerotic lesion of the left aspect of L3 vertebral body without pathological fracture and this is likely due to treatment effect.    CEA on 2/10/2020 was down to 3.3.    Cycle #7 FOLFOX/Avastin 2/25/2020.  C#8 FOLFOX/Avastin 3/9/2020  Cycle#9 5-FU/Avastin 3/24/2020-stopped oxaliplatin due to cumulative fatigue and issues with nausea.    I then discussed with her, her  as well as her son Gamal about possibly switching to single agent Xeloda going forward so as to limit her visits to the clinic and therefore exposure and risk from coronavirus.  We discussed the pros and cons of this approach and they were all willing to try this.     She started Single agent Xeloda on 4/7/2020.  Cycle #3 started on 5/19/2020.    Because of bone metastatic disease, she was started on Zometa on 11/25/2019. We are giving it every 3 months.        CT chest abdomen and pelvis on 7/20/2020 showed mild progression in the lung nodules.  Perirectal nodularity has mildly improved.    Last CEA on 7/22/2020 was 4.      7/27/2020- we decided on switching to XELOX/Avastin.     8/18/2020 -cycle #2 XELOX/Avastin.  CEA on 8/11/2020 was 4.8.  9/8/2020 - C#3 XELOX/Avastin. CEA on 9/2/2020 was 5.1  9/30/2020.  Cycle #4 XELOX/Avastin.  Oxaliplatin dose reduced further to 85 mg per metered square because of worsening  neuropathy.    Repeat CT chest abdomen and pelvis on 10/15/2020 overall shows stable disease with 1 to 2 mm increase in some of the lung nodules and about 2 mm decrease in the lymph node in the mesorectum.    CEA stable at 5.3 on 10/21/2020.    10/22/2020.  Cycle #5 XELOX/Avastin.  Oxaliplatin dose 85 mg/m .  11/11/2020 C#6 XELOX/Avastin. Oxaliplatin dose 85 mg/m .  12/2/2020 C#7 XELOX/Avastin. Oxaliplatin dose 85 mg/m .  12/23/2020 C#8 XELOX/Avastin. Oxaliplatin dose 85 mg/m .    1/11/2021-CT chest abdomen and pelvis shows progressive disease with progression in the lung nodules.  There was a new ill-defined soft tissue density material at the upper mid abdomen adjacent to the hemidiaphragm rangel, celiac access, and most proximal abdominal aorta and this is also concerning for malignancy.  There is a stable sclerosis of L3 and stable left ischial tuberosity tiny sclerosis.  CEA on 12/23/2020 was 9.2.      1/13/2021.  Switch to irinotecan/Panitumumab.  1/27/2021.  Cycle #2 irinotecan/Panitumumab.  2/10/2021.  Cycle #3 irinotecan/Panitumumab.  2/24/2021.  Cycle #4 irinotecan/Panitumumab  3/10/2021.  Cycle #5 irinotecan/Panitumumab.  3/24/2021   Cycle #6 irinotecan/Panitumumab.    INTERVAL HISTORY:     She is doing well.  She feels a little bit of nausea and vomited once during the first week of chemotherapy.  She took antinausea medications which helped.  She has been constipated and she takes senna 2 tablets twice a day and occasionally takes MiraLAX.  No pain.  No bleeding.  Energy is decent.  Overall neuropathy has improved and she has mild neuropathy in the left hand.  She had mild facial rash from Panitumumab for which she used hydrocortisone and lotions which helped.  Currently she denies any rash.  No difficulty breathing.  No new swellings.        ECOG 1     ROS:  Otherwise a comprehensive review of system was negative.    I reviewed with her history in epic as below.    PAST MEDICAL HISTORY  Reflux,  hemorrhoid, hepatitis B?  Overactive bladder  Active Ambulatory Problems     Diagnosis Date Noted     External hemorrhoids 04/29/2016     Non morbid obesity due to excess calories 04/29/2016     Hepatitis B immune 04/29/2016     Screening for cervical cancer 04/29/2016     CARDIOVASCULAR SCREENING; LDL GOAL LESS THAN 160 04/29/2016     Gastroesophageal reflux disease 04/29/2016     Overactive bladder 07/21/2017     Nevus of left lower leg- undetermined behavior 12/27/2018     Metastasis from rectal cancer (H) 11/13/2019     Bone metastasis (H) 11/13/2019     Nausea 11/27/2019     Chemotherapy-induced neutropenia (H) 12/24/2019     Resolved Ambulatory Problems     Diagnosis Date Noted     Urgency of urination 04/29/2016     Need for vaccination against Streptococcus pneumoniae 04/29/2016     Morbid obesity (H) 07/21/2017     Obesity (BMI 35.0-39.9) with comorbidity (H) 12/27/2018     No Additional Past Medical History       MEDICATIONS:  Current Outpatient Medications   Medication     Ascorbic Acid (VITAMIN C) 500 MG CAPS     B Complex Vitamins (B COMPLEX-B12 PO)     cholecalciferol (VITAMIN D3) 400 unit (10 mcg) TABS tablet     clindamycin (CLINDAMAX) 1 % external gel     docusate sodium (COLACE) 100 MG capsule     Fesoterodine Fumarate 8 MG TB24     hydrocortisone 2.5 % ointment     lidocaine-prilocaine (EMLA) 2.5-2.5 % external cream     loperamide (IMODIUM) 2 MG capsule     LORazepam (ATIVAN) 0.5 MG tablet     Lutein 20 MG CAPS     magnesium oxide (MAG-OX) 400 MG tablet     mirabegron (MYRBETRIQ) 50 MG 24 hr tablet     Multiple vitamin TABS     naloxone (NARCAN) 4 MG/0.1ML nasal spray     omeprazole (PRILOSEC) 20 MG DR capsule     ondansetron (ZOFRAN) 8 MG tablet     prochlorperazine (COMPAZINE) 10 MG tablet     senna-docusate (SENNA S) 8.6-50 MG tablet     sennosides (SENOKOT) 8.6 MG tablet     solifenacin (VESICARE) 10 MG tablet     traMADol (ULTRAM) 50 MG tablet     urea (GORDONS UREA) 40 % external ointment  "    Current Facility-Administered Medications   Medication     lidocaine (XYLOCAINE) 5 % ointment     ALLERGY:     Allergies   Allergen Reactions     Penicillins      Sulfa Drugs             SOCIAL HISTORY:  She is a [place] immigrants to [place].  Denies smoking denies alcohol abuse.  2 of her sons are doctors.    FAMILY HISTORY:   Denies any family history of cancer.       PHYSICAL EXAMINATION:       /73 (BP Location: Left arm)   Pulse 83   Temp 98  F (36.7  C) (Oral)   Resp 14   Ht 1.575 m (5' 2.01\")   Wt 62.7 kg (138 lb 4.8 oz)   LMP 11/27/2001 (Approximate)   SpO2 100%   BMI 25.29 kg/m       Wt Readings from Last 4 Encounters:   03/24/21 62.7 kg (138 lb 4.8 oz)   03/10/21 64.2 kg (141 lb 9.6 oz)   02/24/21 64.9 kg (143 lb 1.6 oz)   02/10/21 65.9 kg (145 lb 4.8 oz)     CONSTITUTIONAL: no acute distress  EYES: PERRLA, no palor or icterus.   ENT/MOUTH: no mouth lesions. Ears normal  CVS: s1s2 no m r g .   RESPIRATORY: clear to auscultation b/l  GI: soft non tender no hepatosplenomegaly  NEURO: AAOX3  Grossly non focal neuro exam  INTEGUMENT: no obvious rashes  LYMPHATIC: no palpable cervical, supraclavicular, axillary or inguinal LAD  MUSCULOSKELETAL: Unremarkable. No bony tenderness.   EXTREMITIES: no edema  PSYCH: Mentation, mood and affect are normal. Decision making capacity is intact        LABS/IMAGING/PATHOLOGY:    Reviewed  CBC unremarkable.    CMP remarkable only for albumin 3.2.    Last CEA was 6.4 on 3/10/2021.      CBC shows WBC 4.1.  Hemoglobin 12.  Platelets 223.  ANC 1.8.        1/11/2021-CT chest abdomen and pelvis shows progressive disease with progression in the lung nodules.  There was a new ill-defined soft tissue density material at the upper mid abdomen adjacent to the hemidiaphragm rangel, celiac access, and most proximal abdominal aorta and this is also concerning for malignancy.  This measures approximately 1.7 x 1.8 cm.  There is a stable sclerosis of L3 and stable left " ischial tuberosity tiny sclerosis.    EXAM: CT CHEST/ABDOMEN/PELVIS W CONTRAST  LOCATION: [facility]  DATE/TIME: 1/11/2021 8:13 AM     INDICATION: Metastatic colon cancer. Bony metastatic disease.  COMPARISON: CT chest, abdomen and pelvis 10/15/2020.  TECHNIQUE: CT scan of the chest, abdomen, and pelvis was performed following injection of IV contrast. Multiplanar reformats were obtained. Dose reduction techniques were used.   CONTRAST: 93 ml isovue 370     FINDINGS:   LUNGS AND PLEURA: No effusions. Multiple bilateral pulmonary nodules within all lobes of both lungs consistent with pulmonary metastatic disease. Several are larger in size. An example at the medial left lower lobe now measures 1.7 x 0.9 cm, previously   1.5 x 0.7 cm, series 7 image 106. Another larger example at the posterior left lower lobe is 1.0 x 0.9 cm, previously 0.6 x 0.4 cm, image 129. At the right upper lobe an example is 0.6 x 0.6 cm, previously 0.4 x 0.4 cm, image 71.     MEDIASTINUM/AXILLAE: Right chest Port-A-Cath is identified, the previously noted looping of this catheter within the right internal jugular vein is not currently seen. The catheter tip is at the most superior aspect of the right atrium. No acute   mediastinal abnormality. Thoracic aortic and minimal coronary artery calcifications. Small hiatal hernia is stable. No new chest enlarged lymph nodes identified.     HEPATOBILIARY: Status post cholecystectomy. No focal hepatic lesions identified.     PANCREAS: Normal.     SPLEEN: Normal.     ADRENAL GLANDS: Stable mild thickening of the left adrenal. Normal right adrenal.     KIDNEYS/BLADDER: Normal.     BOWEL: Previously noted rectal wall thickening distally is stable versus slightly less prominent. Perirectal nodules again identified appearing stable. One of these is 1.0 x 0.6 cm posteriorly towards the left, series 3 image 509. There are 2 other   stable lesions towards the left posteriorly on image 519. Other  small nodules superiorly towards the left posteriorly on image 474. Some of the perirectal fat has mild haziness. No bowel obstruction or inflammation.     LYMPH NODES: There is new indeterminate hazy soft tissue density at the upper mid abdomen posteriorly lying anterior to the hemidiaphragm rangel, and surrounding the celiac axis and most proximal abdominal aorta. This is difficult to precisely measure but   is approximately 1.7 x 1.8 cm, series 3 image 255.     VASCULATURE: Scattered mild vascular calcifications.     PELVIC ORGANS: Normal.     MUSCULOSKELETAL: Stable left L3 sclerosis measuring 2.3 cm image 332. Stable left ischial tuberosity tiny sclerosis image 542. This could just be a small bone island.                                                                      IMPRESSION:  1.  Enlargement of several bilateral metastatic pulmonary nodules suggesting progression of disease.  2.  New ill-defined soft tissue density material at the upper mid abdomen adjacent to the hemidiaphragm rangel, celiac access, and most proximal abdominal aorta. This could represent new area of adenopathy, potentially malignant. Inflammatory change in   this region is also possible, nonspecific as to precise etiology.  3.  Stable perirectal nodular opacities that could represent malignant lymph nodes.  4.  Stable sclerosis at L3 consistent with metastatic disease.      ASSESSMENT AND PLAN:      Metastatic rectal adenocarcinoma, MSI intact, K-aimee wild type, PDL 1 + 25% with metastasis to the lungs, bones and pelvic lymph nodes.      She was on palliative XELOX/Avastin but CT scan from 1/11/2021 shows progressive disease with progression in the lung nodules and there is also a soft tissue density in the upper mid abdomen which could be new lymphadenopathy versus soft tissue deposit concerning for malignancy.  CEA on 12/23/2020 was also rising and it was 9.2.  Overall this is consistent with progression of the disease.      Because of  slow progression I switched to irinotecan/Panitumumab on 1/13/2021.    Overall chemotherapy is going well.  CEA continues to come down and the last one was 6.4.  We will give her cycle #6 today.  Initially I was planning to repeat CT scan after 8 cycles but I will not be in the clinic when she will be due for cycle #9 so we will plan on doing a CT scan after cycle #9 provided she continues to do as well as she is doing now and CEA does not show significantly upward trend.  We will also check bone scan because she has bone mets.    Nausea/vomiting.  She felt it during the first week but overall it was mild and decently controlled with antinausea medication so she should continue that.    Constipation.  I recommend that she continues to take senna 2 tablets twice a day and she should take MiraLAX once a day and potentially twice a day for constipation.  If she has any diarrhea then she should back off.      Panitumumab related rash.  This was mild.  Continue hydrocortisone 2.5% and clindamycin 1% topical ointment as soon as the rash appears.  If it gets worse then she we will give her minocycline.  And she will let us know.  ]    Hypomagnesemia.  From Panitumumab.  She was given magnesium oxide 400 mg twice a day on last visit and she will continue that.  Today magnesium is low normal at 1.7.  Continue to serially monitor.      Bone metastasis.  Started Zometa on 11/25/2019.  She last received on 1/27/2021.  She will be due next month.  We will repeat bone scan with the next set of scans.  Continue calcium and vitamin D.      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  She now only notices mild neuropathy in the left hand but otherwise overall this has significantly improved.  Continue to monitor.     We did not address the following today.   COVID-19 vaccine.  She is supposed to get second COVID-19 shot on 3/26/2021.  She has chemotherapy scheduled on 3/24/2021.  It is reasonable to schedule the COVID-19 shot for the following  week.         Right big toe infection/ingrown toenail.  She is done with systemic antibiotics.  She is using topical antibiotics under the care of podiatrist and this is getting better.      Port-A-Cath.  It was coiled in the right internal jugular vein so it was replaced 10/20/2020.       Discussion regarding health care directive  We discussed that it is important that she completes health care directive which would help in making sure that her wishes are followed about her treatment care in case she is not able to make a decision for herself.  We gave her information regarding that.    RTC in 2 weeks    All questions were answered to her satisfaction.  She is agreeable and comfortable with the plan.    Charline Velazquez MD

## 2021-03-24 NOTE — LETTER
"    3/24/2021         RE: Alannah Wynn  [address]  [place] [address]        Dear Colleague,    Thank you for referring your patient, Alannah Wynn, to the [facility]. Please see a copy of my visit note below.    ONCOLOGY FOLLOW UP NOTE    3/24/2021     Patient was being followed by Dr. Tavarez and saw her on 11/13/2019.  Now she is transferring her care to me.  I have reviewed her previous records and have copied and updated from prior notes.      DIAGNOSIS:    10/2019 dx adenoCa of rectum,  MMR intact- NGS Negative for KRAS, NRAS and BRAF. PD L1 25%.  Baseline CEA 10.  Stage IV disease with pulmonary and bony mets      HISTORY OF ONCOLOGY ILLNESS:    She has had intermittent hematochezia and anorectal irritation since 2011, that have been managed as \"hemorrhoids\". These symptoms became persistent and progressive since early thi year. Her anorectal pain is managed with Tylenol. Denies fevers, chills, or unintentional weight loss. Does have intermittent urinary and fecal urgency but no incontinence per se.    Diagnostic colonoscopy on 10/18/19 for rectal bleeding showed \"fungating and ulcerated non-obstructing mass was found in the distal rectum <1 cm from the anal verge. The mass was partially circumferential (involving one-half of the lumen circumference). The mass measured four cm in length. In addition, its diameter measured six mm.  Pathology showed invasive moderately differentiated adenocarcinoma with intact mismatch repair protein expression, PD L1 25% and NGS panel showed NO MUTATIONS in KRAS, NRAS and BRAF.      Pelvic MRI 11/2019 found fungating polypoid mid/lower rectal mass with luminal narrowing as well as innumerable enlarged lymph nodes as described above with a conglomerate of suspicious lymph nodes abutting the anterior peritoneal reflection. Stage cT3d N2.     PET 11/2019 found   1. Hypermetabolic mass in the distal rectum, SUV 14 corresponding to " the patient's known rectal cancer.  2. Multiple hypermetabolic pelvic lymph nodes, compatible metastatic disease, SUV 5-6.  3. FDG avid pulmonary nodule measuring 7 mm in the left lower lobe, concerning for metastatic disease. Additional FDG avid pulmonary nodules suspicious for metastatic disease, SUV around 4.  4. Osteoblastic metastatic lesion in the L3 vertebral body, SUV 6.8    She started palliative FOLFOX/Avastin on 11/25/2019.  Cycle #2 given on 12/9/2019.  Cycle #3 scheduled for 12/24/2019.  C#6 on 2/10/2020.    Repeat CT CAP on 2/21/2020 shows good response to therapy with improvement in the rectal mass, mesorectal and superior rectal nodularity as well as resolution of right internal iliac lymphadenopathy.  There is slight improvement in the dominant posterior left lower lobe lung nodule and several of the lung nodules are is stable.  There is increase in size of the sclerotic lesion of the left aspect of L3 vertebral body without pathological fracture and this is likely due to treatment effect.    CEA on 2/10/2020 was down to 3.3.    Cycle #7 FOLFOX/Avastin 2/25/2020.  C#8 FOLFOX/Avastin 3/9/2020  Cycle#9 5-FU/Avastin 3/24/2020-stopped oxaliplatin due to cumulative fatigue and issues with nausea.    I then discussed with her, her  as well as her son Gamal about possibly switching to single agent Xeloda going forward so as to limit her visits to the clinic and therefore exposure and risk from coronavirus.  We discussed the pros and cons of this approach and they were all willing to try this.     She started Single agent Xeloda on 4/7/2020.  Cycle #3 started on 5/19/2020.    Because of bone metastatic disease, she was started on Zometa on 11/25/2019. We are giving it every 3 months.        CT chest abdomen and pelvis on 7/20/2020 showed mild progression in the lung nodules.  Perirectal nodularity has mildly improved.    Last CEA on 7/22/2020 was 4.      7/27/2020- we decided on switching to  XELOX/Avastin.     8/18/2020 -cycle #2 XELOX/Avastin.  CEA on 8/11/2020 was 4.8.  9/8/2020 - C#3 XELOX/Avastin. CEA on 9/2/2020 was 5.1  9/30/2020.  Cycle #4 XELOX/Avastin.  Oxaliplatin dose reduced further to 85 mg per metered square because of worsening neuropathy.    Repeat CT chest abdomen and pelvis on 10/15/2020 overall shows stable disease with 1 to 2 mm increase in some of the lung nodules and about 2 mm decrease in the lymph node in the mesorectum.    CEA stable at 5.3 on 10/21/2020.    10/22/2020.  Cycle #5 XELOX/Avastin.  Oxaliplatin dose 85 mg/m .  11/11/2020 C#6 XELOX/Avastin. Oxaliplatin dose 85 mg/m .  12/2/2020 C#7 XELOX/Avastin. Oxaliplatin dose 85 mg/m .  12/23/2020 C#8 XELOX/Avastin. Oxaliplatin dose 85 mg/m .    1/11/2021-CT chest abdomen and pelvis shows progressive disease with progression in the lung nodules.  There was a new ill-defined soft tissue density material at the upper mid abdomen adjacent to the hemidiaphragm rangel, celiac access, and most proximal abdominal aorta and this is also concerning for malignancy.  There is a stable sclerosis of L3 and stable left ischial tuberosity tiny sclerosis.  CEA on 12/23/2020 was 9.2.      1/13/2021.  Switch to irinotecan/Panitumumab.  1/27/2021.  Cycle #2 irinotecan/Panitumumab.  2/10/2021.  Cycle #3 irinotecan/Panitumumab.  2/24/2021.  Cycle #4 irinotecan/Panitumumab  3/10/2021.  Cycle #5 irinotecan/Panitumumab.  3/24/2021   Cycle #6 irinotecan/Panitumumab.    INTERVAL HISTORY:     She is doing well.  She feels a little bit of nausea and vomited once during the first week of chemotherapy.  She took antinausea medications which helped.  She has been constipated and she takes senna 2 tablets twice a day and occasionally takes MiraLAX.  No pain.  No bleeding.  Energy is decent.  Overall neuropathy has improved and she has mild neuropathy in the left hand.  She had mild facial rash from Panitumumab for which she used hydrocortisone and lotions which  helped.  Currently she denies any rash.  No difficulty breathing.  No new swellings.        ECOG 1     ROS:  Otherwise a comprehensive review of system was negative.    I reviewed with her history in epic as below.    PAST MEDICAL HISTORY  Reflux, hemorrhoid, hepatitis B?  Overactive bladder  Active Ambulatory Problems     Diagnosis Date Noted     External hemorrhoids 04/29/2016     Non morbid obesity due to excess calories 04/29/2016     Hepatitis B immune 04/29/2016     Screening for cervical cancer 04/29/2016     CARDIOVASCULAR SCREENING; LDL GOAL LESS THAN 160 04/29/2016     Gastroesophageal reflux disease 04/29/2016     Overactive bladder 07/21/2017     Nevus of left lower leg- undetermined behavior 12/27/2018     Metastasis from rectal cancer (H) 11/13/2019     Bone metastasis (H) 11/13/2019     Nausea 11/27/2019     Chemotherapy-induced neutropenia (H) 12/24/2019     Resolved Ambulatory Problems     Diagnosis Date Noted     Urgency of urination 04/29/2016     Need for vaccination against Streptococcus pneumoniae 04/29/2016     Morbid obesity (H) 07/21/2017     Obesity (BMI 35.0-39.9) with comorbidity (H) 12/27/2018     No Additional Past Medical History       MEDICATIONS:  Current Outpatient Medications   Medication     Ascorbic Acid (VITAMIN C) 500 MG CAPS     B Complex Vitamins (B COMPLEX-B12 PO)     cholecalciferol (VITAMIN D3) 400 unit (10 mcg) TABS tablet     clindamycin (CLINDAMAX) 1 % external gel     docusate sodium (COLACE) 100 MG capsule     Fesoterodine Fumarate 8 MG TB24     hydrocortisone 2.5 % ointment     lidocaine-prilocaine (EMLA) 2.5-2.5 % external cream     loperamide (IMODIUM) 2 MG capsule     LORazepam (ATIVAN) 0.5 MG tablet     Lutein 20 MG CAPS     magnesium oxide (MAG-OX) 400 MG tablet     mirabegron (MYRBETRIQ) 50 MG 24 hr tablet     Multiple vitamin TABS     naloxone (NARCAN) 4 MG/0.1ML nasal spray     omeprazole (PRILOSEC) 20 MG DR capsule     ondansetron (ZOFRAN) 8 MG tablet      "prochlorperazine (COMPAZINE) 10 MG tablet     senna-docusate (SENNA S) 8.6-50 MG tablet     sennosides (SENOKOT) 8.6 MG tablet     solifenacin (VESICARE) 10 MG tablet     traMADol (ULTRAM) 50 MG tablet     urea (GORDONS UREA) 40 % external ointment     Current Facility-Administered Medications   Medication     lidocaine (XYLOCAINE) 5 % ointment     ALLERGY:     Allergies   Allergen Reactions     Penicillins      Sulfa Drugs             SOCIAL HISTORY:  She is a [place] immigrants to [place].  Denies smoking denies alcohol abuse.  2 of her sons are doctors.    FAMILY HISTORY:   Denies any family history of cancer.       PHYSICAL EXAMINATION:       /73 (BP Location: Left arm)   Pulse 83   Temp 98  F (36.7  C) (Oral)   Resp 14   Ht 1.575 m (5' 2.01\")   Wt 62.7 kg (138 lb 4.8 oz)   LMP 11/27/2001 (Approximate)   SpO2 100%   BMI 25.29 kg/m       Wt Readings from Last 4 Encounters:   03/24/21 62.7 kg (138 lb 4.8 oz)   03/10/21 64.2 kg (141 lb 9.6 oz)   02/24/21 64.9 kg (143 lb 1.6 oz)   02/10/21 65.9 kg (145 lb 4.8 oz)     CONSTITUTIONAL: no acute distress  EYES: PERRLA, no palor or icterus.   ENT/MOUTH: no mouth lesions. Ears normal  CVS: s1s2 no m r g .   RESPIRATORY: clear to auscultation b/l  GI: soft non tender no hepatosplenomegaly  NEURO: AAOX3  Grossly non focal neuro exam  INTEGUMENT: no obvious rashes  LYMPHATIC: no palpable cervical, supraclavicular, axillary or inguinal LAD  MUSCULOSKELETAL: Unremarkable. No bony tenderness.   EXTREMITIES: no edema  PSYCH: Mentation, mood and affect are normal. Decision making capacity is intact        LABS/IMAGING/PATHOLOGY:    Reviewed  CBC unremarkable.    CMP remarkable only for albumin 3.2.    Last CEA was 6.4 on 3/10/2021.      CBC shows WBC 4.1.  Hemoglobin 12.  Platelets 223.  ANC 1.8.        1/11/2021-CT chest abdomen and pelvis shows progressive disease with progression in the lung nodules.  There was a new ill-defined soft tissue density material at " the upper mid abdomen adjacent to the hemidiaphragm rangel, celiac access, and most proximal abdominal aorta and this is also concerning for malignancy.  This measures approximately 1.7 x 1.8 cm.  There is a stable sclerosis of L3 and stable left ischial tuberosity tiny sclerosis.    EXAM: CT CHEST/ABDOMEN/PELVIS W CONTRAST  LOCATION: [facility]  DATE/TIME: 1/11/2021 8:13 AM     INDICATION: Metastatic colon cancer. Bony metastatic disease.  COMPARISON: CT chest, abdomen and pelvis 10/15/2020.  TECHNIQUE: CT scan of the chest, abdomen, and pelvis was performed following injection of IV contrast. Multiplanar reformats were obtained. Dose reduction techniques were used.   CONTRAST: 93 ml isovue 370     FINDINGS:   LUNGS AND PLEURA: No effusions. Multiple bilateral pulmonary nodules within all lobes of both lungs consistent with pulmonary metastatic disease. Several are larger in size. An example at the medial left lower lobe now measures 1.7 x 0.9 cm, previously   1.5 x 0.7 cm, series 7 image 106. Another larger example at the posterior left lower lobe is 1.0 x 0.9 cm, previously 0.6 x 0.4 cm, image 129. At the right upper lobe an example is 0.6 x 0.6 cm, previously 0.4 x 0.4 cm, image 71.     MEDIASTINUM/AXILLAE: Right chest Port-A-Cath is identified, the previously noted looping of this catheter within the right internal jugular vein is not currently seen. The catheter tip is at the most superior aspect of the right atrium. No acute   mediastinal abnormality. Thoracic aortic and minimal coronary artery calcifications. Small hiatal hernia is stable. No new chest enlarged lymph nodes identified.     HEPATOBILIARY: Status post cholecystectomy. No focal hepatic lesions identified.     PANCREAS: Normal.     SPLEEN: Normal.     ADRENAL GLANDS: Stable mild thickening of the left adrenal. Normal right adrenal.     KIDNEYS/BLADDER: Normal.     BOWEL: Previously noted rectal wall thickening distally is stable  versus slightly less prominent. Perirectal nodules again identified appearing stable. One of these is 1.0 x 0.6 cm posteriorly towards the left, series 3 image 509. There are 2 other   stable lesions towards the left posteriorly on image 519. Other small nodules superiorly towards the left posteriorly on image 474. Some of the perirectal fat has mild haziness. No bowel obstruction or inflammation.     LYMPH NODES: There is new indeterminate hazy soft tissue density at the upper mid abdomen posteriorly lying anterior to the hemidiaphragm rangel, and surrounding the celiac axis and most proximal abdominal aorta. This is difficult to precisely measure but   is approximately 1.7 x 1.8 cm, series 3 image 255.     VASCULATURE: Scattered mild vascular calcifications.     PELVIC ORGANS: Normal.     MUSCULOSKELETAL: Stable left L3 sclerosis measuring 2.3 cm image 332. Stable left ischial tuberosity tiny sclerosis image 542. This could just be a small bone island.                                                                      IMPRESSION:  1.  Enlargement of several bilateral metastatic pulmonary nodules suggesting progression of disease.  2.  New ill-defined soft tissue density material at the upper mid abdomen adjacent to the hemidiaphragm rangel, celiac access, and most proximal abdominal aorta. This could represent new area of adenopathy, potentially malignant. Inflammatory change in   this region is also possible, nonspecific as to precise etiology.  3.  Stable perirectal nodular opacities that could represent malignant lymph nodes.  4.  Stable sclerosis at L3 consistent with metastatic disease.      ASSESSMENT AND PLAN:      Metastatic rectal adenocarcinoma, MSI intact, K-aimee wild type, PDL 1 + 25% with metastasis to the lungs, bones and pelvic lymph nodes.      She was on palliative XELOX/Avastin but CT scan from 1/11/2021 shows progressive disease with progression in the lung nodules and there is also a soft tissue  density in the upper mid abdomen which could be new lymphadenopathy versus soft tissue deposit concerning for malignancy.  CEA on 12/23/2020 was also rising and it was 9.2.  Overall this is consistent with progression of the disease.      Because of slow progression I switched to irinotecan/Panitumumab on 1/13/2021.    Overall chemotherapy is going well.  CEA continues to come down and the last one was 6.4.  We will give her cycle #6 today.  Initially I was planning to repeat CT scan after 8 cycles but I will not be in the clinic when she will be due for cycle #9 so we will plan on doing a CT scan after cycle #9 provided she continues to do as well as she is doing now and CEA does not show significantly upward trend.  We will also check bone scan because she has bone mets.    Nausea/vomiting.  She felt it during the first week but overall it was mild and decently controlled with antinausea medication so she should continue that.    Constipation.  I recommend that she continues to take senna 2 tablets twice a day and she should take MiraLAX once a day and potentially twice a day for constipation.  If she has any diarrhea then she should back off.      Panitumumab related rash.  This was mild.  Continue hydrocortisone 2.5% and clindamycin 1% topical ointment as soon as the rash appears.  If it gets worse then she we will give her minocycline.  And she will let us know.  ]    Hypomagnesemia.  From Panitumumab.  She was given magnesium oxide 400 mg twice a day on last visit and she will continue that.  Today magnesium is low normal at 1.7.  Continue to serially monitor.      Bone metastasis.  Started Zometa on 11/25/2019.  She last received on 1/27/2021.  She will be due next month.  We will repeat bone scan with the next set of scans.  Continue calcium and vitamin D.      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  She now only notices mild neuropathy in the left hand but otherwise overall this has significantly improved.   Continue to monitor.     We did not address the following today.   COVID-19 vaccine.  She is supposed to get second COVID-19 shot on 3/26/2021.  She has chemotherapy scheduled on 3/24/2021.  It is reasonable to schedule the COVID-19 shot for the following week.         Right big toe infection/ingrown toenail.  She is done with systemic antibiotics.  She is using topical antibiotics under the care of podiatrist and this is getting better.      Port-A-Cath.  It was coiled in the right internal jugular vein so it was replaced 10/20/2020.       Discussion regarding health care directive  We discussed that it is important that she completes health care directive which would help in making sure that her wishes are followed about her treatment care in case she is not able to make a decision for herself.  We gave her information regarding that.    RTC in 2 weeks    All questions were answered to her satisfaction.  She is agreeable and comfortable with the plan.    Charline Velazquez MD        Again, thank you for allowing me to participate in the care of your patient.        Sincerely,        Charline Velazquez MD

## 2021-03-24 NOTE — PROGRESS NOTES
Patient's port accessed without incident. Blood return noted. Tubes drawn, labeled and sent to lab. Port flushed with saline and heparin. Patient tolerated lab draw well.     Kat Quintana RN

## 2021-03-24 NOTE — PROGRESS NOTES
Infusion Nursing Note:  Alannah Wynn presents today for C6D1 Vectibix/Irinotecan.    Patient seen by provider today: Yes: Austin Velazquez   present during visit today: Not Applicable.    Note: Pt c/o ongoing fatigue, some decrease in appetite immediately following chemotherapy.  See flow sheet for assessment    Intravenous Access:  Implanted Port.    Treatment Conditions:  Lab Results   Component Value Date    HGB 12.4 03/24/2021     Lab Results   Component Value Date    WBC 4.3 03/24/2021      Lab Results   Component Value Date    ANEU 1.3 03/24/2021     Lab Results   Component Value Date     03/24/2021      Lab Results   Component Value Date     03/24/2021                   Lab Results   Component Value Date    POTASSIUM 4.0 03/24/2021           Lab Results   Component Value Date    MAG 1.7 03/24/2021            Lab Results   Component Value Date    CR 0.59 03/24/2021                   Lab Results   Component Value Date    LAUREN 8.6 03/24/2021                Lab Results   Component Value Date    BILITOTAL 0.3 03/24/2021           Lab Results   Component Value Date    ALBUMIN 3.2 03/24/2021                    Lab Results   Component Value Date    ALT 31 03/24/2021           Lab Results   Component Value Date    AST 27 03/24/2021       Results reviewed, labs MET treatment parameters, ok to proceed with treatment.      Post Infusion Assessment:  Patient tolerated infusion without incident.  Blood return noted pre and post infusion.  Site patent and intact, free from redness, edema or discomfort.  No evidence of extravasations.  Access discontinued per protocol.       Discharge Plan:   Patient discharged in stable condition accompanied by: self.  Departure Mode: Ambulatory.  Pt will RTC 4/7/20 for C7D1 Vectibix/Irinotecan.    Allen Andrade RN

## 2021-04-07 NOTE — LETTER
"    4/7/2021         RE: Alannah Wynn  [address]  [place] [address]        Dear Colleague,    Thank you for referring your patient, Alannah Wynn, to the [facility]. Please see a copy of my visit note below.    Oncology Follow Up Visit: April 7, 2021     Oncologist: Dr Charline Velazquez  PCP: Earline Mehta    Diagnosis: Stage IV Adenocarcinoma of the rectum   Alannah Wynn is a 71 yo female who presented in 10/2019 with intermittent hematochezia and anorectal irritation since 2011, that have been managed as \"hemorrhoids\".  Diagnostic colonoscopy on 10/18/19 for rectal bleeding showed \"fungating and ulcerated non-obstructing mass was found in the distal rectum <1 cm from the anal verge. The mass was partially circumferential (involving one-half of the lumen circumference). The mass measured four cm in length. In addition, its diameter measured six mm.  Pathology showed invasive moderately differentiated adenocarcinoma with intact mismatch repair protein expression.  Pelvic MRI 11/2019 found fungating polypoid mid/lower rectal mass with luminal narrowing as well as innumerable enlarged lymph nodes as described above with a conglomerate of suspicious lymph nodes abutting the anterior peritoneal reflection. Stage cT3d N2.   Further testing with PET scan proved pulmonarymetastasis and bony metastasis at L3.  Has KRAS - wild type  Treatment:   11/25/2019 began treatment with FOLFOX/Avastin  11/25/2019 Began Zometa every 3 months  4/7/2020-7/2020 capecitabine x 4 cycles  12/23/2020 completed Xelox/Avastin x 8 cycles  1/13/2021  Began Irinotecan/ panitumumab every 2 weeks    Interval History: Ms. Wynn is seen in clinic for review of symptoms for start of cycle 7 of Irinotecan/ panitumumab.  Patient reports she continues to have problems with constipation which is causing hemorrhoid issues as well.  She continues to use the senna S twice daily and uses MiraLAX once " daily and is still having the constipation is causing stomach pain and cramping.  She also reports some nausea the first week after treatment but is treating well with oral antiemetics and feels this is well cared for.  She otherwise has not had any mouth sores since first cycle shortness of breath fevers or illnesses and has completed her Covid vaccinations.  Reducing she has some weight loss again today up to 5 pounds and admits she is not using any protein supplements anymore.  She continues to the house but is not on a regular exercise program and is now walking outside at this point.  She is sleeping well and denies any depression.   is supportive and did Bring her to clinic today.  Rest of comprehensive and complete ROS is reviewed and is negative.   Past Medical History:   Diagnosis Date     Gastroesophageal reflux disease      Current Outpatient Medications   Medication     Ascorbic Acid (VITAMIN C) 500 MG CAPS     B Complex Vitamins (B COMPLEX-B12 PO)     cholecalciferol (VITAMIN D3) 400 unit (10 mcg) TABS tablet     clindamycin (CLINDAMAX) 1 % external gel     docusate sodium (COLACE) 100 MG capsule     Fesoterodine Fumarate 8 MG TB24     hydrocortisone 2.5 % ointment     lidocaine-prilocaine (EMLA) 2.5-2.5 % external cream     loperamide (IMODIUM) 2 MG capsule     LORazepam (ATIVAN) 0.5 MG tablet     Lutein 20 MG CAPS     magnesium oxide (MAG-OX) 400 MG tablet     mirabegron (MYRBETRIQ) 50 MG 24 hr tablet     Multiple vitamin TABS     naloxone (NARCAN) 4 MG/0.1ML nasal spray     omeprazole (PRILOSEC) 20 MG DR capsule     ondansetron (ZOFRAN) 8 MG tablet     prochlorperazine (COMPAZINE) 10 MG tablet     senna-docusate (SENNA S) 8.6-50 MG tablet     sennosides (SENOKOT) 8.6 MG tablet     solifenacin (VESICARE) 10 MG tablet     traMADol (ULTRAM) 50 MG tablet     urea (GORDONS UREA) 40 % external ointment     Current Facility-Administered Medications   Medication     lidocaine (XYLOCAINE) 5 % ointment  "    Allergies   Allergen Reactions     Penicillins      Sulfa Drugs      Physical Exam: /83 (BP Location: Right arm, Patient Position: Chair, Cuff Size: Adult Regular)   Pulse 87   Temp 97.2  F (36.2  C) (Oral)   Ht 1.575 m (5' 2\")   Wt 62.2 kg (137 lb 1.6 oz)   LMP 11/27/2001 (Approximate)   SpO2 99%   BMI 25.08 kg/m     Constitutional: moving well on own, cooperative and in no distress.   ENT: Eyes bright, No mouth sores  Neck: Supple, No adenopathy.  Cardiac: Heart rate and rhythm is regular and strong without murmur  Respiratory: Breathing easy. Lung sounds clear to auscultation  Port without redness or swelling  Abdomen: Soft, non-tender at present with normal BS normal at this time. No masses or organomegaly  MS: Muscle tone normal, extremities normal with no edema.   Skin: No suspicious lesions or rashes- hair is very thin.  Neuro: Sensory grossly WNL, gait normal.   Lymph: Normal ant/post cervical, axillary, supraclavicular nodes  Psych: Mentation appears normal and affect normal/bright and with good conversation.     Laboratory Results:   Results for orders placed or performed in visit on 04/07/21   CBC with platelets differential     Status: Abnormal   Result Value Ref Range    WBC 4.6 4.0 - 11.0 10e9/L    RBC Count 3.80 3.8 - 5.2 10e12/L    Hemoglobin 11.8 11.7 - 15.7 g/dL    Hematocrit 37.1 35.0 - 47.0 %    MCV 98 78 - 100 fl    MCH 31.1 26.5 - 33.0 pg    MCHC 31.8 31.5 - 36.5 g/dL    RDW 15.1 (H) 10.0 - 15.0 %    Platelet Count 273 150 - 450 10e9/L    Diff Method Automated Method     % Neutrophils 34.9 %    % Lymphocytes 46.4 %    % Monocytes 9.0 %    % Eosinophils 8.4 %    % Basophils 1.1 %    % Immature Granulocytes 0.2 %    Absolute Neutrophil 1.6 1.6 - 8.3 10e9/L    Absolute Lymphocytes 2.1 0.8 - 5.3 10e9/L    Absolute Monocytes 0.4 0.0 - 1.3 10e9/L    Absolute Eosinophils 0.4 0.0 - 0.7 10e9/L    Absolute Basophils 0.1 0.0 - 0.2 10e9/L    Abs Immature Granulocytes 0.0 0 - 0.4 10e9/L "   Comprehensive metabolic panel     Status: Abnormal   Result Value Ref Range    Sodium 141 133 - 144 mmol/L    Potassium 3.6 3.4 - 5.3 mmol/L    Chloride 108 94 - 109 mmol/L    Carbon Dioxide 30 20 - 32 mmol/L    Anion Gap 3 3 - 14 mmol/L    Glucose 98 70 - 99 mg/dL    Urea Nitrogen 8 7 - 30 mg/dL    Creatinine 0.64 0.52 - 1.04 mg/dL    GFR Estimate >90 >60 mL/min/[1.73_m2]    GFR Estimate If Black >90 >60 mL/min/[1.73_m2]    Calcium 8.7 8.5 - 10.1 mg/dL    Bilirubin Total 0.3 0.2 - 1.3 mg/dL    Albumin 3.0 (L) 3.4 - 5.0 g/dL    Protein Total 6.7 (L) 6.8 - 8.8 g/dL    Alkaline Phosphatase 80 40 - 150 U/L    ALT 28 0 - 50 U/L    AST 25 0 - 45 U/L   Magnesium     Status: Abnormal   Result Value Ref Range    Magnesium 1.5 (L) 1.6 - 2.3 mg/dL     Assessment and Plan:   Stage IV Adenocarcinoma of the rectum - Pt continues with irinotecan/Panitumumab every 2 weeks for MSI intact, KRAS wild type, PDL1+ cancer with metastasis to lungs, bones and lymph nodes of the pelvis. She is noting early nausea,and continues with constipation that is not well covered.  Labs remain with good results so will continue with plan without changes necessary.  She will return for review prior to cycle 8 with visit with Dr Velazquez.   Bone metastasis- Using Zometa- receiving every 3 months- next due after April 21. Confirmed use of calcium plus vitamin D daily. Encouraged walks for weight bearing exercise that can help bones as well.   Nausea-Using Zofran/Compazine and lorazepam.Reviewed she should start day 2 and 3 with antiemetics and use as long as necessary.  Weight loss- admits she has not been using the protein supplements recently and is seeing weight loss. Will have dietician call pt for review and provide ensure coupons for pt use.   Drug rash- noted intermittently- using hydrocortisone cream and moisturizing creams    Constipation-continues- unusual since irinotecan typically causes loose stools- will ask to increase SennaS to 2 tabs bid  with Miralax 1-2 x daily.   Peripheral neuropathy- from previous use of Oxaliplatin- Improving.No current treatment.  The total time of this encounter amounted to 30 minutes. This time included face to face time spent with the patient, prep work, ordering/ reviewing tests, and performing post visit documentation.  Felipa Garner Cnp                Again, thank you for allowing me to participate in the care of your patient.        Sincerely,        Felipa Garner, LETICIA, APRN CNP

## 2021-04-07 NOTE — PATIENT INSTRUCTIONS
Continue with 2 tabs of magnesium daily   increase protein in diet. Dietician to call and review with you in future.

## 2021-04-07 NOTE — PROGRESS NOTES
Infusion Nursing Note:  Alannah Wynn presents today for C7 D1 vectibix/ irinotecan and magnesium replacement.  Patient seen by provider today: Yes: Felipa Garner NP   present during visit today: Not Applicable.    Intravenous Access:  Implanted Port.    Treatment Conditions:  Lab Results   Component Value Date    HGB 11.8 04/07/2021     Lab Results   Component Value Date    WBC 4.6 04/07/2021      Lab Results   Component Value Date    ANEU 1.6 04/07/2021     Lab Results   Component Value Date     04/07/2021      Lab Results   Component Value Date     04/07/2021                   Lab Results   Component Value Date    POTASSIUM 3.6 04/07/2021           Lab Results   Component Value Date    MAG 1.5 04/07/2021            Lab Results   Component Value Date    CR 0.64 04/07/2021                   Lab Results   Component Value Date    LAUREN 8.7 04/07/2021                Lab Results   Component Value Date    BILITOTAL 0.3 04/07/2021           Lab Results   Component Value Date    ALBUMIN 3.0 04/07/2021                    Lab Results   Component Value Date    ALT 28 04/07/2021           Lab Results   Component Value Date    AST 25 04/07/2021       Results reviewed, labs MET treatment parameters, ok to proceed with treatment.    IV magnesium replacement given.      Post Infusion Assessment:  Patient tolerated infusion without incident.  Blood return noted pre and post infusion.  Site patent and intact, free from redness, edema or discomfort.  No evidence of extravasations.  Access discontinued per protocol.       Discharge Plan:   Patient discharged in stable condition accompanied by: self.  Departure Mode: Ambulatory and to meet her .  Verbally reviewed appointment with Dr Velazquez on 4/21 with chemotherapy to follow .    Shannan Ferrari RN

## 2021-04-07 NOTE — NURSING NOTE
" Oncology Rooming Note    April 7, 2021 7:31 AM   Alannah Wynn is a 70 year old female who presents for:    Chief Complaint   Patient presents with     Oncology Clinic Visit     prior to tx     Initial Vitals: /83 (BP Location: Right arm, Patient Position: Chair, Cuff Size: Adult Regular)   Pulse 87   Temp 97.2  F (36.2  C) (Oral)   Ht 1.575 m (5' 2\")   Wt 62.2 kg (137 lb 1.6 oz)   LMP 11/27/2001 (Approximate)   SpO2 99%   BMI 25.08 kg/m   Estimated body mass index is 25.08 kg/m  as calculated from the following:    Height as of this encounter: 1.575 m (5' 2\").    Weight as of this encounter: 62.2 kg (137 lb 1.6 oz). Body surface area is 1.65 meters squared.  No Pain (0) Comment: Data Unavailable   Patient's last menstrual period was 11/27/2001 (approximate).  Allergies reviewed: Yes  Medications reviewed: Yes    Medications: Medication refills not needed today.  Pharmacy name entered into [facility]:    [id number] - [place], [place] - [address] AT [address] [facility] - [place], [place] - [address], [id number]  [facility] - [place], [place] - [address] [facility] [place] [facility]    Clinical concerns: NO Felipa was notified.      Annetta Bruno CMA              "

## 2021-04-07 NOTE — PROGRESS NOTES
"Oncology Follow Up Visit: April 7, 2021     Oncologist: Dr Charline Velazquez  PCP: Earline Mehta    Diagnosis: Stage IV Adenocarcinoma of the rectum   Alannah Wynn is a 71 yo female who presented in 10/2019 with intermittent hematochezia and anorectal irritation since 2011, that have been managed as \"hemorrhoids\".  Diagnostic colonoscopy on 10/18/19 for rectal bleeding showed \"fungating and ulcerated non-obstructing mass was found in the distal rectum <1 cm from the anal verge. The mass was partially circumferential (involving one-half of the lumen circumference). The mass measured four cm in length. In addition, its diameter measured six mm.  Pathology showed invasive moderately differentiated adenocarcinoma with intact mismatch repair protein expression.  Pelvic MRI 11/2019 found fungating polypoid mid/lower rectal mass with luminal narrowing as well as innumerable enlarged lymph nodes as described above with a conglomerate of suspicious lymph nodes abutting the anterior peritoneal reflection. Stage cT3d N2.   Further testing with PET scan proved pulmonarymetastasis and bony metastasis at L3.  Has KRAS - wild type  Treatment:   11/25/2019 began treatment with FOLFOX/Avastin  11/25/2019 Began Zometa every 3 months  4/7/2020-7/2020 capecitabine x 4 cycles  12/23/2020 completed Xelox/Avastin x 8 cycles  1/13/2021  Began Irinotecan/ panitumumab every 2 weeks    Interval History: Ms. Wynn is seen in clinic for review of symptoms for start of cycle 7 of Irinotecan/ panitumumab.  Patient reports she continues to have problems with constipation which is causing hemorrhoid issues as well.  She continues to use the senna S twice daily and uses MiraLAX once daily and is still having the constipation is causing stomach pain and cramping.  She also reports some nausea the first week after treatment but is treating well with oral antiemetics and feels this is well cared for.  She otherwise has not had any mouth sores since " "first cycle shortness of breath fevers or illnesses and has completed her Covid vaccinations.  Reducing she has some weight loss again today up to 5 pounds and admits she is not using any protein supplements anymore.  She continues to the house but is not on a regular exercise program and is now walking outside at this point.  She is sleeping well and denies any depression.   is supportive and did Bring her to clinic today.  Rest of comprehensive and complete ROS is reviewed and is negative.   Past Medical History:   Diagnosis Date     Gastroesophageal reflux disease      Current Outpatient Medications   Medication     Ascorbic Acid (VITAMIN C) 500 MG CAPS     B Complex Vitamins (B COMPLEX-B12 PO)     cholecalciferol (VITAMIN D3) 400 unit (10 mcg) TABS tablet     clindamycin (CLINDAMAX) 1 % external gel     docusate sodium (COLACE) 100 MG capsule     Fesoterodine Fumarate 8 MG TB24     hydrocortisone 2.5 % ointment     lidocaine-prilocaine (EMLA) 2.5-2.5 % external cream     loperamide (IMODIUM) 2 MG capsule     LORazepam (ATIVAN) 0.5 MG tablet     Lutein 20 MG CAPS     magnesium oxide (MAG-OX) 400 MG tablet     mirabegron (MYRBETRIQ) 50 MG 24 hr tablet     Multiple vitamin TABS     naloxone (NARCAN) 4 MG/0.1ML nasal spray     omeprazole (PRILOSEC) 20 MG DR capsule     ondansetron (ZOFRAN) 8 MG tablet     prochlorperazine (COMPAZINE) 10 MG tablet     senna-docusate (SENNA S) 8.6-50 MG tablet     sennosides (SENOKOT) 8.6 MG tablet     solifenacin (VESICARE) 10 MG tablet     traMADol (ULTRAM) 50 MG tablet     urea (GORDONS UREA) 40 % external ointment     Current Facility-Administered Medications   Medication     lidocaine (XYLOCAINE) 5 % ointment     Allergies   Allergen Reactions     Penicillins      Sulfa Drugs      Physical Exam: /83 (BP Location: Right arm, Patient Position: Chair, Cuff Size: Adult Regular)   Pulse 87   Temp 97.2  F (36.2  C) (Oral)   Ht 1.575 m (5' 2\")   Wt 62.2 kg (137 lb 1.6 " oz)   LMP 11/27/2001 (Approximate)   SpO2 99%   BMI 25.08 kg/m     Constitutional: moving well on own, cooperative and in no distress.   ENT: Eyes bright, No mouth sores  Neck: Supple, No adenopathy.  Cardiac: Heart rate and rhythm is regular and strong without murmur  Respiratory: Breathing easy. Lung sounds clear to auscultation  Port without redness or swelling  Abdomen: Soft, non-tender at present with normal BS normal at this time. No masses or organomegaly  MS: Muscle tone normal, extremities normal with no edema.   Skin: No suspicious lesions or rashes- hair is very thin.  Neuro: Sensory grossly WNL, gait normal.   Lymph: Normal ant/post cervical, axillary, supraclavicular nodes  Psych: Mentation appears normal and affect normal/bright and with good conversation.     Laboratory Results:   Results for orders placed or performed in visit on 04/07/21   CBC with platelets differential     Status: Abnormal   Result Value Ref Range    WBC 4.6 4.0 - 11.0 10e9/L    RBC Count 3.80 3.8 - 5.2 10e12/L    Hemoglobin 11.8 11.7 - 15.7 g/dL    Hematocrit 37.1 35.0 - 47.0 %    MCV 98 78 - 100 fl    MCH 31.1 26.5 - 33.0 pg    MCHC 31.8 31.5 - 36.5 g/dL    RDW 15.1 (H) 10.0 - 15.0 %    Platelet Count 273 150 - 450 10e9/L    Diff Method Automated Method     % Neutrophils 34.9 %    % Lymphocytes 46.4 %    % Monocytes 9.0 %    % Eosinophils 8.4 %    % Basophils 1.1 %    % Immature Granulocytes 0.2 %    Absolute Neutrophil 1.6 1.6 - 8.3 10e9/L    Absolute Lymphocytes 2.1 0.8 - 5.3 10e9/L    Absolute Monocytes 0.4 0.0 - 1.3 10e9/L    Absolute Eosinophils 0.4 0.0 - 0.7 10e9/L    Absolute Basophils 0.1 0.0 - 0.2 10e9/L    Abs Immature Granulocytes 0.0 0 - 0.4 10e9/L   Comprehensive metabolic panel     Status: Abnormal   Result Value Ref Range    Sodium 141 133 - 144 mmol/L    Potassium 3.6 3.4 - 5.3 mmol/L    Chloride 108 94 - 109 mmol/L    Carbon Dioxide 30 20 - 32 mmol/L    Anion Gap 3 3 - 14 mmol/L    Glucose 98 70 - 99 mg/dL     Urea Nitrogen 8 7 - 30 mg/dL    Creatinine 0.64 0.52 - 1.04 mg/dL    GFR Estimate >90 >60 mL/min/[1.73_m2]    GFR Estimate If Black >90 >60 mL/min/[1.73_m2]    Calcium 8.7 8.5 - 10.1 mg/dL    Bilirubin Total 0.3 0.2 - 1.3 mg/dL    Albumin 3.0 (L) 3.4 - 5.0 g/dL    Protein Total 6.7 (L) 6.8 - 8.8 g/dL    Alkaline Phosphatase 80 40 - 150 U/L    ALT 28 0 - 50 U/L    AST 25 0 - 45 U/L   Magnesium     Status: Abnormal   Result Value Ref Range    Magnesium 1.5 (L) 1.6 - 2.3 mg/dL     Assessment and Plan:   Stage IV Adenocarcinoma of the rectum - Pt continues with irinotecan/Panitumumab every 2 weeks for MSI intact, KRAS wild type, PDL1+ cancer with metastasis to lungs, bones and lymph nodes of the pelvis. She is noting early nausea,and continues with constipation that is not well covered.  Labs remain with good results so will continue with plan without changes necessary.  She will return for review prior to cycle 8 with visit with Dr Velazquez.   Bone metastasis- Using Zometa- receiving every 3 months- next due after April 21. Confirmed use of calcium plus vitamin D daily. Encouraged walks for weight bearing exercise that can help bones as well.   Nausea-Using Zofran/Compazine and lorazepam.Reviewed she should start day 2 and 3 with antiemetics and use as long as necessary.  Weight loss- admits she has not been using the protein supplements recently and is seeing weight loss. Will have dietician call pt for review and provide ensure coupons for pt use.   Drug rash- noted intermittently- using hydrocortisone cream and moisturizing creams    Constipation-continues- unusual since irinotecan typically causes loose stools- will ask to increase SennaS to 2 tabs bid with Miralax 1-2 x daily.   Peripheral neuropathy- from previous use of Oxaliplatin- Improving.No current treatment.  The total time of this encounter amounted to 30 minutes. This time included face to face time spent with the patient, prep work, ordering/ reviewing  tests, and performing post visit documentation.  Felipa Garner,Cnp

## 2021-04-21 NOTE — LETTER
"    4/21/2021         RE: Alannah Wynn  [address]  [place] [address]        Dear Colleague,    Thank you for referring your patient, Alannah Wynn, to the [facility]. Please see a copy of my visit note below.    ONCOLOGY FOLLOW UP NOTE    4/21/2021     Patient was being followed by Dr. Tavarez and saw her on 11/13/2019.  Now she is transferring her care to me.  I have reviewed her previous records and have copied and updated from prior notes.      DIAGNOSIS:    10/2019 dx adenoCa of rectum,  MMR intact- NGS Negative for KRAS, NRAS and BRAF. PD L1 25%.  Baseline CEA 10.  Stage IV disease with pulmonary and bony mets      HISTORY OF ONCOLOGY ILLNESS:    She has had intermittent hematochezia and anorectal irritation since 2011, that have been managed as \"hemorrhoids\". These symptoms became persistent and progressive since early thi year. Her anorectal pain is managed with Tylenol. Denies fevers, chills, or unintentional weight loss. Does have intermittent urinary and fecal urgency but no incontinence per se.    Diagnostic colonoscopy on 10/18/19 for rectal bleeding showed \"fungating and ulcerated non-obstructing mass was found in the distal rectum <1 cm from the anal verge. The mass was partially circumferential (involving one-half of the lumen circumference). The mass measured four cm in length. In addition, its diameter measured six mm.  Pathology showed invasive moderately differentiated adenocarcinoma with intact mismatch repair protein expression, PD L1 25% and NGS panel showed NO MUTATIONS in KRAS, NRAS and BRAF.      Pelvic MRI 11/2019 found fungating polypoid mid/lower rectal mass with luminal narrowing as well as innumerable enlarged lymph nodes as described above with a conglomerate of suspicious lymph nodes abutting the anterior peritoneal reflection. Stage cT3d N2.     PET 11/2019 found   1. Hypermetabolic mass in the distal rectum, SUV 14 corresponding to " the patient's known rectal cancer.  2. Multiple hypermetabolic pelvic lymph nodes, compatible metastatic disease, SUV 5-6.  3. FDG avid pulmonary nodule measuring 7 mm in the left lower lobe, concerning for metastatic disease. Additional FDG avid pulmonary nodules suspicious for metastatic disease, SUV around 4.  4. Osteoblastic metastatic lesion in the L3 vertebral body, SUV 6.8    She started palliative FOLFOX/Avastin on 11/25/2019.  Cycle #2 given on 12/9/2019.  Cycle #3 scheduled for 12/24/2019.  C#6 on 2/10/2020.    Repeat CT CAP on 2/21/2020 shows good response to therapy with improvement in the rectal mass, mesorectal and superior rectal nodularity as well as resolution of right internal iliac lymphadenopathy.  There is slight improvement in the dominant posterior left lower lobe lung nodule and several of the lung nodules are is stable.  There is increase in size of the sclerotic lesion of the left aspect of L3 vertebral body without pathological fracture and this is likely due to treatment effect.    CEA on 2/10/2020 was down to 3.3.    Cycle #7 FOLFOX/Avastin 2/25/2020.  C#8 FOLFOX/Avastin 3/9/2020  Cycle#9 5-FU/Avastin 3/24/2020-stopped oxaliplatin due to cumulative fatigue and issues with nausea.    I then discussed with her, her  as well as her son Gamal about possibly switching to single agent Xeloda going forward so as to limit her visits to the clinic and therefore exposure and risk from coronavirus.  We discussed the pros and cons of this approach and they were all willing to try this.     She started Single agent Xeloda on 4/7/2020.  Cycle #3 started on 5/19/2020.    Because of bone metastatic disease, she was started on Zometa on 11/25/2019. We are giving it every 3 months.        CT chest abdomen and pelvis on 7/20/2020 showed mild progression in the lung nodules.  Perirectal nodularity has mildly improved.    Last CEA on 7/22/2020 was 4.      7/27/2020- we decided on switching to  XELOX/Avastin.     8/18/2020 -cycle #2 XELOX/Avastin.  CEA on 8/11/2020 was 4.8.  9/8/2020 - C#3 XELOX/Avastin. CEA on 9/2/2020 was 5.1  9/30/2020.  Cycle #4 XELOX/Avastin.  Oxaliplatin dose reduced further to 85 mg per metered square because of worsening neuropathy.    Repeat CT chest abdomen and pelvis on 10/15/2020 overall shows stable disease with 1 to 2 mm increase in some of the lung nodules and about 2 mm decrease in the lymph node in the mesorectum.    CEA stable at 5.3 on 10/21/2020.    10/22/2020.  Cycle #5 XELOX/Avastin.  Oxaliplatin dose 85 mg/m .  11/11/2020 C#6 XELOX/Avastin. Oxaliplatin dose 85 mg/m .  12/2/2020 C#7 XELOX/Avastin. Oxaliplatin dose 85 mg/m .  12/23/2020 C#8 XELOX/Avastin. Oxaliplatin dose 85 mg/m .    1/11/2021-CT chest abdomen and pelvis shows progressive disease with progression in the lung nodules.  There was a new ill-defined soft tissue density material at the upper mid abdomen adjacent to the hemidiaphragm rangel, celiac access, and most proximal abdominal aorta and this is also concerning for malignancy.  There is a stable sclerosis of L3 and stable left ischial tuberosity tiny sclerosis.  CEA on 12/23/2020 was 9.2.      1/13/2021.  Switch to irinotecan/Panitumumab.  1/27/2021.  Cycle #2 irinotecan/Panitumumab.  2/10/2021.  Cycle #3 irinotecan/Panitumumab.  2/24/2021.  Cycle #4 irinotecan/Panitumumab  3/10/2021.  Cycle #5 irinotecan/Panitumumab.  3/24/2021   Cycle #6 irinotecan/Panitumumab.  4/7/2021   Cycle #7 irinotecan/Panitumumab  4/21/2021 Cycle #8 irinotecan/Panitumumab    INTERVAL HISTORY:     This is a video visit.  Overall she is tolerating chemo well. She has constipation for which she is taking senokot and miralax. She had mild rash from panitumumab and applied topical hydrocortisone ointment. No infections, no dyspnea, no new swellings. Weight is stable and energy is stable. Has mild residual neuropathy in the left hand.     ECOG 1     ROS:  Otherwise a comprehensive  review of system was negative.    I reviewed with her history in epic as below.    PAST MEDICAL HISTORY  Reflux, hemorrhoid, hepatitis B?  Overactive bladder  Active Ambulatory Problems     Diagnosis Date Noted     External hemorrhoids 04/29/2016     Non morbid obesity due to excess calories 04/29/2016     Hepatitis B immune 04/29/2016     Screening for cervical cancer 04/29/2016     CARDIOVASCULAR SCREENING; LDL GOAL LESS THAN 160 04/29/2016     Gastroesophageal reflux disease 04/29/2016     Overactive bladder 07/21/2017     Nevus of left lower leg- undetermined behavior 12/27/2018     Metastasis from rectal cancer (H) 11/13/2019     Bone metastasis (H) 11/13/2019     Nausea 11/27/2019     Chemotherapy-induced neutropenia (H) 12/24/2019     Resolved Ambulatory Problems     Diagnosis Date Noted     Urgency of urination 04/29/2016     Need for vaccination against Streptococcus pneumoniae 04/29/2016     Morbid obesity (H) 07/21/2017     Obesity (BMI 35.0-39.9) with comorbidity (H) 12/27/2018     No Additional Past Medical History       MEDICATIONS:  Current Outpatient Medications   Medication     Ascorbic Acid (VITAMIN C) 500 MG CAPS     B Complex Vitamins (B COMPLEX-B12 PO)     cholecalciferol (VITAMIN D3) 400 unit (10 mcg) TABS tablet     clindamycin (CLINDAMAX) 1 % external gel     docusate sodium (COLACE) 100 MG capsule     Fesoterodine Fumarate 8 MG TB24     hydrocortisone 2.5 % ointment     lidocaine-prilocaine (EMLA) 2.5-2.5 % external cream     loperamide (IMODIUM) 2 MG capsule     LORazepam (ATIVAN) 0.5 MG tablet     Lutein 20 MG CAPS     mirabegron (MYRBETRIQ) 50 MG 24 hr tablet     Multiple vitamin TABS     naloxone (NARCAN) 4 MG/0.1ML nasal spray     omeprazole (PRILOSEC) 20 MG DR capsule     ondansetron (ZOFRAN) 8 MG tablet     polyethylene glycol (MIRALAX) 17 GM/Dose powder     prochlorperazine (COMPAZINE) 10 MG tablet     senna-docusate (SENNA S) 8.6-50 MG tablet     solifenacin (VESICARE) 10 MG tablet      traMADol (ULTRAM) 50 MG tablet     urea (GORDONS UREA) 40 % external ointment     Current Facility-Administered Medications   Medication     lidocaine (XYLOCAINE) 5 % ointment     ALLERGY:     Allergies   Allergen Reactions     Penicillins      Sulfa Drugs             SOCIAL HISTORY:  She is a [place] immigrants to [place].  Denies smoking denies alcohol abuse.  2 of her sons are doctors.    FAMILY HISTORY:   Denies any family history of cancer.       PHYSICAL EXAMINATION:       /76 (BP Location: Left arm, Patient Position: Chair, Cuff Size: Adult Regular)   Pulse 80   Temp 97.6  F (36.4  C) (Oral)   Wt 61.5 kg (135 lb 8 oz)   LMP 11/27/2001 (Approximate)   SpO2 100%   BMI 24.78 kg/m       Wt Readings from Last 4 Encounters:   04/21/21 61.5 kg (135 lb 8 oz)   04/07/21 62.2 kg (137 lb 1.6 oz)   03/24/21 62.7 kg (138 lb 4.8 oz)   03/10/21 64.2 kg (141 lb 9.6 oz)       Constitutional.  Looks well and in no apparent distress.   Eyes.  Without eye redness or apparent jaundice.   Respiratory.  Non labored breathing. Speaking in full sentences.    Skin.  No concerning skin rashes on the skin visualized.   Neurological.  Is alert and oriented.  Psychiatric.  Mood and affect seem appropriate.      The rest of a comprehensive physical examination is deferred due to Public Health Emergency video visit restrictions.      LABS/IMAGING/PATHOLOGY:    Reviewed  CBC unremarkable except Hg is 11.5   CMP remarkable only for albumin 3.1.    Last CEA was 7.2 on 4/7/2021.             1/11/2021-CT chest abdomen and pelvis shows progressive disease with progression in the lung nodules.  There was a new ill-defined soft tissue density material at the upper mid abdomen adjacent to the hemidiaphragm rangel, celiac access, and most proximal abdominal aorta and this is also concerning for malignancy.  This measures approximately 1.7 x 1.8 cm.  There is a stable sclerosis of L3 and stable left ischial tuberosity tiny  sclerosis.    EXAM: CT CHEST/ABDOMEN/PELVIS W CONTRAST  LOCATION: [facility]  DATE/TIME: 1/11/2021 8:13 AM     INDICATION: Metastatic colon cancer. Bony metastatic disease.  COMPARISON: CT chest, abdomen and pelvis 10/15/2020.  TECHNIQUE: CT scan of the chest, abdomen, and pelvis was performed following injection of IV contrast. Multiplanar reformats were obtained. Dose reduction techniques were used.   CONTRAST: 93 ml isovue 370     FINDINGS:   LUNGS AND PLEURA: No effusions. Multiple bilateral pulmonary nodules within all lobes of both lungs consistent with pulmonary metastatic disease. Several are larger in size. An example at the medial left lower lobe now measures 1.7 x 0.9 cm, previously   1.5 x 0.7 cm, series 7 image 106. Another larger example at the posterior left lower lobe is 1.0 x 0.9 cm, previously 0.6 x 0.4 cm, image 129. At the right upper lobe an example is 0.6 x 0.6 cm, previously 0.4 x 0.4 cm, image 71.     MEDIASTINUM/AXILLAE: Right chest Port-A-Cath is identified, the previously noted looping of this catheter within the right internal jugular vein is not currently seen. The catheter tip is at the most superior aspect of the right atrium. No acute   mediastinal abnormality. Thoracic aortic and minimal coronary artery calcifications. Small hiatal hernia is stable. No new chest enlarged lymph nodes identified.     HEPATOBILIARY: Status post cholecystectomy. No focal hepatic lesions identified.     PANCREAS: Normal.     SPLEEN: Normal.     ADRENAL GLANDS: Stable mild thickening of the left adrenal. Normal right adrenal.     KIDNEYS/BLADDER: Normal.     BOWEL: Previously noted rectal wall thickening distally is stable versus slightly less prominent. Perirectal nodules again identified appearing stable. One of these is 1.0 x 0.6 cm posteriorly towards the left, series 3 image 509. There are 2 other   stable lesions towards the left posteriorly on image 519. Other small nodules superiorly  towards the left posteriorly on image 474. Some of the perirectal fat has mild haziness. No bowel obstruction or inflammation.     LYMPH NODES: There is new indeterminate hazy soft tissue density at the upper mid abdomen posteriorly lying anterior to the hemidiaphragm rangel, and surrounding the celiac axis and most proximal abdominal aorta. This is difficult to precisely measure but   is approximately 1.7 x 1.8 cm, series 3 image 255.     VASCULATURE: Scattered mild vascular calcifications.     PELVIC ORGANS: Normal.     MUSCULOSKELETAL: Stable left L3 sclerosis measuring 2.3 cm image 332. Stable left ischial tuberosity tiny sclerosis image 542. This could just be a small bone island.                                                                      IMPRESSION:  1.  Enlargement of several bilateral metastatic pulmonary nodules suggesting progression of disease.  2.  New ill-defined soft tissue density material at the upper mid abdomen adjacent to the hemidiaphragm rangel, celiac access, and most proximal abdominal aorta. This could represent new area of adenopathy, potentially malignant. Inflammatory change in   this region is also possible, nonspecific as to precise etiology.  3.  Stable perirectal nodular opacities that could represent malignant lymph nodes.  4.  Stable sclerosis at L3 consistent with metastatic disease.      ASSESSMENT AND PLAN:      Metastatic rectal adenocarcinoma, MSI intact, K-aimee wild type, PDL 1 + 25% with metastasis to the lungs, bones and pelvic lymph nodes.      She was on palliative XELOX/Avastin but CT scan from 1/11/2021 shows progressive disease with progression in the lung nodules and there is also a soft tissue density in the upper mid abdomen which could be new lymphadenopathy versus soft tissue deposit concerning for malignancy.  CEA on 12/23/2020 was also rising and it was 9.2.  Overall this is consistent with progression of the disease.      Because of slow progression I  switched to irinotecan/Panitumumab on 1/13/2021.    She is tolerating the chemotherapy nicely and remains pretty much asymptomatic.  She does have some constipation.  CEA was 7.2 last time and today it is pending.  We will proceed with chemotherapy today and she will have repeat scans next month.       Nausea/vomiting.  She feels nausea during the first week and she takes antinausea medications.  I refilled Compazine and Zofran and Ativan today.        Constipation.  Continue senna 2 tablets twice a day with MiraLAX.     Panitumumab related rash.  It was mild with this chemotherapy cycle.  She used hydrocortisone 2.5% which helped.  She also has clindamycin 1% topical ointment.  We will consider giving her minocycline if it gets worse.      Anemia.  She has mild anemia from chemotherapy.  Continue to observe.    Hypomagnesemia.  Related to Panitumumab.  Previously she was mildly hypomagnesemic but today magnesium level is 1.6.  Continue oral magnesium.  I refilled it today.     Bone metastasis.  Started Zometa on 11/25/2019.  She last received on 1/27/2021.  She will receive Zometa today.  Continue vitamin D and calcium.  Check bone scan next month.     We did not address the following today.   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  She now only notices mild neuropathy in the left hand but otherwise overall this has significantly improved.  Continue to monitor.       COVID-19 vaccine.  She is supposed to get second COVID-19 shot on 3/26/2021.  She has chemotherapy scheduled on 3/24/2021.  It is reasonable to schedule the COVID-19 shot for the following week.         Right big toe infection/ingrown toenail.  She is done with systemic antibiotics.  She is using topical antibiotics under the care of podiatrist and this is getting better.      Port-A-Cath.  It was coiled in the right internal jugular vein so it was replaced 10/20/2020.       Discussion regarding health care directive  We discussed that it is important that she  completes health care directive which would help in making sure that her wishes are followed about her treatment care in case she is not able to make a decision for herself.  We gave her information regarding that.    RTC in 2 weeks    All questions were answered to her satisfaction.  She is agreeable and comfortable with the plan.    Charline Velazquez MD    Video start time. 9:52 AM  Video stop time. 10:03 AM      Again, thank you for allowing me to participate in the care of your patient.        Sincerely,        Charline Velazquez MD

## 2021-04-21 NOTE — PROGRESS NOTES
Infusion Nursing Note:  Alannah Wynn presents today for C8D1 Vectibix/Irinotecan/Zometa.    Patient seen by provider today: Yes: Dr Velazquez   present during visit today: Not Applicable.    Note: Jacqueline is constipated today. She stated she has not had a BM for 2-3 days. We held off on Atropine prior to Irinotecan but did give it mid infusion as she was cramping.  Infusion went well but she does feel she really need to have a bowel movement.  She is going to take Miralax at home and will call us if she does not have a BM in the next couple of days.    Patient declined the covid-19 test.    Intravenous Access:  Implanted Port.    Treatment Conditions:  Lab Results   Component Value Date    HGB 11.5 04/21/2021     Lab Results   Component Value Date    WBC 4.6 04/21/2021      Lab Results   Component Value Date    ANEU 2.2 04/21/2021     Lab Results   Component Value Date     04/21/2021      Lab Results   Component Value Date     04/21/2021                   Lab Results   Component Value Date    POTASSIUM 3.8 04/21/2021           Lab Results   Component Value Date    MAG 1.6 04/21/2021            Lab Results   Component Value Date    CR 0.61 04/21/2021                   Lab Results   Component Value Date    LAUREN 8.3 04/21/2021                Lab Results   Component Value Date    BILITOTAL 0.2 04/21/2021           Lab Results   Component Value Date    ALBUMIN 3.1 04/21/2021                    Lab Results   Component Value Date    ALT 30 04/21/2021           Lab Results   Component Value Date    AST 28 04/21/2021       Results reviewed, labs MET treatment parameters, ok to proceed with treatment.      Post Infusion Assessment:  Patient tolerated infusion without incident.       Discharge Plan:   Patient discharged in stable condition accompanied by: self.    Helen Ha RN

## 2021-04-21 NOTE — NURSING NOTE
Alannah is a 70 year old who is being evaluated via a billable video visit.      How would you like to obtain your AVS? MyChart  If the video visit is dropped, the invitation should be resent by: Text to cell phone: [phone]  Will anyone else be joining your video visit? No    Video-Visit Details    Type of service:  Video Visit    Originating Location (pt. Location): Other In Clinic Prior to tx    Distant Location (provider location):  [facility]     Platform used for Video Visit: Driss Bruno CMA

## 2021-04-21 NOTE — PROGRESS NOTES
"ONCOLOGY FOLLOW UP NOTE    4/21/2021     Patient was being followed by Dr. Tavarez and saw her on 11/13/2019.  Now she is transferring her care to me.  I have reviewed her previous records and have copied and updated from prior notes.      DIAGNOSIS:    10/2019 dx adenoCa of rectum,  MMR intact- NGS Negative for KRAS, NRAS and BRAF. PD L1 25%.  Baseline CEA 10.  Stage IV disease with pulmonary and bony mets      HISTORY OF ONCOLOGY ILLNESS:    She has had intermittent hematochezia and anorectal irritation since 2011, that have been managed as \"hemorrhoids\". These symptoms became persistent and progressive since early thi year. Her anorectal pain is managed with Tylenol. Denies fevers, chills, or unintentional weight loss. Does have intermittent urinary and fecal urgency but no incontinence per se.    Diagnostic colonoscopy on 10/18/19 for rectal bleeding showed \"fungating and ulcerated non-obstructing mass was found in the distal rectum <1 cm from the anal verge. The mass was partially circumferential (involving one-half of the lumen circumference). The mass measured four cm in length. In addition, its diameter measured six mm.  Pathology showed invasive moderately differentiated adenocarcinoma with intact mismatch repair protein expression, PD L1 25% and NGS panel showed NO MUTATIONS in KRAS, NRAS and BRAF.      Pelvic MRI 11/2019 found fungating polypoid mid/lower rectal mass with luminal narrowing as well as innumerable enlarged lymph nodes as described above with a conglomerate of suspicious lymph nodes abutting the anterior peritoneal reflection. Stage cT3d N2.     PET 11/2019 found   1. Hypermetabolic mass in the distal rectum, SUV 14 corresponding to the patient's known rectal cancer.  2. Multiple hypermetabolic pelvic lymph nodes, compatible metastatic disease, SUV 5-6.  3. FDG avid pulmonary nodule measuring 7 mm in the left lower lobe, concerning for metastatic disease. Additional FDG avid pulmonary nodules " suspicious for metastatic disease, SUV around 4.  4. Osteoblastic metastatic lesion in the L3 vertebral body, SUV 6.8    She started palliative FOLFOX/Avastin on 11/25/2019.  Cycle #2 given on 12/9/2019.  Cycle #3 scheduled for 12/24/2019.  C#6 on 2/10/2020.    Repeat CT CAP on 2/21/2020 shows good response to therapy with improvement in the rectal mass, mesorectal and superior rectal nodularity as well as resolution of right internal iliac lymphadenopathy.  There is slight improvement in the dominant posterior left lower lobe lung nodule and several of the lung nodules are is stable.  There is increase in size of the sclerotic lesion of the left aspect of L3 vertebral body without pathological fracture and this is likely due to treatment effect.    CEA on 2/10/2020 was down to 3.3.    Cycle #7 FOLFOX/Avastin 2/25/2020.  C#8 FOLFOX/Avastin 3/9/2020  Cycle#9 5-FU/Avastin 3/24/2020-stopped oxaliplatin due to cumulative fatigue and issues with nausea.    I then discussed with her, her  as well as her son Gamal about possibly switching to single agent Xeloda going forward so as to limit her visits to the clinic and therefore exposure and risk from coronavirus.  We discussed the pros and cons of this approach and they were all willing to try this.     She started Single agent Xeloda on 4/7/2020.  Cycle #3 started on 5/19/2020.    Because of bone metastatic disease, she was started on Zometa on 11/25/2019. We are giving it every 3 months.        CT chest abdomen and pelvis on 7/20/2020 showed mild progression in the lung nodules.  Perirectal nodularity has mildly improved.    Last CEA on 7/22/2020 was 4.      7/27/2020- we decided on switching to XELOX/Avastin.     8/18/2020 -cycle #2 XELOX/Avastin.  CEA on 8/11/2020 was 4.8.  9/8/2020 - C#3 XELOX/Avastin. CEA on 9/2/2020 was 5.1  9/30/2020.  Cycle #4 XELOX/Avastin.  Oxaliplatin dose reduced further to 85 mg per metered square because of worsening  neuropathy.    Repeat CT chest abdomen and pelvis on 10/15/2020 overall shows stable disease with 1 to 2 mm increase in some of the lung nodules and about 2 mm decrease in the lymph node in the mesorectum.    CEA stable at 5.3 on 10/21/2020.    10/22/2020.  Cycle #5 XELOX/Avastin.  Oxaliplatin dose 85 mg/m .  11/11/2020 C#6 XELOX/Avastin. Oxaliplatin dose 85 mg/m .  12/2/2020 C#7 XELOX/Avastin. Oxaliplatin dose 85 mg/m .  12/23/2020 C#8 XELOX/Avastin. Oxaliplatin dose 85 mg/m .    1/11/2021-CT chest abdomen and pelvis shows progressive disease with progression in the lung nodules.  There was a new ill-defined soft tissue density material at the upper mid abdomen adjacent to the hemidiaphragm rangel, celiac access, and most proximal abdominal aorta and this is also concerning for malignancy.  There is a stable sclerosis of L3 and stable left ischial tuberosity tiny sclerosis.  CEA on 12/23/2020 was 9.2.      1/13/2021.  Switch to irinotecan/Panitumumab.  1/27/2021.  Cycle #2 irinotecan/Panitumumab.  2/10/2021.  Cycle #3 irinotecan/Panitumumab.  2/24/2021.  Cycle #4 irinotecan/Panitumumab  3/10/2021.  Cycle #5 irinotecan/Panitumumab.  3/24/2021   Cycle #6 irinotecan/Panitumumab.  4/7/2021   Cycle #7 irinotecan/Panitumumab  4/21/2021 Cycle #8 irinotecan/Panitumumab    INTERVAL HISTORY:     This is a video visit.  Overall she is tolerating chemo well. She has constipation for which she is taking senokot and miralax. She had mild rash from panitumumab and applied topical hydrocortisone ointment. No infections, no dyspnea, no new swellings. Weight is stable and energy is stable. Has mild residual neuropathy in the left hand.     ECOG 1     ROS:  Otherwise a comprehensive review of system was negative.    I reviewed with her history in epic as below.    PAST MEDICAL HISTORY  Reflux, hemorrhoid, hepatitis B?  Overactive bladder  Active Ambulatory Problems     Diagnosis Date Noted     External hemorrhoids 04/29/2016     Non  morbid obesity due to excess calories 04/29/2016     Hepatitis B immune 04/29/2016     Screening for cervical cancer 04/29/2016     CARDIOVASCULAR SCREENING; LDL GOAL LESS THAN 160 04/29/2016     Gastroesophageal reflux disease 04/29/2016     Overactive bladder 07/21/2017     Nevus of left lower leg- undetermined behavior 12/27/2018     Metastasis from rectal cancer (H) 11/13/2019     Bone metastasis (H) 11/13/2019     Nausea 11/27/2019     Chemotherapy-induced neutropenia (H) 12/24/2019     Resolved Ambulatory Problems     Diagnosis Date Noted     Urgency of urination 04/29/2016     Need for vaccination against Streptococcus pneumoniae 04/29/2016     Morbid obesity (H) 07/21/2017     Obesity (BMI 35.0-39.9) with comorbidity (H) 12/27/2018     No Additional Past Medical History       MEDICATIONS:  Current Outpatient Medications   Medication     Ascorbic Acid (VITAMIN C) 500 MG CAPS     B Complex Vitamins (B COMPLEX-B12 PO)     cholecalciferol (VITAMIN D3) 400 unit (10 mcg) TABS tablet     clindamycin (CLINDAMAX) 1 % external gel     docusate sodium (COLACE) 100 MG capsule     Fesoterodine Fumarate 8 MG TB24     hydrocortisone 2.5 % ointment     lidocaine-prilocaine (EMLA) 2.5-2.5 % external cream     loperamide (IMODIUM) 2 MG capsule     LORazepam (ATIVAN) 0.5 MG tablet     Lutein 20 MG CAPS     mirabegron (MYRBETRIQ) 50 MG 24 hr tablet     Multiple vitamin TABS     naloxone (NARCAN) 4 MG/0.1ML nasal spray     omeprazole (PRILOSEC) 20 MG DR capsule     ondansetron (ZOFRAN) 8 MG tablet     polyethylene glycol (MIRALAX) 17 GM/Dose powder     prochlorperazine (COMPAZINE) 10 MG tablet     senna-docusate (SENNA S) 8.6-50 MG tablet     solifenacin (VESICARE) 10 MG tablet     traMADol (ULTRAM) 50 MG tablet     urea (GORDONS UREA) 40 % external ointment     Current Facility-Administered Medications   Medication     lidocaine (XYLOCAINE) 5 % ointment     ALLERGY:     Allergies   Allergen Reactions     Penicillins      Sulfa  Drugs             SOCIAL HISTORY:  She is a [place] immigrants to [place].  Denies smoking denies alcohol abuse.  2 of her sons are doctors.    FAMILY HISTORY:   Denies any family history of cancer.       PHYSICAL EXAMINATION:       /76 (BP Location: Left arm, Patient Position: Chair, Cuff Size: Adult Regular)   Pulse 80   Temp 97.6  F (36.4  C) (Oral)   Wt 61.5 kg (135 lb 8 oz)   LMP 11/27/2001 (Approximate)   SpO2 100%   BMI 24.78 kg/m       Wt Readings from Last 4 Encounters:   04/21/21 61.5 kg (135 lb 8 oz)   04/07/21 62.2 kg (137 lb 1.6 oz)   03/24/21 62.7 kg (138 lb 4.8 oz)   03/10/21 64.2 kg (141 lb 9.6 oz)       Constitutional.  Looks well and in no apparent distress.   Eyes.  Without eye redness or apparent jaundice.   Respiratory.  Non labored breathing. Speaking in full sentences.    Skin.  No concerning skin rashes on the skin visualized.   Neurological.  Is alert and oriented.  Psychiatric.  Mood and affect seem appropriate.      The rest of a comprehensive physical examination is deferred due to Public Health Emergency video visit restrictions.      LABS/IMAGING/PATHOLOGY:    Reviewed  CBC unremarkable except Hg is 11.5   CMP remarkable only for albumin 3.1.    Last CEA was 7.2 on 4/7/2021.             1/11/2021-CT chest abdomen and pelvis shows progressive disease with progression in the lung nodules.  There was a new ill-defined soft tissue density material at the upper mid abdomen adjacent to the hemidiaphragm rangel, celiac access, and most proximal abdominal aorta and this is also concerning for malignancy.  This measures approximately 1.7 x 1.8 cm.  There is a stable sclerosis of L3 and stable left ischial tuberosity tiny sclerosis.    EXAM: CT CHEST/ABDOMEN/PELVIS W CONTRAST  LOCATION: [facility]  DATE/TIME: 1/11/2021 8:13 AM     INDICATION: Metastatic colon cancer. Bony metastatic disease.  COMPARISON: CT chest, abdomen and pelvis 10/15/2020.  TECHNIQUE: CT scan of  the chest, abdomen, and pelvis was performed following injection of IV contrast. Multiplanar reformats were obtained. Dose reduction techniques were used.   CONTRAST: 93 ml isovue 370     FINDINGS:   LUNGS AND PLEURA: No effusions. Multiple bilateral pulmonary nodules within all lobes of both lungs consistent with pulmonary metastatic disease. Several are larger in size. An example at the medial left lower lobe now measures 1.7 x 0.9 cm, previously   1.5 x 0.7 cm, series 7 image 106. Another larger example at the posterior left lower lobe is 1.0 x 0.9 cm, previously 0.6 x 0.4 cm, image 129. At the right upper lobe an example is 0.6 x 0.6 cm, previously 0.4 x 0.4 cm, image 71.     MEDIASTINUM/AXILLAE: Right chest Port-A-Cath is identified, the previously noted looping of this catheter within the right internal jugular vein is not currently seen. The catheter tip is at the most superior aspect of the right atrium. No acute   mediastinal abnormality. Thoracic aortic and minimal coronary artery calcifications. Small hiatal hernia is stable. No new chest enlarged lymph nodes identified.     HEPATOBILIARY: Status post cholecystectomy. No focal hepatic lesions identified.     PANCREAS: Normal.     SPLEEN: Normal.     ADRENAL GLANDS: Stable mild thickening of the left adrenal. Normal right adrenal.     KIDNEYS/BLADDER: Normal.     BOWEL: Previously noted rectal wall thickening distally is stable versus slightly less prominent. Perirectal nodules again identified appearing stable. One of these is 1.0 x 0.6 cm posteriorly towards the left, series 3 image 509. There are 2 other   stable lesions towards the left posteriorly on image 519. Other small nodules superiorly towards the left posteriorly on image 474. Some of the perirectal fat has mild haziness. No bowel obstruction or inflammation.     LYMPH NODES: There is new indeterminate hazy soft tissue density at the upper mid abdomen posteriorly lying anterior to the  hemidiaphragm rangel, and surrounding the celiac axis and most proximal abdominal aorta. This is difficult to precisely measure but   is approximately 1.7 x 1.8 cm, series 3 image 255.     VASCULATURE: Scattered mild vascular calcifications.     PELVIC ORGANS: Normal.     MUSCULOSKELETAL: Stable left L3 sclerosis measuring 2.3 cm image 332. Stable left ischial tuberosity tiny sclerosis image 542. This could just be a small bone island.                                                                      IMPRESSION:  1.  Enlargement of several bilateral metastatic pulmonary nodules suggesting progression of disease.  2.  New ill-defined soft tissue density material at the upper mid abdomen adjacent to the hemidiaphragm rangel, celiac access, and most proximal abdominal aorta. This could represent new area of adenopathy, potentially malignant. Inflammatory change in   this region is also possible, nonspecific as to precise etiology.  3.  Stable perirectal nodular opacities that could represent malignant lymph nodes.  4.  Stable sclerosis at L3 consistent with metastatic disease.      ASSESSMENT AND PLAN:      Metastatic rectal adenocarcinoma, MSI intact, K-aimee wild type, PDL 1 + 25% with metastasis to the lungs, bones and pelvic lymph nodes.      She was on palliative XELOX/Avastin but CT scan from 1/11/2021 shows progressive disease with progression in the lung nodules and there is also a soft tissue density in the upper mid abdomen which could be new lymphadenopathy versus soft tissue deposit concerning for malignancy.  CEA on 12/23/2020 was also rising and it was 9.2.  Overall this is consistent with progression of the disease.      Because of slow progression I switched to irinotecan/Panitumumab on 1/13/2021.    She is tolerating the chemotherapy nicely and remains pretty much asymptomatic.  She does have some constipation.  CEA was 7.2 last time and today it is pending.  We will proceed with chemotherapy today and  she will have repeat scans next month.       Nausea/vomiting.  She feels nausea during the first week and she takes antinausea medications.  I refilled Compazine and Zofran and Ativan today.        Constipation.  Continue senna 2 tablets twice a day with MiraLAX.     Panitumumab related rash.  It was mild with this chemotherapy cycle.  She used hydrocortisone 2.5% which helped.  She also has clindamycin 1% topical ointment.  We will consider giving her minocycline if it gets worse.      Anemia.  She has mild anemia from chemotherapy.  Continue to observe.    Hypomagnesemia.  Related to Panitumumab.  Previously she was mildly hypomagnesemic but today magnesium level is 1.6.  Continue oral magnesium.  I refilled it today.     Bone metastasis.  Started Zometa on 11/25/2019.  She last received on 1/27/2021.  She will receive Zometa today.  Continue vitamin D and calcium.  Check bone scan next month.     We did not address the following today.   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  She now only notices mild neuropathy in the left hand but otherwise overall this has significantly improved.  Continue to monitor.       COVID-19 vaccine.  She is supposed to get second COVID-19 shot on 3/26/2021.  She has chemotherapy scheduled on 3/24/2021.  It is reasonable to schedule the COVID-19 shot for the following week.         Right big toe infection/ingrown toenail.  She is done with systemic antibiotics.  She is using topical antibiotics under the care of podiatrist and this is getting better.      Port-A-Cath.  It was coiled in the right internal jugular vein so it was replaced 10/20/2020.       Discussion regarding health care directive  We discussed that it is important that she completes health care directive which would help in making sure that her wishes are followed about her treatment care in case she is not able to make a decision for herself.  We gave her information regarding that.    RTC in 2 weeks    All questions were  answered to her satisfaction.  She is agreeable and comfortable with the plan.    Charline Velazquez MD    Video start time. 9:52 AM  Video stop time. 10:03 AM

## 2021-04-21 NOTE — PATIENT INSTRUCTIONS
Chemotherapy and Zometa today.    Refilled magnesium oxide 400 mg twice a day.    Continue senna 2 tablets twice a day and MiraLAX daily for constipation.    Refill Zofran Compazine and Ativan    Return to clinic as a scheduled

## 2021-05-05 NOTE — PROGRESS NOTES
"Oncology Follow Up Visit: May 5, 2021     Oncologist: Dr Charline Velazquez  PCP: Earline Mehta    Diagnosis: Stage IV Adenocarcinoma of the rectum   Alannah Wynn is a 71 yo female who presented in 10/2019 with intermittent hematochezia and anorectal irritation since 2011, that have been managed as \"hemorrhoids\".  Diagnostic colonoscopy on 10/18/19 for rectal bleeding showed \"fungating and ulcerated non-obstructing mass was found in the distal rectum <1 cm from the anal verge. The mass was partially circumferential (involving one-half of the lumen circumference). The mass measured four cm in length. In addition, its diameter measured six mm.  Pathology showed invasive moderately differentiated adenocarcinoma with intact mismatch repair protein expression.  Pelvic MRI 11/2019 found fungating polypoid mid/lower rectal mass with luminal narrowing as well as innumerable enlarged lymph nodes as described above with a conglomerate of suspicious lymph nodes abutting the anterior peritoneal reflection. Stage cT3d N2.   Further testing with PET scan proved pulmonarymetastasis and bony metastasis at L3.  Has KRAS - wild type  Treatment:   11/25/2019 Began treatment with FOLFOX/Avastin  11/25/2019 Began Zometa every 3 months  4/7/2020-7/2020 capecitabine x 4 cycles  12/23/2020 completed Xelox/Avastin x 8 cycles  1/13/2021  Began Irinotecan/ panitumumab every 2 weeks    Interval History: Ms. Wynn is seen laone in clinic for review of symptoms for start of cycle 7 of Irinotecan/ panitumumab.  Patient reports she had noted that her port site was red in the last week but denies fevers, weakness, or other new symptoms. With treatment she has noted early nausea as previous and constipation that continues- using antiemetics almost daily but is eating meals - has continued to loose weight- now using 2 protein drinks daily which she feels should improve weight and does not feel seeing a dietician would be helpful. Noted some redness " over port x several days but did not notify us of concern- denies fevers, increased fatigue or other signs of sepsis.   Rest of comprehensive and complete ROS is reviewed and is negative.   Past Medical History:   Diagnosis Date     Gastroesophageal reflux disease      Current Outpatient Medications   Medication     Ascorbic Acid (VITAMIN C) 500 MG CAPS     B Complex Vitamins (B COMPLEX-B12 PO)     cholecalciferol (VITAMIN D3) 400 unit (10 mcg) TABS tablet     clindamycin (CLINDAMAX) 1 % external gel     docusate sodium (COLACE) 100 MG capsule     Fesoterodine Fumarate 8 MG TB24     hydrocortisone 2.5 % ointment     lidocaine-prilocaine (EMLA) 2.5-2.5 % external cream     loperamide (IMODIUM) 2 MG capsule     LORazepam (ATIVAN) 0.5 MG tablet     Lutein 20 MG CAPS     magnesium oxide (MAG-OX) 400 MG tablet     mirabegron (MYRBETRIQ) 50 MG 24 hr tablet     Multiple vitamin TABS     naloxone (NARCAN) 4 MG/0.1ML nasal spray     omeprazole (PRILOSEC) 20 MG DR capsule     ondansetron (ZOFRAN) 8 MG tablet     polyethylene glycol (MIRALAX) 17 GM/Dose powder     prochlorperazine (COMPAZINE) 10 MG tablet     senna-docusate (SENNA S) 8.6-50 MG tablet     solifenacin (VESICARE) 10 MG tablet     traMADol (ULTRAM) 50 MG tablet     urea (GORDONS UREA) 40 % external ointment     Current Facility-Administered Medications   Medication     lidocaine (XYLOCAINE) 5 % ointment     Allergies   Allergen Reactions     Penicillins      Sulfa Drugs      Physical Exam: LMP 11/27/2001 (Approximate)    Constitutional: moving well on own, cooperative and in no distress.   ENT: Eyes bright, No mouth sores  Neck: Supple, No adenopathy.  Cardiac: Heart rate and rhythm is regular and strong without murmur  Respiratory: Breathing easy. Lung sounds clear to auscultation  Port with redness over site and mild swelling noted- not warm though  Abdomen: Soft, non-tender at present with normal BS normal at this time. No masses or organomegaly  MS: Muscle  tone normal, extremities normal with no edema.   Skin: No suspicious lesions or rashes- hair is very thin.  Neuro: Sensory grossly WNL, gait normal.   Lymph: Normal ant/post cervical, axillary, supraclavicular nodes  Psych: Mentation appears normal and affect normal/bright and with good conversation.     Laboratory Results:   Results for orders placed or performed in visit on 05/05/21   CBC with platelets differential     Status: Abnormal   Result Value Ref Range    WBC 4.6 4.0 - 11.0 10e9/L    RBC Count 3.99 3.8 - 5.2 10e12/L    Hemoglobin 12.2 11.7 - 15.7 g/dL    Hematocrit 38.3 35.0 - 47.0 %    MCV 96 78 - 100 fl    MCH 30.6 26.5 - 33.0 pg    MCHC 31.9 31.5 - 36.5 g/dL    RDW 15.4 (H) 10.0 - 15.0 %    Platelet Count 288 150 - 450 10e9/L    Diff Method Automated Method     % Neutrophils 36.4 %    % Lymphocytes 44.1 %    % Monocytes 11.5 %    % Eosinophils 6.5 %    % Basophils 1.3 %    % Immature Granulocytes 0.2 %    Absolute Neutrophil 1.7 1.6 - 8.3 10e9/L    Absolute Lymphocytes 2.0 0.8 - 5.3 10e9/L    Absolute Monocytes 0.5 0.0 - 1.3 10e9/L    Absolute Eosinophils 0.3 0.0 - 0.7 10e9/L    Absolute Basophils 0.1 0.0 - 0.2 10e9/L    Abs Immature Granulocytes 0.0 0 - 0.4 10e9/L   Comprehensive metabolic panel     Status: Abnormal   Result Value Ref Range    Sodium 142 133 - 144 mmol/L    Potassium 4.3 3.4 - 5.3 mmol/L    Chloride 109 94 - 109 mmol/L    Carbon Dioxide 34 (H) 20 - 32 mmol/L    Anion Gap <1 (L) 3 - 14 mmol/L    Glucose 81 70 - 99 mg/dL    Urea Nitrogen 10 7 - 30 mg/dL    Creatinine 0.66 0.52 - 1.04 mg/dL    GFR Estimate 89 >60 mL/min/[1.73_m2]    GFR Estimate If Black >90 >60 mL/min/[1.73_m2]    Calcium 8.8 8.5 - 10.1 mg/dL    Bilirubin Total 0.2 0.2 - 1.3 mg/dL    Albumin 3.2 (L) 3.4 - 5.0 g/dL    Protein Total 6.8 6.8 - 8.8 g/dL    Alkaline Phosphatase 73 40 - 150 U/L    ALT 34 0 - 50 U/L    AST 29 0 - 45 U/L   Magnesium     Status: None   Result Value Ref Range    Magnesium 1.7 1.6 - 2.3 mg/dL    CEA     Status: Abnormal   Result Value Ref Range    CEA 6.4 (H) 0 - 2.5 ug/L     Assessment and Plan:   Stage IV Adenocarcinoma of the rectum - Pt continues with irinotecan/Panitumumab every 2 weeks for MSI intact, KRAS wild type, PDL1+ cancer with metastasis to lungs, bones and lymph nodes of the pelvis. Pt continues to have early issues with the plan but feels she is treating then and tolerating these and wishes to continue with plan as able.   Note that CEA was seen to be rising with last cycle but noted to be decreased today- will follow with already scheduled imaging.    Ref. Range 4/7/2021 07:36 4/21/2021 09:15 5/5/2021 08:19   CEA Latest Ref Range: 0 - 2.5 ug/L 7.2 (H) 9.6 (H) 6.4 (H)   She will return for imaging and review prior to next cycle- will be meeting with Dr Velazquez.  Skin infection- noted to have some redness over port site but no sign of fever or sepsis noted today.   Will not use port today and will provide antibiotic for skin coverage- allergies to pcn and sulfa- will use levaquin 500 mg po daily x 5 days. Pt warned to take temperature 2 x daily and report to ER if fever > 100.4 or with other new symptoms of concern.   Bone metastasis- Using Zometa- receiving every 3 months- next due in July. Confirmed use of calcium plus vitamin D daily. Encouraged walks for weight bearing exercise that can help bone density.   Nausea-Using Zofran/Compazine and lorazepam.Reviewed she should start day 2 and 3 with antiemetics or as long as necessary.  Weight loss- admits she has not been using the protein supplements recently and is seeing weight loss. Reminded about smaller meals more often and pt feels she will increase supplements.    Drug rash- noted first days after infusion to face - using hydrocortisone cream and moisturizing creams and resolves.     Constipation-continues- unusual since irinotecan typically causes loose stools- will ask to increase SennaS to 2 tabs bid with Miralax 1-2 x daily.    Peripheral neuropathy- from previous use of Oxaliplatin- Improving.No current treatment.  The total time of this encounter amounted to 30 minutes. This time included face to face time spent with the patient, prep work, ordering/ reviewing tests, and performing post visit documentation.  Felipa Garner,Cnp

## 2021-05-05 NOTE — PROGRESS NOTES
Redness and swelling at port site. Denies pain or fevers.Felipa Sheffield NP assessed and advised not using port today. Labs drawn peripherally by   Genevieve Callahan  RN, BSN, OCN

## 2021-05-05 NOTE — LETTER
"    5/5/2021         RE: Alannah yWnn  [address]  [place] [address]        Dear Colleague,    Thank you for referring your patient, Alannah Wynn, to the [facility]. Please see a copy of my visit note below.    Oncology Follow Up Visit: May 5, 2021     Oncologist: Dr Charline Velazquez  PCP: Earline Mehta    Diagnosis: Stage IV Adenocarcinoma of the rectum   Alannah Wynn is a 71 yo female who presented in 10/2019 with intermittent hematochezia and anorectal irritation since 2011, that have been managed as \"hemorrhoids\".  Diagnostic colonoscopy on 10/18/19 for rectal bleeding showed \"fungating and ulcerated non-obstructing mass was found in the distal rectum <1 cm from the anal verge. The mass was partially circumferential (involving one-half of the lumen circumference). The mass measured four cm in length. In addition, its diameter measured six mm.  Pathology showed invasive moderately differentiated adenocarcinoma with intact mismatch repair protein expression.  Pelvic MRI 11/2019 found fungating polypoid mid/lower rectal mass with luminal narrowing as well as innumerable enlarged lymph nodes as described above with a conglomerate of suspicious lymph nodes abutting the anterior peritoneal reflection. Stage cT3d N2.   Further testing with PET scan proved pulmonarymetastasis and bony metastasis at L3.  Has KRAS - wild type  Treatment:   11/25/2019 Began treatment with FOLFOX/Avastin  11/25/2019 Began Zometa every 3 months  4/7/2020-7/2020 capecitabine x 4 cycles  12/23/2020 completed Xelox/Avastin x 8 cycles  1/13/2021  Began Irinotecan/ panitumumab every 2 weeks    Interval History: Ms. Wynn is seen laone in clinic for review of symptoms for start of cycle 7 of Irinotecan/ panitumumab.  Patient reports she had noted that her port site was red in the last week but denies fevers, weakness, or other new symptoms. With treatment she has noted early nausea as " previous and constipation that continues- using antiemetics almost daily but is eating meals - has continued to loose weight- now using 2 protein drinks daily which she feels should improve weight and does not feel seeing a dietician would be helpful. Noted some redness over port x several days but did not notify us of concern- denies fevers, increased fatigue or other signs of sepsis.   Rest of comprehensive and complete ROS is reviewed and is negative.   Past Medical History:   Diagnosis Date     Gastroesophageal reflux disease      Current Outpatient Medications   Medication     Ascorbic Acid (VITAMIN C) 500 MG CAPS     B Complex Vitamins (B COMPLEX-B12 PO)     cholecalciferol (VITAMIN D3) 400 unit (10 mcg) TABS tablet     clindamycin (CLINDAMAX) 1 % external gel     docusate sodium (COLACE) 100 MG capsule     Fesoterodine Fumarate 8 MG TB24     hydrocortisone 2.5 % ointment     lidocaine-prilocaine (EMLA) 2.5-2.5 % external cream     loperamide (IMODIUM) 2 MG capsule     LORazepam (ATIVAN) 0.5 MG tablet     Lutein 20 MG CAPS     magnesium oxide (MAG-OX) 400 MG tablet     mirabegron (MYRBETRIQ) 50 MG 24 hr tablet     Multiple vitamin TABS     naloxone (NARCAN) 4 MG/0.1ML nasal spray     omeprazole (PRILOSEC) 20 MG DR capsule     ondansetron (ZOFRAN) 8 MG tablet     polyethylene glycol (MIRALAX) 17 GM/Dose powder     prochlorperazine (COMPAZINE) 10 MG tablet     senna-docusate (SENNA S) 8.6-50 MG tablet     solifenacin (VESICARE) 10 MG tablet     traMADol (ULTRAM) 50 MG tablet     urea (GORDONS UREA) 40 % external ointment     Current Facility-Administered Medications   Medication     lidocaine (XYLOCAINE) 5 % ointment     Allergies   Allergen Reactions     Penicillins      Sulfa Drugs      Physical Exam: LMP 11/27/2001 (Approximate)    Constitutional: moving well on own, cooperative and in no distress.   ENT: Eyes bright, No mouth sores  Neck: Supple, No adenopathy.  Cardiac: Heart rate and rhythm is regular and  strong without murmur  Respiratory: Breathing easy. Lung sounds clear to auscultation  Port with redness over site and mild swelling noted- not warm though  Abdomen: Soft, non-tender at present with normal BS normal at this time. No masses or organomegaly  MS: Muscle tone normal, extremities normal with no edema.   Skin: No suspicious lesions or rashes- hair is very thin.  Neuro: Sensory grossly WNL, gait normal.   Lymph: Normal ant/post cervical, axillary, supraclavicular nodes  Psych: Mentation appears normal and affect normal/bright and with good conversation.     Laboratory Results:   Results for orders placed or performed in visit on 05/05/21   CBC with platelets differential     Status: Abnormal   Result Value Ref Range    WBC 4.6 4.0 - 11.0 10e9/L    RBC Count 3.99 3.8 - 5.2 10e12/L    Hemoglobin 12.2 11.7 - 15.7 g/dL    Hematocrit 38.3 35.0 - 47.0 %    MCV 96 78 - 100 fl    MCH 30.6 26.5 - 33.0 pg    MCHC 31.9 31.5 - 36.5 g/dL    RDW 15.4 (H) 10.0 - 15.0 %    Platelet Count 288 150 - 450 10e9/L    Diff Method Automated Method     % Neutrophils 36.4 %    % Lymphocytes 44.1 %    % Monocytes 11.5 %    % Eosinophils 6.5 %    % Basophils 1.3 %    % Immature Granulocytes 0.2 %    Absolute Neutrophil 1.7 1.6 - 8.3 10e9/L    Absolute Lymphocytes 2.0 0.8 - 5.3 10e9/L    Absolute Monocytes 0.5 0.0 - 1.3 10e9/L    Absolute Eosinophils 0.3 0.0 - 0.7 10e9/L    Absolute Basophils 0.1 0.0 - 0.2 10e9/L    Abs Immature Granulocytes 0.0 0 - 0.4 10e9/L   Comprehensive metabolic panel     Status: Abnormal   Result Value Ref Range    Sodium 142 133 - 144 mmol/L    Potassium 4.3 3.4 - 5.3 mmol/L    Chloride 109 94 - 109 mmol/L    Carbon Dioxide 34 (H) 20 - 32 mmol/L    Anion Gap <1 (L) 3 - 14 mmol/L    Glucose 81 70 - 99 mg/dL    Urea Nitrogen 10 7 - 30 mg/dL    Creatinine 0.66 0.52 - 1.04 mg/dL    GFR Estimate 89 >60 mL/min/[1.73_m2]    GFR Estimate If Black >90 >60 mL/min/[1.73_m2]    Calcium 8.8 8.5 - 10.1 mg/dL    Bilirubin  Total 0.2 0.2 - 1.3 mg/dL    Albumin 3.2 (L) 3.4 - 5.0 g/dL    Protein Total 6.8 6.8 - 8.8 g/dL    Alkaline Phosphatase 73 40 - 150 U/L    ALT 34 0 - 50 U/L    AST 29 0 - 45 U/L   Magnesium     Status: None   Result Value Ref Range    Magnesium 1.7 1.6 - 2.3 mg/dL   CEA     Status: Abnormal   Result Value Ref Range    CEA 6.4 (H) 0 - 2.5 ug/L     Assessment and Plan:   Stage IV Adenocarcinoma of the rectum - Pt continues with irinotecan/Panitumumab every 2 weeks for MSI intact, KRAS wild type, PDL1+ cancer with metastasis to lungs, bones and lymph nodes of the pelvis. Pt continues to have early issues with the plan but feels she is treating then and tolerating these and wishes to continue with plan as able.   Note that CEA was seen to be rising with last cycle but noted to be decreased today- will follow with already scheduled imaging.    Ref. Range 4/7/2021 07:36 4/21/2021 09:15 5/5/2021 08:19   CEA Latest Ref Range: 0 - 2.5 ug/L 7.2 (H) 9.6 (H) 6.4 (H)   She will return for imaging and review prior to next cycle- will be meeting with Dr Velazquez.  Skin infection- noted to have some redness over port site but no sign of fever or sepsis noted today.   Will not use port today and will provide antibiotic for skin coverage- allergies to pcn and sulfa- will use levaquin 500 mg po daily x 5 days. Pt warned to take temperature 2 x daily and report to ER if fever > 100.4 or with other new symptoms of concern.   Bone metastasis- Using Zometa- receiving every 3 months- next due in July. Confirmed use of calcium plus vitamin D daily. Encouraged walks for weight bearing exercise that can help bone density.   Nausea-Using Zofran/Compazine and lorazepam.Reviewed she should start day 2 and 3 with antiemetics or as long as necessary.  Weight loss- admits she has not been using the protein supplements recently and is seeing weight loss. Reminded about smaller meals more often and pt feels she will increase supplements.    Drug rash-  noted first days after infusion to face - using hydrocortisone cream and moisturizing creams and resolves.     Constipation-continues- unusual since irinotecan typically causes loose stools- will ask to increase SennaS to 2 tabs bid with Miralax 1-2 x daily.   Peripheral neuropathy- from previous use of Oxaliplatin- Improving.No current treatment.  The total time of this encounter amounted to 30 minutes. This time included face to face time spent with the patient, prep work, ordering/ reviewing tests, and performing post visit documentation.  Felipa Garner Cnp                Again, thank you for allowing me to participate in the care of your patient.        Sincerely,        Felipa Garner, LETICIA, APRN CNP

## 2021-05-05 NOTE — PROGRESS NOTES
Infusion Nursing Note:  Alannah Wynn presents today for C9 D1 vectibix/ irinotecan.    Patient seen by provider today: Yes: Felipa Garner NP   present during visit today: Not Applicable.    Intravenous Access:  Peripheral IV placed.    Treatment Conditions:  Lab Results   Component Value Date    HGB 12.2 05/05/2021     Lab Results   Component Value Date    WBC 4.6 05/05/2021      Lab Results   Component Value Date    ANEU 1.7 05/05/2021     Lab Results   Component Value Date     05/05/2021      Lab Results   Component Value Date     05/05/2021                   Lab Results   Component Value Date    POTASSIUM 4.3 05/05/2021           Lab Results   Component Value Date    MAG 1.7 05/05/2021            Lab Results   Component Value Date    CR 0.66 05/05/2021                   Lab Results   Component Value Date    LAUREN 8.8 05/05/2021                Lab Results   Component Value Date    BILITOTAL 0.2 05/05/2021           Lab Results   Component Value Date    ALBUMIN 3.2 05/05/2021                    Lab Results   Component Value Date    ALT 34 05/05/2021           Lab Results   Component Value Date    AST 29 05/05/2021       Results reviewed, labs MET treatment parameters, ok to proceed with treatment.      Alannah to start antibiotic (levaquin) today for port skin infection.      Post Infusion Assessment:  Patient tolerated infusion without incident.  Blood return noted pre and post infusion.  Site patent and intact, free from redness, edema or discomfort.  No evidence of extravasations.  Access discontinued per protocol.       Discharge Plan:   Patient discharged in stable condition accompanied by: self and to meet her ride home.  Departure Mode: Ambulatory.  Verbally reviewed 5/14/21 testing followed by appointments 5/19/21 .    Shannan Ferrari RN

## 2021-05-05 NOTE — NURSING NOTE
"Oncology Rooming Note    May 5, 2021 8:32 AM   Alannah Wynn is a 70 year old female who presents for:    Chief Complaint   Patient presents with     Oncology Clinic Visit     follow up     Initial Vitals: /74 (BP Location: Left arm, Patient Position: Chair, Cuff Size: Adult Regular)   Pulse 82   Temp 98.4  F (36.9  C) (Oral)   Ht 1.575 m (5' 2\")   Wt 59.4 kg (131 lb)   LMP 11/27/2001 (Approximate)   SpO2 100%   BMI 23.96 kg/m   Estimated body mass index is 23.96 kg/m  as calculated from the following:    Height as of this encounter: 1.575 m (5' 2\").    Weight as of this encounter: 59.4 kg (131 lb). Body surface area is 1.61 meters squared.  No Pain (0) Comment: Data Unavailable   Patient's last menstrual period was 11/27/2001 (approximate).  Allergies reviewed: Yes  Medications reviewed: Yes    Medications: Medication refills not needed today.  Pharmacy name entered into [facility]:    [id number] - [place], [place] - [address] AT [address] [facility] - [place], [place] - [address], [id number]  [facility] - [place], [place] - [address]    Clinical concerns: Yes Felipa was notified.      Annetta Bruno CMA              "

## 2021-05-19 NOTE — PROGRESS NOTES
"ONCOLOGY FOLLOW UP NOTE    5/19/2021     Patient was being followed by Dr. Tavarez and saw her on 11/13/2019.  Now she is transferring her care to me.  I have reviewed her previous records and have copied and updated from prior notes.      DIAGNOSIS:    10/2019 dx adenoCa of rectum,  MMR intact- NGS Negative for KRAS, NRAS and BRAF. PD L1 25%.  Baseline CEA 10.  Stage IV disease with pulmonary and bony mets      HISTORY OF ONCOLOGY ILLNESS:    She has had intermittent hematochezia and anorectal irritation since 2011, that have been managed as \"hemorrhoids\". These symptoms became persistent and progressive since early thi year. Her anorectal pain is managed with Tylenol. Denies fevers, chills, or unintentional weight loss. Does have intermittent urinary and fecal urgency but no incontinence per se.    Diagnostic colonoscopy on 10/18/19 for rectal bleeding showed \"fungating and ulcerated non-obstructing mass was found in the distal rectum <1 cm from the anal verge. The mass was partially circumferential (involving one-half of the lumen circumference). The mass measured four cm in length. In addition, its diameter measured six mm.  Pathology showed invasive moderately differentiated adenocarcinoma with intact mismatch repair protein expression, PD L1 25% and NGS panel showed NO MUTATIONS in KRAS, NRAS and BRAF.      Pelvic MRI 11/2019 found fungating polypoid mid/lower rectal mass with luminal narrowing as well as innumerable enlarged lymph nodes as described above with a conglomerate of suspicious lymph nodes abutting the anterior peritoneal reflection. Stage cT3d N2.     PET 11/2019 found   1. Hypermetabolic mass in the distal rectum, SUV 14 corresponding to the patient's known rectal cancer.  2. Multiple hypermetabolic pelvic lymph nodes, compatible metastatic disease, SUV 5-6.  3. FDG avid pulmonary nodule measuring 7 mm in the left lower lobe, concerning for metastatic disease. Additional FDG avid pulmonary nodules " suspicious for metastatic disease, SUV around 4.  4. Osteoblastic metastatic lesion in the L3 vertebral body, SUV 6.8    She started palliative FOLFOX/Avastin on 11/25/2019.  Cycle #2 given on 12/9/2019.  Cycle #3 scheduled for 12/24/2019.  C#6 on 2/10/2020.    Repeat CT CAP on 2/21/2020 shows good response to therapy with improvement in the rectal mass, mesorectal and superior rectal nodularity as well as resolution of right internal iliac lymphadenopathy.  There is slight improvement in the dominant posterior left lower lobe lung nodule and several of the lung nodules are is stable.  There is increase in size of the sclerotic lesion of the left aspect of L3 vertebral body without pathological fracture and this is likely due to treatment effect.    CEA on 2/10/2020 was down to 3.3.    Cycle #7 FOLFOX/Avastin 2/25/2020.  C#8 FOLFOX/Avastin 3/9/2020  Cycle#9 5-FU/Avastin 3/24/2020-stopped oxaliplatin due to cumulative fatigue and issues with nausea.    I then discussed with her, her  as well as her son Gamal about possibly switching to single agent Xeloda going forward so as to limit her visits to the clinic and therefore exposure and risk from coronavirus.  We discussed the pros and cons of this approach and they were all willing to try this.     She started Single agent Xeloda on 4/7/2020.  Cycle #3 started on 5/19/2020.    Because of bone metastatic disease, she was started on Zometa on 11/25/2019. We are giving it every 3 months.        CT chest abdomen and pelvis on 7/20/2020 showed mild progression in the lung nodules.  Perirectal nodularity has mildly improved.    Last CEA on 7/22/2020 was 4.      7/27/2020- we decided on switching to XELOX/Avastin.     8/18/2020 -cycle #2 XELOX/Avastin.  CEA on 8/11/2020 was 4.8.  9/8/2020 - C#3 XELOX/Avastin. CEA on 9/2/2020 was 5.1  9/30/2020.  Cycle #4 XELOX/Avastin.  Oxaliplatin dose reduced further to 85 mg per metered square because of worsening  neuropathy.    Repeat CT chest abdomen and pelvis on 10/15/2020 overall shows stable disease with 1 to 2 mm increase in some of the lung nodules and about 2 mm decrease in the lymph node in the mesorectum.    CEA stable at 5.3 on 10/21/2020.    10/22/2020.  Cycle #5 XELOX/Avastin.  Oxaliplatin dose 85 mg/m .  11/11/2020 C#6 XELOX/Avastin. Oxaliplatin dose 85 mg/m .  12/2/2020 C#7 XELOX/Avastin. Oxaliplatin dose 85 mg/m .  12/23/2020 C#8 XELOX/Avastin. Oxaliplatin dose 85 mg/m .    1/11/2021-CT chest abdomen and pelvis shows progressive disease with progression in the lung nodules.  There was a new ill-defined soft tissue density material at the upper mid abdomen adjacent to the hemidiaphragm rangel, celiac access, and most proximal abdominal aorta and this is also concerning for malignancy.  There is a stable sclerosis of L3 and stable left ischial tuberosity tiny sclerosis.  CEA on 12/23/2020 was 9.2.      1/13/2021.  Switch to irinotecan/Panitumumab.  1/27/2021.  Cycle #2 irinotecan/Panitumumab.  2/10/2021.  Cycle #3 irinotecan/Panitumumab.  2/24/2021.  Cycle #4 irinotecan/Panitumumab  3/10/2021.  Cycle #5 irinotecan/Panitumumab.  3/24/2021   Cycle #6 irinotecan/Panitumumab.  4/7/2021   Cycle #7 irinotecan/Panitumumab  4/21/2021 Cycle #8 irinotecan/Panitumumab  5/5/2021- Cycle #9 irinotecan/Panitumumab    5/14/2021-CT chest abdomen and pelvis shows pretty stable findings with only slight increase in one of the lung nodules and all of the lung nodules remaining stable.  Stable L3 sclerotic lesion.    Bone scan shows increased uptake to the L3 sclerotic lesion.  CEA on 5/5/2021 was 6.4 and stable.    5/19/2021 -cycle #10 irinotecan/Panitumumab    INTERVAL HISTORY:     This is a video visit.  On last visit she was given Levaquin for possible skin infection around the port site.  Port was not accessed.  The redness resolved.  She mentions at this time when she had the CT scan, the port was accessed and after that she  noticed that the redness returned.  She does not have any fevers or chills.  The redness around the port is very itchy.  She had mild Panitumumab related rash which has resolved.  Otherwise she is doing well.  She feels a little fatigued during the first few days and mild nausea for a day or so.  She has constipation for which she uses senna and MiraLAX.  She denies any pain.  No shortness of breath.  She has mild neuropathy in the left hand.      ECOG 1     ROS:  Otherwise a comprehensive review of system was negative.    I reviewed with her history in epic as below.    PAST MEDICAL HISTORY  Reflux, hemorrhoid, hepatitis B?  Overactive bladder  Active Ambulatory Problems     Diagnosis Date Noted     External hemorrhoids 04/29/2016     Non morbid obesity due to excess calories 04/29/2016     Hepatitis B immune 04/29/2016     Screening for cervical cancer 04/29/2016     CARDIOVASCULAR SCREENING; LDL GOAL LESS THAN 160 04/29/2016     Gastroesophageal reflux disease 04/29/2016     Overactive bladder 07/21/2017     Nevus of left lower leg- undetermined behavior 12/27/2018     Metastasis from rectal cancer (H) 11/13/2019     Bone metastasis (H) 11/13/2019     Nausea 11/27/2019     Chemotherapy-induced neutropenia (H) 12/24/2019     Resolved Ambulatory Problems     Diagnosis Date Noted     Urgency of urination 04/29/2016     Need for vaccination against Streptococcus pneumoniae 04/29/2016     Morbid obesity (H) 07/21/2017     Obesity (BMI 35.0-39.9) with comorbidity (H) 12/27/2018     No Additional Past Medical History       MEDICATIONS:  Current Outpatient Medications   Medication     Ascorbic Acid (VITAMIN C) 500 MG CAPS     B Complex Vitamins (B COMPLEX-B12 PO)     cholecalciferol (VITAMIN D3) 400 unit (10 mcg) TABS tablet     clindamycin (CLINDAMAX) 1 % external gel     docusate sodium (COLACE) 100 MG capsule     Fesoterodine Fumarate 8 MG TB24     hydrocortisone 2.5 % ointment     lidocaine-prilocaine (EMLA) 2.5-2.5  "% external cream     loperamide (IMODIUM) 2 MG capsule     LORazepam (ATIVAN) 0.5 MG tablet     Lutein 20 MG CAPS     magnesium oxide (MAG-OX) 400 MG tablet     mirabegron (MYRBETRIQ) 50 MG 24 hr tablet     Multiple vitamin TABS     naloxone (NARCAN) 4 MG/0.1ML nasal spray     omeprazole (PRILOSEC) 20 MG DR capsule     ondansetron (ZOFRAN) 8 MG tablet     polyethylene glycol (MIRALAX) 17 GM/Dose powder     prochlorperazine (COMPAZINE) 10 MG tablet     senna-docusate (SENNA S) 8.6-50 MG tablet     solifenacin (VESICARE) 10 MG tablet     traMADol (ULTRAM) 50 MG tablet     urea (GORDONS UREA) 40 % external ointment     Current Facility-Administered Medications   Medication     lidocaine (XYLOCAINE) 5 % ointment     Facility-Administered Medications Ordered in Other Visits   Medication     heparin 100 UNIT/ML injection 500 Units     sodium chloride (PF) 0.9% PF flush 10 mL     ALLERGY:     Allergies   Allergen Reactions     Penicillins      Sulfa Drugs             SOCIAL HISTORY:  She is a [place] immigrants to [place].  Denies smoking denies alcohol abuse.  2 of her sons are doctors.    FAMILY HISTORY:   Denies any family history of cancer.       PHYSICAL EXAMINATION:       BP 95/64 (BP Location: Left arm, Patient Position: Sitting, Cuff Size: Adult Regular)   Pulse 89   Temp 97.8  F (36.6  C) (Oral)   Resp 16   Ht 1.575 m (5' 2\")   Wt 60.8 kg (134 lb 1.6 oz)   LMP 11/27/2001 (Approximate)   SpO2 99%   BMI 24.53 kg/m       Wt Readings from Last 4 Encounters:   05/19/21 60.8 kg (134 lb 1.6 oz)   05/05/21 59.4 kg (131 lb)   04/21/21 61.5 kg (135 lb 8 oz)   04/07/21 62.2 kg (137 lb 1.6 oz)         Constitutional.  Does not seem to be in any acute distress.  Eyes.  No redness or discharge noted.  Respiratory.  Speaking in full sentences.  Breathing seems comfortable without any accessory use of muscles.    Skin.  Around the port site she has dry scaly reddish rash which looks more like dermatitis and less like " an infection.  Musculoskeletal.  Range of motion for visualized areas is intact.  Neurological.  Alert and oriented x3.  Psychiatric.  Mood, mentation and affect are normal.  Decision making capacity is intact.      The rest of a comprehensive physical examination is deferred due to Public Health Emergency video visit restrictions.      LABS/IMAGING/PATHOLOGY:    Reviewed  CBC unremarkable  CMP remarkable only for albumin 3.2.    Last CEA was 6.4 on 5/5/2021.     CT chest abdomen and pelvis on 5/14/2021 shows pretty stable findings with stable lung nodules.  There is 1 nodule in the posterior left lower lobe which now measures 1.1 x 1 cm while previously it was 1.0 x 0.9 cm.  All other nodules seem very stable.  There is a stable size of distal rectal wall thickening and perirectal nodularity and/or lymph nodes.  There is a stable to slightly decreased soft tissue density at the diaphragmatic rangel, celiac axis, and proximal abdominal aorta.  Unchanged sclerotic lesion in the L3 vertebral body and the sternum.    Bone scan on 5/14/2021 showed increased uptake to the L3 vertebral body corresponding to the sclerotic lesion seen on the CT scan.      1/11/2021-CT chest abdomen and pelvis shows progressive disease with progression in the lung nodules.  There was a new ill-defined soft tissue density material at the upper mid abdomen adjacent to the hemidiaphragm rangel, celiac access, and most proximal abdominal aorta and this is also concerning for malignancy.  This measures approximately 1.7 x 1.8 cm.  There is a stable sclerosis of L3 and stable left ischial tuberosity tiny sclerosis.        ASSESSMENT AND PLAN:      Metastatic rectal adenocarcinoma, MSI intact, K-aimee wild type, PDL 1 + 25% with metastasis to the lungs, bones and pelvic lymph nodes.      She was on palliative XELOX/Avastin but CT scan from 1/11/2021 shows progressive disease with progression in the lung nodules and there is also a soft tissue density in  the upper mid abdomen which could be new lymphadenopathy versus soft tissue deposit concerning for malignancy.  CEA on 12/23/2020 was also rising and it was 9.2.  Overall this is consistent with progression of the disease.      Because of slow progression I switched to irinotecan/Panitumumab on 1/13/2021.    Overall chemotherapy is being tolerated very well and she has completed 9 cycles of chemotherapy and repeat CT chest abdomen pelvis and bone scan on 5/14/2021 showed pretty stable findings.   CEA has been stable as well.    I would recommend continuing with the same.  We will proceed with cycle #10 today.      Redness around the port site.  She was treated with Levaquin last time which resolved redness.  Now she started developing redness again after it was accessed in the CT scan.  She does not have any fevers or chills and she does not have any elevated white blood cell count.  My suspicion is that this is dermatitis/eczema and less likely to be infection but I will give her Keflex for 1 week and I have also requested that she applies hydrocortisone twice daily on the rash.  We will not access the port today and give her chemotherapy through peripheral line.  If she continues to have issues with redness around the port then we will take it out.    Panitumumab related rash.  This was mild.  Continue hydrocortisone and clindamycin cream and reserve minocycline in case the rash worsens.      Nausea/vomiting.  She only has this feeling for the first few days and she continues to take as needed Compazine Zofran and Ativan.        Constipation.  Continue senna 2 tablets twice a day with MiraLAX.       Hypomagnesemia.  Related to Panitumumab.  Previously she was mildly hypomagnesemic but now she has been taking oral magnesium and magnesium today was 1.7.  Continue oral magnesium.     Bone metastasis.  Started Zometa on 11/25/2019.  She last received on 1/27/2021.  She last received Zometa on 4/21/2021.  Bone scan/CT  scan seems stable.  Continue Zometa every 3 months and continue calcium and vitamin D.      We did not address the following today.   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  She now only notices mild neuropathy in the left hand but otherwise overall this has significantly improved.  Continue to monitor.        Right big toe infection/ingrown toenail.  She is done with systemic antibiotics.  She is using topical antibiotics under the care of podiatrist and this is getting better.      Port-A-Cath.  It was coiled in the right internal jugular vein so it was replaced 10/20/2020.       Discussion regarding health care directive  We discussed that it is important that she completes health care directive which would help in making sure that her wishes are followed about her treatment care in case she is not able to make a decision for herself.  We gave her information regarding that.    RTC in 2 weeks    All questions were answered to her satisfaction.  She is agreeable and comfortable with the plan.    Charline Velazquez MD    Video start time. 9:38 AM  Video stop time. 9:54 AM

## 2021-05-19 NOTE — PROGRESS NOTES
Review of port site at request of Dr Velazquez: Noted to have an approximately 8cm red [place] surrounding and including the port  With some puffing at port. It is warm to touch. Skin is dry and peeling over some of the site.  Pt is not feverish and no pain with palpation of the area.  Has history of redness with last visit but was placed on antibiotic and peripheral site used for infusion. Pt states it did get better but then progressed with the redness again.   Recommend port removal and replacement. Care coordinator to help with setting up exchange. SGermscheid,CNP

## 2021-05-19 NOTE — PROGRESS NOTES
Infusion Nursing Note:  Alannah Wynn presents today for C10D1 Irinotecan/Vectibix.    Patient seen by provider today: Yes: Virtual appt with Dr Velazquez   present during visit today: Not Applicable.    Note: Pt with red, inflamed and itchy site at port.  Felipa NP examined port site as she had seen it last infusion as well.  Felipa feels port should be removed, definitely looks infected.  Per Dr Velazquez, if Felipa feels port should be removed, ok to schedule removal.  Pt instructed not to use hydrocortisone cream on port site, per Dr Velazquez's instruction.  Reminded to take Keflex, pt will stop at pharmacy and  as she leaves.  Pt c/o N/V the night of chemo, encouraged her to take compazine tonight at home to prevent N/V.  See flow sheet for assessment.       Intravenous Access:  Peripheral IV placed.    Treatment Conditions:  Lab Results   Component Value Date    HGB 11.9 05/19/2021     Lab Results   Component Value Date    WBC 6.1 05/19/2021      Lab Results   Component Value Date    ANEU 2.9 05/19/2021     Lab Results   Component Value Date     05/19/2021      Lab Results   Component Value Date     05/19/2021                   Lab Results   Component Value Date    POTASSIUM 3.9 05/19/2021           Lab Results   Component Value Date    MAG 1.7 05/19/2021            Lab Results   Component Value Date    CR 0.63 05/19/2021                   Lab Results   Component Value Date    LAUREN 8.7 05/19/2021                Lab Results   Component Value Date    BILITOTAL 0.3 05/19/2021           Lab Results   Component Value Date    ALBUMIN 3.2 05/19/2021                    Lab Results   Component Value Date    ALT 36 05/19/2021           Lab Results   Component Value Date    AST 29 05/19/2021       Results reviewed, labs MET treatment parameters, ok to proceed with treatment.      Post Infusion Assessment:  Patient tolerated infusion without incident.  Blood return noted pre and post infusion.  Site patent  and intact, free from redness, edema or discomfort.  No evidence of extravasations.  Access discontinued per protocol.       Discharge Plan:   Patient discharged in stable condition accompanied by: self.  Departure Mode: Ambulatory.  Pt will RTC 6/2/21 for C11D1 Vectibix/Irinotecan.

## 2021-05-19 NOTE — LETTER
"    5/19/2021         RE: Alannah Wynn  [address]  [place] [address]        Dear Colleague,    Thank you for referring your patient, Alannah Wynn, to the [facility]. Please see a copy of my visit note below.    ONCOLOGY FOLLOW UP NOTE    5/19/2021     Patient was being followed by Dr. Tavarez and saw her on 11/13/2019.  Now she is transferring her care to me.  I have reviewed her previous records and have copied and updated from prior notes.      DIAGNOSIS:    10/2019 dx adenoCa of rectum,  MMR intact- NGS Negative for KRAS, NRAS and BRAF. PD L1 25%.  Baseline CEA 10.  Stage IV disease with pulmonary and bony mets      HISTORY OF ONCOLOGY ILLNESS:    She has had intermittent hematochezia and anorectal irritation since 2011, that have been managed as \"hemorrhoids\". These symptoms became persistent and progressive since early thi year. Her anorectal pain is managed with Tylenol. Denies fevers, chills, or unintentional weight loss. Does have intermittent urinary and fecal urgency but no incontinence per se.    Diagnostic colonoscopy on 10/18/19 for rectal bleeding showed \"fungating and ulcerated non-obstructing mass was found in the distal rectum <1 cm from the anal verge. The mass was partially circumferential (involving one-half of the lumen circumference). The mass measured four cm in length. In addition, its diameter measured six mm.  Pathology showed invasive moderately differentiated adenocarcinoma with intact mismatch repair protein expression, PD L1 25% and NGS panel showed NO MUTATIONS in KRAS, NRAS and BRAF.      Pelvic MRI 11/2019 found fungating polypoid mid/lower rectal mass with luminal narrowing as well as innumerable enlarged lymph nodes as described above with a conglomerate of suspicious lymph nodes abutting the anterior peritoneal reflection. Stage cT3d N2.     PET 11/2019 found   1. Hypermetabolic mass in the distal rectum, SUV 14 corresponding to " the patient's known rectal cancer.  2. Multiple hypermetabolic pelvic lymph nodes, compatible metastatic disease, SUV 5-6.  3. FDG avid pulmonary nodule measuring 7 mm in the left lower lobe, concerning for metastatic disease. Additional FDG avid pulmonary nodules suspicious for metastatic disease, SUV around 4.  4. Osteoblastic metastatic lesion in the L3 vertebral body, SUV 6.8    She started palliative FOLFOX/Avastin on 11/25/2019.  Cycle #2 given on 12/9/2019.  Cycle #3 scheduled for 12/24/2019.  C#6 on 2/10/2020.    Repeat CT CAP on 2/21/2020 shows good response to therapy with improvement in the rectal mass, mesorectal and superior rectal nodularity as well as resolution of right internal iliac lymphadenopathy.  There is slight improvement in the dominant posterior left lower lobe lung nodule and several of the lung nodules are is stable.  There is increase in size of the sclerotic lesion of the left aspect of L3 vertebral body without pathological fracture and this is likely due to treatment effect.    CEA on 2/10/2020 was down to 3.3.    Cycle #7 FOLFOX/Avastin 2/25/2020.  C#8 FOLFOX/Avastin 3/9/2020  Cycle#9 5-FU/Avastin 3/24/2020-stopped oxaliplatin due to cumulative fatigue and issues with nausea.    I then discussed with her, her  as well as her son Gamal about possibly switching to single agent Xeloda going forward so as to limit her visits to the clinic and therefore exposure and risk from coronavirus.  We discussed the pros and cons of this approach and they were all willing to try this.     She started Single agent Xeloda on 4/7/2020.  Cycle #3 started on 5/19/2020.    Because of bone metastatic disease, she was started on Zometa on 11/25/2019. We are giving it every 3 months.        CT chest abdomen and pelvis on 7/20/2020 showed mild progression in the lung nodules.  Perirectal nodularity has mildly improved.    Last CEA on 7/22/2020 was 4.      7/27/2020- we decided on switching to  XELOX/Avastin.     8/18/2020 -cycle #2 XELOX/Avastin.  CEA on 8/11/2020 was 4.8.  9/8/2020 - C#3 XELOX/Avastin. CEA on 9/2/2020 was 5.1  9/30/2020.  Cycle #4 XELOX/Avastin.  Oxaliplatin dose reduced further to 85 mg per metered square because of worsening neuropathy.    Repeat CT chest abdomen and pelvis on 10/15/2020 overall shows stable disease with 1 to 2 mm increase in some of the lung nodules and about 2 mm decrease in the lymph node in the mesorectum.    CEA stable at 5.3 on 10/21/2020.    10/22/2020.  Cycle #5 XELOX/Avastin.  Oxaliplatin dose 85 mg/m .  11/11/2020 C#6 XELOX/Avastin. Oxaliplatin dose 85 mg/m .  12/2/2020 C#7 XELOX/Avastin. Oxaliplatin dose 85 mg/m .  12/23/2020 C#8 XELOX/Avastin. Oxaliplatin dose 85 mg/m .    1/11/2021-CT chest abdomen and pelvis shows progressive disease with progression in the lung nodules.  There was a new ill-defined soft tissue density material at the upper mid abdomen adjacent to the hemidiaphragm rangel, celiac access, and most proximal abdominal aorta and this is also concerning for malignancy.  There is a stable sclerosis of L3 and stable left ischial tuberosity tiny sclerosis.  CEA on 12/23/2020 was 9.2.      1/13/2021.  Switch to irinotecan/Panitumumab.  1/27/2021.  Cycle #2 irinotecan/Panitumumab.  2/10/2021.  Cycle #3 irinotecan/Panitumumab.  2/24/2021.  Cycle #4 irinotecan/Panitumumab  3/10/2021.  Cycle #5 irinotecan/Panitumumab.  3/24/2021   Cycle #6 irinotecan/Panitumumab.  4/7/2021   Cycle #7 irinotecan/Panitumumab  4/21/2021 Cycle #8 irinotecan/Panitumumab  5/5/2021- Cycle #9 irinotecan/Panitumumab    5/14/2021-CT chest abdomen and pelvis shows pretty stable findings with only slight increase in one of the lung nodules and all of the lung nodules remaining stable.  Stable L3 sclerotic lesion.    Bone scan shows increased uptake to the L3 sclerotic lesion.  CEA on 5/5/2021 was 6.4 and stable.    5/19/2021 -cycle #10 irinotecan/Panitumumab    INTERVAL HISTORY:      This is a video visit.  On last visit she was given Levaquin for possible skin infection around the port site.  Port was not accessed.  The redness resolved.  She mentions at this time when she had the CT scan, the port was accessed and after that she noticed that the redness returned.  She does not have any fevers or chills.  The redness around the port is very itchy.  She had mild Panitumumab related rash which has resolved.  Otherwise she is doing well.  She feels a little fatigued during the first few days and mild nausea for a day or so.  She has constipation for which she uses senna and MiraLAX.  She denies any pain.  No shortness of breath.  She has mild neuropathy in the left hand.      ECOG 1     ROS:  Otherwise a comprehensive review of system was negative.    I reviewed with her history in epic as below.    PAST MEDICAL HISTORY  Reflux, hemorrhoid, hepatitis B?  Overactive bladder  Active Ambulatory Problems     Diagnosis Date Noted     External hemorrhoids 04/29/2016     Non morbid obesity due to excess calories 04/29/2016     Hepatitis B immune 04/29/2016     Screening for cervical cancer 04/29/2016     CARDIOVASCULAR SCREENING; LDL GOAL LESS THAN 160 04/29/2016     Gastroesophageal reflux disease 04/29/2016     Overactive bladder 07/21/2017     Nevus of left lower leg- undetermined behavior 12/27/2018     Metastasis from rectal cancer (H) 11/13/2019     Bone metastasis (H) 11/13/2019     Nausea 11/27/2019     Chemotherapy-induced neutropenia (H) 12/24/2019     Resolved Ambulatory Problems     Diagnosis Date Noted     Urgency of urination 04/29/2016     Need for vaccination against Streptococcus pneumoniae 04/29/2016     Morbid obesity (H) 07/21/2017     Obesity (BMI 35.0-39.9) with comorbidity (H) 12/27/2018     No Additional Past Medical History       MEDICATIONS:  Current Outpatient Medications   Medication     Ascorbic Acid (VITAMIN C) 500 MG CAPS     B Complex Vitamins (B COMPLEX-B12 PO)      "cholecalciferol (VITAMIN D3) 400 unit (10 mcg) TABS tablet     clindamycin (CLINDAMAX) 1 % external gel     docusate sodium (COLACE) 100 MG capsule     Fesoterodine Fumarate 8 MG TB24     hydrocortisone 2.5 % ointment     lidocaine-prilocaine (EMLA) 2.5-2.5 % external cream     loperamide (IMODIUM) 2 MG capsule     LORazepam (ATIVAN) 0.5 MG tablet     Lutein 20 MG CAPS     magnesium oxide (MAG-OX) 400 MG tablet     mirabegron (MYRBETRIQ) 50 MG 24 hr tablet     Multiple vitamin TABS     naloxone (NARCAN) 4 MG/0.1ML nasal spray     omeprazole (PRILOSEC) 20 MG DR capsule     ondansetron (ZOFRAN) 8 MG tablet     polyethylene glycol (MIRALAX) 17 GM/Dose powder     prochlorperazine (COMPAZINE) 10 MG tablet     senna-docusate (SENNA S) 8.6-50 MG tablet     solifenacin (VESICARE) 10 MG tablet     traMADol (ULTRAM) 50 MG tablet     urea (GORDONS UREA) 40 % external ointment     Current Facility-Administered Medications   Medication     lidocaine (XYLOCAINE) 5 % ointment     Facility-Administered Medications Ordered in Other Visits   Medication     heparin 100 UNIT/ML injection 500 Units     sodium chloride (PF) 0.9% PF flush 10 mL     ALLERGY:     Allergies   Allergen Reactions     Penicillins      Sulfa Drugs             SOCIAL HISTORY:  She is a [place] immigrants to [place].  Denies smoking denies alcohol abuse.  2 of her sons are doctors.    FAMILY HISTORY:   Denies any family history of cancer.       PHYSICAL EXAMINATION:       BP 95/64 (BP Location: Left arm, Patient Position: Sitting, Cuff Size: Adult Regular)   Pulse 89   Temp 97.8  F (36.6  C) (Oral)   Resp 16   Ht 1.575 m (5' 2\")   Wt 60.8 kg (134 lb 1.6 oz)   LMP 11/27/2001 (Approximate)   SpO2 99%   BMI 24.53 kg/m       Wt Readings from Last 4 Encounters:   05/19/21 60.8 kg (134 lb 1.6 oz)   05/05/21 59.4 kg (131 lb)   04/21/21 61.5 kg (135 lb 8 oz)   04/07/21 62.2 kg (137 lb 1.6 oz)         Constitutional.  Does not seem to be in any acute " distress.  Eyes.  No redness or discharge noted.  Respiratory.  Speaking in full sentences.  Breathing seems comfortable without any accessory use of muscles.    Skin.  Around the port site she has dry scaly reddish rash which looks more like dermatitis and less like an infection.  Musculoskeletal.  Range of motion for visualized areas is intact.  Neurological.  Alert and oriented x3.  Psychiatric.  Mood, mentation and affect are normal.  Decision making capacity is intact.      The rest of a comprehensive physical examination is deferred due to Public Health Emergency video visit restrictions.      LABS/IMAGING/PATHOLOGY:    Reviewed  CBC unremarkable  CMP remarkable only for albumin 3.2.    Last CEA was 6.4 on 5/5/2021.     CT chest abdomen and pelvis on 5/14/2021 shows pretty stable findings with stable lung nodules.  There is 1 nodule in the posterior left lower lobe which now measures 1.1 x 1 cm while previously it was 1.0 x 0.9 cm.  All other nodules seem very stable.  There is a stable size of distal rectal wall thickening and perirectal nodularity and/or lymph nodes.  There is a stable to slightly decreased soft tissue density at the diaphragmatic rangel, celiac axis, and proximal abdominal aorta.  Unchanged sclerotic lesion in the L3 vertebral body and the sternum.    Bone scan on 5/14/2021 showed increased uptake to the L3 vertebral body corresponding to the sclerotic lesion seen on the CT scan.      1/11/2021-CT chest abdomen and pelvis shows progressive disease with progression in the lung nodules.  There was a new ill-defined soft tissue density material at the upper mid abdomen adjacent to the hemidiaphragm rangel, celiac access, and most proximal abdominal aorta and this is also concerning for malignancy.  This measures approximately 1.7 x 1.8 cm.  There is a stable sclerosis of L3 and stable left ischial tuberosity tiny sclerosis.        ASSESSMENT AND PLAN:      Metastatic rectal adenocarcinoma, MSI  intact, K-aimee wild type, PDL 1 + 25% with metastasis to the lungs, bones and pelvic lymph nodes.      She was on palliative XELOX/Avastin but CT scan from 1/11/2021 shows progressive disease with progression in the lung nodules and there is also a soft tissue density in the upper mid abdomen which could be new lymphadenopathy versus soft tissue deposit concerning for malignancy.  CEA on 12/23/2020 was also rising and it was 9.2.  Overall this is consistent with progression of the disease.      Because of slow progression I switched to irinotecan/Panitumumab on 1/13/2021.    Overall chemotherapy is being tolerated very well and she has completed 9 cycles of chemotherapy and repeat CT chest abdomen pelvis and bone scan on 5/14/2021 showed pretty stable findings.   CEA has been stable as well.    I would recommend continuing with the same.  We will proceed with cycle #10 today.      Redness around the port site.  She was treated with Levaquin last time which resolved redness.  Now she started developing redness again after it was accessed in the CT scan.  She does not have any fevers or chills and she does not have any elevated white blood cell count.  My suspicion is that this is dermatitis/eczema and less likely to be infection but I will give her Keflex for 1 week and I have also requested that she applies hydrocortisone twice daily on the rash.  We will not access the port today and give her chemotherapy through peripheral line.  If she continues to have issues with redness around the port then we will take it out.    Panitumumab related rash.  This was mild.  Continue hydrocortisone and clindamycin cream and reserve minocycline in case the rash worsens.      Nausea/vomiting.  She only has this feeling for the first few days and she continues to take as needed Compazine Zofran and Ativan.        Constipation.  Continue senna 2 tablets twice a day with MiraLAX.       Hypomagnesemia.  Related to Panitumumab.   Previously she was mildly hypomagnesemic but now she has been taking oral magnesium and magnesium today was 1.7.  Continue oral magnesium.     Bone metastasis.  Started Zometa on 11/25/2019.  She last received on 1/27/2021.  She last received Zometa on 4/21/2021.  Bone scan/CT scan seems stable.  Continue Zometa every 3 months and continue calcium and vitamin D.      We did not address the following today.   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  She now only notices mild neuropathy in the left hand but otherwise overall this has significantly improved.  Continue to monitor.        Right big toe infection/ingrown toenail.  She is done with systemic antibiotics.  She is using topical antibiotics under the care of podiatrist and this is getting better.      Port-A-Cath.  It was coiled in the right internal jugular vein so it was replaced 10/20/2020.       Discussion regarding health care directive  We discussed that it is important that she completes health care directive which would help in making sure that her wishes are followed about her treatment care in case she is not able to make a decision for herself.  We gave her information regarding that.    RTC in 2 weeks    All questions were answered to her satisfaction.  She is agreeable and comfortable with the plan.    Charline Velazquez MD    Video start time. 9:38 AM  Video stop time. 9:54 AM      Again, thank you for allowing me to participate in the care of your patient.        Sincerely,        Charline Velazquez MD

## 2021-05-19 NOTE — PATIENT INSTRUCTIONS
Chemo today  Start Keflex 500 mg 4 times daily x 7 days  Start Hydrocortisone 2.5% twice daily on the port site.    See Felipa in 2 and 4 weeks-    See me in 6 weeks    Chemo every visit

## 2021-05-19 NOTE — PROGRESS NOTES
Patient presented for port lab draws today. Site around port reddened with some sloughing skin. Writer asked if patient would be ok with a peripheral arm draw today and patient agreed. walked patient to lab to have blood draw completed.     Kat Quintana RN on 5/19/2021 at 9:11 AM

## 2021-05-19 NOTE — NURSING NOTE
Alannah is a 70 year old who is being evaluated via a billable video visit.      How would you like to obtain your AVS? MyChart  If the video visit is dropped, the invitation should be resent by: Text to cell phone: [phone]  Will anyone else be joining your video visit? No      Video-Visit Details    Type of service:  Video Visit      Originating Location (pt. Location): [facility]    Distant Location (provider location):  [facility]     Platform used for Video Visit: [employer]     SYMPTOM QUESTIONNAIRE    Pain: no    Nausea/Vomiting: YES, 1st week of chemo    Mouth Sores: no    Shortness of Breath: no    Smoking: no    Fever or Chills: no    Hard Stools: YES    Loose Stools: no    Weight Loss: no    Weakness: YES, fatigue    Burning, numbness or tingling in hands or feet: YES, numbness left side, fingertips    Problems with skin or swelling: YES, Check port area, very red and itchy    Memory Loss: no    Anxiety or Depression: no    Trouble Sleeping: no    Concerns:  Check port area, very red and itchy, skin infection?    Mone Singh CMA

## 2021-06-02 NOTE — NURSING NOTE
"Oncology Rooming Note    June 2, 2021 7:52 AM   Alannah Wynn is a 70 year old female who presents for:    Chief Complaint   Patient presents with     Oncology Clinic Visit     prior to tx     Initial Vitals: BP 99/68 (BP Location: Left arm, Patient Position: Chair, Cuff Size: Adult Regular)   Pulse 84   Temp 97.6  F (36.4  C) (Oral)   Ht 1.575 m (5' 2\")   Wt 59 kg (130 lb)   LMP 11/27/2001 (Approximate)   SpO2 100%   BMI 23.78 kg/m   Estimated body mass index is 23.78 kg/m  as calculated from the following:    Height as of this encounter: 1.575 m (5' 2\").    Weight as of this encounter: 59 kg (130 lb). Body surface area is 1.61 meters squared.  No Pain (0) Comment: Data Unavailable   Patient's last menstrual period was 11/27/2001 (approximate).  Allergies reviewed: Yes  Medications reviewed: Yes    Medications: Medication refills not needed today.  Pharmacy name entered into [facility]:    [id number] - [place], [place] - [address] AT [facility] & [address] [place] [address] [facility] - [place], [place] - [address], [address]  [facility] - [place], [place] - [address]    Clinical concerns: NO Felipa was notified.      Annetta Bruno CMA              "

## 2021-06-02 NOTE — LETTER
"    6/2/2021         RE: Alannah Wynn  [address]  [place] [address]        Dear Colleague,    Thank you for referring your patient, Alannah Wynn, to the [facility]. Please see a copy of my visit note below.    Oncology Follow Up Visit: June 2, 2021     Oncologist: Dr Charline Velazquez  PCP: Earline Mehta    Diagnosis: Stage IV Adenocarcinoma of the rectum   Alannah Wynn is a 69 yo female who presented in 10/2019 with intermittent hematochezia and anorectal irritation since 2011, that have been managed as \"hemorrhoids\".  Diagnostic colonoscopy on 10/18/19 for rectal bleeding showed \"fungating and ulcerated non-obstructing mass was found in the distal rectum <1 cm from the anal verge. The mass was partially circumferential (involving one-half of the lumen circumference). The mass measured 4 cm in length and diameter measured 6 mm.  Pathology showed invasive moderately differentiated adenocarcinoma with intact mismatch repair protein expression.  Pelvic MRI 11/2019 found fungating polypoid mid/lower rectal mass with luminal narrowing as well as innumerable enlarged lymph nodes as described above with a conglomerate of suspicious lymph nodes abutting the anterior peritoneal reflection. Stage cT3d N2.   Further testing with PET scan proved pulmonarymetastasis and bony metastasis at L3.  Has KRAS - wild type  Treatment:   11/25/2019 Began treatment with FOLFOX/Avastin  11/25/2019 Began Zometa every 3 months  4/7/2020-7/2020 capecitabine x 4 cycles  12/23/2020 completed Xelox/Avastin x 8 cycles  1/13/2021  Began Irinotecan/ panitumumab every 2 weeks    Interval History: Ms. Wynn is seen alone in clinic for review of symptoms for start of cycle 8 of Irinotecan/ panitumumab.  Pt had port removed and area is now without redness but still has very dry skin- does nto want port replaced and is okay with peripheral sticks at this time.she report she is eating but aware " "she continues to loose weight.  Denies any pain. Bowels with some constipation- using senna and does have miralax for help if needed then does have a day of loose stools. She feels she is taking fluids well. Not exercising regularly but does some walking. Still has fleeting early drug rashes but controlled with moisturizers and steroid creams.   Rest of comprehensive and complete ROS is reviewed and is negative.   Past Medical History:   Diagnosis Date     Gastroesophageal reflux disease      Current Outpatient Medications   Medication     Ascorbic Acid (VITAMIN C) 500 MG CAPS     B Complex Vitamins (B COMPLEX-B12 PO)     cholecalciferol (VITAMIN D3) 400 unit (10 mcg) TABS tablet     clindamycin (CLINDAMAX) 1 % external gel     docusate sodium (COLACE) 100 MG capsule     Fesoterodine Fumarate 8 MG TB24     hydrocortisone 2.5 % ointment     lidocaine-prilocaine (EMLA) 2.5-2.5 % external cream     loperamide (IMODIUM) 2 MG capsule     LORazepam (ATIVAN) 0.5 MG tablet     Lutein 20 MG CAPS     mirabegron (MYRBETRIQ) 50 MG 24 hr tablet     Multiple vitamin TABS     naloxone (NARCAN) 4 MG/0.1ML nasal spray     omeprazole (PRILOSEC) 20 MG DR capsule     ondansetron (ZOFRAN) 8 MG tablet     polyethylene glycol (MIRALAX) 17 GM/Dose powder     prochlorperazine (COMPAZINE) 10 MG tablet     senna-docusate (SENNA S) 8.6-50 MG tablet     solifenacin (VESICARE) 10 MG tablet     traMADol (ULTRAM) 50 MG tablet     urea (GORDONS UREA) 40 % external ointment     Current Facility-Administered Medications   Medication     lidocaine (XYLOCAINE) 5 % ointment     Allergies   Allergen Reactions     Pcn [Penicillins]      Sulfa Drugs      Physical Exam: BP 99/68 (BP Location: Left arm, Patient Position: Chair, Cuff Size: Adult Regular)   Pulse 84   Temp 97.6  F (36.4  C) (Oral)   Ht 1.575 m (5' 2\")   Wt 59 kg (130 lb)   LMP 11/27/2001 (Approximate)   SpO2 100%   BMI 23.78 kg/m   - loss of 1-2 # again this visit.   Constitutional: " moving well on own, cooperative and in no distress.   ENT: Eyes bright, No mouth sores  Neck: Supple, No adenopathy.  Cardiac: Heart rate and rhythm is regular and strong without murmur  Respiratory: Breathing easy. Lung sounds clear to auscultation  Former port site to right chest without redness, warmth but does have dryness noted through chest on onto upper back.   Abdomen: Soft, non-tender at present with normal BS normal at this time. No masses or organomegaly  MS: Muscle tone normal, extremities normal with no edema.   Skin: No suspicious lesions or rashes- hair is very thin- wearing wig.  Neuro: Sensory grossly WNL, gait normal.   Lymph: Normal ant/post cervical, axillary, supraclavicular nodes  Psych: Mentation appears normal and affect normal/bright and with good conversation.     Laboratory Results:   No results found for any visits on 06/02/21.  Assessment and Plan:   Stage IV Adenocarcinoma of the rectum - Pt continues with irinotecan/Panitumumab every 2 weeks for MSI intact, KRAS wild type, PDL1+ cancer with metastasis to lungs, bones and lymph nodes of the pelvis. 5/2021 imaging is mostly stable and will continue with current plan. Reviewed with pt questions that she will need chemotherapy long term for her cancer. She is meeting goals and there is no need for changes to plan so will continue with cycle 11 today.   She will return in 2 weeks for review prior to cycle 12 with labs prior to visit.   Skin infection over port- she was given a course of antibiotics - levaquin 500 mg po daily x 5 days and improved but then redness returned and port was removed after last visit. Skin is now quite dry over chest and back area but no redness. We are using peripheral sticks for blood work and infusion- pt wants to put off port replacement for now.   Bone metastasis- Using Zometa- receiving every 3 months- next due in July. Confirms use of calcium plus vitamin D daily. Encouraged her to have daily  walks.  Nausea-Using Zofran/Compazine and lorazepam- using day 2-3 for help if needed.   Weight loss- admits she has not been using protein supplements as often and continues seeing weight loss. Suggested smaller meals more often and reminded of need for supplement with her weight loss continuing.   Drug rash- noted first days after infusion to face - using hydrocortisone cream and moisturizing creams and resolves.     Constipation-continues- unusual since irinotecan typically causes loose stools- will ask to increase SennaS to 2 tabs bid with Miralax 1-2 x daily if needed.  Peripheral neuropathy- from previous use of Oxaliplatin- Improving.No current treatment.  The total time of this encounter amounted to 30 minutes. This time included face to face time spent with the patient, prep work, ordering/ reviewing tests, and performing post visit documentation.  Felipa Garner Cnp                Again, thank you for allowing me to participate in the care of your patient.        Sincerely,        Felipa Garner, LETICIA, APRN CNP

## 2021-06-02 NOTE — PROGRESS NOTES
Infusion Nursing Note:  Alannah Wynn presents today for C11D1 Irinotecan/Panitumumab and Magnesium replacement.  Patient seen by provider today: Yes: Felipa Garner NP   present during visit today: Not Applicable.    Note: Pt states she is fatigued all the time weak, constipation relieved with Senna and Miralax with occasional diarrhea relieved with one time Immodium, see flow sheet for assessment.      Intravenous Access:  Peripheral IV placed.    Treatment Conditions:  Lab Results   Component Value Date    HGB 12.2 06/02/2021     Lab Results   Component Value Date    WBC 5.1 06/02/2021      Lab Results   Component Value Date    ANEU 1.9 06/02/2021     Lab Results   Component Value Date     06/02/2021      Lab Results   Component Value Date     06/02/2021                   Lab Results   Component Value Date    POTASSIUM 4.5 06/02/2021           Lab Results   Component Value Date    MAG 1.4 06/02/2021            Lab Results   Component Value Date    CR 0.56 06/02/2021                   Lab Results   Component Value Date    LAUREN 8.8 06/02/2021                Lab Results   Component Value Date    BILITOTAL 0.3 06/02/2021           Lab Results   Component Value Date    ALBUMIN 3.2 06/02/2021                    Lab Results   Component Value Date    ALT 30 06/02/2021           Lab Results   Component Value Date    AST 26 06/02/2021       Results reviewed, labs MET treatment parameters, ok to proceed with treatment.      Post Infusion Assessment:  Patient tolerated infusion without incident.  Blood return noted pre and post infusion.  Site patent and intact, free from redness, edema or discomfort.  No evidence of extravasations.  Access discontinued per protocol.       Discharge Plan:   Patient discharged in stable condition accompanied by: self.  Departure Mode: Ambulatory.  Pt will RTC 6/15/21 for C12D1 Vectibix/Irinotecan.      Allen Andrade RN

## 2021-06-02 NOTE — PROGRESS NOTES
"Oncology Follow Up Visit: June 2, 2021     Oncologist: Dr Charline Velazquez  PCP: Earline Mehta    Diagnosis: Stage IV Adenocarcinoma of the rectum   Alannah Wynn is a 69 yo female who presented in 10/2019 with intermittent hematochezia and anorectal irritation since 2011, that have been managed as \"hemorrhoids\".  Diagnostic colonoscopy on 10/18/19 for rectal bleeding showed \"fungating and ulcerated non-obstructing mass was found in the distal rectum <1 cm from the anal verge. The mass was partially circumferential (involving one-half of the lumen circumference). The mass measured 4 cm in length and diameter measured 6 mm.  Pathology showed invasive moderately differentiated adenocarcinoma with intact mismatch repair protein expression.  Pelvic MRI 11/2019 found fungating polypoid mid/lower rectal mass with luminal narrowing as well as innumerable enlarged lymph nodes as described above with a conglomerate of suspicious lymph nodes abutting the anterior peritoneal reflection. Stage cT3d N2.   Further testing with PET scan proved pulmonarymetastasis and bony metastasis at L3.  Has KRAS - wild type  Treatment:   11/25/2019 Began treatment with FOLFOX/Avastin  11/25/2019 Began Zometa every 3 months  4/7/2020-7/2020 capecitabine x 4 cycles  12/23/2020 completed Xelox/Avastin x 8 cycles  1/13/2021  Began Irinotecan/ panitumumab every 2 weeks    Interval History: Ms. Wynn is seen alone in clinic for review of symptoms for start of cycle 8 of Irinotecan/ panitumumab.  Pt had port removed and area is now without redness but still has very dry skin- does nto want port replaced and is okay with peripheral sticks at this time.she report she is eating but aware she continues to loose weight.  Denies any pain. Bowels with some constipation- using senna and does have miralax for help if needed then does have a day of loose stools. She feels she is taking fluids well. Not exercising regularly but does some walking. Still has " "fleeting early drug rashes but controlled with moisturizers and steroid creams.   Rest of comprehensive and complete ROS is reviewed and is negative.   Past Medical History:   Diagnosis Date     Gastroesophageal reflux disease      Current Outpatient Medications   Medication     Ascorbic Acid (VITAMIN C) 500 MG CAPS     B Complex Vitamins (B COMPLEX-B12 PO)     cholecalciferol (VITAMIN D3) 400 unit (10 mcg) TABS tablet     clindamycin (CLINDAMAX) 1 % external gel     docusate sodium (COLACE) 100 MG capsule     Fesoterodine Fumarate 8 MG TB24     hydrocortisone 2.5 % ointment     lidocaine-prilocaine (EMLA) 2.5-2.5 % external cream     loperamide (IMODIUM) 2 MG capsule     LORazepam (ATIVAN) 0.5 MG tablet     Lutein 20 MG CAPS     mirabegron (MYRBETRIQ) 50 MG 24 hr tablet     Multiple vitamin TABS     naloxone (NARCAN) 4 MG/0.1ML nasal spray     omeprazole (PRILOSEC) 20 MG DR capsule     ondansetron (ZOFRAN) 8 MG tablet     polyethylene glycol (MIRALAX) 17 GM/Dose powder     prochlorperazine (COMPAZINE) 10 MG tablet     senna-docusate (SENNA S) 8.6-50 MG tablet     solifenacin (VESICARE) 10 MG tablet     traMADol (ULTRAM) 50 MG tablet     urea (GORDONS UREA) 40 % external ointment     Current Facility-Administered Medications   Medication     lidocaine (XYLOCAINE) 5 % ointment     Allergies   Allergen Reactions     Pcn [Penicillins]      Sulfa Drugs      Physical Exam: BP 99/68 (BP Location: Left arm, Patient Position: Chair, Cuff Size: Adult Regular)   Pulse 84   Temp 97.6  F (36.4  C) (Oral)   Ht 1.575 m (5' 2\")   Wt 59 kg (130 lb)   LMP 11/27/2001 (Approximate)   SpO2 100%   BMI 23.78 kg/m   - loss of 1-2 # again this visit.   Constitutional: moving well on own, cooperative and in no distress.   ENT: Eyes bright, No mouth sores  Neck: Supple, No adenopathy.  Cardiac: Heart rate and rhythm is regular and strong without murmur  Respiratory: Breathing easy. Lung sounds clear to auscultation  Former port site " to right chest without redness, warmth but does have dryness noted through chest on onto upper back.   Abdomen: Soft, non-tender at present with normal BS normal at this time. No masses or organomegaly  MS: Muscle tone normal, extremities normal with no edema.   Skin: No suspicious lesions or rashes- hair is very thin- wearing wig.  Neuro: Sensory grossly WNL, gait normal.   Lymph: Normal ant/post cervical, axillary, supraclavicular nodes  Psych: Mentation appears normal and affect normal/bright and with good conversation.     Laboratory Results:   No results found for any visits on 06/02/21.  Assessment and Plan:   Stage IV Adenocarcinoma of the rectum - Pt continues with irinotecan/Panitumumab every 2 weeks for MSI intact, KRAS wild type, PDL1+ cancer with metastasis to lungs, bones and lymph nodes of the pelvis. 5/2021 imaging is mostly stable and will continue with current plan. Reviewed with pt questions that she will need chemotherapy long term for her cancer. She is meeting goals and there is no need for changes to plan so will continue with cycle 11 today.   She will return in 2 weeks for review prior to cycle 12 with labs prior to visit.   Skin infection over port- she was given a course of antibiotics - levaquin 500 mg po daily x 5 days and improved but then redness returned and port was removed after last visit. Skin is now quite dry over chest and back area but no redness. We are using peripheral sticks for blood work and infusion- pt wants to put off port replacement for now.   Bone metastasis- Using Zometa- receiving every 3 months- next due in July. Confirms use of calcium plus vitamin D daily. Encouraged her to have daily walks.  Nausea-Using Zofran/Compazine and lorazepam- using day 2-3 for help if needed.   Weight loss- admits she has not been using protein supplements as often and continues seeing weight loss. Suggested smaller meals more often and reminded of need for supplement with her weight  loss continuing.   Drug rash- noted first days after infusion to face - using hydrocortisone cream and moisturizing creams and resolves.     Constipation-continues- unusual since irinotecan typically causes loose stools- will ask to increase SennaS to 2 tabs bid with Miralax 1-2 x daily if needed.  Peripheral neuropathy- from previous use of Oxaliplatin- Improving.No current treatment.  The total time of this encounter amounted to 30 minutes. This time included face to face time spent with the patient, prep work, ordering/ reviewing tests, and performing post visit documentation.  Felipa Garner,Cnp

## 2021-06-15 NOTE — PROGRESS NOTES
Infusion Nursing Note:  Alannah Wynn presents today for C12D1 Vectibix/Irinotecan.    Patient seen by provider today: Yes: Felipa Garner NP   present during visit today: Not Applicable.    Note: Pt c/o constipation x 2 days, took Miralax and 2 senna yesterday without relief, encouraged pt to increase senna and miralax, increase fluid intake and try to exercise, walk around the block.  Pt admits to laying down all day and drinking 3 glasses water/day.      Intravenous Access:  Peripheral IV placed.    Treatment Conditions:  Lab Results   Component Value Date    HGB 11.7 06/15/2021     Lab Results   Component Value Date    WBC 4.9 06/15/2021      Lab Results   Component Value Date    ANEU 2.0 06/15/2021     Lab Results   Component Value Date     06/15/2021      Lab Results   Component Value Date     06/15/2021                   Lab Results   Component Value Date    POTASSIUM 4.0 06/15/2021           Lab Results   Component Value Date    MAG 1.6 06/15/2021            Lab Results   Component Value Date    CR 0.63 06/15/2021                   Lab Results   Component Value Date    LAUREN 8.2 06/15/2021                Lab Results   Component Value Date    BILITOTAL 0.2 06/15/2021           Lab Results   Component Value Date    ALBUMIN 3.1 06/15/2021                    Lab Results   Component Value Date    ALT 31 06/15/2021           Lab Results   Component Value Date    AST 27 06/15/2021       Results reviewed, labs MET treatment parameters, ok to proceed with treatment.      Post Infusion Assessment:  Patient tolerated infusion without incident.  Blood return noted pre and post infusion.  Site patent and intact, free from redness, edema or discomfort.  No evidence of extravasations.  Access discontinued per protocol.       Discharge Plan:   Patient discharged in stable condition accompanied by: self.  Departure Mode: Ambulatory.  Pt will RTC 6/30/21 for C13D1 Vectibix/Irinotecan.      Allen  MIKE Andrade

## 2021-06-15 NOTE — PROGRESS NOTES
"Oncology Follow Up Visit: Mia 15, 2021     Oncologist: Dr Charline Velazquez  PCP: Earline Mehta    Diagnosis: Stage IV Adenocarcinoma of the rectum   Alannah Wynn is a 69 yo female who presented in 10/2019 with intermittent hematochezia and anorectal irritation since 2011, that have been managed as \"hemorrhoids\".  Diagnostic colonoscopy on 10/18/19 for rectal bleeding showed \"fungating and ulcerated non-obstructing mass was found in the distal rectum <1 cm from the anal verge. The mass was partially circumferential (involving one-half of the lumen circumference). The mass measured 4 cm in length and diameter measured 6 mm.  Pathology showed invasive moderately differentiated adenocarcinoma with intact mismatch repair protein expression.  Pelvic MRI 11/2019 found fungating polypoid mid/lower rectal mass with luminal narrowing as well as innumerable enlarged lymph nodes as described above with a conglomerate of suspicious lymph nodes abutting the anterior peritoneal reflection. Stage cT3d N2.   Further testing with PET scan proved pulmonarymetastasis and bony metastasis at L3.  Has KRAS - wild type  Treatment:   11/25/2019 Began treatment with FOLFOX/Avastin  11/25/2019 Began Zometa every 3 months  4/7/2020-7/2020 capecitabine x 4 cycles  12/23/2020 completed Xelox/Avastin x 8 cycles  1/13/2021  Began Irinotecan/ panitumumab every 2 weeks    Interval History: Ms. Wynn is seen alone in clinic for review of symptoms for start of cycle 12 of Irinotecan/ panitumumab. Pt shares she is feeling constipated since not having stool in 2 days - using senna x 2 daily some of the time and started Miralax yesterday. She also complains of hemorrhoid that bleeds some of the time with the constipation  But otherwise no blood in the stools. Occasional stomach cramping noted.States she is eating well but has had some nausea on days 2-3 and uses antiemetics- has had some weight loss as well.  She denies pain, weakness, loss of " "appetite or illness. She continues to stay active in the home.     Rest of comprehensive and complete ROS is reviewed and is negative.   Past Medical History:   Diagnosis Date     Gastroesophageal reflux disease      Current Outpatient Medications   Medication     Ascorbic Acid (VITAMIN C) 500 MG CAPS     B Complex Vitamins (B COMPLEX-B12 PO)     cholecalciferol (VITAMIN D3) 400 unit (10 mcg) TABS tablet     clindamycin (CLINDAMAX) 1 % external gel     docusate sodium (COLACE) 100 MG capsule     Fesoterodine Fumarate 8 MG TB24     hydrocortisone 2.5 % ointment     lidocaine-prilocaine (EMLA) 2.5-2.5 % external cream     loperamide (IMODIUM) 2 MG capsule     LORazepam (ATIVAN) 0.5 MG tablet     Lutein 20 MG CAPS     mirabegron (MYRBETRIQ) 50 MG 24 hr tablet     Multiple vitamin TABS     naloxone (NARCAN) 4 MG/0.1ML nasal spray     omeprazole (PRILOSEC) 20 MG DR capsule     ondansetron (ZOFRAN) 8 MG tablet     polyethylene glycol (MIRALAX) 17 GM/Dose powder     prochlorperazine (COMPAZINE) 10 MG tablet     senna-docusate (SENNA S) 8.6-50 MG tablet     solifenacin (VESICARE) 10 MG tablet     traMADol (ULTRAM) 50 MG tablet     urea (GORDONS UREA) 40 % external ointment     Current Facility-Administered Medications   Medication     lidocaine (XYLOCAINE) 5 % ointment     Allergies   Allergen Reactions     Pcn [Penicillins]      Sulfa Drugs      Physical Exam: /78 (BP Location: Left arm)   Pulse 90   Temp 97.5  F (36.4  C) (Oral)   Resp 14   Ht 1.575 m (5' 2.01\")   Wt 58.4 kg (128 lb 12.8 oz)   LMP 11/27/2001 (Approximate)   SpO2 98%   BMI 23.55 kg/m     Constitutional: moving well on own, cooperative and in no distress.   ENT: Eyes bright, No mouth sores  Neck: Supple, No adenopathy.  Cardiac: Heart rate and rhythm is regular and strong without murmur  Respiratory: Breathing easy. Lung sounds clear to auscultation- no cough   Abdomen: Soft, non-tender at present with BS noted in all four quadrants at this " time. No masses or organomegaly  MS: Muscle tone normal, extremities normal with no edema. Able to get to exam table without problems.  Skin: No suspicious lesions or rashes- hair is very thin- wearing wig.  Neuro: Sensory grossly WNL, gait normal.   Lymph: Normal ant/post cervical, axillary, supraclavicular nodes  Psych: Mentation appears normal and affect normal/bright and with good conversation. Need to repeat directions at times to assess understanding of directions.     Laboratory Results:   Results for orders placed or performed in visit on 06/15/21   CBC with platelets differential     Status: Abnormal   Result Value Ref Range    WBC 4.9 4.0 - 11.0 10e9/L    RBC Count 3.98 3.8 - 5.2 10e12/L    Hemoglobin 11.7 11.7 - 15.7 g/dL    Hematocrit 38.0 35.0 - 47.0 %    MCV 96 78 - 100 fl    MCH 29.4 26.5 - 33.0 pg    MCHC 30.8 (L) 31.5 - 36.5 g/dL    RDW 15.5 (H) 10.0 - 15.0 %    Platelet Count 322 150 - 450 10e9/L    Diff Method Automated Method     % Neutrophils 39.7 %    % Lymphocytes 46.3 %    % Monocytes 6.9 %    % Eosinophils 5.5 %    % Basophils 1.2 %    % Immature Granulocytes 0.4 %    Absolute Neutrophil 2.0 1.6 - 8.3 10e9/L    Absolute Lymphocytes 2.3 0.8 - 5.3 10e9/L    Absolute Monocytes 0.3 0.0 - 1.3 10e9/L    Absolute Eosinophils 0.3 0.0 - 0.7 10e9/L    Absolute Basophils 0.1 0.0 - 0.2 10e9/L    Abs Immature Granulocytes 0.0 0 - 0.4 10e9/L   Comprehensive metabolic panel     Status: Abnormal   Result Value Ref Range    Sodium 144 133 - 144 mmol/L    Potassium 4.0 3.4 - 5.3 mmol/L    Chloride 112 (H) 94 - 109 mmol/L    Carbon Dioxide 30 20 - 32 mmol/L    Anion Gap 2 (L) 3 - 14 mmol/L    Glucose 74 70 - 99 mg/dL    Urea Nitrogen 8 7 - 30 mg/dL    Creatinine 0.63 0.52 - 1.04 mg/dL    GFR Estimate >90 >60 mL/min/[1.73_m2]    GFR Estimate If Black >90 >60 mL/min/[1.73_m2]    Calcium 8.2 (L) 8.5 - 10.1 mg/dL    Bilirubin Total 0.2 0.2 - 1.3 mg/dL    Albumin 3.1 (L) 3.4 - 5.0 g/dL    Protein Total 6.8 6.8 -  8.8 g/dL    Alkaline Phosphatase 81 40 - 150 U/L    ALT 31 0 - 50 U/L    AST 27 0 - 45 U/L   Magnesium     Status: None   Result Value Ref Range    Magnesium 1.6 1.6 - 2.3 mg/dL     Assessment and Plan:   Stage IV Adenocarcinoma of the rectum - Pt continues with irinotecan/Panitumumab every 2 weeks for MSI intact, KRAS wild type, PDL1+ cancer with metastasis to lungs, bones and lymph nodes of the pelvis.   5/2021 imaging is mostly stable and will continue with current plan. She is noting on some constipation at her problem today but is ready to continue with plan   She will return in 2 weeks for review prior to cycle 13 with labs prior to visit.   Imaging to be repeated in August.  Bone metastasis- Using Zometa- receiving every 3 months- next due in July. Confirms use of calcium plus vitamin D daily.Encouaraged exercise plan.  Nausea-pt using antiemetics day 2-3 for help  With nausea. Will renew Zofran/Compazine and lorazepam today per request.   Constipation-increased with c/o hemorrhoid- discussed use of SennaS to 2 tabs bid with Miralax 1-2 x daily if needed.May use up to 8 tabs of the senna S daily if needed.   Weight loss- Pt has had some weight loss but today is mostly stable. Uses protein supplements and needs to be reminded to use 1 daily for weight support though says energy is good.   Drug rash- noted first days after infusion to face - using hydrocortisone cream and moisturizing creams and resolves quickly     Peripheral neuropathy- from previous use of Oxaliplatin- stable to improving and not affecting gait.No current treatment.  The total time of this encounter amounted to 30 minutes. This time included face to face time spent with the patient, prep work, ordering/ reviewing tests, and performing post visit documentation.  Felipa Garner,Cnp

## 2021-06-15 NOTE — LETTER
"    6/15/2021         RE: Alannah Wynn  [address]  [place] [address]        Dear Colleague,    Thank you for referring your patient, Alannah Wynn, to the [facility]. Please see a copy of my visit note below.    Oncology Follow Up Visit: Mia 15, 2021     Oncologist: Dr Charline Velazquez  PCP: Earline Mehta    Diagnosis: Stage IV Adenocarcinoma of the rectum   Alannah Wynn is a 71 yo female who presented in 10/2019 with intermittent hematochezia and anorectal irritation since 2011, that have been managed as \"hemorrhoids\".  Diagnostic colonoscopy on 10/18/19 for rectal bleeding showed \"fungating and ulcerated non-obstructing mass was found in the distal rectum <1 cm from the anal verge. The mass was partially circumferential (involving one-half of the lumen circumference). The mass measured 4 cm in length and diameter measured 6 mm.  Pathology showed invasive moderately differentiated adenocarcinoma with intact mismatch repair protein expression.  Pelvic MRI 11/2019 found fungating polypoid mid/lower rectal mass with luminal narrowing as well as innumerable enlarged lymph nodes as described above with a conglomerate of suspicious lymph nodes abutting the anterior peritoneal reflection. Stage cT3d N2.   Further testing with PET scan proved pulmonarymetastasis and bony metastasis at L3.  Has KRAS - wild type  Treatment:   11/25/2019 Began treatment with FOLFOX/Avastin  11/25/2019 Began Zometa every 3 months  4/7/2020-7/2020 capecitabine x 4 cycles  12/23/2020 completed Xelox/Avastin x 8 cycles  1/13/2021  Began Irinotecan/ panitumumab every 2 weeks    Interval History: Ms. Wynn is seen alone in clinic for review of symptoms for start of cycle 12 of Irinotecan/ panitumumab. Pt shares she is feeling constipated since not having stool in 2 days - using senna x 2 daily some of the time and started Miralax yesterday. She also complains of hemorrhoid that bleeds some " "of the time with the constipation  But otherwise no blood in the stools. Occasional stomach cramping noted.States she is eating well but has had some nausea on days 2-3 and uses antiemetics- has had some weight loss as well.  She denies pain, weakness, loss of appetite or illness. She continues to stay active in the home.     Rest of comprehensive and complete ROS is reviewed and is negative.   Past Medical History:   Diagnosis Date     Gastroesophageal reflux disease      Current Outpatient Medications   Medication     Ascorbic Acid (VITAMIN C) 500 MG CAPS     B Complex Vitamins (B COMPLEX-B12 PO)     cholecalciferol (VITAMIN D3) 400 unit (10 mcg) TABS tablet     clindamycin (CLINDAMAX) 1 % external gel     docusate sodium (COLACE) 100 MG capsule     Fesoterodine Fumarate 8 MG TB24     hydrocortisone 2.5 % ointment     lidocaine-prilocaine (EMLA) 2.5-2.5 % external cream     loperamide (IMODIUM) 2 MG capsule     LORazepam (ATIVAN) 0.5 MG tablet     Lutein 20 MG CAPS     mirabegron (MYRBETRIQ) 50 MG 24 hr tablet     Multiple vitamin TABS     naloxone (NARCAN) 4 MG/0.1ML nasal spray     omeprazole (PRILOSEC) 20 MG DR capsule     ondansetron (ZOFRAN) 8 MG tablet     polyethylene glycol (MIRALAX) 17 GM/Dose powder     prochlorperazine (COMPAZINE) 10 MG tablet     senna-docusate (SENNA S) 8.6-50 MG tablet     solifenacin (VESICARE) 10 MG tablet     traMADol (ULTRAM) 50 MG tablet     urea (GORDONS UREA) 40 % external ointment     Current Facility-Administered Medications   Medication     lidocaine (XYLOCAINE) 5 % ointment     Allergies   Allergen Reactions     Pcn [Penicillins]      Sulfa Drugs      Physical Exam: /78 (BP Location: Left arm)   Pulse 90   Temp 97.5  F (36.4  C) (Oral)   Resp 14   Ht 1.575 m (5' 2.01\")   Wt 58.4 kg (128 lb 12.8 oz)   LMP 11/27/2001 (Approximate)   SpO2 98%   BMI 23.55 kg/m     Constitutional: moving well on own, cooperative and in no distress.   ENT: Eyes bright, No mouth " sores  Neck: Supple, No adenopathy.  Cardiac: Heart rate and rhythm is regular and strong without murmur  Respiratory: Breathing easy. Lung sounds clear to auscultation- no cough   Abdomen: Soft, non-tender at present with BS noted in all four quadrants at this time. No masses or organomegaly  MS: Muscle tone normal, extremities normal with no edema. Able to get to exam table without problems.  Skin: No suspicious lesions or rashes- hair is very thin- wearing wig.  Neuro: Sensory grossly WNL, gait normal.   Lymph: Normal ant/post cervical, axillary, supraclavicular nodes  Psych: Mentation appears normal and affect normal/bright and with good conversation. Need to repeat directions at times to assess understanding of directions.     Laboratory Results:   Results for orders placed or performed in visit on 06/15/21   CBC with platelets differential     Status: Abnormal   Result Value Ref Range    WBC 4.9 4.0 - 11.0 10e9/L    RBC Count 3.98 3.8 - 5.2 10e12/L    Hemoglobin 11.7 11.7 - 15.7 g/dL    Hematocrit 38.0 35.0 - 47.0 %    MCV 96 78 - 100 fl    MCH 29.4 26.5 - 33.0 pg    MCHC 30.8 (L) 31.5 - 36.5 g/dL    RDW 15.5 (H) 10.0 - 15.0 %    Platelet Count 322 150 - 450 10e9/L    Diff Method Automated Method     % Neutrophils 39.7 %    % Lymphocytes 46.3 %    % Monocytes 6.9 %    % Eosinophils 5.5 %    % Basophils 1.2 %    % Immature Granulocytes 0.4 %    Absolute Neutrophil 2.0 1.6 - 8.3 10e9/L    Absolute Lymphocytes 2.3 0.8 - 5.3 10e9/L    Absolute Monocytes 0.3 0.0 - 1.3 10e9/L    Absolute Eosinophils 0.3 0.0 - 0.7 10e9/L    Absolute Basophils 0.1 0.0 - 0.2 10e9/L    Abs Immature Granulocytes 0.0 0 - 0.4 10e9/L   Comprehensive metabolic panel     Status: Abnormal   Result Value Ref Range    Sodium 144 133 - 144 mmol/L    Potassium 4.0 3.4 - 5.3 mmol/L    Chloride 112 (H) 94 - 109 mmol/L    Carbon Dioxide 30 20 - 32 mmol/L    Anion Gap 2 (L) 3 - 14 mmol/L    Glucose 74 70 - 99 mg/dL    Urea Nitrogen 8 7 - 30 mg/dL     Creatinine 0.63 0.52 - 1.04 mg/dL    GFR Estimate >90 >60 mL/min/[1.73_m2]    GFR Estimate If Black >90 >60 mL/min/[1.73_m2]    Calcium 8.2 (L) 8.5 - 10.1 mg/dL    Bilirubin Total 0.2 0.2 - 1.3 mg/dL    Albumin 3.1 (L) 3.4 - 5.0 g/dL    Protein Total 6.8 6.8 - 8.8 g/dL    Alkaline Phosphatase 81 40 - 150 U/L    ALT 31 0 - 50 U/L    AST 27 0 - 45 U/L   Magnesium     Status: None   Result Value Ref Range    Magnesium 1.6 1.6 - 2.3 mg/dL     Assessment and Plan:   Stage IV Adenocarcinoma of the rectum - Pt continues with irinotecan/Panitumumab every 2 weeks for MSI intact, KRAS wild type, PDL1+ cancer with metastasis to lungs, bones and lymph nodes of the pelvis.   5/2021 imaging is mostly stable and will continue with current plan. She is noting on some constipation at her problem today but is ready to continue with plan   She will return in 2 weeks for review prior to cycle 13 with labs prior to visit.   Imaging to be repeated in August.  Bone metastasis- Using Zometa- receiving every 3 months- next due in July. Confirms use of calcium plus vitamin D daily.Encouaraged exercise plan.  Nausea-pt using antiemetics day 2-3 for help  With nausea. Will renew Zofran/Compazine and lorazepam today per request.   Constipation-increased with c/o hemorrhoid- discussed use of SennaS to 2 tabs bid with Miralax 1-2 x daily if needed.May use up to 8 tabs of the senna S daily if needed.   Weight loss- Pt has had some weight loss but today is mostly stable. Uses protein supplements and needs to be reminded to use 1 daily for weight support though says energy is good.   Drug rash- noted first days after infusion to face - using hydrocortisone cream and moisturizing creams and resolves quickly     Peripheral neuropathy- from previous use of Oxaliplatin- stable to improving and not affecting gait.No current treatment.  The total time of this encounter amounted to 30 minutes. This time included face to face time spent with the patient,  prep work, ordering/ reviewing tests, and performing post visit documentation.  Felipa Garner Cnp                Again, thank you for allowing me to participate in the care of your patient.        Sincerely,        Felipa Garner, LETICIA, APRN CNP

## 2021-06-15 NOTE — PROGRESS NOTES
SPIRITUAL HEALTH SERVICES  SPIRITUAL ASSESSMENT Progress Note  [facility] Oncology [facility]       REFERRAL SOURCE: Follow up visit.     Visited with Alannah Wynn  in the [facility] center  for ongoing emotional/spiritual support. Patient is most concerned about constipation today, otherwise she is doing OK.  Has been able to continue working in her garden. Asked that I pray for her but she is too tired today for prayer.     PLAN: Patient  knows that she can request a Spiritual Health encounter as needed.     Neelam Harris  Staff   [phone]

## 2021-06-27 NOTE — PROGRESS NOTES
Chief Complaint:    Chief Complaint   Patient presents with     Urgent Care     Toenail     c/o toe nail swollen for 1 week         Medical Decision Making:    Vital signs reviewed by Sunny Orellana PA-C  /74   Pulse 93   Temp 97.8  F (36.6  C) (Tympanic)   Wt 58.1 kg (128 lb)   LMP 11/27/2001 (Approximate)   SpO2 100%   BMI 23.41 kg/m      Differential Diagnosis:  Ingrown toe nail, cellulitis     ASSESSMENT:     1. Ingrown toenail of both feet           PLAN:     Rx for Doxycycline for toe infection.  Start foot soaks 2-3 times per day.  Patient has had ingrown toe nails in the past.  Bilateral great toes need nail resection.  Patient declined removal in clinic today.  Patient will follow up with podiatry for removal and possible phenol treatment.   Patient instructed to follow up with PCP in 1 week if symptoms are not improving.  Sooner if symptoms worsen.  Worrisome symptoms discussed with instructions to go to the ED.  Patient verbalized understanding and agreed with this plan.    Labs:     No results found for any visits on 06/27/21.    Current Meds:    Current Outpatient Medications:      Ascorbic Acid (VITAMIN C) 500 MG CAPS, Take 1,000 mg by mouth, Disp: , Rfl:      B Complex Vitamins (B COMPLEX-B12 PO), , Disp: , Rfl:      cholecalciferol (VITAMIN D3) 400 unit (10 mcg) TABS tablet, Take by mouth daily, Disp: , Rfl:      clindamycin (CLINDAMAX) 1 % external gel, Apply topically 2 times daily, Disp: 100 g, Rfl: 3     docusate sodium (COLACE) 100 MG capsule, Take 1 capsule (100 mg) by mouth 2 times daily as needed for constipation, Disp: 60 capsule, Rfl: 0     doxycycline hyclate (VIBRA-TABS) 100 MG tablet, Take 1 tablet (100 mg) by mouth 2 times daily for 7 days, Disp: 14 tablet, Rfl: 0     Fesoterodine Fumarate 8 MG TB24, Take 1 tablet (8 mg) by mouth daily, Disp: 30 tablet, Rfl: 11     hydrocortisone 2.5 % ointment, Apply topically 2 times daily, Disp: 100 g, Rfl: 3     lidocaine-prilocaine  (EMLA) 2.5-2.5 % external cream, Apply topically as needed for moderate pain Apply EMLA cream to PORT site on chest 30-60 minutes prior to lab appointment and cover with a cellophane wrap such as Saran wrap and tape in place., Disp: 30 g, Rfl: 3     loperamide (IMODIUM) 2 MG capsule, 2 caps at 1st sign of diarrhea & 1 cap every 2hrs until 12hrs diarrhea free. During night, 2 caps at bedtime & 2 caps every 4hrs until AM, Disp: 30 capsule, Rfl: 0     LORazepam (ATIVAN) 0.5 MG tablet, Take 1 tablet (0.5 mg) by mouth every 4 hours as needed (Anxiety, Nausea/Vomiting or Sleep), Disp: 30 tablet, Rfl: 2     Lutein 20 MG CAPS, , Disp: , Rfl:      mirabegron (MYRBETRIQ) 50 MG 24 hr tablet, Take 1 tablet (50 mg) by mouth daily, Disp: 90 tablet, Rfl: 1     Multiple vitamin TABS, , Disp: , Rfl:      naloxone (NARCAN) 4 MG/0.1ML nasal spray, Spray 1 spray (4 mg) into one nostril alternating nostrils once as needed for opioid reversal every 2-3 minutes until assistance arrives, Disp: 0.2 mL, Rfl: 0     omeprazole (PRILOSEC) 20 MG DR capsule, Take 20 mg by mouth, Disp: , Rfl:      ondansetron (ZOFRAN) 8 MG tablet, Take 1 tablet (8 mg) by mouth every 8 hours as needed (Nausea/Vomiting), Disp: 30 tablet, Rfl: 2     polyethylene glycol (MIRALAX) 17 GM/Dose powder, Take 17 g (1 capful) by mouth 2 times daily, Disp: 578 g, Rfl: 2     prochlorperazine (COMPAZINE) 10 MG tablet, Take 1 tablet (10 mg) by mouth every 6 hours as needed (Nausea/Vomiting), Disp: 30 tablet, Rfl: 2     senna-docusate (SENNA S) 8.6-50 MG tablet, Take 1-2 tablets by mouth 2 times daily, Disp: 120 tablet, Rfl: 1     solifenacin (VESICARE) 10 MG tablet, Take 1 tablet (10 mg) by mouth daily, Disp: 30 tablet, Rfl: 3     traMADol (ULTRAM) 50 MG tablet, Take 1 tablet (50 mg) by mouth every 6 hours as needed for severe pain, Disp: 30 tablet, Rfl: 0     urea (GORDONS UREA) 40 % external ointment, Apply on the palms and toes 4 times a day for hand foot syndrome, Disp: 250  g, Rfl: 1    Current Facility-Administered Medications:      lidocaine (XYLOCAINE) 5 % ointment, , Topical, Once, Samson Prince MD    Allergies:  Allergies   Allergen Reactions     Pcn [Penicillins]      Sulfa Drugs        SUBJECTIVE    HPI: Alannah Wynn is an 70 year old female who presents for evaluation and treatment of L great toe infection. Patient has been having worsening redness, swelling and pain of the L great toe.  She denies any numbness, tingling, or dysfunction of the toe.    ROS:      Review of Systems   Constitutional: Negative for chills and fever.   HENT: Negative for congestion, ear pain, rhinorrhea and sore throat.    Eyes: Negative.    Respiratory: Negative.  Negative for cough and shortness of breath.    Cardiovascular: Negative.  Negative for chest pain and palpitations.   Gastrointestinal: Negative for diarrhea, nausea and vomiting.   Endocrine: Negative.    Genitourinary: Negative.    Musculoskeletal: Negative.  Negative for arthralgias, back pain, joint swelling, myalgias, neck pain and neck stiffness.   Skin: Positive for wound. Negative for rash.   Allergic/Immunologic: Negative.  Negative for immunocompromised state.   Neurological: Negative for dizziness, weakness, light-headedness and headaches.        Family History   Family History   Problem Relation Age of Onset     Hypertension Father        Social History  Social History     Socioeconomic History     Marital status:      Spouse name: Not on file     Number of children: Not on file     Years of education: Not on file     Highest education level: Not on file   Occupational History     Not on file   Social Needs     Financial resource strain: Not on file     Food insecurity     Worry: Not on file     Inability: Not on file     Transportation needs     Medical: Not on file     Non-medical: Not on file   Tobacco Use     Smoking status: Never Smoker     Smokeless tobacco: Never Used   Substance and Sexual Activity      Alcohol use: No     Alcohol/week: 0.0 standard drinks     Drug use: No     Sexual activity: Never   Lifestyle     Physical activity     Days per week: Not on file     Minutes per session: Not on file     Stress: Not on file   Relationships     Social connections     Talks on phone: Not on file     Gets together: Not on file     Attends Gnosticist service: Not on file     Active member of club or organization: Not on file     Attends meetings of clubs or organizations: Not on file     Relationship status: Not on file     Intimate partner violence     Fear of current or ex partner: Not on file     Emotionally abused: Not on file     Physically abused: Not on file     Forced sexual activity: Not on file   Other Topics Concern     Parent/sibling w/ CABG, MI or angioplasty before 65F 55M? No   Social History Narrative     Not on file        Surgical History:  Past Surgical History:   Procedure Laterality Date     COLONOSCOPY  10/18/2019    1 polyp     COLONOSCOPY N/A 10/18/2019    Procedure: Colonoscopy, With Polypectomy And Biopsy;  Surgeon: Duane, William Charles, MD;  Location: [facility] OR     COLONOSCOPY WITH CO2 INSUFFLATION N/A 10/18/2019    Procedure: COLONOSCOPY, WITH CO2 INSUFFLATION;  Surgeon: Duane, William Charles, MD;  Location: [facility] OR     IR CHEST PORT PLACEMENT > 5 YRS OF AGE  11/21/2019     IR CHEST PORT REPLACEMENT SAME SITE RIGHT  10/20/2020        Problem List:  Patient Active Problem List   Diagnosis     External hemorrhoids     Non morbid obesity due to excess calories     Hepatitis B immune     Screening for cervical cancer     CARDIOVASCULAR SCREENING; LDL GOAL LESS THAN 160     Gastroesophageal reflux disease     Overactive bladder     Nevus of left lower leg- undetermined behavior     Metastasis from rectal cancer (H)     Bone metastasis (H)     Nausea     Chemotherapy-induced neutropenia (H)           OBJECTIVE:     Vital signs noted and reviewed by Sunny Orellana PA-C  /74   Pulse 93    Temp 97.8  F (36.6  C) (Tympanic)   Wt 58.1 kg (128 lb)   LMP 11/27/2001 (Approximate)   SpO2 100%   BMI 23.41 kg/m       PEFR:    Physical Exam  Vitals signs and nursing note reviewed.   Constitutional:       General: She is not in acute distress.     Appearance: She is well-developed. She is not ill-appearing, toxic-appearing or diaphoretic.   HENT:      Head: Normocephalic and atraumatic.      Right Ear: Tympanic membrane and external ear normal. No drainage, swelling or tenderness. Tympanic membrane is not perforated, erythematous, retracted or bulging.      Left Ear: Tympanic membrane and external ear normal. No drainage, swelling or tenderness. Tympanic membrane is not perforated, erythematous, retracted or bulging.      Nose: No mucosal edema, congestion or rhinorrhea.      Right Sinus: No maxillary sinus tenderness or frontal sinus tenderness.      Left Sinus: No maxillary sinus tenderness or frontal sinus tenderness.      Mouth/Throat:      Pharynx: No pharyngeal swelling, oropharyngeal exudate, posterior oropharyngeal erythema or uvula swelling.      Tonsils: No tonsillar abscesses.   Eyes:      Pupils: Pupils are equal, round, and reactive to light.   Neck:      Musculoskeletal: Full passive range of motion without pain, normal range of motion and neck supple.      Trachea: Trachea normal.   Cardiovascular:      Rate and Rhythm: Normal rate and regular rhythm.      Heart sounds: Normal heart sounds, S1 normal and S2 normal. No murmur. No friction rub. No gallop.    Pulmonary:      Effort: Pulmonary effort is normal. No respiratory distress.      Breath sounds: Normal breath sounds. No decreased breath sounds, wheezing, rhonchi or rales.   Abdominal:      General: Bowel sounds are normal. There is no distension.      Palpations: Abdomen is soft. Abdomen is not rigid. There is no mass.      Tenderness: There is no abdominal tenderness. There is no guarding or rebound.   Musculoskeletal:      Left foot:  Normal range of motion and normal capillary refill. Tenderness and swelling present. No bony tenderness, crepitus or deformity.      Comments: In grown nail on bilateral great toes.  L toe is erythematous and swollen with some clear drainage.   Lymphadenopathy:      Cervical: No cervical adenopathy.   Skin:     General: Skin is warm and dry.   Neurological:      Mental Status: She is alert and oriented to person, place, and time.      Cranial Nerves: No cranial nerve deficit.      Deep Tendon Reflexes: Reflexes are normal and symmetric.   Psychiatric:         Behavior: Behavior normal. Behavior is cooperative.         Thought Content: Thought content normal.         Judgment: Judgment normal.             Sunny Orellana PA-C  6/27/2021, 12:31 PM

## 2021-06-27 NOTE — PATIENT INSTRUCTIONS
Talk to the podiatrist about treatment with Phenol.      Patient Education     Infected Ingrown Toenail (Antibiotics, No Excision)   An ingrown toenail occurs when the nail grows sideways into the skin alongside the nail. This can cause pain. It can also lead to an infection with redness, swelling, and sometimes drainage.   The most common cause of an ingrown toenail is trimming your nails wrong. Most people trim the nails too close to the skin and try to round the nail too tightly around the shape of the toe. When you do this, the nail can grow into the skin of your toe. It's safer to trim the nail ending in a straight line rather than a curve.   Other causes include injury or wearing shoes that are too short or tight. This can cause the same problem that happens when trimming your nails. Your genetics can also make this more likely to happen.   The following are the most common symptoms of an ingrown toenail:     Pain    Redness    Swelling    Drainage  If the infection is mild, you may be able to take care of it at home with the following measures:     Frequent warm water soaks    Keeping it clean    Wearing loose, comfortable shoes or sandals  Another method involves using a small piece of cotton or waxed dental floss to gently lift up the corner of the problem nail. Change the cotton or floss frequently, especially if it gets dirty.   If your infection is mild, and the above methods aren t working, or if the infection gets worse, see your healthcare provider. Signs of worsening infection include:     Swelling    Redness    Pus drainage    Increased pain  In some cases, you may need antibiotics along with warm soaks. If after 2 to 3 days of antibiotics the toenail doesn't get better or gets worse, part of the nail may need to be removed to drain the infection. With treatment, it can take 1 to 2 weeks to clear up completely.   Home care  Wound care  For the next 3 days, soak and clean your toe in warm water a few  times a day.    Twice a day for the first 3 days, clean and soak the toe as follows:  1. Soak your foot in a tub of warm water for 5 minutes. Or, hold your toe under a faucet of warm running water for 5 minute  2. Clean any remaining crust away with soap and water using a cotton swab.  3. Put a small amount of antibiotic ointment on the infected area.    Change the dressing or bandage every time you soak or clean it, or whenever it becomes wet or dirty.    If you were prescribed antibiotics, take them as directed until they are all gone.    Wear comfortable shoes with a lot of toe room, or open-toe sandals, while your toe is healing.  Medicines    You can take over-the-counter medicine for pain, unless you were given a different pain medicine to use. Note: Talk with your provider before using these medicines if you have chronic liver or kidney disease, ever had a stomach ulcer or digestive bleeding, or are taking blood-thinner medicines.    If you were given antibiotics, take them until they are used up or your provider tells you to stop, even if the wound looks better. This makes sure that the infection clears up.  Prevention  To prevent ingrown toenails:    Wear shoes that fit well. Don't wear shoes that pinch the toes together.    When you trim your toenails, don't cut them too short. Cut straight across at the top and don t round the edges.    Don t use a sharp object to clean under your nail since this might cause an infection.    If the toenail starts to grow into the skin again, put a small piece of waxed dental floss or cotton under that side of the nail to help it grow out straight.  Follow-up care  Follow up with your healthcare provider, or as advised. If the antibiotic doesn't work, or if the condition happens again, you may need to have part of the nail removed.   When to seek medical advice  Call your healthcare provider right away if any of the following occur:    Increasing redness, pain, or swelling  of the toe    Red streaks in the skin leading away from the wound    Pus or fluid drainage    Fever of 100.4 F (38 C) or higher, or as directed by your provider  Jerardo last reviewed this educational content on 8/1/2019 2000-2021 [employer]. All rights reserved. This information is not intended as a substitute for professional medical care. Always follow your healthcare professional's instructions.

## 2021-06-30 NOTE — NURSING NOTE
"Oncology Rooming Note    June 30, 2021 9:27 AM   Alannah Wynn is a 70 year old female who presents for:    Chief Complaint   Patient presents with     Oncology Clinic Visit     Follow up     Initial Vitals: /70 (BP Location: Left arm, Patient Position: Sitting, Cuff Size: Adult Regular)   Pulse 76   Temp 97.3  F (36.3  C) (Oral)   Resp 14   Ht 1.575 m (5' 2.01\")   Wt 58.7 kg (129 lb 6.4 oz)   LMP 11/27/2001 (Approximate)   SpO2 100%   BMI 23.66 kg/m   Estimated body mass index is 23.66 kg/m  as calculated from the following:    Height as of this encounter: 1.575 m (5' 2.01\").    Weight as of this encounter: 58.7 kg (129 lb 6.4 oz). Body surface area is 1.6 meters squared.  Data Unavailable Comment: 7   Patient's last menstrual period was 11/27/2001 (approximate).  Allergies reviewed: Yes  Medications reviewed: Yes    Medications: MEDICATION REFILLS NEEDED TODAY. Provider was notified.  Pharmacy name entered into [employer]:    [id number] - [place], [place] - [address] AT [address] [facility] - [place], [place] - [address], [id number]  [facility] - [place], [place] - [address]     Clinical concerns: Patient seen at  urgent care on 06/27/2021 diagnosed with ingrown toenail, both big toes, patient states her pain is 7/10, toes are still swollen, infected, put on Doxycycline but it doesn't look like it is working, patient will be seeing a podiatrist after the swelling goes down,  Dr. Haji is on vacation for 2 weeks so patient states she would like a refill on the antibiotic to get her through for 2 weeks.  Dr. Velazquze was notified.     Constipation?  Due to chemo, states sometimes a small amount of liquid comes out of her rectum, no blood.    Questions regarding Chemo medications        Mone Singh, MOE            "

## 2021-06-30 NOTE — PROGRESS NOTES
"ONCOLOGY FOLLOW UP NOTE    6/30/21     Patient was being followed by Dr. Tavarez and saw her on 11/13/2019.  Now she is transferring her care to me.  I have reviewed her previous records and have copied and updated from prior notes.      DIAGNOSIS:    10/2019 dx adenoCa of rectum,  MMR intact- NGS Negative for KRAS, NRAS and BRAF. PD L1 25%.  Baseline CEA 10.  Stage IV disease with pulmonary and bony mets      HISTORY OF ONCOLOGY ILLNESS:    She has had intermittent hematochezia and anorectal irritation since 2011, that have been managed as \"hemorrhoids\". These symptoms became persistent and progressive since early thi year. Her anorectal pain is managed with Tylenol. Denies fevers, chills, or unintentional weight loss. Does have intermittent urinary and fecal urgency but no incontinence per se.    Diagnostic colonoscopy on 10/18/19 for rectal bleeding showed \"fungating and ulcerated non-obstructing mass was found in the distal rectum <1 cm from the anal verge. The mass was partially circumferential (involving one-half of the lumen circumference). The mass measured four cm in length. In addition, its diameter measured six mm.  Pathology showed invasive moderately differentiated adenocarcinoma with intact mismatch repair protein expression, PD L1 25% and NGS panel showed NO MUTATIONS in KRAS, NRAS and BRAF.      Pelvic MRI 11/2019 found fungating polypoid mid/lower rectal mass with luminal narrowing as well as innumerable enlarged lymph nodes as described above with a conglomerate of suspicious lymph nodes abutting the anterior peritoneal reflection. Stage cT3d N2.     PET 11/2019 found   1. Hypermetabolic mass in the distal rectum, SUV 14 corresponding to the patient's known rectal cancer.  2. Multiple hypermetabolic pelvic lymph nodes, compatible metastatic disease, SUV 5-6.  3. FDG avid pulmonary nodule measuring 7 mm in the left lower lobe, concerning for metastatic disease. Additional FDG avid pulmonary nodules " suspicious for metastatic disease, SUV around 4.  4. Osteoblastic metastatic lesion in the L3 vertebral body, SUV 6.8    She started palliative FOLFOX/Avastin on 11/25/2019.  Cycle #2 given on 12/9/2019.  Cycle #3 scheduled for 12/24/2019.  C#6 on 2/10/2020.    Repeat CT CAP on 2/21/2020 shows good response to therapy with improvement in the rectal mass, mesorectal and superior rectal nodularity as well as resolution of right internal iliac lymphadenopathy.  There is slight improvement in the dominant posterior left lower lobe lung nodule and several of the lung nodules are is stable.  There is increase in size of the sclerotic lesion of the left aspect of L3 vertebral body without pathological fracture and this is likely due to treatment effect.    CEA on 2/10/2020 was down to 3.3.    Cycle #7 FOLFOX/Avastin 2/25/2020.  C#8 FOLFOX/Avastin 3/9/2020  Cycle#9 5-FU/Avastin 3/24/2020-stopped oxaliplatin due to cumulative fatigue and issues with nausea.    I then discussed with her, her  as well as her son Gamal about possibly switching to single agent Xeloda going forward so as to limit her visits to the clinic and therefore exposure and risk from coronavirus.  We discussed the pros and cons of this approach and they were all willing to try this.     She started Single agent Xeloda on 4/7/2020.  Cycle #3 started on 5/19/2020.    Because of bone metastatic disease, she was started on Zometa on 11/25/2019. We are giving it every 3 months.        CT chest abdomen and pelvis on 7/20/2020 showed mild progression in the lung nodules.  Perirectal nodularity has mildly improved.    Last CEA on 7/22/2020 was 4.      7/27/2020- we decided on switching to XELOX/Avastin.     8/18/2020 -cycle #2 XELOX/Avastin.  CEA on 8/11/2020 was 4.8.  9/8/2020 - C#3 XELOX/Avastin. CEA on 9/2/2020 was 5.1  9/30/2020.  Cycle #4 XELOX/Avastin.  Oxaliplatin dose reduced further to 85 mg per metered square because of worsening  neuropathy.    Repeat CT chest abdomen and pelvis on 10/15/2020 overall shows stable disease with 1 to 2 mm increase in some of the lung nodules and about 2 mm decrease in the lymph node in the mesorectum.    CEA stable at 5.3 on 10/21/2020.    10/22/2020.  Cycle #5 XELOX/Avastin.  Oxaliplatin dose 85 mg/m .  11/11/2020 C#6 XELOX/Avastin. Oxaliplatin dose 85 mg/m .  12/2/2020 C#7 XELOX/Avastin. Oxaliplatin dose 85 mg/m .  12/23/2020 C#8 XELOX/Avastin. Oxaliplatin dose 85 mg/m .    1/11/2021-CT chest abdomen and pelvis shows progressive disease with progression in the lung nodules.  There was a new ill-defined soft tissue density material at the upper mid abdomen adjacent to the hemidiaphragm rangel, celiac access, and most proximal abdominal aorta and this is also concerning for malignancy.  There is a stable sclerosis of L3 and stable left ischial tuberosity tiny sclerosis.  CEA on 12/23/2020 was 9.2.      1/13/2021.  Switch to irinotecan/Panitumumab.  1/27/2021.  Cycle #2 irinotecan/Panitumumab.  2/10/2021.  Cycle #3 irinotecan/Panitumumab.  2/24/2021.  Cycle #4 irinotecan/Panitumumab  3/10/2021.  Cycle #5 irinotecan/Panitumumab.  3/24/2021   Cycle #6 irinotecan/Panitumumab.  4/7/2021   Cycle #7 irinotecan/Panitumumab  4/21/2021 Cycle #8 irinotecan/Panitumumab  5/5/2021- Cycle #9 irinotecan/Panitumumab    5/14/2021-CT chest abdomen and pelvis shows pretty stable findings with only slight increase in one of the lung nodules and all of the lung nodules remaining stable.  Stable L3 sclerotic lesion.    Bone scan shows increased uptake to the L3 sclerotic lesion.  CEA on 5/5/2021 was 6.4 and stable.    5/19/2021 -cycle #10 irinotecan/Panitumumab  6/15/2021-cycle #12 irinotecan/Panitumumab  6/30/2021-cycle #13 irinotecan/Panitumumab    INTERVAL HISTORY:     This is a video visit.  She had a port removed on 5/20/2020 for possible port site infection.   She had ingrown toenail on both great toes.  She went to urgent care  on 6/27/2021 and I reviewed the records.  She was given doxycycline.  She was told to follow-up with podiatrist.  She thinks is a little better.  There is some swelling and redness but no fevers.  She also feels pain in the area.  Otherwise tolerating chemotherapy well with some fatigue for the first few days.  She had some nausea for the first few days.  She gets constipated and she takes senna and MiraLAX and then she has diarrhea if she continues to take MiraLAX.  She has noticed occasional leaking of the stool.  No bleeding.  No shortness of breath.  No other swellings.  She has mild neuropathy in the left hand.  Rash from Panitumumab was mild and involving the face and lasted a couple of days.  She applied hydrocortisone and it went away.      ECOG 1     ROS:  Rest of the comprehensive review of the system was essentially unremarkable.    I reviewed with her history in epic as below.    PAST MEDICAL HISTORY  Reflux, hemorrhoid, hepatitis B?  Overactive bladder  Active Ambulatory Problems     Diagnosis Date Noted     External hemorrhoids 04/29/2016     Non morbid obesity due to excess calories 04/29/2016     Hepatitis B immune 04/29/2016     Screening for cervical cancer 04/29/2016     CARDIOVASCULAR SCREENING; LDL GOAL LESS THAN 160 04/29/2016     Gastroesophageal reflux disease 04/29/2016     Overactive bladder 07/21/2017     Nevus of left lower leg- undetermined behavior 12/27/2018     Metastasis from rectal cancer (H) 11/13/2019     Bone metastasis (H) 11/13/2019     Nausea 11/27/2019     Chemotherapy-induced neutropenia (H) 12/24/2019     Resolved Ambulatory Problems     Diagnosis Date Noted     Urgency of urination 04/29/2016     Need for vaccination against Streptococcus pneumoniae 04/29/2016     Morbid obesity (H) 07/21/2017     Obesity (BMI 35.0-39.9) with comorbidity (H) 12/27/2018     No Additional Past Medical History       MEDICATIONS:  Current Outpatient Medications   Medication     Ascorbic Acid  (VITAMIN C) 500 MG CAPS     B Complex Vitamins (B COMPLEX-B12 PO)     cholecalciferol (VITAMIN D3) 400 unit (10 mcg) TABS tablet     clindamycin (CLINDAMAX) 1 % external gel     docusate sodium (COLACE) 100 MG capsule     doxycycline hyclate (VIBRA-TABS) 100 MG tablet     Fesoterodine Fumarate 8 MG TB24     hydrocortisone 2.5 % ointment     lidocaine-prilocaine (EMLA) 2.5-2.5 % external cream     loperamide (IMODIUM) 2 MG capsule     LORazepam (ATIVAN) 0.5 MG tablet     Lutein 20 MG CAPS     mirabegron (MYRBETRIQ) 50 MG 24 hr tablet     Multiple vitamin TABS     naloxone (NARCAN) 4 MG/0.1ML nasal spray     omeprazole (PRILOSEC) 20 MG DR capsule     ondansetron (ZOFRAN) 8 MG tablet     polyethylene glycol (MIRALAX) 17 GM/Dose powder     prochlorperazine (COMPAZINE) 10 MG tablet     senna-docusate (SENNA S) 8.6-50 MG tablet     solifenacin (VESICARE) 10 MG tablet     traMADol (ULTRAM) 50 MG tablet     urea (GORDONS UREA) 40 % external ointment     Current Facility-Administered Medications   Medication     lidocaine (XYLOCAINE) 5 % ointment     ALLERGY:     Allergies   Allergen Reactions     Pcn [Penicillins]      Sulfa Drugs             SOCIAL HISTORY:  She is a [place] immigrants to [place].  Denies smoking denies alcohol abuse.  2 of her sons are doctors.    FAMILY HISTORY:   Denies any family history of cancer.       PHYSICAL EXAMINATION:       LMP 11/27/2001 (Approximate)      Wt Readings from Last 4 Encounters:   06/27/21 58.1 kg (128 lb)   06/15/21 58.4 kg (128 lb 12.8 oz)   06/02/21 59 kg (130 lb)   05/19/21 60.8 kg (134 lb 1.6 oz)       Constitutional.  Looks well and in no apparent distress.   Eyes.  Without eye redness or apparent jaundice.   Respiratory.  Non labored breathing. Speaking in full sentences.    Skin.  No concerning skin rashes on the skin visualized.   Neurological.  Is alert and oriented.  Psychiatric.  Mood and affect seem appropriate.      The rest of a comprehensive physical examination  is deferred due to Public Health Emergency video visit restrictions.      LABS/IMAGING/PATHOLOGY:    Reviewed  CBC unremarkable except hemoglobin 11.5  CMP remarkable for magnesium 1.1 albumin 3.  Last CEA was 7.2 on 6/15/2021    CT chest abdomen and pelvis on 5/14/2021 shows pretty stable findings with stable lung nodules.  There is 1 nodule in the posterior left lower lobe which now measures 1.1 x 1 cm while previously it was 1.0 x 0.9 cm.  All other nodules seem very stable.  There is a stable size of distal rectal wall thickening and perirectal nodularity and/or lymph nodes.  There is a stable to slightly decreased soft tissue density at the diaphragmatic rangel, celiac axis, and proximal abdominal aorta.  Unchanged sclerotic lesion in the L3 vertebral body and the sternum.    Bone scan on 5/14/2021 showed increased uptake to the L3 vertebral body corresponding to the sclerotic lesion seen on the CT scan.      1/11/2021-CT chest abdomen and pelvis shows progressive disease with progression in the lung nodules.  There was a new ill-defined soft tissue density material at the upper mid abdomen adjacent to the hemidiaphragm rangel, celiac access, and most proximal abdominal aorta and this is also concerning for malignancy.  This measures approximately 1.7 x 1.8 cm.  There is a stable sclerosis of L3 and stable left ischial tuberosity tiny sclerosis.        ASSESSMENT AND PLAN:      Metastatic rectal adenocarcinoma, MSI intact, K-aimee wild type, PDL 1 + 25% with metastasis to the lungs, bones and pelvic lymph nodes.      She was on palliative XELOX/Avastin but CT scan from 1/11/2021 shows progressive disease with progression in the lung nodules and there is also a soft tissue density in the upper mid abdomen which could be new lymphadenopathy versus soft tissue deposit concerning for malignancy.  CEA on 12/23/2020 was also rising and it was 9.2.  Overall this is consistent with progression of the disease.      Because of  slow progression I switched to irinotecan/Panitumumab on 1/13/2021.    Overall chemotherapy is being tolerated very well and after 9 cycles, repeat CT chest abdomen pelvis and bone scan on 5/14/2021 showed pretty stable findings.   CEA has been stable as well.   We decided on continuing the same chemotherapy and she has received 12 cycles up until now.  Overall she is tolerating treatments well.  Last CEA was a stable at 7.2.    Nausea.  Continue antiemetics as needed.    Constipation.  I advised her to take senna/MiraLAX for the constipation but as soon as the constipation gets resolved then she should stop taking MiraLAX and continue with senna only.    Ingrown toenails.  She is on doxycycline which has started to help.  She should follow with podiatrist.    Panitumumab related rash.  Continue hydrocortisone/clindamycin ointment as needed and use doxycycline only if the rash gets worse.  This time it was mild.    Hypomagnesemia.  Start magnesium oxide 400 mg twice a day.    Anemia.  She has mild anemia from chemotherapy.  Continue to observe.      Bone metastasis.  Started Zometa on 11/25/2019.  She last received on 1/27/2021.  She last received Zometa on 4/21/2021.  Bone scan/CT scan seems stable.  She will get Zometa every 3 months and continue vitamin D and calcium.  Repeat bone scan in August.       We did not address the following today.   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  She now only notices mild neuropathy in the left hand but otherwise overall this has significantly improved.  Continue to monitor.     Port-A-Cath.  It was removed on 5/20/2021.    Discussion regarding health care directive  We discussed that it is important that she completes health care directive which would help in making sure that her wishes are followed about her treatment care in case she is not able to make a decision for herself.  We gave her information regarding that.    RTC in 2 weeks    All questions were answered to her  satisfaction.  She is agreeable and comfortable with the plan.    Charline Velazquez MD    Video start time. 9:36 AM  Video stop time. 9:51 AM

## 2021-06-30 NOTE — PROGRESS NOTES
Infusion Nursing Note:  Alannah Wynn presents today for Vectibix, Irinotecan + IV magnesium.    Patient seen by provider today: Yes: Video Visit with Dr Velazquez   present during visit today: Not Applicable.    Note: Patient stating that both of her great toes are infected due to an ingrown toenails.  Upon assessment today, both toes noted to be swollen and irritated in color next to the toe nail. Patient rating her pain 5-6 in both feet and walking extra slow due to swelling.  Additional antibiotics ordered from Dr. Velazquez for 1 more week and she will see Dr. Kong.        Intravenous Access:  Peripheral IV placed.    Treatment Conditions:  Lab Results   Component Value Date    HGB 11.5 06/30/2021     Lab Results   Component Value Date    WBC 5.6 06/30/2021      Lab Results   Component Value Date    ANEU 2.4 06/30/2021     Lab Results   Component Value Date     06/30/2021      Lab Results   Component Value Date     06/30/2021                   Lab Results   Component Value Date    POTASSIUM 4.3 06/30/2021           Lab Results   Component Value Date    MAG 1.1 06/30/2021            Lab Results   Component Value Date    CR 0.53 06/30/2021                   Lab Results   Component Value Date    LAUREN 8.1 06/30/2021                Lab Results   Component Value Date    BILITOTAL 0.2 06/30/2021           Lab Results   Component Value Date    ALBUMIN 3.0 06/30/2021                    Lab Results   Component Value Date    ALT 26 06/30/2021           Lab Results   Component Value Date    AST 23 06/30/2021       Results reviewed, labs MET treatment parameters, ok to proceed with treatment.      Post Infusion Assessment:  Patient tolerated infusion without incident.  Blood return noted pre and post infusion.  Site patent and intact, free from redness, edema or discomfort.  No evidence of extravasations.  Access discontinued per protocol.       Discharge Plan:   Patient discharged in stable condition  accompanied by: self.      Ernestina Melissa RN

## 2021-06-30 NOTE — LETTER
"    6/30/2021         RE: Alannah Wynn  [address]  [place] [address]        Dear Colleague,    Thank you for referring your patient, Alannah Wynn, to the [facility]. Please see a copy of my visit note below.    ONCOLOGY FOLLOW UP NOTE    6/30/21     Patient was being followed by Dr. Tavarez and saw her on 11/13/2019.  Now she is transferring her care to me.  I have reviewed her previous records and have copied and updated from prior notes.      DIAGNOSIS:    10/2019 dx adenoCa of rectum,  MMR intact- NGS Negative for KRAS, NRAS and BRAF. PD L1 25%.  Baseline CEA 10.  Stage IV disease with pulmonary and bony mets      HISTORY OF ONCOLOGY ILLNESS:    She has had intermittent hematochezia and anorectal irritation since 2011, that have been managed as \"hemorrhoids\". These symptoms became persistent and progressive since early thi year. Her anorectal pain is managed with Tylenol. Denies fevers, chills, or unintentional weight loss. Does have intermittent urinary and fecal urgency but no incontinence per se.    Diagnostic colonoscopy on 10/18/19 for rectal bleeding showed \"fungating and ulcerated non-obstructing mass was found in the distal rectum <1 cm from the anal verge. The mass was partially circumferential (involving one-half of the lumen circumference). The mass measured four cm in length. In addition, its diameter measured six mm.  Pathology showed invasive moderately differentiated adenocarcinoma with intact mismatch repair protein expression, PD L1 25% and NGS panel showed NO MUTATIONS in KRAS, NRAS and BRAF.      Pelvic MRI 11/2019 found fungating polypoid mid/lower rectal mass with luminal narrowing as well as innumerable enlarged lymph nodes as described above with a conglomerate of suspicious lymph nodes abutting the anterior peritoneal reflection. Stage cT3d N2.     PET 11/2019 found   1. Hypermetabolic mass in the distal rectum, SUV 14 corresponding to " the patient's known rectal cancer.  2. Multiple hypermetabolic pelvic lymph nodes, compatible metastatic disease, SUV 5-6.  3. FDG avid pulmonary nodule measuring 7 mm in the left lower lobe, concerning for metastatic disease. Additional FDG avid pulmonary nodules suspicious for metastatic disease, SUV around 4.  4. Osteoblastic metastatic lesion in the L3 vertebral body, SUV 6.8    She started palliative FOLFOX/Avastin on 11/25/2019.  Cycle #2 given on 12/9/2019.  Cycle #3 scheduled for 12/24/2019.  C#6 on 2/10/2020.    Repeat CT CAP on 2/21/2020 shows good response to therapy with improvement in the rectal mass, mesorectal and superior rectal nodularity as well as resolution of right internal iliac lymphadenopathy.  There is slight improvement in the dominant posterior left lower lobe lung nodule and several of the lung nodules are is stable.  There is increase in size of the sclerotic lesion of the left aspect of L3 vertebral body without pathological fracture and this is likely due to treatment effect.    CEA on 2/10/2020 was down to 3.3.    Cycle #7 FOLFOX/Avastin 2/25/2020.  C#8 FOLFOX/Avastin 3/9/2020  Cycle#9 5-FU/Avastin 3/24/2020-stopped oxaliplatin due to cumulative fatigue and issues with nausea.    I then discussed with her, her  as well as her son Gamal about possibly switching to single agent Xeloda going forward so as to limit her visits to the clinic and therefore exposure and risk from coronavirus.  We discussed the pros and cons of this approach and they were all willing to try this.     She started Single agent Xeloda on 4/7/2020.  Cycle #3 started on 5/19/2020.    Because of bone metastatic disease, she was started on Zometa on 11/25/2019. We are giving it every 3 months.        CT chest abdomen and pelvis on 7/20/2020 showed mild progression in the lung nodules.  Perirectal nodularity has mildly improved.    Last CEA on 7/22/2020 was 4.      7/27/2020- we decided on switching to  XELOX/Avastin.     8/18/2020 -cycle #2 XELOX/Avastin.  CEA on 8/11/2020 was 4.8.  9/8/2020 - C#3 XELOX/Avastin. CEA on 9/2/2020 was 5.1  9/30/2020.  Cycle #4 XELOX/Avastin.  Oxaliplatin dose reduced further to 85 mg per metered square because of worsening neuropathy.    Repeat CT chest abdomen and pelvis on 10/15/2020 overall shows stable disease with 1 to 2 mm increase in some of the lung nodules and about 2 mm decrease in the lymph node in the mesorectum.    CEA stable at 5.3 on 10/21/2020.    10/22/2020.  Cycle #5 XELOX/Avastin.  Oxaliplatin dose 85 mg/m .  11/11/2020 C#6 XELOX/Avastin. Oxaliplatin dose 85 mg/m .  12/2/2020 C#7 XELOX/Avastin. Oxaliplatin dose 85 mg/m .  12/23/2020 C#8 XELOX/Avastin. Oxaliplatin dose 85 mg/m .    1/11/2021-CT chest abdomen and pelvis shows progressive disease with progression in the lung nodules.  There was a new ill-defined soft tissue density material at the upper mid abdomen adjacent to the hemidiaphragm rangel, celiac access, and most proximal abdominal aorta and this is also concerning for malignancy.  There is a stable sclerosis of L3 and stable left ischial tuberosity tiny sclerosis.  CEA on 12/23/2020 was 9.2.      1/13/2021.  Switch to irinotecan/Panitumumab.  1/27/2021.  Cycle #2 irinotecan/Panitumumab.  2/10/2021.  Cycle #3 irinotecan/Panitumumab.  2/24/2021.  Cycle #4 irinotecan/Panitumumab  3/10/2021.  Cycle #5 irinotecan/Panitumumab.  3/24/2021   Cycle #6 irinotecan/Panitumumab.  4/7/2021   Cycle #7 irinotecan/Panitumumab  4/21/2021 Cycle #8 irinotecan/Panitumumab  5/5/2021- Cycle #9 irinotecan/Panitumumab    5/14/2021-CT chest abdomen and pelvis shows pretty stable findings with only slight increase in one of the lung nodules and all of the lung nodules remaining stable.  Stable L3 sclerotic lesion.    Bone scan shows increased uptake to the L3 sclerotic lesion.  CEA on 5/5/2021 was 6.4 and stable.    5/19/2021 -cycle #10 irinotecan/Panitumumab  6/15/2021-cycle #12  irinotecan/Panitumumab  6/30/2021-cycle #13 irinotecan/Panitumumab    INTERVAL HISTORY:     This is a video visit.  She had a port removed on 5/20/2020 for possible port site infection.   She had ingrown toenail on both great toes.  She went to urgent care on 6/27/2021 and I reviewed the records.  She was given doxycycline.  She was told to follow-up with podiatrist.  She thinks is a little better.  There is some swelling and redness but no fevers.  She also feels pain in the area.  Otherwise tolerating chemotherapy well with some fatigue for the first few days.  She had some nausea for the first few days.  She gets constipated and she takes senna and MiraLAX and then she has diarrhea if she continues to take MiraLAX.  She has noticed occasional leaking of the stool.  No bleeding.  No shortness of breath.  No other swellings.  She has mild neuropathy in the left hand.  Rash from Panitumumab was mild and involving the face and lasted a couple of days.  She applied hydrocortisone and it went away.      ECOG 1     ROS:  Rest of the comprehensive review of the system was essentially unremarkable.    I reviewed with her history in epic as below.    PAST MEDICAL HISTORY  Reflux, hemorrhoid, hepatitis B?  Overactive bladder  Active Ambulatory Problems     Diagnosis Date Noted     External hemorrhoids 04/29/2016     Non morbid obesity due to excess calories 04/29/2016     Hepatitis B immune 04/29/2016     Screening for cervical cancer 04/29/2016     CARDIOVASCULAR SCREENING; LDL GOAL LESS THAN 160 04/29/2016     Gastroesophageal reflux disease 04/29/2016     Overactive bladder 07/21/2017     Nevus of left lower leg- undetermined behavior 12/27/2018     Metastasis from rectal cancer (H) 11/13/2019     Bone metastasis (H) 11/13/2019     Nausea 11/27/2019     Chemotherapy-induced neutropenia (H) 12/24/2019     Resolved Ambulatory Problems     Diagnosis Date Noted     Urgency of urination 04/29/2016     Need for vaccination  against Streptococcus pneumoniae 04/29/2016     Morbid obesity (H) 07/21/2017     Obesity (BMI 35.0-39.9) with comorbidity (H) 12/27/2018     No Additional Past Medical History       MEDICATIONS:  Current Outpatient Medications   Medication     Ascorbic Acid (VITAMIN C) 500 MG CAPS     B Complex Vitamins (B COMPLEX-B12 PO)     cholecalciferol (VITAMIN D3) 400 unit (10 mcg) TABS tablet     clindamycin (CLINDAMAX) 1 % external gel     docusate sodium (COLACE) 100 MG capsule     doxycycline hyclate (VIBRA-TABS) 100 MG tablet     Fesoterodine Fumarate 8 MG TB24     hydrocortisone 2.5 % ointment     lidocaine-prilocaine (EMLA) 2.5-2.5 % external cream     loperamide (IMODIUM) 2 MG capsule     LORazepam (ATIVAN) 0.5 MG tablet     Lutein 20 MG CAPS     mirabegron (MYRBETRIQ) 50 MG 24 hr tablet     Multiple vitamin TABS     naloxone (NARCAN) 4 MG/0.1ML nasal spray     omeprazole (PRILOSEC) 20 MG DR capsule     ondansetron (ZOFRAN) 8 MG tablet     polyethylene glycol (MIRALAX) 17 GM/Dose powder     prochlorperazine (COMPAZINE) 10 MG tablet     senna-docusate (SENNA S) 8.6-50 MG tablet     solifenacin (VESICARE) 10 MG tablet     traMADol (ULTRAM) 50 MG tablet     urea (GORDONS UREA) 40 % external ointment     Current Facility-Administered Medications   Medication     lidocaine (XYLOCAINE) 5 % ointment     ALLERGY:     Allergies   Allergen Reactions     Pcn [Penicillins]      Sulfa Drugs             SOCIAL HISTORY:  She is a [place] immigrants to [place].  Denies smoking denies alcohol abuse.  2 of her sons are doctors.    FAMILY HISTORY:   Denies any family history of cancer.       PHYSICAL EXAMINATION:       LMP 11/27/2001 (Approximate)      Wt Readings from Last 4 Encounters:   06/27/21 58.1 kg (128 lb)   06/15/21 58.4 kg (128 lb 12.8 oz)   06/02/21 59 kg (130 lb)   05/19/21 60.8 kg (134 lb 1.6 oz)       Constitutional.  Looks well and in no apparent distress.   Eyes.  Without eye redness or apparent jaundice.    Respiratory.  Non labored breathing. Speaking in full sentences.    Skin.  No concerning skin rashes on the skin visualized.   Neurological.  Is alert and oriented.  Psychiatric.  Mood and affect seem appropriate.      The rest of a comprehensive physical examination is deferred due to Public Health Emergency video visit restrictions.      LABS/IMAGING/PATHOLOGY:    Reviewed  CBC unremarkable except hemoglobin 11.5  CMP remarkable for magnesium 1.1 albumin 3.  Last CEA was 7.2 on 6/15/2021    CT chest abdomen and pelvis on 5/14/2021 shows pretty stable findings with stable lung nodules.  There is 1 nodule in the posterior left lower lobe which now measures 1.1 x 1 cm while previously it was 1.0 x 0.9 cm.  All other nodules seem very stable.  There is a stable size of distal rectal wall thickening and perirectal nodularity and/or lymph nodes.  There is a stable to slightly decreased soft tissue density at the diaphragmatic rangel, celiac axis, and proximal abdominal aorta.  Unchanged sclerotic lesion in the L3 vertebral body and the sternum.    Bone scan on 5/14/2021 showed increased uptake to the L3 vertebral body corresponding to the sclerotic lesion seen on the CT scan.      1/11/2021-CT chest abdomen and pelvis shows progressive disease with progression in the lung nodules.  There was a new ill-defined soft tissue density material at the upper mid abdomen adjacent to the hemidiaphragm rangel, celiac access, and most proximal abdominal aorta and this is also concerning for malignancy.  This measures approximately 1.7 x 1.8 cm.  There is a stable sclerosis of L3 and stable left ischial tuberosity tiny sclerosis.        ASSESSMENT AND PLAN:      Metastatic rectal adenocarcinoma, MSI intact, K-aimee wild type, PDL 1 + 25% with metastasis to the lungs, bones and pelvic lymph nodes.      She was on palliative XELOX/Avastin but CT scan from 1/11/2021 shows progressive disease with progression in the lung nodules and there  is also a soft tissue density in the upper mid abdomen which could be new lymphadenopathy versus soft tissue deposit concerning for malignancy.  CEA on 12/23/2020 was also rising and it was 9.2.  Overall this is consistent with progression of the disease.      Because of slow progression I switched to irinotecan/Panitumumab on 1/13/2021.    Overall chemotherapy is being tolerated very well and after 9 cycles, repeat CT chest abdomen pelvis and bone scan on 5/14/2021 showed pretty stable findings.   CEA has been stable as well.   We decided on continuing the same chemotherapy and she has received 12 cycles up until now.  Overall she is tolerating treatments well.  Last CEA was a stable at 7.2.    Nausea.  Continue antiemetics as needed.    Constipation.  I advised her to take senna/MiraLAX for the constipation but as soon as the constipation gets resolved then she should stop taking MiraLAX and continue with senna only.    Ingrown toenails.  She is on doxycycline which has started to help.  She should follow with podiatrist.    Panitumumab related rash.  Continue hydrocortisone/clindamycin ointment as needed and use doxycycline only if the rash gets worse.  This time it was mild.    Hypomagnesemia.  Start magnesium oxide 400 mg twice a day.    Anemia.  She has mild anemia from chemotherapy.  Continue to observe.      Bone metastasis.  Started Zometa on 11/25/2019.  She last received on 1/27/2021.  She last received Zometa on 4/21/2021.  Bone scan/CT scan seems stable.  She will get Zometa every 3 months and continue vitamin D and calcium.  Repeat bone scan in August.       We did not address the following today.   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  She now only notices mild neuropathy in the left hand but otherwise overall this has significantly improved.  Continue to monitor.     Port-A-Cath.  It was removed on 5/20/2021.    Discussion regarding health care directive  We discussed that it is important that she  completes health care directive which would help in making sure that her wishes are followed about her treatment care in case she is not able to make a decision for herself.  We gave her information regarding that.    RTC in 2 weeks    All questions were answered to her satisfaction.  She is agreeable and comfortable with the plan.    Charline Velazquez MD    Video start time. 9:36 AM  Video stop time. 9:51 AM      Again, thank you for allowing me to participate in the care of your patient.        Sincerely,        Charline Velazquez MD

## 2021-06-30 NOTE — PATIENT INSTRUCTIONS
Chemo today.  Start Magnesium oxide 400 mg twice daily  Take senna/miralax for constipation but stop miralax once constipation gets resolved    See me back 7/27/2021 at 3:15 PM-okay to book  Chemotherapy on 7/28/2021.  Schedule CT chest abdomen and pelvis and bone scan around 8/6/2021.  See me back in 8/11/2021 with chemotherapy to follow.

## 2021-07-12 NOTE — PROGRESS NOTES
Hematology/Oncology Visit    Care Team:  - Oncologist: Dr. Velazquez  - PCP: Earline Mehta MD    Reason for visit: exam prior to cycle 14 irinotecan + panitumumab    Diagnosis: metastatic (lungs, bone) rectal cancer    Cancer and Treatment Hx:  Oct 2019: diagnostic colonoscopy for rectal bleeding showed a fungating and ulcerated non-obstructing mass in the distal rectum <1 cm from the anal verge. The mass involved half of the lumen circumference and measured 4 cm in length, 6mm diameter. Pathology showed invasive moderately differentiated adenocarcinoma with intact mismatch repair protein expression, PD L1 25% and NGS panel showed NO MUTATIONS in KRAS, NRAS and BRAF.   Nov 2019: Pelvic MRI found fungating polypoid mid/lower rectal mass with luminal narrowing as well as innumerable enlarged lymph nodes. Stage cT3d N2. PET demonstrated a hypermetabolic mass in the distal rectum, multiple pelvic lymph nodes, FDG avid pulmonary nodules suspicious for metastasis, as well as an osteoblastic metastatic lesion in the L3 vertebral body.  Nov 25, 2019: started palliative FOLFOX/Avastin, also initiated Zometa [id number]  Feb 2010: CT C/A/P after 6 cycles showed good response, CEA down to 3.3  March 2020: completed 9 cycles of FOLFOX/Avastin (oxaliplatin stopped due to fatigue/nausea)  April 7, 2020: switched to single agent Xeloda to decrease clinic visits  July 2020: switched to XELOX/Avastin given CT C/A/P showed mild progression in lung nodules. Oxaliplatin dose reduced with cycle 4 given worsening neuropathy  Jan 2021: switched to irinotecan + panitumumab due to CT C/A/P showed progressive disease in lung nodules and new soft tissue density in upper mid abdomen       Interval History:  Alannah continues to have bilateral great toe ingrown toenails that are uncomfortable. They are not more painful. She completed a course of doxycycline 2 days ago. She has noticed stool leakage intermittently over the past 4-5 weeks. No  fevers, rectal pain, or rectal bleeding. She typically is constipated and uses stool softeners to prevent hemorrhoidal pain but can fluctuate to diarrhea intermittently from stool softeners. She is eating and drinking well. Nausea has been well controlled with daily use of zofran, compazine, and ativan. She has no questions about planned therapies today.    Medications:   Current Outpatient Medications   Medication     Ascorbic Acid (VITAMIN C) 500 MG CAPS     B Complex Vitamins (B COMPLEX-B12 PO)     cholecalciferol (VITAMIN D3) 400 unit (10 mcg) TABS tablet     clindamycin (CLINDAMAX) 1 % external gel     docusate sodium (COLACE) 100 MG capsule     doxycycline hyclate (VIBRA-TABS) 100 MG tablet     Fesoterodine Fumarate 8 MG TB24     hydrocortisone 2.5 % ointment     lidocaine-prilocaine (EMLA) 2.5-2.5 % external cream     loperamide (IMODIUM) 2 MG capsule     LORazepam (ATIVAN) 0.5 MG tablet     Lutein 20 MG CAPS     magnesium oxide (MAG-OX) 400 MG tablet     mirabegron (MYRBETRIQ) 50 MG 24 hr tablet     Multiple vitamin TABS     naloxone (NARCAN) 4 MG/0.1ML nasal spray     omeprazole (PRILOSEC) 20 MG DR capsule     ondansetron (ZOFRAN) 8 MG tablet     polyethylene glycol (MIRALAX) 17 GM/Dose powder     prochlorperazine (COMPAZINE) 10 MG tablet     senna-docusate (SENNA S) 8.6-50 MG tablet     solifenacin (VESICARE) 10 MG tablet     traMADol (ULTRAM) 50 MG tablet     urea (GORDONS UREA) 40 % external ointment     Physical Exam:  GEN: pleasantly conversant female no acute distress  SKIN: generally intact, no visible rash, bruising, or sores. Band-aids removed from toenails for evaluation. Ingrown toenails on bilateral great toes, lateral aspect. Mild erythema/irritation without tenderness to palpation, moist skin with scant serous drainage at lateral aspect of nail. Not warm or significantly swollen.  ENT: eyes non-icteric, no notable oral sores  LYMPH: no notable cervical, supraclavicular, or axillary  "lymphadenopathy  RESP: clear to auscultation bilaterally, on room air  CARDS: regular rate and rhythm, no notable murmurs   GI: abd soft without notable hepatosplenomegaly, mild tenderness to deep palpation of LLQ  ANAL: visible external hemorrhoid without erythema or drainage, scant residual stool surrounding anus, scant liquid brown stool drainage in depends  MSK: no notable lower extremity edema  NEURO: alert and oriented without obvious focal deficit    /71 (BP Location: Left arm, Patient Position: Chair, Cuff Size: Adult Regular)   Pulse 80   Ht 1.575 m (5' 2\")   Wt 56.7 kg (125 lb)   LMP 11/27/2001 (Approximate)   SpO2 100%   BMI 22.86 kg/m       Wt Readings from Last 4 Encounters:   07/13/21 56.7 kg (125 lb)   06/30/21 58.7 kg (129 lb 6.4 oz)   06/27/21 58.1 kg (128 lb)   06/15/21 58.4 kg (128 lb 12.8 oz)   ]  Labs:  Results for orders placed or performed in visit on 07/13/21   Comprehensive metabolic panel     Status: Abnormal   Result Value Ref Range    Sodium 144 133 - 144 mmol/L    Potassium 4.1 3.4 - 5.3 mmol/L    Chloride 110 mmol/L    Carbon Dioxide (CO2) 30 20 - 32 mmol/L    Anion Gap 4 3 - 14 mmol/L    Urea Nitrogen 5 (L) 7 - 30 mg/dL    Creatinine 0.56 mg/dL    Calcium 7.5 (L) 8.5 - 10.1 mg/dL    Glucose 100 (H) 70 - 99 mg/dL    Alkaline Phosphatase 64 40 - 150 U/L    AST 24 0 - 45 U/L    ALT 27 U/L    Protein Total 6.6 (L) 6.8 - 8.8 g/dL    Albumin 3.1 (L) 3.4 - 5.0 g/dL    Bilirubin Total 0.3 0.2 - 1.3 mg/dL    GFR Estimate >90 >60 mL/min/1.73m2   Magnesium     Status: Abnormal   Result Value Ref Range    Magnesium 1.3 (L) 1.6 - 2.3 mg/dL   CBC with platelets and differential     Status: Abnormal   Result Value Ref Range    WBC Count 5.4 4.0 - 11.0 10e3/uL    RBC Count 3.94 3.80 - 5.20 10e6/uL    Hemoglobin 11.4 (L) 11.7 - 15.7 g/dL    Hematocrit 36.9 35.0 - 47.0 %    MCV 94 78 - 100 fL    MCH 28.9 26.5 - 33.0 pg    MCHC 30.9 (L) 31.5 - 36.5 g/dL    RDW 16.0 (H) 10.0 - 15.0 %    " Platelet Count 322 150 - 450 10e3/uL    NRBCs per 100 WBC 0 <1 /100    Absolute Neutrophils 2.6 1.6 - 8.3 10e3/uL    Absolute Lymphocytes 2.1 0.8 - 5.3 10e3/uL    Absolute Monocytes 0.4 0.0 - 1.3 10e3/uL    Absolute Eosinophils 0.2 0.0 - 0.7 10e3/uL    Absolute Basophils 0.1 0.0 - 0.2 10e3/uL    Absolute Immature Granulocytes 0.0 <=0.0 10e3/uL    Absolute NRBCs 0.0 10e3/uL      Imaging:  Reviewed bone scan whole body from 5/14/2021:  IMPRESSION: Focal uptake to the L3 vertebral body corresponding with sclerotic lesion seen on CT, consistent with metastasis.     Reviewed CT C/A/P with contrast from 5/14/21:  IMPRESSION: In this patient with a history of stage IV rectal adenocarcinoma:  1. Multiple bilateral pulmonary nodules consistent with metastases demonstrate:  1a) Minimally increased size of a posterior left lower lobe pulmonary nodule (image 120 of series 7).  1b) Remaining pulmonary nodules are stable.  2. Stable appearance of distal rectal wall thickening and perirectal nodularity and/or lymph nodes.  3. Stable or slightly decreased soft tissue density at the diaphragmatic rangel, celiac axis, and proximal abdominal aorta, representing inflammation versus lymphadenopathy.    4. Unchanged sclerotic metastatic lesions in the L3 vertebral body and sternum.    Assessment and Plan:  Rectal cancer with bone and pulmonary metastasis  Normocytic Anemia  - Has been tolerating irinotecan and panitumumab well  - Receives every 2 weeks  - Meeting goals and labs appropriate to receive cycle 14 later today    Bone metastasis  Hypocalcemia  - Receiving zometa every 3 months, scheduled today but will defer 2wks given calcium level  - Calcium corrected for hypoalbuminemia is 8.2  - Continues vitamin D supplementation, recommended starting calcium supplementation BID also    Nausea, stable  - Stable with use of zofran, compazine, and ativan (called pharmacy for refill)  - Prior significant nausea with chemo/immunotherapy,  continue all 3 meds if working  - Weight down 3lbs in past month    Constipation  Anal stool leakage  - Continues on senna twice daily with MiraLAX as needed  - Occasional loose stools from senna use with small amount of stool leakage in depends  - Recommended using moist towelettes to prevent rectal irritation and worsening leakage     Ingrown toenails, bilateral  - Completed a 14 day course of doxycycline on 7/11  - No current signs of infection but bilateral ingrown toenails causing irritation  - Scheduled appt with Dr. Haji in Podiatry on 7/19    Facial rash secondary to panitumumab  - Currently resolved, reports that it typically occurs cycle day 2  - Utilizes hydrocortisone and clindamycin ointments when rash occurs    Hypomagnesemia  - increase on oral magnesium oxide to twice daily- previously reduced for antibiotic use  - Magnesium 1.3, will administer IV supplementation at infusion therapy      Next visit: 7/27 with Dr. Velazquez prior to cycle 15 (pt would prefer to see DAVINA day of infusion 7/29)  Next imaging: repeat CT C/A/P on 8/6  Future Appointments   Date Time Provider Department Center   7/12/2021  2:45 PM Felipa Garner APRN [facility]   7/13/2021 12:30 [facility] [place]   7/13/2021  1:00 PM BAY 3 INFUSION [facility] [place]   7/19/2021 10:00 AM Ady Haji DPM [facility] [place]   7/27/2021  3:15 PM Charline Velazquez MD [facility]   7/29/2021 11:30 AM [facility] [place]   7/29/2021 12:00 PM [facility] [place]   8/6/2021  9:00 AM [id number] [facility] [place]   8/6/2021  9:20 AM MGCT1 [facility] [place]   8/6/2021 12:00 PM MGNM1 [facility] [place]   8/11/2021  8:45 AM [facility] [place]   8/11/2021  9:30 AM Charline Velazquez MD [facility]   8/11/2021 10:00 AM BAY 4 INFUSION [facility] [place]   8/25/2021  8:30 AM LAB ONC [facility] [place]   8/25/2021  9:00 AM Felipa Garner APRN CNP [facility]    8/25/2021  9:30 AM BAY 8 INFUSION [facility] [place]     The total time of this encounter amounted to 35 minutes today. This time includes video time spent with the patient, prep work, ordering tests, and performing post-visit documentation.    Yasmeen Wynn, DERRELL    I have examined the patient and agree with written evaluation. Assessment and plan presented by this provider. Felipa Garner,Cnp

## 2021-07-13 NOTE — PROGRESS NOTES
Infusion Nursing Note:  Alannah Wynn presents today for Magnesium Replacement [id number] Vectibix/Irinotecan. Zometa not given today due to corrected calcium result.    Patient seen by provider today: Yes: Felipa Garner NP   present during visit today: Not Applicable.    Note:   Holding Zometa today, per pharmacist and Felipa Garner NP, due to corrected calcium value.    Intravenous Access:  Peripheral IV placed.    Treatment Conditions:  Lab Results   Component Value Date    HGB 11.4 07/13/2021    HGB 11.5 06/30/2021     Lab Results   Component Value Date    WBC 5.4 07/13/2021    WBC 5.6 06/30/2021      Lab Results   Component Value Date    ANEU 2.4 06/30/2021     Lab Results   Component Value Date     07/13/2021     06/30/2021      Lab Results   Component Value Date     07/13/2021     06/30/2021                   Lab Results   Component Value Date    POTASSIUM 4.1 07/13/2021    POTASSIUM 4.3 06/30/2021           Lab Results   Component Value Date    MAG 1.3 07/13/2021    MAG 1.1 06/30/2021            Lab Results   Component Value Date    CR 0.56 07/13/2021    CR 0.53 06/30/2021                   Lab Results   Component Value Date    LAUREN 7.5 07/13/2021    LAUREN 8.1 06/30/2021                Lab Results   Component Value Date    BILITOTAL 0.3 07/13/2021    BILITOTAL 0.2 06/30/2021           Lab Results   Component Value Date    ALBUMIN 3.1 07/13/2021    ALBUMIN 3.0 06/30/2021                    Lab Results   Component Value Date    ALT 27 07/13/2021    ALT 26 06/30/2021           Lab Results   Component Value Date    AST 24 07/13/2021    AST 23 06/30/2021       Results reviewed, labs MET treatment parameters, ok to proceed with treatment.      Post Infusion Assessment:  Patient tolerated infusion without incident.  Blood return noted pre and post infusion.  Site patent and intact, free from redness, edema or discomfort.  No evidence of extravasations.  Access discontinued per  protocol.       Discharge Plan:   AVS to patient via MYCHART.  Patient will return 7/29/21 for next appointment.   Patient discharged in stable condition accompanied by: self.  Departure Mode: Ambulatory.      Gisell Nuñez RN

## 2021-07-13 NOTE — LETTER
7/13/2021         RE: Alannah Wynn  [address]  [place] [address]        Dear Colleague,    Thank you for referring your patient, Alannah Wynn, to the [facility]. Please see a copy of my visit note below.    Hematology/Oncology Visit    Care Team:  - Oncologist: Dr. Velazquez  - PCP: Earline Mehta MD    Reason for visit: exam prior to cycle 14 irinotecan + panitumumab    Diagnosis: metastatic (lungs, bone) rectal cancer    Cancer and Treatment Hx:  Oct 2019: diagnostic colonoscopy for rectal bleeding showed a fungating and ulcerated non-obstructing mass in the distal rectum <1 cm from the anal verge. The mass involved half of the lumen circumference and measured 4 cm in length, 6mm diameter. Pathology showed invasive moderately differentiated adenocarcinoma with intact mismatch repair protein expression, PD L1 25% and NGS panel showed NO MUTATIONS in KRAS, NRAS and BRAF.   Nov 2019: Pelvic MRI found fungating polypoid mid/lower rectal mass with luminal narrowing as well as innumerable enlarged lymph nodes. Stage cT3d N2. PET demonstrated a hypermetabolic mass in the distal rectum, multiple pelvic lymph nodes, FDG avid pulmonary nodules suspicious for metastasis, as well as an osteoblastic metastatic lesion in the L3 vertebral body.  Nov 25, 2019: started palliative FOLFOX/Avastin, also initiated Zometa [id number]  Feb 2010: CT C/A/P after 6 cycles showed good response, CEA down to 3.3  March 2020: completed 9 cycles of FOLFOX/Avastin (oxaliplatin stopped due to fatigue/nausea)  April 7, 2020: switched to single agent Xeloda to decrease clinic visits  July 2020: switched to XELOX/Avastin given CT C/A/P showed mild progression in lung nodules. Oxaliplatin dose reduced with cycle 4 given worsening neuropathy  Jan 2021: switched to irinotecan + panitumumab due to CT C/A/P showed progressive disease in lung nodules and new soft tissue density in upper mid abdomen        Interval History:  Alannah continues to have bilateral great toe ingrown toenails that are uncomfortable. They are not more painful. She completed a course of doxycycline 2 days ago. She has noticed stool leakage intermittently over the past 4-5 weeks. No fevers, rectal pain, or rectal bleeding. She typically is constipated and uses stool softeners to prevent hemorrhoidal pain but can fluctuate to diarrhea intermittently from stool softeners. She is eating and drinking well. Nausea has been well controlled with daily use of zofran, compazine, and ativan. She has no questions about planned therapies today.    Medications:   Current Outpatient Medications   Medication     Ascorbic Acid (VITAMIN C) 500 MG CAPS     B Complex Vitamins (B COMPLEX-B12 PO)     cholecalciferol (VITAMIN D3) 400 unit (10 mcg) TABS tablet     clindamycin (CLINDAMAX) 1 % external gel     docusate sodium (COLACE) 100 MG capsule     doxycycline hyclate (VIBRA-TABS) 100 MG tablet     Fesoterodine Fumarate 8 MG TB24     hydrocortisone 2.5 % ointment     lidocaine-prilocaine (EMLA) 2.5-2.5 % external cream     loperamide (IMODIUM) 2 MG capsule     LORazepam (ATIVAN) 0.5 MG tablet     Lutein 20 MG CAPS     magnesium oxide (MAG-OX) 400 MG tablet     mirabegron (MYRBETRIQ) 50 MG 24 hr tablet     Multiple vitamin TABS     naloxone (NARCAN) 4 MG/0.1ML nasal spray     omeprazole (PRILOSEC) 20 MG DR capsule     ondansetron (ZOFRAN) 8 MG tablet     polyethylene glycol (MIRALAX) 17 GM/Dose powder     prochlorperazine (COMPAZINE) 10 MG tablet     senna-docusate (SENNA S) 8.6-50 MG tablet     solifenacin (VESICARE) 10 MG tablet     traMADol (ULTRAM) 50 MG tablet     urea (GORDONS UREA) 40 % external ointment     Physical Exam:  GEN: pleasantly conversant female no acute distress  SKIN: generally intact, no visible rash, bruising, or sores. Band-aids removed from toenails for evaluation. Ingrown toenails on bilateral great toes, lateral aspect. Mild  "erythema/irritation without tenderness to palpation, moist skin with scant serous drainage at lateral aspect of nail. Not warm or significantly swollen.  ENT: eyes non-icteric, no notable oral sores  LYMPH: no notable cervical, supraclavicular, or axillary lymphadenopathy  RESP: clear to auscultation bilaterally, on room air  CARDS: regular rate and rhythm, no notable murmurs   GI: abd soft without notable hepatosplenomegaly, mild tenderness to deep palpation of LLQ  ANAL: visible external hemorrhoid without erythema or drainage, scant residual stool surrounding anus, scant liquid brown stool drainage in depends  MSK: no notable lower extremity edema  NEURO: alert and oriented without obvious focal deficit    /71 (BP Location: Left arm, Patient Position: Chair, Cuff Size: Adult Regular)   Pulse 80   Ht 1.575 m (5' 2\")   Wt 56.7 kg (125 lb)   LMP 11/27/2001 (Approximate)   SpO2 100%   BMI 22.86 kg/m       Wt Readings from Last 4 Encounters:   07/13/21 56.7 kg (125 lb)   06/30/21 58.7 kg (129 lb 6.4 oz)   06/27/21 58.1 kg (128 lb)   06/15/21 58.4 kg (128 lb 12.8 oz)   ]  Labs:  Results for orders placed or performed in visit on 07/13/21   Comprehensive metabolic panel     Status: Abnormal   Result Value Ref Range    Sodium 144 133 - 144 mmol/L    Potassium 4.1 3.4 - 5.3 mmol/L    Chloride 110 mmol/L    Carbon Dioxide (CO2) 30 20 - 32 mmol/L    Anion Gap 4 3 - 14 mmol/L    Urea Nitrogen 5 (L) 7 - 30 mg/dL    Creatinine 0.56 mg/dL    Calcium 7.5 (L) 8.5 - 10.1 mg/dL    Glucose 100 (H) 70 - 99 mg/dL    Alkaline Phosphatase 64 40 - 150 U/L    AST 24 0 - 45 U/L    ALT 27 U/L    Protein Total 6.6 (L) 6.8 - 8.8 g/dL    Albumin 3.1 (L) 3.4 - 5.0 g/dL    Bilirubin Total 0.3 0.2 - 1.3 mg/dL    GFR Estimate >90 >60 mL/min/1.73m2   Magnesium     Status: Abnormal   Result Value Ref Range    Magnesium 1.3 (L) 1.6 - 2.3 mg/dL   CBC with platelets and differential     Status: Abnormal   Result Value Ref Range    WBC Count " 5.4 4.0 - 11.0 10e3/uL    RBC Count 3.94 3.80 - 5.20 10e6/uL    Hemoglobin 11.4 (L) 11.7 - 15.7 g/dL    Hematocrit 36.9 35.0 - 47.0 %    MCV 94 78 - 100 fL    MCH 28.9 26.5 - 33.0 pg    MCHC 30.9 (L) 31.5 - 36.5 g/dL    RDW 16.0 (H) 10.0 - 15.0 %    Platelet Count 322 150 - 450 10e3/uL    NRBCs per 100 WBC 0 <1 /100    Absolute Neutrophils 2.6 1.6 - 8.3 10e3/uL    Absolute Lymphocytes 2.1 0.8 - 5.3 10e3/uL    Absolute Monocytes 0.4 0.0 - 1.3 10e3/uL    Absolute Eosinophils 0.2 0.0 - 0.7 10e3/uL    Absolute Basophils 0.1 0.0 - 0.2 10e3/uL    Absolute Immature Granulocytes 0.0 <=0.0 10e3/uL    Absolute NRBCs 0.0 10e3/uL      Imaging:  Reviewed bone scan whole body from 5/14/2021:  IMPRESSION: Focal uptake to the L3 vertebral body corresponding with sclerotic lesion seen on CT, consistent with metastasis.     Reviewed CT C/A/P with contrast from 5/14/21:  IMPRESSION: In this patient with a history of stage IV rectal adenocarcinoma:  1. Multiple bilateral pulmonary nodules consistent with metastases demonstrate:  1a) Minimally increased size of a posterior left lower lobe pulmonary nodule (image 120 of series 7).  1b) Remaining pulmonary nodules are stable.  2. Stable appearance of distal rectal wall thickening and perirectal nodularity and/or lymph nodes.  3. Stable or slightly decreased soft tissue density at the diaphragmatic rangel, celiac axis, and proximal abdominal aorta, representing inflammation versus lymphadenopathy.    4. Unchanged sclerotic metastatic lesions in the L3 vertebral body and sternum.    Assessment and Plan:  Rectal cancer with bone and pulmonary metastasis  Normocytic Anemia  - Has been tolerating irinotecan and panitumumab well  - Receives every 2 weeks  - Meeting goals and labs appropriate to receive cycle 14 later today    Bone metastasis  Hypocalcemia  - Receiving zometa every 3 months, scheduled today but will defer 2wks given calcium level  - Calcium corrected for hypoalbuminemia is 8.2  -  Continues vitamin D supplementation, recommended starting calcium supplementation BID also    Nausea, stable  - Stable with use of zofran, compazine, and ativan (called pharmacy for refill)  - Prior significant nausea with chemo/immunotherapy, continue all 3 meds if working  - Weight down 3lbs in past month    Constipation  Anal stool leakage  - Continues on senna twice daily with MiraLAX as needed  - Occasional loose stools from senna use with small amount of stool leakage in depends  - Recommended using moist towelettes to prevent rectal irritation and worsening leakage     Ingrown toenails, bilateral  - Completed a 14 day course of doxycycline on 7/11  - No current signs of infection but bilateral ingrown toenails causing irritation  - Scheduled appt with Dr. Haji in Podiatry on 7/19    Facial rash secondary to panitumumab  - Currently resolved, reports that it typically occurs cycle day 2  - Utilizes hydrocortisone and clindamycin ointments when rash occurs    Hypomagnesemia  - increase on oral magnesium oxide to twice daily- previously reduced for antibiotic use  - Magnesium 1.3, will administer IV supplementation at infusion therapy      Next visit: 7/27 with Dr. Velazquez prior to cycle 15 (pt would prefer to see DAVNIA day of infusion 7/29)  Next imaging: repeat CT C/A/P on 8/6  Future Appointments   Date Time Provider Department Center   7/12/2021  2:45 PM Felipa Garner APRN CNP [facility]   7/13/2021 12:30 [facility] [place]   7/13/2021  1:00 PM BAY 3 [facility] [place]   7/19/2021 10:00 AM Ady Haji DPM [facility] [place]   7/27/2021  3:15 PM Charline Velazquez MD [facility]   7/29/2021 11:30 AM LAB ONC [facility] [place]   7/29/2021 12:00 PM BAY 4 [facility]ION [facility] [place]   8/6/2021  9:00 AM MGNMINJ1 MGNM [place]   8/6/2021  9:20 AM [facility] [place]   8/6/2021 12:00 PM [facility] [place]   8/11/2021  8:45 AM [facility]  [place]   8/11/2021  9:30 AM Charline Velazquez MD [facility]   8/11/2021 10:00 AM BAY 4 INFUSION [facility] [place]   8/25/2021  8:30 AM LAB ONC [facility] [place]   8/25/2021  9:00 AM Felipa Garner APRN CNP [facility]   8/25/2021  9:30 AM [facility]AY [facility] [place]     The total time of this encounter amounted to 35 minutes today. This time includes video time spent with the patient, prep work, ordering tests, and performing post-visit documentation.    Yasmeen Wynn CNP    I have examined the patient and agree with written evaluation. Assessment and plan presented by this provider. Felipa Garner Cnp        Again, thank you for allowing me to participate in the care of your patient.        Sincerely,        Felipa Garner, LETICIA, APRN CNP

## 2021-07-13 NOTE — NURSING NOTE
"Oncology Rooming Note    July 13, 2021 12:35 PM   Alannah Wynn is a 70 year old female who presents for:    Chief Complaint   Patient presents with     Oncology Clinic Visit     Prior to tx     Initial Vitals: /71 (BP Location: Left arm, Patient Position: Chair, Cuff Size: Adult Regular)   Pulse 80   Ht 1.575 m (5' 2\")   Wt 56.7 kg (125 lb)   LMP 11/27/2001 (Approximate)   SpO2 100%   BMI 22.86 kg/m   Estimated body mass index is 22.86 kg/m  as calculated from the following:    Height as of this encounter: 1.575 m (5' 2\").    Weight as of this encounter: 56.7 kg (125 lb). Body surface area is 1.57 meters squared.  Moderate Pain (4) Comment: Data Unavailable   Patient's last menstrual period was 11/27/2001 (approximate).  Allergies reviewed: Yes  Medications reviewed: Yes    Medications: MEDICATION REFILLS NEEDED TODAY. Provider was notified.  Pharmacy name entered into [facility]:    [id number] - [place], [place] - [address] AT [address] [facility] - [place], [place] - [address], [id number]  [facility] - [place], [place] - [address]    Clinical concerns: Yes Felipa was notified.      Annetta Bruno CMA              "

## 2021-07-17 NOTE — PROGRESS NOTES
Subjective   Alannah is a 70 year old who presents for the following health issues   HPI   Eye(s) Problem  Onset/Duration: 2days  Description:   Location: L eye and eyelids  Pain: YES- around her eyelids  Redness: YES  Accompanying Signs & Symptoms:  Discharge/mattering: YES  Swelling: no  Visual changes: no  Fever: no  Nasal Congestion: no  Bothered by bright lights: no  History:  Trauma: no  Foreign body exposure: no  Wearing contacts: no  Precipitating or alleviating factors: None  Therapies tried and outcome: OTC eye drops with minimal relief    Patient Active Problem List   Diagnosis     External hemorrhoids     Non morbid obesity due to excess calories     Hepatitis B immune     Screening for cervical cancer     CARDIOVASCULAR SCREENING; LDL GOAL LESS THAN 160     Gastroesophageal reflux disease     Overactive bladder     Nevus of left lower leg- undetermined behavior     Metastasis from rectal cancer (H)     Bone metastasis (H)     Nausea     Chemotherapy-induced neutropenia (H)     Current Outpatient Medications   Medication     Ascorbic Acid (VITAMIN C) 500 MG CAPS     B Complex Vitamins (B COMPLEX-B12 PO)     cholecalciferol (VITAMIN D3) 400 unit (10 mcg) TABS tablet     hydrocortisone 2.5 % ointment     LORazepam (ATIVAN) 0.5 MG tablet     Lutein 20 MG CAPS     magnesium oxide (MAG-OX) 400 MG tablet     Multiple vitamin TABS     omeprazole (PRILOSEC) 20 MG DR capsule     polyethylene glycol (MIRALAX) 17 GM/Dose powder     prochlorperazine (COMPAZINE) 10 MG tablet     senna-docusate (SENNA S) 8.6-50 MG tablet     clindamycin (CLINDAMAX) 1 % external gel     docusate sodium (COLACE) 100 MG capsule     doxycycline hyclate (VIBRA-TABS) 100 MG tablet     Fesoterodine Fumarate 8 MG TB24     lidocaine-prilocaine (EMLA) 2.5-2.5 % external cream     loperamide (IMODIUM) 2 MG capsule     mirabegron (MYRBETRIQ) 50 MG 24 hr tablet     naloxone (NARCAN) 4 MG/0.1ML nasal spray     ondansetron (ZOFRAN) 8 MG tablet      solifenacin (VESICARE) 10 MG tablet     traMADol (ULTRAM) 50 MG tablet     urea (GORDONS UREA) 40 % external ointment     Current Facility-Administered Medications   Medication     lidocaine (XYLOCAINE) 5 % ointment        Allergies   Allergen Reactions     Pcn [Penicillins]      Sulfa Drugs        Review of Systems   Constitutional: Negative.  Negative for chills, fatigue and fever.   HENT: Negative for congestion, ear discharge, ear pain, hearing loss, rhinorrhea, sinus pressure, sinus pain and sore throat.    Eyes: Positive for discharge and redness. Negative for photophobia, pain, itching and visual disturbance.   Respiratory: Negative.  Negative for cough, shortness of breath and wheezing.    Cardiovascular: Negative.  Negative for chest pain, palpitations and peripheral edema.   Gastrointestinal: Negative.    All other systems reviewed and are negative.           Objective    /81 (BP Location: Left arm, Patient Position: Sitting, Cuff Size: Adult Regular)   Pulse 96   Temp 98.3  F (36.8  C) (Tympanic)   Resp 16   Wt 54.6 kg (120 lb 6 oz)   LMP 11/27/2001 (Approximate)   SpO2 100%   Breastfeeding No   BMI 22.02 kg/m    Body mass index is 22.02 kg/m .  Physical Exam  Vitals and nursing note reviewed.   Constitutional:       General: She is not in acute distress.     Appearance: Normal appearance. She is well-developed and normal weight. She is not ill-appearing.   HENT:      Head: Normocephalic and atraumatic.      Comments: TMs are intact without any erythema or bulging bilaterally.  Airway is patent.     Nose: Nose normal.      Mouth/Throat:      Lips: Pink.      Mouth: Mucous membranes are moist.      Pharynx: Oropharynx is clear. Uvula midline. No pharyngeal swelling, oropharyngeal exudate or posterior oropharyngeal erythema.      Tonsils: No tonsillar exudate.   Eyes:      General: Lids are everted, no foreign bodies appreciated. Vision grossly intact. Gaze aligned appropriately. No scleral  icterus.        Right eye: No foreign body or discharge.         Left eye: Discharge present.No foreign body.      Extraocular Movements: Extraocular movements intact.      Conjunctiva/sclera:      Right eye: Right conjunctiva is not injected.      Left eye: Left conjunctiva is injected.      Pupils: Pupils are equal, round, and reactive to light.      Comments: Visual acuity L eye 20/25.  Visual acuity R eye 20/25 with glasses.   Neck:      Thyroid: No thyromegaly.   Cardiovascular:      Rate and Rhythm: Normal rate and regular rhythm.      Pulses: Normal pulses.      Heart sounds: Normal heart sounds, S1 normal and S2 normal. No murmur heard.   No friction rub. No gallop.    Pulmonary:      Effort: Pulmonary effort is normal. No accessory muscle usage, respiratory distress or retractions.      Breath sounds: Normal breath sounds and air entry. No stridor. No decreased breath sounds, wheezing, rhonchi or rales.   Musculoskeletal:      Cervical back: Normal range of motion and neck supple.   Lymphadenopathy:      Cervical: No cervical adenopathy.   Skin:     General: Skin is warm and dry.      Nails: There is no clubbing.   Neurological:      Mental Status: She is alert and oriented to person, place, and time.   Psychiatric:         Mood and Affect: Mood normal.         Behavior: Behavior normal.         Thought Content: Thought content normal.         Judgment: Judgment normal.          Assessment/Plan:  Blepharitis of both upper and lower eyelid of left eye, unspecified type:  Will treat with erythromycin eye [id number] for bephlaritis and conjunctivitis.  Recommend tylenol/ibuprofen prn pain/fever and warm compresses.   Rest, fluids, chicken soup.  Recheck in clinic if symptoms worsen or if symptoms do not improve.    -     erythromycin (ROMYCIN) 5 MG/GM ophthalmic ointment; Place 0.5 inches Into the left eye 4 times daily    Acute conjunctivitis of left eye, unspecified acute conjunctivitis type          Patricia  See CÉSAR CaraballoC

## 2021-07-19 NOTE — NURSING NOTE
Alannah Wynn's chief complaint for this visit includes:  Chief Complaint   Patient presents with     New Patient     bilateral ingrown toenails     PCP: Earline Mehta    Referring Provider:  Sunny Orellana PA-C  [address] [place] [address]    /83 (BP Location: Right arm, Patient Position: Sitting, Cuff Size: Adult Regular)   Pulse 90   LMP 11/27/2001 (Approximate)   SpO2 99%   Data Unavailable     Do you need any medication refills at today's visit? Desiree Stephenson, CMA

## 2021-07-19 NOTE — PROGRESS NOTES
Past Medical History:   Diagnosis Date     Gastroesophageal reflux disease      Patient Active Problem List   Diagnosis     External hemorrhoids     Non morbid obesity due to excess calories     Hepatitis B immune     Screening for cervical cancer     CARDIOVASCULAR SCREENING; LDL GOAL LESS THAN 160     Gastroesophageal reflux disease     Overactive bladder     Nevus of left lower leg- undetermined behavior     Metastasis from rectal cancer (H)     Bone metastasis (H)     Nausea     Chemotherapy-induced neutropenia (H)     Past Surgical History:   Procedure Laterality Date     COLONOSCOPY  10/18/2019    1 polyp     COLONOSCOPY N/A 10/18/2019    Procedure: Colonoscopy, With Polypectomy And Biopsy;  Surgeon: Duane, William Charles, MD;  Location: [facility]     COLONOSCOPY WITH CO2 INSUFFLATION N/A 10/18/2019    Procedure: COLONOSCOPY, WITH CO2 INSUFFLATION;  Surgeon: Duane, William Charles, MD;  Location: [facility] OR     IR CHEST PORT PLACEMENT > 5 YRS OF AGE  11/21/2019     IR CHEST PORT REPLACEMENT SAME SITE RIGHT  10/20/2020     Social History     Socioeconomic History     Marital status:      Spouse name: Not on file     Number of children: Not on file     Years of education: Not on file     Highest education level: Not on file   Occupational History     Not on file   Tobacco Use     Smoking status: Never Smoker     Smokeless tobacco: Never Used   Substance and Sexual Activity     Alcohol use: No     Alcohol/week: 0.0 standard drinks     Drug use: No     Sexual activity: Never   Other Topics Concern     Parent/sibling w/ CABG, MI or angioplasty before 65F 55M? No   Social History Narrative     Not on file     Social Determinants of Health     Financial Resource Strain:      Difficulty of Paying Living Expenses:    Food Insecurity:      Worried About Running Out of Food in the Last Year:      Ran Out of Food in the Last Year:    Transportation Needs:      Lack of Transportation (Medical):      Lack of Transportation  (Non-Medical):    Physical Activity:      Days of Exercise per Week:      Minutes of Exercise per Session:    Stress:      Feeling of Stress :    Social Connections:      Frequency of Communication with Friends and Family:      Frequency of Social Gatherings with Friends and Family:      Attends Yarsanism Services:      Active Member of Clubs or Organizations:      Attends Club or Organization Meetings:      Marital Status:    Intimate Partner Violence:      Fear of Current or Ex-Partner:      Emotionally Abused:      Physically Abused:      Sexually Abused:      Family History   Problem Relation Age of Onset     Hypertension Father      SUBJECTIVE FINDINGS:  A 70-year-old female presents for ingrown toenails on the big toenails.  She relates it has been going on about 4 weeks.  She relates she has seen Urgent Care on 06/27/2021.  She had after-visit summary which I reviewed.  She relates that she has on the doxycycline.  She has been doing the foot soaks.  No injuries.  Relates no problems with anesthesia in the past.    OBJECTIVE FINDINGS:  DP and PT are 2/4 bilaterally.  She has medial hallux nail borders, right and left, with incurvation, thickening, subungual debris, hypergranulation tissue, edema, serosanguineous drainage, pain on palpation.  Minimal erythema.  She has dry, scaly skin in a moccasin pattern bilaterally.  She has dystrophic, discolored nails bilaterally.    ASSESSMENT AND PLAN:  Paronychia, medial hallux nail borders bilaterally.  Tinea pedis bilaterally.  Diagnosis and treatment discussed with her.  I advised nail avulsions.  She did not want to do that today.  She opted for nail debridement; medial hallux nail borders were debrided and local wound care done upon consent today.  I dispensed some bacitracin, Band-Aids and MicroKlenz.  She will clean this twice daily with MicroKlenz.  Advised her on foot soaks daily and to apply bacitracin and a light dressing.  Prescription for Zithromax given.   Use discussed with her.  Return to clinic and see me in 1 week.

## 2021-07-19 NOTE — PATIENT INSTRUCTIONS
Thanks for coming today.  [facility]  [address] [place] [address]    To schedule future appointments in Ortho Clinic, you may call [phone].    To schedule ordered imaging by your provider:   Call [facility] [phone]    To schedule an injection ordered by your provider:  Call [facility] Injection scheduling line: [phone]  [employer] available online at:  [email or web address]    Please call if any further questions or concerns ([phone]).  Clinic hours 8 am to 5 pm.    Return to clinic (call) if symptoms worsen or fail to improve.

## 2021-07-19 NOTE — LETTER
7/19/2021         RE: Alannah Wynn  [address]  [place] [address]        Dear Colleague,    Thank you for referring your patient, Alannah Wynn, to the [facility]. Please see a copy of my visit note below.    Past Medical History:   Diagnosis Date     Gastroesophageal reflux disease      Patient Active Problem List   Diagnosis     External hemorrhoids     Non morbid obesity due to excess calories     Hepatitis B immune     Screening for cervical cancer     CARDIOVASCULAR SCREENING; LDL GOAL LESS THAN 160     Gastroesophageal reflux disease     Overactive bladder     Nevus of left lower leg- undetermined behavior     Metastasis from rectal cancer (H)     Bone metastasis (H)     Nausea     Chemotherapy-induced neutropenia (H)     Past Surgical History:   Procedure Laterality Date     COLONOSCOPY  10/18/2019    1 polyp     COLONOSCOPY N/A 10/18/2019    Procedure: Colonoscopy, With Polypectomy And Biopsy;  Surgeon: Duane, William Charles, MD;  Location: [facility]     COLONOSCOPY WITH CO2 INSUFFLATION N/A 10/18/2019    Procedure: COLONOSCOPY, WITH CO2 INSUFFLATION;  Surgeon: Duane, William Charles, MD;  Location: [facility]     IR CHEST PORT PLACEMENT > 5 YRS OF AGE  11/21/2019     IR CHEST PORT REPLACEMENT SAME SITE RIGHT  10/20/2020     Social History     Socioeconomic History     Marital status:      Spouse name: Not on file     Number of children: Not on file     Years of education: Not on file     Highest education level: Not on file   Occupational History     Not on file   Tobacco Use     Smoking status: Never Smoker     Smokeless tobacco: Never Used   Substance and Sexual Activity     Alcohol use: No     Alcohol/week: 0.0 standard drinks     Drug use: No     Sexual activity: Never   Other Topics Concern     Parent/sibling w/ CABG, MI or angioplasty before 65F 55M? No   Social History Narrative     Not on file     Social Determinants of Health     Financial Resource  Strain:      Difficulty of Paying Living Expenses:    Food Insecurity:      Worried About Running Out of Food in the Last Year:      Ran Out of Food in the Last Year:    Transportation Needs:      Lack of Transportation (Medical):      Lack of Transportation (Non-Medical):    Physical Activity:      Days of Exercise per Week:      Minutes of Exercise per Session:    Stress:      Feeling of Stress :    Social Connections:      Frequency of Communication with Friends and Family:      Frequency of Social Gatherings with Friends and Family:      Attends Confucianist Services:      Active Member of Clubs or Organizations:      Attends Club or Organization Meetings:      Marital Status:    Intimate Partner Violence:      Fear of Current or Ex-Partner:      Emotionally Abused:      Physically Abused:      Sexually Abused:      Family History   Problem Relation Age of Onset     Hypertension Father      SUBJECTIVE FINDINGS:  A 70-year-old female presents for ingrown toenails on the big toenails.  She relates it has been going on about 4 weeks.  She relates she has seen Urgent Care on 06/27/2021.  She had after-visit summary which I reviewed.  She relates that she has on the doxycycline.  She has been doing the foot soaks.  No injuries.  Relates no problems with anesthesia in the past.    OBJECTIVE FINDINGS:  DP and PT are 2/4 bilaterally.  She has medial hallux nail borders, right and left, with incurvation, thickening, subungual debris, hypergranulation tissue, edema, serosanguineous drainage, pain on palpation.  Minimal erythema.  She has dry, scaly skin in a moccasin pattern bilaterally.  She has dystrophic, discolored nails bilaterally.    ASSESSMENT AND PLAN:  Paronychia, medial hallux nail borders bilaterally.  Tinea pedis bilaterally.  Diagnosis and treatment discussed with her.  I advised nail avulsions.  She did not want to do that today.  She opted for nail debridement; medial hallux nail borders were debrided and  local wound care done upon consent today.  I dispensed some bacitracin, Band-Aids and MicroKlenz.  She will clean this twice daily with MicroKlenz.  Advised her on foot soaks daily and to apply bacitracin and a light dressing.  Prescription for Zithromax given.  Use discussed with her.  Return to clinic and see me in 1 week.          Again, thank you for allowing me to participate in the care of your patient.        Sincerely,        Ady Haji DPM

## 2021-07-28 NOTE — NURSING NOTE
Alannah Wynn's chief complaint for this visit includes:  Chief Complaint   Patient presents with     RECHECK     Bilateral ingrown toenails hallux     PCP: Earline Mehta    Referring Provider:  No referring provider defined for this encounter.    LMP 11/27/2001 (Approximate)   Data Unavailable     Do you need any medication refills at today's visit? No    Tia Hernandez MA, ATC

## 2021-07-28 NOTE — LETTER
7/28/2021         RE: Alannah Wynn  [address]  [place] [address]        Dear Colleague,    Thank you for referring your patient, Alannah Wynn, to the [facility]. Please see a copy of my visit note below.    Past Medical History:   Diagnosis Date     Gastroesophageal reflux disease      Patient Active Problem List   Diagnosis     External hemorrhoids     Non morbid obesity due to excess calories     Hepatitis B immune     Screening for cervical cancer     CARDIOVASCULAR SCREENING; LDL GOAL LESS THAN 160     Gastroesophageal reflux disease     Overactive bladder     Nevus of left lower leg- undetermined behavior     Metastasis from rectal cancer (H)     Bone metastasis (H)     Nausea     Chemotherapy-induced neutropenia (H)     Past Surgical History:   Procedure Laterality Date     COLONOSCOPY  10/18/2019    1 polyp     COLONOSCOPY N/A 10/18/2019    Procedure: Colonoscopy, With Polypectomy And Biopsy;  Surgeon: Duane, William Charles, MD;  Location: [facility]     COLONOSCOPY WITH CO2 INSUFFLATION N/A 10/18/2019    Procedure: COLONOSCOPY, WITH CO2 INSUFFLATION;  Surgeon: Duane, William Charles, MD;  Location: [facility]     IR CHEST PORT PLACEMENT > 5 YRS OF AGE  11/21/2019     IR CHEST PORT REPLACEMENT SAME SITE RIGHT  10/20/2020     Social History     Socioeconomic History     Marital status:      Spouse name: Not on file     Number of children: Not on file     Years of education: Not on file     Highest education level: Not on file   Occupational History     Not on file   Tobacco Use     Smoking status: Never Smoker     Smokeless tobacco: Never Used   Substance and Sexual Activity     Alcohol use: No     Alcohol/week: 0.0 standard drinks     Drug use: No     Sexual activity: Never   Other Topics Concern     Parent/sibling w/ CABG, MI or angioplasty before 65F 55M? No   Social History Narrative     Not on file     Social Determinants of Health     Financial Resource  Strain:      Difficulty of Paying Living Expenses:    Food Insecurity:      Worried About Running Out of Food in the Last Year:      Ran Out of Food in the Last Year:    Transportation Needs:      Lack of Transportation (Medical):      Lack of Transportation (Non-Medical):    Physical Activity:      Days of Exercise per Week:      Minutes of Exercise per Session:    Stress:      Feeling of Stress :    Social Connections:      Frequency of Communication with Friends and Family:      Frequency of Social Gatherings with Friends and Family:      Attends Sikhism Services:      Active Member of Clubs or Organizations:      Attends Club or Organization Meetings:      Marital Status:    Intimate Partner Violence:      Fear of Current or Ex-Partner:      Emotionally Abused:      Physically Abused:      Sexually Abused:      Family History   Problem Relation Age of Onset     Hypertension Father      Last Comprehensive Metabolic Panel:  Sodium   Date Value Ref Range Status   07/13/2021 144 133 - 144 mmol/L Final   06/30/2021 145 (H) 133 - 144 mmol/L Final     Potassium   Date Value Ref Range Status   07/13/2021 4.1 3.4 - 5.3 mmol/L Final   06/30/2021 4.3 3.4 - 5.3 mmol/L Final     Chloride   Date Value Ref Range Status   07/13/2021 110 mmol/L Final   06/30/2021 114 (H) 94 - 109 mmol/L Final     Carbon Dioxide   Date Value Ref Range Status   06/30/2021 31 20 - 32 mmol/L Final     Carbon Dioxide (CO2)   Date Value Ref Range Status   07/13/2021 30 20 - 32 mmol/L Final     Anion Gap   Date Value Ref Range Status   07/13/2021 4 3 - 14 mmol/L Final   06/30/2021 <1 (L) 3 - 14 mmol/L Final     Glucose   Date Value Ref Range Status   07/13/2021 100 (H) 70 - 99 mg/dL Final   06/30/2021 74 70 - 99 mg/dL Final     Urea Nitrogen   Date Value Ref Range Status   07/13/2021 5 (L) 7 - 30 mg/dL Final   06/30/2021 5 (L) 7 - 30 mg/dL Final     Creatinine   Date Value Ref Range Status   07/13/2021 0.56 mg/dL Final   06/30/2021 0.53 0.52 - 1.04  mg/dL Final     GFR Estimate   Date Value Ref Range Status   07/13/2021 >90 >60 mL/min/1.73m2 Final     Comment:     As of July 11, 2021, eGFR is calculated by the CKD-EPI creatinine equation, without race adjustment. eGFR can be influenced by muscle mass, exercise, and diet. The reported eGFR is an estimation only and is only applicable if the renal function is stable.   06/30/2021 >90 >60 mL/min/[1.73_m2] Final     Comment:     Non  GFR Calc  Starting 12/18/2018, serum creatinine based estimated GFR (eGFR) will be   calculated using the Chronic Kidney Disease Epidemiology Collaboration   (CKD-EPI) equation.       Calcium   Date Value Ref Range Status   07/13/2021 7.5 (L) 8.5 - 10.1 mg/dL Final   06/30/2021 8.1 (L) 8.5 - 10.1 mg/dL Final     Lab Results   Component Value Date    AST 24 07/13/2021    AST 23 06/30/2021     Lab Results   Component Value Date    ALT 27 07/13/2021    ALT 26 06/30/2021     No results found for: BILICONJ   Lab Results   Component Value Date    BILITOTAL 0.3 07/13/2021    BILITOTAL 0.2 06/30/2021     Lab Results   Component Value Date    ALBUMIN 3.1 07/13/2021    ALBUMIN 3.0 06/30/2021     Lab Results   Component Value Date    PROTTOTAL 6.6 07/13/2021    PROTTOTAL 6.7 06/30/2021      Lab Results   Component Value Date    ALKPHOS 64 07/13/2021    ALKPHOS 68 06/30/2021     SUBJECTIVE FINDINGS:  A 70-year-old female presents for ingrown toenails on the big toenails.  She relates it is doing better and no problems with medications.     OBJECTIVE FINDINGS:  DP and PT are 2/4 bilaterally.  She has medial hallux nail borders, right and left, with incurvation, thickening, subungual debris, decreased hypergranulation tissue, decreased edema, Dry serosanguineous drainage, decreased pain on palpation.  no erythema.  She has mild dry, scaly skin in a moccasin pattern bilaterally.  She has dystrophic, discolored nails bilaterally.     ASSESSMENT AND PLAN:  Paronychia, medial hallux nail  borders bilaterally.  Tinea pedis bilaterally.  Diagnosis and treatment discussed with her.  Medial hallux nail borders were debrided and local wound care done upon consent today.  I dispensed some bacitracin, Band-Aids and MicroKlenz.  She can discontinue wound cares as tolerated.  Tinea Pedis is improved, continue Loprox.  Return to clinic and see me in 1 month.         Again, thank you for allowing me to participate in the care of your patient.        Sincerely,        Ady Haji DPM

## 2021-07-28 NOTE — PROGRESS NOTES
Past Medical History:   Diagnosis Date     Gastroesophageal reflux disease      Patient Active Problem List   Diagnosis     External hemorrhoids     Non morbid obesity due to excess calories     Hepatitis B immune     Screening for cervical cancer     CARDIOVASCULAR SCREENING; LDL GOAL LESS THAN 160     Gastroesophageal reflux disease     Overactive bladder     Nevus of left lower leg- undetermined behavior     Metastasis from rectal cancer (H)     Bone metastasis (H)     Nausea     Chemotherapy-induced neutropenia (H)     Past Surgical History:   Procedure Laterality Date     COLONOSCOPY  10/18/2019    1 polyp     COLONOSCOPY N/A 10/18/2019    Procedure: Colonoscopy, With Polypectomy And Biopsy;  Surgeon: Duane, William Charles, MD;  Location: [facility] OR     COLONOSCOPY WITH CO2 INSUFFLATION N/A 10/18/2019    Procedure: COLONOSCOPY, WITH CO2 INSUFFLATION;  Surgeon: Duane, William Charles, MD;  Location: [facility] OR     IR CHEST PORT PLACEMENT > 5 YRS OF AGE  11/21/2019     IR CHEST PORT REPLACEMENT SAME SITE RIGHT  10/20/2020     Social History     Socioeconomic History     Marital status:      Spouse name: Not on file     Number of children: Not on file     Years of education: Not on file     Highest education level: Not on file   Occupational History     Not on file   Tobacco Use     Smoking status: Never Smoker     Smokeless tobacco: Never Used   Substance and Sexual Activity     Alcohol use: No     Alcohol/week: 0.0 standard drinks     Drug use: No     Sexual activity: Never   Other Topics Concern     Parent/sibling w/ CABG, MI or angioplasty before 65F 55M? No   Social History Narrative     Not on file     Social Determinants of Health     Financial Resource Strain:      Difficulty of Paying Living Expenses:    Food Insecurity:      Worried About Running Out of Food in the Last Year:      Ran Out of Food in the Last Year:    Transportation Needs:      Lack of Transportation (Medical):      Lack of Transportation  (Non-Medical):    Physical Activity:      Days of Exercise per Week:      Minutes of Exercise per Session:    Stress:      Feeling of Stress :    Social Connections:      Frequency of Communication with Friends and Family:      Frequency of Social Gatherings with Friends and Family:      Attends [employer] Services:      Active Member of Clubs or Organizations:      Attends Club or Organization Meetings:      Marital Status:    Intimate Partner Violence:      Fear of Current or Ex-Partner:      Emotionally Abused:      Physically Abused:      Sexually Abused:      Family History   Problem Relation Age of Onset     Hypertension Father      Last Comprehensive Metabolic Panel:  Sodium   Date Value Ref Range Status   07/13/2021 144 133 - 144 mmol/L Final   06/30/2021 145 (H) 133 - 144 mmol/L Final     Potassium   Date Value Ref Range Status   07/13/2021 4.1 3.4 - 5.3 mmol/L Final   06/30/2021 4.3 3.4 - 5.3 mmol/L Final     Chloride   Date Value Ref Range Status   07/13/2021 110 mmol/L Final   06/30/2021 114 (H) 94 - 109 mmol/L Final     Carbon Dioxide   Date Value Ref Range Status   06/30/2021 31 20 - 32 mmol/L Final     Carbon Dioxide (CO2)   Date Value Ref Range Status   07/13/2021 30 20 - 32 mmol/L Final     Anion Gap   Date Value Ref Range Status   07/13/2021 4 3 - 14 mmol/L Final   06/30/2021 <1 (L) 3 - 14 mmol/L Final     Glucose   Date Value Ref Range Status   07/13/2021 100 (H) 70 - 99 mg/dL Final   06/30/2021 74 70 - 99 mg/dL Final     Urea Nitrogen   Date Value Ref Range Status   07/13/2021 5 (L) 7 - 30 mg/dL Final   06/30/2021 5 (L) 7 - 30 mg/dL Final     Creatinine   Date Value Ref Range Status   07/13/2021 0.56 mg/dL Final   06/30/2021 0.53 0.52 - 1.04 mg/dL Final     GFR Estimate   Date Value Ref Range Status   07/13/2021 >90 >60 mL/min/1.73m2 Final     Comment:     As of July 11, 2021, eGFR is calculated by the CKD-EPI creatinine equation, without race adjustment. eGFR can be influenced by muscle mass,  exercise, and diet. The reported eGFR is an estimation only and is only applicable if the renal function is stable.   06/30/2021 >90 >60 mL/min/[1.73_m2] Final     Comment:     Non  GFR Calc  Starting 12/18/2018, serum creatinine based estimated GFR (eGFR) will be   calculated using the Chronic Kidney Disease Epidemiology Collaboration   (CKD-EPI) equation.       Calcium   Date Value Ref Range Status   07/13/2021 7.5 (L) 8.5 - 10.1 mg/dL Final   06/30/2021 8.1 (L) 8.5 - 10.1 mg/dL Final     Lab Results   Component Value Date    AST 24 07/13/2021    AST 23 06/30/2021     Lab Results   Component Value Date    ALT 27 07/13/2021    ALT 26 06/30/2021     No results found for: BILICONJ   Lab Results   Component Value Date    BILITOTAL 0.3 07/13/2021    BILITOTAL 0.2 06/30/2021     Lab Results   Component Value Date    ALBUMIN 3.1 07/13/2021    ALBUMIN 3.0 06/30/2021     Lab Results   Component Value Date    PROTTOTAL 6.6 07/13/2021    PROTTOTAL 6.7 06/30/2021      Lab Results   Component Value Date    ALKPHOS 64 07/13/2021    ALKPHOS 68 06/30/2021     SUBJECTIVE FINDINGS:  A 70-year-old female presents for ingrown toenails on the big toenails.  She relates it is doing better and no problems with medications.     OBJECTIVE FINDINGS:  DP and PT are 2/4 bilaterally.  She has medial hallux nail borders, right and left, with incurvation, thickening, subungual debris, decreased hypergranulation tissue, decreased edema, Dry serosanguineous drainage, decreased pain on palpation.  no erythema.  She has mild dry, scaly skin in a moccasin pattern bilaterally.  She has dystrophic, discolored nails bilaterally.     ASSESSMENT AND PLAN:  Paronychia, medial hallux nail borders bilaterally.  Tinea pedis bilaterally.  Diagnosis and treatment discussed with her.  Medial hallux nail borders were debrided and local wound care done upon consent today.  I dispensed some bacitracin, Band-Aids and MicroKlenz.  She can discontinue  wound cares as tolerated.  Tinea Pedis is improved, continue Loprox.  Return to clinic and see me in 1 month.

## 2021-07-29 NOTE — LETTER
"    7/29/2021         RE: Alannah Wynn  [address]  [place] [address]        Dear Colleague,    Thank you for referring your patient, Alannah Wynn, to the [facility]. Please see a copy of my visit note below.    Oncology Follow Up Visit: July 29, 2021     Oncologist: Dr Charline Velazquez  PCP: Earline Mehta    Diagnosis: Stage IV Adenocarcinoma of the rectum   Alannah Wynn is a 69 yo female who presented in 10/2019 with intermittent hematochezia and anorectal irritation since 2011, that have been managed as \"hemorrhoids\".  Diagnostic colonoscopy on 10/18/19 for rectal bleeding showed \"fungating and ulcerated non-obstructing mass was found in the distal rectum <1 cm from the anal verge. The mass was partially circumferential (involving one-half of the lumen circumference). The mass measured 4 cm in length and diameter measured 6 mm.  Pathology showed invasive moderately differentiated adenocarcinoma with intact mismatch repair protein expression.  Pelvic MRI 11/2019 found fungating polypoid mid/lower rectal mass with luminal narrowing as well as innumerable enlarged lymph nodes as described above with a conglomerate of suspicious lymph nodes abutting the anterior peritoneal reflection. Stage cT3d N2.   Further testing with PET scan proved pulmonarymetastasis and bony metastasis at L3.  KRAS - wild type  Treatment:   11/25/2019 Began treatment with FOLFOX/Avastin  11/25/2019 Began Zometa every 3 months  4/7/2020-7/2020 capecitabine x 4 cycles  12/23/2020 completed Xelox/Avastin x 8 cycles  1/13/2021  Began Irinotecan/ panitumumab every 2 weeks    Interval History: Ms. Wynn is seen alone in clinic for review of symptoms for start of cycle 15 of Irinotecan/ panitumumab and Zometa.  Pt is concerned she is having some bowel leakage of liquid like stool and is needing to wear protection- no bleeding noted. Admits she has had some constipation even with use of senna " 4 tabs daily but has had some formed stool most days. Denies pain or pressure but has had some cramping for the pelvis. Appetite is decreased but continues to eat meals and is not loosing weight. Toe infection is resolved and she is staying active around the home.   Still noting nausea and non pruritic rash on first 2-3 days of cycle- uses antiemetics regularly on first days- not treating rash and does fade on own  Rest of comprehensive and complete ROS is reviewed and is negative.   Past Medical History:   Diagnosis Date     Gastroesophageal reflux disease      Current Outpatient Medications   Medication     Ascorbic Acid (VITAMIN C) 500 MG CAPS     azithromycin (ZITHROMAX) 250 MG tablet     B Complex Vitamins (B COMPLEX-B12 PO)     cholecalciferol (VITAMIN D3) 400 unit (10 mcg) TABS tablet     ciclopirox (LOPROX) 0.77 % cream     clindamycin (CLINDAMAX) 1 % external gel     docusate sodium (COLACE) 100 MG capsule     doxycycline hyclate (VIBRA-TABS) 100 MG tablet     erythromycin (ROMYCIN) 5 MG/GM ophthalmic ointment     Fesoterodine Fumarate 8 MG TB24     hydrocortisone 2.5 % ointment     lidocaine-prilocaine (EMLA) 2.5-2.5 % external cream     loperamide (IMODIUM) 2 MG capsule     LORazepam (ATIVAN) 0.5 MG tablet     Lutein 20 MG CAPS     magnesium oxide (MAG-OX) 400 MG tablet     mirabegron (MYRBETRIQ) 50 MG 24 hr tablet     Multiple vitamin TABS     naloxone (NARCAN) 4 MG/0.1ML nasal spray     omeprazole (PRILOSEC) 20 MG DR capsule     ondansetron (ZOFRAN) 8 MG tablet     polyethylene glycol (MIRALAX) 17 GM/Dose powder     prochlorperazine (COMPAZINE) 10 MG tablet     senna-docusate (SENNA S) 8.6-50 MG tablet     solifenacin (VESICARE) 10 MG tablet     traMADol (ULTRAM) 50 MG tablet     urea (GORDONS UREA) 40 % external ointment     Current Facility-Administered Medications   Medication     lidocaine (XYLOCAINE) 5 % ointment     Allergies   Allergen Reactions     Pcn [Penicillins]      Sulfa Drugs   "    Physical Exam: /80 (BP Location: Left arm, Patient Position: Sitting, Cuff Size: Adult Regular)   Pulse 89   Temp 97.3  F (36.3  C) (Oral)   Resp 16   Ht 1.575 m (5' 2\")   Wt 55.2 kg (121 lb 9.6 oz)   LMP 11/27/2001 (Approximate)   SpO2 100%   BMI 22.24 kg/m     Constitutional: moving well on own, cooperative and in no distress.   ENT: Eyes bright, No mouth sores  Neck: Supple, No adenopathy.  Cardiac: Heart rate and rhythm is regular and strong without murmur  Respiratory: Breathing easy. Lung sounds clear to auscultation- no cough   Abdomen: Soft, non-tender at present with BS noted in all four quadrants at this time. No masses or organomegaly  Rectal check- MERY- with noted hard stool but no large mass of stool noted- no mass felt. No hemorrhoids or bleeding from rectum. Does have area of excoriation to buttock skin noted.   MS: Muscle tone normal, extremities normal with no edema. Able to get to exam table without problems.  Skin: No suspicious rash today though still saying she does get rash onfirst 2 days of cycle- hair is very thin- wearing wig.see buttock skin stage 1-2 above.   Neuro: Sensory grossly WNL, gait normal.   Lymph: Normal ant/post cervical, axillary, supraclavicular nodes  Psych: Mentation appears normal and affect normal/bright and with good conversation. Need to repeat directions at times to assess understanding of directions.     Laboratory Results:   Results for orders placed or performed in visit on 07/29/21   Comprehensive metabolic panel     Status: Abnormal   Result Value Ref Range    Sodium 144 133 - 144 mmol/L    Potassium 3.9 3.4 - 5.3 mmol/L    Chloride 107 94 - 109 mmol/L    Carbon Dioxide (CO2) 34 (H) 20 - 32 mmol/L    Anion Gap 3 3 - 14 mmol/L    Urea Nitrogen 7 7 - 30 mg/dL    Creatinine 0.66 0.52 - 1.04 mg/dL    Calcium 9.6 8.5 - 10.1 mg/dL    Glucose 89 70 - 99 mg/dL    Alkaline Phosphatase 80 40 - 150 U/L    AST 26 0 - 45 U/L    ALT 36 0 - 50 U/L    Protein " Total 7.1 6.8 - 8.8 g/dL    Albumin 3.2 (L) 3.4 - 5.0 g/dL    Bilirubin Total 0.3 0.2 - 1.3 mg/dL    GFR Estimate 90 >60 mL/min/1.73m2   Magnesium     Status: Normal   Result Value Ref Range    Magnesium 1.6 1.6 - 2.3 mg/dL   CBC with platelets and differential     Status: Abnormal   Result Value Ref Range    WBC Count 5.8 4.0 - 11.0 10e3/uL    RBC Count 4.21 3.80 - 5.20 10e6/uL    Hemoglobin 12.0 11.7 - 15.7 g/dL    Hematocrit 38.5 35.0 - 47.0 %    MCV 91 78 - 100 fL    MCH 28.5 26.5 - 33.0 pg    MCHC 31.2 (L) 31.5 - 36.5 g/dL    RDW 15.8 (H) 10.0 - 15.0 %    Platelet Count 413 150 - 450 10e3/uL    % Neutrophils 50 %    % Lymphocytes 37 %    % Monocytes 8 %    % Eosinophils 4 %    % Basophils 1 %    % Immature Granulocytes 0 %    NRBCs per 100 WBC 0 <1 /100    Absolute Neutrophils 2.9 1.6 - 8.3 10e3/uL    Absolute Lymphocytes 2.1 0.8 - 5.3 10e3/uL    Absolute Monocytes 0.5 0.0 - 1.3 10e3/uL    Absolute Eosinophils 0.2 0.0 - 0.7 10e3/uL    Absolute Basophils 0.1 0.0 - 0.2 10e3/uL    Absolute Immature Granulocytes 0.0 <=0.0 10e3/uL    Absolute NRBCs 0.0 10e3/uL   CBC with platelets differential     Status: Abnormal    Narrative    The following orders were created for panel order CBC with platelets differential.  Procedure                               Abnormality         Status                     ---------                               -----------         ------                     CBC with platelets and d...[[id number]]  Abnormal            Final result                 Please view results for these tests on the individual orders.     Assessment and Plan:   Stage IV Adenocarcinoma of the rectum - Pt continues with irinotecan/Panitumumab every 2 weeks for MSI intact, KRAS wild type, PDL1+ cancer with metastasis to lungs, bones and lymph nodes of the pelvis.   She is noting first 2-3 days of nausea and rash but with use of antiemetics - all resolve by day 3.   Pt is meeting goals to continue with plan today.    She  will return in 2 weeks for review prior to cycle 16 with labs prior to visit.   Imaging to be repeated 8/6 and will see Dr Velazquez for review on 8/11.  Rectal leakage with constipation- exam with MERY and Anoscope did not find new mass or pressure but appears as constipation concern. Pt will use miralax x 2 tomorrow to clear bowels and see if this will help with concern. She will resume use of senna in a couple days. If the rectal leakage continues will set up to see GI for review.   Skin excoriation to buttock- recommend using Desitin to area bid to tid to procet from irritation of the leakage and briefs.   Bone metastasis- Using Zometa- receiving every 3 months- will be given today. Confirms use of calcium plus vitamin D daily- calcium did improved with getting back on her caclium supplement.  Peripheral neuropathy- from previous use of Oxaliplatin- stable to improving and not affecting gait.No current treatment.  The total time of this encounter amounted to 35 minutes. This time included face to face time spent with the patient, prep work, ordering/ reviewing tests, and performing post visit documentation.  Felipa Garner Cnp                Again, thank you for allowing me to participate in the care of your patient.        Sincerely,        Felipa Garner, LETICIA, APRN CNP

## 2021-07-29 NOTE — PROGRESS NOTES
Infusion Nursing Note:  Alannah Wynn presents today for C15D1 Vectibix, Irinotecan and Zometa.    Patient seen by provider today: Yes: Felipa Garner NP   present during visit today: Not Applicable.    Note: Corrected calcium high enough this week, thus Zometa given per Felipa Garner NP.    Patient states she gets abdominal cramps at home.  Recommended patient take Imodium as prescribed on the box.  Patient verbalized understanding and agreement with this plan.    Intravenous Access:  Peripheral IV placed.    Treatment Conditions:  Lab Results   Component Value Date    HGB 12.0 07/29/2021    HGB 11.5 06/30/2021     Lab Results   Component Value Date    WBC 5.8 07/29/2021    WBC 5.6 06/30/2021      Lab Results   Component Value Date    ANEU 2.4 06/30/2021     Lab Results   Component Value Date     07/29/2021     06/30/2021      Lab Results   Component Value Date     07/29/2021     06/30/2021                   Lab Results   Component Value Date    POTASSIUM 3.9 07/29/2021    POTASSIUM 4.3 06/30/2021           Lab Results   Component Value Date    MAG 1.6 07/29/2021    MAG 1.1 06/30/2021            Lab Results   Component Value Date    CR 0.66 07/29/2021    CR 0.53 06/30/2021                   Lab Results   Component Value Date    LAUREN 9.6 07/29/2021    LAUREN 8.1 06/30/2021                Lab Results   Component Value Date    BILITOTAL 0.3 07/29/2021    BILITOTAL 0.2 06/30/2021           Lab Results   Component Value Date    ALBUMIN 3.2 07/29/2021    ALBUMIN 3.0 06/30/2021                    Lab Results   Component Value Date    ALT 36 07/29/2021    ALT 26 06/30/2021           Lab Results   Component Value Date    AST 26 07/29/2021    AST 23 06/30/2021       Results reviewed, labs MET treatment parameters, ok to proceed with treatment.      Post Infusion Assessment:  Patient tolerated infusion without incident.  Blood return noted pre and post infusion.  Site patent and intact,  free from redness, edema or discomfort.  No evidence of extravasations.  Access discontinued per protocol.       Discharge Plan:   AVS to patient via MYCHART.  Patient will return 8/11/21 for next appointment.   Patient discharged in stable condition accompanied by: self.  is .  Departure Mode: Ambulatory.      Gisell Nuñez RN

## 2021-07-29 NOTE — PROGRESS NOTES
"Oncology Follow Up Visit: July 29, 2021     Oncologist: Dr Charline Velazquez  PCP: Earline Mehta    Diagnosis: Stage IV Adenocarcinoma of the rectum   Alannah Wynn is a 71 yo female who presented in 10/2019 with intermittent hematochezia and anorectal irritation since 2011, that have been managed as \"hemorrhoids\".  Diagnostic colonoscopy on 10/18/19 for rectal bleeding showed \"fungating and ulcerated non-obstructing mass was found in the distal rectum <1 cm from the anal verge. The mass was partially circumferential (involving one-half of the lumen circumference). The mass measured 4 cm in length and diameter measured 6 mm.  Pathology showed invasive moderately differentiated adenocarcinoma with intact mismatch repair protein expression.  Pelvic MRI 11/2019 found fungating polypoid mid/lower rectal mass with luminal narrowing as well as innumerable enlarged lymph nodes as described above with a conglomerate of suspicious lymph nodes abutting the anterior peritoneal reflection. Stage cT3d N2.   Further testing with PET scan proved pulmonarymetastasis and bony metastasis at L3.  KRAS - wild type  Treatment:   11/25/2019 Began treatment with FOLFOX/Avastin  11/25/2019 Began Zometa every 3 months  4/7/2020-7/2020 capecitabine x 4 cycles  12/23/2020 completed Xelox/Avastin x 8 cycles  1/13/2021  Began Irinotecan/ panitumumab every 2 weeks    Interval History: Ms. Wynn is seen alone in clinic for review of symptoms for start of cycle 15 of Irinotecan/ panitumumab and Zometa.  Pt is concerned she is having some bowel leakage of liquid like stool and is needing to wear protection- no bleeding noted. Admits she has had some constipation even with use of senna 4 tabs daily but has had some formed stool most days. Denies pain or pressure but has had some cramping for the pelvis. Appetite is decreased but continues to eat meals and is not loosing weight. Toe infection is resolved and she is staying active around the home. " "  Still noting nausea and non pruritic rash on first 2-3 days of cycle- uses antiemetics regularly on first days- not treating rash and does fade on own  Rest of comprehensive and complete ROS is reviewed and is negative.   Past Medical History:   Diagnosis Date     Gastroesophageal reflux disease      Current Outpatient Medications   Medication     Ascorbic Acid (VITAMIN C) 500 MG CAPS     azithromycin (ZITHROMAX) 250 MG tablet     B Complex Vitamins (B COMPLEX-B12 PO)     cholecalciferol (VITAMIN D3) 400 unit (10 mcg) TABS tablet     ciclopirox (LOPROX) 0.77 % cream     clindamycin (CLINDAMAX) 1 % external gel     docusate sodium (COLACE) 100 MG capsule     doxycycline hyclate (VIBRA-TABS) 100 MG tablet     erythromycin (ROMYCIN) 5 MG/GM ophthalmic ointment     Fesoterodine Fumarate 8 MG TB24     hydrocortisone 2.5 % ointment     lidocaine-prilocaine (EMLA) 2.5-2.5 % external cream     loperamide (IMODIUM) 2 MG capsule     LORazepam (ATIVAN) 0.5 MG tablet     Lutein 20 MG CAPS     magnesium oxide (MAG-OX) 400 MG tablet     mirabegron (MYRBETRIQ) 50 MG 24 hr tablet     Multiple vitamin TABS     naloxone (NARCAN) 4 MG/0.1ML nasal spray     omeprazole (PRILOSEC) 20 MG DR capsule     ondansetron (ZOFRAN) 8 MG tablet     polyethylene glycol (MIRALAX) 17 GM/Dose powder     prochlorperazine (COMPAZINE) 10 MG tablet     senna-docusate (SENNA S) 8.6-50 MG tablet     solifenacin (VESICARE) 10 MG tablet     traMADol (ULTRAM) 50 MG tablet     urea (GORDONS UREA) 40 % external ointment     Current Facility-Administered Medications   Medication     lidocaine (XYLOCAINE) 5 % ointment     Allergies   Allergen Reactions     Pcn [Penicillins]      Sulfa Drugs      Physical Exam: /80 (BP Location: Left arm, Patient Position: Sitting, Cuff Size: Adult Regular)   Pulse 89   Temp 97.3  F (36.3  C) (Oral)   Resp 16   Ht 1.575 m (5' 2\")   Wt 55.2 kg (121 lb 9.6 oz)   LMP 11/27/2001 (Approximate)   SpO2 100%   BMI 22.24 " kg/m     Constitutional: moving well on own, cooperative and in no distress.   ENT: Eyes bright, No mouth sores  Neck: Supple, No adenopathy.  Cardiac: Heart rate and rhythm is regular and strong without murmur  Respiratory: Breathing easy. Lung sounds clear to auscultation- no cough   Abdomen: Soft, non-tender at present with BS noted in all four quadrants at this time. No masses or organomegaly  Rectal check- MERY- with noted hard stool but no large mass of stool noted- no mass felt. No hemorrhoids or bleeding from rectum. Does have area of excoriation to buttock skin noted.   MS: Muscle tone normal, extremities normal with no edema. Able to get to exam table without problems.  Skin: No suspicious rash today though still saying she does get rash onfirst 2 days of cycle- hair is very thin- wearing wig.see buttock skin stage 1-2 above.   Neuro: Sensory grossly WNL, gait normal.   Lymph: Normal ant/post cervical, axillary, supraclavicular nodes  Psych: Mentation appears normal and affect normal/bright and with good conversation. Need to repeat directions at times to assess understanding of directions.     Laboratory Results:   Results for orders placed or performed in visit on 07/29/21   Comprehensive metabolic panel     Status: Abnormal   Result Value Ref Range    Sodium 144 133 - 144 mmol/L    Potassium 3.9 3.4 - 5.3 mmol/L    Chloride 107 94 - 109 mmol/L    Carbon Dioxide (CO2) 34 (H) 20 - 32 mmol/L    Anion Gap 3 3 - 14 mmol/L    Urea Nitrogen 7 7 - 30 mg/dL    Creatinine 0.66 0.52 - 1.04 mg/dL    Calcium 9.6 8.5 - 10.1 mg/dL    Glucose 89 70 - 99 mg/dL    Alkaline Phosphatase 80 40 - 150 U/L    AST 26 0 - 45 U/L    ALT 36 0 - 50 U/L    Protein Total 7.1 6.8 - 8.8 g/dL    Albumin 3.2 (L) 3.4 - 5.0 g/dL    Bilirubin Total 0.3 0.2 - 1.3 mg/dL    GFR Estimate 90 >60 mL/min/1.73m2   Magnesium     Status: Normal   Result Value Ref Range    Magnesium 1.6 1.6 - 2.3 mg/dL   CBC with platelets and differential     Status:  Abnormal   Result Value Ref Range    WBC Count 5.8 4.0 - 11.0 10e3/uL    RBC Count 4.21 3.80 - 5.20 10e6/uL    Hemoglobin 12.0 11.7 - 15.7 g/dL    Hematocrit 38.5 35.0 - 47.0 %    MCV 91 78 - 100 fL    MCH 28.5 26.5 - 33.0 pg    MCHC 31.2 (L) 31.5 - 36.5 g/dL    RDW 15.8 (H) 10.0 - 15.0 %    Platelet Count 413 150 - 450 10e3/uL    % Neutrophils 50 %    % Lymphocytes 37 %    % Monocytes 8 %    % Eosinophils 4 %    % Basophils 1 %    % Immature Granulocytes 0 %    NRBCs per 100 WBC 0 <1 /100    Absolute Neutrophils 2.9 1.6 - 8.3 10e3/uL    Absolute Lymphocytes 2.1 0.8 - 5.3 10e3/uL    Absolute Monocytes 0.5 0.0 - 1.3 10e3/uL    Absolute Eosinophils 0.2 0.0 - 0.7 10e3/uL    Absolute Basophils 0.1 0.0 - 0.2 10e3/uL    Absolute Immature Granulocytes 0.0 <=0.0 10e3/uL    Absolute NRBCs 0.0 10e3/uL   CBC with platelets differential     Status: Abnormal    Narrative    The following orders were created for panel order CBC with platelets differential.  Procedure                               Abnormality         Status                     ---------                               -----------         ------                     CBC with platelets and d...[[id number]]  Abnormal            Final result                 Please view results for these tests on the individual orders.     Assessment and Plan:   Stage IV Adenocarcinoma of the rectum - Pt continues with irinotecan/Panitumumab every 2 weeks for MSI intact, KRAS wild type, PDL1+ cancer with metastasis to lungs, bones and lymph nodes of the pelvis.   She is noting first 2-3 days of nausea and rash but with use of antiemetics - all resolve by day 3.   Pt is meeting goals to continue with plan today.    She will return in 2 weeks for review prior to cycle 16 with labs prior to visit.   Imaging to be repeated 8/6 and will see Dr Velazquez for review on 8/11.  Rectal leakage with constipation- exam with MERY and Anoscope did not find new mass or pressure but appears as constipation  concern. Pt will use miralax x 2 tomorrow to clear bowels and see if this will help with concern. She will resume use of senna in a couple days. If the rectal leakage continues will set up to see GI for review.   Skin excoriation to buttock- recommend using Desitin to area bid to tid to procet from irritation of the leakage and briefs.   Bone metastasis- Using Zometa- receiving every 3 months- will be given today. Confirms use of calcium plus vitamin D daily- calcium did improved with getting back on her caclium supplement.  Peripheral neuropathy- from previous use of Oxaliplatin- stable to improving and not affecting gait.No current treatment.  The total time of this encounter amounted to 35 minutes. This time included face to face time spent with the patient, prep work, ordering/ reviewing tests, and performing post visit documentation.  Felipa Garner,Cnp

## 2021-07-29 NOTE — NURSING NOTE
"Oncology Rooming Note    July 29, 2021 11:39 AM   Alannah Wynn is a 70 year old female who presents for:    No chief complaint on file.    Initial Vitals: /80 (BP Location: Left arm, Patient Position: Sitting, Cuff Size: Adult Regular)   Pulse 89   Temp 97.3  F (36.3  C) (Oral)   Resp 16   Ht 1.575 m (5' 2\")   Wt 55.2 kg (121 lb 9.6 oz)   LMP 11/27/2001 (Approximate)   SpO2 100%   BMI 22.24 kg/m   Estimated body mass index is 22.24 kg/m  as calculated from the following:    Height as of this encounter: 1.575 m (5' 2\").    Weight as of this encounter: 55.2 kg (121 lb 9.6 oz). Body surface area is 1.55 meters squared.  Extreme Pain (8) Comment: Data Unavailable   Patient's last menstrual period was 11/27/2001 (approximate).  Allergies reviewed: Yes  Medications reviewed: Yes    Medications: MEDICATION REFILLS NEEDED TODAY. Provider was notified.  Pharmacy name entered into [facility]:    [id number] - [place], [place] - [address] AT [address] [facility] [place], [place] - [address]  [facility] - [place], [place] - [address]    Clinical concerns: Rectal pain/hemorrhoid, leaking stool, refill magnus Garner was notified.      Mone Singh CMA              "

## 2021-09-07 NOTE — LETTER
"    11/25/2019         RE: Alannah Wynn  [address]  [place] [address]        Dear Colleague,    Thank you for referring your patient, Alannah Wynn, to the [facility]. Please see a copy of my visit note below.    Oncology Follow Up Visit: November 25, 2019    Oncologist: Dr Rachna Tavarez  PCP: Earline Mehta    Diagnosis: Stage IV Adenocarcinoma of the rectum   Alannah Wynn is a 67 yo female who presented in 10/2019 with intermittent hematochezia and anorectal irritation since 2011, that have been managed as \"hemorrhoids\".  Diagnostic colonoscopy on 10/18/19 for rectal bleeding showed \"fungating and ulcerated non-obstructing mass was found in the distal rectum <1 cm from the anal verge. The mass was partially circumferential (involving one-half of the lumen circumference). The mass measured four cm in length. In addition, its diameter measured six mm.  Pathology showed invasive moderately differentiated adenocarcinoma with intact mismatch repair protein expression.  Pelvic MRI 11/2019 found fungating polypoid mid/lower rectal mass with luminal narrowing as well as innumerable enlarged lymph nodes as described above with a conglomerate of suspicious lymph nodes abutting the anterior peritoneal reflection. Stage cT3d N2.   Further testing with PET scan proved pulmonarymetastasis and bony metastasis at L3.  Treatment:   11/25/2019 to begin treatment with FOLFOX/Avastin    Interval History: Ms. Wynn comes to clinic with  and son for start of treatment for her colon cancer with metastasis with FOLFOX/Avastin. They have many questions about treatment but pt is anxious to start infusion. She is otherwise feeling well without pain but admits she has some loose stools and occasionally noted with blood in stool. She is eating well and bladder is without problems.    Rest of comprehensive and complete ROS is reviewed and is negative.   Past Medical History:   Diagnosis Date     " "Gastroesophageal reflux disease      Current Outpatient Medications   Medication     docusate sodium (COLACE) 100 MG capsule     ENOVARX-LIDOCAINE HCL 5 % cream     Fesoterodine Fumarate 8 MG TB24     hydrocortisone (ANUSOL-HC) 2.5 % cream     LORazepam (ATIVAN) 0.5 MG tablet     mirabegron (MYRBETRIQ) 50 MG 24 hr tablet     Multiple vitamin TABS     omeprazole (PRILOSEC) 20 MG DR capsule     ondansetron (ZOFRAN) 8 MG tablet     prochlorperazine (COMPAZINE) 10 MG tablet     solifenacin (VESICARE) 10 MG tablet     trospium (SANCTURA XR) 60 MG CP24 24 hr capsule     Current Facility-Administered Medications   Medication     lidocaine (XYLOCAINE) 5 % ointment     Allergies   Allergen Reactions     Penicillins      Sulfa Drugs        Physical Exam:/78 (BP Location: Right arm)   Pulse 86   Temp 97.7  F (36.5  C) (Oral)   Resp 16   Ht 1.495 m (4' 10.86\")   Wt 75.1 kg (165 lb 9.6 oz)   LMP 11/27/2001 (Approximate)   SpO2 94%   BMI 33.61 kg/m      ECOG PS- 0  Constitutional: Alert, cooperative, and in no distress.   ENT: Eyes bright , No mouth sores  Neck: Supple, No adenopathy.  Cardiac: Heart rate and rhythm is regular and strong without murmur  Respiratory: Breathing easy. Lung sounds clear to auscultation  GI: Abdomen is soft, non-tender, BS normal. No masses or organomegaly  MS: Muscle tone normal, extremities normal with no edema.   Skin: No suspicious lesions or rashes  Neuro: Sensory grossly WNL, gait normal.   Lymph: Normal ant/post cervical, axillary, supraclavicular nodes  Psych: Mentation appears normal and affect is normal.     Laboratory Results:   Results for orders placed or performed in visit on 11/25/19   CBC with platelets differential     Status: None   Result Value Ref Range    WBC 8.2 4.0 - 11.0 10e9/L    RBC Count 4.53 3.8 - 5.2 10e12/L    Hemoglobin 13.0 11.7 - 15.7 g/dL    Hematocrit 41.2 35.0 - 47.0 %    MCV 91 78 - 100 fl    MCH 28.7 26.5 - 33.0 pg    MCHC 31.6 31.5 - 36.5 g/dL    " RDW 13.7 10.0 - 15.0 %    Platelet Count 331 150 - 450 10e9/L    Diff Method Automated Method     % Neutrophils 52.5 %    % Lymphocytes 36.0 %    % Monocytes 6.2 %    % Eosinophils 4.0 %    % Basophils 0.9 %    % Immature Granulocytes 0.4 %    Absolute Neutrophil 4.3 1.6 - 8.3 10e9/L    Absolute Lymphocytes 3.0 0.8 - 5.3 10e9/L    Absolute Monocytes 0.5 0.0 - 1.3 10e9/L    Absolute Eosinophils 0.3 0.0 - 0.7 10e9/L    Absolute Basophils 0.1 0.0 - 0.2 10e9/L    Abs Immature Granulocytes 0.0 0 - 0.4 10e9/L   Comprehensive metabolic panel     Status: Abnormal   Result Value Ref Range    Sodium 141 133 - 144 mmol/L    Potassium 3.9 3.4 - 5.3 mmol/L    Chloride 107 94 - 109 mmol/L    Carbon Dioxide 28 20 - 32 mmol/L    Anion Gap 6 3 - 14 mmol/L    Glucose 109 (H) 70 - 99 mg/dL    Urea Nitrogen 10 7 - 30 mg/dL    Creatinine 0.61 0.52 - 1.04 mg/dL    GFR Estimate >90 >60 mL/min/[1.73_m2]    GFR Estimate If Black >90 >60 mL/min/[1.73_m2]    Calcium 9.1 8.5 - 10.1 mg/dL    Bilirubin Total 0.4 0.2 - 1.3 mg/dL    Albumin 3.5 3.4 - 5.0 g/dL    Protein Total 7.4 6.8 - 8.8 g/dL    Alkaline Phosphatase 111 40 - 150 U/L    ALT 33 0 - 50 U/L    AST 18 0 - 45 U/L   Protein qualitative urine     Status: None   Result Value Ref Range    Protein Albumin Urine Negative NEG^Negative mg/dL     Assessment and Plan:   Stage IV Adenocarcinoma of the rectum-Pt is here with family to start treatment with FOLFOX/Avastin. We reviewed administration and side effects of treatment with expectations for follow up after 6 cycles. Symptom control reviewed and made sure patient and family know where to call for help if needed. All of patient's questions were answered to best of ability.   Pharmacy will be discussing antiemetics with pt during infusion.   Pt will return in 2 weeks for cycle 2 and we will see her for symptom review prior to treatment.   -Awaiting PD1L result.   Bone metastasis- pt will start zometa today. Reviewed need for calcium and  vitamin D support today.   Blood in stool- reviewed with pt that if increases she should contact us for next steps.   This was a 35 min visit with > 50% in counseling and coordinating care including education and management of concerns.    Felipa Garner CNP    Again, thank you for allowing me to participate in the care of your patient.        Sincerely,        Felipa Garner, NP, APRN CNP     Where Do You Want The Question To Include Opioid Counseling Located?: Case Summary Tab

## 2023-04-03 PROBLEM — C79.51 MALIGNANT NEOPLASM METASTATIC TO BONE (H): Status: ACTIVE | Noted: 2019-11-13

## 2023-04-21 NOTE — PATIENT INSTRUCTIONS
Start Xeloda  RTC in 3 weeks with video/telephone visit with me. Labs prior.   Will benefit from weight loss, dietary changes and increased exercise

## 2023-12-22 NOTE — IR NOTE
Interventional Radiology Intra-procedural Nursing Note    Patient Name: Alannah Wynn  Medical Record Number: [id number]  Today's Date: November 21, 2019    Start Time: 1021  End of procedure time: 1033  Procedure: Port Placement  Report given to: Gisell MCKEON, care suites  Time pt departs:  1040    Other Notes: Patient into IR suite 2 via cart with Ancef infusing to right PIV. Awake and alert to all spheres. VSS, sinus rhythm. Patient to table in supine position, prepped and draped with 2% chlorhexidine to right neck/chest. Dr. Kimbrough in room, timeout and procedure started. New 6Fr slim port placed to right chest. Flushed with 500 units heparin. Patient tolerated procedure well. Versed mg and fentanyl mcg given. Debrief with Dr. Kimbrough, no complications. Dressing to right chest clean, dry and intact. (dermabond, tegaderm, and gauze) Patient back to care suites via cart. Bedside report given.     Lavonne Rainey RN    
Applied

## 2024-03-19 NOTE — PROGRESS NOTES
Care Suites Post Procedure Note    Patient Information  Name: Alannah Wynn  Age: 69 year old    Post Procedure  Time patient returned to Care Suites: 0950  Concerns/abnormal assessment: none  If abnormal assessment, provider notified: N/A  Plan/Other: dressing to right upper chest CDI.  Reports no pain.  VSS, taking in PO fluids.  Pt has  Port booklet and ID.  Last sedation at 0913    Madison Carbajal RN      monitored anesthesia care (MAC)

## 2025-06-21 NOTE — NURSING NOTE
"Oncology Rooming Note    Mia 15, 2021 7:38 AM   Alannah Wynn is a 70 year old female who presents for:    Chief Complaint   Patient presents with     Oncology Clinic Visit     Prior to treatment     Initial Vitals: /78 (BP Location: Left arm)   Pulse 90   Temp 97.5  F (36.4  C) (Oral)   Resp 14   Ht 1.575 m (5' 2.01\")   Wt 58.4 kg (128 lb 12.8 oz)   LMP 11/27/2001 (Approximate)   SpO2 98%   BMI 23.55 kg/m   Estimated body mass index is 23.55 kg/m  as calculated from the following:    Height as of this encounter: 1.575 m (5' 2.01\").    Weight as of this encounter: 58.4 kg (128 lb 12.8 oz). Body surface area is 1.6 meters squared.  No Pain (0) Comment: Data Unavailable   Patient's last menstrual period was 11/27/2001 (approximate).  Allergies reviewed: Yes  Medications reviewed: Yes    Medications: MEDICATION REFILLS NEEDED TODAY. Provider was notified.  Pharmacy name entered into [facility]:    [id number] - [place], [place] - [address] [facility] - [place], [place] - [address], [address]  [facility] - [place], [place] - [address]    Clinical concerns: constipation- taking Senna and Miralax.       Suni Givens LPN              "
no

## (undated) DEVICE — SOL WATER IRRIG 1000ML BOTTLE 07139-09

## (undated) RX ORDER — FENTANYL CITRATE 50 UG/ML
INJECTION, SOLUTION INTRAMUSCULAR; INTRAVENOUS
Status: DISPENSED
Start: 2020-01-01

## (undated) RX ORDER — SIMETHICONE 40MG/0.6ML
SUSPENSION, DROPS(FINAL DOSAGE FORM)(ML) ORAL
Status: DISPENSED
Start: 2019-10-18

## (undated) RX ORDER — CLINDAMYCIN PHOSPHATE 900 MG/50ML
INJECTION, SOLUTION INTRAVENOUS
Status: DISPENSED
Start: 2020-01-01

## (undated) RX ORDER — HEPARIN SODIUM (PORCINE) LOCK FLUSH IV SOLN 100 UNIT/ML 100 UNIT/ML
SOLUTION INTRAVENOUS
Status: DISPENSED
Start: 2020-01-01

## (undated) RX ORDER — FENTANYL CITRATE 50 UG/ML
INJECTION, SOLUTION INTRAMUSCULAR; INTRAVENOUS
Status: DISPENSED
Start: 2019-10-18

## (undated) RX ORDER — LIDOCAINE HYDROCHLORIDE 10 MG/ML
INJECTION, SOLUTION INFILTRATION; PERINEURAL
Status: DISPENSED
Start: 2020-01-01